# Patient Record
Sex: MALE | Race: ASIAN | NOT HISPANIC OR LATINO | Employment: FULL TIME | URBAN - METROPOLITAN AREA
[De-identification: names, ages, dates, MRNs, and addresses within clinical notes are randomized per-mention and may not be internally consistent; named-entity substitution may affect disease eponyms.]

---

## 2021-01-01 ENCOUNTER — OFFICE VISIT (OUTPATIENT)
Dept: UROLOGY | Facility: CLINIC | Age: 61
End: 2021-01-01
Payer: COMMERCIAL

## 2021-01-01 ENCOUNTER — TELEPHONE (OUTPATIENT)
Dept: UROLOGY | Facility: CLINIC | Age: 61
End: 2021-01-01
Payer: COMMERCIAL

## 2021-01-01 ENCOUNTER — LAB VISIT (OUTPATIENT)
Dept: LAB | Facility: HOSPITAL | Age: 61
End: 2021-01-01
Attending: NURSE PRACTITIONER
Payer: COMMERCIAL

## 2021-01-01 VITALS
HEIGHT: 68 IN | DIASTOLIC BLOOD PRESSURE: 74 MMHG | SYSTOLIC BLOOD PRESSURE: 118 MMHG | HEART RATE: 68 BPM | BODY MASS INDEX: 22.66 KG/M2 | WEIGHT: 149.5 LBS

## 2021-01-01 DIAGNOSIS — N40.1 BENIGN PROSTATIC HYPERPLASIA WITH URINARY FREQUENCY: ICD-10-CM

## 2021-01-01 DIAGNOSIS — Z79.4 TYPE 2 DIABETES MELLITUS WITH CHRONIC KIDNEY DISEASE, WITH LONG-TERM CURRENT USE OF INSULIN, UNSPECIFIED CKD STAGE: ICD-10-CM

## 2021-01-01 DIAGNOSIS — R35.0 BENIGN PROSTATIC HYPERPLASIA WITH URINARY FREQUENCY: ICD-10-CM

## 2021-01-01 DIAGNOSIS — N52.9 ERECTILE DYSFUNCTION, UNSPECIFIED ERECTILE DYSFUNCTION TYPE: ICD-10-CM

## 2021-01-01 DIAGNOSIS — E11.22 TYPE 2 DIABETES MELLITUS WITH CHRONIC KIDNEY DISEASE, WITH LONG-TERM CURRENT USE OF INSULIN, UNSPECIFIED CKD STAGE: ICD-10-CM

## 2021-01-01 DIAGNOSIS — Z94.0 KIDNEY TRANSPLANT RECIPIENT: ICD-10-CM

## 2021-01-01 DIAGNOSIS — N52.9 ERECTILE DYSFUNCTION, UNSPECIFIED ERECTILE DYSFUNCTION TYPE: Primary | ICD-10-CM

## 2021-01-01 LAB
ANION GAP SERPL CALC-SCNC: 9 MMOL/L (ref 8–16)
BUN SERPL-MCNC: 15 MG/DL (ref 6–20)
CALCIUM SERPL-MCNC: 8.8 MG/DL (ref 8.7–10.5)
CHLORIDE SERPL-SCNC: 106 MMOL/L (ref 95–110)
CO2 SERPL-SCNC: 22 MMOL/L (ref 23–29)
COMPLEXED PSA SERPL-MCNC: 0.18 NG/ML (ref 0–4)
CREAT SERPL-MCNC: 1.1 MG/DL (ref 0.5–1.4)
ERYTHROCYTE [DISTWIDTH] IN BLOOD BY AUTOMATED COUNT: 13.2 % (ref 11.5–14.5)
EST. GFR  (AFRICAN AMERICAN): >60 ML/MIN/1.73 M^2
EST. GFR  (NON AFRICAN AMERICAN): >60 ML/MIN/1.73 M^2
ESTIMATED AVG GLUCOSE: 174 MG/DL (ref 68–131)
FSH SERPL-ACNC: 38.29 MIU/ML (ref 0.95–11.95)
GLUCOSE SERPL-MCNC: 217 MG/DL (ref 70–110)
HBA1C MFR BLD: 7.7 % (ref 4–5.6)
HCT VFR BLD AUTO: 34 % (ref 40–54)
HGB BLD-MCNC: 11.9 G/DL (ref 14–18)
LH SERPL-ACNC: 17.7 MIU/ML (ref 0.6–12.1)
MCH RBC QN AUTO: 30.7 PG (ref 27–31)
MCHC RBC AUTO-ENTMCNC: 35 G/DL (ref 32–36)
MCV RBC AUTO: 88 FL (ref 82–98)
PLATELET # BLD AUTO: 93 K/UL (ref 150–450)
PMV BLD AUTO: 11.2 FL (ref 9.2–12.9)
POTASSIUM SERPL-SCNC: 3.7 MMOL/L (ref 3.5–5.1)
PROLACTIN SERPL IA-MCNC: 14.7 NG/ML (ref 3.5–19.4)
RBC # BLD AUTO: 3.88 M/UL (ref 4.6–6.2)
SODIUM SERPL-SCNC: 137 MMOL/L (ref 136–145)
T4 FREE SERPL-MCNC: 1.09 NG/DL (ref 0.71–1.51)
TESTOST SERPL-MCNC: 273 NG/DL (ref 304–1227)
TSH SERPL DL<=0.005 MIU/L-ACNC: 2.3 UIU/ML (ref 0.4–4)
WBC # BLD AUTO: 3.34 K/UL (ref 3.9–12.7)

## 2021-01-01 PROCEDURE — 84443 ASSAY THYROID STIM HORMONE: CPT | Performed by: NURSE PRACTITIONER

## 2021-01-01 PROCEDURE — 1160F RVW MEDS BY RX/DR IN RCRD: CPT | Mod: CPTII,S$GLB,, | Performed by: NURSE PRACTITIONER

## 2021-01-01 PROCEDURE — 3074F SYST BP LT 130 MM HG: CPT | Mod: CPTII,S$GLB,, | Performed by: NURSE PRACTITIONER

## 2021-01-01 PROCEDURE — 3008F PR BODY MASS INDEX (BMI) DOCUMENTED: ICD-10-PCS | Mod: CPTII,S$GLB,, | Performed by: NURSE PRACTITIONER

## 2021-01-01 PROCEDURE — 99999 PR PBB SHADOW E&M-EST. PATIENT-LVL V: CPT | Mod: PBBFAC,,, | Performed by: NURSE PRACTITIONER

## 2021-01-01 PROCEDURE — 84403 ASSAY OF TOTAL TESTOSTERONE: CPT | Performed by: NURSE PRACTITIONER

## 2021-01-01 PROCEDURE — 3074F PR MOST RECENT SYSTOLIC BLOOD PRESSURE < 130 MM HG: ICD-10-PCS | Mod: CPTII,S$GLB,, | Performed by: NURSE PRACTITIONER

## 2021-01-01 PROCEDURE — 4010F ACE/ARB THERAPY RXD/TAKEN: CPT | Mod: CPTII,S$GLB,, | Performed by: NURSE PRACTITIONER

## 2021-01-01 PROCEDURE — 84153 ASSAY OF PSA TOTAL: CPT | Performed by: NURSE PRACTITIONER

## 2021-01-01 PROCEDURE — 3078F PR MOST RECENT DIASTOLIC BLOOD PRESSURE < 80 MM HG: ICD-10-PCS | Mod: CPTII,S$GLB,, | Performed by: NURSE PRACTITIONER

## 2021-01-01 PROCEDURE — 4010F PR ACE/ARB THEARPY RXD/TAKEN: ICD-10-PCS | Mod: CPTII,S$GLB,, | Performed by: NURSE PRACTITIONER

## 2021-01-01 PROCEDURE — 83002 ASSAY OF GONADOTROPIN (LH): CPT | Performed by: NURSE PRACTITIONER

## 2021-01-01 PROCEDURE — 84439 ASSAY OF FREE THYROXINE: CPT | Performed by: NURSE PRACTITIONER

## 2021-01-01 PROCEDURE — 83001 ASSAY OF GONADOTROPIN (FSH): CPT | Performed by: NURSE PRACTITIONER

## 2021-01-01 PROCEDURE — 83036 HEMOGLOBIN GLYCOSYLATED A1C: CPT | Performed by: NURSE PRACTITIONER

## 2021-01-01 PROCEDURE — 3008F BODY MASS INDEX DOCD: CPT | Mod: CPTII,S$GLB,, | Performed by: NURSE PRACTITIONER

## 2021-01-01 PROCEDURE — 1159F MED LIST DOCD IN RCRD: CPT | Mod: CPTII,S$GLB,, | Performed by: NURSE PRACTITIONER

## 2021-01-01 PROCEDURE — 84146 ASSAY OF PROLACTIN: CPT | Performed by: NURSE PRACTITIONER

## 2021-01-01 PROCEDURE — 85027 COMPLETE CBC AUTOMATED: CPT | Performed by: NURSE PRACTITIONER

## 2021-01-01 PROCEDURE — 99204 PR OFFICE/OUTPT VISIT, NEW, LEVL IV, 45-59 MIN: ICD-10-PCS | Mod: S$GLB,,, | Performed by: NURSE PRACTITIONER

## 2021-01-01 PROCEDURE — 99204 OFFICE O/P NEW MOD 45 MIN: CPT | Mod: S$GLB,,, | Performed by: NURSE PRACTITIONER

## 2021-01-01 PROCEDURE — 36415 COLL VENOUS BLD VENIPUNCTURE: CPT | Performed by: NURSE PRACTITIONER

## 2021-01-01 PROCEDURE — 80048 BASIC METABOLIC PNL TOTAL CA: CPT | Performed by: NURSE PRACTITIONER

## 2021-01-01 PROCEDURE — 3078F DIAST BP <80 MM HG: CPT | Mod: CPTII,S$GLB,, | Performed by: NURSE PRACTITIONER

## 2021-01-01 PROCEDURE — 1160F PR REVIEW ALL MEDS BY PRESCRIBER/CLIN PHARMACIST DOCUMENTED: ICD-10-PCS | Mod: CPTII,S$GLB,, | Performed by: NURSE PRACTITIONER

## 2021-01-01 PROCEDURE — 99999 PR PBB SHADOW E&M-EST. PATIENT-LVL V: ICD-10-PCS | Mod: PBBFAC,,, | Performed by: NURSE PRACTITIONER

## 2021-01-01 PROCEDURE — 1159F PR MEDICATION LIST DOCUMENTED IN MEDICAL RECORD: ICD-10-PCS | Mod: CPTII,S$GLB,, | Performed by: NURSE PRACTITIONER

## 2021-01-01 RX ORDER — SIROLIMUS 1 MG/1
2 TABLET, FILM COATED ORAL DAILY
Status: ON HOLD | COMMUNITY
Start: 2021-01-01 | End: 2022-01-01

## 2021-01-01 RX ORDER — SILDENAFIL 100 MG/1
100 TABLET, FILM COATED ORAL DAILY PRN
Qty: 6 TABLET | Refills: 6 | Status: ON HOLD | OUTPATIENT
Start: 2021-01-01 | End: 2022-01-01

## 2021-01-01 RX ORDER — PEN NEEDLE, DIABETIC 32GX 5/32"
NEEDLE, DISPOSABLE MISCELLANEOUS
Status: ON HOLD | COMMUNITY
Start: 2021-01-01 | End: 2022-01-01

## 2021-01-01 RX ORDER — TACROLIMUS 0.5 MG/1
2 CAPSULE ORAL EVERY MORNING
Status: ON HOLD | COMMUNITY
Start: 2021-01-01 | End: 2022-01-01 | Stop reason: HOSPADM

## 2021-01-01 RX ORDER — LEVOTHYROXINE SODIUM 50 UG/1
50 TABLET ORAL DAILY
COMMUNITY
Start: 2021-01-01 | End: 2022-01-01

## 2021-01-01 RX ORDER — INSULIN GLARGINE 300 U/ML
30 INJECTION, SOLUTION SUBCUTANEOUS NIGHTLY
Status: ON HOLD | COMMUNITY
Start: 2021-01-01 | End: 2022-01-01 | Stop reason: SDUPTHER

## 2021-01-01 RX ORDER — METOPROLOL TARTRATE 25 MG/1
25 TABLET, FILM COATED ORAL 2 TIMES DAILY
COMMUNITY
Start: 2021-01-01 | End: 2022-01-01

## 2021-01-01 RX ORDER — ALFUZOSIN HYDROCHLORIDE 10 MG/1
10 TABLET, EXTENDED RELEASE ORAL
Qty: 30 TABLET | Refills: 12 | Status: ON HOLD | OUTPATIENT
Start: 2021-01-01 | End: 2022-01-01 | Stop reason: HOSPADM

## 2021-01-01 RX ORDER — LISINOPRIL 5 MG/1
5 TABLET ORAL DAILY
COMMUNITY
Start: 2021-01-01 | End: 2022-01-01

## 2021-01-01 RX ORDER — LANCETS
EACH MISCELLANEOUS
Status: ON HOLD | COMMUNITY
Start: 2021-01-01 | End: 2022-01-01 | Stop reason: HOSPADM

## 2021-01-01 RX ORDER — PRAVASTATIN SODIUM 40 MG/1
40 TABLET ORAL NIGHTLY
Status: ON HOLD | COMMUNITY
Start: 2021-01-01 | End: 2022-01-01

## 2021-01-01 RX ORDER — METFORMIN HYDROCHLORIDE 500 MG/1
500 TABLET ORAL 2 TIMES DAILY
COMMUNITY
Start: 2021-01-01 | End: 2022-01-01

## 2021-01-01 RX ORDER — ALLOPURINOL 100 MG/1
100 TABLET ORAL DAILY
Status: ON HOLD | COMMUNITY
Start: 2021-01-01 | End: 2022-01-01

## 2021-05-19 ENCOUNTER — OFFICE VISIT (OUTPATIENT)
Dept: URBAN - METROPOLITAN AREA CLINIC 51 | Facility: CLINIC | Age: 61
End: 2021-05-19
Payer: COMMERCIAL

## 2021-05-19 DIAGNOSIS — Z83.511 FAMILY HISTORY OF GLAUCOMA: ICD-10-CM

## 2021-05-19 DIAGNOSIS — H25.811 COMBINED FORMS OF AGE-RELATED CATARACT, RIGHT EYE: ICD-10-CM

## 2021-05-19 DIAGNOSIS — E11.9 TYPE 2 DIABETES MELLITUS W/O COMPLICATION: ICD-10-CM

## 2021-05-19 DIAGNOSIS — Z79.4 LONG TERM (CURRENT) USE OF INSULIN: ICD-10-CM

## 2021-05-19 DIAGNOSIS — H43.812 VITREOUS DEGENERATION, LEFT EYE: Primary | ICD-10-CM

## 2021-05-19 PROCEDURE — 76514 ECHO EXAM OF EYE THICKNESS: CPT | Performed by: OPTOMETRIST

## 2021-05-19 PROCEDURE — 99204 OFFICE O/P NEW MOD 45 MIN: CPT | Performed by: OPTOMETRIST

## 2021-05-19 PROCEDURE — 92250 FUNDUS PHOTOGRAPHY W/I&R: CPT | Performed by: OPTOMETRIST

## 2021-05-19 ASSESSMENT — VISUAL ACUITY
OS: 20/25
OD: 20/30

## 2021-05-19 ASSESSMENT — INTRAOCULAR PRESSURE
OD: 16
OS: 16

## 2021-05-19 ASSESSMENT — KERATOMETRY
OD: 43.08
OS: 43.33

## 2021-05-19 NOTE — IMPRESSION/PLAN
Impression: Family history of glaucoma: Z83.511. Plan: iop low 
rnfl normal 
no cupping 
no glc at this time.  
monitor yearly

## 2021-05-19 NOTE — IMPRESSION/PLAN
Impression: Type 2 diabetes mellitus w/o complication: L78.6. Plan: Diabetes type II: no background retinopathy, no signs of neovascularization noted. Discussed ocular and systemic benefits of blood sugar control.

## 2021-05-19 NOTE — IMPRESSION/PLAN
Impression: Vitreous degeneration, left eye: H43.812.  Plan: pt feels sig to vision and requests referral to retinal specialist

## 2021-05-19 NOTE — IMPRESSION/PLAN
Impression: Combined forms of age-related cataract, right eye: H25.811. Plan: discussed cat right eye , explained cat sx was in left eye and not the right.  pt feels vision is acceptable in the right eye

## 2022-01-01 ENCOUNTER — OFFICE VISIT (OUTPATIENT)
Dept: PULMONOLOGY | Facility: CLINIC | Age: 62
End: 2022-01-01
Payer: COMMERCIAL

## 2022-01-01 ENCOUNTER — PATIENT MESSAGE (OUTPATIENT)
Dept: FAMILY MEDICINE | Facility: CLINIC | Age: 62
End: 2022-01-01
Payer: COMMERCIAL

## 2022-01-01 ENCOUNTER — TELEPHONE (OUTPATIENT)
Dept: PHARMACY | Facility: CLINIC | Age: 62
End: 2022-01-01
Payer: COMMERCIAL

## 2022-01-01 ENCOUNTER — NURSE TRIAGE (OUTPATIENT)
Dept: ADMINISTRATIVE | Facility: CLINIC | Age: 62
End: 2022-01-01
Payer: COMMERCIAL

## 2022-01-01 ENCOUNTER — HOSPITAL ENCOUNTER (OUTPATIENT)
Dept: RADIOLOGY | Facility: CLINIC | Age: 62
Discharge: HOME | End: 2022-03-02
Attending: PHYSICIAN ASSISTANT
Payer: COMMERCIAL

## 2022-01-01 ENCOUNTER — HOSPITAL ENCOUNTER (OUTPATIENT)
Facility: HOSPITAL | Age: 62
Discharge: HOME | End: 2022-03-08
Attending: INTERNAL MEDICINE | Admitting: INTERNAL MEDICINE
Payer: COMMERCIAL

## 2022-01-01 ENCOUNTER — LAB VISIT (OUTPATIENT)
Dept: LAB | Facility: HOSPITAL | Age: 62
End: 2022-01-01
Attending: RADIOLOGY
Payer: COMMERCIAL

## 2022-01-01 ENCOUNTER — TELEPHONE (OUTPATIENT)
Dept: FAMILY MEDICINE | Facility: CLINIC | Age: 62
End: 2022-01-01

## 2022-01-01 ENCOUNTER — DOCUMENTATION ONLY (OUTPATIENT)
Dept: TRANSPLANT | Facility: CLINIC | Age: 62
End: 2022-01-01
Payer: COMMERCIAL

## 2022-01-01 ENCOUNTER — PATIENT MESSAGE (OUTPATIENT)
Dept: PULMONOLOGY | Facility: CLINIC | Age: 62
End: 2022-01-01
Payer: COMMERCIAL

## 2022-01-01 ENCOUNTER — OFFICE VISIT (OUTPATIENT)
Dept: HEPATOLOGY | Facility: CLINIC | Age: 62
End: 2022-01-01
Payer: COMMERCIAL

## 2022-01-01 ENCOUNTER — HOSPITAL ENCOUNTER (OUTPATIENT)
Dept: RADIOLOGY | Facility: HOSPITAL | Age: 62
Discharge: HOME | End: 2022-02-24
Attending: INTERNAL MEDICINE
Payer: COMMERCIAL

## 2022-01-01 ENCOUNTER — HOSPITAL ENCOUNTER (INPATIENT)
Facility: HOSPITAL | Age: 62
LOS: 3 days | Discharge: ANOTHER HOSPITAL | DRG: 682 | End: 2022-04-30
Attending: EMERGENCY MEDICINE | Admitting: INTERNAL MEDICINE
Payer: COMMERCIAL

## 2022-01-01 ENCOUNTER — ANESTHESIA (OUTPATIENT)
Dept: TRANSPLANT | Facility: HOSPITAL | Age: 62
DRG: 698 | End: 2022-01-01
Payer: COMMERCIAL

## 2022-01-01 ENCOUNTER — TELEPHONE (OUTPATIENT)
Dept: TRANSPLANT | Facility: HOSPITAL | Age: 62
End: 2022-01-01
Payer: COMMERCIAL

## 2022-01-01 ENCOUNTER — LAB VISIT (OUTPATIENT)
Dept: LAB | Facility: HOSPITAL | Age: 62
End: 2022-01-01
Attending: INTERNAL MEDICINE
Payer: COMMERCIAL

## 2022-01-01 ENCOUNTER — COMMITTEE REVIEW (OUTPATIENT)
Dept: TRANSPLANT | Facility: CLINIC | Age: 62
End: 2022-01-01
Payer: COMMERCIAL

## 2022-01-01 ENCOUNTER — TELEPHONE (OUTPATIENT)
Dept: HEPATOLOGY | Facility: CLINIC | Age: 62
End: 2022-01-01
Payer: COMMERCIAL

## 2022-01-01 ENCOUNTER — HOSPITAL ENCOUNTER (OUTPATIENT)
Dept: INTERVENTIONAL RADIOLOGY/VASCULAR | Facility: HOSPITAL | Age: 62
Discharge: HOME | End: 2022-04-06
Attending: INTERNAL MEDICINE
Payer: COMMERCIAL

## 2022-01-01 ENCOUNTER — TELEPHONE (OUTPATIENT)
Dept: RADIOLOGY | Facility: HOSPITAL | Age: 62
End: 2022-01-01
Payer: COMMERCIAL

## 2022-01-01 ENCOUNTER — TELEPHONE (OUTPATIENT)
Dept: INTERVENTIONAL RADIOLOGY/VASCULAR | Facility: HOSPITAL | Age: 62
End: 2022-01-01
Payer: COMMERCIAL

## 2022-01-01 ENCOUNTER — ANESTHESIA EVENT (OUTPATIENT)
Dept: SURGERY | Facility: HOSPITAL | Age: 62
End: 2022-01-01
Payer: COMMERCIAL

## 2022-01-01 ENCOUNTER — HOSPITAL ENCOUNTER (OUTPATIENT)
Dept: PREADMISSION TESTING | Facility: HOSPITAL | Age: 62
Discharge: HOME | End: 2022-03-04
Attending: INTERNAL MEDICINE
Payer: COMMERCIAL

## 2022-01-01 ENCOUNTER — LAB VISIT (OUTPATIENT)
Dept: LAB | Facility: HOSPITAL | Age: 62
End: 2022-01-01
Attending: PHYSICIAN ASSISTANT
Payer: COMMERCIAL

## 2022-01-01 ENCOUNTER — TELEPHONE (OUTPATIENT)
Dept: FAMILY MEDICINE | Facility: CLINIC | Age: 62
End: 2022-01-01
Payer: COMMERCIAL

## 2022-01-01 ENCOUNTER — HOSPITAL ENCOUNTER (INPATIENT)
Facility: HOSPITAL | Age: 62
LOS: 18 days | DRG: 698 | End: 2022-05-18
Attending: INTERNAL MEDICINE | Admitting: HOSPITALIST
Payer: COMMERCIAL

## 2022-01-01 ENCOUNTER — HOSPITAL ENCOUNTER (INPATIENT)
Facility: HOSPITAL | Age: 62
LOS: 13 days | Discharge: HOME | DRG: 640 | End: 2022-04-21
Attending: EMERGENCY MEDICINE | Admitting: INTERNAL MEDICINE
Payer: COMMERCIAL

## 2022-01-01 ENCOUNTER — LAB VISIT (OUTPATIENT)
Dept: LAB | Facility: HOSPITAL | Age: 62
End: 2022-01-01
Attending: FAMILY MEDICINE
Payer: COMMERCIAL

## 2022-01-01 ENCOUNTER — OFFICE VISIT (OUTPATIENT)
Dept: FAMILY MEDICINE | Facility: CLINIC | Age: 62
End: 2022-01-01
Payer: COMMERCIAL

## 2022-01-01 ENCOUNTER — EPISODE CHANGES (OUTPATIENT)
Dept: TRANSPLANT | Facility: CLINIC | Age: 62
End: 2022-01-01

## 2022-01-01 ENCOUNTER — TELEPHONE (OUTPATIENT)
Dept: TRANSPLANT | Facility: CLINIC | Age: 62
End: 2022-01-01
Payer: COMMERCIAL

## 2022-01-01 ENCOUNTER — HOSPITAL ENCOUNTER (OUTPATIENT)
Dept: RADIOLOGY | Facility: HOSPITAL | Age: 62
Discharge: HOME | End: 2022-03-24
Attending: INTERNAL MEDICINE
Payer: COMMERCIAL

## 2022-01-01 ENCOUNTER — PATIENT OUTREACH (OUTPATIENT)
Dept: ADMINISTRATIVE | Facility: OTHER | Age: 62
End: 2022-01-01
Payer: COMMERCIAL

## 2022-01-01 ENCOUNTER — TELEPHONE (OUTPATIENT)
Dept: PULMONOLOGY | Facility: CLINIC | Age: 62
End: 2022-01-01
Payer: COMMERCIAL

## 2022-01-01 ENCOUNTER — TELEPHONE (OUTPATIENT)
Dept: UROLOGY | Facility: CLINIC | Age: 62
End: 2022-01-01
Payer: COMMERCIAL

## 2022-01-01 ENCOUNTER — CONFERENCE (OUTPATIENT)
Dept: TRANSPLANT | Facility: CLINIC | Age: 62
End: 2022-01-01
Payer: COMMERCIAL

## 2022-01-01 ENCOUNTER — ANESTHESIA (OUTPATIENT)
Dept: SURGERY | Facility: HOSPITAL | Age: 62
End: 2022-01-01
Payer: COMMERCIAL

## 2022-01-01 ENCOUNTER — HOSPITAL ENCOUNTER (OUTPATIENT)
Dept: RADIOLOGY | Facility: HOSPITAL | Age: 62
Discharge: HOME | End: 2022-02-24
Attending: SURGERY
Payer: COMMERCIAL

## 2022-01-01 ENCOUNTER — PATIENT MESSAGE (OUTPATIENT)
Dept: ADMINISTRATIVE | Facility: HOSPITAL | Age: 62
End: 2022-01-01
Payer: COMMERCIAL

## 2022-01-01 ENCOUNTER — ANESTHESIA EVENT (OUTPATIENT)
Dept: TRANSPLANT | Facility: HOSPITAL | Age: 62
DRG: 698 | End: 2022-01-01
Payer: COMMERCIAL

## 2022-01-01 ENCOUNTER — HOSPITAL ENCOUNTER (INPATIENT)
Facility: HOSPITAL | Age: 62
LOS: 1 days | Discharge: HOME | DRG: 194 | End: 2022-02-03
Attending: EMERGENCY MEDICINE | Admitting: INTERNAL MEDICINE
Payer: COMMERCIAL

## 2022-01-01 VITALS
TEMPERATURE: 99 F | SYSTOLIC BLOOD PRESSURE: 112 MMHG | HEIGHT: 68 IN | HEART RATE: 74 BPM | DIASTOLIC BLOOD PRESSURE: 73 MMHG | BODY MASS INDEX: 22.75 KG/M2 | OXYGEN SATURATION: 94 % | WEIGHT: 150.13 LBS | RESPIRATION RATE: 20 BRPM

## 2022-01-01 VITALS
HEART RATE: 77 BPM | OXYGEN SATURATION: 97 % | WEIGHT: 160.25 LBS | DIASTOLIC BLOOD PRESSURE: 68 MMHG | TEMPERATURE: 99 F | RESPIRATION RATE: 17 BRPM | HEIGHT: 68 IN | SYSTOLIC BLOOD PRESSURE: 118 MMHG | BODY MASS INDEX: 24.29 KG/M2

## 2022-01-01 VITALS
SYSTOLIC BLOOD PRESSURE: 108 MMHG | HEART RATE: 75 BPM | OXYGEN SATURATION: 96 % | DIASTOLIC BLOOD PRESSURE: 70 MMHG | RESPIRATION RATE: 20 BRPM

## 2022-01-01 VITALS
OXYGEN SATURATION: 98 % | SYSTOLIC BLOOD PRESSURE: 124 MMHG | HEART RATE: 62 BPM | DIASTOLIC BLOOD PRESSURE: 77 MMHG | RESPIRATION RATE: 18 BRPM | HEIGHT: 68 IN | WEIGHT: 141.56 LBS | TEMPERATURE: 99 F | BODY MASS INDEX: 21.45 KG/M2

## 2022-01-01 VITALS
RESPIRATION RATE: 15 BRPM | BODY MASS INDEX: 21.81 KG/M2 | WEIGHT: 143.94 LBS | OXYGEN SATURATION: 90 % | HEART RATE: 71 BPM | TEMPERATURE: 99 F | SYSTOLIC BLOOD PRESSURE: 121 MMHG | HEIGHT: 68 IN | DIASTOLIC BLOOD PRESSURE: 75 MMHG

## 2022-01-01 VITALS
TEMPERATURE: 97 F | TEMPERATURE: 98 F | HEART RATE: 79 BPM | SYSTOLIC BLOOD PRESSURE: 99 MMHG | DIASTOLIC BLOOD PRESSURE: 72 MMHG | HEART RATE: 77 BPM | SYSTOLIC BLOOD PRESSURE: 101 MMHG | BODY MASS INDEX: 22.46 KG/M2 | RESPIRATION RATE: 18 BRPM | OXYGEN SATURATION: 96 % | DIASTOLIC BLOOD PRESSURE: 61 MMHG | OXYGEN SATURATION: 96 % | RESPIRATION RATE: 14 BRPM | WEIGHT: 147.69 LBS

## 2022-01-01 VITALS
HEART RATE: 107 BPM | WEIGHT: 137.56 LBS | TEMPERATURE: 97 F | BODY MASS INDEX: 20.85 KG/M2 | OXYGEN SATURATION: 88 % | HEIGHT: 68 IN | RESPIRATION RATE: 19 BRPM | DIASTOLIC BLOOD PRESSURE: 41 MMHG | SYSTOLIC BLOOD PRESSURE: 69 MMHG

## 2022-01-01 VITALS
WEIGHT: 160 LBS | SYSTOLIC BLOOD PRESSURE: 129 MMHG | HEART RATE: 73 BPM | HEIGHT: 68 IN | DIASTOLIC BLOOD PRESSURE: 86 MMHG | BODY MASS INDEX: 24.25 KG/M2 | OXYGEN SATURATION: 97 % | TEMPERATURE: 99 F | RESPIRATION RATE: 18 BRPM

## 2022-01-01 VITALS
SYSTOLIC BLOOD PRESSURE: 128 MMHG | WEIGHT: 162.25 LBS | OXYGEN SATURATION: 97 % | DIASTOLIC BLOOD PRESSURE: 72 MMHG | HEART RATE: 89 BPM | HEIGHT: 68 IN | BODY MASS INDEX: 24.59 KG/M2

## 2022-01-01 VITALS
RESPIRATION RATE: 18 BRPM | SYSTOLIC BLOOD PRESSURE: 103 MMHG | HEART RATE: 78 BPM | OXYGEN SATURATION: 98 % | HEIGHT: 68 IN | DIASTOLIC BLOOD PRESSURE: 70 MMHG | BODY MASS INDEX: 22.7 KG/M2 | WEIGHT: 149.81 LBS

## 2022-01-01 VITALS
SYSTOLIC BLOOD PRESSURE: 115 MMHG | RESPIRATION RATE: 18 BRPM | DIASTOLIC BLOOD PRESSURE: 79 MMHG | BODY MASS INDEX: 22.73 KG/M2 | WEIGHT: 150 LBS | TEMPERATURE: 98 F | HEART RATE: 82 BPM | OXYGEN SATURATION: 99 % | HEIGHT: 68 IN

## 2022-01-01 DIAGNOSIS — K74.60 CIRRHOSIS: Primary | ICD-10-CM

## 2022-01-01 DIAGNOSIS — D64.9 ANEMIA, UNSPECIFIED TYPE: Chronic | ICD-10-CM

## 2022-01-01 DIAGNOSIS — K74.60 CIRRHOSIS OF LIVER WITH ASCITES, UNSPECIFIED HEPATIC CIRRHOSIS TYPE: ICD-10-CM

## 2022-01-01 DIAGNOSIS — I10 HYPERTENSION, UNSPECIFIED TYPE: ICD-10-CM

## 2022-01-01 DIAGNOSIS — Z71.89 ADVANCED CARE PLANNING/COUNSELING DISCUSSION: ICD-10-CM

## 2022-01-01 DIAGNOSIS — R18.8 CIRRHOSIS OF LIVER WITH ASCITES, UNSPECIFIED HEPATIC CIRRHOSIS TYPE: Primary | ICD-10-CM

## 2022-01-01 DIAGNOSIS — R18.8 CIRRHOSIS OF LIVER WITH ASCITES, UNSPECIFIED HEPATIC CIRRHOSIS TYPE: ICD-10-CM

## 2022-01-01 DIAGNOSIS — R07.9 CHEST PAIN: ICD-10-CM

## 2022-01-01 DIAGNOSIS — K74.60 HEPATIC CIRRHOSIS, UNSPECIFIED HEPATIC CIRRHOSIS TYPE, UNSPECIFIED WHETHER ASCITES PRESENT: Primary | ICD-10-CM

## 2022-01-01 DIAGNOSIS — R06.82 TACHYPNEA: ICD-10-CM

## 2022-01-01 DIAGNOSIS — Z94.0 KIDNEY REPLACED BY TRANSPLANT: ICD-10-CM

## 2022-01-01 DIAGNOSIS — E78.5 HYPERLIPIDEMIA, UNSPECIFIED HYPERLIPIDEMIA TYPE: ICD-10-CM

## 2022-01-01 DIAGNOSIS — R06.02 SHORTNESS OF BREATH: ICD-10-CM

## 2022-01-01 DIAGNOSIS — K74.69 NON-ALCOHOLIC MICRONODULAR CIRRHOSIS OF LIVER: Primary | ICD-10-CM

## 2022-01-01 DIAGNOSIS — J84.9 INTERSTITIAL LUNG DISEASE: ICD-10-CM

## 2022-01-01 DIAGNOSIS — Z94.0 KIDNEY TRANSPLANTED: Primary | ICD-10-CM

## 2022-01-01 DIAGNOSIS — Z92.25 PERSONAL HISTORY OF IMMUNOSUPRESSION THERAPY: ICD-10-CM

## 2022-01-01 DIAGNOSIS — K55.9 ISCHEMIC BOWEL DISEASE: ICD-10-CM

## 2022-01-01 DIAGNOSIS — R18.8 OTHER ASCITES: Primary | ICD-10-CM

## 2022-01-01 DIAGNOSIS — J18.9 MULTIFOCAL PNEUMONIA: ICD-10-CM

## 2022-01-01 DIAGNOSIS — K76.89 COMPENSATORY LOBAR HYPERPLASIA OF LIVER: ICD-10-CM

## 2022-01-01 DIAGNOSIS — K20.90 BARRETT'S ESOPHAGUS WITH ESOPHAGITIS: ICD-10-CM

## 2022-01-01 DIAGNOSIS — Z09 HOSPITAL DISCHARGE FOLLOW-UP: ICD-10-CM

## 2022-01-01 DIAGNOSIS — K74.60 HEPATIC CIRRHOSIS, UNSPECIFIED HEPATIC CIRRHOSIS TYPE, UNSPECIFIED WHETHER ASCITES PRESENT: ICD-10-CM

## 2022-01-01 DIAGNOSIS — E10.29 TYPE 1 DIABETES MELLITUS WITH OTHER KIDNEY COMPLICATION: ICD-10-CM

## 2022-01-01 DIAGNOSIS — K74.60 CIRRHOSIS OF LIVER WITH ASCITES, UNSPECIFIED HEPATIC CIRRHOSIS TYPE: Primary | ICD-10-CM

## 2022-01-01 DIAGNOSIS — E07.9 THYROID DISEASE: ICD-10-CM

## 2022-01-01 DIAGNOSIS — Z94.9 TRANSPLANT: ICD-10-CM

## 2022-01-01 DIAGNOSIS — A41.9 SEPSIS, DUE TO UNSPECIFIED ORGANISM, UNSPECIFIED WHETHER ACUTE ORGAN DYSFUNCTION PRESENT: ICD-10-CM

## 2022-01-01 DIAGNOSIS — R07.9 CHEST PAIN IN ADULT: ICD-10-CM

## 2022-01-01 DIAGNOSIS — R53.1 WEAKNESS: ICD-10-CM

## 2022-01-01 DIAGNOSIS — E87.20 METABOLIC ACIDOSIS: ICD-10-CM

## 2022-01-01 DIAGNOSIS — Z94.0 KIDNEY TRANSPLANTED: ICD-10-CM

## 2022-01-01 DIAGNOSIS — K22.70 BARRETT'S ESOPHAGUS WITH ESOPHAGITIS: ICD-10-CM

## 2022-01-01 DIAGNOSIS — Z94.0 KIDNEY TRANSPLANT RECIPIENT: ICD-10-CM

## 2022-01-01 DIAGNOSIS — N18.9 ACUTE KIDNEY INJURY SUPERIMPOSED ON CHRONIC KIDNEY DISEASE: Primary | ICD-10-CM

## 2022-01-01 DIAGNOSIS — R65.20 SEVERE SEPSIS: ICD-10-CM

## 2022-01-01 DIAGNOSIS — Z79.60 LONG-TERM USE OF IMMUNOSUPPRESSANT MEDICATION: ICD-10-CM

## 2022-01-01 DIAGNOSIS — E03.9 ACQUIRED HYPOTHYROIDISM: Chronic | ICD-10-CM

## 2022-01-01 DIAGNOSIS — E87.5 HYPERKALEMIA: ICD-10-CM

## 2022-01-01 DIAGNOSIS — N17.9 AKI (ACUTE KIDNEY INJURY): ICD-10-CM

## 2022-01-01 DIAGNOSIS — E11.9 TYPE 2 DIABETES MELLITUS WITHOUT COMPLICATION, WITHOUT LONG-TERM CURRENT USE OF INSULIN: ICD-10-CM

## 2022-01-01 DIAGNOSIS — K56.609 SMALL BOWEL OBSTRUCTION: ICD-10-CM

## 2022-01-01 DIAGNOSIS — Z01.818 PRE-TRANSPLANT EVALUATION FOR LIVER TRANSPLANT: ICD-10-CM

## 2022-01-01 DIAGNOSIS — B59 PNEUMONIA OF BOTH LUNGS DUE TO PNEUMOCYSTIS JIROVECII, UNSPECIFIED PART OF LUNG: ICD-10-CM

## 2022-01-01 DIAGNOSIS — E87.1 HYPONATREMIA: ICD-10-CM

## 2022-01-01 DIAGNOSIS — K74.69 FLORID CIRRHOSIS: ICD-10-CM

## 2022-01-01 DIAGNOSIS — R18.8 OTHER ASCITES: ICD-10-CM

## 2022-01-01 DIAGNOSIS — B65.9 SCHISTOSOMA: ICD-10-CM

## 2022-01-01 DIAGNOSIS — K65.2 SBP (SPONTANEOUS BACTERIAL PERITONITIS): ICD-10-CM

## 2022-01-01 DIAGNOSIS — E11.9 TYPE 2 DIABETES MELLITUS WITHOUT COMPLICATION: ICD-10-CM

## 2022-01-01 DIAGNOSIS — K74.60 DECOMPENSATED HEPATIC CIRRHOSIS: ICD-10-CM

## 2022-01-01 DIAGNOSIS — K59.00 CONSTIPATION, UNSPECIFIED CONSTIPATION TYPE: ICD-10-CM

## 2022-01-01 DIAGNOSIS — R19.7 DIARRHEA, UNSPECIFIED TYPE: Chronic | ICD-10-CM

## 2022-01-01 DIAGNOSIS — R05.9 COUGH: Primary | ICD-10-CM

## 2022-01-01 DIAGNOSIS — R91.8 LUNG INFILTRATE ON CT: ICD-10-CM

## 2022-01-01 DIAGNOSIS — R05.9 COUGH: ICD-10-CM

## 2022-01-01 DIAGNOSIS — K22.70 BARRETT'S ESOPHAGUS: ICD-10-CM

## 2022-01-01 DIAGNOSIS — A41.9 SEVERE SEPSIS: ICD-10-CM

## 2022-01-01 DIAGNOSIS — R79.89 ABNORMAL FOLLICLE STIMULATING HORMONE (FSH) LEVEL: Primary | ICD-10-CM

## 2022-01-01 DIAGNOSIS — N18.9 CHRONIC KIDNEY DISEASE, UNSPECIFIED CKD STAGE: ICD-10-CM

## 2022-01-01 DIAGNOSIS — K74.69 FLORID CIRRHOSIS: Primary | ICD-10-CM

## 2022-01-01 DIAGNOSIS — K52.9 CHRONIC DIARRHEA: Primary | ICD-10-CM

## 2022-01-01 DIAGNOSIS — I85.00 ESOPHAGEAL VARICES WITHOUT BLEEDING: ICD-10-CM

## 2022-01-01 DIAGNOSIS — R50.9 FEVER, UNSPECIFIED FEVER CAUSE: ICD-10-CM

## 2022-01-01 DIAGNOSIS — E43 SEVERE MALNUTRITION: ICD-10-CM

## 2022-01-01 DIAGNOSIS — K74.60 CIRRHOSIS: ICD-10-CM

## 2022-01-01 DIAGNOSIS — R79.89 INCREASED AMMONIA LEVEL: ICD-10-CM

## 2022-01-01 DIAGNOSIS — Z01.818 PRE-OP TESTING: Primary | ICD-10-CM

## 2022-01-01 DIAGNOSIS — Z09 HOSPITAL DISCHARGE FOLLOW-UP: Primary | ICD-10-CM

## 2022-01-01 DIAGNOSIS — B40.9 BLASTOMYCOSIS: ICD-10-CM

## 2022-01-01 DIAGNOSIS — Z94.0 HISTORY OF RENAL TRANSPLANT: ICD-10-CM

## 2022-01-01 DIAGNOSIS — D64.9 ANEMIA, UNSPECIFIED TYPE: ICD-10-CM

## 2022-01-01 DIAGNOSIS — D69.6 THROMBOCYTOPENIA: ICD-10-CM

## 2022-01-01 DIAGNOSIS — N17.9 ACUTE KIDNEY INJURY SUPERIMPOSED ON CHRONIC KIDNEY DISEASE: Primary | ICD-10-CM

## 2022-01-01 DIAGNOSIS — M10.9 GOUT, UNSPECIFIED CAUSE, UNSPECIFIED CHRONICITY, UNSPECIFIED SITE: ICD-10-CM

## 2022-01-01 DIAGNOSIS — E03.9 HYPOTHYROIDISM, UNSPECIFIED TYPE: Chronic | ICD-10-CM

## 2022-01-01 DIAGNOSIS — K72.90 DECOMPENSATED HEPATIC CIRRHOSIS: ICD-10-CM

## 2022-01-01 DIAGNOSIS — R91.8 MULTIPLE LUNG NODULES: ICD-10-CM

## 2022-01-01 DIAGNOSIS — K74.60 HEPATIC CIRRHOSIS: ICD-10-CM

## 2022-01-01 DIAGNOSIS — N17.9 AKI (ACUTE KIDNEY INJURY): Primary | ICD-10-CM

## 2022-01-01 DIAGNOSIS — D72.819 LEUKOPENIA, UNSPECIFIED TYPE: ICD-10-CM

## 2022-01-01 DIAGNOSIS — Z94.0 TRANSPLANTED KIDNEY: ICD-10-CM

## 2022-01-01 DIAGNOSIS — R05.3 CHRONIC COUGH: ICD-10-CM

## 2022-01-01 DIAGNOSIS — K74.69 NON-ALCOHOLIC MICRONODULAR CIRRHOSIS OF LIVER: ICD-10-CM

## 2022-01-01 DIAGNOSIS — J18.9 PNEUMONIA OF BOTH LUNGS DUE TO INFECTIOUS ORGANISM, UNSPECIFIED PART OF LUNG: Primary | ICD-10-CM

## 2022-01-01 LAB
1,3 BETA GLUCAN SER-MCNC: >500 PG/ML
A1AT PHENOTYP SERPL IFE: ABNORMAL
A1AT SERPL-MCNC: 198 MG/DL (ref 101–187)
ABO + RH BLD: NORMAL
ACID FAST MOD KINY STN SPEC: NORMAL
ACTIN IGG SERPL-ACNC: 9 UNITS (ref 0–19)
ADENOVIRUS: NOT DETECTED
ADV 40+41 DNA STL QL NAA+NON-PROBE: NOT DETECTED
AFP-TM SERPL-MCNC: 86.3 NG/ML (ref 0–8.4)
ALBUMIN FLD-MCNC: 0.8 G/DL
ALBUMIN FLD-MCNC: 0.9 G/DL
ALBUMIN FLD-MCNC: <1 G/DL
ALBUMIN FLD-MCNC: <1 G/DL
ALBUMIN SERPL BCP-MCNC: 2.3 G/DL (ref 3.5–5.2)
ALBUMIN SERPL BCP-MCNC: 2.4 G/DL (ref 3.5–5.2)
ALBUMIN SERPL BCP-MCNC: 2.5 G/DL (ref 3.5–5.2)
ALBUMIN SERPL BCP-MCNC: 2.6 G/DL (ref 3.5–5.2)
ALBUMIN SERPL BCP-MCNC: 2.7 G/DL (ref 3.5–5.2)
ALBUMIN SERPL BCP-MCNC: 2.8 G/DL (ref 3.5–5.2)
ALBUMIN SERPL BCP-MCNC: 2.8 G/DL (ref 3.5–5.2)
ALBUMIN SERPL BCP-MCNC: 2.9 G/DL (ref 3.5–5.2)
ALBUMIN SERPL BCP-MCNC: 3 G/DL (ref 3.5–5.2)
ALBUMIN SERPL BCP-MCNC: 3.1 G/DL (ref 3.5–5.2)
ALBUMIN SERPL BCP-MCNC: 3.3 G/DL (ref 3.5–5.2)
ALBUMIN SERPL BCP-MCNC: 3.4 G/DL (ref 3.5–5.2)
ALBUMIN SERPL ELPH-MCNC: 2.4 G/DL (ref 2.9–4.4)
ALBUMIN/GLOB SERPL: 0.9 {RATIO} (ref 0.7–1.7)
ALDOLASE SERPL-CCNC: 4 U/L (ref 3.3–10.3)
ALLENS TEST: ABNORMAL
ALLENS TEST: ABNORMAL
ALP SERPL-CCNC: 100 U/L (ref 55–135)
ALP SERPL-CCNC: 104 U/L (ref 55–135)
ALP SERPL-CCNC: 105 U/L (ref 55–135)
ALP SERPL-CCNC: 108 U/L (ref 55–135)
ALP SERPL-CCNC: 124 U/L (ref 55–135)
ALP SERPL-CCNC: 125 U/L (ref 55–135)
ALP SERPL-CCNC: 74 U/L (ref 55–135)
ALP SERPL-CCNC: 75 U/L (ref 55–135)
ALP SERPL-CCNC: 77 U/L (ref 55–135)
ALP SERPL-CCNC: 79 U/L (ref 55–135)
ALP SERPL-CCNC: 80 U/L (ref 55–135)
ALP SERPL-CCNC: 80 U/L (ref 55–135)
ALP SERPL-CCNC: 82 U/L (ref 55–135)
ALP SERPL-CCNC: 82 U/L (ref 55–135)
ALP SERPL-CCNC: 83 U/L (ref 55–135)
ALP SERPL-CCNC: 83 U/L (ref 55–135)
ALP SERPL-CCNC: 84 U/L (ref 55–135)
ALP SERPL-CCNC: 84 U/L (ref 55–135)
ALP SERPL-CCNC: 86 U/L (ref 55–135)
ALP SERPL-CCNC: 88 U/L (ref 55–135)
ALP SERPL-CCNC: 89 U/L (ref 55–135)
ALP SERPL-CCNC: 89 U/L (ref 55–135)
ALP SERPL-CCNC: 91 U/L (ref 55–135)
ALP SERPL-CCNC: 92 U/L (ref 55–135)
ALP SERPL-CCNC: 96 U/L (ref 55–135)
ALP SERPL-CCNC: 96 U/L (ref 55–135)
ALP SERPL-CCNC: 99 U/L (ref 55–135)
ALPHA1 GLOB SERPL ELPH-MCNC: 0.3 G/DL (ref 0–0.4)
ALPHA2 GLOB SERPL ELPH-MCNC: 0.4 G/DL (ref 0.4–1)
ALT SERPL W/O P-5'-P-CCNC: 10 U/L (ref 10–44)
ALT SERPL W/O P-5'-P-CCNC: 11 U/L (ref 10–44)
ALT SERPL W/O P-5'-P-CCNC: 12 U/L (ref 10–44)
ALT SERPL W/O P-5'-P-CCNC: 12 U/L (ref 10–44)
ALT SERPL W/O P-5'-P-CCNC: 14 U/L (ref 10–44)
ALT SERPL W/O P-5'-P-CCNC: 15 U/L (ref 10–44)
ALT SERPL W/O P-5'-P-CCNC: 16 U/L (ref 10–44)
ALT SERPL W/O P-5'-P-CCNC: 16 U/L (ref 10–44)
ALT SERPL W/O P-5'-P-CCNC: 17 U/L (ref 10–44)
ALT SERPL W/O P-5'-P-CCNC: 18 U/L (ref 10–44)
ALT SERPL W/O P-5'-P-CCNC: 19 U/L (ref 10–44)
ALT SERPL W/O P-5'-P-CCNC: 21 U/L (ref 10–44)
ALT SERPL W/O P-5'-P-CCNC: 22 U/L (ref 10–44)
ALT SERPL W/O P-5'-P-CCNC: 22 U/L (ref 10–44)
ALT SERPL W/O P-5'-P-CCNC: 23 U/L (ref 10–44)
ALT SERPL W/O P-5'-P-CCNC: 24 U/L (ref 10–44)
ALT SERPL W/O P-5'-P-CCNC: 26 U/L (ref 10–44)
ALT SERPL W/O P-5'-P-CCNC: 26 U/L (ref 10–44)
ALT SERPL W/O P-5'-P-CCNC: 37 U/L (ref 10–44)
ALT SERPL W/O P-5'-P-CCNC: 39 U/L (ref 10–44)
ALT SERPL W/O P-5'-P-CCNC: 49 U/L (ref 10–44)
ALT SERPL W/O P-5'-P-CCNC: 7 U/L (ref 10–44)
ALT SERPL W/O P-5'-P-CCNC: 8 U/L (ref 10–44)
ALT SERPL W/O P-5'-P-CCNC: 9 U/L (ref 10–44)
AMMONIA PLAS-SCNC: 110 UMOL/L (ref 10–50)
AMMONIA PLAS-SCNC: 26 UMOL/L (ref 10–50)
AMMONIA PLAS-SCNC: 69 UMOL/L (ref 10–50)
AMYLASE, BODY FLUID: 23 U/L
ANA TITR SER IF: NEGATIVE {TITER}
ANION GAP SERPL CALC-SCNC: 10 MMOL/L (ref 8–16)
ANION GAP SERPL CALC-SCNC: 11 MMOL/L (ref 8–16)
ANION GAP SERPL CALC-SCNC: 12 MMOL/L (ref 8–16)
ANION GAP SERPL CALC-SCNC: 13 MMOL/L (ref 8–16)
ANION GAP SERPL CALC-SCNC: 15 MMOL/L (ref 8–16)
ANION GAP SERPL CALC-SCNC: 16 MMOL/L (ref 8–16)
ANION GAP SERPL CALC-SCNC: 17 MMOL/L (ref 8–16)
ANION GAP SERPL CALC-SCNC: 18 MMOL/L (ref 8–16)
ANION GAP SERPL CALC-SCNC: 20 MMOL/L (ref 8–16)
ANION GAP SERPL CALC-SCNC: 5 MMOL/L (ref 8–16)
ANION GAP SERPL CALC-SCNC: 5 MMOL/L (ref 8–16)
ANION GAP SERPL CALC-SCNC: 6 MMOL/L (ref 8–16)
ANION GAP SERPL CALC-SCNC: 7 MMOL/L (ref 8–16)
ANION GAP SERPL CALC-SCNC: 7 MMOL/L (ref 8–16)
ANION GAP SERPL CALC-SCNC: 8 MMOL/L (ref 8–16)
ANION GAP SERPL CALC-SCNC: 9 MMOL/L (ref 8–16)
ANISOCYTOSIS BLD QL SMEAR: SLIGHT
ANISOCYTOSIS BLD QL SMEAR: SLIGHT
ANNOTATION COMMENT IMP: ABNORMAL
APPEARANCE FLD: CLEAR
APPEARANCE FLD: NORMAL
APTT BLDCRRT: 47.4 SEC (ref 21–32)
APTT PPP: 39 SEC (ref 23.3–35.1)
ASCENDING AORTA: 3.09 CM
ASCENDING AORTA: 3.14 CM
AST SERPL-CCNC: 103 U/L (ref 10–40)
AST SERPL-CCNC: 104 U/L (ref 10–40)
AST SERPL-CCNC: 110 U/L (ref 10–40)
AST SERPL-CCNC: 110 U/L (ref 10–40)
AST SERPL-CCNC: 111 U/L (ref 10–40)
AST SERPL-CCNC: 112 U/L (ref 10–40)
AST SERPL-CCNC: 120 U/L (ref 10–40)
AST SERPL-CCNC: 122 U/L (ref 10–40)
AST SERPL-CCNC: 124 U/L (ref 10–40)
AST SERPL-CCNC: 127 U/L (ref 10–40)
AST SERPL-CCNC: 129 U/L (ref 10–40)
AST SERPL-CCNC: 204 U/L (ref 10–40)
AST SERPL-CCNC: 213 U/L (ref 10–40)
AST SERPL-CCNC: 55 U/L (ref 10–40)
AST SERPL-CCNC: 58 U/L (ref 10–40)
AST SERPL-CCNC: 59 U/L (ref 10–40)
AST SERPL-CCNC: 60 U/L (ref 10–40)
AST SERPL-CCNC: 61 U/L (ref 10–40)
AST SERPL-CCNC: 61 U/L (ref 10–40)
AST SERPL-CCNC: 62 U/L (ref 10–40)
AST SERPL-CCNC: 63 U/L (ref 10–40)
AST SERPL-CCNC: 64 U/L (ref 10–40)
AST SERPL-CCNC: 66 U/L (ref 10–40)
AST SERPL-CCNC: 68 U/L (ref 10–40)
AST SERPL-CCNC: 71 U/L (ref 10–40)
AST SERPL-CCNC: 71 U/L (ref 10–40)
AST SERPL-CCNC: 72 U/L (ref 10–40)
AST SERPL-CCNC: 75 U/L (ref 10–40)
AST SERPL-CCNC: 77 U/L (ref 10–40)
AST SERPL-CCNC: 80 U/L (ref 10–40)
AST SERPL-CCNC: 83 U/L (ref 10–40)
AST SERPL-CCNC: 84 U/L (ref 10–40)
AST SERPL-CCNC: 86 U/L (ref 10–40)
AST SERPL-CCNC: 86 U/L (ref 10–40)
AST SERPL-CCNC: 90 U/L (ref 10–40)
AST SERPL-CCNC: 91 U/L (ref 10–40)
AST SERPL-CCNC: 95 U/L (ref 10–40)
AST SERPL-CCNC: 96 U/L (ref 10–40)
AST SERPL-CCNC: 96 U/L (ref 10–40)
AST SERPL-CCNC: 97 U/L (ref 10–40)
AST SERPL-CCNC: 99 U/L (ref 10–40)
ASTRO TYP 1-8 RNA STL QL NAA+NON-PROBE: NOT DETECTED
AV INDEX (PROSTH): 0.9
AV MEAN GRADIENT: 5 MMHG
AV PEAK GRADIENT: 8 MMHG
AV VALVE AREA: 3.32 CM2
AV VELOCITY RATIO: 0.89
B DERMAT AG UR QL IA: DETECTED
B-GLOBULIN SERPL ELPH-MCNC: 0.7 G/DL (ref 0.7–1.3)
BACTERIA #/AREA URNS AUTO: NORMAL /HPF
BACTERIA #/AREA URNS HPF: NEGATIVE /HPF
BACTERIA BLD CULT: NORMAL
BACTERIA FLD AEROBE CULT: NO GROWTH
BACTERIA FLD CULT: NORMAL
BACTERIA SPEC AEROBE CULT: ABNORMAL
BACTERIA SPEC AEROBE CULT: NO GROWTH
BACTERIA SPEC AEROBE CULT: NO GROWTH
BACTERIA SPEC AEROBE CULT: NORMAL
BACTERIA SPEC ANAEROBE CULT: NORMAL
BASOPHILS # BLD AUTO: 0 K/UL (ref 0–0.2)
BASOPHILS # BLD AUTO: 0.01 K/UL (ref 0–0.2)
BASOPHILS # BLD AUTO: 0.02 K/UL (ref 0–0.2)
BASOPHILS # BLD AUTO: 0.03 K/UL (ref 0–0.2)
BASOPHILS # BLD AUTO: 0.04 K/UL (ref 0–0.2)
BASOPHILS # BLD AUTO: 0.04 K/UL (ref 0–0.2)
BASOPHILS # BLD AUTO: 0.05 K/UL (ref 0–0.2)
BASOPHILS # BLD AUTO: 0.05 K/UL (ref 0–0.2)
BASOPHILS # BLD AUTO: 0.07 K/UL (ref 0–0.2)
BASOPHILS # BLD AUTO: ABNORMAL K/UL (ref 0–0.2)
BASOPHILS NFR BLD: 0 % (ref 0–1.9)
BASOPHILS NFR BLD: 0.1 % (ref 0–1.9)
BASOPHILS NFR BLD: 0.2 % (ref 0–1.9)
BASOPHILS NFR BLD: 0.2 % (ref 0–1.9)
BASOPHILS NFR BLD: 0.3 % (ref 0–1.9)
BASOPHILS NFR BLD: 0.4 % (ref 0–1.9)
BASOPHILS NFR BLD: 0.5 % (ref 0–1.9)
BASOPHILS NFR BLD: 0.6 % (ref 0–1.9)
BASOPHILS NFR BLD: 0.7 % (ref 0–1.9)
BASOPHILS NFR BLD: 0.9 % (ref 0–1.9)
BASOPHILS NFR BLD: 0.9 % (ref 0–1.9)
BILIRUB SERPL-MCNC: 1.5 MG/DL (ref 0.1–1)
BILIRUB SERPL-MCNC: 1.7 MG/DL (ref 0.1–1)
BILIRUB SERPL-MCNC: 1.8 MG/DL (ref 0.1–1)
BILIRUB SERPL-MCNC: 11.3 MG/DL (ref 0.1–1)
BILIRUB SERPL-MCNC: 14.8 MG/DL (ref 0.1–1)
BILIRUB SERPL-MCNC: 16.1 MG/DL (ref 0.1–1)
BILIRUB SERPL-MCNC: 2 MG/DL (ref 0.1–1)
BILIRUB SERPL-MCNC: 2.1 MG/DL (ref 0.1–1)
BILIRUB SERPL-MCNC: 2.2 MG/DL (ref 0.1–1)
BILIRUB SERPL-MCNC: 2.3 MG/DL (ref 0.1–1)
BILIRUB SERPL-MCNC: 2.3 MG/DL (ref 0.1–1)
BILIRUB SERPL-MCNC: 2.4 MG/DL (ref 0.1–1)
BILIRUB SERPL-MCNC: 2.5 MG/DL (ref 0.1–1)
BILIRUB SERPL-MCNC: 4 MG/DL (ref 0.1–1)
BILIRUB SERPL-MCNC: 4.2 MG/DL (ref 0.1–1)
BILIRUB SERPL-MCNC: 4.5 MG/DL (ref 0.1–1)
BILIRUB SERPL-MCNC: 4.9 MG/DL (ref 0.1–1)
BILIRUB SERPL-MCNC: 4.9 MG/DL (ref 0.1–1)
BILIRUB SERPL-MCNC: 5 MG/DL (ref 0.1–1)
BILIRUB SERPL-MCNC: 5.2 MG/DL (ref 0.1–1)
BILIRUB SERPL-MCNC: 5.3 MG/DL (ref 0.1–1)
BILIRUB SERPL-MCNC: 5.5 MG/DL (ref 0.1–1)
BILIRUB SERPL-MCNC: 5.5 MG/DL (ref 0.1–1)
BILIRUB SERPL-MCNC: 5.6 MG/DL (ref 0.1–1)
BILIRUB SERPL-MCNC: 5.7 MG/DL (ref 0.1–1)
BILIRUB SERPL-MCNC: 5.9 MG/DL (ref 0.1–1)
BILIRUB SERPL-MCNC: 6 MG/DL (ref 0.1–1)
BILIRUB SERPL-MCNC: 6 MG/DL (ref 0.1–1)
BILIRUB SERPL-MCNC: 6.2 MG/DL (ref 0.1–1)
BILIRUB SERPL-MCNC: 6.9 MG/DL (ref 0.1–1)
BILIRUB SERPL-MCNC: 6.9 MG/DL (ref 0.1–1)
BILIRUB SERPL-MCNC: 8.6 MG/DL (ref 0.1–1)
BILIRUB SERPL-MCNC: 9.7 MG/DL (ref 0.1–1)
BILIRUB UR QL STRIP: NEGATIVE
BLASTOMYCES AG RESULT: <1.3 NG/ML
BLD GP AB SCN CELLS X3 SERPL QL: NORMAL
BLD PROD TYP BPU: NORMAL
BLOOD UNIT EXPIRATION DATE: NORMAL
BLOOD UNIT TYPE CODE: 7300
BLOOD UNIT TYPE: NORMAL
BNP SERPL-MCNC: 217 PG/ML (ref 0–99)
BNP SERPL-MCNC: 368 PG/ML (ref 0–99)
BNP SERPL-MCNC: 389 PG/ML (ref 0–99)
BNP SERPL-MCNC: 90 PG/ML (ref 0–99)
BODY FLD TYPE: NORMAL
BODY FLUID SOURCE AMYLASE: NORMAL
BODY FLUID SOURCE, LDH: NORMAL
BORDETELLA PARAPERTUSSIS (IS1001): NOT DETECTED
BORDETELLA PERTUSSIS (PTXP): NOT DETECTED
BSA FOR ECHO PROCEDURE: 1.69 M2
BSA FOR ECHO PROCEDURE: 1.74 M2
BUN SERPL-MCNC: 102 MG/DL (ref 6–20)
BUN SERPL-MCNC: 104 MG/DL (ref 6–20)
BUN SERPL-MCNC: 18 MG/DL (ref 6–20)
BUN SERPL-MCNC: 20 MG/DL (ref 6–20)
BUN SERPL-MCNC: 34 MG/DL (ref 6–20)
BUN SERPL-MCNC: 34 MG/DL (ref 6–20)
BUN SERPL-MCNC: 36 MG/DL (ref 6–20)
BUN SERPL-MCNC: 36 MG/DL (ref 6–20)
BUN SERPL-MCNC: 37 MG/DL (ref 6–20)
BUN SERPL-MCNC: 37 MG/DL (ref 6–20)
BUN SERPL-MCNC: 38 MG/DL (ref 6–20)
BUN SERPL-MCNC: 40 MG/DL (ref 6–20)
BUN SERPL-MCNC: 40 MG/DL (ref 6–20)
BUN SERPL-MCNC: 42 MG/DL (ref 6–20)
BUN SERPL-MCNC: 43 MG/DL (ref 6–20)
BUN SERPL-MCNC: 43 MG/DL (ref 6–20)
BUN SERPL-MCNC: 46 MG/DL (ref 6–20)
BUN SERPL-MCNC: 47 MG/DL (ref 6–20)
BUN SERPL-MCNC: 58 MG/DL (ref 6–20)
BUN SERPL-MCNC: 62 MG/DL (ref 6–20)
BUN SERPL-MCNC: 64 MG/DL (ref 6–20)
BUN SERPL-MCNC: 66 MG/DL (ref 6–20)
BUN SERPL-MCNC: 66 MG/DL (ref 6–20)
BUN SERPL-MCNC: 67 MG/DL (ref 6–20)
BUN SERPL-MCNC: 67 MG/DL (ref 6–20)
BUN SERPL-MCNC: 68 MG/DL (ref 6–20)
BUN SERPL-MCNC: 70 MG/DL (ref 6–20)
BUN SERPL-MCNC: 71 MG/DL (ref 6–20)
BUN SERPL-MCNC: 73 MG/DL (ref 6–20)
BUN SERPL-MCNC: 73 MG/DL (ref 6–20)
BUN SERPL-MCNC: 75 MG/DL (ref 6–20)
BUN SERPL-MCNC: 84 MG/DL (ref 6–20)
BUN SERPL-MCNC: 88 MG/DL (ref 6–20)
BUN SERPL-MCNC: 92 MG/DL (ref 6–20)
BUN SERPL-MCNC: 94 MG/DL (ref 6–20)
BUN SERPL-MCNC: 94 MG/DL (ref 6–20)
BUN SERPL-MCNC: 95 MG/DL (ref 6–20)
BUN SERPL-MCNC: 95 MG/DL (ref 6–20)
BURR CELLS BLD QL SMEAR: ABNORMAL
BURR CELLS BLD QL SMEAR: ABNORMAL
C CAYETANENSIS DNA STL QL NAA+NON-PROBE: NOT DETECTED
C COLI+JEJ+UPSA DNA STL QL NAA+NON-PROBE: NOT DETECTED
C DIF TOX TCDA+TCDB STL QL NAA+NON-PROBE: NOT DETECTED
C DIFF GDH STL QL: NEGATIVE
C DIFF TOX A+B STL QL IA: NEGATIVE
C IMMITIS AB SER QL IA: NEGATIVE
C-ANCA TITR SER IF: NORMAL TITER
CALCIUM SERPL-MCNC: 6.9 MG/DL (ref 8.7–10.5)
CALCIUM SERPL-MCNC: 7.1 MG/DL (ref 8.7–10.5)
CALCIUM SERPL-MCNC: 7.2 MG/DL (ref 8.7–10.5)
CALCIUM SERPL-MCNC: 7.2 MG/DL (ref 8.7–10.5)
CALCIUM SERPL-MCNC: 7.5 MG/DL (ref 8.7–10.5)
CALCIUM SERPL-MCNC: 7.6 MG/DL (ref 8.7–10.5)
CALCIUM SERPL-MCNC: 7.7 MG/DL (ref 8.7–10.5)
CALCIUM SERPL-MCNC: 7.8 MG/DL (ref 8.7–10.5)
CALCIUM SERPL-MCNC: 7.9 MG/DL (ref 8.7–10.5)
CALCIUM SERPL-MCNC: 7.9 MG/DL (ref 8.7–10.5)
CALCIUM SERPL-MCNC: 8 MG/DL (ref 8.7–10.5)
CALCIUM SERPL-MCNC: 8 MG/DL (ref 8.7–10.5)
CALCIUM SERPL-MCNC: 8.1 MG/DL (ref 8.7–10.5)
CALCIUM SERPL-MCNC: 8.2 MG/DL (ref 8.7–10.5)
CALCIUM SERPL-MCNC: 8.3 MG/DL (ref 8.7–10.5)
CALCIUM SERPL-MCNC: 8.4 MG/DL (ref 8.7–10.5)
CALCIUM SERPL-MCNC: 8.6 MG/DL (ref 8.7–10.5)
CALCIUM SERPL-MCNC: 8.7 MG/DL (ref 8.7–10.5)
CALCIUM SERPL-MCNC: 8.8 MG/DL (ref 8.7–10.5)
CALCIUM SERPL-MCNC: 9.3 MG/DL (ref 8.7–10.5)
CALPROTECTIN STL-MCNT: 42 UG/G (ref 0–120)
CCP IGA+IGG SERPL IA-ACNC: 9 UNITS (ref 0–19)
CERULOPLASMIN SERPL-MCNC: 33.3 MG/DL (ref 16–31)
CHLAMYDIA PNEUMONIAE: NOT DETECTED
CHLORIDE SERPL-SCNC: 102 MMOL/L (ref 95–110)
CHLORIDE SERPL-SCNC: 103 MMOL/L (ref 95–110)
CHLORIDE SERPL-SCNC: 103 MMOL/L (ref 95–110)
CHLORIDE SERPL-SCNC: 105 MMOL/L (ref 95–110)
CHLORIDE SERPL-SCNC: 106 MMOL/L (ref 95–110)
CHLORIDE SERPL-SCNC: 107 MMOL/L (ref 95–110)
CHLORIDE SERPL-SCNC: 108 MMOL/L (ref 95–110)
CHLORIDE SERPL-SCNC: 91 MMOL/L (ref 95–110)
CHLORIDE SERPL-SCNC: 92 MMOL/L (ref 95–110)
CHLORIDE SERPL-SCNC: 93 MMOL/L (ref 95–110)
CHLORIDE SERPL-SCNC: 94 MMOL/L (ref 95–110)
CHLORIDE SERPL-SCNC: 94 MMOL/L (ref 95–110)
CHLORIDE SERPL-SCNC: 95 MMOL/L (ref 95–110)
CHLORIDE SERPL-SCNC: 96 MMOL/L (ref 95–110)
CHLORIDE SERPL-SCNC: 97 MMOL/L (ref 95–110)
CHLORIDE SERPL-SCNC: 98 MMOL/L (ref 95–110)
CHLORIDE UR-SCNC: 20 MMOL/L (ref 25–200)
CHOLEST SERPL-MCNC: 156 MG/DL (ref 120–199)
CHOLEST/HDLC SERPL: 4.1 {RATIO} (ref 2–5)
CK SERPL-CCNC: 114 U/L (ref 20–200)
CK SERPL-CCNC: 81 U/L (ref 20–200)
CLARITY UR REFRACT.AUTO: CLEAR
CLARITY UR REFRACT.AUTO: CLEAR
CLARITY UR: ABNORMAL
CLARITY UR: CLEAR
CMV DNA SPEC QL NAA+PROBE: DETECTED
CMV IGG SERPL QL IA: REACTIVE
CO2 SERPL-SCNC: 14 MMOL/L (ref 23–29)
CO2 SERPL-SCNC: 15 MMOL/L (ref 23–29)
CO2 SERPL-SCNC: 16 MMOL/L (ref 23–29)
CO2 SERPL-SCNC: 17 MMOL/L (ref 23–29)
CO2 SERPL-SCNC: 18 MMOL/L (ref 23–29)
CO2 SERPL-SCNC: 19 MMOL/L (ref 23–29)
CO2 SERPL-SCNC: 20 MMOL/L (ref 23–29)
CO2 SERPL-SCNC: 21 MMOL/L (ref 23–29)
CO2 SERPL-SCNC: 22 MMOL/L (ref 23–29)
CO2 SERPL-SCNC: 23 MMOL/L (ref 23–29)
CO2 SERPL-SCNC: 24 MMOL/L (ref 23–29)
CO2 SERPL-SCNC: 24 MMOL/L (ref 23–29)
CO2 SERPL-SCNC: 25 MMOL/L (ref 23–29)
CO2 SERPL-SCNC: 25 MMOL/L (ref 23–29)
CO2 SERPL-SCNC: 27 MMOL/L (ref 23–29)
CODING SYSTEM: NORMAL
COLOR FLD: NORMAL
COLOR FLD: NORMAL
COLOR FLD: YELLOW
COLOR UR AUTO: YELLOW
COLOR UR AUTO: YELLOW
COLOR UR: ABNORMAL
COLOR UR: YELLOW
COMMENT: NORMAL
COMPLEXED PSA SERPL-MCNC: 0.18 NG/ML (ref 0–4)
CORONAVIRUS 229E, COMMON COLD VIRUS: NOT DETECTED
CORONAVIRUS HKU1, COMMON COLD VIRUS: NOT DETECTED
CORONAVIRUS NL63, COMMON COLD VIRUS: NOT DETECTED
CORONAVIRUS OC43, COMMON COLD VIRUS: NOT DETECTED
CORTIS SERPL-MCNC: 17.5 UG/DL
CREAT CL/1.73 SQ M 12H UR+SERPL-ARVRAT: 25 ML/MIN (ref 70–110)
CREAT SERPL-MCNC: 1 MG/DL (ref 0.5–1.4)
CREAT SERPL-MCNC: 1.6 MG/DL (ref 0.5–1.4)
CREAT SERPL-MCNC: 2 MG/DL (ref 0.5–1.4)
CREAT SERPL-MCNC: 2.2 MG/DL (ref 0.5–1.4)
CREAT SERPL-MCNC: 2.4 MG/DL (ref 0.5–1.4)
CREAT SERPL-MCNC: 2.4 MG/DL (ref 0.5–1.4)
CREAT SERPL-MCNC: 2.6 MG/DL (ref 0.5–1.4)
CREAT SERPL-MCNC: 2.7 MG/DL (ref 0.5–1.4)
CREAT SERPL-MCNC: 2.8 MG/DL (ref 0.5–1.4)
CREAT SERPL-MCNC: 2.9 MG/DL (ref 0.5–1.4)
CREAT SERPL-MCNC: 2.9 MG/DL (ref 0.5–1.4)
CREAT SERPL-MCNC: 3 MG/DL (ref 0.5–1.4)
CREAT SERPL-MCNC: 3.2 MG/DL (ref 0.5–1.4)
CREAT SERPL-MCNC: 3.9 MG/DL (ref 0.5–1.4)
CREAT SERPL-MCNC: 4.2 MG/DL (ref 0.5–1.4)
CREAT SERPL-MCNC: 4.7 MG/DL (ref 0.5–1.4)
CREAT SERPL-MCNC: 4.7 MG/DL (ref 0.5–1.4)
CREAT SERPL-MCNC: 4.9 MG/DL (ref 0.5–1.4)
CREAT SERPL-MCNC: 5 MG/DL (ref 0.5–1.4)
CREAT SERPL-MCNC: 5 MG/DL (ref 0.5–1.4)
CREAT SERPL-MCNC: 5.1 MG/DL (ref 0.5–1.4)
CREAT SERPL-MCNC: 5.2 MG/DL (ref 0.5–1.4)
CREAT SERPL-MCNC: 5.3 MG/DL (ref 0.5–1.4)
CREAT SERPL-MCNC: 5.4 MG/DL (ref 0.5–1.4)
CREAT SERPL-MCNC: 5.4 MG/DL (ref 0.5–1.4)
CREAT SERPL-MCNC: 5.6 MG/DL (ref 0.5–1.4)
CREAT SERPL-MCNC: 5.7 MG/DL (ref 0.5–1.4)
CREAT SERPL-MCNC: 5.8 MG/DL (ref 0.5–1.4)
CREAT SERPL-MCNC: 5.8 MG/DL (ref 0.5–1.4)
CREAT SERPL-MCNC: 6 MG/DL (ref 0.5–1.4)
CREAT UR-MCNC: 71 MG/DL (ref 23–375)
CREAT UR-MCNC: 94 MG/DL (ref 23–375)
CREATININE, URINE (MG/SPEC): 1065 MG/SPEC
CRYPTOC AG SER QL LA: NEGATIVE
CRYPTOSP AG STL QL IA: NEGATIVE
CRYPTOSP AG STL QL IA: NEGATIVE
CRYPTOSP DNA STL QL NAA+NON-PROBE: NOT DETECTED
CV ECHO LV RWT: 0.32 CM
CV ECHO LV RWT: 0.32 CM
CV STRESS BASE HR: 51 BPM
CYTOMEGALOVIRUS LOG (IU/ML): <1.7 LOGIU/ML
CYTOMEGALOVIRUS PCR, QUANT: <50 IU/ML
DELSYS: ABNORMAL
DELSYS: ABNORMAL
DIASTOLIC BLOOD PRESSURE: 82 MMHG
DIFFERENTIAL METHOD: ABNORMAL
DISPENSE STATUS: NORMAL
DOP CALC AO PEAK VEL: 1.45 M/S
DOP CALC AO VTI: 32.3 CM
DOP CALC LVOT AREA: 3.1 CM2
DOP CALC LVOT AREA: 3.7 CM2
DOP CALC LVOT DIAMETER: 1.99 CM
DOP CALC LVOT DIAMETER: 2.17 CM
DOP CALC LVOT PEAK VEL: 0.9 M/S
DOP CALC LVOT PEAK VEL: 1.29 M/S
DOP CALC LVOT STROKE VOLUME: 107.12 CM3
DOP CALC LVOT STROKE VOLUME: 79.55 CM3
DOP CALCLVOT PEAK VEL VTI: 25.59 CM
DOP CALCLVOT PEAK VEL VTI: 28.98 CM
E COLI O157 DNA STL QL NAA+NON-PROBE: NORMAL
E HISTOLYT DNA STL QL NAA+NON-PROBE: NOT DETECTED
E WAVE DECELERATION TIME: 156.73 MSEC
E WAVE DECELERATION TIME: 215.52 MSEC
E/A RATIO: 0.77
E/A RATIO: 1.18
E/E' RATIO: 15.27 M/S
E/E' RATIO: 9.17 M/S
EC STX1+STX2 GENES STL QL NAA+NON-PROBE: NOT DETECTED
ECHO LV POSTERIOR WALL: 0.74 CM (ref 0.6–1.1)
ECHO LV POSTERIOR WALL: 0.84 CM (ref 0.6–1.1)
EJECTION FRACTION: 55 %
EJECTION FRACTION: 65 %
ELASTASE PANC STL-MCNT: 405 UG ELAST./G
ELASTASE PANC STL-MCNT: NORMAL UG ELAST./G
ENA JO1 AB SER-ACNC: <0.2 AI (ref 0–0.9)
ENTEROAGGREGATIVE E COLI: NOT DETECTED
ENTEROPATHOGENIC E COLI: NOT DETECTED
ENTEROVIRUS/RHINOVIRUS: NOT DETECTED
EOSINOPHIL # BLD AUTO: 0 K/UL (ref 0–0.5)
EOSINOPHIL # BLD AUTO: 0.1 K/UL (ref 0–0.5)
EOSINOPHIL # BLD AUTO: 0.2 K/UL (ref 0–0.5)
EOSINOPHIL # BLD AUTO: 0.3 K/UL (ref 0–0.5)
EOSINOPHIL # BLD AUTO: 0.4 K/UL (ref 0–0.5)
EOSINOPHIL # BLD AUTO: 0.4 K/UL (ref 0–0.5)
EOSINOPHIL # BLD AUTO: 0.5 K/UL (ref 0–0.5)
EOSINOPHIL # BLD AUTO: ABNORMAL K/UL (ref 0–0.5)
EOSINOPHIL NFR BLD: 0 % (ref 0–8)
EOSINOPHIL NFR BLD: 0.3 % (ref 0–8)
EOSINOPHIL NFR BLD: 1.8 % (ref 0–8)
EOSINOPHIL NFR BLD: 1.9 % (ref 0–8)
EOSINOPHIL NFR BLD: 2 % (ref 0–8)
EOSINOPHIL NFR BLD: 2.5 % (ref 0–8)
EOSINOPHIL NFR BLD: 2.6 % (ref 0–8)
EOSINOPHIL NFR BLD: 2.8 % (ref 0–8)
EOSINOPHIL NFR BLD: 3.1 % (ref 0–8)
EOSINOPHIL NFR BLD: 3.4 % (ref 0–8)
EOSINOPHIL NFR BLD: 3.8 % (ref 0–8)
EOSINOPHIL NFR BLD: 4.3 % (ref 0–8)
EOSINOPHIL NFR BLD: 4.7 % (ref 0–8)
EOSINOPHIL NFR BLD: 5.1 % (ref 0–8)
EOSINOPHIL NFR BLD: 5.3 % (ref 0–8)
EOSINOPHIL NFR BLD: 5.6 % (ref 0–8)
EOSINOPHIL NFR BLD: 5.6 % (ref 0–8)
EOSINOPHIL NFR BLD: 5.9 % (ref 0–8)
EOSINOPHIL NFR BLD: 6.1 % (ref 0–8)
EOSINOPHIL NFR BLD: 6.2 % (ref 0–8)
EOSINOPHIL NFR BLD: 6.6 % (ref 0–8)
EOSINOPHIL NFR BLD: 6.7 % (ref 0–8)
EOSINOPHIL NFR BLD: 6.8 % (ref 0–8)
EOSINOPHIL NFR BLD: 7.5 % (ref 0–8)
EOSINOPHIL NFR BLD: 7.7 % (ref 0–8)
EOSINOPHIL NFR BLD: 7.8 % (ref 0–8)
EOSINOPHIL NFR BLD: 8.2 % (ref 0–8)
EOSINOPHIL NFR BLD: 8.6 % (ref 0–8)
ERYTHROCYTE [DISTWIDTH] IN BLOOD BY AUTOMATED COUNT: 13.2 % (ref 11.5–14.5)
ERYTHROCYTE [DISTWIDTH] IN BLOOD BY AUTOMATED COUNT: 13.2 % (ref 11.5–14.5)
ERYTHROCYTE [DISTWIDTH] IN BLOOD BY AUTOMATED COUNT: 13.4 % (ref 11.5–14.5)
ERYTHROCYTE [DISTWIDTH] IN BLOOD BY AUTOMATED COUNT: 13.4 % (ref 11.5–14.5)
ERYTHROCYTE [DISTWIDTH] IN BLOOD BY AUTOMATED COUNT: 13.5 % (ref 11.5–14.5)
ERYTHROCYTE [DISTWIDTH] IN BLOOD BY AUTOMATED COUNT: 13.7 % (ref 11.5–14.5)
ERYTHROCYTE [DISTWIDTH] IN BLOOD BY AUTOMATED COUNT: 13.8 % (ref 11.5–14.5)
ERYTHROCYTE [DISTWIDTH] IN BLOOD BY AUTOMATED COUNT: 13.9 % (ref 11.5–14.5)
ERYTHROCYTE [DISTWIDTH] IN BLOOD BY AUTOMATED COUNT: 14 % (ref 11.5–14.5)
ERYTHROCYTE [DISTWIDTH] IN BLOOD BY AUTOMATED COUNT: 14 % (ref 11.5–14.5)
ERYTHROCYTE [DISTWIDTH] IN BLOOD BY AUTOMATED COUNT: 14.1 % (ref 11.5–14.5)
ERYTHROCYTE [DISTWIDTH] IN BLOOD BY AUTOMATED COUNT: 14.2 % (ref 11.5–14.5)
ERYTHROCYTE [DISTWIDTH] IN BLOOD BY AUTOMATED COUNT: 14.2 % (ref 11.5–14.5)
ERYTHROCYTE [DISTWIDTH] IN BLOOD BY AUTOMATED COUNT: 14.4 % (ref 11.5–14.5)
ERYTHROCYTE [DISTWIDTH] IN BLOOD BY AUTOMATED COUNT: 14.4 % (ref 11.5–14.5)
ERYTHROCYTE [DISTWIDTH] IN BLOOD BY AUTOMATED COUNT: 14.6 % (ref 11.5–14.5)
ERYTHROCYTE [DISTWIDTH] IN BLOOD BY AUTOMATED COUNT: 14.7 % (ref 11.5–14.5)
ERYTHROCYTE [DISTWIDTH] IN BLOOD BY AUTOMATED COUNT: 14.8 % (ref 11.5–14.5)
ERYTHROCYTE [DISTWIDTH] IN BLOOD BY AUTOMATED COUNT: 15.4 % (ref 11.5–14.5)
ERYTHROCYTE [DISTWIDTH] IN BLOOD BY AUTOMATED COUNT: 15.8 % (ref 11.5–14.5)
ERYTHROCYTE [DISTWIDTH] IN BLOOD BY AUTOMATED COUNT: 16.7 % (ref 11.5–14.5)
ERYTHROCYTE [DISTWIDTH] IN BLOOD BY AUTOMATED COUNT: 17.4 % (ref 11.5–14.5)
ERYTHROCYTE [DISTWIDTH] IN BLOOD BY AUTOMATED COUNT: 17.8 % (ref 11.5–14.5)
ERYTHROCYTE [DISTWIDTH] IN BLOOD BY AUTOMATED COUNT: 18.9 % (ref 11.5–14.5)
ERYTHROCYTE [DISTWIDTH] IN BLOOD BY AUTOMATED COUNT: 19.1 % (ref 11.5–14.5)
ERYTHROCYTE [DISTWIDTH] IN BLOOD BY AUTOMATED COUNT: 20.6 % (ref 11.5–14.5)
ERYTHROCYTE [DISTWIDTH] IN BLOOD BY AUTOMATED COUNT: 22.1 % (ref 11.5–14.5)
ERYTHROCYTE [SEDIMENTATION RATE] IN BLOOD BY WESTERGREN METHOD: 35 MM/H
EST. GFR  (AFRICAN AMERICAN): 10.8 ML/MIN/1.73 M^2
EST. GFR  (AFRICAN AMERICAN): 11.2 ML/MIN/1.73 M^2
EST. GFR  (AFRICAN AMERICAN): 11.2 ML/MIN/1.73 M^2
EST. GFR  (AFRICAN AMERICAN): 11.5 ML/MIN/1.73 M^2
EST. GFR  (AFRICAN AMERICAN): 11.7 ML/MIN/1.73 M^2
EST. GFR  (AFRICAN AMERICAN): 12.3 ML/MIN/1.73 M^2
EST. GFR  (AFRICAN AMERICAN): 12.3 ML/MIN/1.73 M^2
EST. GFR  (AFRICAN AMERICAN): 12.5 ML/MIN/1.73 M^2
EST. GFR  (AFRICAN AMERICAN): 12.8 ML/MIN/1.73 M^2
EST. GFR  (AFRICAN AMERICAN): 13.1 ML/MIN/1.73 M^2
EST. GFR  (AFRICAN AMERICAN): 13.5 ML/MIN/1.73 M^2
EST. GFR  (AFRICAN AMERICAN): 13.5 ML/MIN/1.73 M^2
EST. GFR  (AFRICAN AMERICAN): 13.8 ML/MIN/1.73 M^2
EST. GFR  (AFRICAN AMERICAN): 14.5 ML/MIN/1.73 M^2
EST. GFR  (AFRICAN AMERICAN): 14.5 ML/MIN/1.73 M^2
EST. GFR  (AFRICAN AMERICAN): 16.6 ML/MIN/1.73 M^2
EST. GFR  (AFRICAN AMERICAN): 18.2 ML/MIN/1.73 M^2
EST. GFR  (AFRICAN AMERICAN): 23.1 ML/MIN/1.73 M^2
EST. GFR  (AFRICAN AMERICAN): 24.9 ML/MIN/1.73 M^2
EST. GFR  (AFRICAN AMERICAN): 26 ML/MIN/1.73 M^2
EST. GFR  (AFRICAN AMERICAN): 26 ML/MIN/1.73 M^2
EST. GFR  (AFRICAN AMERICAN): 27.1 ML/MIN/1.73 M^2
EST. GFR  (AFRICAN AMERICAN): 28.3 ML/MIN/1.73 M^2
EST. GFR  (AFRICAN AMERICAN): 29.7 ML/MIN/1.73 M^2
EST. GFR  (AFRICAN AMERICAN): 32.7 ML/MIN/1.73 M^2
EST. GFR  (AFRICAN AMERICAN): 32.7 ML/MIN/1.73 M^2
EST. GFR  (AFRICAN AMERICAN): 36.3 ML/MIN/1.73 M^2
EST. GFR  (AFRICAN AMERICAN): 40.7 ML/MIN/1.73 M^2
EST. GFR  (AFRICAN AMERICAN): 53.3 ML/MIN/1.73 M^2
EST. GFR  (AFRICAN AMERICAN): >60 ML/MIN/1.73 M^2
EST. GFR  (NON AFRICAN AMERICAN): 10.1 ML/MIN/1.73 M^2
EST. GFR  (NON AFRICAN AMERICAN): 10.6 ML/MIN/1.73 M^2
EST. GFR  (NON AFRICAN AMERICAN): 10.6 ML/MIN/1.73 M^2
EST. GFR  (NON AFRICAN AMERICAN): 10.8 ML/MIN/1.73 M^2
EST. GFR  (NON AFRICAN AMERICAN): 11.1 ML/MIN/1.73 M^2
EST. GFR  (NON AFRICAN AMERICAN): 11.4 ML/MIN/1.73 M^2
EST. GFR  (NON AFRICAN AMERICAN): 11.6 ML/MIN/1.73 M^2
EST. GFR  (NON AFRICAN AMERICAN): 11.6 ML/MIN/1.73 M^2
EST. GFR  (NON AFRICAN AMERICAN): 11.9 ML/MIN/1.73 M^2
EST. GFR  (NON AFRICAN AMERICAN): 12.5 ML/MIN/1.73 M^2
EST. GFR  (NON AFRICAN AMERICAN): 12.5 ML/MIN/1.73 M^2
EST. GFR  (NON AFRICAN AMERICAN): 14.4 ML/MIN/1.73 M^2
EST. GFR  (NON AFRICAN AMERICAN): 15.7 ML/MIN/1.73 M^2
EST. GFR  (NON AFRICAN AMERICAN): 20 ML/MIN/1.73 M^2
EST. GFR  (NON AFRICAN AMERICAN): 21.6 ML/MIN/1.73 M^2
EST. GFR  (NON AFRICAN AMERICAN): 22.5 ML/MIN/1.73 M^2
EST. GFR  (NON AFRICAN AMERICAN): 22.5 ML/MIN/1.73 M^2
EST. GFR  (NON AFRICAN AMERICAN): 23.5 ML/MIN/1.73 M^2
EST. GFR  (NON AFRICAN AMERICAN): 24.5 ML/MIN/1.73 M^2
EST. GFR  (NON AFRICAN AMERICAN): 25.7 ML/MIN/1.73 M^2
EST. GFR  (NON AFRICAN AMERICAN): 28.3 ML/MIN/1.73 M^2
EST. GFR  (NON AFRICAN AMERICAN): 28.3 ML/MIN/1.73 M^2
EST. GFR  (NON AFRICAN AMERICAN): 31.4 ML/MIN/1.73 M^2
EST. GFR  (NON AFRICAN AMERICAN): 35.2 ML/MIN/1.73 M^2
EST. GFR  (NON AFRICAN AMERICAN): 46.1 ML/MIN/1.73 M^2
EST. GFR  (NON AFRICAN AMERICAN): 9.3 ML/MIN/1.73 M^2
EST. GFR  (NON AFRICAN AMERICAN): 9.7 ML/MIN/1.73 M^2
EST. GFR  (NON AFRICAN AMERICAN): 9.7 ML/MIN/1.73 M^2
EST. GFR  (NON AFRICAN AMERICAN): 9.9 ML/MIN/1.73 M^2
EST. GFR  (NON AFRICAN AMERICAN): >60 ML/MIN/1.73 M^2
ESTIMATED AVG GLUCOSE: 126 MG/DL (ref 68–131)
ESTIMATED AVG GLUCOSE: 143 MG/DL (ref 68–131)
ETEC LTA+ST1A+ST1B TOX ST NAA+NON-PROBE: NOT DETECTED
FERRITIN SERPL-MCNC: 860 NG/ML (ref 20–300)
FINAL PATHOLOGIC DIAGNOSIS: NORMAL
FINAL PATHOLOGIC DIAGNOSIS: NORMAL
FIO2: 100
FLOW: 15
FLOW: 4
FLUBV RNA NPH QL NAA+NON-PROBE: NOT DETECTED
FRACTIONAL SHORTENING: 36 % (ref 28–44)
FRACTIONAL SHORTENING: 40 % (ref 28–44)
FUNGITELL COMMENTS: POSITIVE
G LAMBLIA DNA STL QL NAA+NON-PROBE: NOT DETECTED
G6PD RBC-CCNT: 10.9 U/G HB (ref 8–11.9)
GALACTOMANNAN AG SERPL IA-ACNC: <0.5 INDEX
GALACTOMANNAN AG SPEC-ACNC: <0.5 INDEX
GAMMA GLOB SERPL ELPH-MCNC: 1.4 G/DL (ref 0.4–1.8)
GAMMA INTERFERON BACKGROUND BLD IA-ACNC: 0.37 IU/ML
GIARDIA ANTIGEN - EIA: NEGATIVE
GIARDIA LAMBLIA AG SOURCE: 0
GLOBULIN SER CALC-MCNC: 2.7 G/DL (ref 2.2–3.9)
GLUCOSE FLD-MCNC: 84 MG/DL
GLUCOSE SERPL-MCNC: 100 MG/DL (ref 70–110)
GLUCOSE SERPL-MCNC: 101 MG/DL (ref 70–110)
GLUCOSE SERPL-MCNC: 102 MG/DL (ref 70–110)
GLUCOSE SERPL-MCNC: 103 MG/DL (ref 70–110)
GLUCOSE SERPL-MCNC: 104 MG/DL (ref 70–110)
GLUCOSE SERPL-MCNC: 104 MG/DL (ref 70–110)
GLUCOSE SERPL-MCNC: 108 MG/DL (ref 70–110)
GLUCOSE SERPL-MCNC: 110 MG/DL (ref 70–110)
GLUCOSE SERPL-MCNC: 111 MG/DL (ref 70–110)
GLUCOSE SERPL-MCNC: 112 MG/DL (ref 70–110)
GLUCOSE SERPL-MCNC: 115 MG/DL (ref 70–110)
GLUCOSE SERPL-MCNC: 118 MG/DL (ref 70–110)
GLUCOSE SERPL-MCNC: 118 MG/DL (ref 70–110)
GLUCOSE SERPL-MCNC: 122 MG/DL (ref 70–110)
GLUCOSE SERPL-MCNC: 125 MG/DL (ref 70–110)
GLUCOSE SERPL-MCNC: 126 MG/DL (ref 70–110)
GLUCOSE SERPL-MCNC: 126 MG/DL (ref 70–110)
GLUCOSE SERPL-MCNC: 127 MG/DL (ref 70–110)
GLUCOSE SERPL-MCNC: 127 MG/DL (ref 70–110)
GLUCOSE SERPL-MCNC: 128 MG/DL (ref 70–110)
GLUCOSE SERPL-MCNC: 128 MG/DL (ref 70–110)
GLUCOSE SERPL-MCNC: 129 MG/DL (ref 70–110)
GLUCOSE SERPL-MCNC: 130 MG/DL (ref 70–110)
GLUCOSE SERPL-MCNC: 130 MG/DL (ref 70–110)
GLUCOSE SERPL-MCNC: 133 MG/DL (ref 70–110)
GLUCOSE SERPL-MCNC: 133 MG/DL (ref 70–110)
GLUCOSE SERPL-MCNC: 135 MG/DL (ref 70–110)
GLUCOSE SERPL-MCNC: 135 MG/DL (ref 70–110)
GLUCOSE SERPL-MCNC: 136 MG/DL (ref 70–110)
GLUCOSE SERPL-MCNC: 137 MG/DL (ref 70–110)
GLUCOSE SERPL-MCNC: 138 MG/DL (ref 70–110)
GLUCOSE SERPL-MCNC: 138 MG/DL (ref 70–110)
GLUCOSE SERPL-MCNC: 140 MG/DL (ref 70–110)
GLUCOSE SERPL-MCNC: 140 MG/DL (ref 70–110)
GLUCOSE SERPL-MCNC: 143 MG/DL (ref 70–110)
GLUCOSE SERPL-MCNC: 143 MG/DL (ref 70–110)
GLUCOSE SERPL-MCNC: 145 MG/DL (ref 70–110)
GLUCOSE SERPL-MCNC: 145 MG/DL (ref 70–110)
GLUCOSE SERPL-MCNC: 146 MG/DL (ref 70–110)
GLUCOSE SERPL-MCNC: 148 MG/DL (ref 70–110)
GLUCOSE SERPL-MCNC: 149 MG/DL (ref 70–110)
GLUCOSE SERPL-MCNC: 149 MG/DL (ref 70–110)
GLUCOSE SERPL-MCNC: 150 MG/DL (ref 70–110)
GLUCOSE SERPL-MCNC: 151 MG/DL (ref 70–110)
GLUCOSE SERPL-MCNC: 151 MG/DL (ref 70–110)
GLUCOSE SERPL-MCNC: 152 MG/DL (ref 70–110)
GLUCOSE SERPL-MCNC: 152 MG/DL (ref 70–110)
GLUCOSE SERPL-MCNC: 159 MG/DL (ref 70–110)
GLUCOSE SERPL-MCNC: 159 MG/DL (ref 70–110)
GLUCOSE SERPL-MCNC: 162 MG/DL (ref 70–110)
GLUCOSE SERPL-MCNC: 163 MG/DL (ref 70–110)
GLUCOSE SERPL-MCNC: 165 MG/DL (ref 70–110)
GLUCOSE SERPL-MCNC: 165 MG/DL (ref 70–110)
GLUCOSE SERPL-MCNC: 167 MG/DL (ref 70–110)
GLUCOSE SERPL-MCNC: 170 MG/DL (ref 70–110)
GLUCOSE SERPL-MCNC: 171 MG/DL (ref 70–110)
GLUCOSE SERPL-MCNC: 172 MG/DL (ref 70–110)
GLUCOSE SERPL-MCNC: 173 MG/DL (ref 70–110)
GLUCOSE SERPL-MCNC: 175 MG/DL (ref 70–110)
GLUCOSE SERPL-MCNC: 179 MG/DL (ref 70–110)
GLUCOSE SERPL-MCNC: 180 MG/DL (ref 70–110)
GLUCOSE SERPL-MCNC: 182 MG/DL (ref 70–110)
GLUCOSE SERPL-MCNC: 187 MG/DL (ref 70–110)
GLUCOSE SERPL-MCNC: 191 MG/DL (ref 70–110)
GLUCOSE SERPL-MCNC: 192 MG/DL (ref 70–110)
GLUCOSE SERPL-MCNC: 194 MG/DL (ref 70–110)
GLUCOSE SERPL-MCNC: 195 MG/DL (ref 70–110)
GLUCOSE SERPL-MCNC: 196 MG/DL (ref 70–110)
GLUCOSE SERPL-MCNC: 196 MG/DL (ref 70–110)
GLUCOSE SERPL-MCNC: 202 MG/DL (ref 70–110)
GLUCOSE SERPL-MCNC: 202 MG/DL (ref 70–110)
GLUCOSE SERPL-MCNC: 204 MG/DL (ref 70–110)
GLUCOSE SERPL-MCNC: 204 MG/DL (ref 70–110)
GLUCOSE SERPL-MCNC: 209 MG/DL (ref 70–110)
GLUCOSE SERPL-MCNC: 209 MG/DL (ref 70–110)
GLUCOSE SERPL-MCNC: 210 MG/DL (ref 70–110)
GLUCOSE SERPL-MCNC: 213 MG/DL (ref 70–110)
GLUCOSE SERPL-MCNC: 216 MG/DL (ref 70–110)
GLUCOSE SERPL-MCNC: 221 MG/DL (ref 70–110)
GLUCOSE SERPL-MCNC: 224 MG/DL (ref 70–110)
GLUCOSE SERPL-MCNC: 230 MG/DL (ref 70–110)
GLUCOSE SERPL-MCNC: 251 MG/DL (ref 70–110)
GLUCOSE SERPL-MCNC: 264 MG/DL (ref 70–110)
GLUCOSE SERPL-MCNC: 272 MG/DL (ref 70–110)
GLUCOSE SERPL-MCNC: 275 MG/DL (ref 70–110)
GLUCOSE SERPL-MCNC: 299 MG/DL (ref 70–110)
GLUCOSE SERPL-MCNC: 306 MG/DL (ref 70–110)
GLUCOSE SERPL-MCNC: 326 MG/DL (ref 70–110)
GLUCOSE SERPL-MCNC: 336 MG/DL (ref 70–110)
GLUCOSE SERPL-MCNC: 459 MG/DL (ref 70–110)
GLUCOSE SERPL-MCNC: 67 MG/DL (ref 70–110)
GLUCOSE SERPL-MCNC: 69 MG/DL (ref 70–110)
GLUCOSE SERPL-MCNC: 71 MG/DL (ref 70–110)
GLUCOSE SERPL-MCNC: 73 MG/DL (ref 70–110)
GLUCOSE SERPL-MCNC: 77 MG/DL (ref 70–110)
GLUCOSE SERPL-MCNC: 80 MG/DL (ref 70–110)
GLUCOSE SERPL-MCNC: 80 MG/DL (ref 70–110)
GLUCOSE SERPL-MCNC: 81 MG/DL (ref 70–110)
GLUCOSE SERPL-MCNC: 85 MG/DL (ref 70–110)
GLUCOSE SERPL-MCNC: 95 MG/DL (ref 70–110)
GLUCOSE SERPL-MCNC: 95 MG/DL (ref 70–110)
GLUCOSE SERPL-MCNC: 97 MG/DL (ref 70–110)
GLUCOSE UR QL STRIP: ABNORMAL
GPP - SALMONELLA: NOT DETECTED
GPP - VIBRIO CHOLERA: NOT DETECTED
GPP - YERSINIA ENTEROCOLITICA: NOT DETECTED
GRAM STN SPEC: NORMAL
GROSS: NORMAL
H CAPSUL AG UR-MCNC: NOT DETECTED NG/ML
HAV IGM SERPL QL IA: NEGATIVE
HBA1C MFR BLD: 6 % (ref 4–5.6)
HBA1C MFR BLD: 6.6 % (ref 4–5.6)
HBV CORE AB SERPL QL IA: POSITIVE
HBV CORE IGM SERPL QL IA: NEGATIVE
HBV SURFACE AB SER-ACNC: POSITIVE M[IU]/ML
HBV SURFACE AG SERPL QL IA: NEGATIVE
HBV SURFACE AG SERPL QL IA: NEGATIVE
HCO3 UR-SCNC: 17.2 MMOL/L (ref 24–28)
HCO3 UR-SCNC: 29.4 MMOL/L (ref 24–28)
HCT VFR BLD AUTO: 19.9 % (ref 40–54)
HCT VFR BLD AUTO: 20.1 % (ref 40–54)
HCT VFR BLD AUTO: 21.3 % (ref 40–54)
HCT VFR BLD AUTO: 22.7 % (ref 40–54)
HCT VFR BLD AUTO: 22.8 % (ref 40–54)
HCT VFR BLD AUTO: 23 % (ref 40–54)
HCT VFR BLD AUTO: 23.1 % (ref 40–54)
HCT VFR BLD AUTO: 23.5 % (ref 40–54)
HCT VFR BLD AUTO: 23.6 % (ref 40–54)
HCT VFR BLD AUTO: 23.8 % (ref 40–54)
HCT VFR BLD AUTO: 24 % (ref 40–54)
HCT VFR BLD AUTO: 24.2 % (ref 40–54)
HCT VFR BLD AUTO: 25 % (ref 40–54)
HCT VFR BLD AUTO: 26.6 % (ref 40–54)
HCT VFR BLD AUTO: 26.8 % (ref 40–54)
HCT VFR BLD AUTO: 27.4 % (ref 40–54)
HCT VFR BLD AUTO: 27.4 % (ref 40–54)
HCT VFR BLD AUTO: 27.6 % (ref 40–54)
HCT VFR BLD AUTO: 27.8 % (ref 40–54)
HCT VFR BLD AUTO: 28 % (ref 40–54)
HCT VFR BLD AUTO: 28 % (ref 40–54)
HCT VFR BLD AUTO: 28.2 % (ref 40–54)
HCT VFR BLD AUTO: 28.2 % (ref 40–54)
HCT VFR BLD AUTO: 28.5 % (ref 40–54)
HCT VFR BLD AUTO: 28.5 % (ref 40–54)
HCT VFR BLD AUTO: 28.6 % (ref 40–54)
HCT VFR BLD AUTO: 28.8 % (ref 40–54)
HCT VFR BLD AUTO: 29.4 % (ref 40–54)
HCT VFR BLD AUTO: 29.5 % (ref 40–54)
HCT VFR BLD AUTO: 29.6 % (ref 40–54)
HCT VFR BLD AUTO: 29.7 % (ref 40–54)
HCT VFR BLD AUTO: 29.9 % (ref 40–54)
HCT VFR BLD AUTO: 30.2 % (ref 40–54)
HCT VFR BLD AUTO: 30.3 % (ref 40–54)
HCT VFR BLD AUTO: 30.5 % (ref 40–54)
HCT VFR BLD AUTO: 30.7 % (ref 40–54)
HCT VFR BLD AUTO: 31.5 % (ref 40–54)
HCT VFR BLD AUTO: 33.5 % (ref 40–54)
HCT VFR BLD AUTO: 33.8 % (ref 40–54)
HCT VFR BLD AUTO: 33.8 % (ref 40–54)
HCT VFR BLD AUTO: 34 % (ref 40–54)
HCT VFR BLD AUTO: 34.2 % (ref 40–54)
HCT VFR BLD AUTO: 38.2 % (ref 40–54)
HCT VFR BLD AUTO: 39.2 % (ref 40–54)
HCV AB S/CO SERPL IA: <0.1 S/CO RATIO (ref 0–0.9)
HCV AB SERPL QL IA: NEGATIVE
HCV AB SERPL QL IA: NEGATIVE
HDLC SERPL-MCNC: 38 MG/DL (ref 40–75)
HDLC SERPL: 24.4 % (ref 20–50)
HGB BLD-MCNC: 10 G/DL (ref 14–18)
HGB BLD-MCNC: 10 G/DL (ref 14–18)
HGB BLD-MCNC: 10.1 G/DL (ref 14–18)
HGB BLD-MCNC: 10.2 G/DL (ref 14–18)
HGB BLD-MCNC: 10.3 G/DL (ref 14–18)
HGB BLD-MCNC: 10.3 G/DL (ref 14–18)
HGB BLD-MCNC: 10.5 G/DL (ref 14–18)
HGB BLD-MCNC: 10.7 G/DL (ref 14–18)
HGB BLD-MCNC: 10.7 G/DL (ref 14–18)
HGB BLD-MCNC: 10.9 G/DL (ref 14–18)
HGB BLD-MCNC: 10.9 G/DL (ref 14–18)
HGB BLD-MCNC: 11.1 G/DL (ref 14–18)
HGB BLD-MCNC: 11.2 G/DL (ref 14–18)
HGB BLD-MCNC: 11.5 G/DL (ref 14–18)
HGB BLD-MCNC: 11.6 G/DL (ref 14–18)
HGB BLD-MCNC: 11.6 G/DL (ref 14–18)
HGB BLD-MCNC: 11.9 G/DL (ref 14–18)
HGB BLD-MCNC: 12.7 G/DL (ref 14–18)
HGB BLD-MCNC: 13.8 G/DL (ref 14–18)
HGB BLD-MCNC: 6.6 G/DL (ref 14–18)
HGB BLD-MCNC: 6.7 G/DL (ref 14–18)
HGB BLD-MCNC: 7.6 G/DL (ref 14–18)
HGB BLD-MCNC: 7.6 G/DL (ref 14–18)
HGB BLD-MCNC: 7.7 G/DL (ref 14–18)
HGB BLD-MCNC: 7.9 G/DL (ref 14–18)
HGB BLD-MCNC: 8 G/DL (ref 14–18)
HGB BLD-MCNC: 8.1 G/DL (ref 14–18)
HGB BLD-MCNC: 8.3 G/DL (ref 14–18)
HGB BLD-MCNC: 8.3 G/DL (ref 14–18)
HGB BLD-MCNC: 8.4 G/DL (ref 14–18)
HGB BLD-MCNC: 8.4 G/DL (ref 14–18)
HGB BLD-MCNC: 8.7 G/DL (ref 14–18)
HGB BLD-MCNC: 9.4 G/DL (ref 14–18)
HGB BLD-MCNC: 9.4 G/DL (ref 14–18)
HGB BLD-MCNC: 9.5 G/DL (ref 14–18)
HGB BLD-MCNC: 9.6 G/DL (ref 14–18)
HGB BLD-MCNC: 9.6 G/DL (ref 14–18)
HGB BLD-MCNC: 9.7 G/DL (ref 14–18)
HGB BLD-MCNC: 9.7 G/DL (ref 14–18)
HGB BLD-MCNC: 9.9 G/DL (ref 14–18)
HGB UR QL STRIP: ABNORMAL
HGB UR QL STRIP: NEGATIVE
HISTOPLASMA ANTIGEN URINE: NOT DETECTED
HIV 1+2 AB+HIV1 P24 AG SERPL QL IA: NEGATIVE
HIV 1+2 AB+HIV1 P24 AG SERPL QL IA: NEGATIVE
HPIV1 RNA NPH QL NAA+NON-PROBE: NOT DETECTED
HPIV2 RNA NPH QL NAA+NON-PROBE: NOT DETECTED
HPIV3 RNA NPH QL NAA+NON-PROBE: NOT DETECTED
HPIV4 RNA NPH QL NAA+NON-PROBE: NOT DETECTED
HUMAN BOCAVIRUS: NOT DETECTED
HUMAN CORONAVIRUS, COMMON COLD VIRUS: NOT DETECTED
HUMAN METAPNEUMOVIRUS: NOT DETECTED
HYALINE CASTS #/AREA URNS LPF: 11 /LPF
HYALINE CASTS #/AREA URNS LPF: 11 /LPF
HYALINE CASTS #/AREA URNS LPF: 17 /LPF
HYALINE CASTS UR QL AUTO: 0 /LPF
HYPOCHROMIA BLD QL SMEAR: ABNORMAL
IMM GRANULOCYTES # BLD AUTO: 0.01 K/UL (ref 0–0.04)
IMM GRANULOCYTES # BLD AUTO: 0.02 K/UL (ref 0–0.04)
IMM GRANULOCYTES # BLD AUTO: 0.03 K/UL (ref 0–0.04)
IMM GRANULOCYTES # BLD AUTO: 0.04 K/UL (ref 0–0.04)
IMM GRANULOCYTES # BLD AUTO: 0.05 K/UL (ref 0–0.04)
IMM GRANULOCYTES # BLD AUTO: 0.06 K/UL (ref 0–0.04)
IMM GRANULOCYTES # BLD AUTO: 0.07 K/UL (ref 0–0.04)
IMM GRANULOCYTES # BLD AUTO: 0.08 K/UL (ref 0–0.04)
IMM GRANULOCYTES # BLD AUTO: 0.09 K/UL (ref 0–0.04)
IMM GRANULOCYTES # BLD AUTO: 0.13 K/UL (ref 0–0.04)
IMM GRANULOCYTES # BLD AUTO: 0.17 K/UL (ref 0–0.04)
IMM GRANULOCYTES # BLD AUTO: 0.18 K/UL (ref 0–0.04)
IMM GRANULOCYTES # BLD AUTO: 0.26 K/UL (ref 0–0.04)
IMM GRANULOCYTES # BLD AUTO: 0.28 K/UL (ref 0–0.04)
IMM GRANULOCYTES # BLD AUTO: 0.3 K/UL (ref 0–0.04)
IMM GRANULOCYTES # BLD AUTO: 0.33 K/UL (ref 0–0.04)
IMM GRANULOCYTES # BLD AUTO: 0.36 K/UL (ref 0–0.04)
IMM GRANULOCYTES # BLD AUTO: 1.23 K/UL (ref 0–0.04)
IMM GRANULOCYTES # BLD AUTO: ABNORMAL K/UL (ref 0–0.04)
IMM GRANULOCYTES NFR BLD AUTO: 0.2 % (ref 0–0.5)
IMM GRANULOCYTES NFR BLD AUTO: 0.2 % (ref 0–0.5)
IMM GRANULOCYTES NFR BLD AUTO: 0.3 % (ref 0–0.5)
IMM GRANULOCYTES NFR BLD AUTO: 0.4 % (ref 0–0.5)
IMM GRANULOCYTES NFR BLD AUTO: 0.4 % (ref 0–0.5)
IMM GRANULOCYTES NFR BLD AUTO: 0.5 % (ref 0–0.5)
IMM GRANULOCYTES NFR BLD AUTO: 0.6 % (ref 0–0.5)
IMM GRANULOCYTES NFR BLD AUTO: 0.6 % (ref 0–0.5)
IMM GRANULOCYTES NFR BLD AUTO: 0.7 % (ref 0–0.5)
IMM GRANULOCYTES NFR BLD AUTO: 0.8 % (ref 0–0.5)
IMM GRANULOCYTES NFR BLD AUTO: 0.9 % (ref 0–0.5)
IMM GRANULOCYTES NFR BLD AUTO: 1 % (ref 0–0.5)
IMM GRANULOCYTES NFR BLD AUTO: 1.1 % (ref 0–0.5)
IMM GRANULOCYTES NFR BLD AUTO: 1.1 % (ref 0–0.5)
IMM GRANULOCYTES NFR BLD AUTO: 1.2 % (ref 0–0.5)
IMM GRANULOCYTES NFR BLD AUTO: 1.7 % (ref 0–0.5)
IMM GRANULOCYTES NFR BLD AUTO: 1.9 % (ref 0–0.5)
IMM GRANULOCYTES NFR BLD AUTO: 1.9 % (ref 0–0.5)
IMM GRANULOCYTES NFR BLD AUTO: 2.2 % (ref 0–0.5)
IMM GRANULOCYTES NFR BLD AUTO: 2.2 % (ref 0–0.5)
IMM GRANULOCYTES NFR BLD AUTO: 2.9 % (ref 0–0.5)
IMM GRANULOCYTES NFR BLD AUTO: 3.1 % (ref 0–0.5)
IMM GRANULOCYTES NFR BLD AUTO: 4 % (ref 0–0.5)
IMM GRANULOCYTES NFR BLD AUTO: 4.6 % (ref 0–0.5)
IMM GRANULOCYTES NFR BLD AUTO: 4.7 % (ref 0–0.5)
IMM GRANULOCYTES NFR BLD AUTO: ABNORMAL % (ref 0–0.5)
INFLUENZA A (SUBTYPES H1,H1-2009,H3): NOT DETECTED
INFLUENZA A - H1N1-09: NOT DETECTED
INFLUENZA A, MOLECULAR: NEGATIVE
INFLUENZA B, MOLECULAR: NEGATIVE
INR PPP: 1.2
INR PPP: 1.3
INR PPP: 1.3
INR PPP: 1.5
INR PPP: 1.5
INR PPP: 1.5 (ref 0.8–1.2)
INR PPP: 1.5 (ref 0.8–1.2)
INR PPP: 1.6 (ref 0.8–1.2)
INR PPP: 1.7 (ref 0.8–1.2)
INR PPP: 1.8 (ref 0.8–1.2)
INR PPP: 1.9 (ref 0.8–1.2)
INR PPP: 2 (ref 0.8–1.2)
INTERVENTRICULAR SEPTUM: 0.82 CM (ref 0.6–1.1)
INTERVENTRICULAR SEPTUM: 0.94 CM (ref 0.6–1.1)
IRON SERPL-MCNC: 120 UG/DL (ref 45–160)
IVRT: 114.18 MSEC
KETONES UR QL STRIP: NEGATIVE
KOH PREP SPEC: NORMAL
L PNEUMO AG UR QL IA: NEGATIVE
L PNEUMO1 AG UR QL IA: NEGATIVE
LA MAJOR: 4.94 CM
LA MAJOR: 5.07 CM
LA MINOR: 4.87 CM
LA MINOR: 5.09 CM
LA WIDTH: 4.1 CM
LA WIDTH: 4.35 CM
LABCORP MISC TEST CODE: NORMAL
LABCORP MISC TEST CODE: NORMAL
LABCORP MISC TEST NAME: NORMAL
LABCORP MISC TEST NAME: NORMAL
LABCORP MISCELLANEOUS TEST: NORMAL
LABCORP MISCELLANEOUS TEST: NORMAL
LABORATORY COMMENT REPORT: ABNORMAL
LACTATE SERPL-SCNC: 0.9 MMOL/L (ref 0.5–2.2)
LACTATE SERPL-SCNC: 1.1 MMOL/L (ref 0.5–2.2)
LACTATE SERPL-SCNC: 1.5 MMOL/L (ref 0.5–1.9)
LACTATE SERPL-SCNC: 1.8 MMOL/L (ref 0.5–2.2)
LACTATE SERPL-SCNC: 2.3 MMOL/L (ref 0.5–2.2)
LACTATE SERPL-SCNC: 8.5 MMOL/L (ref 0.5–2.2)
LDH FLD L TO P-CCNC: 55 U/L
LDH FLD L TO P-CCNC: 95 U/L
LDH FLD L TO P-CCNC: 96 U/L
LDH SERPL L TO P-CCNC: 267 U/L (ref 110–260)
LDLC SERPL CALC-MCNC: 87 MG/DL (ref 63–159)
LEFT ATRIUM SIZE: 3.8 CM
LEFT ATRIUM SIZE: 4.02 CM
LEFT ATRIUM VOLUME INDEX MOD: 32.8 ML/M2
LEFT ATRIUM VOLUME INDEX MOD: 33.2 ML/M2
LEFT ATRIUM VOLUME INDEX: 39.4 ML/M2
LEFT ATRIUM VOLUME INDEX: 41.6 ML/M2
LEFT ATRIUM VOLUME MOD: 56.12 CM3
LEFT ATRIUM VOLUME MOD: 58.1 CM3
LEFT ATRIUM VOLUME: 68.91 CM3
LEFT ATRIUM VOLUME: 71.17 CM3
LEFT INTERNAL DIMENSION IN SYSTOLE: 2.95 CM (ref 2.1–4)
LEFT INTERNAL DIMENSION IN SYSTOLE: 3.22 CM (ref 2.1–4)
LEFT VENTRICLE DIASTOLIC VOLUME INDEX: 56.83 ML/M2
LEFT VENTRICLE DIASTOLIC VOLUME INDEX: 80.19 ML/M2
LEFT VENTRICLE DIASTOLIC VOLUME: 137.12 ML
LEFT VENTRICLE DIASTOLIC VOLUME: 99.46 ML
LEFT VENTRICLE MASS INDEX: 102 G/M2
LEFT VENTRICLE MASS INDEX: 66 G/M2
LEFT VENTRICLE SYSTOLIC VOLUME INDEX: 19.2 ML/M2
LEFT VENTRICLE SYSTOLIC VOLUME INDEX: 24.3 ML/M2
LEFT VENTRICLE SYSTOLIC VOLUME: 33.56 ML
LEFT VENTRICLE SYSTOLIC VOLUME: 41.49 ML
LEFT VENTRICULAR INTERNAL DIMENSION IN DIASTOLE: 4.64 CM (ref 3.5–6)
LEFT VENTRICULAR INTERNAL DIMENSION IN DIASTOLE: 5.33 CM (ref 3.5–6)
LEFT VENTRICULAR MASS: 115.77 G
LEFT VENTRICULAR MASS: 173.66 G
LEGIONELLA CULTURE STATUS: NORMAL
LEGIONELLA CULTURE STATUS: NORMAL
LEGIONELLA PNEUMOPHILA: NOT DETECTED
LEGIONELLA SPEC CULT: NORMAL
LEGIONELLA SPEC CULT: NORMAL
LEGIONELLA SPECIMEN SOURCE: NORMAL
LEUKOCYTE ESTERASE UR QL STRIP: NEGATIVE
LV LATERAL E/E' RATIO: 14 M/S
LV LATERAL E/E' RATIO: 6.88 M/S
LV SEPTAL E/E' RATIO: 13.75 M/S
LV SEPTAL E/E' RATIO: 16.8 M/S
LYMPHOCYTES # BLD AUTO: 0.3 K/UL (ref 1–4.8)
LYMPHOCYTES # BLD AUTO: 0.4 K/UL (ref 1–4.8)
LYMPHOCYTES # BLD AUTO: 0.5 K/UL (ref 1–4.8)
LYMPHOCYTES # BLD AUTO: 0.5 K/UL (ref 1–4.8)
LYMPHOCYTES # BLD AUTO: 0.6 K/UL (ref 1–4.8)
LYMPHOCYTES # BLD AUTO: 0.7 K/UL (ref 1–4.8)
LYMPHOCYTES # BLD AUTO: 0.8 K/UL (ref 1–4.8)
LYMPHOCYTES # BLD AUTO: 0.9 K/UL (ref 1–4.8)
LYMPHOCYTES # BLD AUTO: 1 K/UL (ref 1–4.8)
LYMPHOCYTES # BLD AUTO: 1.1 K/UL (ref 1–4.8)
LYMPHOCYTES # BLD AUTO: 1.3 K/UL (ref 1–4.8)
LYMPHOCYTES # BLD AUTO: 1.3 K/UL (ref 1–4.8)
LYMPHOCYTES # BLD AUTO: ABNORMAL K/UL (ref 1–4.8)
LYMPHOCYTES NFR BLD: 10.1 % (ref 18–48)
LYMPHOCYTES NFR BLD: 10.1 % (ref 18–48)
LYMPHOCYTES NFR BLD: 10.4 % (ref 18–48)
LYMPHOCYTES NFR BLD: 10.7 % (ref 18–48)
LYMPHOCYTES NFR BLD: 12.5 % (ref 18–48)
LYMPHOCYTES NFR BLD: 13 % (ref 18–48)
LYMPHOCYTES NFR BLD: 13.2 % (ref 18–48)
LYMPHOCYTES NFR BLD: 13.4 % (ref 18–48)
LYMPHOCYTES NFR BLD: 15.2 % (ref 18–48)
LYMPHOCYTES NFR BLD: 16 % (ref 18–48)
LYMPHOCYTES NFR BLD: 17.7 % (ref 18–48)
LYMPHOCYTES NFR BLD: 18.1 % (ref 18–48)
LYMPHOCYTES NFR BLD: 18.8 % (ref 18–48)
LYMPHOCYTES NFR BLD: 19.3 % (ref 18–48)
LYMPHOCYTES NFR BLD: 19.4 % (ref 18–48)
LYMPHOCYTES NFR BLD: 19.5 % (ref 18–48)
LYMPHOCYTES NFR BLD: 2.3 % (ref 18–48)
LYMPHOCYTES NFR BLD: 2.6 % (ref 18–48)
LYMPHOCYTES NFR BLD: 20 % (ref 18–48)
LYMPHOCYTES NFR BLD: 21.1 % (ref 18–48)
LYMPHOCYTES NFR BLD: 21.2 % (ref 18–48)
LYMPHOCYTES NFR BLD: 21.4 % (ref 18–48)
LYMPHOCYTES NFR BLD: 21.6 % (ref 18–48)
LYMPHOCYTES NFR BLD: 23.1 % (ref 18–48)
LYMPHOCYTES NFR BLD: 25.1 % (ref 18–48)
LYMPHOCYTES NFR BLD: 25.2 % (ref 18–48)
LYMPHOCYTES NFR BLD: 26.6 % (ref 18–48)
LYMPHOCYTES NFR BLD: 28 % (ref 18–48)
LYMPHOCYTES NFR BLD: 29.5 % (ref 18–48)
LYMPHOCYTES NFR BLD: 3 % (ref 18–48)
LYMPHOCYTES NFR BLD: 3.1 % (ref 18–48)
LYMPHOCYTES NFR BLD: 3.2 % (ref 18–48)
LYMPHOCYTES NFR BLD: 3.8 % (ref 18–48)
LYMPHOCYTES NFR BLD: 4.2 % (ref 18–48)
LYMPHOCYTES NFR BLD: 4.7 % (ref 18–48)
LYMPHOCYTES NFR BLD: 6.7 % (ref 18–48)
LYMPHOCYTES NFR BLD: 7.2 % (ref 18–48)
LYMPHOCYTES NFR BLD: 7.3 % (ref 18–48)
LYMPHOCYTES NFR BLD: 9 % (ref 18–48)
LYMPHOCYTES NFR BLD: 9.6 % (ref 18–48)
LYMPHOCYTES NFR FLD MANUAL: 23 %
LYMPHOCYTES NFR FLD MANUAL: 28 %
LYMPHOCYTES NFR FLD MANUAL: 51 %
LYMPHOCYTES NFR FLD MANUAL: 88 %
LYMPHOCYTES NFR FLD MANUAL: 9 %
Lab: NORMAL
Lab: NORMAL
M PROTEIN SERPL ELPH-MCNC: ABNORMAL G/DL
M TB CMPLX DNA SPEC QL NAA+PROBE: NEGATIVE
M TB IFN-G CD4+ BCKGRND COR BLD-ACNC: 0.02 IU/ML
MAGNESIUM SERPL-MCNC: 1.3 MG/DL (ref 1.6–2.6)
MAGNESIUM SERPL-MCNC: 1.5 MG/DL (ref 1.6–2.6)
MAGNESIUM SERPL-MCNC: 1.6 MG/DL (ref 1.6–2.6)
MAGNESIUM SERPL-MCNC: 1.7 MG/DL (ref 1.6–2.6)
MAGNESIUM SERPL-MCNC: 1.9 MG/DL (ref 1.6–2.6)
MAGNESIUM SERPL-MCNC: 2 MG/DL (ref 1.6–2.6)
MAGNESIUM SERPL-MCNC: 2 MG/DL (ref 1.6–2.6)
MAGNESIUM SERPL-MCNC: 2.8 MG/DL (ref 1.6–2.6)
MAGNESIUM SERPL-MCNC: 3 MG/DL (ref 1.6–2.6)
MCH RBC QN AUTO: 28.7 PG (ref 27–31)
MCH RBC QN AUTO: 29 PG (ref 27–31)
MCH RBC QN AUTO: 29 PG (ref 27–31)
MCH RBC QN AUTO: 29.1 PG (ref 27–31)
MCH RBC QN AUTO: 29.1 PG (ref 27–31)
MCH RBC QN AUTO: 29.2 PG (ref 27–31)
MCH RBC QN AUTO: 29.3 PG (ref 27–31)
MCH RBC QN AUTO: 29.4 PG (ref 27–31)
MCH RBC QN AUTO: 29.5 PG (ref 27–31)
MCH RBC QN AUTO: 29.6 PG (ref 27–31)
MCH RBC QN AUTO: 29.7 PG (ref 27–31)
MCH RBC QN AUTO: 29.8 PG (ref 27–31)
MCH RBC QN AUTO: 29.9 PG (ref 27–31)
MCH RBC QN AUTO: 30 PG (ref 27–31)
MCH RBC QN AUTO: 30.2 PG (ref 27–31)
MCH RBC QN AUTO: 30.3 PG (ref 27–31)
MCH RBC QN AUTO: 30.4 PG (ref 27–31)
MCH RBC QN AUTO: 30.5 PG (ref 27–31)
MCH RBC QN AUTO: 30.5 PG (ref 27–31)
MCH RBC QN AUTO: 30.6 PG (ref 27–31)
MCH RBC QN AUTO: 30.7 PG (ref 27–31)
MCH RBC QN AUTO: 31.8 PG (ref 27–31)
MCHC RBC AUTO-ENTMCNC: 32.8 G/DL (ref 32–36)
MCHC RBC AUTO-ENTMCNC: 32.9 G/DL (ref 32–36)
MCHC RBC AUTO-ENTMCNC: 33.2 G/DL (ref 32–36)
MCHC RBC AUTO-ENTMCNC: 33.2 G/DL (ref 32–36)
MCHC RBC AUTO-ENTMCNC: 33.7 G/DL (ref 32–36)
MCHC RBC AUTO-ENTMCNC: 33.8 G/DL (ref 32–36)
MCHC RBC AUTO-ENTMCNC: 33.9 G/DL (ref 32–36)
MCHC RBC AUTO-ENTMCNC: 33.9 G/DL (ref 32–36)
MCHC RBC AUTO-ENTMCNC: 34 G/DL (ref 32–36)
MCHC RBC AUTO-ENTMCNC: 34.1 G/DL (ref 32–36)
MCHC RBC AUTO-ENTMCNC: 34.2 G/DL (ref 32–36)
MCHC RBC AUTO-ENTMCNC: 34.2 G/DL (ref 32–36)
MCHC RBC AUTO-ENTMCNC: 34.3 G/DL (ref 32–36)
MCHC RBC AUTO-ENTMCNC: 34.4 G/DL (ref 32–36)
MCHC RBC AUTO-ENTMCNC: 34.6 G/DL (ref 32–36)
MCHC RBC AUTO-ENTMCNC: 34.7 G/DL (ref 32–36)
MCHC RBC AUTO-ENTMCNC: 34.8 G/DL (ref 32–36)
MCHC RBC AUTO-ENTMCNC: 35 G/DL (ref 32–36)
MCHC RBC AUTO-ENTMCNC: 35.1 G/DL (ref 32–36)
MCHC RBC AUTO-ENTMCNC: 35.1 G/DL (ref 32–36)
MCHC RBC AUTO-ENTMCNC: 35.2 G/DL (ref 32–36)
MCHC RBC AUTO-ENTMCNC: 35.2 G/DL (ref 32–36)
MCHC RBC AUTO-ENTMCNC: 35.3 G/DL (ref 32–36)
MCHC RBC AUTO-ENTMCNC: 35.4 G/DL (ref 32–36)
MCHC RBC AUTO-ENTMCNC: 35.4 G/DL (ref 32–36)
MCHC RBC AUTO-ENTMCNC: 35.6 G/DL (ref 32–36)
MCHC RBC AUTO-ENTMCNC: 35.6 G/DL (ref 32–36)
MCHC RBC AUTO-ENTMCNC: 35.7 G/DL (ref 32–36)
MCHC RBC AUTO-ENTMCNC: 35.8 G/DL (ref 32–36)
MCHC RBC AUTO-ENTMCNC: 36.1 G/DL (ref 32–36)
MCHC RBC AUTO-ENTMCNC: 36.1 G/DL (ref 32–36)
MCHC RBC AUTO-ENTMCNC: 36.2 G/DL (ref 32–36)
MCV RBC AUTO: 82 FL (ref 82–98)
MCV RBC AUTO: 83 FL (ref 82–98)
MCV RBC AUTO: 84 FL (ref 82–98)
MCV RBC AUTO: 85 FL (ref 82–98)
MCV RBC AUTO: 86 FL (ref 82–98)
MCV RBC AUTO: 87 FL (ref 82–98)
MCV RBC AUTO: 88 FL (ref 82–98)
MCV RBC AUTO: 89 FL (ref 82–98)
MCV RBC AUTO: 90 FL (ref 82–98)
MCV RBC AUTO: 92 FL (ref 82–98)
MCV RBC AUTO: 92 FL (ref 82–98)
MESOTHL CELL NFR FLD MANUAL: 2 %
MESOTHL CELL NFR FLD MANUAL: 2 %
MESOTHL CELL NFR FLD MANUAL: 20 %
MICROSCOPIC COMMENT: ABNORMAL
MICROSCOPIC COMMENT: NORMAL
MICROSCOPIC COMMENT: NORMAL
MICROSCOPIC EXAM: NORMAL
MICROSCOPIC EXAM: NORMAL
MISCELLANEOUS TEST NAME: NORMAL
MITOCHONDRIA M2 IGG SER-ACNC: <20 UNITS (ref 0–20)
MITOGEN IGNF BCKGRD COR BLD-ACNC: 9.42 IU/ML
MODE: ABNORMAL
MODE: ABNORMAL
MONOCYTES # BLD AUTO: 0.1 K/UL (ref 0.3–1)
MONOCYTES # BLD AUTO: 0.1 K/UL (ref 0.3–1)
MONOCYTES # BLD AUTO: 0.3 K/UL (ref 0.3–1)
MONOCYTES # BLD AUTO: 0.4 K/UL (ref 0.3–1)
MONOCYTES # BLD AUTO: 0.5 K/UL (ref 0.3–1)
MONOCYTES # BLD AUTO: 0.6 K/UL (ref 0.3–1)
MONOCYTES # BLD AUTO: 0.7 K/UL (ref 0.3–1)
MONOCYTES # BLD AUTO: 0.8 K/UL (ref 0.3–1)
MONOCYTES # BLD AUTO: 0.9 K/UL (ref 0.3–1)
MONOCYTES # BLD AUTO: 1 K/UL (ref 0.3–1)
MONOCYTES # BLD AUTO: 1.1 K/UL (ref 0.3–1)
MONOCYTES # BLD AUTO: 1.2 K/UL (ref 0.3–1)
MONOCYTES # BLD AUTO: 1.3 K/UL (ref 0.3–1)
MONOCYTES # BLD AUTO: 1.3 K/UL (ref 0.3–1)
MONOCYTES # BLD AUTO: 1.4 K/UL (ref 0.3–1)
MONOCYTES # BLD AUTO: 2.3 K/UL (ref 0.3–1)
MONOCYTES # BLD AUTO: ABNORMAL K/UL (ref 0.3–1)
MONOCYTES NFR BLD: 10.2 % (ref 4–15)
MONOCYTES NFR BLD: 10.2 % (ref 4–15)
MONOCYTES NFR BLD: 11.5 % (ref 4–15)
MONOCYTES NFR BLD: 11.7 % (ref 4–15)
MONOCYTES NFR BLD: 11.7 % (ref 4–15)
MONOCYTES NFR BLD: 12.1 % (ref 4–15)
MONOCYTES NFR BLD: 12.1 % (ref 4–15)
MONOCYTES NFR BLD: 12.2 % (ref 4–15)
MONOCYTES NFR BLD: 12.2 % (ref 4–15)
MONOCYTES NFR BLD: 12.3 % (ref 4–15)
MONOCYTES NFR BLD: 12.5 % (ref 4–15)
MONOCYTES NFR BLD: 13 % (ref 4–15)
MONOCYTES NFR BLD: 13.5 % (ref 4–15)
MONOCYTES NFR BLD: 14.6 % (ref 4–15)
MONOCYTES NFR BLD: 15.1 % (ref 4–15)
MONOCYTES NFR BLD: 15.5 % (ref 4–15)
MONOCYTES NFR BLD: 15.5 % (ref 4–15)
MONOCYTES NFR BLD: 15.9 % (ref 4–15)
MONOCYTES NFR BLD: 16.4 % (ref 4–15)
MONOCYTES NFR BLD: 16.9 % (ref 4–15)
MONOCYTES NFR BLD: 16.9 % (ref 4–15)
MONOCYTES NFR BLD: 17.1 % (ref 4–15)
MONOCYTES NFR BLD: 17.7 % (ref 4–15)
MONOCYTES NFR BLD: 17.7 % (ref 4–15)
MONOCYTES NFR BLD: 18 % (ref 4–15)
MONOCYTES NFR BLD: 19.4 % (ref 4–15)
MONOCYTES NFR BLD: 2.9 % (ref 4–15)
MONOCYTES NFR BLD: 5.1 % (ref 4–15)
MONOCYTES NFR BLD: 6 % (ref 4–15)
MONOCYTES NFR BLD: 6.9 % (ref 4–15)
MONOCYTES NFR BLD: 7.2 % (ref 4–15)
MONOCYTES NFR BLD: 7.2 % (ref 4–15)
MONOCYTES NFR BLD: 8.1 % (ref 4–15)
MONOCYTES NFR BLD: 8.1 % (ref 4–15)
MONOCYTES NFR BLD: 8.3 % (ref 4–15)
MONOCYTES NFR BLD: 8.3 % (ref 4–15)
MONOCYTES NFR BLD: 8.6 % (ref 4–15)
MONOCYTES NFR BLD: 8.8 % (ref 4–15)
MONOCYTES NFR BLD: 9.3 % (ref 4–15)
MONOCYTES NFR BLD: 9.3 % (ref 4–15)
MONOS+MACROS NFR FLD MANUAL: 12 %
MONOS+MACROS NFR FLD MANUAL: 43 %
MONOS+MACROS NFR FLD MANUAL: 46 %
MONOS+MACROS NFR FLD MANUAL: 47 %
MONOS+MACROS NFR FLD MANUAL: 7 %
MORAXELLA CATARRHALIS: NOT DETECTED
MRSA SPEC QL CULT: NORMAL
MRSA SPEC QL CULT: NORMAL
MV A" WAVE DURATION": 11.04 MSEC
MV A" WAVE DURATION": 13.32 MSEC
MV PEAK A VEL: 0.71 M/S
MV PEAK A VEL: 0.71 M/S
MV PEAK E VEL: 0.55 M/S
MV PEAK E VEL: 0.84 M/S
MV STENOSIS PRESSURE HALF TIME: 45.45 MS
MV STENOSIS PRESSURE HALF TIME: 62.5 MS
MV VALVE AREA P 1/2 METHOD: 3.52 CM2
MV VALVE AREA P 1/2 METHOD: 4.84 CM2
MYCOPLASMA PNEUMONIAE: NOT DETECTED
MYELOPEROXIDASE AB SER IA-ACNC: <9 U/ML (ref 0–9)
NEUTROPHILS # BLD AUTO: 1.5 K/UL (ref 1.8–7.7)
NEUTROPHILS # BLD AUTO: 1.8 K/UL (ref 1.8–7.7)
NEUTROPHILS # BLD AUTO: 1.9 K/UL (ref 1.8–7.7)
NEUTROPHILS # BLD AUTO: 1.9 K/UL (ref 1.8–7.7)
NEUTROPHILS # BLD AUTO: 12 K/UL (ref 1.8–7.7)
NEUTROPHILS # BLD AUTO: 12.6 K/UL (ref 1.8–7.7)
NEUTROPHILS # BLD AUTO: 12.8 K/UL (ref 1.8–7.7)
NEUTROPHILS # BLD AUTO: 14 K/UL (ref 1.8–7.7)
NEUTROPHILS # BLD AUTO: 2 K/UL (ref 1.8–7.7)
NEUTROPHILS # BLD AUTO: 2.1 K/UL (ref 1.8–7.7)
NEUTROPHILS # BLD AUTO: 2.2 K/UL (ref 1.8–7.7)
NEUTROPHILS # BLD AUTO: 2.3 K/UL (ref 1.8–7.7)
NEUTROPHILS # BLD AUTO: 2.3 K/UL (ref 1.8–7.7)
NEUTROPHILS # BLD AUTO: 2.4 K/UL (ref 1.8–7.7)
NEUTROPHILS # BLD AUTO: 2.5 K/UL (ref 1.8–7.7)
NEUTROPHILS # BLD AUTO: 2.5 K/UL (ref 1.8–7.7)
NEUTROPHILS # BLD AUTO: 2.6 K/UL (ref 1.8–7.7)
NEUTROPHILS # BLD AUTO: 2.7 K/UL (ref 1.8–7.7)
NEUTROPHILS # BLD AUTO: 2.8 K/UL (ref 1.8–7.7)
NEUTROPHILS # BLD AUTO: 2.9 K/UL (ref 1.8–7.7)
NEUTROPHILS # BLD AUTO: 22.5 K/UL (ref 1.8–7.7)
NEUTROPHILS # BLD AUTO: 3 K/UL (ref 1.8–7.7)
NEUTROPHILS # BLD AUTO: 3.2 K/UL (ref 1.8–7.7)
NEUTROPHILS # BLD AUTO: 3.2 K/UL (ref 1.8–7.7)
NEUTROPHILS # BLD AUTO: 3.3 K/UL (ref 1.8–7.7)
NEUTROPHILS # BLD AUTO: 3.4 K/UL (ref 1.8–7.7)
NEUTROPHILS # BLD AUTO: 3.4 K/UL (ref 1.8–7.7)
NEUTROPHILS # BLD AUTO: 4.6 K/UL (ref 1.8–7.7)
NEUTROPHILS # BLD AUTO: 4.7 K/UL (ref 1.8–7.7)
NEUTROPHILS # BLD AUTO: 4.9 K/UL (ref 1.8–7.7)
NEUTROPHILS # BLD AUTO: 4.9 K/UL (ref 1.8–7.7)
NEUTROPHILS # BLD AUTO: 5 K/UL (ref 1.8–7.7)
NEUTROPHILS # BLD AUTO: 5.3 K/UL (ref 1.8–7.7)
NEUTROPHILS # BLD AUTO: 5.4 K/UL (ref 1.8–7.7)
NEUTROPHILS # BLD AUTO: 6.3 K/UL (ref 1.8–7.7)
NEUTROPHILS # BLD AUTO: 7.1 K/UL (ref 1.8–7.7)
NEUTROPHILS # BLD AUTO: 7.1 K/UL (ref 1.8–7.7)
NEUTROPHILS NFR BLD: 43.6 % (ref 38–73)
NEUTROPHILS NFR BLD: 51.9 % (ref 38–73)
NEUTROPHILS NFR BLD: 52.5 % (ref 38–73)
NEUTROPHILS NFR BLD: 53 % (ref 38–73)
NEUTROPHILS NFR BLD: 53.3 % (ref 38–73)
NEUTROPHILS NFR BLD: 53.8 % (ref 38–73)
NEUTROPHILS NFR BLD: 55.5 % (ref 38–73)
NEUTROPHILS NFR BLD: 55.6 % (ref 38–73)
NEUTROPHILS NFR BLD: 55.6 % (ref 38–73)
NEUTROPHILS NFR BLD: 57 % (ref 38–73)
NEUTROPHILS NFR BLD: 57.7 % (ref 38–73)
NEUTROPHILS NFR BLD: 58 % (ref 38–73)
NEUTROPHILS NFR BLD: 58.1 % (ref 38–73)
NEUTROPHILS NFR BLD: 58.3 % (ref 38–73)
NEUTROPHILS NFR BLD: 58.6 % (ref 38–73)
NEUTROPHILS NFR BLD: 59.6 % (ref 38–73)
NEUTROPHILS NFR BLD: 59.8 % (ref 38–73)
NEUTROPHILS NFR BLD: 66.1 % (ref 38–73)
NEUTROPHILS NFR BLD: 66.2 % (ref 38–73)
NEUTROPHILS NFR BLD: 69 % (ref 38–73)
NEUTROPHILS NFR BLD: 71 % (ref 38–73)
NEUTROPHILS NFR BLD: 71.7 % (ref 38–73)
NEUTROPHILS NFR BLD: 74.1 % (ref 38–73)
NEUTROPHILS NFR BLD: 74.5 % (ref 38–73)
NEUTROPHILS NFR BLD: 76.5 % (ref 38–73)
NEUTROPHILS NFR BLD: 79.2 % (ref 38–73)
NEUTROPHILS NFR BLD: 80.4 % (ref 38–73)
NEUTROPHILS NFR BLD: 80.5 % (ref 38–73)
NEUTROPHILS NFR BLD: 80.7 % (ref 38–73)
NEUTROPHILS NFR BLD: 80.8 % (ref 38–73)
NEUTROPHILS NFR BLD: 81.4 % (ref 38–73)
NEUTROPHILS NFR BLD: 81.5 % (ref 38–73)
NEUTROPHILS NFR BLD: 81.6 % (ref 38–73)
NEUTROPHILS NFR BLD: 84 % (ref 38–73)
NEUTROPHILS NFR BLD: 84.2 % (ref 38–73)
NEUTROPHILS NFR BLD: 85.6 % (ref 38–73)
NEUTROPHILS NFR BLD: 85.9 % (ref 38–73)
NEUTROPHILS NFR BLD: 86.2 % (ref 38–73)
NEUTROPHILS NFR BLD: 87 % (ref 38–73)
NEUTROPHILS NFR BLD: 89 % (ref 38–73)
NEUTROPHILS NFR FLD MANUAL: 1 %
NEUTROPHILS NFR FLD MANUAL: 10 %
NEUTROPHILS NFR FLD MANUAL: 29 %
NEUTROPHILS NFR FLD MANUAL: 3 %
NEUTROPHILS NFR FLD MANUAL: 79 %
NEUTS BAND NFR BLD MANUAL: 2 %
NITRITE UR QL STRIP: NEGATIVE
NONHDLC SERPL-MCNC: 118 MG/DL
NOROVIRUS GI+II RNA STL QL NAA+NON-PROBE: NOT DETECTED
NRBC BLD-RTO: 0 /100 WBC
NRBC BLD-RTO: 1 /100 WBC
NUM UNITS TRANS PACKED RBC: NORMAL
O+P SPEC MICRO: NORMAL
O+P SPEC MICRO: NORMAL
O+P STL CONC: NORMAL
O+P STL CONC: NORMAL
OHS CV CPX 1 MINUTE RECOVERY HEART RATE: 103 BPM
OHS CV CPX 85 PERCENT MAX PREDICTED HEART RATE MALE: 136
OHS CV CPX MAX PREDICTED HEART RATE: 160
OHS CV CPX PATIENT IS FEMALE: 0
OHS CV CPX PATIENT IS MALE: 1
OHS CV CPX PEAK DIASTOLIC BLOOD PRESSURE: 69 MMHG
OHS CV CPX PEAK HEAR RATE: 105 BPM
OHS CV CPX PEAK RATE PRESSURE PRODUCT: NORMAL
OHS CV CPX PEAK SYSTOLIC BLOOD PRESSURE: 110 MMHG
OHS CV CPX PERCENT MAX PREDICTED HEART RATE ACHIEVED: 66
OHS CV CPX RATE PRESSURE PRODUCT PRESENTING: 6732
OSMOLALITY SERPL: 287 MOSM/KG (ref 280–300)
OSMOLALITY UR: 203 MOSM/KG (ref 50–1200)
OSMOLALITY UR: 213 MOSM/KG (ref 50–1200)
OSMOLALITY UR: 297 MOSM/KG (ref 50–1200)
OVALOCYTES BLD QL SMEAR: ABNORMAL
P JIROVECII DNA L RESP QL NAA+NON-PROBE: POSITIVE
P-ANCA ATYPICAL TITR SER IF: NORMAL TITER
P-ANCA TITR SER IF: NORMAL TITER
PARAINFLUENZA: NOT DETECTED
PCO2 BLDA: 31.1 MMHG (ref 35–45)
PCO2 BLDA: 34.8 MMHG (ref 35–45)
PH SMN: 7.35 [PH] (ref 7.35–7.45)
PH SMN: 7.54 [PH] (ref 7.35–7.45)
PH UR STRIP: 5 [PH] (ref 5–8)
PH UR STRIP: 6 [PH] (ref 5–8)
PHOSPHATE SERPL-MCNC: 3.5 MG/DL (ref 2.7–4.5)
PHOSPHATE SERPL-MCNC: 3.7 MG/DL (ref 2.7–4.5)
PHOSPHATE SERPL-MCNC: 4 MG/DL (ref 2.7–4.5)
PHOSPHATE SERPL-MCNC: 4 MG/DL (ref 2.7–4.5)
PHOSPHATE SERPL-MCNC: 4.1 MG/DL (ref 2.7–4.5)
PHOSPHATE SERPL-MCNC: 6.6 MG/DL (ref 2.7–4.5)
PHOSPHATE SERPL-MCNC: 6.6 MG/DL (ref 2.7–4.5)
PHOSPHATE SERPL-MCNC: 6.7 MG/DL (ref 2.7–4.5)
PHOSPHATE SERPL-MCNC: 6.8 MG/DL (ref 2.7–4.5)
PHOSPHATE SERPL-MCNC: 7.4 MG/DL (ref 2.7–4.5)
PISA TR MAX VEL: 2.11 M/S
PISA TR MAX VEL: 2.35 M/S
PLATELET # BLD AUTO: 112 K/UL (ref 150–450)
PLATELET # BLD AUTO: 115 K/UL (ref 150–450)
PLATELET # BLD AUTO: 153 K/UL (ref 150–450)
PLATELET # BLD AUTO: 38 K/UL (ref 150–450)
PLATELET # BLD AUTO: 40 K/UL (ref 150–450)
PLATELET # BLD AUTO: 41 K/UL (ref 150–450)
PLATELET # BLD AUTO: 41 K/UL (ref 150–450)
PLATELET # BLD AUTO: 42 K/UL (ref 150–450)
PLATELET # BLD AUTO: 43 K/UL (ref 150–450)
PLATELET # BLD AUTO: 44 K/UL (ref 150–450)
PLATELET # BLD AUTO: 45 K/UL (ref 150–450)
PLATELET # BLD AUTO: 52 K/UL (ref 150–450)
PLATELET # BLD AUTO: 54 K/UL (ref 150–450)
PLATELET # BLD AUTO: 59 K/UL (ref 150–450)
PLATELET # BLD AUTO: 59 K/UL (ref 150–450)
PLATELET # BLD AUTO: 63 K/UL (ref 150–450)
PLATELET # BLD AUTO: 65 K/UL (ref 150–450)
PLATELET # BLD AUTO: 67 K/UL (ref 150–450)
PLATELET # BLD AUTO: 68 K/UL (ref 150–450)
PLATELET # BLD AUTO: 71 K/UL (ref 150–450)
PLATELET # BLD AUTO: 71 K/UL (ref 150–450)
PLATELET # BLD AUTO: 74 K/UL (ref 150–450)
PLATELET # BLD AUTO: 75 K/UL (ref 150–450)
PLATELET # BLD AUTO: 76 K/UL (ref 150–450)
PLATELET # BLD AUTO: 77 K/UL (ref 150–450)
PLATELET # BLD AUTO: 77 K/UL (ref 150–450)
PLATELET # BLD AUTO: 78 K/UL (ref 150–450)
PLATELET # BLD AUTO: 79 K/UL (ref 150–450)
PLATELET # BLD AUTO: 80 K/UL (ref 150–450)
PLATELET # BLD AUTO: 80 K/UL (ref 150–450)
PLATELET # BLD AUTO: 81 K/UL (ref 150–450)
PLATELET # BLD AUTO: 83 K/UL (ref 150–450)
PLATELET # BLD AUTO: 83 K/UL (ref 150–450)
PLATELET # BLD AUTO: 84 K/UL (ref 150–450)
PLATELET # BLD AUTO: 85 K/UL (ref 150–450)
PLATELET # BLD AUTO: 87 K/UL (ref 150–450)
PLATELET # BLD AUTO: 89 K/UL (ref 150–450)
PLATELET # BLD AUTO: 90 K/UL (ref 150–450)
PLATELET # BLD AUTO: 91 K/UL (ref 150–450)
PLATELET # BLD AUTO: 99 K/UL (ref 150–450)
PLATELET BLD QL SMEAR: ABNORMAL
PLESIOMONAS SHIGELLOIDES: NOT DETECTED
PMV BLD AUTO: 10 FL (ref 9.2–12.9)
PMV BLD AUTO: 10 FL (ref 9.2–12.9)
PMV BLD AUTO: 10.1 FL (ref 9.2–12.9)
PMV BLD AUTO: 10.2 FL (ref 9.2–12.9)
PMV BLD AUTO: 10.3 FL (ref 9.2–12.9)
PMV BLD AUTO: 10.4 FL (ref 9.2–12.9)
PMV BLD AUTO: 10.5 FL (ref 9.2–12.9)
PMV BLD AUTO: 10.6 FL (ref 9.2–12.9)
PMV BLD AUTO: 10.7 FL (ref 9.2–12.9)
PMV BLD AUTO: 10.7 FL (ref 9.2–12.9)
PMV BLD AUTO: 10.8 FL (ref 9.2–12.9)
PMV BLD AUTO: 10.8 FL (ref 9.2–12.9)
PMV BLD AUTO: 10.9 FL (ref 9.2–12.9)
PMV BLD AUTO: 11 FL (ref 9.2–12.9)
PMV BLD AUTO: 11.1 FL (ref 9.2–12.9)
PMV BLD AUTO: 11.4 FL (ref 9.2–12.9)
PMV BLD AUTO: 11.4 FL (ref 9.2–12.9)
PMV BLD AUTO: 11.6 FL (ref 9.2–12.9)
PMV BLD AUTO: 11.6 FL (ref 9.2–12.9)
PMV BLD AUTO: 11.8 FL (ref 9.2–12.9)
PMV BLD AUTO: 11.8 FL (ref 9.2–12.9)
PMV BLD AUTO: 12 FL (ref 9.2–12.9)
PMV BLD AUTO: 12.1 FL (ref 9.2–12.9)
PMV BLD AUTO: 12.2 FL (ref 9.2–12.9)
PMV BLD AUTO: 13 FL (ref 9.2–12.9)
PMV BLD AUTO: 9.8 FL (ref 9.2–12.9)
PMV BLD AUTO: 9.9 FL (ref 9.2–12.9)
PMV BLD AUTO: 9.9 FL (ref 9.2–12.9)
PMV BLD AUTO: ABNORMAL FL (ref 9.2–12.9)
PNEUMOCYSTIS REPORT STATUS: ABNORMAL
PO2 BLDA: 56 MMHG (ref 80–100)
PO2 BLDA: 69 MMHG (ref 80–100)
POC BE: -8 MMOL/L
POC BE: 7 MMOL/L
POC SATURATED O2: 88 % (ref 95–100)
POC SATURATED O2: 96 % (ref 95–100)
POC TCO2: 18 MMOL/L (ref 23–27)
POC TCO2: 30 MMOL/L (ref 23–27)
POCT GLUCOSE: 101 MG/DL (ref 70–110)
POCT GLUCOSE: 102 MG/DL (ref 70–110)
POCT GLUCOSE: 104 MG/DL (ref 70–110)
POCT GLUCOSE: 107 MG/DL (ref 70–110)
POCT GLUCOSE: 110 MG/DL (ref 70–110)
POCT GLUCOSE: 111 MG/DL (ref 70–110)
POCT GLUCOSE: 114 MG/DL (ref 70–110)
POCT GLUCOSE: 114 MG/DL (ref 70–110)
POCT GLUCOSE: 118 MG/DL (ref 70–110)
POCT GLUCOSE: 120 MG/DL (ref 70–110)
POCT GLUCOSE: 123 MG/DL (ref 70–110)
POCT GLUCOSE: 125 MG/DL (ref 70–110)
POCT GLUCOSE: 129 MG/DL (ref 70–110)
POCT GLUCOSE: 130 MG/DL (ref 70–110)
POCT GLUCOSE: 131 MG/DL (ref 70–110)
POCT GLUCOSE: 131 MG/DL (ref 70–110)
POCT GLUCOSE: 133 MG/DL (ref 70–110)
POCT GLUCOSE: 139 MG/DL (ref 70–110)
POCT GLUCOSE: 141 MG/DL (ref 70–110)
POCT GLUCOSE: 141 MG/DL (ref 70–110)
POCT GLUCOSE: 142 MG/DL (ref 70–110)
POCT GLUCOSE: 145 MG/DL (ref 70–110)
POCT GLUCOSE: 149 MG/DL (ref 70–110)
POCT GLUCOSE: 151 MG/DL (ref 70–110)
POCT GLUCOSE: 154 MG/DL (ref 70–110)
POCT GLUCOSE: 156 MG/DL (ref 70–110)
POCT GLUCOSE: 167 MG/DL (ref 70–110)
POCT GLUCOSE: 169 MG/DL (ref 70–110)
POCT GLUCOSE: 173 MG/DL (ref 70–110)
POCT GLUCOSE: 176 MG/DL (ref 70–110)
POCT GLUCOSE: 178 MG/DL (ref 70–110)
POCT GLUCOSE: 182 MG/DL (ref 70–110)
POCT GLUCOSE: 184 MG/DL (ref 70–110)
POCT GLUCOSE: 186 MG/DL (ref 70–110)
POCT GLUCOSE: 186 MG/DL (ref 70–110)
POCT GLUCOSE: 189 MG/DL (ref 70–110)
POCT GLUCOSE: 192 MG/DL (ref 70–110)
POCT GLUCOSE: 193 MG/DL (ref 70–110)
POCT GLUCOSE: 195 MG/DL (ref 70–110)
POCT GLUCOSE: 196 MG/DL (ref 70–110)
POCT GLUCOSE: 200 MG/DL (ref 70–110)
POCT GLUCOSE: 201 MG/DL (ref 70–110)
POCT GLUCOSE: 202 MG/DL (ref 70–110)
POCT GLUCOSE: 202 MG/DL (ref 70–110)
POCT GLUCOSE: 204 MG/DL (ref 70–110)
POCT GLUCOSE: 206 MG/DL (ref 70–110)
POCT GLUCOSE: 207 MG/DL (ref 70–110)
POCT GLUCOSE: 208 MG/DL (ref 70–110)
POCT GLUCOSE: 209 MG/DL (ref 70–110)
POCT GLUCOSE: 210 MG/DL (ref 70–110)
POCT GLUCOSE: 212 MG/DL (ref 70–110)
POCT GLUCOSE: 214 MG/DL (ref 70–110)
POCT GLUCOSE: 217 MG/DL (ref 70–110)
POCT GLUCOSE: 225 MG/DL (ref 70–110)
POCT GLUCOSE: 226 MG/DL (ref 70–110)
POCT GLUCOSE: 233 MG/DL (ref 70–110)
POCT GLUCOSE: 234 MG/DL (ref 70–110)
POCT GLUCOSE: 240 MG/DL (ref 70–110)
POCT GLUCOSE: 242 MG/DL (ref 70–110)
POCT GLUCOSE: 246 MG/DL (ref 70–110)
POCT GLUCOSE: 248 MG/DL (ref 70–110)
POCT GLUCOSE: 257 MG/DL (ref 70–110)
POCT GLUCOSE: 261 MG/DL (ref 70–110)
POCT GLUCOSE: 267 MG/DL (ref 70–110)
POCT GLUCOSE: 273 MG/DL (ref 70–110)
POCT GLUCOSE: 291 MG/DL (ref 70–110)
POCT GLUCOSE: 319 MG/DL (ref 70–110)
POCT GLUCOSE: 328 MG/DL (ref 70–110)
POCT GLUCOSE: 355 MG/DL (ref 70–110)
POCT GLUCOSE: 373 MG/DL (ref 70–110)
POCT GLUCOSE: 402 MG/DL (ref 70–110)
POCT GLUCOSE: 404 MG/DL (ref 70–110)
POCT GLUCOSE: 417 MG/DL (ref 70–110)
POCT GLUCOSE: 424 MG/DL (ref 70–110)
POCT GLUCOSE: 439 MG/DL (ref 70–110)
POCT GLUCOSE: 453 MG/DL (ref 70–110)
POCT GLUCOSE: 462 MG/DL (ref 70–110)
POCT GLUCOSE: 465 MG/DL (ref 70–110)
POCT GLUCOSE: 468 MG/DL (ref 70–110)
POCT GLUCOSE: 473 MG/DL (ref 70–110)
POCT GLUCOSE: 478 MG/DL (ref 70–110)
POCT GLUCOSE: 491 MG/DL (ref 70–110)
POCT GLUCOSE: 69 MG/DL (ref 70–110)
POCT GLUCOSE: 70 MG/DL (ref 70–110)
POCT GLUCOSE: 78 MG/DL (ref 70–110)
POCT GLUCOSE: 95 MG/DL (ref 70–110)
POCT GLUCOSE: 95 MG/DL (ref 70–110)
POCT GLUCOSE: 97 MG/DL (ref 70–110)
POCT GLUCOSE: 98 MG/DL (ref 70–110)
POIKILOCYTOSIS BLD QL SMEAR: SLIGHT
POIKILOCYTOSIS BLD QL SMEAR: SLIGHT
POLYCHROMASIA BLD QL SMEAR: ABNORMAL
POLYCHROMASIA BLD QL SMEAR: ABNORMAL
POTASSIUM SERPL-SCNC: 3.2 MMOL/L (ref 3.5–5.1)
POTASSIUM SERPL-SCNC: 3.2 MMOL/L (ref 3.5–5.1)
POTASSIUM SERPL-SCNC: 3.4 MMOL/L (ref 3.5–5.1)
POTASSIUM SERPL-SCNC: 3.7 MMOL/L (ref 3.5–5.1)
POTASSIUM SERPL-SCNC: 3.8 MMOL/L (ref 3.5–5.1)
POTASSIUM SERPL-SCNC: 3.9 MMOL/L (ref 3.5–5.1)
POTASSIUM SERPL-SCNC: 4 MMOL/L (ref 3.5–5.1)
POTASSIUM SERPL-SCNC: 4.1 MMOL/L (ref 3.5–5.1)
POTASSIUM SERPL-SCNC: 4.2 MMOL/L (ref 3.5–5.1)
POTASSIUM SERPL-SCNC: 4.3 MMOL/L (ref 3.5–5.1)
POTASSIUM SERPL-SCNC: 4.4 MMOL/L (ref 3.5–5.1)
POTASSIUM SERPL-SCNC: 4.4 MMOL/L (ref 3.5–5.1)
POTASSIUM SERPL-SCNC: 4.5 MMOL/L (ref 3.5–5.1)
POTASSIUM SERPL-SCNC: 4.6 MMOL/L (ref 3.5–5.1)
POTASSIUM SERPL-SCNC: 4.7 MMOL/L (ref 3.5–5.1)
POTASSIUM SERPL-SCNC: 4.8 MMOL/L (ref 3.5–5.1)
POTASSIUM SERPL-SCNC: 4.9 MMOL/L (ref 3.5–5.1)
POTASSIUM SERPL-SCNC: 5 MMOL/L (ref 3.5–5.1)
POTASSIUM SERPL-SCNC: 5.1 MMOL/L (ref 3.5–5.1)
POTASSIUM SERPL-SCNC: 5.2 MMOL/L (ref 3.5–5.1)
POTASSIUM SERPL-SCNC: 5.2 MMOL/L (ref 3.5–5.1)
POTASSIUM SERPL-SCNC: 5.3 MMOL/L (ref 3.5–5.1)
POTASSIUM SERPL-SCNC: 5.3 MMOL/L (ref 3.5–5.1)
POTASSIUM SERPL-SCNC: 5.4 MMOL/L (ref 3.5–5.1)
POTASSIUM UR-SCNC: 19 MMOL/L (ref 15–95)
PROCALCITONIN SERPL IA-MCNC: 0.12 NG/ML (ref 0–0.5)
PROCALCITONIN SERPL IA-MCNC: 1.95 NG/ML (ref 0–0.5)
PROT FLD-MCNC: 1.3 G/DL
PROT FLD-MCNC: 1.5 G/DL
PROT FLD-MCNC: <3 G/DL
PROT FLD-MCNC: <3 G/DL
PROT SERPL-MCNC: 4.7 G/DL (ref 6–8.4)
PROT SERPL-MCNC: 4.8 G/DL (ref 6–8.4)
PROT SERPL-MCNC: 4.9 G/DL (ref 6–8.4)
PROT SERPL-MCNC: 4.9 G/DL (ref 6–8.4)
PROT SERPL-MCNC: 5 G/DL (ref 6–8.4)
PROT SERPL-MCNC: 5.1 G/DL (ref 6–8.4)
PROT SERPL-MCNC: 5.1 G/DL (ref 6–8.4)
PROT SERPL-MCNC: 5.1 G/DL (ref 6–8.5)
PROT SERPL-MCNC: 5.3 G/DL (ref 6–8.4)
PROT SERPL-MCNC: 5.4 G/DL (ref 6–8.4)
PROT SERPL-MCNC: 5.5 G/DL (ref 6–8.4)
PROT SERPL-MCNC: 5.6 G/DL (ref 6–8.4)
PROT SERPL-MCNC: 5.7 G/DL (ref 6–8.4)
PROT SERPL-MCNC: 5.8 G/DL (ref 6–8.4)
PROT SERPL-MCNC: 5.8 G/DL (ref 6–8.4)
PROT SERPL-MCNC: 5.9 G/DL (ref 6–8.4)
PROT SERPL-MCNC: 6.2 G/DL (ref 6–8.4)
PROT SERPL-MCNC: 6.5 G/DL (ref 6–8.4)
PROT SERPL-MCNC: 6.5 G/DL (ref 6–8.4)
PROT SERPL-MCNC: 6.6 G/DL (ref 6–8.4)
PROT SERPL-MCNC: 6.7 G/DL (ref 6–8.4)
PROT SERPL-MCNC: 7.3 G/DL (ref 6–8.4)
PROT UR QL STRIP: ABNORMAL
PROT UR QL STRIP: NEGATIVE
PROT UR-MCNC: 27 MG/DL (ref 0–15)
PROT/CREAT UR: 0.29 MG/G{CREAT} (ref 0–0.2)
PROTEINASE3 AB SER IA-ACNC: <3.5 U/ML (ref 0–3.5)
PROTHROMBIN TIME: 14.5 SEC (ref 11.4–13.7)
PROTHROMBIN TIME: 15.2 SEC (ref 11.4–13.7)
PROTHROMBIN TIME: 15.3 SEC (ref 11.4–13.7)
PROTHROMBIN TIME: 15.4 SEC (ref 9–12.5)
PROTHROMBIN TIME: 15.5 SEC (ref 9–12.5)
PROTHROMBIN TIME: 15.9 SEC (ref 9–12.5)
PROTHROMBIN TIME: 15.9 SEC (ref 9–12.5)
PROTHROMBIN TIME: 16.1 SEC (ref 9–12.5)
PROTHROMBIN TIME: 16.1 SEC (ref 9–12.5)
PROTHROMBIN TIME: 16.2 SEC (ref 9–12.5)
PROTHROMBIN TIME: 16.9 SEC (ref 11.4–13.7)
PROTHROMBIN TIME: 16.9 SEC (ref 11.4–13.7)
PROTHROMBIN TIME: 17.2 SEC (ref 9–12.5)
PROTHROMBIN TIME: 18 SEC (ref 9–12.5)
PROTHROMBIN TIME: 18.1 SEC (ref 9–12.5)
PROTHROMBIN TIME: 18.4 SEC (ref 9–12.5)
PROTHROMBIN TIME: 18.4 SEC (ref 9–12.5)
PROTHROMBIN TIME: 18.8 SEC (ref 9–12.5)
PROTHROMBIN TIME: 19.2 SEC (ref 9–12.5)
PROTHROMBIN TIME: 19.6 SEC (ref 9–12.5)
PROTHROMBIN TIME: 19.7 SEC (ref 9–12.5)
PROTHROMBIN TIME: 19.8 SEC (ref 9–12.5)
PROTHROMBIN TIME: 20 SEC (ref 9–12.5)
PTH-INTACT SERPL-MCNC: 248.7 PG/ML (ref 9–77)
PULM VEIN S/D RATIO: 1.16
PULM VEIN S/D RATIO: 1.4
PV PEAK D VEL: 0.4 M/S
PV PEAK D VEL: 0.57 M/S
PV PEAK S VEL: 0.56 M/S
PV PEAK S VEL: 0.66 M/S
RA MAJOR: 4.21 CM
RA MAJOR: 4.46 CM
RA PRESSURE: 3 MMHG
RA PRESSURE: 3 MMHG
RA WIDTH: 1.85 CM
RA WIDTH: 3.95 CM
RBC # BLD AUTO: 2.17 M/UL (ref 4.6–6.2)
RBC # BLD AUTO: 2.23 M/UL (ref 4.6–6.2)
RBC # BLD AUTO: 2.53 M/UL (ref 4.6–6.2)
RBC # BLD AUTO: 2.62 M/UL (ref 4.6–6.2)
RBC # BLD AUTO: 2.65 M/UL (ref 4.6–6.2)
RBC # BLD AUTO: 2.69 M/UL (ref 4.6–6.2)
RBC # BLD AUTO: 2.71 M/UL (ref 4.6–6.2)
RBC # BLD AUTO: 2.81 M/UL (ref 4.6–6.2)
RBC # BLD AUTO: 2.81 M/UL (ref 4.6–6.2)
RBC # BLD AUTO: 2.82 M/UL (ref 4.6–6.2)
RBC # BLD AUTO: 2.83 M/UL (ref 4.6–6.2)
RBC # BLD AUTO: 2.84 M/UL (ref 4.6–6.2)
RBC # BLD AUTO: 2.97 M/UL (ref 4.6–6.2)
RBC # BLD AUTO: 3.15 M/UL (ref 4.6–6.2)
RBC # BLD AUTO: 3.15 M/UL (ref 4.6–6.2)
RBC # BLD AUTO: 3.17 M/UL (ref 4.6–6.2)
RBC # BLD AUTO: 3.23 M/UL (ref 4.6–6.2)
RBC # BLD AUTO: 3.24 M/UL (ref 4.6–6.2)
RBC # BLD AUTO: 3.26 M/UL (ref 4.6–6.2)
RBC # BLD AUTO: 3.26 M/UL (ref 4.6–6.2)
RBC # BLD AUTO: 3.28 M/UL (ref 4.6–6.2)
RBC # BLD AUTO: 3.3 M/UL (ref 4.6–6.2)
RBC # BLD AUTO: 3.3 M/UL (ref 4.6–6.2)
RBC # BLD AUTO: 3.32 M/UL (ref 4.6–6.2)
RBC # BLD AUTO: 3.32 M/UL (ref 4.6–6.2)
RBC # BLD AUTO: 3.34 M/UL (ref 4.6–6.2)
RBC # BLD AUTO: 3.34 M/UL (ref 4.6–6.2)
RBC # BLD AUTO: 3.35 M/UL (ref 4.6–6.2)
RBC # BLD AUTO: 3.38 M/UL (ref 4.6–6.2)
RBC # BLD AUTO: 3.41 M/UL (ref 4.6–6.2)
RBC # BLD AUTO: 3.46 M/UL (ref 4.6–6.2)
RBC # BLD AUTO: 3.54 M/UL (ref 4.6–6.2)
RBC # BLD AUTO: 3.57 M/UL (ref 4.6–6.2)
RBC # BLD AUTO: 3.58 M/UL (ref 4.6–6.2)
RBC # BLD AUTO: 3.59 M/UL (ref 4.6–6.2)
RBC # BLD AUTO: 3.6 M/UL (ref 4.6–6.2)
RBC # BLD AUTO: 3.73 M/UL (ref 4.6–6.2)
RBC # BLD AUTO: 3.75 M/UL (ref 4.6–6.2)
RBC # BLD AUTO: 3.79 M/UL (ref 4.6–6.2)
RBC # BLD AUTO: 3.8 M/UL (ref 4.6–6.2)
RBC # BLD AUTO: 3.83 M/UL (ref 4.6–6.2)
RBC # BLD AUTO: 3.92 M/UL (ref 4.6–6.2)
RBC # BLD AUTO: 4.17 M/UL (ref 4.6–6.2)
RBC # BLD AUTO: 4.34 M/UL (ref 4.6–6.2)
RBC #/AREA URNS AUTO: 1 /HPF (ref 0–4)
RBC #/AREA URNS AUTO: 2 /HPF (ref 0–4)
RBC #/AREA URNS HPF: 10 /HPF (ref 0–4)
RBC #/AREA URNS HPF: 3 /HPF (ref 0–4)
RBC #/AREA URNS HPF: 3 /HPF (ref 0–4)
REFERENCE LAB: NORMAL
RESPIRATORY INFECTION PANEL SOURCE: NORMAL
RETICS/RBC NFR AUTO: 6.2 % (ref 0.4–2)
RHEUMATOID FACT SERPL-ACNC: <10 IU/ML
RIGHT VENTRICULAR END-DIASTOLIC DIMENSION: 2.32 CM
RIGHT VENTRICULAR END-DIASTOLIC DIMENSION: 3.58 CM
RPR SER QL: NORMAL
RSV RNA NPH QL NAA+NON-PROBE: NOT DETECTED
RV TISSUE DOPPLER FREE WALL SYSTOLIC VELOCITY 1 (APICAL 4 CHAMBER VIEW): 11.11 CM/S
RV TISSUE DOPPLER FREE WALL SYSTOLIC VELOCITY 1 (APICAL 4 CHAMBER VIEW): 12 CM/S
RV+EV RNA NPH QL NAA+NON-PROBE: NOT DETECTED
RVA RNA STL QL NAA+NON-PROBE: NOT DETECTED
RVP - ADENOVIRUS: NOT DETECTED
RVP - HUMAN METAPNEUMOVIRUS (HMPV): NOT DETECTED
RVP - INFLUENZA A: NOT DETECTED
RVP - INFLUENZA B: NOT DETECTED
RVP - RESPIRATORY SYNCTIAL VIRUS (RSV) A: NOT DETECTED
RVP - RESPIRATORY VIRAL PANEL, SOURCE: NORMAL
SAMPLE: ABNORMAL
SAMPLE: ABNORMAL
SAPO I+II+IV+V RNA STL QL NAA+NON-PROBE: NOT DETECTED
SARS-COV-2 RDRP RESP QL NAA+PROBE: NEGATIVE
SARS-COV-2 RNA RESP QL NAA+PROBE: NOT DETECTED
SATURATED IRON: 46 % (ref 20–50)
SCHISTOCYTES BLD QL SMEAR: ABNORMAL
SCHISTOCYTES BLD QL SMEAR: ABNORMAL
SCHISTOSOMA IGG SER QL: ABNORMAL
SHIGELLA SP+EIEC IPAH ST NAA+NON-PROBE: NOT DETECTED
SINUS: 3.51 CM
SINUS: 3.8 CM
SIROLIMUS BLD-MCNC: 10 NG/ML (ref 4–20)
SIROLIMUS BLD-MCNC: 10.7 NG/ML (ref 4–20)
SIROLIMUS BLD-MCNC: 10.8 NG/ML (ref 4–20)
SIROLIMUS BLD-MCNC: 12.6 NG/ML (ref 4–20)
SIROLIMUS BLD-MCNC: 13.2 NG/ML (ref 4–20)
SIROLIMUS BLD-MCNC: 13.2 NG/ML (ref 4–20)
SIROLIMUS BLD-MCNC: 15 NG/ML (ref 4–20)
SIROLIMUS BLD-MCNC: 18.7 NG/ML (ref 4–20)
SIROLIMUS BLD-MCNC: 22.1 NG/ML (ref 4–20)
SIROLIMUS BLD-MCNC: 27.6 NG/ML (ref 4–20)
SIROLIMUS BLD-MCNC: 33 NG/ML (ref 4–20)
SIROLIMUS BLD-MCNC: 6.4 NG/ML (ref 4–20)
SIROLIMUS BLD-MCNC: 7.6 NG/ML (ref 4–20)
SIROLIMUS BLD-MCNC: 7.8 NG/ML (ref 4–20)
SIROLIMUS BLD-MCNC: 8.4 NG/ML (ref 4–20)
SIROLIMUS BLD-MCNC: 8.6 NG/ML (ref 4–20)
SITE: ABNORMAL
SITE: ABNORMAL
SODIUM SERPL-SCNC: 120 MMOL/L (ref 136–145)
SODIUM SERPL-SCNC: 121 MMOL/L (ref 136–145)
SODIUM SERPL-SCNC: 122 MMOL/L (ref 136–145)
SODIUM SERPL-SCNC: 123 MMOL/L (ref 136–145)
SODIUM SERPL-SCNC: 124 MMOL/L (ref 136–145)
SODIUM SERPL-SCNC: 125 MMOL/L (ref 136–145)
SODIUM SERPL-SCNC: 126 MMOL/L (ref 136–145)
SODIUM SERPL-SCNC: 127 MMOL/L (ref 136–145)
SODIUM SERPL-SCNC: 128 MMOL/L (ref 136–145)
SODIUM SERPL-SCNC: 129 MMOL/L (ref 136–145)
SODIUM SERPL-SCNC: 129 MMOL/L (ref 136–145)
SODIUM SERPL-SCNC: 130 MMOL/L (ref 136–145)
SODIUM SERPL-SCNC: 132 MMOL/L (ref 136–145)
SODIUM SERPL-SCNC: 132 MMOL/L (ref 136–145)
SODIUM SERPL-SCNC: 133 MMOL/L (ref 136–145)
SODIUM SERPL-SCNC: 133 MMOL/L (ref 136–145)
SODIUM SERPL-SCNC: 134 MMOL/L (ref 136–145)
SODIUM SERPL-SCNC: 135 MMOL/L (ref 136–145)
SODIUM SERPL-SCNC: 136 MMOL/L (ref 136–145)
SODIUM SERPL-SCNC: 137 MMOL/L (ref 136–145)
SODIUM SERPL-SCNC: 138 MMOL/L (ref 136–145)
SODIUM SERPL-SCNC: 139 MMOL/L (ref 136–145)
SODIUM SERPL-SCNC: 139 MMOL/L (ref 136–145)
SODIUM SERPL-SCNC: 142 MMOL/L (ref 136–145)
SODIUM SERPL-SCNC: 143 MMOL/L (ref 136–145)
SODIUM UR-SCNC: 11 MMOL/L (ref 20–250)
SODIUM UR-SCNC: 22 MMOL/L (ref 20–250)
SODIUM UR-SCNC: 22 MMOL/L (ref 20–250)
SODIUM UR-SCNC: 29 MMOL/L (ref 20–250)
SP GR UR STRIP: 1.01 (ref 1–1.03)
SP GR UR STRIP: 1.02 (ref 1–1.03)
SP02: 94
SPECIMEN SOURCE: ABNORMAL
SPECIMEN SOURCE: NORMAL
SPECIMEN TYPE: NORMAL
SQUAMOUS #/AREA URNS HPF: 1 /HPF
SQUAMOUS #/AREA URNS HPF: 1 /HPF
SQUAMOUS #/AREA URNS HPF: 4 /HPF
STJ: 3 CM
STJ: 3 CM
STOOL CULTURE: NORMAL
STRONGYLOIDES ANTIBODY IGG: NEGATIVE
SUPPLEMENTAL DIAGNOSIS: NORMAL
SYSTOLIC BLOOD PRESSURE: 132 MMHG
T4 FREE SERPL-MCNC: 1.22 NG/DL (ref 0.71–1.51)
TACROLIMUS BLD-MCNC: 11.4 NG/ML (ref 5–15)
TACROLIMUS BLD-MCNC: 13.3 NG/ML (ref 5–15)
TACROLIMUS BLD-MCNC: 15.2 NG/ML (ref 5–15)
TACROLIMUS BLD-MCNC: 18.8 NG/ML (ref 5–15)
TACROLIMUS BLD-MCNC: 2.1 NG/ML (ref 5–15)
TACROLIMUS BLD-MCNC: 2.5 NG/ML (ref 5–15)
TACROLIMUS BLD-MCNC: 2.7 NG/ML (ref 5–15)
TACROLIMUS BLD-MCNC: 21 NG/ML (ref 5–15)
TACROLIMUS BLD-MCNC: 21.7 NG/ML (ref 5–15)
TACROLIMUS BLD-MCNC: 24 NG/ML (ref 5–15)
TACROLIMUS BLD-MCNC: 28.8 NG/ML (ref 5–15)
TACROLIMUS BLD-MCNC: 3.5 NG/ML (ref 5–15)
TACROLIMUS BLD-MCNC: 3.5 NG/ML (ref 5–15)
TACROLIMUS BLD-MCNC: 4 NG/ML (ref 5–15)
TACROLIMUS BLD-MCNC: 4.8 NG/ML (ref 5–15)
TACROLIMUS BLD-MCNC: 4.9 NG/ML (ref 5–15)
TACROLIMUS BLD-MCNC: 6 NG/ML (ref 5–15)
TACROLIMUS BLD-MCNC: 6.1 NG/ML (ref 5–15)
TACROLIMUS BLD-MCNC: 6.6 NG/ML (ref 5–15)
TACROLIMUS BLD-MCNC: 7.7 NG/ML (ref 5–15)
TACROLIMUS BLD-MCNC: 9.8 NG/ML (ref 5–15)
TARGETS BLD QL SMEAR: ABNORMAL
TB GOLD PLUS: NEGATIVE
TB2 - NIL: -0.05 IU/ML
TDI LATERAL: 0.06 M/S
TDI LATERAL: 0.08 M/S
TDI SEPTAL: 0.04 M/S
TDI SEPTAL: 0.05 M/S
TDI: 0.06 M/S
TDI: 0.06 M/S
TEM - ACINETOBACTER BAUMANNII: NOT DETECTED
TEM - BORDETELLA PERTUSSIS: NOT DETECTED
TEM - CHLAMYDOPHILA PNEUMONIAE: NOT DETECTED
TEM - KLEBSIELLA PNEUMONIAE: NOT DETECTED
TEM - MRSA: NOT DETECTED
TEM - MYCOPLASMA PNEUMONIAE: NOT DETECTED
TEM - NEISSERIA MENINGITIDIS: NOT DETECTED
TEM - PANTON-VALENTINE: NOT DETECTED
TEM - PSEUDOMONAS AERUGINOSA: NOT DETECTED
TEM - STAPHYLOCOCCUS AUREUS: NOT DETECTED
TEM - STREPTOCOCCUS PNEUMONIAE: NOT DETECTED
TEM - STREPTOCOCCUS PYOGENES A: NOT DETECTED
TEM- HAEMOPHILUS INFLUENZAE B: NOT DETECTED
TEM- HAEMOPHILUS INFLUENZAE: NOT DETECTED
TEST RESULT: NORMAL
TOTAL IRON BINDING CAPACITY: 259 UG/DL (ref 250–450)
TR MAX PG: 18 MMHG
TR MAX PG: 22 MMHG
TRANSFERRIN SERPL-MCNC: 185 MG/DL (ref 200–375)
TRICUSPID ANNULAR PLANE SYSTOLIC EXCURSION: 1.75 CM
TRICUSPID ANNULAR PLANE SYSTOLIC EXCURSION: 2.3 CM
TRIGL SERPL-MCNC: 155 MG/DL (ref 30–150)
TROPONIN I SERPL DL<=0.01 NG/ML-MCNC: 0.03 NG/ML (ref 0–0.03)
TROPONIN I SERPL DL<=0.01 NG/ML-MCNC: 0.03 NG/ML (ref 0–0.03)
TROPONIN I SERPL DL<=0.01 NG/ML-MCNC: <0.03 NG/ML
TROPONIN I SERPL DL<=0.01 NG/ML-MCNC: <0.03 NG/ML
TSH SERPL DL<=0.005 MIU/L-ACNC: 1.54 UIU/ML (ref 0.4–4)
TSH SERPL DL<=0.005 MIU/L-ACNC: 2.38 UIU/ML (ref 0.4–4)
TV REST PULMONARY ARTERY PRESSURE: 21 MMHG
TV REST PULMONARY ARTERY PRESSURE: 25 MMHG
URINE COLLECTION DURATION: 24 HR
URINE VOLUME: 1500 ML
URN SPEC COLLECT METH UR: ABNORMAL
UROBILINOGEN UR STRIP-ACNC: NEGATIVE EU/DL
VANCOMYCIN SERPL-MCNC: 14.7 UG/ML
VANCOMYCIN SERPL-MCNC: 16.1 UG/ML
VANCOMYCIN SERPL-MCNC: 16.3 UG/ML
VANCOMYCIN SERPL-MCNC: 19.4 UG/ML
VANCOMYCIN SERPL-MCNC: 9.9 UG/ML
VARICELLA INTERPRETATION: POSITIVE
VARICELLA ZOSTER IGG: 2.71 ISR (ref 0–0.9)
VIBRIO: NOT DETECTED
WBC # BLD AUTO: 13.93 K/UL (ref 3.9–12.7)
WBC # BLD AUTO: 14.97 K/UL (ref 3.9–12.7)
WBC # BLD AUTO: 15.02 K/UL (ref 3.9–12.7)
WBC # BLD AUTO: 16.11 K/UL (ref 3.9–12.7)
WBC # BLD AUTO: 16.33 K/UL (ref 3.9–12.7)
WBC # BLD AUTO: 2.52 K/UL (ref 3.9–12.7)
WBC # BLD AUTO: 2.56 K/UL (ref 3.9–12.7)
WBC # BLD AUTO: 26.66 K/UL (ref 3.9–12.7)
WBC # BLD AUTO: 3.43 K/UL (ref 3.9–12.7)
WBC # BLD AUTO: 3.45 K/UL (ref 3.9–12.7)
WBC # BLD AUTO: 3.45 K/UL (ref 3.9–12.7)
WBC # BLD AUTO: 3.47 K/UL (ref 3.9–12.7)
WBC # BLD AUTO: 3.5 K/UL (ref 3.9–12.7)
WBC # BLD AUTO: 3.64 K/UL (ref 3.9–12.7)
WBC # BLD AUTO: 3.72 K/UL (ref 3.9–12.7)
WBC # BLD AUTO: 3.79 K/UL (ref 3.9–12.7)
WBC # BLD AUTO: 4.08 K/UL (ref 3.9–12.7)
WBC # BLD AUTO: 4.14 K/UL (ref 3.9–12.7)
WBC # BLD AUTO: 4.14 K/UL (ref 3.9–12.7)
WBC # BLD AUTO: 4.16 K/UL (ref 3.9–12.7)
WBC # BLD AUTO: 4.16 K/UL (ref 3.9–12.7)
WBC # BLD AUTO: 4.18 K/UL (ref 3.9–12.7)
WBC # BLD AUTO: 4.21 K/UL (ref 3.9–12.7)
WBC # BLD AUTO: 4.26 K/UL (ref 3.9–12.7)
WBC # BLD AUTO: 4.44 K/UL (ref 3.9–12.7)
WBC # BLD AUTO: 4.51 K/UL (ref 3.9–12.7)
WBC # BLD AUTO: 4.54 K/UL (ref 3.9–12.7)
WBC # BLD AUTO: 4.78 K/UL (ref 3.9–12.7)
WBC # BLD AUTO: 4.96 K/UL (ref 3.9–12.7)
WBC # BLD AUTO: 5.41 K/UL (ref 3.9–12.7)
WBC # BLD AUTO: 5.64 K/UL (ref 3.9–12.7)
WBC # BLD AUTO: 5.69 K/UL (ref 3.9–12.7)
WBC # BLD AUTO: 6.15 K/UL (ref 3.9–12.7)
WBC # BLD AUTO: 6.22 K/UL (ref 3.9–12.7)
WBC # BLD AUTO: 6.36 K/UL (ref 3.9–12.7)
WBC # BLD AUTO: 6.38 K/UL (ref 3.9–12.7)
WBC # BLD AUTO: 6.86 K/UL (ref 3.9–12.7)
WBC # BLD AUTO: 6.86 K/UL (ref 3.9–12.7)
WBC # BLD AUTO: 7.1 K/UL (ref 3.9–12.7)
WBC # BLD AUTO: 7.85 K/UL (ref 3.9–12.7)
WBC # BLD AUTO: 8.15 K/UL (ref 3.9–12.7)
WBC # BLD AUTO: 8.26 K/UL (ref 3.9–12.7)
WBC # BLD AUTO: 8.78 K/UL (ref 3.9–12.7)
WBC # BLD AUTO: 8.89 K/UL (ref 3.9–12.7)
WBC # FLD: 113 /CU MM
WBC # FLD: 125 /CU MM
WBC # FLD: 171 /CU MM
WBC # FLD: 46 /CU MM
WBC # FLD: 611 /CU MM
WBC #/AREA STL HPF: NORMAL /[HPF]
WBC #/AREA STL HPF: NORMAL /[HPF]
WBC #/AREA URNS AUTO: 1 /HPF (ref 0–5)
WBC #/AREA URNS AUTO: 3 /HPF (ref 0–5)
WBC #/AREA URNS HPF: 2 /HPF (ref 0–5)
WBC #/AREA URNS HPF: 2 /HPF (ref 0–5)
WBC #/AREA URNS HPF: 5 /HPF (ref 0–5)
YEAST URNS QL MICRO: ABNORMAL

## 2022-01-01 PROCEDURE — 12000002 HC ACUTE/MED SURGE SEMI-PRIVATE ROOM

## 2022-01-01 PROCEDURE — 99900035 HC TECH TIME PER 15 MIN (STAT)

## 2022-01-01 PROCEDURE — 80195 ASSAY OF SIROLIMUS: CPT | Performed by: PHYSICIAN ASSISTANT

## 2022-01-01 PROCEDURE — C9399 UNCLASSIFIED DRUGS OR BIOLOG: HCPCS | Performed by: INTERNAL MEDICINE

## 2022-01-01 PROCEDURE — 94761 N-INVAS EAR/PLS OXIMETRY MLT: CPT

## 2022-01-01 PROCEDURE — 80053 COMPREHEN METABOLIC PANEL: CPT | Performed by: NURSE PRACTITIONER

## 2022-01-01 PROCEDURE — 99291 PR CRITICAL CARE, E/M 30-74 MINUTES: ICD-10-PCS | Mod: ,,, | Performed by: NURSE PRACTITIONER

## 2022-01-01 PROCEDURE — 25000003 PHARM REV CODE 250: Performed by: HOSPITALIST

## 2022-01-01 PROCEDURE — 88305 TISSUE EXAM BY PATHOLOGIST: CPT | Mod: 26,,, | Performed by: PATHOLOGY

## 2022-01-01 PROCEDURE — 99222 1ST HOSP IP/OBS MODERATE 55: CPT | Mod: ,,, | Performed by: INTERNAL MEDICINE

## 2022-01-01 PROCEDURE — 99223 PR INITIAL HOSPITAL CARE,LEVL III: ICD-10-PCS | Mod: ,,, | Performed by: INTERNAL MEDICINE

## 2022-01-01 PROCEDURE — 63600175 PHARM REV CODE 636 W HCPCS: Performed by: INTERNAL MEDICINE

## 2022-01-01 PROCEDURE — 1160F PR REVIEW ALL MEDS BY PRESCRIBER/CLIN PHARMACIST DOCUMENTED: ICD-10-PCS | Mod: CPTII,S$GLB,, | Performed by: FAMILY MEDICINE

## 2022-01-01 PROCEDURE — 80197 ASSAY OF TACROLIMUS: CPT | Performed by: INTERNAL MEDICINE

## 2022-01-01 PROCEDURE — 83880 ASSAY OF NATRIURETIC PEPTIDE: CPT | Performed by: EMERGENCY MEDICINE

## 2022-01-01 PROCEDURE — 80197 ASSAY OF TACROLIMUS: CPT | Performed by: HOSPITALIST

## 2022-01-01 PROCEDURE — 82955 ASSAY OF G6PD ENZYME: CPT | Performed by: STUDENT IN AN ORGANIZED HEALTH CARE EDUCATION/TRAINING PROGRAM

## 2022-01-01 PROCEDURE — 37200 TRANSCATHETER BIOPSY: CPT | Mod: ,,, | Performed by: RADIOLOGY

## 2022-01-01 PROCEDURE — 89051 BODY FLUID CELL COUNT: CPT | Performed by: INTERNAL MEDICINE

## 2022-01-01 PROCEDURE — 99232 SBSQ HOSP IP/OBS MODERATE 35: CPT | Mod: ,,, | Performed by: INTERNAL MEDICINE

## 2022-01-01 PROCEDURE — 25500020 PHARM REV CODE 255: Performed by: INTERNAL MEDICINE

## 2022-01-01 PROCEDURE — 36415 COLL VENOUS BLD VENIPUNCTURE: CPT | Performed by: EMERGENCY MEDICINE

## 2022-01-01 PROCEDURE — 84133 ASSAY OF URINE POTASSIUM: CPT | Performed by: STUDENT IN AN ORGANIZED HEALTH CARE EDUCATION/TRAINING PROGRAM

## 2022-01-01 PROCEDURE — 80195 ASSAY OF SIROLIMUS: CPT

## 2022-01-01 PROCEDURE — 82085 ASSAY OF ALDOLASE: CPT | Performed by: INTERNAL MEDICINE

## 2022-01-01 PROCEDURE — 87206 SMEAR FLUORESCENT/ACID STAI: CPT | Mod: 91 | Performed by: STUDENT IN AN ORGANIZED HEALTH CARE EDUCATION/TRAINING PROGRAM

## 2022-01-01 PROCEDURE — 85025 COMPLETE CBC W/AUTO DIFF WBC: CPT | Performed by: HOSPITALIST

## 2022-01-01 PROCEDURE — 99153 MOD SED SAME PHYS/QHP EA: CPT | Performed by: INTERNAL MEDICINE

## 2022-01-01 PROCEDURE — 87040 BLOOD CULTURE FOR BACTERIA: CPT | Mod: 59

## 2022-01-01 PROCEDURE — 27100107 HC POCKET PEAK FLOW METER

## 2022-01-01 PROCEDURE — 83036 HEMOGLOBIN GLYCOSYLATED A1C: CPT | Performed by: NURSE PRACTITIONER

## 2022-01-01 PROCEDURE — 85025 COMPLETE CBC W/AUTO DIFF WBC: CPT | Performed by: PHYSICIAN ASSISTANT

## 2022-01-01 PROCEDURE — 87340 HEPATITIS B SURFACE AG IA: CPT | Performed by: INTERNAL MEDICINE

## 2022-01-01 PROCEDURE — 94640 AIRWAY INHALATION TREATMENT: CPT

## 2022-01-01 PROCEDURE — 87046 STOOL CULTR AEROBIC BACT EA: CPT | Performed by: INTERNAL MEDICINE

## 2022-01-01 PROCEDURE — 89055 LEUKOCYTE ASSESSMENT FECAL: CPT | Performed by: INTERNAL MEDICINE

## 2022-01-01 PROCEDURE — 99232 PR SUBSEQUENT HOSPITAL CARE,LEVL II: ICD-10-PCS | Mod: ,,, | Performed by: INTERNAL MEDICINE

## 2022-01-01 PROCEDURE — 36415 COLL VENOUS BLD VENIPUNCTURE: CPT | Performed by: INTERNAL MEDICINE

## 2022-01-01 PROCEDURE — 36415 COLL VENOUS BLD VENIPUNCTURE: CPT | Performed by: NURSE PRACTITIONER

## 2022-01-01 PROCEDURE — 63600175 PHARM REV CODE 636 W HCPCS: Performed by: HOSPITALIST

## 2022-01-01 PROCEDURE — 90935 HEMODIALYSIS ONE EVALUATION: CPT | Mod: ,,, | Performed by: INTERNAL MEDICINE

## 2022-01-01 PROCEDURE — 99233 SBSQ HOSP IP/OBS HIGH 50: CPT | Mod: ,,, | Performed by: INTERNAL MEDICINE

## 2022-01-01 PROCEDURE — 25000003 PHARM REV CODE 250: Performed by: STUDENT IN AN ORGANIZED HEALTH CARE EDUCATION/TRAINING PROGRAM

## 2022-01-01 PROCEDURE — 84100 ASSAY OF PHOSPHORUS: CPT | Performed by: PHYSICIAN ASSISTANT

## 2022-01-01 PROCEDURE — 87205 SMEAR GRAM STAIN: CPT | Performed by: INTERNAL MEDICINE

## 2022-01-01 PROCEDURE — 88305 TISSUE EXAM BY PATHOLOGIST: CPT | Performed by: PATHOLOGY

## 2022-01-01 PROCEDURE — 3008F PR BODY MASS INDEX (BMI) DOCUMENTED: ICD-10-PCS | Mod: CPTII,S$GLB,, | Performed by: INTERNAL MEDICINE

## 2022-01-01 PROCEDURE — 63700000 PHARM REV CODE 250 ALT 637 W/O HCPCS: Performed by: STUDENT IN AN ORGANIZED HEALTH CARE EDUCATION/TRAINING PROGRAM

## 2022-01-01 PROCEDURE — 99233 SBSQ HOSP IP/OBS HIGH 50: CPT | Mod: ,,, | Performed by: STUDENT IN AN ORGANIZED HEALTH CARE EDUCATION/TRAINING PROGRAM

## 2022-01-01 PROCEDURE — 99232 PR SUBSEQUENT HOSPITAL CARE,LEVL II: ICD-10-PCS | Mod: ,,, | Performed by: NURSE PRACTITIONER

## 2022-01-01 PROCEDURE — P9047 ALBUMIN (HUMAN), 25%, 50ML: HCPCS | Mod: JG | Performed by: INTERNAL MEDICINE

## 2022-01-01 PROCEDURE — 99223 1ST HOSP IP/OBS HIGH 75: CPT | Mod: ,,, | Performed by: HOSPITALIST

## 2022-01-01 PROCEDURE — 84300 ASSAY OF URINE SODIUM: CPT | Performed by: INTERNAL MEDICINE

## 2022-01-01 PROCEDURE — 82803 BLOOD GASES ANY COMBINATION: CPT

## 2022-01-01 PROCEDURE — 31624 DX BRONCHOSCOPE/LAVAGE: CPT | Performed by: INTERNAL MEDICINE

## 2022-01-01 PROCEDURE — 63600175 PHARM REV CODE 636 W HCPCS: Performed by: STUDENT IN AN ORGANIZED HEALTH CARE EDUCATION/TRAINING PROGRAM

## 2022-01-01 PROCEDURE — 85610 PROTHROMBIN TIME: CPT | Performed by: HOSPITALIST

## 2022-01-01 PROCEDURE — 80053 COMPREHEN METABOLIC PANEL: CPT | Performed by: FAMILY MEDICINE

## 2022-01-01 PROCEDURE — 81003 URINALYSIS AUTO W/O SCOPE: CPT | Performed by: INTERNAL MEDICINE

## 2022-01-01 PROCEDURE — 96376 TX/PRO/DX INJ SAME DRUG ADON: CPT

## 2022-01-01 PROCEDURE — 36415 COLL VENOUS BLD VENIPUNCTURE: CPT | Performed by: HOSPITALIST

## 2022-01-01 PROCEDURE — 99900031 HC PATIENT EDUCATION (STAT)

## 2022-01-01 PROCEDURE — 97530 THERAPEUTIC ACTIVITIES: CPT

## 2022-01-01 PROCEDURE — 83993 ASSAY FOR CALPROTECTIN FECAL: CPT | Performed by: INTERNAL MEDICINE

## 2022-01-01 PROCEDURE — 87040 BLOOD CULTURE FOR BACTERIA: CPT | Performed by: EMERGENCY MEDICINE

## 2022-01-01 PROCEDURE — 81001 URINALYSIS AUTO W/SCOPE: CPT | Performed by: INTERNAL MEDICINE

## 2022-01-01 PROCEDURE — 87449 NOS EACH ORGANISM AG IA: CPT | Performed by: INTERNAL MEDICINE

## 2022-01-01 PROCEDURE — 4010F ACE/ARB THERAPY RXD/TAKEN: CPT | Mod: CPTII,S$GLB,, | Performed by: INTERNAL MEDICINE

## 2022-01-01 PROCEDURE — 63600175 PHARM REV CODE 636 W HCPCS: Performed by: NURSE PRACTITIONER

## 2022-01-01 PROCEDURE — 4010F PR ACE/ARB THEARPY RXD/TAKEN: ICD-10-PCS | Mod: CPTII,S$GLB,, | Performed by: INTERNAL MEDICINE

## 2022-01-01 PROCEDURE — 25000003 PHARM REV CODE 250

## 2022-01-01 PROCEDURE — 94010 BREATHING CAPACITY TEST: CPT

## 2022-01-01 PROCEDURE — 25000003 PHARM REV CODE 250: Performed by: NURSE PRACTITIONER

## 2022-01-01 PROCEDURE — 84100 ASSAY OF PHOSPHORUS: CPT

## 2022-01-01 PROCEDURE — 87081 CULTURE SCREEN ONLY: CPT

## 2022-01-01 PROCEDURE — 87075 CULTR BACTERIA EXCEPT BLOOD: CPT | Performed by: INTERNAL MEDICINE

## 2022-01-01 PROCEDURE — 25000003 PHARM REV CODE 250: Performed by: INTERNAL MEDICINE

## 2022-01-01 PROCEDURE — 96365 THER/PROPH/DIAG IV INF INIT: CPT

## 2022-01-01 PROCEDURE — 75970 IR TRANSJUGULAR LIVER BIOPSY: ICD-10-PCS | Mod: 26,,, | Performed by: RADIOLOGY

## 2022-01-01 PROCEDURE — 99232 SBSQ HOSP IP/OBS MODERATE 35: CPT | Mod: ,,, | Performed by: NURSE PRACTITIONER

## 2022-01-01 PROCEDURE — 87070 CULTURE OTHR SPECIMN AEROBIC: CPT | Performed by: INTERNAL MEDICINE

## 2022-01-01 PROCEDURE — 25000242 PHARM REV CODE 250 ALT 637 W/ HCPCS

## 2022-01-01 PROCEDURE — 99233 PR SUBSEQUENT HOSPITAL CARE,LEVL III: ICD-10-PCS | Mod: ,,, | Performed by: HOSPITALIST

## 2022-01-01 PROCEDURE — 87116 MYCOBACTERIA CULTURE: CPT | Performed by: STUDENT IN AN ORGANIZED HEALTH CARE EDUCATION/TRAINING PROGRAM

## 2022-01-01 PROCEDURE — 87070 CULTURE OTHR SPECIMN AEROBIC: CPT | Mod: 59

## 2022-01-01 PROCEDURE — P9047 ALBUMIN (HUMAN), 25%, 50ML: HCPCS | Mod: JG | Performed by: HOSPITALIST

## 2022-01-01 PROCEDURE — G0378 HOSPITAL OBSERVATION PER HR: HCPCS

## 2022-01-01 PROCEDURE — 99233 PR SUBSEQUENT HOSPITAL CARE,LEVL III: ICD-10-PCS | Mod: ,,, | Performed by: STUDENT IN AN ORGANIZED HEALTH CARE EDUCATION/TRAINING PROGRAM

## 2022-01-01 PROCEDURE — 27000207 HC ISOLATION

## 2022-01-01 PROCEDURE — 99223 1ST HOSP IP/OBS HIGH 75: CPT | Mod: ,,, | Performed by: INTERNAL MEDICINE

## 2022-01-01 PROCEDURE — 82390 ASSAY OF CERULOPLASMIN: CPT | Performed by: INTERNAL MEDICINE

## 2022-01-01 PROCEDURE — 80053 COMPREHEN METABOLIC PANEL: CPT | Performed by: HOSPITALIST

## 2022-01-01 PROCEDURE — P9016 RBC LEUKOCYTES REDUCED: HCPCS | Performed by: STUDENT IN AN ORGANIZED HEALTH CARE EDUCATION/TRAINING PROGRAM

## 2022-01-01 PROCEDURE — 94760 N-INVAS EAR/PLS OXIMETRY 1: CPT

## 2022-01-01 PROCEDURE — 85025 COMPLETE CBC W/AUTO DIFF WBC: CPT | Performed by: NURSE PRACTITIONER

## 2022-01-01 PROCEDURE — 3008F PR BODY MASS INDEX (BMI) DOCUMENTED: ICD-10-PCS | Mod: CPTII,S$GLB,, | Performed by: FAMILY MEDICINE

## 2022-01-01 PROCEDURE — 84100 ASSAY OF PHOSPHORUS: CPT | Performed by: HOSPITALIST

## 2022-01-01 PROCEDURE — 85025 COMPLETE CBC W/AUTO DIFF WBC: CPT | Mod: 91 | Performed by: STUDENT IN AN ORGANIZED HEALTH CARE EDUCATION/TRAINING PROGRAM

## 2022-01-01 PROCEDURE — 83036 HEMOGLOBIN GLYCOSYLATED A1C: CPT | Performed by: FAMILY MEDICINE

## 2022-01-01 PROCEDURE — 93010 ELECTROCARDIOGRAM REPORT: CPT | Mod: ,,, | Performed by: INTERNAL MEDICINE

## 2022-01-01 PROCEDURE — 86431 RHEUMATOID FACTOR QUANT: CPT | Performed by: INTERNAL MEDICINE

## 2022-01-01 PROCEDURE — 84153 ASSAY OF PSA TOTAL: CPT | Performed by: INTERNAL MEDICINE

## 2022-01-01 PROCEDURE — 85610 PROTHROMBIN TIME: CPT | Performed by: INTERNAL MEDICINE

## 2022-01-01 PROCEDURE — 87015 SPECIMEN INFECT AGNT CONCNTJ: CPT | Performed by: STUDENT IN AN ORGANIZED HEALTH CARE EDUCATION/TRAINING PROGRAM

## 2022-01-01 PROCEDURE — 84145 PROCALCITONIN (PCT): CPT | Performed by: EMERGENCY MEDICINE

## 2022-01-01 PROCEDURE — 1159F MED LIST DOCD IN RCRD: CPT | Mod: CPTII,S$GLB,, | Performed by: FAMILY MEDICINE

## 2022-01-01 PROCEDURE — 87206 SMEAR FLUORESCENT/ACID STAI: CPT

## 2022-01-01 PROCEDURE — 3078F PR MOST RECENT DIASTOLIC BLOOD PRESSURE < 80 MM HG: ICD-10-PCS | Mod: CPTII,S$GLB,, | Performed by: FAMILY MEDICINE

## 2022-01-01 PROCEDURE — 80074 ACUTE HEPATITIS PANEL: CPT | Performed by: INTERNAL MEDICINE

## 2022-01-01 PROCEDURE — 25000242 PHARM REV CODE 250 ALT 637 W/ HCPCS: Performed by: EMERGENCY MEDICINE

## 2022-01-01 PROCEDURE — 97110 THERAPEUTIC EXERCISES: CPT

## 2022-01-01 PROCEDURE — 97116 GAIT TRAINING THERAPY: CPT

## 2022-01-01 PROCEDURE — 36415 COLL VENOUS BLD VENIPUNCTURE: CPT | Performed by: STUDENT IN AN ORGANIZED HEALTH CARE EDUCATION/TRAINING PROGRAM

## 2022-01-01 PROCEDURE — 99233 PR SUBSEQUENT HOSPITAL CARE,LEVL III: ICD-10-PCS | Mod: ,,, | Performed by: INTERNAL MEDICINE

## 2022-01-01 PROCEDURE — 99999 PR PBB SHADOW E&M-EST. PATIENT-LVL IV: CPT | Mod: PBBFAC,,, | Performed by: FAMILY MEDICINE

## 2022-01-01 PROCEDURE — 80195 ASSAY OF SIROLIMUS: CPT | Performed by: HOSPITALIST

## 2022-01-01 PROCEDURE — 25000242 PHARM REV CODE 250 ALT 637 W/ HCPCS: Performed by: INTERNAL MEDICINE

## 2022-01-01 PROCEDURE — 63600175 PHARM REV CODE 636 W HCPCS: Mod: JG | Performed by: INTERNAL MEDICINE

## 2022-01-01 PROCEDURE — 83930 ASSAY OF BLOOD OSMOLALITY: CPT | Performed by: STUDENT IN AN ORGANIZED HEALTH CARE EDUCATION/TRAINING PROGRAM

## 2022-01-01 PROCEDURE — 25000242 PHARM REV CODE 250 ALT 637 W/ HCPCS: Performed by: NURSE PRACTITIONER

## 2022-01-01 PROCEDURE — 43239 EGD BIOPSY SINGLE/MULTIPLE: CPT | Performed by: INTERNAL MEDICINE

## 2022-01-01 PROCEDURE — 88341 IMHCHEM/IMCYTCHM EA ADD ANTB: CPT | Performed by: PATHOLOGY

## 2022-01-01 PROCEDURE — 99232 PR SUBSEQUENT HOSPITAL CARE,LEVL II: ICD-10-PCS | Mod: ,,, | Performed by: HOSPITALIST

## 2022-01-01 PROCEDURE — 1111F PR DISCHARGE MEDS RECONCILED W/ CURRENT OUTPATIENT MED LIST: ICD-10-PCS | Mod: CPTII,S$GLB,, | Performed by: FAMILY MEDICINE

## 2022-01-01 PROCEDURE — 27000221 HC OXYGEN, UP TO 24 HOURS

## 2022-01-01 PROCEDURE — 84484 ASSAY OF TROPONIN QUANT: CPT | Performed by: STUDENT IN AN ORGANIZED HEALTH CARE EDUCATION/TRAINING PROGRAM

## 2022-01-01 PROCEDURE — 86592 SYPHILIS TEST NON-TREP QUAL: CPT | Performed by: INTERNAL MEDICINE

## 2022-01-01 PROCEDURE — 84484 ASSAY OF TROPONIN QUANT: CPT | Mod: 91 | Performed by: HOSPITALIST

## 2022-01-01 PROCEDURE — 99999 PR PBB SHADOW E&M-EST. PATIENT-LVL V: ICD-10-PCS | Mod: PBBFAC,,, | Performed by: PHYSICIAN ASSISTANT

## 2022-01-01 PROCEDURE — 87205 SMEAR GRAM STAIN: CPT | Performed by: HOSPITALIST

## 2022-01-01 PROCEDURE — 97165 OT EVAL LOW COMPLEX 30 MIN: CPT

## 2022-01-01 PROCEDURE — 86635 COCCIDIOIDES ANTIBODY: CPT | Performed by: STUDENT IN AN ORGANIZED HEALTH CARE EDUCATION/TRAINING PROGRAM

## 2022-01-01 PROCEDURE — 80053 COMPREHEN METABOLIC PANEL: CPT

## 2022-01-01 PROCEDURE — 1160F RVW MEDS BY RX/DR IN RCRD: CPT | Mod: CPTII,S$GLB,, | Performed by: FAMILY MEDICINE

## 2022-01-01 PROCEDURE — 80202 ASSAY OF VANCOMYCIN: CPT | Performed by: HOSPITALIST

## 2022-01-01 PROCEDURE — 37000008 HC ANESTHESIA 1ST 15 MINUTES: Performed by: INTERNAL MEDICINE

## 2022-01-01 PROCEDURE — 96361 HYDRATE IV INFUSION ADD-ON: CPT

## 2022-01-01 PROCEDURE — 99205 PR OFFICE/OUTPT VISIT, NEW, LEVL V, 60-74 MIN: ICD-10-PCS | Mod: S$GLB,,, | Performed by: INTERNAL MEDICINE

## 2022-01-01 PROCEDURE — 25000242 PHARM REV CODE 250 ALT 637 W/ HCPCS: Performed by: STUDENT IN AN ORGANIZED HEALTH CARE EDUCATION/TRAINING PROGRAM

## 2022-01-01 PROCEDURE — 87389 HIV-1 AG W/HIV-1&-2 AB AG IA: CPT | Performed by: INTERNAL MEDICINE

## 2022-01-01 PROCEDURE — 87116 MYCOBACTERIA CULTURE: CPT | Performed by: HOSPITALIST

## 2022-01-01 PROCEDURE — 36415 COLL VENOUS BLD VENIPUNCTURE: CPT | Performed by: PHYSICIAN ASSISTANT

## 2022-01-01 PROCEDURE — 87798 DETECT AGENT NOS DNA AMP: CPT | Mod: 59 | Performed by: STUDENT IN AN ORGANIZED HEALTH CARE EDUCATION/TRAINING PROGRAM

## 2022-01-01 PROCEDURE — 36415 COLL VENOUS BLD VENIPUNCTURE: CPT | Performed by: RADIOLOGY

## 2022-01-01 PROCEDURE — 83735 ASSAY OF MAGNESIUM: CPT | Performed by: NURSE PRACTITIONER

## 2022-01-01 PROCEDURE — 27100171 HC OXYGEN HIGH FLOW UP TO 24 HOURS

## 2022-01-01 PROCEDURE — 87070 CULTURE OTHR SPECIMN AEROBIC: CPT

## 2022-01-01 PROCEDURE — 31624 DX BRONCHOSCOPE/LAVAGE: CPT | Mod: RT,,, | Performed by: INTERNAL MEDICINE

## 2022-01-01 PROCEDURE — 87541 LEGION PNEUMO DNA AMP PROB: CPT | Performed by: STUDENT IN AN ORGANIZED HEALTH CARE EDUCATION/TRAINING PROGRAM

## 2022-01-01 PROCEDURE — 30000890 MISCELLANEOUS SENDOUT TEST, NON-BLOOD: Performed by: INTERNAL MEDICINE

## 2022-01-01 PROCEDURE — 82728 ASSAY OF FERRITIN: CPT | Performed by: INTERNAL MEDICINE

## 2022-01-01 PROCEDURE — 85045 AUTOMATED RETICULOCYTE COUNT: CPT | Performed by: STUDENT IN AN ORGANIZED HEALTH CARE EDUCATION/TRAINING PROGRAM

## 2022-01-01 PROCEDURE — 94799 UNLISTED PULMONARY SVC/PX: CPT

## 2022-01-01 PROCEDURE — 85025 COMPLETE CBC W/AUTO DIFF WBC: CPT

## 2022-01-01 PROCEDURE — 99291 CRITICAL CARE FIRST HOUR: CPT | Mod: ,,, | Performed by: NURSE PRACTITIONER

## 2022-01-01 PROCEDURE — 99153 MOD SED SAME PHYS/QHP EA: CPT | Performed by: RADIOLOGY

## 2022-01-01 PROCEDURE — 80100014 HC HEMODIALYSIS 1:1

## 2022-01-01 PROCEDURE — 86706 HEP B SURFACE ANTIBODY: CPT | Performed by: INTERNAL MEDICINE

## 2022-01-01 PROCEDURE — 99214 PR OFFICE/OUTPT VISIT, EST, LEVL IV, 30-39 MIN: ICD-10-PCS | Mod: S$GLB,,, | Performed by: PHYSICIAN ASSISTANT

## 2022-01-01 PROCEDURE — 99285 EMERGENCY DEPT VISIT HI MDM: CPT | Mod: 25

## 2022-01-01 PROCEDURE — 93010 EKG 12-LEAD: ICD-10-PCS | Mod: ,,, | Performed by: INTERNAL MEDICINE

## 2022-01-01 PROCEDURE — 82042 OTHER SOURCE ALBUMIN QUAN EA: CPT | Performed by: INTERNAL MEDICINE

## 2022-01-01 PROCEDURE — 83935 ASSAY OF URINE OSMOLALITY: CPT | Performed by: STUDENT IN AN ORGANIZED HEALTH CARE EDUCATION/TRAINING PROGRAM

## 2022-01-01 PROCEDURE — 86704 HEP B CORE ANTIBODY TOTAL: CPT | Performed by: INTERNAL MEDICINE

## 2022-01-01 PROCEDURE — 36415 COLL VENOUS BLD VENIPUNCTURE: CPT | Performed by: GENERAL ACUTE CARE HOSPITAL

## 2022-01-01 PROCEDURE — 88341 IMHCHEM/IMCYTCHM EA ADD ANTB: CPT | Mod: 26,,, | Performed by: PATHOLOGY

## 2022-01-01 PROCEDURE — 83735 ASSAY OF MAGNESIUM: CPT | Performed by: INTERNAL MEDICINE

## 2022-01-01 PROCEDURE — 76937 US GUIDE VASCULAR ACCESS: CPT | Mod: TC | Performed by: RADIOLOGY

## 2022-01-01 PROCEDURE — 99222 PR INITIAL HOSPITAL CARE,LEVL II: ICD-10-PCS | Mod: ,,, | Performed by: INTERNAL MEDICINE

## 2022-01-01 PROCEDURE — 80048 BASIC METABOLIC PNL TOTAL CA: CPT | Mod: XB | Performed by: HOSPITALIST

## 2022-01-01 PROCEDURE — 87102 FUNGUS ISOLATION CULTURE: CPT | Performed by: STUDENT IN AN ORGANIZED HEALTH CARE EDUCATION/TRAINING PROGRAM

## 2022-01-01 PROCEDURE — 83735 ASSAY OF MAGNESIUM: CPT | Performed by: HOSPITALIST

## 2022-01-01 PROCEDURE — 83735 ASSAY OF MAGNESIUM: CPT | Performed by: STUDENT IN AN ORGANIZED HEALTH CARE EDUCATION/TRAINING PROGRAM

## 2022-01-01 PROCEDURE — 37200 TRANSCATHETER BIOPSY: CPT | Performed by: RADIOLOGY

## 2022-01-01 PROCEDURE — 86682 HELMINTH ANTIBODY: CPT | Performed by: INTERNAL MEDICINE

## 2022-01-01 PROCEDURE — 82575 CREATININE CLEARANCE TEST: CPT | Performed by: INTERNAL MEDICINE

## 2022-01-01 PROCEDURE — 87102 FUNGUS ISOLATION CULTURE: CPT

## 2022-01-01 PROCEDURE — 3044F HG A1C LEVEL LT 7.0%: CPT | Mod: CPTII,S$GLB,, | Performed by: PHYSICIAN ASSISTANT

## 2022-01-01 PROCEDURE — 84157 ASSAY OF PROTEIN OTHER: CPT

## 2022-01-01 PROCEDURE — 85007 BL SMEAR W/DIFF WBC COUNT: CPT | Performed by: HOSPITALIST

## 2022-01-01 PROCEDURE — 93005 ELECTROCARDIOGRAM TRACING: CPT | Performed by: GENERAL PRACTICE

## 2022-01-01 PROCEDURE — 36415 COLL VENOUS BLD VENIPUNCTURE: CPT | Mod: PO | Performed by: FAMILY MEDICINE

## 2022-01-01 PROCEDURE — 80053 COMPREHEN METABOLIC PANEL: CPT | Performed by: PHYSICIAN ASSISTANT

## 2022-01-01 PROCEDURE — 85027 COMPLETE CBC AUTOMATED: CPT | Performed by: HOSPITALIST

## 2022-01-01 PROCEDURE — 36011 PLACE CATHETER IN VEIN: CPT | Mod: 51,RT,, | Performed by: RADIOLOGY

## 2022-01-01 PROCEDURE — 80048 BASIC METABOLIC PNL TOTAL CA: CPT | Performed by: INTERNAL MEDICINE

## 2022-01-01 PROCEDURE — 43237 ENDOSCOPIC US EXAM ESOPH: CPT | Performed by: INTERNAL MEDICINE

## 2022-01-01 PROCEDURE — 3008F BODY MASS INDEX DOCD: CPT | Mod: CPTII,S$GLB,, | Performed by: INTERNAL MEDICINE

## 2022-01-01 PROCEDURE — 88305 TISSUE EXAM BY PATHOLOGIST: ICD-10-PCS | Mod: 26,,, | Performed by: PATHOLOGY

## 2022-01-01 PROCEDURE — 3078F PR MOST RECENT DIASTOLIC BLOOD PRESSURE < 80 MM HG: ICD-10-PCS | Mod: CPTII,S$GLB,, | Performed by: PHYSICIAN ASSISTANT

## 2022-01-01 PROCEDURE — 81001 URINALYSIS AUTO W/SCOPE: CPT

## 2022-01-01 PROCEDURE — 83615 LACTATE (LD) (LDH) ENZYME: CPT

## 2022-01-01 PROCEDURE — 99204 PR OFFICE/OUTPT VISIT, NEW, LEVL IV, 45-59 MIN: ICD-10-PCS | Mod: S$GLB,,, | Performed by: FAMILY MEDICINE

## 2022-01-01 PROCEDURE — 87389 HIV-1 AG W/HIV-1&-2 AB AG IA: CPT | Performed by: FAMILY MEDICINE

## 2022-01-01 PROCEDURE — 3008F PR BODY MASS INDEX (BMI) DOCUMENTED: ICD-10-PCS | Mod: CPTII,S$GLB,, | Performed by: PHYSICIAN ASSISTANT

## 2022-01-01 PROCEDURE — 84466 ASSAY OF TRANSFERRIN: CPT | Performed by: INTERNAL MEDICINE

## 2022-01-01 PROCEDURE — 87177 OVA AND PARASITES SMEARS: CPT | Performed by: INTERNAL MEDICINE

## 2022-01-01 PROCEDURE — 88365 INSITU HYBRIDIZATION (FISH): CPT | Performed by: PATHOLOGY

## 2022-01-01 PROCEDURE — 86850 RBC ANTIBODY SCREEN: CPT | Performed by: STUDENT IN AN ORGANIZED HEALTH CARE EDUCATION/TRAINING PROGRAM

## 2022-01-01 PROCEDURE — 99999 PR PBB SHADOW E&M-EST. PATIENT-LVL V: ICD-10-PCS | Mod: PBBFAC,,, | Performed by: INTERNAL MEDICINE

## 2022-01-01 PROCEDURE — 84484 ASSAY OF TROPONIN QUANT: CPT | Performed by: EMERGENCY MEDICINE

## 2022-01-01 PROCEDURE — 3051F HG A1C>EQUAL 7.0%<8.0%: CPT | Mod: CPTII,S$GLB,, | Performed by: FAMILY MEDICINE

## 2022-01-01 PROCEDURE — 63600175 PHARM REV CODE 636 W HCPCS: Mod: JG | Performed by: HOSPITALIST

## 2022-01-01 PROCEDURE — C9399 UNCLASSIFIED DRUGS OR BIOLOG: HCPCS | Performed by: NURSE PRACTITIONER

## 2022-01-01 PROCEDURE — 3074F PR MOST RECENT SYSTOLIC BLOOD PRESSURE < 130 MM HG: ICD-10-PCS | Mod: CPTII,S$GLB,, | Performed by: FAMILY MEDICINE

## 2022-01-01 PROCEDURE — 3078F DIAST BP <80 MM HG: CPT | Mod: CPTII,S$GLB,, | Performed by: PHYSICIAN ASSISTANT

## 2022-01-01 PROCEDURE — 25000003 PHARM REV CODE 250: Performed by: NURSE ANESTHETIST, CERTIFIED REGISTERED

## 2022-01-01 PROCEDURE — 96366 THER/PROPH/DIAG IV INF ADDON: CPT

## 2022-01-01 PROCEDURE — 87206 SMEAR FLUORESCENT/ACID STAI: CPT | Performed by: STUDENT IN AN ORGANIZED HEALTH CARE EDUCATION/TRAINING PROGRAM

## 2022-01-01 PROCEDURE — 84157 ASSAY OF PROTEIN OTHER: CPT | Performed by: HOSPITALIST

## 2022-01-01 PROCEDURE — 20600001 HC STEP DOWN PRIVATE ROOM

## 2022-01-01 PROCEDURE — 99214 OFFICE O/P EST MOD 30 MIN: CPT | Mod: S$GLB,,, | Performed by: PHYSICIAN ASSISTANT

## 2022-01-01 PROCEDURE — 87798 DETECT AGENT NOS DNA AMP: CPT | Mod: 59

## 2022-01-01 PROCEDURE — 3044F HG A1C LEVEL LT 7.0%: CPT | Mod: CPTII,S$GLB,, | Performed by: INTERNAL MEDICINE

## 2022-01-01 PROCEDURE — 75970 VASCULAR BIOPSY: CPT | Mod: 26,,, | Performed by: RADIOLOGY

## 2022-01-01 PROCEDURE — 83516 IMMUNOASSAY NONANTIBODY: CPT | Mod: 59 | Performed by: INTERNAL MEDICINE

## 2022-01-01 PROCEDURE — 3074F PR MOST RECENT SYSTOLIC BLOOD PRESSURE < 130 MM HG: ICD-10-PCS | Mod: CPTII,S$GLB,, | Performed by: INTERNAL MEDICINE

## 2022-01-01 PROCEDURE — 82436 ASSAY OF URINE CHLORIDE: CPT

## 2022-01-01 PROCEDURE — 1111F DSCHRG MED/CURRENT MED MERGE: CPT | Mod: CPTII,S$GLB,, | Performed by: FAMILY MEDICINE

## 2022-01-01 PROCEDURE — 37000009 HC ANESTHESIA EA ADD 15 MINS: Performed by: INTERNAL MEDICINE

## 2022-01-01 PROCEDURE — 30000890 HC MISC. SEND OUT TEST

## 2022-01-01 PROCEDURE — 85025 COMPLETE CBC W/AUTO DIFF WBC: CPT | Mod: PO | Performed by: PHYSICIAN ASSISTANT

## 2022-01-01 PROCEDURE — 84157 ASSAY OF PROTEIN OTHER: CPT | Performed by: INTERNAL MEDICINE

## 2022-01-01 PROCEDURE — 86644 CMV ANTIBODY: CPT | Performed by: INTERNAL MEDICINE

## 2022-01-01 PROCEDURE — 87081 CULTURE SCREEN ONLY: CPT | Mod: 91 | Performed by: HOSPITALIST

## 2022-01-01 PROCEDURE — 84132 ASSAY OF SERUM POTASSIUM: CPT | Performed by: GENERAL ACUTE CARE HOSPITAL

## 2022-01-01 PROCEDURE — 87015 SPECIMEN INFECT AGNT CONCNTJ: CPT | Mod: 91 | Performed by: INTERNAL MEDICINE

## 2022-01-01 PROCEDURE — 85730 THROMBOPLASTIN TIME PARTIAL: CPT | Performed by: RADIOLOGY

## 2022-01-01 PROCEDURE — 87305 ASPERGILLUS AG IA: CPT | Performed by: STUDENT IN AN ORGANIZED HEALTH CARE EDUCATION/TRAINING PROGRAM

## 2022-01-01 PROCEDURE — 86235 NUCLEAR ANTIGEN ANTIBODY: CPT | Performed by: INTERNAL MEDICINE

## 2022-01-01 PROCEDURE — 82945 GLUCOSE OTHER FLUID: CPT

## 2022-01-01 PROCEDURE — 1159F PR MEDICATION LIST DOCUMENTED IN MEDICAL RECORD: ICD-10-PCS | Mod: CPTII,S$GLB,, | Performed by: INTERNAL MEDICINE

## 2022-01-01 PROCEDURE — 84100 ASSAY OF PHOSPHORUS: CPT | Performed by: STUDENT IN AN ORGANIZED HEALTH CARE EDUCATION/TRAINING PROGRAM

## 2022-01-01 PROCEDURE — 82140 ASSAY OF AMMONIA: CPT | Performed by: HOSPITALIST

## 2022-01-01 PROCEDURE — 75825 IR TRANSJUGULAR LIVER BIOPSY: ICD-10-PCS | Mod: 26,59,, | Performed by: RADIOLOGY

## 2022-01-01 PROCEDURE — 87385 HISTOPLASMA CAPSUL AG IA: CPT | Performed by: HOSPITALIST

## 2022-01-01 PROCEDURE — 99999 PR PBB SHADOW E&M-EST. PATIENT-LVL V: CPT | Mod: PBBFAC,,, | Performed by: PHYSICIAN ASSISTANT

## 2022-01-01 PROCEDURE — 83605 ASSAY OF LACTIC ACID: CPT | Performed by: HOSPITALIST

## 2022-01-01 PROCEDURE — 87633 RESP VIRUS 12-25 TARGETS: CPT | Performed by: STUDENT IN AN ORGANIZED HEALTH CARE EDUCATION/TRAINING PROGRAM

## 2022-01-01 PROCEDURE — 87045 FECES CULTURE AEROBIC BACT: CPT | Performed by: INTERNAL MEDICINE

## 2022-01-01 PROCEDURE — 87205 SMEAR GRAM STAIN: CPT

## 2022-01-01 PROCEDURE — 36410 VNPNXR 3YR/> PHY/QHP DX/THER: CPT

## 2022-01-01 PROCEDURE — 88307 TISSUE EXAM BY PATHOLOGIST: CPT | Performed by: PATHOLOGY

## 2022-01-01 PROCEDURE — 25000242 PHARM REV CODE 250 ALT 637 W/ HCPCS: Performed by: HOSPITALIST

## 2022-01-01 PROCEDURE — 71046 XR CHEST PA AND LATERAL: ICD-10-PCS | Mod: 26,,, | Performed by: RADIOLOGY

## 2022-01-01 PROCEDURE — 76937 US GUIDE VASCULAR ACCESS: CPT

## 2022-01-01 PROCEDURE — 49083 ABD PARACENTESIS W/IMAGING: CPT | Performed by: RADIOLOGY

## 2022-01-01 PROCEDURE — 86256 FLUORESCENT ANTIBODY TITER: CPT | Performed by: INTERNAL MEDICINE

## 2022-01-01 PROCEDURE — 84165 PROTEIN E-PHORESIS SERUM: CPT | Performed by: INTERNAL MEDICINE

## 2022-01-01 PROCEDURE — 88307 PR  SURG PATH,LEVEL V: ICD-10-PCS | Mod: 26,,, | Performed by: PATHOLOGY

## 2022-01-01 PROCEDURE — 81003 URINALYSIS AUTO W/O SCOPE: CPT | Mod: 91 | Performed by: INTERNAL MEDICINE

## 2022-01-01 PROCEDURE — 63600175 PHARM REV CODE 636 W HCPCS

## 2022-01-01 PROCEDURE — 82550 ASSAY OF CK (CPK): CPT | Performed by: EMERGENCY MEDICINE

## 2022-01-01 PROCEDURE — 94150 VITAL CAPACITY TEST: CPT

## 2022-01-01 PROCEDURE — 82550 ASSAY OF CK (CPK): CPT | Performed by: INTERNAL MEDICINE

## 2022-01-01 PROCEDURE — 36011 PLACE CATHETER IN VEIN: CPT | Mod: RT | Performed by: RADIOLOGY

## 2022-01-01 PROCEDURE — 87507 IADNA-DNA/RNA PROBE TQ 12-25: CPT | Performed by: INTERNAL MEDICINE

## 2022-01-01 PROCEDURE — 96375 TX/PRO/DX INJ NEW DRUG ADDON: CPT

## 2022-01-01 PROCEDURE — 87210 SMEAR WET MOUNT SALINE/INK: CPT | Performed by: STUDENT IN AN ORGANIZED HEALTH CARE EDUCATION/TRAINING PROGRAM

## 2022-01-01 PROCEDURE — 31624 PR BRONCHOSCOPY,DIAG2STIC W LAVAGE: ICD-10-PCS | Mod: RT,,, | Performed by: INTERNAL MEDICINE

## 2022-01-01 PROCEDURE — 83605 ASSAY OF LACTIC ACID: CPT | Performed by: STUDENT IN AN ORGANIZED HEALTH CARE EDUCATION/TRAINING PROGRAM

## 2022-01-01 PROCEDURE — 4010F PR ACE/ARB THEARPY RXD/TAKEN: ICD-10-PCS | Mod: CPTII,S$GLB,, | Performed by: PHYSICIAN ASSISTANT

## 2022-01-01 PROCEDURE — 76937 IR TRANSJUGULAR LIVER BIOPSY: ICD-10-PCS | Mod: 26,,, | Performed by: RADIOLOGY

## 2022-01-01 PROCEDURE — 97116 GAIT TRAINING THERAPY: CPT | Mod: CQ

## 2022-01-01 PROCEDURE — 99223 PR INITIAL HOSPITAL CARE,LEVL III: ICD-10-PCS | Mod: ,,, | Performed by: HOSPITALIST

## 2022-01-01 PROCEDURE — 63600175 PHARM REV CODE 636 W HCPCS: Performed by: RADIOLOGY

## 2022-01-01 PROCEDURE — 86803 HEPATITIS C AB TEST: CPT | Performed by: FAMILY MEDICINE

## 2022-01-01 PROCEDURE — 92610 EVALUATE SWALLOWING FUNCTION: CPT

## 2022-01-01 PROCEDURE — 3074F SYST BP LT 130 MM HG: CPT | Mod: CPTII,S$GLB,, | Performed by: FAMILY MEDICINE

## 2022-01-01 PROCEDURE — 85610 PROTHROMBIN TIME: CPT

## 2022-01-01 PROCEDURE — 82140 ASSAY OF AMMONIA: CPT | Performed by: PHYSICIAN ASSISTANT

## 2022-01-01 PROCEDURE — 80053 COMPREHEN METABOLIC PANEL: CPT | Performed by: EMERGENCY MEDICINE

## 2022-01-01 PROCEDURE — 85027 COMPLETE CBC AUTOMATED: CPT | Performed by: INTERNAL MEDICINE

## 2022-01-01 PROCEDURE — 87799 DETECT AGENT NOS DNA QUANT: CPT

## 2022-01-01 PROCEDURE — 85025 COMPLETE CBC W/AUTO DIFF WBC: CPT | Performed by: EMERGENCY MEDICINE

## 2022-01-01 PROCEDURE — 86403 PARTICLE AGGLUT ANTBDY SCRN: CPT | Performed by: STUDENT IN AN ORGANIZED HEALTH CARE EDUCATION/TRAINING PROGRAM

## 2022-01-01 PROCEDURE — 85610 PROTHROMBIN TIME: CPT | Performed by: PHYSICIAN ASSISTANT

## 2022-01-01 PROCEDURE — 76705 ECHO EXAM OF ABDOMEN: CPT | Mod: TC,PO,59

## 2022-01-01 PROCEDURE — 3074F SYST BP LT 130 MM HG: CPT | Mod: CPTII,S$GLB,, | Performed by: PHYSICIAN ASSISTANT

## 2022-01-01 PROCEDURE — 83880 ASSAY OF NATRIURETIC PEPTIDE: CPT | Performed by: INTERNAL MEDICINE

## 2022-01-01 PROCEDURE — 99205 OFFICE O/P NEW HI 60 MIN: CPT | Mod: S$GLB,,, | Performed by: INTERNAL MEDICINE

## 2022-01-01 PROCEDURE — 88313 SPECIAL STAINS GROUP 2: CPT | Mod: 26,,, | Performed by: PATHOLOGY

## 2022-01-01 PROCEDURE — 99204 OFFICE O/P NEW MOD 45 MIN: CPT | Mod: S$GLB,,, | Performed by: FAMILY MEDICINE

## 2022-01-01 PROCEDURE — 75970 VASCULAR BIOPSY: CPT | Mod: TC | Performed by: RADIOLOGY

## 2022-01-01 PROCEDURE — U0002 COVID-19 LAB TEST NON-CDC: HCPCS | Performed by: PHYSICIAN ASSISTANT

## 2022-01-01 PROCEDURE — 88313 PR  SPECIAL STAINS,GROUP II: ICD-10-PCS | Mod: 26,,, | Performed by: PATHOLOGY

## 2022-01-01 PROCEDURE — S0030 INJECTION, METRONIDAZOLE: HCPCS | Performed by: HOSPITALIST

## 2022-01-01 PROCEDURE — 88342 CHG IMMUNOCYTOCHEMISTRY: ICD-10-PCS | Mod: 26,,, | Performed by: PATHOLOGY

## 2022-01-01 PROCEDURE — 87186 SC STD MICRODIL/AGAR DIL: CPT

## 2022-01-01 PROCEDURE — 84300 ASSAY OF URINE SODIUM: CPT

## 2022-01-01 PROCEDURE — 1159F MED LIST DOCD IN RCRD: CPT | Mod: CPTII,S$GLB,, | Performed by: INTERNAL MEDICINE

## 2022-01-01 PROCEDURE — 27000671 HC TUBING MICROBORE EXT: Performed by: ANESTHESIOLOGY

## 2022-01-01 PROCEDURE — 25000003 PHARM REV CODE 250: Performed by: GENERAL ACUTE CARE HOSPITAL

## 2022-01-01 PROCEDURE — 1111F DSCHRG MED/CURRENT MED MERGE: CPT | Mod: CPTII,S$GLB,, | Performed by: PHYSICIAN ASSISTANT

## 2022-01-01 PROCEDURE — 88312 PR  SPECIAL STAINS,GROUP I: ICD-10-PCS | Mod: 26,,, | Performed by: PATHOLOGY

## 2022-01-01 PROCEDURE — 87015 SPECIMEN INFECT AGNT CONCNTJ: CPT | Performed by: INTERNAL MEDICINE

## 2022-01-01 PROCEDURE — 1111F PR DISCHARGE MEDS RECONCILED W/ CURRENT OUTPATIENT MED LIST: ICD-10-PCS | Mod: CPTII,S$GLB,, | Performed by: PHYSICIAN ASSISTANT

## 2022-01-01 PROCEDURE — 87209 SMEAR COMPLEX STAIN: CPT | Performed by: INTERNAL MEDICINE

## 2022-01-01 PROCEDURE — 80197 ASSAY OF TACROLIMUS: CPT | Performed by: PHYSICIAN ASSISTANT

## 2022-01-01 PROCEDURE — 3008F BODY MASS INDEX DOCD: CPT | Mod: CPTII,S$GLB,, | Performed by: FAMILY MEDICINE

## 2022-01-01 PROCEDURE — 99152 MOD SED SAME PHYS/QHP 5/>YRS: CPT | Performed by: INTERNAL MEDICINE

## 2022-01-01 PROCEDURE — C9399 UNCLASSIFIED DRUGS OR BIOLOG: HCPCS | Performed by: GENERAL ACUTE CARE HOSPITAL

## 2022-01-01 PROCEDURE — 83970 ASSAY OF PARATHORMONE: CPT | Performed by: HOSPITALIST

## 2022-01-01 PROCEDURE — U0002 COVID-19 LAB TEST NON-CDC: HCPCS | Performed by: NURSE PRACTITIONER

## 2022-01-01 PROCEDURE — 27200043 HC FORCEPS, BIOPSY: Performed by: INTERNAL MEDICINE

## 2022-01-01 PROCEDURE — 75825 VEIN X-RAY TRUNK: CPT | Mod: TC,59 | Performed by: RADIOLOGY

## 2022-01-01 PROCEDURE — 3078F DIAST BP <80 MM HG: CPT | Mod: CPTII,S$GLB,, | Performed by: INTERNAL MEDICINE

## 2022-01-01 PROCEDURE — 82103 ALPHA-1-ANTITRYPSIN TOTAL: CPT | Performed by: INTERNAL MEDICINE

## 2022-01-01 PROCEDURE — 90935 PR HEMODIALYSIS, ONE EVALUATION: ICD-10-PCS | Mod: ,,, | Performed by: INTERNAL MEDICINE

## 2022-01-01 PROCEDURE — 85610 PROTHROMBIN TIME: CPT | Performed by: STUDENT IN AN ORGANIZED HEALTH CARE EDUCATION/TRAINING PROGRAM

## 2022-01-01 PROCEDURE — 3074F SYST BP LT 130 MM HG: CPT | Mod: CPTII,S$GLB,, | Performed by: INTERNAL MEDICINE

## 2022-01-01 PROCEDURE — 87305 ASPERGILLUS AG IA: CPT

## 2022-01-01 PROCEDURE — C1751 CATH, INF, PER/CENT/MIDLINE: HCPCS

## 2022-01-01 PROCEDURE — 87075 CULTR BACTERIA EXCEPT BLOOD: CPT | Performed by: HOSPITALIST

## 2022-01-01 PROCEDURE — 75889 IR TRANSJUGULAR LIVER BIOPSY: ICD-10-PCS | Mod: 26,59,, | Performed by: RADIOLOGY

## 2022-01-01 PROCEDURE — 87502 INFLUENZA DNA AMP PROBE: CPT | Performed by: EMERGENCY MEDICINE

## 2022-01-01 PROCEDURE — 63600175 PHARM REV CODE 636 W HCPCS: Performed by: NURSE ANESTHETIST, CERTIFIED REGISTERED

## 2022-01-01 PROCEDURE — 87449 NOS EACH ORGANISM AG IA: CPT | Performed by: STUDENT IN AN ORGANIZED HEALTH CARE EDUCATION/TRAINING PROGRAM

## 2022-01-01 PROCEDURE — 83735 ASSAY OF MAGNESIUM: CPT | Performed by: PHYSICIAN ASSISTANT

## 2022-01-01 PROCEDURE — 3078F PR MOST RECENT DIASTOLIC BLOOD PRESSURE < 80 MM HG: ICD-10-PCS | Mod: CPTII,S$GLB,, | Performed by: INTERNAL MEDICINE

## 2022-01-01 PROCEDURE — 75825 VEIN X-RAY TRUNK: CPT | Mod: 26,59,, | Performed by: RADIOLOGY

## 2022-01-01 PROCEDURE — 99291 CRITICAL CARE FIRST HOUR: CPT | Mod: ,,, | Performed by: HOSPITALIST

## 2022-01-01 PROCEDURE — 88112 CYTOPATH CELL ENHANCE TECH: CPT | Performed by: PATHOLOGY

## 2022-01-01 PROCEDURE — 83735 ASSAY OF MAGNESIUM: CPT | Mod: 91

## 2022-01-01 PROCEDURE — 82105 ALPHA-FETOPROTEIN SERUM: CPT | Performed by: INTERNAL MEDICINE

## 2022-01-01 PROCEDURE — 87077 CULTURE AEROBIC IDENTIFY: CPT

## 2022-01-01 PROCEDURE — 99152 PR MOD CONSCIOUS SEDATION, SAME PHYS, 5+ YRS, FIRST 15 MIN: ICD-10-PCS | Mod: ,,, | Performed by: RADIOLOGY

## 2022-01-01 PROCEDURE — 63600175 PHARM REV CODE 636 W HCPCS: Performed by: EMERGENCY MEDICINE

## 2022-01-01 PROCEDURE — 97530 THERAPEUTIC ACTIVITIES: CPT | Mod: CQ

## 2022-01-01 PROCEDURE — 88312 SPECIAL STAINS GROUP 1: CPT | Mod: 59 | Performed by: PATHOLOGY

## 2022-01-01 PROCEDURE — 87328 CRYPTOSPORIDIUM AG IA: CPT | Performed by: INTERNAL MEDICINE

## 2022-01-01 PROCEDURE — 93005 ELECTROCARDIOGRAM TRACING: CPT

## 2022-01-01 PROCEDURE — 82042 OTHER SOURCE ALBUMIN QUAN EA: CPT

## 2022-01-01 PROCEDURE — 88313 SPECIAL STAINS GROUP 2: CPT | Performed by: PATHOLOGY

## 2022-01-01 PROCEDURE — 25000003 PHARM REV CODE 250: Performed by: RADIOLOGY

## 2022-01-01 PROCEDURE — 88112 PR  CYTOPATH, CELL ENHANCE TECH: ICD-10-PCS | Mod: 26,,, | Performed by: PATHOLOGY

## 2022-01-01 PROCEDURE — 99233 SBSQ HOSP IP/OBS HIGH 50: CPT | Mod: ,,, | Performed by: HOSPITALIST

## 2022-01-01 PROCEDURE — 80053 COMPREHEN METABOLIC PANEL: CPT | Performed by: INTERNAL MEDICINE

## 2022-01-01 PROCEDURE — 87556 M.TUBERCULO DNA AMP PROBE: CPT | Performed by: STUDENT IN AN ORGANIZED HEALTH CARE EDUCATION/TRAINING PROGRAM

## 2022-01-01 PROCEDURE — 88341 PR IHC OR ICC EACH ADD'L SINGLE ANTIBODY  STAINPR: ICD-10-PCS | Mod: 26,,, | Performed by: PATHOLOGY

## 2022-01-01 PROCEDURE — 99152 MOD SED SAME PHYS/QHP 5/>YRS: CPT | Mod: ,,, | Performed by: RADIOLOGY

## 2022-01-01 PROCEDURE — 3074F PR MOST RECENT SYSTOLIC BLOOD PRESSURE < 130 MM HG: ICD-10-PCS | Mod: CPTII,S$GLB,, | Performed by: PHYSICIAN ASSISTANT

## 2022-01-01 PROCEDURE — 99999 PR PBB SHADOW E&M-EST. PATIENT-LVL V: CPT | Mod: PBBFAC,,, | Performed by: INTERNAL MEDICINE

## 2022-01-01 PROCEDURE — 82656 EL-1 FECAL QUAL/SEMIQ: CPT | Performed by: INTERNAL MEDICINE

## 2022-01-01 PROCEDURE — 3008F BODY MASS INDEX DOCD: CPT | Mod: CPTII,S$GLB,, | Performed by: PHYSICIAN ASSISTANT

## 2022-01-01 PROCEDURE — 88112 CYTOPATH CELL ENHANCE TECH: CPT | Mod: 26,,, | Performed by: PATHOLOGY

## 2022-01-01 PROCEDURE — 99232 SBSQ HOSP IP/OBS MODERATE 35: CPT | Mod: ,,, | Performed by: HOSPITALIST

## 2022-01-01 PROCEDURE — 3044F PR MOST RECENT HEMOGLOBIN A1C LEVEL <7.0%: ICD-10-PCS | Mod: CPTII,S$GLB,, | Performed by: PHYSICIAN ASSISTANT

## 2022-01-01 PROCEDURE — 30000890 LABCORP MISCELLANEOUS TEST: Mod: 91 | Performed by: INTERNAL MEDICINE

## 2022-01-01 PROCEDURE — 36600 WITHDRAWAL OF ARTERIAL BLOOD: CPT

## 2022-01-01 PROCEDURE — 86920 COMPATIBILITY TEST SPIN: CPT | Performed by: STUDENT IN AN ORGANIZED HEALTH CARE EDUCATION/TRAINING PROGRAM

## 2022-01-01 PROCEDURE — 89051 BODY FLUID CELL COUNT: CPT

## 2022-01-01 PROCEDURE — 4010F ACE/ARB THERAPY RXD/TAKEN: CPT | Mod: CPTII,S$GLB,, | Performed by: PHYSICIAN ASSISTANT

## 2022-01-01 PROCEDURE — 83605 ASSAY OF LACTIC ACID: CPT | Performed by: EMERGENCY MEDICINE

## 2022-01-01 PROCEDURE — 97535 SELF CARE MNGMENT TRAINING: CPT

## 2022-01-01 PROCEDURE — 86682 HELMINTH ANTIBODY: CPT | Mod: 91 | Performed by: INTERNAL MEDICINE

## 2022-01-01 PROCEDURE — 86787 VARICELLA-ZOSTER ANTIBODY: CPT | Performed by: INTERNAL MEDICINE

## 2022-01-01 PROCEDURE — 82533 TOTAL CORTISOL: CPT | Performed by: HOSPITALIST

## 2022-01-01 PROCEDURE — 86803 HEPATITIS C AB TEST: CPT | Performed by: INTERNAL MEDICINE

## 2022-01-01 PROCEDURE — 27201045 IR TRANSJUGULAR LIVER BIOPSY

## 2022-01-01 PROCEDURE — 49083 IR PARACENTESIS WITH IMAGING: ICD-10-PCS | Mod: ,,, | Performed by: RADIOLOGY

## 2022-01-01 PROCEDURE — 99232 PR SUBSEQUENT HOSPITAL CARE,LEVL II: ICD-10-PCS | Mod: ,,, | Performed by: STUDENT IN AN ORGANIZED HEALTH CARE EDUCATION/TRAINING PROGRAM

## 2022-01-01 PROCEDURE — 87449 NOS EACH ORGANISM AG IA: CPT | Mod: 91 | Performed by: STUDENT IN AN ORGANIZED HEALTH CARE EDUCATION/TRAINING PROGRAM

## 2022-01-01 PROCEDURE — 83516 IMMUNOASSAY NONANTIBODY: CPT | Performed by: INTERNAL MEDICINE

## 2022-01-01 PROCEDURE — 88312 SPECIAL STAINS GROUP 1: CPT | Mod: 26,,, | Performed by: PATHOLOGY

## 2022-01-01 PROCEDURE — 89051 BODY FLUID CELL COUNT: CPT | Performed by: STUDENT IN AN ORGANIZED HEALTH CARE EDUCATION/TRAINING PROGRAM

## 2022-01-01 PROCEDURE — 36011 IR TRANSJUGULAR LIVER BIOPSY: ICD-10-PCS | Mod: 51,RT,, | Performed by: RADIOLOGY

## 2022-01-01 PROCEDURE — 75889 VEIN X-RAY LIVER W/HEMODYNAM: CPT | Mod: 26,59,, | Performed by: RADIOLOGY

## 2022-01-01 PROCEDURE — 84439 ASSAY OF FREE THYROXINE: CPT | Performed by: FAMILY MEDICINE

## 2022-01-01 PROCEDURE — 81001 URINALYSIS AUTO W/SCOPE: CPT | Performed by: STUDENT IN AN ORGANIZED HEALTH CARE EDUCATION/TRAINING PROGRAM

## 2022-01-01 PROCEDURE — 85610 PROTHROMBIN TIME: CPT | Mod: 91 | Performed by: INTERNAL MEDICINE

## 2022-01-01 PROCEDURE — 86200 CCP ANTIBODY: CPT | Performed by: INTERNAL MEDICINE

## 2022-01-01 PROCEDURE — 3044F PR MOST RECENT HEMOGLOBIN A1C LEVEL <7.0%: ICD-10-PCS | Mod: CPTII,S$GLB,, | Performed by: INTERNAL MEDICINE

## 2022-01-01 PROCEDURE — 99232 SBSQ HOSP IP/OBS MODERATE 35: CPT | Mod: ,,, | Performed by: STUDENT IN AN ORGANIZED HEALTH CARE EDUCATION/TRAINING PROGRAM

## 2022-01-01 PROCEDURE — 27100019 HC AMBU BAG ADULT/PED: Performed by: ANESTHESIOLOGY

## 2022-01-01 PROCEDURE — 88342 IMHCHEM/IMCYTCHM 1ST ANTB: CPT | Performed by: PATHOLOGY

## 2022-01-01 PROCEDURE — 86850 RBC ANTIBODY SCREEN: CPT | Performed by: INTERNAL MEDICINE

## 2022-01-01 PROCEDURE — 88307 TISSUE EXAM BY PATHOLOGIST: CPT | Mod: 26,,, | Performed by: PATHOLOGY

## 2022-01-01 PROCEDURE — U0002 COVID-19 LAB TEST NON-CDC: HCPCS | Performed by: INTERNAL MEDICINE

## 2022-01-01 PROCEDURE — 87641 MR-STAPH DNA AMP PROBE: CPT | Performed by: STUDENT IN AN ORGANIZED HEALTH CARE EDUCATION/TRAINING PROGRAM

## 2022-01-01 PROCEDURE — 93976 VASCULAR STUDY: CPT | Mod: TC,PO

## 2022-01-01 PROCEDURE — 86038 ANTINUCLEAR ANTIBODIES: CPT | Performed by: INTERNAL MEDICINE

## 2022-01-01 PROCEDURE — 87329 GIARDIA AG IA: CPT | Performed by: INTERNAL MEDICINE

## 2022-01-01 PROCEDURE — 30000890 LABCORP MISCELLANEOUS TEST: Performed by: INTERNAL MEDICINE

## 2022-01-01 PROCEDURE — 49083 ABD PARACENTESIS W/IMAGING: CPT

## 2022-01-01 PROCEDURE — 1159F PR MEDICATION LIST DOCUMENTED IN MEDICAL RECORD: ICD-10-PCS | Mod: CPTII,S$GLB,, | Performed by: FAMILY MEDICINE

## 2022-01-01 PROCEDURE — 83605 ASSAY OF LACTIC ACID: CPT | Mod: 91 | Performed by: HOSPITALIST

## 2022-01-01 PROCEDURE — U0002 COVID-19 LAB TEST NON-CDC: HCPCS | Performed by: EMERGENCY MEDICINE

## 2022-01-01 PROCEDURE — 87075 CULTR BACTERIA EXCEPT BLOOD: CPT

## 2022-01-01 PROCEDURE — 4010F PR ACE/ARB THEARPY RXD/TAKEN: ICD-10-PCS | Mod: CPTII,S$GLB,, | Performed by: FAMILY MEDICINE

## 2022-01-01 PROCEDURE — 96367 TX/PROPH/DG ADDL SEQ IV INF: CPT

## 2022-01-01 PROCEDURE — 87040 BLOOD CULTURE FOR BACTERIA: CPT | Performed by: HOSPITALIST

## 2022-01-01 PROCEDURE — 93010 ELECTROCARDIOGRAM REPORT: CPT | Mod: ,,, | Performed by: GENERAL PRACTICE

## 2022-01-01 PROCEDURE — 86901 BLOOD TYPING SEROLOGIC RH(D): CPT | Performed by: STUDENT IN AN ORGANIZED HEALTH CARE EDUCATION/TRAINING PROGRAM

## 2022-01-01 PROCEDURE — 83615 LACTATE (LD) (LDH) ENZYME: CPT | Performed by: EMERGENCY MEDICINE

## 2022-01-01 PROCEDURE — 93010 EKG 12-LEAD: ICD-10-PCS | Mod: ,,, | Performed by: GENERAL PRACTICE

## 2022-01-01 PROCEDURE — 93005 ELECTROCARDIOGRAM TRACING: CPT | Performed by: INTERNAL MEDICINE

## 2022-01-01 PROCEDURE — 49082 ABD PARACENTESIS: CPT

## 2022-01-01 PROCEDURE — 97110 THERAPEUTIC EXERCISES: CPT | Mod: CQ

## 2022-01-01 PROCEDURE — 87207 SMEAR SPECIAL STAIN: CPT

## 2022-01-01 PROCEDURE — 90935 HEMODIALYSIS ONE EVALUATION: CPT

## 2022-01-01 PROCEDURE — 80053 COMPREHEN METABOLIC PANEL: CPT | Mod: 91

## 2022-01-01 PROCEDURE — 3078F DIAST BP <80 MM HG: CPT | Mod: CPTII,S$GLB,, | Performed by: FAMILY MEDICINE

## 2022-01-01 PROCEDURE — 89051 BODY FLUID CELL COUNT: CPT | Performed by: HOSPITALIST

## 2022-01-01 PROCEDURE — 97161 PT EVAL LOW COMPLEX 20 MIN: CPT

## 2022-01-01 PROCEDURE — 36415 COLL VENOUS BLD VENIPUNCTURE: CPT | Mod: PO | Performed by: PHYSICIAN ASSISTANT

## 2022-01-01 PROCEDURE — 88365 INSITU HYBRIDIZATION (FISH): CPT | Mod: 26,,, | Performed by: PATHOLOGY

## 2022-01-01 PROCEDURE — 99999 PR PBB SHADOW E&M-EST. PATIENT-LVL IV: ICD-10-PCS | Mod: PBBFAC,,, | Performed by: FAMILY MEDICINE

## 2022-01-01 PROCEDURE — 96372 THER/PROPH/DIAG INJ SC/IM: CPT | Performed by: NURSE PRACTITIONER

## 2022-01-01 PROCEDURE — 84443 ASSAY THYROID STIM HORMONE: CPT | Performed by: FAMILY MEDICINE

## 2022-01-01 PROCEDURE — 83615 LACTATE (LD) (LDH) ENZYME: CPT | Performed by: HOSPITALIST

## 2022-01-01 PROCEDURE — 82042 OTHER SOURCE ALBUMIN QUAN EA: CPT | Performed by: HOSPITALIST

## 2022-01-01 PROCEDURE — 86850 RBC ANTIBODY SCREEN: CPT | Performed by: HOSPITALIST

## 2022-01-01 PROCEDURE — 85610 PROTHROMBIN TIME: CPT | Performed by: RADIOLOGY

## 2022-01-01 PROCEDURE — 4010F ACE/ARB THERAPY RXD/TAKEN: CPT | Mod: CPTII,S$GLB,, | Performed by: FAMILY MEDICINE

## 2022-01-01 PROCEDURE — 88365 PR  TISSUE HYBRIDIZATION: ICD-10-PCS | Mod: 26,,, | Performed by: PATHOLOGY

## 2022-01-01 PROCEDURE — 84443 ASSAY THYROID STIM HORMONE: CPT | Performed by: STUDENT IN AN ORGANIZED HEALTH CARE EDUCATION/TRAINING PROGRAM

## 2022-01-01 PROCEDURE — 87046 STOOL CULTR AEROBIC BACT EA: CPT | Mod: 59 | Performed by: INTERNAL MEDICINE

## 2022-01-01 PROCEDURE — 83935 ASSAY OF URINE OSMOLALITY: CPT | Performed by: INTERNAL MEDICINE

## 2022-01-01 PROCEDURE — 63600175 PHARM REV CODE 636 W HCPCS: Performed by: GENERAL ACUTE CARE HOSPITAL

## 2022-01-01 PROCEDURE — 83615 LACTATE (LD) (LDH) ENZYME: CPT | Performed by: INTERNAL MEDICINE

## 2022-01-01 PROCEDURE — P9047 ALBUMIN (HUMAN), 25%, 50ML: HCPCS | Mod: JG

## 2022-01-01 PROCEDURE — 99291 PR CRITICAL CARE, E/M 30-74 MINUTES: ICD-10-PCS | Mod: ,,, | Performed by: HOSPITALIST

## 2022-01-01 PROCEDURE — 75889 VEIN X-RAY LIVER W/HEMODYNAM: CPT | Mod: TC,59 | Performed by: RADIOLOGY

## 2022-01-01 PROCEDURE — 88342 IMHCHEM/IMCYTCHM 1ST ANTB: CPT | Mod: 26,,, | Performed by: PATHOLOGY

## 2022-01-01 PROCEDURE — C1729 CATH, DRAINAGE: HCPCS

## 2022-01-01 PROCEDURE — 3051F PR MOST RECENT HEMOGLOBIN A1C LEVEL 7.0 - < 8.0%: ICD-10-PCS | Mod: CPTII,S$GLB,, | Performed by: FAMILY MEDICINE

## 2022-01-01 PROCEDURE — 87040 BLOOD CULTURE FOR BACTERIA: CPT | Performed by: INTERNAL MEDICINE

## 2022-01-01 PROCEDURE — 37200 IR TRANSJUGULAR LIVER BIOPSY: ICD-10-PCS | Mod: ,,, | Performed by: RADIOLOGY

## 2022-01-01 PROCEDURE — 71046 X-RAY EXAM CHEST 2 VIEWS: CPT | Mod: 26,,, | Performed by: RADIOLOGY

## 2022-01-01 PROCEDURE — 82150 ASSAY OF AMYLASE: CPT

## 2022-01-01 PROCEDURE — 87081 CULTURE SCREEN ONLY: CPT | Performed by: STUDENT IN AN ORGANIZED HEALTH CARE EDUCATION/TRAINING PROGRAM

## 2022-01-01 PROCEDURE — 85025 COMPLETE CBC W/AUTO DIFF WBC: CPT | Performed by: FAMILY MEDICINE

## 2022-01-01 PROCEDURE — 86480 TB TEST CELL IMMUN MEASURE: CPT | Performed by: INTERNAL MEDICINE

## 2022-01-01 PROCEDURE — 71046 X-RAY EXAM CHEST 2 VIEWS: CPT | Mod: TC,FY,PO

## 2022-01-01 PROCEDURE — 27000675 HC TUBING MICRODRIP: Performed by: ANESTHESIOLOGY

## 2022-01-01 PROCEDURE — 25000003 PHARM REV CODE 250: Performed by: EMERGENCY MEDICINE

## 2022-01-01 PROCEDURE — 45378 DIAGNOSTIC COLONOSCOPY: CPT | Performed by: INTERNAL MEDICINE

## 2022-01-01 PROCEDURE — 85730 THROMBOPLASTIN TIME PARTIAL: CPT

## 2022-01-01 PROCEDURE — 36430 TRANSFUSION BLD/BLD COMPNT: CPT

## 2022-01-01 PROCEDURE — 82570 ASSAY OF URINE CREATININE: CPT

## 2022-01-01 PROCEDURE — 87070 CULTURE OTHR SPECIMN AEROBIC: CPT | Performed by: HOSPITALIST

## 2022-01-01 PROCEDURE — 81003 URINALYSIS AUTO W/O SCOPE: CPT | Performed by: PHYSICIAN ASSISTANT

## 2022-01-01 PROCEDURE — P9047 ALBUMIN (HUMAN), 25%, 50ML: HCPCS | Mod: JG | Performed by: RADIOLOGY

## 2022-01-01 PROCEDURE — 80061 LIPID PANEL: CPT | Performed by: FAMILY MEDICINE

## 2022-01-01 PROCEDURE — 87040 BLOOD CULTURE FOR BACTERIA: CPT | Mod: 59 | Performed by: STUDENT IN AN ORGANIZED HEALTH CARE EDUCATION/TRAINING PROGRAM

## 2022-01-01 RX ORDER — OXYCODONE HYDROCHLORIDE 5 MG/1
5 TABLET ORAL EVERY 8 HOURS PRN
Status: DISCONTINUED | OUTPATIENT
Start: 2022-01-01 | End: 2022-01-01 | Stop reason: HOSPADM

## 2022-01-01 RX ORDER — CLINDAMYCIN PHOSPHATE 900 MG/50ML
900 INJECTION, SOLUTION INTRAVENOUS
Status: DISCONTINUED | OUTPATIENT
Start: 2022-01-01 | End: 2022-01-01

## 2022-01-01 RX ORDER — HYDROCODONE POLISTIREX AND CHLORPHENIRAMINE POLISTIREX 10; 8 MG/5ML; MG/5ML
5 SUSPENSION, EXTENDED RELEASE ORAL EVERY 6 HOURS PRN
Status: DISCONTINUED | OUTPATIENT
Start: 2022-01-01 | End: 2022-01-01

## 2022-01-01 RX ORDER — CODEINE PHOSPHATE AND GUAIFENESIN 10; 100 MG/5ML; MG/5ML
10 SOLUTION ORAL EVERY 4 HOURS PRN
Status: DISCONTINUED | OUTPATIENT
Start: 2022-01-01 | End: 2022-01-01 | Stop reason: HOSPADM

## 2022-01-01 RX ORDER — SODIUM CHLORIDE 9 MG/ML
INJECTION, SOLUTION INTRAVENOUS ONCE
Status: CANCELLED | OUTPATIENT
Start: 2022-01-01 | End: 2022-01-01

## 2022-01-01 RX ORDER — SODIUM CHLORIDE 9 MG/ML
INJECTION, SOLUTION INTRAVENOUS CONTINUOUS
Status: DISCONTINUED | OUTPATIENT
Start: 2022-01-01 | End: 2022-01-01

## 2022-01-01 RX ORDER — MORPHINE SULFATE 2 MG/ML
2 INJECTION, SOLUTION INTRAMUSCULAR; INTRAVENOUS EVERY 4 HOURS PRN
Status: DISCONTINUED | OUTPATIENT
Start: 2022-01-01 | End: 2022-01-01 | Stop reason: HOSPADM

## 2022-01-01 RX ORDER — MIDODRINE HYDROCHLORIDE 5 MG/1
5 TABLET ORAL DAILY PRN
Status: DISCONTINUED | OUTPATIENT
Start: 2022-01-01 | End: 2022-01-01

## 2022-01-01 RX ORDER — SODIUM BICARBONATE 1 MEQ/ML
100 SYRINGE (ML) INTRAVENOUS ONCE
Status: COMPLETED | OUTPATIENT
Start: 2022-01-01 | End: 2022-01-01

## 2022-01-01 RX ORDER — FUROSEMIDE 20 MG/1
20 TABLET ORAL DAILY
Status: DISCONTINUED | OUTPATIENT
Start: 2022-01-01 | End: 2022-01-01

## 2022-01-01 RX ORDER — MIDAZOLAM HYDROCHLORIDE 5 MG/ML
INJECTION INTRAMUSCULAR; INTRAVENOUS CODE/TRAUMA/SEDATION MEDICATION
Status: COMPLETED | OUTPATIENT
Start: 2022-01-01 | End: 2022-01-01

## 2022-01-01 RX ORDER — METOPROLOL TARTRATE 25 MG/1
25 TABLET, FILM COATED ORAL 2 TIMES DAILY
Status: DISCONTINUED | OUTPATIENT
Start: 2022-01-01 | End: 2022-01-01 | Stop reason: HOSPADM

## 2022-01-01 RX ORDER — IPRATROPIUM BROMIDE AND ALBUTEROL SULFATE 2.5; .5 MG/3ML; MG/3ML
3 SOLUTION RESPIRATORY (INHALATION) EVERY 6 HOURS PRN
Status: DISCONTINUED | OUTPATIENT
Start: 2022-01-01 | End: 2022-01-01 | Stop reason: HOSPADM

## 2022-01-01 RX ORDER — LIDOCAINE HYDROCHLORIDE 10 MG/ML
1 INJECTION INFILTRATION; PERINEURAL ONCE
Status: COMPLETED | OUTPATIENT
Start: 2022-01-01 | End: 2022-01-01

## 2022-01-01 RX ORDER — IBUPROFEN 200 MG
24 TABLET ORAL
Status: DISCONTINUED | OUTPATIENT
Start: 2022-01-01 | End: 2022-01-01

## 2022-01-01 RX ORDER — IBUPROFEN 200 MG
16 TABLET ORAL
Status: DISCONTINUED | OUTPATIENT
Start: 2022-01-01 | End: 2022-01-01 | Stop reason: HOSPADM

## 2022-01-01 RX ORDER — LOPERAMIDE HYDROCHLORIDE 2 MG/1
2 CAPSULE ORAL 4 TIMES DAILY PRN
Status: DISCONTINUED | OUTPATIENT
Start: 2022-01-01 | End: 2022-01-01 | Stop reason: HOSPADM

## 2022-01-01 RX ORDER — INSULIN ASPART 100 [IU]/ML
3-6 INJECTION, SOLUTION INTRAVENOUS; SUBCUTANEOUS
Status: DISCONTINUED | OUTPATIENT
Start: 2022-01-01 | End: 2022-01-01

## 2022-01-01 RX ORDER — LOPERAMIDE HYDROCHLORIDE 2 MG/1
2 CAPSULE ORAL 2 TIMES DAILY PRN
Status: DISCONTINUED | OUTPATIENT
Start: 2022-01-01 | End: 2022-01-01

## 2022-01-01 RX ORDER — TACROLIMUS 0.5 MG/1
1.5 CAPSULE ORAL 2 TIMES DAILY
Status: DISCONTINUED | OUTPATIENT
Start: 2022-01-01 | End: 2022-01-01 | Stop reason: HOSPADM

## 2022-01-01 RX ORDER — PRAVASTATIN SODIUM 10 MG/1
10 TABLET ORAL NIGHTLY
Status: DISCONTINUED | OUTPATIENT
Start: 2022-01-01 | End: 2022-01-01 | Stop reason: HOSPADM

## 2022-01-01 RX ORDER — SODIUM CHLORIDE 9 MG/ML
INJECTION, SOLUTION INTRAVENOUS CONTINUOUS
Status: ACTIVE | OUTPATIENT
Start: 2022-01-01 | End: 2022-01-01

## 2022-01-01 RX ORDER — INDOMETHACIN 25 MG/1
CAPSULE ORAL
Status: COMPLETED
Start: 2022-01-01 | End: 2022-01-01

## 2022-01-01 RX ORDER — ALBUMIN HUMAN 250 G/1000ML
12.5 SOLUTION INTRAVENOUS ONCE
Status: COMPLETED | OUTPATIENT
Start: 2022-01-01 | End: 2022-01-01

## 2022-01-01 RX ORDER — TALC
6 POWDER (GRAM) TOPICAL NIGHTLY PRN
Status: DISCONTINUED | OUTPATIENT
Start: 2022-01-01 | End: 2022-01-01 | Stop reason: HOSPADM

## 2022-01-01 RX ORDER — METRONIDAZOLE 500 MG/100ML
500 INJECTION, SOLUTION INTRAVENOUS
Status: DISCONTINUED | OUTPATIENT
Start: 2022-01-01 | End: 2022-01-01

## 2022-01-01 RX ORDER — GLUCAGON 1 MG
1 KIT INJECTION
Status: DISCONTINUED | OUTPATIENT
Start: 2022-01-01 | End: 2022-01-01 | Stop reason: HOSPADM

## 2022-01-01 RX ORDER — PRAZIQUANTEL 600 MG/1
20 TABLET, FILM COATED ORAL EVERY 6 HOURS
Status: COMPLETED | OUTPATIENT
Start: 2022-01-01 | End: 2022-01-01

## 2022-01-01 RX ORDER — ALBUMIN HUMAN 250 G/1000ML
SOLUTION INTRAVENOUS
Status: DISPENSED
Start: 2022-01-01 | End: 2022-01-01

## 2022-01-01 RX ORDER — TACROLIMUS 1 MG/1
2 CAPSULE ORAL 2 TIMES DAILY
Status: DISCONTINUED | OUTPATIENT
Start: 2022-01-01 | End: 2022-01-01 | Stop reason: HOSPADM

## 2022-01-01 RX ORDER — FENTANYL CITRATE 50 UG/ML
INJECTION, SOLUTION INTRAMUSCULAR; INTRAVENOUS CODE/TRAUMA/SEDATION MEDICATION
Status: COMPLETED | OUTPATIENT
Start: 2022-01-01 | End: 2022-01-01

## 2022-01-01 RX ORDER — POLYETHYLENE GLYCOL 3350, SODIUM SULFATE ANHYDROUS, SODIUM BICARBONATE, SODIUM CHLORIDE, POTASSIUM CHLORIDE 236; 22.74; 6.74; 5.86; 2.97 G/4L; G/4L; G/4L; G/4L; G/4L
4000 POWDER, FOR SOLUTION ORAL ONCE
Status: DISCONTINUED | OUTPATIENT
Start: 2022-01-01 | End: 2022-01-01

## 2022-01-01 RX ORDER — ALBUMIN HUMAN 250 G/1000ML
25 SOLUTION INTRAVENOUS 3 TIMES DAILY
Status: COMPLETED | OUTPATIENT
Start: 2022-01-01 | End: 2022-01-01

## 2022-01-01 RX ORDER — MAGNESIUM SULFATE HEPTAHYDRATE 40 MG/ML
2 INJECTION, SOLUTION INTRAVENOUS ONCE
Status: COMPLETED | OUTPATIENT
Start: 2022-01-01 | End: 2022-01-01

## 2022-01-01 RX ORDER — TACROLIMUS 0.5 MG/1
1.5 CAPSULE ORAL 2 TIMES DAILY
Status: DISCONTINUED | OUTPATIENT
Start: 2022-01-01 | End: 2022-01-01

## 2022-01-01 RX ORDER — FUROSEMIDE 20 MG/1
20 TABLET ORAL DAILY
Status: DISCONTINUED | OUTPATIENT
Start: 2022-01-01 | End: 2022-01-01 | Stop reason: HOSPADM

## 2022-01-01 RX ORDER — BENZONATATE 200 MG/1
200 CAPSULE ORAL 3 TIMES DAILY
Qty: 15 CAPSULE | Refills: 0 | Status: SHIPPED | OUTPATIENT
Start: 2022-01-01 | End: 2022-01-01

## 2022-01-01 RX ORDER — POTASSIUM CHLORIDE 20 MEQ/1
40 TABLET, EXTENDED RELEASE ORAL ONCE
Status: COMPLETED | OUTPATIENT
Start: 2022-01-01 | End: 2022-01-01

## 2022-01-01 RX ORDER — PRIMAQUINE PHOSPHATE 15 MG/1
52.6 TABLET ORAL DAILY
Status: DISCONTINUED | OUTPATIENT
Start: 2022-01-01 | End: 2022-01-01

## 2022-01-01 RX ORDER — ALBUTEROL SULFATE 90 UG/1
AEROSOL, METERED RESPIRATORY (INHALATION)
Qty: 18 G | Refills: 2 | Status: ON HOLD | OUTPATIENT
Start: 2022-01-01 | End: 2022-01-01

## 2022-01-01 RX ORDER — NALOXONE HCL 0.4 MG/ML
0.02 VIAL (ML) INJECTION
Status: DISCONTINUED | OUTPATIENT
Start: 2022-01-01 | End: 2022-01-01 | Stop reason: HOSPADM

## 2022-01-01 RX ORDER — ACETAMINOPHEN 325 MG/1
650 TABLET ORAL EVERY 4 HOURS PRN
Status: DISCONTINUED | OUTPATIENT
Start: 2022-01-01 | End: 2022-01-01 | Stop reason: HOSPADM

## 2022-01-01 RX ORDER — LEVOFLOXACIN 500 MG/1
500 TABLET, FILM COATED ORAL DAILY
Qty: 7 TABLET | Refills: 0 | Status: SHIPPED | OUTPATIENT
Start: 2022-01-01 | End: 2022-01-01

## 2022-01-01 RX ORDER — PSEUDOEPHEDRINE/ACETAMINOPHEN 30MG-500MG
100 TABLET ORAL
Status: DISCONTINUED | OUTPATIENT
Start: 2022-01-01 | End: 2022-01-01

## 2022-01-01 RX ORDER — BENZONATATE 100 MG/1
100 CAPSULE ORAL 3 TIMES DAILY PRN
Status: DISCONTINUED | OUTPATIENT
Start: 2022-01-01 | End: 2022-01-01 | Stop reason: HOSPADM

## 2022-01-01 RX ORDER — AMOXICILLIN 500 MG/1
500 CAPSULE ORAL EVERY 24 HOURS
Status: DISCONTINUED | OUTPATIENT
Start: 2022-01-01 | End: 2022-01-01

## 2022-01-01 RX ORDER — SIROLIMUS 1 MG/1
2 TABLET, FILM COATED ORAL DAILY
Status: DISCONTINUED | OUTPATIENT
Start: 2022-01-01 | End: 2022-01-01 | Stop reason: HOSPADM

## 2022-01-01 RX ORDER — ACETAMINOPHEN 325 MG/1
650 TABLET ORAL EVERY 8 HOURS PRN
Status: DISCONTINUED | OUTPATIENT
Start: 2022-01-01 | End: 2022-01-01 | Stop reason: HOSPADM

## 2022-01-01 RX ORDER — GUAIFENESIN 600 MG/1
600 TABLET, EXTENDED RELEASE ORAL 2 TIMES DAILY
Qty: 20 TABLET | Refills: 0 | Status: ON HOLD | OUTPATIENT
Start: 2022-01-01 | End: 2022-01-01

## 2022-01-01 RX ORDER — BENZONATATE 100 MG/1
100 CAPSULE ORAL 3 TIMES DAILY
Status: DISCONTINUED | OUTPATIENT
Start: 2022-01-01 | End: 2022-01-01

## 2022-01-01 RX ORDER — FENTANYL CITRATE 50 UG/ML
100 INJECTION, SOLUTION INTRAMUSCULAR; INTRAVENOUS ONCE
Status: CANCELLED | OUTPATIENT
Start: 2022-01-01 | End: 2022-01-01

## 2022-01-01 RX ORDER — MINOXIDIL 2.5 MG/1
2.5 TABLET ORAL 2 TIMES DAILY
Status: DISCONTINUED | OUTPATIENT
Start: 2022-01-01 | End: 2022-01-01

## 2022-01-01 RX ORDER — INDOMETHACIN 25 MG/1
50 CAPSULE ORAL ONCE
Status: COMPLETED | OUTPATIENT
Start: 2022-01-01 | End: 2022-01-01

## 2022-01-01 RX ORDER — BENZONATATE 100 MG/1
200 CAPSULE ORAL 3 TIMES DAILY
Status: DISCONTINUED | OUTPATIENT
Start: 2022-01-01 | End: 2022-01-01 | Stop reason: HOSPADM

## 2022-01-01 RX ORDER — LISINOPRIL 5 MG/1
5 TABLET ORAL NIGHTLY
Status: DISCONTINUED | OUTPATIENT
Start: 2022-01-01 | End: 2022-01-01 | Stop reason: HOSPADM

## 2022-01-01 RX ORDER — BISACODYL 10 MG
10 SUPPOSITORY, RECTAL RECTAL DAILY PRN
Status: DISCONTINUED | OUTPATIENT
Start: 2022-01-01 | End: 2022-01-01 | Stop reason: HOSPADM

## 2022-01-01 RX ORDER — MIDAZOLAM HYDROCHLORIDE 1 MG/ML
INJECTION INTRAMUSCULAR; INTRAVENOUS CODE/TRAUMA/SEDATION MEDICATION
Status: COMPLETED | OUTPATIENT
Start: 2022-01-01 | End: 2022-01-01

## 2022-01-01 RX ORDER — PROPOFOL 10 MG/ML
VIAL (ML) INTRAVENOUS
Status: DISCONTINUED | OUTPATIENT
Start: 2022-01-01 | End: 2022-01-01

## 2022-01-01 RX ORDER — ALBUTEROL SULFATE 90 UG/1
2 AEROSOL, METERED RESPIRATORY (INHALATION) EVERY 6 HOURS PRN
Qty: 18 G | Refills: 0 | Status: SHIPPED | OUTPATIENT
Start: 2022-01-01 | End: 2022-01-01 | Stop reason: ALTCHOICE

## 2022-01-01 RX ORDER — BENZONATATE 100 MG/1
200 CAPSULE ORAL EVERY 8 HOURS PRN
Status: DISCONTINUED | OUTPATIENT
Start: 2022-01-01 | End: 2022-01-01

## 2022-01-01 RX ORDER — LIDOCAINE HYDROCHLORIDE 10 MG/ML
INJECTION INFILTRATION; PERINEURAL CODE/TRAUMA/SEDATION MEDICATION
Status: COMPLETED | OUTPATIENT
Start: 2022-01-01 | End: 2022-01-01

## 2022-01-01 RX ORDER — LEVOFLOXACIN 5 MG/ML
750 INJECTION, SOLUTION INTRAVENOUS
Status: COMPLETED | OUTPATIENT
Start: 2022-01-01 | End: 2022-01-01

## 2022-01-01 RX ORDER — LIDOCAINE HYDROCHLORIDE 10 MG/ML
INJECTION INFILTRATION; PERINEURAL
Status: DISPENSED
Start: 2022-01-01 | End: 2022-01-01

## 2022-01-01 RX ORDER — TACROLIMUS 0.5 MG/1
1.5 CAPSULE ORAL EVERY EVENING
Status: DISCONTINUED | OUTPATIENT
Start: 2022-01-01 | End: 2022-01-01

## 2022-01-01 RX ORDER — LEVOTHYROXINE SODIUM 50 UG/1
50 TABLET ORAL
Status: DISCONTINUED | OUTPATIENT
Start: 2022-01-01 | End: 2022-01-01

## 2022-01-01 RX ORDER — GUAIFENESIN/DEXTROMETHORPHAN 100-10MG/5
5 SYRUP ORAL EVERY 6 HOURS PRN
Status: DISCONTINUED | OUTPATIENT
Start: 2022-01-01 | End: 2022-01-01 | Stop reason: HOSPADM

## 2022-01-01 RX ORDER — INSULIN ASPART 100 [IU]/ML
1-12 INJECTION, SOLUTION INTRAVENOUS; SUBCUTANEOUS
Status: DISCONTINUED | OUTPATIENT
Start: 2022-01-01 | End: 2022-01-01 | Stop reason: HOSPADM

## 2022-01-01 RX ORDER — METOPROLOL TARTRATE 50 MG/1
50 TABLET ORAL 2 TIMES DAILY
Status: DISCONTINUED | OUTPATIENT
Start: 2022-01-01 | End: 2022-01-01

## 2022-01-01 RX ORDER — POSACONAZOLE 100 MG/1
300 TABLET, DELAYED RELEASE ORAL DAILY
Qty: 90 TABLET | Refills: 3 | Status: SHIPPED | OUTPATIENT
Start: 2022-01-01 | End: 2022-01-01 | Stop reason: HOSPADM

## 2022-01-01 RX ORDER — CALCITRIOL 1 UG/ML
0.25 SOLUTION ORAL DAILY
Status: DISCONTINUED | OUTPATIENT
Start: 2022-01-01 | End: 2022-01-01

## 2022-01-01 RX ORDER — FUROSEMIDE 20 MG/1
20 TABLET ORAL DAILY
Qty: 30 TABLET | Refills: 11 | Status: ON HOLD | OUTPATIENT
Start: 2022-01-01 | End: 2022-01-01

## 2022-01-01 RX ORDER — CODEINE PHOSPHATE AND GUAIFENESIN 10; 100 MG/5ML; MG/5ML
5 SOLUTION ORAL 3 TIMES DAILY PRN
Qty: 118 ML | Refills: 0 | Status: SHIPPED | OUTPATIENT
Start: 2022-01-01 | End: 2022-01-01

## 2022-01-01 RX ORDER — ALBUMIN HUMAN 250 G/1000ML
100 SOLUTION INTRAVENOUS ONCE
Status: COMPLETED | OUTPATIENT
Start: 2022-01-01 | End: 2022-01-01

## 2022-01-01 RX ORDER — LEVOTHYROXINE SODIUM 25 UG/1
50 TABLET ORAL DAILY
Status: DISCONTINUED | OUTPATIENT
Start: 2022-01-01 | End: 2022-01-01 | Stop reason: HOSPADM

## 2022-01-01 RX ORDER — TACROLIMUS 1 MG/1
1.5 CAPSULE ORAL NIGHTLY
Status: ON HOLD | COMMUNITY
End: 2022-01-01 | Stop reason: HOSPADM

## 2022-01-01 RX ORDER — IBUPROFEN 200 MG
24 TABLET ORAL
Status: DISCONTINUED | OUTPATIENT
Start: 2022-01-01 | End: 2022-01-01 | Stop reason: HOSPADM

## 2022-01-01 RX ORDER — IPRATROPIUM BROMIDE AND ALBUTEROL SULFATE 2.5; .5 MG/3ML; MG/3ML
3 SOLUTION RESPIRATORY (INHALATION) 2 TIMES DAILY
Status: DISCONTINUED | OUTPATIENT
Start: 2022-01-01 | End: 2022-01-01

## 2022-01-01 RX ORDER — INSULIN ASPART 100 [IU]/ML
0-5 INJECTION, SOLUTION INTRAVENOUS; SUBCUTANEOUS
Status: DISCONTINUED | OUTPATIENT
Start: 2022-01-01 | End: 2022-01-01

## 2022-01-01 RX ORDER — TRAMADOL HYDROCHLORIDE 50 MG/1
50 TABLET ORAL EVERY 12 HOURS PRN
Status: CANCELLED | OUTPATIENT
Start: 2022-01-01

## 2022-01-01 RX ORDER — DOBUTAMINE HYDROCHLORIDE 400 MG/100ML
60 INJECTION INTRAVENOUS
Status: COMPLETED | OUTPATIENT
Start: 2022-01-01 | End: 2022-01-01

## 2022-01-01 RX ORDER — SODIUM CHLORIDE FOR INHALATION 7 %
4 VIAL, NEBULIZER (ML) INHALATION
Status: COMPLETED | OUTPATIENT
Start: 2022-01-01 | End: 2022-01-01

## 2022-01-01 RX ORDER — INSULIN GLARGINE 300 U/ML
20 INJECTION, SOLUTION SUBCUTANEOUS NIGHTLY
Status: ON HOLD
Start: 2022-01-01 | End: 2022-01-01

## 2022-01-01 RX ORDER — BENZONATATE 100 MG/1
200 CAPSULE ORAL 3 TIMES DAILY
Status: DISCONTINUED | OUTPATIENT
Start: 2022-01-01 | End: 2022-01-01

## 2022-01-01 RX ORDER — HYDROCODONE POLISTIREX AND CHLORPHENIRAMINE POLISTIREX 10; 8 MG/5ML; MG/5ML
5 SUSPENSION, EXTENDED RELEASE ORAL 2 TIMES DAILY
Status: DISCONTINUED | OUTPATIENT
Start: 2022-01-01 | End: 2022-01-01 | Stop reason: HOSPADM

## 2022-01-01 RX ORDER — FUROSEMIDE 10 MG/ML
20 INJECTION INTRAMUSCULAR; INTRAVENOUS ONCE
Status: COMPLETED | OUTPATIENT
Start: 2022-01-01 | End: 2022-01-01

## 2022-01-01 RX ORDER — INSULIN ASPART 100 [IU]/ML
8 INJECTION, SOLUTION INTRAVENOUS; SUBCUTANEOUS ONCE
Status: COMPLETED | OUTPATIENT
Start: 2022-01-01 | End: 2022-01-01

## 2022-01-01 RX ORDER — IBUPROFEN 200 MG
16 TABLET ORAL
Status: DISCONTINUED | OUTPATIENT
Start: 2022-01-01 | End: 2022-01-01

## 2022-01-01 RX ORDER — INSULIN ASPART 100 [IU]/ML
7 INJECTION, SOLUTION INTRAVENOUS; SUBCUTANEOUS ONCE
Status: COMPLETED | OUTPATIENT
Start: 2022-01-01 | End: 2022-01-01

## 2022-01-01 RX ORDER — GUAIFENESIN/DEXTROMETHORPHAN 100-10MG/5
5 SYRUP ORAL EVERY 6 HOURS PRN
Status: DISCONTINUED | OUTPATIENT
Start: 2022-01-01 | End: 2022-01-01

## 2022-01-01 RX ORDER — LIDOCAINE HYDROCHLORIDE 10 MG/ML
1 INJECTION, SOLUTION EPIDURAL; INFILTRATION; INTRACAUDAL; PERINEURAL ONCE
Status: DISCONTINUED | OUTPATIENT
Start: 2022-01-01 | End: 2022-01-01 | Stop reason: HOSPADM

## 2022-01-01 RX ORDER — LIDOCAINE 50 MG/G
2 PATCH TOPICAL
Status: DISCONTINUED | OUTPATIENT
Start: 2022-01-01 | End: 2022-01-01 | Stop reason: HOSPADM

## 2022-01-01 RX ORDER — CEFEPIME HYDROCHLORIDE 1 G/50ML
2 INJECTION, SOLUTION INTRAVENOUS
Status: COMPLETED | OUTPATIENT
Start: 2022-01-01 | End: 2022-01-01

## 2022-01-01 RX ORDER — INSULIN ASPART 100 [IU]/ML
2-4 INJECTION, SOLUTION INTRAVENOUS; SUBCUTANEOUS
Status: DISCONTINUED | OUTPATIENT
Start: 2022-01-01 | End: 2022-01-01

## 2022-01-01 RX ORDER — SODIUM BICARBONATE 650 MG/1
1300 TABLET ORAL 2 TIMES DAILY
Status: DISCONTINUED | OUTPATIENT
Start: 2022-01-01 | End: 2022-01-01

## 2022-01-01 RX ORDER — PRAVASTATIN SODIUM 40 MG/1
40 TABLET ORAL NIGHTLY
Status: DISCONTINUED | OUTPATIENT
Start: 2022-01-01 | End: 2022-01-01 | Stop reason: HOSPADM

## 2022-01-01 RX ORDER — SODIUM CHLORIDE 9 MG/ML
INJECTION, SOLUTION INTRAVENOUS CONTINUOUS
Status: DISCONTINUED | OUTPATIENT
Start: 2022-01-01 | End: 2022-01-01 | Stop reason: HOSPADM

## 2022-01-01 RX ORDER — ALBUMIN HUMAN 250 G/1000ML
25 SOLUTION INTRAVENOUS ONCE
Status: COMPLETED | OUTPATIENT
Start: 2022-01-01 | End: 2022-01-01

## 2022-01-01 RX ORDER — METOPROLOL TARTRATE 50 MG/1
50 TABLET ORAL 2 TIMES DAILY
Status: DISCONTINUED | OUTPATIENT
Start: 2022-01-01 | End: 2022-01-01 | Stop reason: HOSPADM

## 2022-01-01 RX ORDER — LACTULOSE 10 G/15ML
15 SOLUTION ORAL 3 TIMES DAILY
Status: DISCONTINUED | OUTPATIENT
Start: 2022-01-01 | End: 2022-01-01

## 2022-01-01 RX ORDER — ACETAMINOPHEN 500 MG
500 TABLET ORAL EVERY 6 HOURS PRN
Status: DISCONTINUED | OUTPATIENT
Start: 2022-01-01 | End: 2022-01-01 | Stop reason: HOSPADM

## 2022-01-01 RX ORDER — ONDANSETRON 2 MG/ML
4 INJECTION INTRAMUSCULAR; INTRAVENOUS EVERY 8 HOURS PRN
Status: DISCONTINUED | OUTPATIENT
Start: 2022-01-01 | End: 2022-01-01 | Stop reason: HOSPADM

## 2022-01-01 RX ORDER — MAGNESIUM SULFATE HEPTAHYDRATE 40 MG/ML
2 INJECTION, SOLUTION INTRAVENOUS
Status: COMPLETED | OUTPATIENT
Start: 2022-01-01 | End: 2022-01-01

## 2022-01-01 RX ORDER — AMOXICILLIN 500 MG/1
500 CAPSULE ORAL EVERY 12 HOURS
Status: DISCONTINUED | OUTPATIENT
Start: 2022-01-01 | End: 2022-01-01

## 2022-01-01 RX ORDER — MAGNESIUM SULFATE 1 G/100ML
1 INJECTION INTRAVENOUS ONCE
Status: COMPLETED | OUTPATIENT
Start: 2022-01-01 | End: 2022-01-01

## 2022-01-01 RX ORDER — LORAZEPAM 2 MG/ML
0.25 INJECTION INTRAMUSCULAR EVERY 5 MIN PRN
Status: DISCONTINUED | OUTPATIENT
Start: 2022-01-01 | End: 2022-01-01

## 2022-01-01 RX ORDER — SODIUM CHLORIDE 9 MG/ML
INJECTION, SOLUTION INTRAVENOUS
Status: DISCONTINUED | OUTPATIENT
Start: 2022-01-01 | End: 2022-01-01 | Stop reason: HOSPADM

## 2022-01-01 RX ORDER — SODIUM CHLORIDE 0.9 % (FLUSH) 0.9 %
10 SYRINGE (ML) INJECTION
Status: DISCONTINUED | OUTPATIENT
Start: 2022-01-01 | End: 2022-01-01 | Stop reason: HOSPADM

## 2022-01-01 RX ORDER — INSULIN ASPART 100 [IU]/ML
0-5 INJECTION, SOLUTION INTRAVENOUS; SUBCUTANEOUS
Status: DISCONTINUED | OUTPATIENT
Start: 2022-01-01 | End: 2022-01-01 | Stop reason: HOSPADM

## 2022-01-01 RX ORDER — GLUCAGON 1 MG
1 KIT INJECTION
Status: DISCONTINUED | OUTPATIENT
Start: 2022-01-01 | End: 2022-01-01

## 2022-01-01 RX ORDER — FLUTICASONE PROPIONATE 50 MCG
2 SPRAY, SUSPENSION (ML) NASAL DAILY
Qty: 16 G | Refills: 1 | Status: ON HOLD | OUTPATIENT
Start: 2022-01-01 | End: 2022-01-01

## 2022-01-01 RX ORDER — ONDANSETRON 2 MG/ML
4 INJECTION INTRAMUSCULAR; INTRAVENOUS EVERY 6 HOURS PRN
Status: DISCONTINUED | OUTPATIENT
Start: 2022-01-01 | End: 2022-01-01 | Stop reason: HOSPADM

## 2022-01-01 RX ORDER — MINOXIDIL 2.5 MG/1
2.5 TABLET ORAL 2 TIMES DAILY
Status: DISCONTINUED | OUTPATIENT
Start: 2022-01-01 | End: 2022-01-01 | Stop reason: HOSPADM

## 2022-01-01 RX ORDER — PRAVASTATIN SODIUM 40 MG/1
40 TABLET ORAL DAILY
Status: DISCONTINUED | OUTPATIENT
Start: 2022-01-01 | End: 2022-01-01 | Stop reason: HOSPADM

## 2022-01-01 RX ORDER — VANCOMYCIN HCL IN 5 % DEXTROSE 1G/250ML
1000 PLASTIC BAG, INJECTION (ML) INTRAVENOUS ONCE
Status: COMPLETED | OUTPATIENT
Start: 2022-01-01 | End: 2022-01-01

## 2022-01-01 RX ORDER — SODIUM BICARBONATE 650 MG/1
1300 TABLET ORAL 3 TIMES DAILY
Status: DISCONTINUED | OUTPATIENT
Start: 2022-01-01 | End: 2022-01-01

## 2022-01-01 RX ORDER — ONDANSETRON 2 MG/ML
4 INJECTION INTRAMUSCULAR; INTRAVENOUS EVERY 6 HOURS PRN
Status: DISCONTINUED | OUTPATIENT
Start: 2022-01-01 | End: 2022-01-01

## 2022-01-01 RX ORDER — MORPHINE SULFATE 2 MG/ML
2 INJECTION, SOLUTION INTRAMUSCULAR; INTRAVENOUS ONCE
Status: COMPLETED | OUTPATIENT
Start: 2022-01-01 | End: 2022-01-01

## 2022-01-01 RX ORDER — TACROLIMUS 1 MG/1
2 CAPSULE ORAL EVERY MORNING
Status: DISCONTINUED | OUTPATIENT
Start: 2022-01-01 | End: 2022-01-01

## 2022-01-01 RX ORDER — ALBUTEROL SULFATE 90 UG/1
4 AEROSOL, METERED RESPIRATORY (INHALATION)
Status: COMPLETED | OUTPATIENT
Start: 2022-01-01 | End: 2022-01-01

## 2022-01-01 RX ORDER — SYRING-NEEDL,DISP,INSUL,0.3 ML 29 G X1/2"
296 SYRINGE, EMPTY DISPOSABLE MISCELLANEOUS
Status: DISPENSED | OUTPATIENT
Start: 2022-01-01 | End: 2022-01-01

## 2022-01-01 RX ORDER — POLYETHYLENE GLYCOL 3350 17 G/17G
17 POWDER, FOR SOLUTION ORAL 2 TIMES DAILY PRN
Status: DISCONTINUED | OUTPATIENT
Start: 2022-01-01 | End: 2022-01-01 | Stop reason: HOSPADM

## 2022-01-01 RX ORDER — SODIUM CHLORIDE 9 MG/ML
INJECTION, SOLUTION INTRAVENOUS
Status: CANCELLED | OUTPATIENT
Start: 2022-01-01

## 2022-01-01 RX ORDER — INDOMETHACIN 25 MG/1
50 CAPSULE ORAL ONCE
Status: DISCONTINUED | OUTPATIENT
Start: 2022-01-01 | End: 2022-01-01 | Stop reason: HOSPADM

## 2022-01-01 RX ORDER — INSULIN ASPART 100 [IU]/ML
1-10 INJECTION, SOLUTION INTRAVENOUS; SUBCUTANEOUS
Status: DISCONTINUED | OUTPATIENT
Start: 2022-01-01 | End: 2022-01-01

## 2022-01-01 RX ORDER — ACETAMINOPHEN 325 MG/1
650 TABLET ORAL EVERY 6 HOURS PRN
Status: DISCONTINUED | OUTPATIENT
Start: 2022-01-01 | End: 2022-01-01

## 2022-01-01 RX ORDER — SODIUM CHLORIDE 9 MG/ML
1000 INJECTION, SOLUTION INTRAVENOUS
Status: COMPLETED | OUTPATIENT
Start: 2022-01-01 | End: 2022-01-01

## 2022-01-01 RX ORDER — POSACONAZOLE 100 MG/1
300 TABLET, DELAYED RELEASE ORAL 2 TIMES DAILY
Status: COMPLETED | OUTPATIENT
Start: 2022-01-01 | End: 2022-01-01

## 2022-01-01 RX ORDER — METOPROLOL TARTRATE 25 MG/1
12.5 TABLET ORAL 2 TIMES DAILY
Status: DISCONTINUED | OUTPATIENT
Start: 2022-01-01 | End: 2022-01-01

## 2022-01-01 RX ORDER — MIDODRINE HYDROCHLORIDE 5 MG/1
10 TABLET ORAL DAILY PRN
Status: DISCONTINUED | OUTPATIENT
Start: 2022-01-01 | End: 2022-01-01

## 2022-01-01 RX ORDER — ALBUTEROL SULFATE 90 UG/1
1 AEROSOL, METERED RESPIRATORY (INHALATION) EVERY 6 HOURS PRN
Status: DISCONTINUED | OUTPATIENT
Start: 2022-01-01 | End: 2022-01-01

## 2022-01-01 RX ORDER — MORPHINE SULFATE 2 MG/ML
2 INJECTION, SOLUTION INTRAMUSCULAR; INTRAVENOUS
Status: DISCONTINUED | OUTPATIENT
Start: 2022-01-01 | End: 2022-01-01

## 2022-01-01 RX ORDER — LIDOCAINE HYDROCHLORIDE 20 MG/ML
10 SOLUTION OROPHARYNGEAL EVERY 6 HOURS
Status: DISCONTINUED | OUTPATIENT
Start: 2022-01-01 | End: 2022-01-01

## 2022-01-01 RX ORDER — PHENYLEPHRINE HYDROCHLORIDE 10 MG/ML
INJECTION INTRAVENOUS
Status: DISCONTINUED | OUTPATIENT
Start: 2022-01-01 | End: 2022-01-01

## 2022-01-01 RX ORDER — MIDAZOLAM HYDROCHLORIDE 1 MG/ML
2 INJECTION INTRAMUSCULAR; INTRAVENOUS ONCE
Status: CANCELLED | OUTPATIENT
Start: 2022-01-01 | End: 2022-01-01

## 2022-01-01 RX ORDER — INSULIN ASPART 100 [IU]/ML
10 INJECTION, SOLUTION INTRAVENOUS; SUBCUTANEOUS ONCE
Status: COMPLETED | OUTPATIENT
Start: 2022-01-01 | End: 2022-01-01

## 2022-01-01 RX ORDER — GUAIFENESIN/DEXTROMETHORPHAN 100-10MG/5
5 SYRUP ORAL EVERY 6 HOURS PRN
Qty: 473 ML | Refills: 0 | Status: ON HOLD | OUTPATIENT
Start: 2022-01-01 | End: 2022-01-01

## 2022-01-01 RX ORDER — BISACODYL 10 MG
10 SUPPOSITORY, RECTAL RECTAL DAILY PRN
Status: DISCONTINUED | OUTPATIENT
Start: 2022-01-01 | End: 2022-01-01

## 2022-01-01 RX ORDER — POLYETHYLENE GLYCOL 3350 17 G/17G
17 POWDER, FOR SOLUTION ORAL DAILY PRN
Status: DISCONTINUED | OUTPATIENT
Start: 2022-01-01 | End: 2022-01-01 | Stop reason: HOSPADM

## 2022-01-01 RX ORDER — CEFEPIME HYDROCHLORIDE 1 G/50ML
1 INJECTION, SOLUTION INTRAVENOUS
Status: DISCONTINUED | OUTPATIENT
Start: 2022-01-01 | End: 2022-01-01

## 2022-01-01 RX ORDER — SODIUM CHLORIDE FOR INHALATION 7 %
4 VIAL, NEBULIZER (ML) INHALATION EVERY 8 HOURS
Status: DISCONTINUED | OUTPATIENT
Start: 2022-01-01 | End: 2022-01-01

## 2022-01-01 RX ORDER — PREDNISONE 20 MG/1
TABLET ORAL
Qty: 15 TABLET | Refills: 0 | Status: SHIPPED | OUTPATIENT
Start: 2022-01-01 | End: 2022-01-01

## 2022-01-01 RX ORDER — SODIUM BICARBONATE 650 MG/1
650 TABLET ORAL 3 TIMES DAILY
Status: DISCONTINUED | OUTPATIENT
Start: 2022-01-01 | End: 2022-01-01

## 2022-01-01 RX ORDER — ALBUMIN HUMAN 250 G/1000ML
SOLUTION INTRAVENOUS
Status: COMPLETED | OUTPATIENT
Start: 2022-01-01 | End: 2022-01-01

## 2022-01-01 RX ORDER — ALLOPURINOL 100 MG/1
100 TABLET ORAL DAILY
Status: DISCONTINUED | OUTPATIENT
Start: 2022-01-01 | End: 2022-01-01

## 2022-01-01 RX ORDER — CALCITRIOL 0.25 UG/1
0.25 CAPSULE ORAL DAILY
Status: DISCONTINUED | OUTPATIENT
Start: 2022-01-01 | End: 2022-01-01

## 2022-01-01 RX ORDER — POSACONAZOLE 100 MG/1
300 TABLET, DELAYED RELEASE ORAL DAILY
Status: DISCONTINUED | OUTPATIENT
Start: 2022-01-01 | End: 2022-01-01

## 2022-01-01 RX ORDER — HYDROCODONE POLISTIREX AND CHLORPHENIRAMINE POLISTIREX 10; 8 MG/5ML; MG/5ML
5 SUSPENSION, EXTENDED RELEASE ORAL EVERY 12 HOURS PRN
Qty: 115 ML | Refills: 0 | Status: ON HOLD | OUTPATIENT
Start: 2022-01-01 | End: 2022-01-01

## 2022-01-01 RX ORDER — PREDNISONE 20 MG/1
20 TABLET ORAL DAILY
Status: DISCONTINUED | OUTPATIENT
Start: 2022-01-01 | End: 2022-01-01

## 2022-01-01 RX ORDER — LIDOCAINE 50 MG/G
1 PATCH TOPICAL
Status: DISCONTINUED | OUTPATIENT
Start: 2022-01-01 | End: 2022-01-01

## 2022-01-01 RX ORDER — BUDESONIDE 0.5 MG/2ML
0.5 INHALANT ORAL EVERY 12 HOURS
Status: DISCONTINUED | OUTPATIENT
Start: 2022-01-01 | End: 2022-01-01 | Stop reason: HOSPADM

## 2022-01-01 RX ORDER — MORPHINE SULFATE 2 MG/ML
INJECTION, SOLUTION INTRAMUSCULAR; INTRAVENOUS
Status: COMPLETED
Start: 2022-01-01 | End: 2022-01-01

## 2022-01-01 RX ORDER — TACROLIMUS 1 MG/1
2 CAPSULE ORAL 2 TIMES DAILY
Status: DISCONTINUED | OUTPATIENT
Start: 2022-01-01 | End: 2022-01-01

## 2022-01-01 RX ORDER — LEVOTHYROXINE SODIUM 25 UG/1
50 TABLET ORAL
Status: DISCONTINUED | OUTPATIENT
Start: 2022-01-01 | End: 2022-01-01 | Stop reason: HOSPADM

## 2022-01-01 RX ORDER — PSEUDOEPHEDRINE/ACETAMINOPHEN 30MG-500MG
100 TABLET ORAL
Status: DISPENSED | OUTPATIENT
Start: 2022-01-01 | End: 2022-01-01

## 2022-01-01 RX ORDER — PRIMAQUINE PHOSPHATE 15 MG/1
52.6 TABLET ORAL DAILY
Qty: 60 TABLET | Refills: 0 | Status: SHIPPED | OUTPATIENT
Start: 2022-01-01 | End: 2022-01-01 | Stop reason: HOSPADM

## 2022-01-01 RX ORDER — SYRING-NEEDL,DISP,INSUL,0.3 ML 29 G X1/2"
296 SYRINGE, EMPTY DISPOSABLE MISCELLANEOUS
Status: DISCONTINUED | OUTPATIENT
Start: 2022-01-01 | End: 2022-01-01

## 2022-01-01 RX ORDER — OXYCODONE HYDROCHLORIDE 5 MG/1
5 TABLET ORAL ONCE
Status: COMPLETED | OUTPATIENT
Start: 2022-01-01 | End: 2022-01-01

## 2022-01-01 RX ORDER — LIDOCAINE HYDROCHLORIDE 20 MG/ML
INJECTION, SOLUTION INFILTRATION; PERINEURAL CODE/TRAUMA/SEDATION MEDICATION
Status: COMPLETED | OUTPATIENT
Start: 2022-01-01 | End: 2022-01-01

## 2022-01-01 RX ORDER — PROMETHAZINE HYDROCHLORIDE AND DEXTROMETHORPHAN HYDROBROMIDE 6.25; 15 MG/5ML; MG/5ML
5 SYRUP ORAL NIGHTLY PRN
Qty: 180 ML | Refills: 0 | Status: SHIPPED | OUTPATIENT
Start: 2022-01-01 | End: 2022-01-01

## 2022-01-01 RX ORDER — POLYETHYLENE GLYCOL 3350, SODIUM SULFATE ANHYDROUS, SODIUM BICARBONATE, SODIUM CHLORIDE, POTASSIUM CHLORIDE 236; 22.74; 6.74; 5.86; 2.97 G/4L; G/4L; G/4L; G/4L; G/4L
4000 POWDER, FOR SOLUTION ORAL ONCE
Status: COMPLETED | OUTPATIENT
Start: 2022-01-01 | End: 2022-01-01

## 2022-01-01 RX ORDER — SPIRONOLACTONE 50 MG/1
TABLET, FILM COATED ORAL
COMMUNITY
Start: 2022-01-01 | End: 2022-01-01

## 2022-01-01 RX ORDER — MORPHINE SULFATE 1 MG/ML
0-10 INJECTION, SOLUTION INTRAVENOUS CONTINUOUS
Status: DISCONTINUED | OUTPATIENT
Start: 2022-01-01 | End: 2022-01-01

## 2022-01-01 RX ORDER — IPRATROPIUM BROMIDE AND ALBUTEROL SULFATE 2.5; .5 MG/3ML; MG/3ML
3 SOLUTION RESPIRATORY (INHALATION)
Status: DISCONTINUED | OUTPATIENT
Start: 2022-01-01 | End: 2022-01-01 | Stop reason: HOSPADM

## 2022-01-01 RX ORDER — ACETAMINOPHEN 325 MG/1
650 TABLET ORAL ONCE
Status: COMPLETED | OUTPATIENT
Start: 2022-01-01 | End: 2022-01-01

## 2022-01-01 RX ORDER — CODEINE PHOSPHATE AND GUAIFENESIN 10; 100 MG/5ML; MG/5ML
5 SOLUTION ORAL EVERY 6 HOURS PRN
Status: DISCONTINUED | OUTPATIENT
Start: 2022-01-01 | End: 2022-01-01

## 2022-01-01 RX ORDER — GUAIFENESIN 600 MG/1
600 TABLET, EXTENDED RELEASE ORAL 2 TIMES DAILY
Status: DISCONTINUED | OUTPATIENT
Start: 2022-01-01 | End: 2022-01-01 | Stop reason: HOSPADM

## 2022-01-01 RX ORDER — SODIUM CHLORIDE 0.9 % (FLUSH) 0.9 %
10 SYRINGE (ML) INJECTION EVERY 12 HOURS PRN
Status: DISCONTINUED | OUTPATIENT
Start: 2022-01-01 | End: 2022-01-01 | Stop reason: HOSPADM

## 2022-01-01 RX ORDER — INSULIN ASPART 100 [IU]/ML
1-10 INJECTION, SOLUTION INTRAVENOUS; SUBCUTANEOUS EVERY 6 HOURS PRN
Status: DISCONTINUED | OUTPATIENT
Start: 2022-01-01 | End: 2022-01-01 | Stop reason: HOSPADM

## 2022-01-01 RX ORDER — LACTULOSE 10 G/15ML
20 SOLUTION ORAL 2 TIMES DAILY
Qty: 300 ML | Refills: 0 | Status: SHIPPED | OUTPATIENT
Start: 2022-01-01 | End: 2022-01-01

## 2022-01-01 RX ORDER — AZITHROMYCIN 250 MG/1
TABLET, FILM COATED ORAL
Status: ON HOLD | COMMUNITY
Start: 2022-01-01 | End: 2022-01-01 | Stop reason: HOSPADM

## 2022-01-01 RX ORDER — LOPERAMIDE HYDROCHLORIDE 2 MG/1
2 CAPSULE ORAL 4 TIMES DAILY PRN
Qty: 40 CAPSULE | Refills: 0 | Status: ON HOLD | OUTPATIENT
Start: 2022-01-01 | End: 2022-01-01

## 2022-01-01 RX ORDER — TACROLIMUS 0.5 MG/1
1.5 CAPSULE ORAL EVERY 12 HOURS
Qty: 180 CAPSULE | Refills: 11 | Status: ON HOLD
Start: 2022-01-01 | End: 2022-01-01

## 2022-01-01 RX ORDER — LOPERAMIDE HYDROCHLORIDE 2 MG/1
2 CAPSULE ORAL 3 TIMES DAILY
Status: DISCONTINUED | OUTPATIENT
Start: 2022-01-01 | End: 2022-01-01

## 2022-01-01 RX ORDER — LORAZEPAM 2 MG/ML
0.5 INJECTION INTRAMUSCULAR ONCE
Status: DISCONTINUED | OUTPATIENT
Start: 2022-01-01 | End: 2022-01-01

## 2022-01-01 RX ORDER — ALLOPURINOL 100 MG/1
100 TABLET ORAL DAILY
Status: DISCONTINUED | OUTPATIENT
Start: 2022-01-01 | End: 2022-01-01 | Stop reason: HOSPADM

## 2022-01-01 RX ORDER — MUPIROCIN 20 MG/G
OINTMENT TOPICAL 2 TIMES DAILY
Status: COMPLETED | OUTPATIENT
Start: 2022-01-01 | End: 2022-01-01

## 2022-01-01 RX ORDER — PREDNISONE 20 MG/1
40 TABLET ORAL 2 TIMES DAILY
Status: COMPLETED | OUTPATIENT
Start: 2022-01-01 | End: 2022-01-01

## 2022-01-01 RX ORDER — FLUTICASONE PROPIONATE 50 MCG
2 SPRAY, SUSPENSION (ML) NASAL DAILY PRN
Status: DISCONTINUED | OUTPATIENT
Start: 2022-01-01 | End: 2022-01-01 | Stop reason: HOSPADM

## 2022-01-01 RX ORDER — TAMSULOSIN HYDROCHLORIDE 0.4 MG/1
0.4 CAPSULE ORAL DAILY
Status: DISCONTINUED | OUTPATIENT
Start: 2022-01-01 | End: 2022-01-01 | Stop reason: HOSPADM

## 2022-01-01 RX ORDER — PRAZIQUANTEL 600 MG/1
20 TABLET, FILM COATED ORAL EVERY 6 HOURS
Status: DISCONTINUED | OUTPATIENT
Start: 2022-01-01 | End: 2022-01-01

## 2022-01-01 RX ORDER — IPRATROPIUM BROMIDE AND ALBUTEROL SULFATE 2.5; .5 MG/3ML; MG/3ML
3 SOLUTION RESPIRATORY (INHALATION) EVERY 4 HOURS PRN
Status: DISCONTINUED | OUTPATIENT
Start: 2022-01-01 | End: 2022-01-01 | Stop reason: HOSPADM

## 2022-01-01 RX ORDER — IPRATROPIUM BROMIDE AND ALBUTEROL SULFATE 2.5; .5 MG/3ML; MG/3ML
3 SOLUTION RESPIRATORY (INHALATION) EVERY 6 HOURS
Status: DISCONTINUED | OUTPATIENT
Start: 2022-01-01 | End: 2022-01-01

## 2022-01-01 RX ORDER — LEVOTHYROXINE SODIUM 50 UG/1
50 TABLET ORAL DAILY
Status: DISCONTINUED | OUTPATIENT
Start: 2022-01-01 | End: 2022-01-01

## 2022-01-01 RX ORDER — BENZONATATE 200 MG/1
200 CAPSULE ORAL 3 TIMES DAILY PRN
Qty: 90 CAPSULE | Refills: 1 | Status: SHIPPED | OUTPATIENT
Start: 2022-01-01 | End: 2022-01-01 | Stop reason: SDUPTHER

## 2022-01-01 RX ORDER — FLUTICASONE PROPIONATE 50 MCG
2 SPRAY, SUSPENSION (ML) NASAL DAILY
Status: DISCONTINUED | OUTPATIENT
Start: 2022-01-01 | End: 2022-01-01 | Stop reason: HOSPADM

## 2022-01-01 RX ORDER — PRAVASTATIN SODIUM 40 MG/1
40 TABLET ORAL NIGHTLY
Status: DISCONTINUED | OUTPATIENT
Start: 2022-01-01 | End: 2022-01-01

## 2022-01-01 RX ORDER — MINOXIDIL 2.5 MG/1
2.5 TABLET ORAL 2 TIMES DAILY
COMMUNITY
Start: 2022-01-01 | End: 2022-01-01

## 2022-01-01 RX ORDER — ALBUTEROL SULFATE 90 UG/1
1 AEROSOL, METERED RESPIRATORY (INHALATION) EVERY 6 HOURS PRN
Status: DISCONTINUED | OUTPATIENT
Start: 2022-01-01 | End: 2022-01-01 | Stop reason: HOSPADM

## 2022-01-01 RX ORDER — HYDROCODONE BITARTRATE AND ACETAMINOPHEN 500; 5 MG/1; MG/1
TABLET ORAL
Status: DISCONTINUED | OUTPATIENT
Start: 2022-01-01 | End: 2022-01-01 | Stop reason: HOSPADM

## 2022-01-01 RX ORDER — OXYCODONE HYDROCHLORIDE 5 MG/1
10 TABLET ORAL EVERY 4 HOURS PRN
Status: DISCONTINUED | OUTPATIENT
Start: 2022-01-01 | End: 2022-01-01 | Stop reason: HOSPADM

## 2022-01-01 RX ORDER — MORPHINE SULFATE 2 MG/ML
2 INJECTION, SOLUTION INTRAMUSCULAR; INTRAVENOUS ONCE AS NEEDED
Status: COMPLETED | OUTPATIENT
Start: 2022-01-01 | End: 2022-01-01

## 2022-01-01 RX ORDER — NOREPINEPHRINE BITARTRATE/D5W 4MG/250ML
0-3 PLASTIC BAG, INJECTION (ML) INTRAVENOUS CONTINUOUS
Status: DISCONTINUED | OUTPATIENT
Start: 2022-01-01 | End: 2022-01-01

## 2022-01-01 RX ORDER — LACTULOSE 10 G/15ML
20 SOLUTION ORAL 3 TIMES DAILY
Status: DISCONTINUED | OUTPATIENT
Start: 2022-01-01 | End: 2022-01-01

## 2022-01-01 RX ORDER — PREDNISONE 20 MG/1
40 TABLET ORAL DAILY
Status: DISPENSED | OUTPATIENT
Start: 2022-01-01 | End: 2022-01-01

## 2022-01-01 RX ORDER — MORPHINE SULFATE 1 MG/ML
0-10 INJECTION, SOLUTION INTRAVENOUS CONTINUOUS
Status: DISCONTINUED | OUTPATIENT
Start: 2022-01-01 | End: 2022-01-01 | Stop reason: HOSPADM

## 2022-01-01 RX ORDER — FUROSEMIDE 20 MG/1
TABLET ORAL
COMMUNITY
Start: 2022-01-01 | End: 2022-01-01

## 2022-01-01 RX ORDER — ATROPINE SULFATE 0.1 MG/ML
2 INJECTION INTRAVENOUS
Status: COMPLETED | OUTPATIENT
Start: 2022-01-01 | End: 2022-01-01

## 2022-01-01 RX ORDER — BENZONATATE 200 MG/1
200 CAPSULE ORAL 3 TIMES DAILY
Qty: 90 CAPSULE | Refills: 0 | Status: ON HOLD | OUTPATIENT
Start: 2022-01-01 | End: 2022-01-01

## 2022-01-01 RX ORDER — LEVOFLOXACIN 5 MG/ML
500 INJECTION, SOLUTION INTRAVENOUS
Status: DISCONTINUED | OUTPATIENT
Start: 2022-01-01 | End: 2022-01-01 | Stop reason: HOSPADM

## 2022-01-01 RX ORDER — GUAIFENESIN 600 MG/1
600 TABLET, EXTENDED RELEASE ORAL 2 TIMES DAILY
Status: DISCONTINUED | OUTPATIENT
Start: 2022-01-01 | End: 2022-01-01

## 2022-01-01 RX ORDER — METOPROLOL TARTRATE 50 MG/1
50 TABLET ORAL 2 TIMES DAILY
Status: ON HOLD | COMMUNITY
Start: 2022-01-01 | End: 2022-01-01

## 2022-01-01 RX ORDER — MORPHINE SULFATE 2 MG/ML
INJECTION, SOLUTION INTRAMUSCULAR; INTRAVENOUS
Status: DISPENSED
Start: 2022-01-01 | End: 2022-01-01

## 2022-01-01 RX ORDER — ALBUMIN HUMAN 250 G/1000ML
25 SOLUTION INTRAVENOUS EVERY 6 HOURS
Status: COMPLETED | OUTPATIENT
Start: 2022-01-01 | End: 2022-01-01

## 2022-01-01 RX ORDER — LEVOTHYROXINE SODIUM 50 UG/1
50 TABLET ORAL DAILY
Status: ON HOLD | COMMUNITY
Start: 2022-01-01 | End: 2022-01-01

## 2022-01-01 RX ADMIN — LIDOCAINE 1 PATCH: 50 PATCH CUTANEOUS at 09:05

## 2022-01-01 RX ADMIN — PRAZIQUANTEL 1200 MG: 600 TABLET, FILM COATED ORAL at 05:05

## 2022-01-01 RX ADMIN — PRAVASTATIN SODIUM 40 MG: 40 TABLET ORAL at 08:04

## 2022-01-01 RX ADMIN — LEVOTHYROXINE SODIUM 50 MCG: 0.03 TABLET ORAL at 05:04

## 2022-01-01 RX ADMIN — SODIUM BICARBONATE 650 MG TABLET 1300 MG: at 04:05

## 2022-01-01 RX ADMIN — LEVOTHYROXINE SODIUM 50 MCG: 50 TABLET ORAL at 06:05

## 2022-01-01 RX ADMIN — BENZONATATE 200 MG: 100 CAPSULE ORAL at 04:04

## 2022-01-01 RX ADMIN — INSULIN ASPART 6 UNITS: 100 INJECTION, SOLUTION INTRAVENOUS; SUBCUTANEOUS at 05:05

## 2022-01-01 RX ADMIN — ALLOPURINOL 100 MG: 100 TABLET ORAL at 09:05

## 2022-01-01 RX ADMIN — PANCRELIPASE 2 CAPSULE: 60000; 12000; 38000 CAPSULE, DELAYED RELEASE PELLETS ORAL at 11:05

## 2022-01-01 RX ADMIN — METOPROLOL TARTRATE 50 MG: 50 TABLET, FILM COATED ORAL at 09:04

## 2022-01-01 RX ADMIN — MUPIROCIN: 20 OINTMENT TOPICAL at 09:05

## 2022-01-01 RX ADMIN — GUAIFENESIN SYRUP AND DEXTROMETHORPHAN 5 ML: 100; 10 SYRUP ORAL at 04:05

## 2022-01-01 RX ADMIN — LEVOTHYROXINE SODIUM 50 MCG: 50 TABLET ORAL at 09:05

## 2022-01-01 RX ADMIN — PANCRELIPASE 2 CAPSULE: 60000; 12000; 38000 CAPSULE, DELAYED RELEASE PELLETS ORAL at 09:05

## 2022-01-01 RX ADMIN — FENTANYL CITRATE 50 MCG: 50 INJECTION, SOLUTION INTRAMUSCULAR; INTRAVENOUS at 10:04

## 2022-01-01 RX ADMIN — PREDNISONE 40 MG: 20 TABLET ORAL at 08:05

## 2022-01-01 RX ADMIN — PANCRELIPASE 2 CAPSULE: 60000; 12000; 38000 CAPSULE, DELAYED RELEASE PELLETS ORAL at 01:04

## 2022-01-01 RX ADMIN — ACETAMINOPHEN 650 MG: 325 TABLET ORAL at 07:05

## 2022-01-01 RX ADMIN — TACROLIMUS 900 MG: 0.5 CAPSULE ORAL at 04:05

## 2022-01-01 RX ADMIN — METOPROLOL TARTRATE 50 MG: 100 TABLET, FILM COATED ORAL at 08:05

## 2022-01-01 RX ADMIN — INSULIN ASPART 3 UNITS: 100 INJECTION, SOLUTION INTRAVENOUS; SUBCUTANEOUS at 11:05

## 2022-01-01 RX ADMIN — PANCRELIPASE 2 CAPSULE: 60000; 12000; 38000 CAPSULE, DELAYED RELEASE PELLETS ORAL at 08:04

## 2022-01-01 RX ADMIN — BENZONATATE 200 MG: 100 CAPSULE ORAL at 08:04

## 2022-01-01 RX ADMIN — LOPERAMIDE HYDROCHLORIDE 2 MG: 2 CAPSULE ORAL at 05:05

## 2022-01-01 RX ADMIN — TACROLIMUS 1.5 MG: 0.5 CAPSULE ORAL at 06:04

## 2022-01-01 RX ADMIN — SODIUM BICARBONATE 650 MG TABLET 1300 MG: at 02:04

## 2022-01-01 RX ADMIN — PANCRELIPASE 2 CAPSULE: 60000; 12000; 38000 CAPSULE, DELAYED RELEASE PELLETS ORAL at 05:04

## 2022-01-01 RX ADMIN — BENZONATATE 200 MG: 100 CAPSULE, LIQUID FILLED ORAL at 12:05

## 2022-01-01 RX ADMIN — INSULIN ASPART 7 UNITS: 100 INJECTION, SOLUTION INTRAVENOUS; SUBCUTANEOUS at 05:05

## 2022-01-01 RX ADMIN — PRIMAQUINE PHOSPHATE 52.6 MG: 15 TABLET, FILM COATED ORAL at 06:05

## 2022-01-01 RX ADMIN — INSULIN ASPART 2 UNITS: 100 INJECTION, SOLUTION INTRAVENOUS; SUBCUTANEOUS at 05:04

## 2022-01-01 RX ADMIN — TACROLIMUS 2 MG: 1 CAPSULE ORAL at 08:04

## 2022-01-01 RX ADMIN — SODIUM BICARBONATE 650 MG TABLET 1300 MG: at 08:05

## 2022-01-01 RX ADMIN — LEVOTHYROXINE SODIUM 50 MCG: 0.03 TABLET ORAL at 06:04

## 2022-01-01 RX ADMIN — HYDROCODONE POLISTIREX AND CHLORPHENIRAMINE POLISTIREX 5 ML: 10; 8 SUSPENSION, EXTENDED RELEASE ORAL at 08:04

## 2022-01-01 RX ADMIN — LEVOTHYROXINE SODIUM 50 MCG: 50 TABLET ORAL at 05:05

## 2022-01-01 RX ADMIN — BENZONATATE 200 MG: 100 CAPSULE, LIQUID FILLED ORAL at 08:05

## 2022-01-01 RX ADMIN — FUROSEMIDE 20 MG: 20 TABLET ORAL at 10:05

## 2022-01-01 RX ADMIN — BENZONATATE 200 MG: 100 CAPSULE ORAL at 03:04

## 2022-01-01 RX ADMIN — SIROLIMUS 2 MG: 1 TABLET, FILM COATED ORAL at 09:04

## 2022-01-01 RX ADMIN — POLYETHYLENE GLYCOL 3350 17 G: 17 POWDER, FOR SOLUTION ORAL at 11:04

## 2022-01-01 RX ADMIN — TAMSULOSIN HYDROCHLORIDE 0.4 MG: 0.4 CAPSULE ORAL at 08:04

## 2022-01-01 RX ADMIN — TACROLIMUS 900 MG: 0.5 CAPSULE ORAL at 08:05

## 2022-01-01 RX ADMIN — ALLOPURINOL 100 MG: 100 TABLET ORAL at 08:05

## 2022-01-01 RX ADMIN — AMOXICILLIN 500 MG: 500 CAPSULE ORAL at 08:05

## 2022-01-01 RX ADMIN — PANCRELIPASE 2 CAPSULE: 60000; 12000; 38000 CAPSULE, DELAYED RELEASE PELLETS ORAL at 04:04

## 2022-01-01 RX ADMIN — SODIUM BICARBONATE 650 MG TABLET 1300 MG: at 09:04

## 2022-01-01 RX ADMIN — PROPOFOL 50 MG: 10 INJECTION, EMULSION INTRAVENOUS at 10:03

## 2022-01-01 RX ADMIN — CEFEPIME HYDROCHLORIDE 1 G: 1 INJECTION, SOLUTION INTRAVENOUS at 08:05

## 2022-01-01 RX ADMIN — INSULIN ASPART 4 UNITS: 100 INJECTION, SOLUTION INTRAVENOUS; SUBCUTANEOUS at 08:05

## 2022-01-01 RX ADMIN — GUAIFENESIN SYRUP AND DEXTROMETHORPHAN 5 ML: 100; 10 SYRUP ORAL at 10:05

## 2022-01-01 RX ADMIN — PRAVASTATIN SODIUM 40 MG: 40 TABLET ORAL at 09:04

## 2022-01-01 RX ADMIN — METOPROLOL TARTRATE 50 MG: 50 TABLET, FILM COATED ORAL at 08:04

## 2022-01-01 RX ADMIN — TAMSULOSIN HYDROCHLORIDE 0.4 MG: 0.4 CAPSULE ORAL at 11:04

## 2022-01-01 RX ADMIN — GUAIFENESIN 600 MG: 600 TABLET, EXTENDED RELEASE ORAL at 08:04

## 2022-01-01 RX ADMIN — ONDANSETRON 4 MG: 2 INJECTION INTRAMUSCULAR; INTRAVENOUS at 10:05

## 2022-01-01 RX ADMIN — PREDNISONE 20 MG: 20 TABLET ORAL at 08:05

## 2022-01-01 RX ADMIN — FUROSEMIDE 20 MG: 20 TABLET ORAL at 09:05

## 2022-01-01 RX ADMIN — SODIUM BICARBONATE 650 MG TABLET 1300 MG: at 08:04

## 2022-01-01 RX ADMIN — INSULIN ASPART 10 UNITS: 100 INJECTION, SOLUTION INTRAVENOUS; SUBCUTANEOUS at 07:05

## 2022-01-01 RX ADMIN — MIDODRINE HYDROCHLORIDE 5 MG: 5 TABLET ORAL at 12:05

## 2022-01-01 RX ADMIN — LOPERAMIDE HYDROCHLORIDE 2 MG: 2 CAPSULE ORAL at 09:05

## 2022-01-01 RX ADMIN — ACETAMINOPHEN 650 MG: 325 TABLET ORAL at 04:05

## 2022-01-01 RX ADMIN — BUDESONIDE 0.5 MG: 0.5 INHALANT RESPIRATORY (INHALATION) at 08:04

## 2022-01-01 RX ADMIN — METOPROLOL TARTRATE 12.5 MG: 25 TABLET, FILM COATED ORAL at 09:05

## 2022-01-01 RX ADMIN — SODIUM BICARBONATE 650 MG TABLET 1300 MG: at 09:05

## 2022-01-01 RX ADMIN — PANCRELIPASE 2 CAPSULE: 60000; 12000; 38000 CAPSULE, DELAYED RELEASE PELLETS ORAL at 05:05

## 2022-01-01 RX ADMIN — SODIUM BICARBONATE 650 MG TABLET 1300 MG: at 03:05

## 2022-01-01 RX ADMIN — TACROLIMUS 900 MG: 0.5 CAPSULE ORAL at 12:05

## 2022-01-01 RX ADMIN — TACROLIMUS 900 MG: 0.5 CAPSULE ORAL at 05:05

## 2022-01-01 RX ADMIN — HYDROCODONE POLISTIREX AND CHLORPHENIRAMINE POLISTIREX 5 ML: 10; 8 SUSPENSION, EXTENDED RELEASE ORAL at 11:04

## 2022-01-01 RX ADMIN — SODIUM BICARBONATE: 84 INJECTION, SOLUTION INTRAVENOUS at 05:04

## 2022-01-01 RX ADMIN — SODIUM BICARBONATE 650 MG TABLET 1300 MG: at 04:04

## 2022-01-01 RX ADMIN — GUAIFENESIN SYRUP AND DEXTROMETHORPHAN 5 ML: 100; 10 SYRUP ORAL at 11:05

## 2022-01-01 RX ADMIN — DOBUTAMINE HYDROCHLORIDE 60 MCG/KG/MIN: 400 INJECTION INTRAVENOUS at 01:05

## 2022-01-01 RX ADMIN — SODIUM BICARBONATE 100 MEQ: 84 INJECTION INTRAVENOUS at 09:04

## 2022-01-01 RX ADMIN — SIROLIMUS 2 MG: 1 TABLET, FILM COATED ORAL at 11:04

## 2022-01-01 RX ADMIN — MORPHINE SULFATE 2 MG: 2 INJECTION, SOLUTION INTRAMUSCULAR; INTRAVENOUS at 01:04

## 2022-01-01 RX ADMIN — GUAIFENESIN SYRUP AND DEXTROMETHORPHAN 5 ML: 100; 10 SYRUP ORAL at 12:05

## 2022-01-01 RX ADMIN — LOPERAMIDE HYDROCHLORIDE 2 MG: 2 CAPSULE ORAL at 01:04

## 2022-01-01 RX ADMIN — BENZONATATE 200 MG: 100 CAPSULE, LIQUID FILLED ORAL at 03:05

## 2022-01-01 RX ADMIN — TACROLIMUS 900 MG: 0.5 CAPSULE ORAL at 02:05

## 2022-01-01 RX ADMIN — PANCRELIPASE 2 CAPSULE: 60000; 12000; 38000 CAPSULE, DELAYED RELEASE PELLETS ORAL at 09:04

## 2022-01-01 RX ADMIN — PREDNISONE 40 MG: 20 TABLET ORAL at 06:05

## 2022-01-01 RX ADMIN — BENZONATATE 200 MG: 100 CAPSULE ORAL at 09:04

## 2022-01-01 RX ADMIN — MUPIROCIN: 20 OINTMENT TOPICAL at 08:05

## 2022-01-01 RX ADMIN — TACROLIMUS 900 MG: 0.5 CAPSULE ORAL at 11:05

## 2022-01-01 RX ADMIN — MUPIROCIN: 20 OINTMENT TOPICAL at 10:05

## 2022-01-01 RX ADMIN — LEVOTHYROXINE SODIUM 50 MCG: 25 TABLET ORAL at 05:02

## 2022-01-01 RX ADMIN — LIDOCAINE 1 PATCH: 50 PATCH CUTANEOUS at 08:05

## 2022-01-01 RX ADMIN — ACETAMINOPHEN 650 MG: 325 TABLET, FILM COATED ORAL at 04:04

## 2022-01-01 RX ADMIN — Medication 4 ML: at 08:05

## 2022-01-01 RX ADMIN — Medication 1 MG/HR: at 04:05

## 2022-01-01 RX ADMIN — METHYLPREDNISOLONE SODIUM SUCCINATE 80 MG: 40 INJECTION, POWDER, FOR SOLUTION INTRAMUSCULAR; INTRAVENOUS at 11:02

## 2022-01-01 RX ADMIN — VANCOMYCIN HYDROCHLORIDE 750 MG: 750 INJECTION, POWDER, LYOPHILIZED, FOR SOLUTION INTRAVENOUS at 11:05

## 2022-01-01 RX ADMIN — PANCRELIPASE 2 CAPSULE: 60000; 12000; 38000 CAPSULE, DELAYED RELEASE PELLETS ORAL at 08:05

## 2022-01-01 RX ADMIN — INSULIN ASPART 2 UNITS: 100 INJECTION, SOLUTION INTRAVENOUS; SUBCUTANEOUS at 12:05

## 2022-01-01 RX ADMIN — MUPIROCIN: 20 OINTMENT TOPICAL at 10:04

## 2022-01-01 RX ADMIN — SIROLIMUS 2 MG: 1 TABLET, FILM COATED ORAL at 08:04

## 2022-01-01 RX ADMIN — IPRATROPIUM BROMIDE AND ALBUTEROL SULFATE 3 ML: .5; 3 SOLUTION RESPIRATORY (INHALATION) at 08:04

## 2022-01-01 RX ADMIN — TACROLIMUS 1.5 MG: 0.5 CAPSULE ORAL at 11:04

## 2022-01-01 RX ADMIN — BENZONATATE 200 MG: 100 CAPSULE ORAL at 02:04

## 2022-01-01 RX ADMIN — MORPHINE SULFATE 2 MG: 2 INJECTION, SOLUTION INTRAMUSCULAR; INTRAVENOUS at 04:05

## 2022-01-01 RX ADMIN — BENZONATATE 100 MG: 100 CAPSULE ORAL at 02:04

## 2022-01-01 RX ADMIN — CEFTRIAXONE 2 G: 2 INJECTION, SOLUTION INTRAVENOUS at 11:04

## 2022-01-01 RX ADMIN — POSACONAZOLE 300 MG: 100 TABLET, DELAYED RELEASE ORAL at 09:05

## 2022-01-01 RX ADMIN — SODIUM BICARBONATE 650 MG TABLET 1300 MG: at 02:05

## 2022-01-01 RX ADMIN — TACROLIMUS 1.5 MG: 0.5 CAPSULE ORAL at 08:04

## 2022-01-01 RX ADMIN — FLUTICASONE PROPIONATE 100 MCG: 50 SPRAY, METERED NASAL at 09:04

## 2022-01-01 RX ADMIN — SODIUM CHLORIDE 1000 ML: 0.9 INJECTION, SOLUTION INTRAVENOUS at 02:04

## 2022-01-01 RX ADMIN — INSULIN ASPART 3 UNITS: 100 INJECTION, SOLUTION INTRAVENOUS; SUBCUTANEOUS at 08:05

## 2022-01-01 RX ADMIN — TACROLIMUS 900 MG: 0.5 CAPSULE ORAL at 10:05

## 2022-01-01 RX ADMIN — GUAIFENESIN 600 MG: 600 TABLET, EXTENDED RELEASE ORAL at 09:04

## 2022-01-01 RX ADMIN — LACTULOSE 15 G: 20 SOLUTION ORAL at 03:05

## 2022-01-01 RX ADMIN — IPRATROPIUM BROMIDE AND ALBUTEROL SULFATE 3 ML: 2.5; .5 SOLUTION RESPIRATORY (INHALATION) at 08:05

## 2022-01-01 RX ADMIN — TACROLIMUS 1.5 MG: 0.5 CAPSULE ORAL at 05:04

## 2022-01-01 RX ADMIN — IPRATROPIUM BROMIDE AND ALBUTEROL SULFATE 3 ML: .5; 3 SOLUTION RESPIRATORY (INHALATION) at 07:04

## 2022-01-01 RX ADMIN — PHENYLEPHRINE HYDROCHLORIDE 200 MCG: 10 INJECTION INTRAVENOUS at 10:03

## 2022-01-01 RX ADMIN — INSULIN ASPART 4 UNITS: 100 INJECTION, SOLUTION INTRAVENOUS; SUBCUTANEOUS at 11:05

## 2022-01-01 RX ADMIN — GUAIFENESIN SYRUP AND DEXTROMETHORPHAN 5 ML: 100; 10 SYRUP ORAL at 10:04

## 2022-01-01 RX ADMIN — OXYCODONE 5 MG: 5 TABLET ORAL at 08:05

## 2022-01-01 RX ADMIN — SODIUM CHLORIDE: 0.9 INJECTION, SOLUTION INTRAVENOUS at 10:03

## 2022-01-01 RX ADMIN — INSULIN ASPART 1 UNITS: 100 INJECTION, SOLUTION INTRAVENOUS; SUBCUTANEOUS at 02:05

## 2022-01-01 RX ADMIN — METOPROLOL TARTRATE 25 MG: 25 TABLET, FILM COATED ORAL at 09:02

## 2022-01-01 RX ADMIN — LACTULOSE 15 G: 20 SOLUTION ORAL at 09:05

## 2022-01-01 RX ADMIN — PRIMAQUINE PHOSPHATE 52.6 MG: 15 TABLET, FILM COATED ORAL at 09:05

## 2022-01-01 RX ADMIN — BENZONATATE 200 MG: 100 CAPSULE, LIQUID FILLED ORAL at 02:05

## 2022-01-01 RX ADMIN — TACROLIMUS 900 MG: 0.5 CAPSULE ORAL at 01:05

## 2022-01-01 RX ADMIN — FLUTICASONE PROPIONATE 100 MCG: 50 SPRAY, METERED NASAL at 08:04

## 2022-01-01 RX ADMIN — ALBUMIN (HUMAN) 25 G: 12.5 SOLUTION INTRAVENOUS at 05:04

## 2022-01-01 RX ADMIN — BUDESONIDE 0.5 MG: 0.5 INHALANT RESPIRATORY (INHALATION) at 07:04

## 2022-01-01 RX ADMIN — GUAIFENESIN AND CODEINE PHOSPHATE 10 ML: 100; 10 SOLUTION ORAL at 05:04

## 2022-01-01 RX ADMIN — INSULIN ASPART 1 UNITS: 100 INJECTION, SOLUTION INTRAVENOUS; SUBCUTANEOUS at 10:05

## 2022-01-01 RX ADMIN — INSULIN ASPART 5 UNITS: 100 INJECTION, SOLUTION INTRAVENOUS; SUBCUTANEOUS at 02:05

## 2022-01-01 RX ADMIN — AMPICILLIN 2 G: 2 INJECTION, POWDER, FOR SOLUTION INTRAMUSCULAR; INTRAVENOUS at 08:05

## 2022-01-01 RX ADMIN — INSULIN ASPART 3 UNITS: 100 INJECTION, SOLUTION INTRAVENOUS; SUBCUTANEOUS at 05:05

## 2022-01-01 RX ADMIN — BENZONATATE 100 MG: 100 CAPSULE ORAL at 01:04

## 2022-01-01 RX ADMIN — INSULIN DETEMIR 15 UNITS: 100 INJECTION, SOLUTION SUBCUTANEOUS at 09:04

## 2022-01-01 RX ADMIN — AMOXICILLIN 500 MG: 500 CAPSULE ORAL at 04:05

## 2022-01-01 RX ADMIN — AMPICILLIN 2 G: 2 INJECTION, POWDER, FOR SOLUTION INTRAMUSCULAR; INTRAVENOUS at 09:05

## 2022-01-01 RX ADMIN — ACETAMINOPHEN 500 MG: 500 TABLET ORAL at 02:05

## 2022-01-01 RX ADMIN — LIDOCAINE HYDROCHLORIDE 10 ML: 10 INJECTION, SOLUTION INFILTRATION; PERINEURAL at 09:04

## 2022-01-01 RX ADMIN — FUROSEMIDE 20 MG: 20 TABLET ORAL at 08:05

## 2022-01-01 RX ADMIN — INSULIN DETEMIR 12 UNITS: 100 INJECTION, SOLUTION SUBCUTANEOUS at 09:05

## 2022-01-01 RX ADMIN — POSACONAZOLE 300 MG: 100 TABLET, DELAYED RELEASE ORAL at 11:05

## 2022-01-01 RX ADMIN — PANCRELIPASE 2 CAPSULE: 60000; 12000; 38000 CAPSULE, DELAYED RELEASE PELLETS ORAL at 02:04

## 2022-01-01 RX ADMIN — LIDOCAINE HYDROCHLORIDE 6 ML: 20 INJECTION, SOLUTION INFILTRATION; PERINEURAL at 08:05

## 2022-01-01 RX ADMIN — SODIUM BICARBONATE 650 MG TABLET 1300 MG: at 10:04

## 2022-01-01 RX ADMIN — ALBUMIN (HUMAN) 100 G: 12.5 SOLUTION INTRAVENOUS at 02:04

## 2022-01-01 RX ADMIN — PANCRELIPASE 2 CAPSULE: 60000; 12000; 38000 CAPSULE, DELAYED RELEASE PELLETS ORAL at 11:04

## 2022-01-01 RX ADMIN — INSULIN ASPART 1 UNITS: 100 INJECTION, SOLUTION INTRAVENOUS; SUBCUTANEOUS at 09:05

## 2022-01-01 RX ADMIN — MAGNESIUM SULFATE IN WATER 2 G: 40 INJECTION, SOLUTION INTRAVENOUS at 10:04

## 2022-01-01 RX ADMIN — METOPROLOL TARTRATE 25 MG: 25 TABLET, FILM COATED ORAL at 10:02

## 2022-01-01 RX ADMIN — CEFEPIME HYDROCHLORIDE 1 G: 1 INJECTION, SOLUTION INTRAVENOUS at 09:05

## 2022-01-01 RX ADMIN — PANCRELIPASE 2 CAPSULE: 60000; 12000; 38000 CAPSULE, DELAYED RELEASE PELLETS ORAL at 12:05

## 2022-01-01 RX ADMIN — SODIUM ZIRCONIUM CYCLOSILICATE 10 G: 10 POWDER, FOR SUSPENSION ORAL at 07:04

## 2022-01-01 RX ADMIN — ALLOPURINOL 100 MG: 100 TABLET ORAL at 10:05

## 2022-01-01 RX ADMIN — AMOXICILLIN 500 MG: 500 CAPSULE ORAL at 09:05

## 2022-01-01 RX ADMIN — LOPERAMIDE HYDROCHLORIDE 2 MG: 2 CAPSULE ORAL at 08:05

## 2022-01-01 RX ADMIN — LACTULOSE 20 G: 20 SOLUTION ORAL at 09:05

## 2022-01-01 RX ADMIN — MAGNESIUM SULFATE 2 G: 2 INJECTION INTRAVENOUS at 01:05

## 2022-01-01 RX ADMIN — PANCRELIPASE 2 CAPSULE: 60000; 12000; 38000 CAPSULE, DELAYED RELEASE PELLETS ORAL at 07:05

## 2022-01-01 RX ADMIN — ALLOPURINOL 100 MG: 100 TABLET ORAL at 11:04

## 2022-01-01 RX ADMIN — INSULIN ASPART 4 UNITS: 100 INJECTION, SOLUTION INTRAVENOUS; SUBCUTANEOUS at 12:05

## 2022-01-01 RX ADMIN — PREDNISONE 40 MG: 20 TABLET ORAL at 09:05

## 2022-01-01 RX ADMIN — BUDESONIDE 0.5 MG: 0.5 INHALANT RESPIRATORY (INHALATION) at 09:04

## 2022-01-01 RX ADMIN — GUAIFENESIN SYRUP AND DEXTROMETHORPHAN 5 ML: 100; 10 SYRUP ORAL at 08:05

## 2022-01-01 RX ADMIN — MAGNESIUM SULFATE 2 G: 2 INJECTION INTRAVENOUS at 12:04

## 2022-01-01 RX ADMIN — HUMAN INSULIN 2 UNITS: 100 INJECTION, SOLUTION SUBCUTANEOUS at 10:04

## 2022-01-01 RX ADMIN — PANCRELIPASE 2 CAPSULE: 60000; 12000; 38000 CAPSULE, DELAYED RELEASE PELLETS ORAL at 04:05

## 2022-01-01 RX ADMIN — PRAVASTATIN SODIUM 10 MG: 10 TABLET ORAL at 09:04

## 2022-01-01 RX ADMIN — ONDANSETRON 4 MG: 2 INJECTION INTRAMUSCULAR; INTRAVENOUS at 09:04

## 2022-01-01 RX ADMIN — GUAIFENESIN SYRUP AND DEXTROMETHORPHAN 5 ML: 100; 10 SYRUP ORAL at 01:05

## 2022-01-01 RX ADMIN — POTASSIUM BICARBONATE 50 MEQ: 978 TABLET, EFFERVESCENT ORAL at 05:05

## 2022-01-01 RX ADMIN — POTASSIUM CHLORIDE 40 MEQ: 1500 TABLET, EXTENDED RELEASE ORAL at 12:05

## 2022-01-01 RX ADMIN — TAMSULOSIN HYDROCHLORIDE 0.4 MG: 0.4 CAPSULE ORAL at 09:04

## 2022-01-01 RX ADMIN — PANCRELIPASE 2 CAPSULE: 60000; 12000; 38000 CAPSULE, DELAYED RELEASE PELLETS ORAL at 07:04

## 2022-01-01 RX ADMIN — INSULIN DETEMIR 10 UNITS: 100 INJECTION, SOLUTION SUBCUTANEOUS at 08:04

## 2022-01-01 RX ADMIN — ALLOPURINOL 100 MG: 100 TABLET ORAL at 08:04

## 2022-01-01 RX ADMIN — POSACONAZOLE 300 MG: 100 TABLET, DELAYED RELEASE ORAL at 08:05

## 2022-01-01 RX ADMIN — ALLOPURINOL 100 MG: 100 TABLET ORAL at 09:02

## 2022-01-01 RX ADMIN — SODIUM CHLORIDE: 0.9 INJECTION, SOLUTION INTRAVENOUS at 02:05

## 2022-01-01 RX ADMIN — LISINOPRIL 5 MG: 5 TABLET ORAL at 10:02

## 2022-01-01 RX ADMIN — FUROSEMIDE 20 MG: 10 INJECTION, SOLUTION INTRAVENOUS at 10:04

## 2022-01-01 RX ADMIN — LACTULOSE 15 G: 20 SOLUTION ORAL at 08:05

## 2022-01-01 RX ADMIN — MORPHINE SULFATE 2 MG: 2 INJECTION, SOLUTION INTRAMUSCULAR; INTRAVENOUS at 07:05

## 2022-01-01 RX ADMIN — MIDAZOLAM HYDROCHLORIDE 1 MG: 1 INJECTION INTRAMUSCULAR; INTRAVENOUS at 10:04

## 2022-01-01 RX ADMIN — CALCITRIOL CAPSULES 0.25 MCG 0.25 MCG: 0.25 CAPSULE ORAL at 09:05

## 2022-01-01 RX ADMIN — LIDOCAINE HYDROCHLORIDE 5 ML: 10 INJECTION, SOLUTION INFILTRATION; PERINEURAL at 03:05

## 2022-01-01 RX ADMIN — IPRATROPIUM BROMIDE AND ALBUTEROL SULFATE 3 ML: .5; 3 SOLUTION RESPIRATORY (INHALATION) at 09:04

## 2022-01-01 RX ADMIN — INSULIN ASPART 2 UNITS: 100 INJECTION, SOLUTION INTRAVENOUS; SUBCUTANEOUS at 05:05

## 2022-01-01 RX ADMIN — PANCRELIPASE 2 CAPSULE: 60000; 12000; 38000 CAPSULE, DELAYED RELEASE PELLETS ORAL at 06:05

## 2022-01-01 RX ADMIN — AMPICILLIN 2 G: 2 INJECTION, POWDER, FOR SOLUTION INTRAMUSCULAR; INTRAVENOUS at 10:05

## 2022-01-01 RX ADMIN — ALBUMIN (HUMAN) 12.5 G: 12.5 SOLUTION INTRAVENOUS at 12:05

## 2022-01-01 RX ADMIN — IPRATROPIUM BROMIDE AND ALBUTEROL SULFATE 3 ML: .5; 3 SOLUTION RESPIRATORY (INHALATION) at 01:04

## 2022-01-01 RX ADMIN — PANCRELIPASE 2 CAPSULE: 60000; 12000; 38000 CAPSULE, DELAYED RELEASE PELLETS ORAL at 01:05

## 2022-01-01 RX ADMIN — TACROLIMUS 1.5 MG: 0.5 CAPSULE ORAL at 07:04

## 2022-01-01 RX ADMIN — SODIUM BICARBONATE: 84 INJECTION, SOLUTION INTRAVENOUS at 11:04

## 2022-01-01 RX ADMIN — Medication 16 G: at 03:05

## 2022-01-01 RX ADMIN — POSACONAZOLE 300 MG: 100 TABLET, DELAYED RELEASE ORAL at 03:05

## 2022-01-01 RX ADMIN — FENTANYL CITRATE 25 MCG: 50 INJECTION, SOLUTION INTRAMUSCULAR; INTRAVENOUS at 08:05

## 2022-01-01 RX ADMIN — SODIUM CHLORIDE: 0.9 INJECTION, SOLUTION INTRAVENOUS at 05:04

## 2022-01-01 RX ADMIN — ALBUMIN (HUMAN) 25 G: 25 SOLUTION INTRAVENOUS at 10:04

## 2022-01-01 RX ADMIN — PRAZIQUANTEL 1200 MG: 600 TABLET, FILM COATED ORAL at 06:05

## 2022-01-01 RX ADMIN — POLYETHYLENE GLYCOL 3350, SODIUM SULFATE ANHYDROUS, SODIUM BICARBONATE, SODIUM CHLORIDE, POTASSIUM CHLORIDE 4000 ML: 236; 22.74; 6.74; 5.86; 2.97 POWDER, FOR SOLUTION ORAL at 06:05

## 2022-01-01 RX ADMIN — MAGNESIUM SULFATE IN WATER 2 G: 40 INJECTION, SOLUTION INTRAVENOUS at 11:04

## 2022-01-01 RX ADMIN — BENZONATATE 200 MG: 100 CAPSULE, LIQUID FILLED ORAL at 09:05

## 2022-01-01 RX ADMIN — LEVOTHYROXINE SODIUM 50 MCG: 0.03 TABLET ORAL at 04:04

## 2022-01-01 RX ADMIN — TACROLIMUS 900 MG: 0.5 CAPSULE ORAL at 09:05

## 2022-01-01 RX ADMIN — ALBUMIN (HUMAN) 25 G: 12.5 SOLUTION INTRAVENOUS at 12:05

## 2022-01-01 RX ADMIN — SODIUM BICARBONATE: 84 INJECTION, SOLUTION INTRAVENOUS at 03:04

## 2022-01-01 RX ADMIN — INSULIN ASPART 2 UNITS: 100 INJECTION, SOLUTION INTRAVENOUS; SUBCUTANEOUS at 09:04

## 2022-01-01 RX ADMIN — SODIUM BICARBONATE 650 MG TABLET 650 MG: at 09:05

## 2022-01-01 RX ADMIN — INSULIN ASPART 2 UNITS: 100 INJECTION, SOLUTION INTRAVENOUS; SUBCUTANEOUS at 09:05

## 2022-01-01 RX ADMIN — INSULIN ASPART 4 UNITS: 100 INJECTION, SOLUTION INTRAVENOUS; SUBCUTANEOUS at 09:05

## 2022-01-01 RX ADMIN — BENZONATATE 100 MG: 100 CAPSULE ORAL at 08:04

## 2022-01-01 RX ADMIN — ALBUMIN (HUMAN) 25 G: 12.5 SOLUTION INTRAVENOUS at 03:05

## 2022-01-01 RX ADMIN — LACTULOSE 15 G: 20 SOLUTION ORAL at 02:05

## 2022-01-01 RX ADMIN — SODIUM BICARBONATE 650 MG TABLET 1300 MG: at 03:04

## 2022-01-01 RX ADMIN — INSULIN DETEMIR 12 UNITS: 100 INJECTION, SOLUTION SUBCUTANEOUS at 01:05

## 2022-01-01 RX ADMIN — ALBUMIN (HUMAN) 25 G: 12.5 SOLUTION INTRAVENOUS at 09:04

## 2022-01-01 RX ADMIN — GUAIFENESIN SYRUP AND DEXTROMETHORPHAN 5 ML: 100; 10 SYRUP ORAL at 09:05

## 2022-01-01 RX ADMIN — SODIUM BICARBONATE 650 MG TABLET 650 MG: at 10:05

## 2022-01-01 RX ADMIN — FUROSEMIDE 20 MG: 10 INJECTION, SOLUTION INTRAMUSCULAR; INTRAVENOUS at 05:04

## 2022-01-01 RX ADMIN — TACROLIMUS 1.5 MG: 0.5 CAPSULE ORAL at 09:04

## 2022-01-01 RX ADMIN — INSULIN ASPART 3 UNITS: 100 INJECTION, SOLUTION INTRAVENOUS; SUBCUTANEOUS at 09:05

## 2022-01-01 RX ADMIN — FUROSEMIDE 20 MG: 20 TABLET ORAL at 01:04

## 2022-01-01 RX ADMIN — INSULIN ASPART 4 UNITS: 100 INJECTION, SOLUTION INTRAVENOUS; SUBCUTANEOUS at 05:04

## 2022-01-01 RX ADMIN — IPRATROPIUM BROMIDE AND ALBUTEROL SULFATE 3 ML: .5; 3 SOLUTION RESPIRATORY (INHALATION) at 04:04

## 2022-01-01 RX ADMIN — PANCRELIPASE 2 CAPSULE: 60000; 12000; 38000 CAPSULE, DELAYED RELEASE PELLETS ORAL at 02:05

## 2022-01-01 RX ADMIN — BENZONATATE 200 MG: 100 CAPSULE, LIQUID FILLED ORAL at 10:05

## 2022-01-01 RX ADMIN — ALLOPURINOL 100 MG: 100 TABLET ORAL at 09:04

## 2022-01-01 RX ADMIN — SODIUM BICARBONATE 650 MG TABLET 650 MG: at 08:05

## 2022-01-01 RX ADMIN — VANCOMYCIN HYDROCHLORIDE 1000 MG: 1 INJECTION, POWDER, LYOPHILIZED, FOR SOLUTION INTRAVENOUS at 01:04

## 2022-01-01 RX ADMIN — CALCITRIOL 0.25 MCG: 1 SOLUTION ORAL at 10:05

## 2022-01-01 RX ADMIN — GUAIFENESIN AND CODEINE PHOSPHATE 10 ML: 100; 10 SOLUTION ORAL at 08:04

## 2022-01-01 RX ADMIN — PRIMAQUINE PHOSPHATE 52.6 MG: 15 TABLET, FILM COATED ORAL at 08:05

## 2022-01-01 RX ADMIN — FUROSEMIDE 20 MG: 20 TABLET ORAL at 12:05

## 2022-01-01 RX ADMIN — TOPICAL ANESTHETIC 0.5 ML: 200 SPRAY DENTAL; PERIODONTAL at 08:05

## 2022-01-01 RX ADMIN — ACETAMINOPHEN 650 MG: 325 TABLET ORAL at 05:05

## 2022-01-01 RX ADMIN — PANCRELIPASE 2 CAPSULE: 60000; 12000; 38000 CAPSULE, DELAYED RELEASE PELLETS ORAL at 12:04

## 2022-01-01 RX ADMIN — FUROSEMIDE 20 MG: 20 TABLET ORAL at 04:04

## 2022-01-01 RX ADMIN — SODIUM BICARBONATE 650 MG TABLET 650 MG: at 03:05

## 2022-01-01 RX ADMIN — LEVOTHYROXINE SODIUM 50 MCG: 50 TABLET ORAL at 12:05

## 2022-01-01 RX ADMIN — GUAIFENESIN SYRUP AND DEXTROMETHORPHAN 5 ML: 100; 10 SYRUP ORAL at 06:05

## 2022-01-01 RX ADMIN — INSULIN DETEMIR 12 UNITS: 100 INJECTION, SOLUTION SUBCUTANEOUS at 08:05

## 2022-01-01 RX ADMIN — INSULIN ASPART 4 UNITS: 100 INJECTION, SOLUTION INTRAVENOUS; SUBCUTANEOUS at 08:04

## 2022-01-01 RX ADMIN — MORPHINE SULFATE 2 MG: 2 INJECTION, SOLUTION INTRAMUSCULAR; INTRAVENOUS at 09:04

## 2022-01-01 RX ADMIN — METRONIDAZOLE 500 MG: 500 SOLUTION INTRAVENOUS at 03:05

## 2022-01-01 RX ADMIN — INSULIN ASPART 5 UNITS: 100 INJECTION, SOLUTION INTRAVENOUS; SUBCUTANEOUS at 11:05

## 2022-01-01 RX ADMIN — INSULIN ASPART 1 UNITS: 100 INJECTION, SOLUTION INTRAVENOUS; SUBCUTANEOUS at 11:05

## 2022-01-01 RX ADMIN — BENZONATATE 200 MG: 100 CAPSULE, LIQUID FILLED ORAL at 08:04

## 2022-01-01 RX ADMIN — ALLOPURINOL 100 MG: 100 TABLET ORAL at 10:04

## 2022-01-01 RX ADMIN — BENZONATATE 100 MG: 100 CAPSULE ORAL at 09:04

## 2022-01-01 RX ADMIN — LOPERAMIDE HYDROCHLORIDE 2 MG: 2 CAPSULE ORAL at 03:05

## 2022-01-01 RX ADMIN — INSULIN ASPART 2 UNITS: 100 INJECTION, SOLUTION INTRAVENOUS; SUBCUTANEOUS at 02:05

## 2022-01-01 RX ADMIN — METOPROLOL TARTRATE 50 MG: 100 TABLET, FILM COATED ORAL at 09:05

## 2022-01-01 RX ADMIN — IOHEXOL 15 ML: 300 INJECTION, SOLUTION INTRAVENOUS at 10:04

## 2022-01-01 RX ADMIN — TACROLIMUS 900 MG: 0.5 CAPSULE ORAL at 06:05

## 2022-01-01 RX ADMIN — OXYCODONE HYDROCHLORIDE 5 MG: 5 TABLET ORAL at 12:04

## 2022-01-01 RX ADMIN — PROMETHAZINE HYDROCHLORIDE 12.5 MG: 25 INJECTION INTRAMUSCULAR; INTRAVENOUS at 12:05

## 2022-01-01 RX ADMIN — MORPHINE SULFATE: 2 INJECTION, SOLUTION INTRAMUSCULAR; INTRAVENOUS at 02:05

## 2022-01-01 RX ADMIN — ALBUTEROL SULFATE 4 PUFF: 90 AEROSOL, METERED RESPIRATORY (INHALATION) at 10:02

## 2022-01-01 RX ADMIN — TACROLIMUS 900 MG: 0.5 CAPSULE ORAL at 07:05

## 2022-01-01 RX ADMIN — OXYCODONE HYDROCHLORIDE 5 MG: 5 TABLET ORAL at 11:04

## 2022-01-01 RX ADMIN — BENZONATATE 100 MG: 100 CAPSULE ORAL at 05:02

## 2022-01-01 RX ADMIN — SODIUM BICARBONATE 650 MG TABLET 1300 MG: at 07:05

## 2022-01-01 RX ADMIN — INSULIN ASPART 2 UNITS: 100 INJECTION, SOLUTION INTRAVENOUS; SUBCUTANEOUS at 06:05

## 2022-01-01 RX ADMIN — METOPROLOL TARTRATE 50 MG: 50 TABLET, FILM COATED ORAL at 10:04

## 2022-01-01 RX ADMIN — GUAIFENESIN SYRUP AND DEXTROMETHORPHAN 5 ML: 100; 10 SYRUP ORAL at 03:05

## 2022-01-01 RX ADMIN — GUAIFENESIN SYRUP AND DEXTROMETHORPHAN 5 ML: 100; 10 SYRUP ORAL at 05:05

## 2022-01-01 RX ADMIN — SODIUM BICARBONATE 650 MG TABLET 1300 MG: at 10:05

## 2022-01-01 RX ADMIN — LEVOTHYROXINE SODIUM 50 MCG: 50 TABLET ORAL at 04:05

## 2022-01-01 RX ADMIN — INSULIN ASPART 2 UNITS: 100 INJECTION, SOLUTION INTRAVENOUS; SUBCUTANEOUS at 07:05

## 2022-01-01 RX ADMIN — METOPROLOL TARTRATE 50 MG: 100 TABLET, FILM COATED ORAL at 10:05

## 2022-01-01 RX ADMIN — OXYCODONE HYDROCHLORIDE 5 MG: 5 TABLET ORAL at 08:04

## 2022-01-01 RX ADMIN — HYDROCODONE POLISTIREX AND CHLORPHENIRAMINE POLISTIREX 5 ML: 10; 8 SUSPENSION, EXTENDED RELEASE ORAL at 09:04

## 2022-01-01 RX ADMIN — INSULIN ASPART 6 UNITS: 100 INJECTION, SOLUTION INTRAVENOUS; SUBCUTANEOUS at 08:05

## 2022-01-01 RX ADMIN — ATROPINE SULFATE 2 MG: 0.1 INJECTION PARENTERAL at 01:05

## 2022-01-01 RX ADMIN — MINOXIDIL 2.5 MG: 2.5 TABLET ORAL at 09:02

## 2022-01-01 RX ADMIN — TACROLIMUS 2 MG: 1 CAPSULE ORAL at 09:02

## 2022-01-01 RX ADMIN — ALBUMIN (HUMAN) 25 G: 12.5 SOLUTION INTRAVENOUS at 05:05

## 2022-01-01 RX ADMIN — MINOXIDIL 2.5 MG: 2.5 TABLET ORAL at 10:02

## 2022-01-01 RX ADMIN — METOPROLOL TARTRATE 12.5 MG: 25 TABLET, FILM COATED ORAL at 08:05

## 2022-01-01 RX ADMIN — OXYCODONE HYDROCHLORIDE 10 MG: 5 TABLET ORAL at 09:04

## 2022-01-01 RX ADMIN — TACROLIMUS 2 MG: 1 CAPSULE ORAL at 09:04

## 2022-01-01 RX ADMIN — MAGNESIUM SULFATE IN DEXTROSE 1 G: 10 INJECTION, SOLUTION INTRAVENOUS at 12:04

## 2022-01-01 RX ADMIN — LIDOCAINE HYDROCHLORIDE 10 ML: 20 SOLUTION ORAL; TOPICAL at 03:05

## 2022-01-01 RX ADMIN — LACTULOSE 20 G: 20 SOLUTION ORAL at 12:05

## 2022-01-01 RX ADMIN — ALBUMIN (HUMAN) 25 G: 12.5 SOLUTION INTRAVENOUS at 08:05

## 2022-01-01 RX ADMIN — PRIMAQUINE PHOSPHATE 52.6 MG: 15 TABLET, FILM COATED ORAL at 12:05

## 2022-01-01 RX ADMIN — CEFEPIME HYDROCHLORIDE 2 G: 2 INJECTION, SOLUTION INTRAVENOUS at 12:04

## 2022-01-01 RX ADMIN — ACETAMINOPHEN 650 MG: 325 TABLET, FILM COATED ORAL at 08:04

## 2022-01-01 RX ADMIN — SODIUM BICARBONATE 50 MEQ: 84 INJECTION, SOLUTION INTRAVENOUS at 09:05

## 2022-01-01 RX ADMIN — BENZONATATE 100 MG: 100 CAPSULE ORAL at 03:04

## 2022-01-01 RX ADMIN — INSULIN ASPART 4 UNITS: 100 INJECTION, SOLUTION INTRAVENOUS; SUBCUTANEOUS at 06:05

## 2022-01-01 RX ADMIN — SODIUM CHLORIDE 500 ML: 0.9 INJECTION, SOLUTION INTRAVENOUS at 02:05

## 2022-01-01 RX ADMIN — AMOXICILLIN 500 MG: 500 CAPSULE ORAL at 11:05

## 2022-01-01 RX ADMIN — INSULIN DETEMIR 10 UNITS: 100 INJECTION, SOLUTION SUBCUTANEOUS at 10:02

## 2022-01-01 RX ADMIN — VANCOMYCIN HYDROCHLORIDE 250 MG: 500 INJECTION, POWDER, LYOPHILIZED, FOR SOLUTION INTRAVENOUS at 11:05

## 2022-01-01 RX ADMIN — BENZONATATE 100 MG: 100 CAPSULE ORAL at 12:04

## 2022-01-01 RX ADMIN — INSULIN ASPART 8 UNITS: 100 INJECTION, SOLUTION INTRAVENOUS; SUBCUTANEOUS at 09:05

## 2022-01-01 RX ADMIN — NOREPINEPHRINE BITARTRATE 0.45 MCG/KG/MIN: 1 INJECTION, SOLUTION, CONCENTRATE INTRAVENOUS at 07:05

## 2022-01-01 RX ADMIN — LORAZEPAM 0.25 MG: 2 INJECTION INTRAMUSCULAR; INTRAVENOUS at 03:05

## 2022-01-01 RX ADMIN — METOPROLOL TARTRATE 50 MG: 100 TABLET, FILM COATED ORAL at 08:04

## 2022-01-01 RX ADMIN — MINOXIDIL 2.5 MG: 2.5 TABLET ORAL at 09:04

## 2022-01-01 RX ADMIN — SODIUM CHLORIDE: 0.9 INJECTION, SOLUTION INTRAVENOUS at 09:03

## 2022-01-01 RX ADMIN — LEVOFLOXACIN 750 MG: 5 INJECTION, SOLUTION INTRAVENOUS at 12:02

## 2022-01-01 RX ADMIN — LIDOCAINE HYDROCHLORIDE 1 ML: 10 INJECTION, SOLUTION EPIDURAL; INFILTRATION; INTRACAUDAL at 06:05

## 2022-01-01 RX ADMIN — SODIUM ZIRCONIUM CYCLOSILICATE 10 G: 10 POWDER, FOR SUSPENSION ORAL at 08:04

## 2022-01-01 RX ADMIN — PRAVASTATIN SODIUM 40 MG: 40 TABLET ORAL at 09:02

## 2022-01-01 RX ADMIN — TAMSULOSIN HYDROCHLORIDE 0.4 MG: 0.4 CAPSULE ORAL at 10:04

## 2022-01-01 RX ADMIN — INSULIN ASPART 2 UNITS: 100 INJECTION, SOLUTION INTRAVENOUS; SUBCUTANEOUS at 08:05

## 2022-01-01 RX ADMIN — OXYCODONE HYDROCHLORIDE 5 MG: 5 TABLET ORAL at 03:04

## 2022-01-01 RX ADMIN — INDOMETHACIN 50 MEQ: 25 CAPSULE ORAL at 09:05

## 2022-01-01 RX ADMIN — HYDROCODONE POLISTIREX AND CHLORPHENIRAMINE POLISTIREX 5 ML: 10; 8 SUSPENSION, EXTENDED RELEASE ORAL at 04:04

## 2022-01-01 RX ADMIN — SIROLIMUS 2 MG: 1 TABLET, FILM COATED ORAL at 10:04

## 2022-01-01 RX ADMIN — INSULIN ASPART 2 UNITS: 100 INJECTION, SOLUTION INTRAVENOUS; SUBCUTANEOUS at 08:04

## 2022-01-01 RX ADMIN — ALBUMIN (HUMAN) 25 G: 12.5 SOLUTION INTRAVENOUS at 10:04

## 2022-01-01 RX ADMIN — LIDOCAINE HYDROCHLORIDE 8 ML: 10 INJECTION, SOLUTION INFILTRATION; PERINEURAL at 08:05

## 2022-01-01 RX ADMIN — HYDROCODONE POLISTIREX AND CHLORPHENIRAMINE POLISTIREX 5 ML: 10; 8 SUSPENSION, EXTENDED RELEASE ORAL at 06:04

## 2022-01-01 RX ADMIN — LIDOCAINE HYDROCHLORIDE 5 ML: 10 INJECTION, SOLUTION INFILTRATION; PERINEURAL at 10:04

## 2022-01-01 RX ADMIN — PANCRELIPASE 2 CAPSULE: 60000; 12000; 38000 CAPSULE, DELAYED RELEASE PELLETS ORAL at 10:05

## 2022-01-01 RX ADMIN — TACROLIMUS 2 MG: 1 CAPSULE ORAL at 10:02

## 2022-01-01 RX ADMIN — LIDOCAINE 2 PATCH: 50 PATCH CUTANEOUS at 08:05

## 2022-01-01 RX ADMIN — ALLOPURINOL 100 MG: 100 TABLET ORAL at 12:05

## 2022-01-01 RX ADMIN — PRAZIQUANTEL 1200 MG: 600 TABLET, FILM COATED ORAL at 11:05

## 2022-01-01 RX ADMIN — HYDROCODONE POLISTIREX AND CHLORPHENIRAMINE POLISTIREX 5 ML: 10; 8 SUSPENSION, EXTENDED RELEASE ORAL at 05:04

## 2022-01-01 RX ADMIN — SODIUM BICARBONATE: 84 INJECTION, SOLUTION INTRAVENOUS at 04:04

## 2022-01-01 RX ADMIN — IPRATROPIUM BROMIDE AND ALBUTEROL SULFATE 3 ML: .5; 3 SOLUTION RESPIRATORY (INHALATION) at 02:04

## 2022-01-01 RX ADMIN — GUAIFENESIN AND CODEINE PHOSPHATE 10 ML: 100; 10 SOLUTION ORAL at 01:04

## 2022-01-01 RX ADMIN — LOPERAMIDE HYDROCHLORIDE 2 MG: 2 CAPSULE ORAL at 11:05

## 2022-01-01 RX ADMIN — PROMETHAZINE HYDROCHLORIDE 12.5 MG: 25 INJECTION INTRAMUSCULAR; INTRAVENOUS at 05:05

## 2022-01-01 RX ADMIN — IPRATROPIUM BROMIDE AND ALBUTEROL SULFATE 3 ML: 2.5; .5 SOLUTION RESPIRATORY (INHALATION) at 06:05

## 2022-01-01 RX ADMIN — MAGNESIUM SULFATE 2 G: 2 INJECTION INTRAVENOUS at 08:05

## 2022-01-01 RX ADMIN — CALCITRIOL CAPSULES 0.25 MCG 0.25 MCG: 0.25 CAPSULE ORAL at 02:05

## 2022-01-01 RX ADMIN — MIDAZOLAM 1 MG: 5 INJECTION INTRAMUSCULAR; INTRAVENOUS at 08:05

## 2022-01-01 RX ADMIN — Medication 16 G: at 08:04

## 2022-01-01 RX ADMIN — LACTULOSE 20 G: 20 SOLUTION ORAL at 02:05

## 2022-01-01 RX ADMIN — GUAIFENESIN AND CODEINE PHOSPHATE 5 ML: 100; 10 SOLUTION ORAL at 08:04

## 2022-01-01 RX ADMIN — VANCOMYCIN HYDROCHLORIDE 500 MG: 500 INJECTION, POWDER, LYOPHILIZED, FOR SOLUTION INTRAVENOUS at 11:05

## 2022-01-01 RX ADMIN — CEFTRIAXONE 2 G: 2 INJECTION, SOLUTION INTRAVENOUS at 12:04

## 2022-01-01 RX ADMIN — Medication 4 ML: at 12:05

## 2022-01-01 RX ADMIN — CEFTRIAXONE 2 G: 2 INJECTION, SOLUTION INTRAVENOUS at 01:04

## 2022-01-01 RX ADMIN — PRIMAQUINE PHOSPHATE 52.6 MG: 15 TABLET, FILM COATED ORAL at 10:05

## 2022-01-01 RX ADMIN — PHYTONADIONE 10 MG: 10 INJECTION, EMULSION INTRAMUSCULAR; INTRAVENOUS; SUBCUTANEOUS at 11:04

## 2022-01-01 RX ADMIN — GUAIFENESIN AND DEXTROMETHORPHAN 5 ML: 100; 10 SYRUP ORAL at 04:04

## 2022-01-01 RX ADMIN — LACTULOSE 15 G: 20 SOLUTION ORAL at 07:05

## 2022-01-01 RX ADMIN — CEFEPIME HYDROCHLORIDE 1 G: 1 INJECTION, SOLUTION INTRAVENOUS at 11:05

## 2022-01-01 RX ADMIN — POSACONAZOLE 300 MG: 100 TABLET, DELAYED RELEASE ORAL at 12:05

## 2022-01-01 RX ADMIN — BENZONATATE 100 MG: 100 CAPSULE ORAL at 10:04

## 2022-01-01 RX ADMIN — GUAIFENESIN AND CODEINE PHOSPHATE 10 ML: 100; 10 SOLUTION ORAL at 09:04

## 2022-01-01 RX ADMIN — GUAIFENESIN AND CODEINE PHOSPHATE 10 ML: 100; 10 SOLUTION ORAL at 12:04

## 2022-01-01 RX ADMIN — GUAIFENESIN AND CODEINE PHOSPHATE 10 ML: 100; 10 SOLUTION ORAL at 01:02

## 2022-01-01 RX ADMIN — ONDANSETRON 4 MG: 2 INJECTION INTRAMUSCULAR; INTRAVENOUS at 02:04

## 2022-01-01 RX ADMIN — IPRATROPIUM BROMIDE AND ALBUTEROL SULFATE 3 ML: 2.5; .5 SOLUTION RESPIRATORY (INHALATION) at 01:05

## 2022-01-01 RX ADMIN — INSULIN ASPART 3 UNITS: 100 INJECTION, SOLUTION INTRAVENOUS; SUBCUTANEOUS at 12:05

## 2022-01-01 RX ADMIN — INSULIN ASPART 5 UNITS: 100 INJECTION, SOLUTION INTRAVENOUS; SUBCUTANEOUS at 07:05

## 2022-01-01 RX ADMIN — INSULIN ASPART 2 UNITS: 100 INJECTION, SOLUTION INTRAVENOUS; SUBCUTANEOUS at 04:05

## 2022-01-01 RX ADMIN — INSULIN ASPART 6 UNITS: 100 INJECTION, SOLUTION INTRAVENOUS; SUBCUTANEOUS at 08:04

## 2022-01-01 RX ADMIN — CALCIUM GLUCONATE 1 G: 98 INJECTION, SOLUTION INTRAVENOUS at 10:04

## 2022-01-01 RX ADMIN — BENZONATATE 100 MG: 100 CAPSULE ORAL at 11:04

## 2022-01-01 RX ADMIN — SODIUM BICARBONATE 650 MG TABLET 1300 MG: at 11:04

## 2022-01-01 RX ADMIN — CEFTRIAXONE 1 G: 1 INJECTION, SOLUTION INTRAVENOUS at 12:04

## 2022-01-01 RX ADMIN — BENZONATATE 200 MG: 100 CAPSULE ORAL at 05:04

## 2022-01-01 RX ADMIN — IPRATROPIUM BROMIDE AND ALBUTEROL SULFATE 3 ML: 2.5; .5 SOLUTION RESPIRATORY (INHALATION) at 04:05

## 2022-01-01 RX ADMIN — INSULIN HUMAN 2 UNITS/HR: 1 INJECTION, SOLUTION INTRAVENOUS at 12:05

## 2022-01-01 RX ADMIN — INSULIN ASPART 8 UNITS: 100 INJECTION, SOLUTION INTRAVENOUS; SUBCUTANEOUS at 05:04

## 2022-01-01 RX ADMIN — DIPHENHYDRAMINE HYDROCHLORIDE 10 ML: 25 SOLUTION ORAL at 07:05

## 2022-01-01 RX ADMIN — SODIUM CHLORIDE: 0.9 INJECTION, SOLUTION INTRAVENOUS at 01:04

## 2022-01-01 RX ADMIN — CALCITRIOL 0.25 MCG: 1 SOLUTION ORAL at 04:05

## 2022-01-05 NOTE — TELEPHONE ENCOUNTER
----- Message from SANJUANA Fung sent at 1/3/2022  7:56 AM CST -----  Please contact pt and advise--needs to see Endocrinologist for further workup--hormones, glucose all abnormal at this time.  Referral placed into system this am, thanks.

## 2022-01-05 NOTE — TELEPHONE ENCOUNTER
Spoke with patient, results given with advisement for referral, [facility] main number given to call and schedule, also for PCP, patient verbally understood.

## 2022-02-02 NOTE — H&P
[facility] Medicine History & Physical Examination   Patient Name: Edgar Jackson  MRN: [id number]  Patient Class: OP- Observation   Admission Date: 2/2/2022  9:16 AM  Attending Physician: Theodora Colby MD  Primary Care Provider: Primary Doctor No  Face-to-Face encounter date: 02/02/2022  Code Status: Full  Chief Complaint: Shortness of Breath (Cough for 3 weeks-sent over from Urgent Care)      Covid test negative       Patient information was obtained from patient, past medical records and ER records and ED physician sign out.   HISTORY OF PRESENT ILLNESS:   Edgar Jackson is a 61 y.o. Ethiopian male who has PMH of  hypertension ,gout, diabetes, hypothyroidism, ED, a renal transplant in 2004 at [place], recently moved to [place] 2 months ago  The patient presented to [facility] on 2/2/2022 with a primary complaint of Shortness of Breath (Cough for 3 weeks-sent over from Urgent Care)  .  Patient was apparently well 3 weeks ago , started developing low-grade fever with productive cough with clear to yellow sputum, shortness of breath with chest tightness at night mostly, postnasal drip runny nose, sore throat, was initially treated in Urgent Care as bronchitis with antibiotic and decongestions.  Patient also felt nauseous and abdominal bloating with antibiotic therapy.  Patient also noticed watery diarrhea for last 3 days, poor appetite, and bilateral lower extremity edema 1 week.  He also reports hx epistaxis early mornings not today.  No change in urine output but notes dark urine.    In ed patient was afebrile, COVID and influenza screening negative, noted to be leukopenic with pancytopenia, renal function stable with mild hyponatremia, procalcitonin negative  Transaminitis noted, patient denies smoking EtOH use.    CT chest  Showed peripheral interstitial articular markings with punctate calcified nodule densities, with small pleural effusions pericardial effusion and ascites,  morphology features of cirrhosis and portal hypertension-splenomegaly and paraesophageal varices noted   Has been on tacrolimus and sirolimus since 2004, never had any complication after transplant, no new history of chronic lung disease, had history of GI bleed and scope which was unremarkable in the past.  No recent travel, sick contact.  Patient did not establish his care in [place] yet, continue to receive his medications from [place] by mail.    REVIEW OF SYSTEMS:   10 Point Review of System was performed and was found to be negative except for that mentioned already in the HPI above.     PAST MEDICAL HISTORY:     Past Medical History:   Diagnosis Date    CKD (chronic kidney disease)     Diabetes     Gout     Hypertension     Kidney transplant recipient 01/2004    [place]    Thyroid disease        PAST SURGICAL HISTORY:     Past Surgical History:   Procedure Laterality Date    KIDNEY TRANSPLANT  2004       ALLERGIES:   Patient has no known allergies.    FAMILY HISTORY:   No family history on file.    SOCIAL HISTORY:     Social History     Tobacco Use    Smoking status: Never Smoker    Smokeless tobacco: Never Used   Substance Use Topics    Alcohol use: Not Currently        Social History     Substance and Sexual Activity   Sexual Activity Not on file        HOME MEDICATIONS:     Prior to Admission medications    Medication Sig Start Date End Date Taking? Authorizing Provider   allopurinoL (ZYLOPRIM) 100 MG tablet Take 100 mg by mouth once daily. 10/12/21  Yes Historical Provider   levothyroxine (SYNTHROID) 50 MCG tablet Take 50 mcg by mouth once daily. 10/12/21  Yes Historical Provider   lisinopriL (PRINIVIL,ZESTRIL) 5 MG tablet Take 5 mg by mouth once daily. 10/12/21  Yes Historical Provider   metFORMIN (GLUCOPHAGE) 500 MG tablet Take 500 mg by mouth 2 (two) times daily. 8/20/21  Yes Historical Provider   metoprolol tartrate (LOPRESSOR) 25 MG tablet Take 25 mg by mouth 2 (two) times daily. 7/23/21   "Yes Historical Provider   minoxidiL (LONITEN) 2.5 MG tablet Take 2.5 mg by mouth 2 (two) times daily. 1/7/22  Yes Historical Provider   pravastatin (PRAVACHOL) 40 MG tablet Take 40 mg by mouth once daily. 10/12/21  Yes Historical Provider   sirolimus (RAPAMUNE) 1 MG Tab Take 2 mg by mouth once daily. 10/15/21  Yes Historical Provider   tacrolimus (PROGRAF) 0.5 MG Cap Take 2 mg by mouth every morning. And 1.5mg in the evening 8/5/21  Yes Historical Provider   TOUJEO SOLOSTAR U-300 INSULIN 300 unit/mL (1.5 mL) InPn pen Inject 30 Units into the skin nightly. 10/12/21  Yes Historical Provider   alfuzosin (UROXATRAL) 10 mg Tb24 Take 1 tablet (10 mg total) by mouth after dinner. 12/29/21 1/28/22  Chiquita Mcfarlane, SANJUANA   azithromycin (Z-RODOLFO) 250 MG tablet TK 2 TS PO ON DAY 1, THEN TK 1 T PO D FOR 4 DAYS 1/20/22   Historical Provider   BD NOEMI 2ND GEN PEN NEEDLE 32 gauge x 5/32" Ndle USE AS DIRECTED ONCE DAILY WITH BASAGLAR 10/14/21   Historical Provider   MICROLET LANCET Misc USE 1 DAILY 10/14/21   Historical Provider   sildenafiL (VIAGRA) 100 MG tablet Take 1 tablet (100 mg total) by mouth daily as needed for Erectile Dysfunction. 12/29/21 12/29/22  Chiquita Mcfarlane FNP         PHYSICAL EXAM:   BP (!) 142/85   Pulse 70   Temp 98.9 °F (37.2 °C) (Oral)   Resp 19   Wt 70.3 kg (155 lb)   SpO2 95%   BMI 23.57 kg/m²   Vitals Reviewed  General appearance: Well-developed, well-nourished male in no apparent distress.  Skin: No Rash.   Neuro: Motor and sensory exams grossly intact. Good tone. Power in all 4 extremities 5/5.   HENT: Atraumatic head. Moist mucous membranes of oral cavity.  Eyes: Normal extraocular movements.   Neck: Supple. No evidence of lymphadenopathy. No thyroidomegaly.  Lungs: Clear to auscultation bilaterally. No wheezing present.   Heart: Regular rate and rhythm. S1 and S2 present with no murmurs/gallop/rub. No pedal edema. No JVD present.   Abdomen: Soft, non-distended, non-tender. No rebound " tenderness/guarding. No masses or organomegaly. Bowel sounds are normal. Bladder is not palpable.  :no jacinto ,no CVA tenderness  Extremities: No cyanosis, clubbing, 1+blt edema.  Psych/mental status: Alert and oriented. Cooperative. Responds appropriately to questions.     EMERGENCY DEPARTMENT LABS AND IMAGING:     Labs Reviewed   CBC W/ AUTO DIFFERENTIAL - Abnormal; Notable for the following components:       Result Value    WBC 2.52 (*)     RBC 3.79 (*)     Hemoglobin 11.6 (*)     Hematocrit 34.0 (*)     Platelets 90 (*)     Gran # (ANC) 1.5 (*)     Lymph # 0.7 (*)     All other components within normal limits   COMPREHENSIVE METABOLIC PANEL - Abnormal; Notable for the following components:    Sodium 134 (*)     CO2 21 (*)     Glucose 152 (*)     Albumin 3.0 (*)     Total Bilirubin 1.7 (*)      (*)     ALT 49 (*)     Anion Gap 7 (*)     All other components within normal limits   CULTURE, BLOOD   CULTURE, BLOOD   SARS-COV-2 RNA AMPLIFICATION, QUAL   INFLUENZA A AND B ANTIGEN    Narrative:     Specimen Source->Nasopharyngeal Swab   TROPONIN I   B-TYPE NATRIURETIC PEPTIDE   PROCALCITONIN   LACTIC ACID, PLASMA       CT Chest Without Contrast   Final Result      X-Ray Chest PA And Lateral   Final Result          ASSESSMENT & PLAN:   Edgar Jackson is a 61 y.o. male admitted for    # URTI -possible viral atypical pneumonia  # enteritis -2/2 abx or viral prodrome  # newly diagnosed cirrhosis with portal hypertension on CT   # history of renal transplant on tacrolimus  # pancytopenia  # htn, gout,DM, hypothyroidism,HLD      Plan:  Admit to med surg  C/w duonebs, antiemitics  C/w levofloxacin for atypical coverage  Will obtain stool study,CMV,legionella and PCP  Encourage oral hydration  Avoid decongestion and NSAID in transplant pt  Glucose SSI   Will swich to levemir  Restart home meds  Monitor pancytopenia, consider transfusion plt if epistaxis recurs/ G-CSF  Consult GI and ID  Will obtain UA    DVT Prophylaxis: will  "be placed on scd  for DVT prophylaxis and will be advised to be as mobile as possible and sit in a chair as tolerated.     INPATIENT LIST OF MEDICATIONS   No current facility-administered medications for this encounter.    Current Outpatient Medications:     allopurinoL (ZYLOPRIM) 100 MG tablet, Take 100 mg by mouth once daily., Disp: , Rfl:     levothyroxine (SYNTHROID) 50 MCG tablet, Take 50 mcg by mouth once daily., Disp: , Rfl:     lisinopriL (PRINIVIL,ZESTRIL) 5 MG tablet, Take 5 mg by mouth once daily., Disp: , Rfl:     metFORMIN (GLUCOPHAGE) 500 MG tablet, Take 500 mg by mouth 2 (two) times daily., Disp: , Rfl:     metoprolol tartrate (LOPRESSOR) 25 MG tablet, Take 25 mg by mouth 2 (two) times daily., Disp: , Rfl:     minoxidiL (LONITEN) 2.5 MG tablet, Take 2.5 mg by mouth 2 (two) times daily., Disp: , Rfl:     pravastatin (PRAVACHOL) 40 MG tablet, Take 40 mg by mouth once daily., Disp: , Rfl:     sirolimus (RAPAMUNE) 1 MG Tab, Take 2 mg by mouth once daily., Disp: , Rfl:     tacrolimus (PROGRAF) 0.5 MG Cap, Take 2 mg by mouth every morning. And 1.5mg in the evening, Disp: , Rfl:     TOUJEO SOLOSTAR U-300 INSULIN 300 unit/mL (1.5 mL) InPn pen, Inject 30 Units into the skin nightly., Disp: , Rfl:     alfuzosin (UROXATRAL) 10 mg Tb24, Take 1 tablet (10 mg total) by mouth after dinner., Disp: 30 tablet, Rfl: 12    azithromycin (Z-RODOLFO) 250 MG tablet, TK 2 TS PO ON DAY 1, THEN TK 1 T PO D FOR 4 DAYS, Disp: , Rfl:     BD NOEMI 2ND GEN PEN NEEDLE 32 gauge x 5/32" Ndle, USE AS DIRECTED ONCE DAILY WITH BASAGLAR, Disp: , Rfl:     MICROLET LANCET Misc, USE 1 DAILY, Disp: , Rfl:     sildenafiL (VIAGRA) 100 MG tablet, Take 1 tablet (100 mg total) by mouth daily as needed for Erectile Dysfunction., Disp: 6 tablet, Rfl: 6      Scheduled Meds:  Continuous Infusions:  PRN Meds:.      Theodora Colby  [facility] Hospitalist  02/02/2022  "

## 2022-02-02 NOTE — ED PROVIDER NOTES
Encounter Date: 2/2/2022       History     Chief Complaint   Patient presents with    Shortness of Breath     Cough for 3 weeks-sent over from Urgent Care     61-year-old male with a history of hypertension, gout, hypothyroidism, diabetes, CKD who is status post renal transplant in 2004 in [place] (on prograf and rapamune) presents to emergency department with worsening cough and shortness of breath. The patient went to Urgent Care approximately 3 weeks ago and was placed on an antibiotic. He states he took the entire course but cannot remember the name or for how long he took the medication. He states that his symptoms never improved and in fact he now feels worse. He has subjective fevers and chills, a productive cough, myalgias and states he has dyspnea with exertion. He also notes his legs have been swelling for the same amount of time. He has no chest pain. No known sick contacts. He got his covid vaccine and booster. He has not established care here with a transplant team since he moved here several months ago. He does not smoke.        Review of patient's allergies indicates:  No Known Allergies  Past Medical History:   Diagnosis Date    CKD (chronic kidney disease)     Diabetes     Gout     Hypertension     Kidney transplant recipient 01/2004    [place]    Thyroid disease      Past Surgical History:   Procedure Laterality Date    KIDNEY TRANSPLANT  2004     No family history on file.  Social History     Tobacco Use    Smoking status: Never Smoker    Smokeless tobacco: Never Used   Substance Use Topics    Alcohol use: Not Currently    Drug use: Never     Review of Systems   Constitutional: Positive for chills and fever.   HENT: Negative for sore throat.    Respiratory: Positive for cough and shortness of breath.    Cardiovascular: Positive for leg swelling. Negative for chest pain.   Gastrointestinal: Negative for nausea and vomiting.   Genitourinary: Negative for dysuria.   Musculoskeletal:  Positive for myalgias. Negative for back pain.   Skin: Negative for rash.   Neurological: Negative for weakness and headaches.   Hematological: Does not bruise/bleed easily.   Psychiatric/Behavioral: Negative for confusion.   All other systems reviewed and are negative.      Physical Exam     Initial Vitals   BP Pulse Resp Temp SpO2   02/02/22 0842 02/02/22 0841 02/02/22 0841 02/02/22 0842 02/02/22 0841   128/85 73 16 98.9 °F (37.2 °C) 97 %      MAP       --                Physical Exam    Nursing note and vitals reviewed.  Constitutional: He appears well-developed and well-nourished.   Ill appearing   HENT:   Head: Normocephalic and atraumatic.   Eyes: EOM are normal.   Neck:   Normal range of motion.  Cardiovascular: Normal rate, regular rhythm, normal heart sounds and intact distal pulses.   No murmur heard.  Pulmonary/Chest: No respiratory distress.   Mild tachypnea with scattered rales and wheezes   Abdominal: Abdomen is soft. He exhibits no distension. There is no abdominal tenderness.   Musculoskeletal:         General: Edema (2+ BLE) present. Normal range of motion.      Cervical back: Normal range of motion.     Neurological: He is alert and oriented to person, place, and time. GCS score is 15. GCS eye subscore is 4. GCS verbal subscore is 5. GCS motor subscore is 6.   Skin: Skin is warm and dry. Capillary refill takes less than 2 seconds.   Psychiatric: He has a normal mood and affect.         ED Course   Procedures  Labs Reviewed   CBC W/ AUTO DIFFERENTIAL - Abnormal; Notable for the following components:       Result Value    WBC 2.52 (*)     RBC 3.79 (*)     Hemoglobin 11.6 (*)     Hematocrit 34.0 (*)     Platelets 90 (*)     Gran # (ANC) 1.5 (*)     Lymph # 0.7 (*)     All other components within normal limits   COMPREHENSIVE METABOLIC PANEL - Abnormal; Notable for the following components:    Sodium 134 (*)     CO2 21 (*)     Glucose 152 (*)     Albumin 3.0 (*)     Total Bilirubin 1.7 (*)       (*)     ALT 49 (*)     Anion Gap 7 (*)     All other components within normal limits   LACTATE DEHYDROGENASE - Abnormal; Notable for the following components:     (*)     All other components within normal limits   CULTURE, BLOOD   CULTURE, BLOOD   PNEUMOCYSTIS SMEAR BY DFA   CULTURE, STOOL   SARS-COV-2 RNA AMPLIFICATION, QUAL   INFLUENZA A AND B ANTIGEN    Narrative:     Specimen Source->Nasopharyngeal Swab   TROPONIN I   B-TYPE NATRIURETIC PEPTIDE   PROCALCITONIN   LACTIC ACID, PLASMA   LACTATE DEHYDROGENASE   STOOL EXAM-OVA,CYSTS,PARASITES   URINALYSIS, REFLEX TO URINE CULTURE   URINALYSIS   CMV DNA, QUANTITATIVE, PCR   LEGIONELLA ANTIGEN, URINE RANDOM   WBC, STOOL   POCT GLUCOSE, HAND-HELD DEVICE   POCT GLUCOSE, HAND-HELD DEVICE     EKG Readings: (Independently Interpreted)   ECG NSR rate 69 bpm with no JAIDEN/STD, normal intervals     ECG Results          EKG 12-lead (In process)  Result time 02/02/22 09:43:21    In process by Interface, Lab In [facility] (02/02/22 09:43:21)                 Narrative:    Test Reason : R06.02,    Vent. Rate : 069 BPM     Atrial Rate : 069 BPM     P-R Int : 128 ms          QRS Dur : 074 ms      QT Int : 382 ms       P-R-T Axes : 016 001 032 degrees     QTc Int : 409 ms    Normal sinus rhythm  Anteroseptal infarct ,age undetermined  Abnormal ECG  No previous ECGs available    Referred By: [email or web address]   SELF           Confirmed By:                             Imaging Results          CT Chest Without Contrast (Final result)  Result time 02/02/22 12:31:44    Final result by Jarred Lora MD (02/02/22 12:31:44)                 Narrative:    CMS MANDATED QUALITY DATA - CT RADIATION 436    All CT scans at this facility utilize dose modulation, iterative reconstruction, and/or weight based dosing when appropriate to reduce radiation dose to as low as reasonably achievable.    CLINICAL HISTORY:  61 years (1960) Male Cough, persistent; Pneumonia, unresolved    TECHNIQUE:  CT  CHEST WITHOUT IV CONTRAST. 456 images obtained. CT scan of the chest was performed without intravenous contrast.    CONTRAST:  No IV contrast was administered    COMPARISON:  Radiograph of the chest from the same day.    FINDINGS:.  Visualized neck: Within normal limits.  Lungs: Mild scattered peripheral basilar and fissural predominant reticular lung markings, with scattered punctate reticular nodules identified throughout both lungs, a majority of which appear to show calcification suggesting prior granulomatous disease or possibly sarcoid.  Airway: Mild cylindrical bronchiectasis.  Pleura: Trace (right pleural effusion and adjacent atelectasis. There is no pneumothorax.  Cardiovascular: The heart is normal in size. There is a small pericardial effusion. Scattered coronary arterial calcifications (most pronounced in the LAD and RCA distribution) the pulmonary artery is mildly enlarged.  Mediastinum: There is no supraclavicular, axillary, mediastinal, or hilar lymphadenopathy.  Soft tissues: The peripheral soft tissues appear normal.  Musculoskeletal: The visualized osseous structures appear normal.  There are no suspicious osseous lesions.  Esophagus: The esophagus appears grossly normal.  Upper Abdomen: Small to moderate volume of upper abdominal ascites. There is a slightly nodular hepatic parenchymal contour. The spleen is subjectively enlarged.    IMPRESSION:  1. Mild to moderate scattered peripheral paraseptal predominant interstitial reticular lung markings with punctate calcified nodular densities, the differential includes mild chronic granulomatous disease, sarcoid interstitial lung disease, atypical infection/viral pneumonia, and less likely edema.  2. Trace left greater than right pleural effusion and adjacent atelectasis.  3. Small pericardial effusion.  4. Small to moderate volume of upper abdominal ascites.  5. Morphologic features of cirrhosis and findings suggesting portal hypertension. Subjective  splenomegaly and tiny paraesophageal/perigastric varices.  6. Pulmonary arterial enlargement, a nonspecific finding which may suggest pulmonary arterial hypertension or pulmonary vascular congestion.  8. Scattered coronary arterial calcifications (3 vessel disease, most pronounced in the LAD and RCA distribution).                    .    Electronically signed by:  Jarred Lora MD  2/2/2022 12:31 PM CST Workstation: [id number]                             X-Ray Chest PA And Lateral (Final result)  Result time 02/02/22 09:22:19    Final result by Jarred Lora MD (02/02/22 09:22:19)                 Narrative:    CLINICAL HISTORY:  61 years (1960) Male cough    TECHNIQUE:  PA and lateral radiograph of the chest.    COMPARISON:  None available.    FINDINGS:  Minimal scattered peripheral basilar predominant interstitial opacities are seen bilaterally suggestive of mild atypical infection/round pneumonia and less likely atelectasis, scarring or edema. Costophrenic angles are seen without effusion. No pneumothorax is identified. The heart is normal in size. The mediastinum is within normal limits. Osseous structures appear within normal limits. The visualized upper abdomen is unremarkable.    IMPRESSION:  Findings suggestive of mild atypical infection/viral pneumonia. Consider PA and lateral radiographic follow-up after treatment to determine resolution.                  .            Electronically signed by:  Jarred Lora MD  2/2/2022 9:22 AM CST Workstation: [id number]                            X-Rays:   Independently Interpreted Readings:   Chest X-Ray: Bilateral interstitial opacities     Medications   acetaminophen tablet 650 mg (has no administration in time range)   bisacodyL suppository 10 mg (has no administration in time range)   insulin aspart U-100 pen 0-5 Units (has no administration in time range)   glucose chewable tablet 16 g (has no administration in time range)   glucose chewable  tablet 24 g (has no administration in time range)   dextrose 50% injection 12.5 g (has no administration in time range)   dextrose 50% injection 25 g (has no administration in time range)   glucagon (human recombinant) injection 1 mg (has no administration in time range)   acetaminophen tablet 650 mg (has no administration in time range)   melatonin tablet 6 mg (has no administration in time range)   naloxone 0.4 mg/mL injection 0.02 mg (has no administration in time range)   insulin detemir U-100 pen 10 Units (has no administration in time range)   allopurinoL tablet 100 mg (has no administration in time range)   levothyroxine tablet 50 mcg (has no administration in time range)   lisinopriL tablet 5 mg (has no administration in time range)   metoprolol tartrate (LOPRESSOR) tablet 25 mg (has no administration in time range)   minoxidiL tablet 2.5 mg (has no administration in time range)   pravastatin tablet 40 mg (has no administration in time range)   levoFLOXacin 500 mg/100 mL IVPB 500 mg (has no administration in time range)   sirolimus tablet 2 mg (has no administration in time range)   tacrolimus capsule 2 mg (has no administration in time range)   albuterol inhaler 4 puff (4 puffs Inhalation Given 2/2/22 1001)   levoFLOXacin 750 mg/150 mL IVPB 750 mg (0 mg Intravenous Stopped 2/2/22 7154)     Medical Decision Making:   ED Management:  62 yo man presents to the ED with cough, fever and SOB. Ddx includes viral infection, PNA, PTX, malignancy, chf, pericardial effusion, anemia, lyte abnormality. Workup remarkable for leukopenia with elevated LFTS. Lactic and procal normal. CXR and CT with interstitial lung findings concerning for atypical infection. Also with other incidental findings including cirrhosis with evidence of portal hypertension - patient states he has never been told he has liver disease and does not drink etoh. Given his immunocompromised state will cover empirically with antibiotics and admit for  further evaluation and management.     Anastacia Hagen MD  Emergency Medicine  02/02/2022 9:26 PM                        Clinical Impression:   Final diagnoses:  [R05.9] Cough  [R06.02] Shortness of breath  [J18.9] Pneumonia of both lungs due to infectious organism, unspecified part of lung (Primary)  [D72.819] Leukopenia, unspecified type  [K74.60] Hepatic cirrhosis, unspecified hepatic cirrhosis type, unspecified whether ascites present          ED Disposition Condition    Observation               Anastacia Hagen MD  02/02/22 [id number]

## 2022-02-02 NOTE — CARE UPDATE
02/02/22 1001   Patient Assessment/Suction   Level of Consciousness (AVPU) alert   Respiratory Effort Normal;Unlabored   Expansion/Accessory Muscles/Retractions no use of accessory muscles;no retractions;expansion symmetric   All Lung Fields Breath Sounds clear;equal bilaterally   Rhythm/Pattern, Respiratory unlabored;pattern regular;depth regular;chest wiggle adequate;no shortness of breath reported   Cough Frequency no cough   PRE-TX-O2   O2 Device (Oxygen Therapy) room air   SpO2 97 %   Pulse Oximetry Type Intermittent   $ Pulse Oximetry - Multiple Charge Pulse Oximetry - Multiple   Pulse 78   Resp (!) 22   Inhaler   $ Inhaler Charges MDI (Metered Dose Inahler) Treatment;Given With Spacer   Daily Review of Necessity (Inhaler) completed   Respiratory Treatment Status (Inhaler) given   Treatment Route (Inhaler) mouthpiece;spacer/holding chamber   Patient Position (Inhaler) HOB elevated   Post Treatment Assessment (Inhaler) increased aeration   Signs of Intolerance (Inhaler) none   Breath Sounds Post-Respiratory Treatment   Throughout All Fields Post-Treatment All Fields   Throughout All Fields Post-Treatment aeration increased   Post-treatment Heart Rate (beats/min) 72   Post-treatment Resp Rate (breaths/min) 18   Education   $ Education Bronchodilator;15 min   Respiratory Evaluation   $ Care Plan Tech Time 15 min

## 2022-02-03 PROBLEM — J18.9 PNEUMONIA: Status: RESOLVED | Noted: 2022-01-01 | Resolved: 2022-01-01

## 2022-02-03 PROBLEM — J18.9 PNEUMONIA OF BOTH LUNGS DUE TO INFECTIOUS ORGANISM: Status: RESOLVED | Noted: 2022-01-01 | Resolved: 2022-01-01

## 2022-02-03 PROBLEM — J18.9 PNEUMONIA OF BOTH LUNGS DUE TO INFECTIOUS ORGANISM: Status: ACTIVE | Noted: 2022-01-01

## 2022-02-03 PROBLEM — J18.9 PNEUMONIA: Status: ACTIVE | Noted: 2022-01-01

## 2022-02-03 NOTE — NURSING
Patient medication Sirolimus is non-formulary.  Informed patient that the medication would need to be brought from home.  Patient stated he has no one to bring the medication

## 2022-02-03 NOTE — PLAN OF CARE
Plan of care reviewed with patient.  Questions answered.  Patient verbalizes understanding of plan of care.

## 2022-02-03 NOTE — DISCHARGE SUMMARY
[facility] Medicine  Discharge Summary      Patient Name: Edgar Jackson  MRN: [id number]  Patient Class: IP- Inpatient  Admission Date: 2/2/2022  Hospital Length of Stay: 1 days  Discharge Date and Time:  02/03/2022 12:19 PM  Attending Physician: Solomon Gardner MD   Discharging Provider: Solomon Gardner MD  Primary Care Provider: Primary Doctor No      HPI:   No notes on file    * No surgery found *      Hospital Course:   Patient who recently located here from [place], who is on immunosuppressants after renal transplant ,admitted with acute pneumonia  Patient was also found to have signs of end stage liver disease on imaging studies  Pt was treated with iv abx and was later changed to PO abx  Amb Referral to GI MD/PCP made and pt was discharged to home        Goals of Care Treatment Preferences:  Code Status: Full Code      Consults:   Consults (From admission, onward)        Status Ordering Provider     Inpatient consult to Hospitalist  Once        Provider:  MD Masoud Alfaro ELIZABETH D.          No new Assessment & Plan notes have been filed under this hospital service since the last note was generated.  Service: Hospital Medicine    Final Active Diagnoses:      Problems Resolved During this Admission:    Diagnosis Date Noted Date Resolved POA    PRINCIPAL PROBLEM:  Pneumonia [J18.9] 02/03/2022 02/03/2022 Unknown    Pneumonia of both lungs due to infectious organism [J18.9] 02/03/2022 02/03/2022 Unknown       Discharged Condition: good    Disposition: Home or Self Care    Follow Up:    Patient Instructions:      Ambulatory referral/consult to Family Practice   Standing Status: Future   Referral Priority: Routine Referral Type: Consultation   Referral Reason: Specialty Services Required   Requested Specialty: Family Medicine   Number of Visits Requested: 1     Ambulatory referral/consult to Gastroenterology   Standing Status: Future   Referral Priority: Routine Referral Type:  "Consultation   Referral Reason: Specialty Services Required   Referred to Provider: YUNIOR GEE III Requested Specialty: Gastroenterology   Number of Visits Requested: 1       Significant Diagnostic Studies: Labs:   CMP   Recent Labs   Lab 02/02/22  1056   *   K 4.3      CO2 21*   *   BUN 18   CREATININE 1.0   CALCIUM 8.7   PROT 6.7   ALBUMIN 3.0*   BILITOT 1.7*   ALKPHOS 108   *   ALT 49*   ANIONGAP 7*   ESTGFRAFRICA >60.0   EGFRNONAA >60.0    and CBC   Recent Labs   Lab 02/02/22  1056   WBC 2.52*   HGB 11.6*   HCT 34.0*   PLT 90*       Pending Diagnostic Studies:     Procedure Component Value Units Date/Time    Stool Exam-Ova,Cysts,Parasites [[id number]] Collected: 02/03/22 0602    Order Status: Sent Lab Status: In process Updated: 02/03/22 0609    Specimen: Stool          Medications:  Reconciled Home Medications:      Medication List      START taking these medications    benzonatate 200 MG capsule  Commonly known as: TESSALON  Take 1 capsule (200 mg total) by mouth 3 (three) times daily. for 5 days     levoFLOXacin 500 MG tablet  Commonly known as: LEVAQUIN  Take 1 tablet (500 mg total) by mouth once daily. for 7 days     predniSONE 20 MG tablet  Commonly known as: DELTASONE  Take 2 tablets (40 mg total) by mouth once daily for 5 days, THEN 1 tablet (20 mg total) once daily for 5 days.  Start taking on: February 3, 2022        CONTINUE taking these medications    alfuzosin 10 mg Tb24  Commonly known as: UROXATRAL  Take 1 tablet (10 mg total) by mouth after dinner.     allopurinoL 100 MG tablet  Commonly known as: ZYLOPRIM  Take 100 mg by mouth once daily.     BD NOEMI 2ND GEN PEN NEEDLE 32 gauge x 5/32" Ndle  Generic drug: pen needle, diabetic  USE AS DIRECTED ONCE DAILY WITH BASAGLAR     levothyroxine 50 MCG tablet  Commonly known as: SYNTHROID  Take 50 mcg by mouth once daily.     lisinopriL 5 MG tablet  Commonly known as: PRINIVIL,ZESTRIL  Take 5 mg by mouth once daily.   "   metFORMIN 500 MG tablet  Commonly known as: GLUCOPHAGE  Take 500 mg by mouth 2 (two) times daily.     metoprolol tartrate 25 MG tablet  Commonly known as: LOPRESSOR  Take 25 mg by mouth 2 (two) times daily.     minoxidiL 2.5 MG tablet  Commonly known as: LONITEN  Take 2.5 mg by mouth 2 (two) times daily.     pravastatin 40 MG tablet  Commonly known as: PRAVACHOL  Take 40 mg by mouth once daily.     sildenafiL 100 MG tablet  Commonly known as: VIAGRA  Take 1 tablet (100 mg total) by mouth daily as needed for Erectile Dysfunction.     sirolimus 1 MG Tab  Commonly known as: RAPAMUNE  Take 2 mg by mouth once daily.     tacrolimus 0.5 MG Cap  Commonly known as: PROGRAF  Take 2 mg by mouth every morning. And 1.5mg in the evening     TOUJEO SOLOSTAR U-300 INSULIN 300 unit/mL (1.5 mL) Inpn pen  Generic drug: insulin glargine (TOUJEO)  Inject 30 Units into the skin nightly.        STOP taking these medications    azithromycin 250 MG tablet  Commonly known as: Z-RODOLFO     MICROLET LANCET Misc  Generic drug: lancets            Indwelling Lines/Drains at time of discharge:   Lines/Drains/Airways     None               Physical Exam  Cardiovascular:      Rate and Rhythm: Normal rate.   Neurological:      Mental Status: He is alert and oriented to person, place, and time.       Time spent on the discharge of patient: 45  minutes         Solomon Gardner MD  Department of Hospital Medicine  [facility]

## 2022-02-03 NOTE — PLAN OF CARE
Problem: Adult Inpatient Plan of Care  Goal: Plan of Care Review  Outcome: Met  Goal: Patient-Specific Goal (Individualized)  Outcome: Met  Goal: Absence of Hospital-Acquired Illness or Injury  Outcome: Met  Goal: Optimal Comfort and Wellbeing  Outcome: Met  Goal: Readiness for Transition of Care  Outcome: Met     Problem: Gas Exchange Impaired  Goal: Optimal Gas Exchange  Outcome: Met     Problem: Fall Injury Risk  Goal: Absence of Fall and Fall-Related Injury  Outcome: Met     Problem: Skin Injury Risk Increased  Goal: Skin Health and Integrity  Outcome: Met

## 2022-02-03 NOTE — NURSING
IV from left AC removed.  Cannula in tact.  Site without redness, swelling, heat or pain.  Pressure dressing applied.  D/C instructions along with prescriptions from pharmacy given.  Allowed time for questions/answers.  Patient did not want to be wheeled by w/c.  Escorted patient to front for discharge.  Steady gait, denies any pain.

## 2022-02-03 NOTE — PLAN OF CARE
[facility]  Initial Discharge Assessment       Primary Care Provider: Primary Doctor No    Admission Diagnosis: Pneumonia of both lungs due to infectious organism, unspecified part of lung [J18.9]    Admission Date: 2/2/2022  Expected Discharge Date: 2/3/2022         Discharge Barriers Identified: None    Payor: BLUE CROSS BLUE SHIELD / Plan: BCBS ALL OUT OF STATE / Product Type: PPO /     Extended Emergency Contact Information  Primary Emergency Contact: Meenu Jackson  Mobile Phone: [phone]  Relation: Spouse  Preferred language: English   needed? No    Discharge Plan A: Home  Discharge Plan B: Home      [facility] - [place], [place] - [address]  [address]  [place] [address]  Phone: [phone] Fax: [phone]    [id number] - [place] - [address]  [address]  [place] [address]  Phone: [phone] Fax: [phone]      Initial Assessment (most recent)     Adult Discharge Assessment - 02/03/22 1104        Discharge Assessment    Assessment Type Discharge Planning Assessment     Confirmed/corrected address, phone number and insurance Yes     Confirmed Demographics Correct on Facesheet     Source of Information patient     When was your last doctors appointment? 02/02/22     Does patient/caregiver understand observation status Yes     Reason For Admission Pneumonia     Lives With alone     Facility Arrived From: Home     Do you expect to return to your current living situation? Yes     Do you have help at home or someone to help you manage your care at home? No     Prior to hospitilization cognitive status: Alert/Oriented     Current cognitive status: Alert/Oriented     Walking or Climbing Stairs Difficulty none     Dressing/Bathing Difficulty none     Equipment Currently Used at Home none     Readmission within 30 days? No     Patient currently being followed by outpatient case management? No     Do  you currently have service(s) that help you manage your care at home? No     Do you take prescription medications? Yes     Do you have prescription coverage? Yes     Coverage BCBS     Do you have any problems affording any of your prescribed medications? No     Is the patient taking medications as prescribed? yes     Who is going to help you get home at discharge? Will drive     How do you get to doctors appointments? car, drives self     Are you on dialysis? No     Do you take coumadin? No     Discharge Plan A Home     Discharge Plan B Home     DME Needed Upon Discharge  none     Discharge Plan discussed with: Patient     Discharge Barriers Identified None

## 2022-02-03 NOTE — PLAN OF CARE
Chart and discharge orders reviewed. Patient has ambulatory referrals for GI and Family practice. Patient reports having a working glucometer at home. Patient discharged home with no further case management needs.       02/03/22 1156   Final Note   Assessment Type Final Discharge Note   Anticipated Discharge Disposition Home   What phone number can be called within the next 1-3 days to see how you are doing after discharge? [phone]

## 2022-02-08 PROBLEM — M10.9 GOUT: Status: ACTIVE | Noted: 2022-01-01

## 2022-02-08 PROBLEM — Z94.0 KIDNEY TRANSPLANT RECIPIENT: Status: ACTIVE | Noted: 2022-01-01

## 2022-02-08 PROBLEM — E07.9 THYROID DISEASE: Status: ACTIVE | Noted: 2022-01-01

## 2022-02-08 PROBLEM — Z92.25 PERSONAL HISTORY OF IMMUNOSUPRESSION THERAPY: Status: ACTIVE | Noted: 2022-01-01

## 2022-02-08 PROBLEM — E08.22 DIABETES MELLITUS DUE TO UNDERLYING CONDITION WITH CHRONIC KIDNEY DISEASE, WITH LONG-TERM CURRENT USE OF INSULIN: Status: ACTIVE | Noted: 2022-01-01

## 2022-02-08 PROBLEM — Z79.4 DIABETES MELLITUS DUE TO UNDERLYING CONDITION WITH CHRONIC KIDNEY DISEASE, WITH LONG-TERM CURRENT USE OF INSULIN: Status: ACTIVE | Noted: 2022-01-01

## 2022-02-08 PROBLEM — N18.9 CHRONIC KIDNEY DISEASE: Status: ACTIVE | Noted: 2022-01-01

## 2022-02-08 PROBLEM — I10 HYPERTENSION: Status: ACTIVE | Noted: 2022-01-01

## 2022-02-08 PROBLEM — E78.5 HYPERLIPIDEMIA: Status: ACTIVE | Noted: 2022-01-01

## 2022-02-08 NOTE — PROGRESS NOTES
Subjective:       Patient ID: Edgar Jackson is a 61 y.o. male.    Chief Complaint: Follow-up    Here for hospital followup. He was admitted for one day stay 02/02/22 with pneumonia that was likely viral in nature. Was found to have elevated liver enzymes as well and was referred to GI for followup. He recently moved here from [place]. Several chronic medical issues.     Past Medical History:  No date: CKD (chronic kidney disease)  No date: Diabetes  No date: Gout  No date: Hypertension  01/2004: Kidney transplant recipient      Comment:  [place]  No date: Thyroid disease    So far has only been established with urology who referred him to endocrinology and nephrology. Has not made appointments yet.     Today he still reports feeling ill with cough. Cough is worse at night. He is afebrile. Still has a few days of levaquin and prednsione left that was prescribed at discharge.     Review of Systems   Constitutional: Negative for activity change, appetite change, chills and fever.   HENT: Negative for nasal congestion, ear discharge, ear pain and sinus pressure/congestion.    Eyes: Negative for pain and itching.   Respiratory: Positive for cough. Negative for chest tightness and shortness of breath.    Cardiovascular: Negative for chest pain and palpitations.   Gastrointestinal: Negative for abdominal pain, constipation, nausea and vomiting.   Endocrine: Negative for cold intolerance and heat intolerance.   Musculoskeletal: Negative for arthralgias, joint swelling and myalgias.   Integumentary:  Negative for color change and rash.   Neurological: Negative for dizziness, weakness and headaches.   Psychiatric/Behavioral: Negative for agitation, behavioral problems and confusion.         Objective:      Physical Exam  Vitals and nursing note reviewed.   Constitutional:       Appearance: He is well-developed and well-nourished.   HENT:      Head: Normocephalic and atraumatic.   Eyes:      Extraocular Movements: EOM normal.       Pupils: Pupils are equal, round, and reactive to light.   Cardiovascular:      Rate and Rhythm: Normal rate and regular rhythm.      Heart sounds: No murmur heard.      Pulmonary:      Effort: Pulmonary effort is normal. No respiratory distress.      Breath sounds: Normal breath sounds. No wheezing or rales.   Abdominal:      General: There is no distension.      Palpations: Abdomen is soft.      Tenderness: There is no abdominal tenderness. There is no guarding.   Musculoskeletal:         General: Normal range of motion.      Cervical back: Normal range of motion and neck supple.   Skin:     General: Skin is warm and dry.   Neurological:      Mental Status: He is alert and oriented to person, place, and time.      Deep Tendon Reflexes: Reflexes normal.   Psychiatric:         Mood and Affect: Mood and affect normal.         Behavior: Behavior normal.         Thought Content: Thought content normal.         Judgment: Judgment normal.         Assessment:       Problem List Items Addressed This Visit        Cardiac/Vascular    Hypertension    Relevant Orders    CBC Auto Differential    Comprehensive Metabolic Panel    Hemoglobin A1C    Lipid Panel    TSH    T4, Free    Hepatitis C Antibody    HIV 1/2 Ag/Ab (4th Gen)    Hyperlipidemia    Relevant Orders    CBC Auto Differential    Comprehensive Metabolic Panel    Hemoglobin A1C    Lipid Panel    TSH    T4, Free    Hepatitis C Antibody    HIV 1/2 Ag/Ab (4th Gen)       Renal/    Kidney transplant recipient    Relevant Orders    CBC Auto Differential    Comprehensive Metabolic Panel    Hemoglobin A1C    Lipid Panel    TSH    T4, Free    Hepatitis C Antibody    HIV 1/2 Ag/Ab (4th Gen)    Chronic kidney disease    Relevant Orders    CBC Auto Differential    Comprehensive Metabolic Panel    Hemoglobin A1C    Lipid Panel    TSH    T4, Free    Hepatitis C Antibody    HIV 1/2 Ag/Ab (4th Gen)       Immunology/Multi System    Personal history of immunosupression therapy     Relevant Orders    CBC Auto Differential    Comprehensive Metabolic Panel    Hemoglobin A1C    Lipid Panel    TSH    T4, Free    Hepatitis C Antibody    HIV 1/2 Ag/Ab (4th Gen)       Endocrine    Thyroid disease    Relevant Orders    CBC Auto Differential    Comprehensive Metabolic Panel    Hemoglobin A1C    Lipid Panel    TSH    T4, Free    Hepatitis C Antibody    HIV 1/2 Ag/Ab (4th Gen)    Diabetes mellitus due to underlying condition with chronic kidney disease, with long-term current use of insulin       Orthopedic    Gout    Relevant Orders    CBC Auto Differential    Comprehensive Metabolic Panel    Hemoglobin A1C    Lipid Panel    TSH    T4, Free    Hepatitis C Antibody    HIV 1/2 Ag/Ab (4th Gen)      Other Visit Diagnoses     Hospital discharge follow-up    -  Primary    Relevant Orders    CBC Auto Differential    Comprehensive Metabolic Panel    Hemoglobin A1C    Lipid Panel    TSH    T4, Free    Hepatitis C Antibody    HIV 1/2 Ag/Ab (4th Gen)          Plan:       Long was seen today for follow-up.    Diagnoses and all orders for this visit:    Hospital discharge follow-up  -     CBC Auto Differential; Future  -     Comprehensive Metabolic Panel; Future  -     Hemoglobin A1C; Future  -     Lipid Panel; Future  -     TSH; Future  -     T4, Free; Future  -     Hepatitis C Antibody; Future  -     HIV 1/2 Ag/Ab (4th Gen); Future    Chronic kidney disease, unspecified CKD stage  -     CBC Auto Differential; Future  -     Comprehensive Metabolic Panel; Future  -     Hemoglobin A1C; Future  -     Lipid Panel; Future  -     TSH; Future  -     T4, Free; Future  -     Hepatitis C Antibody; Future  -     HIV 1/2 Ag/Ab (4th Gen); Future    Kidney transplant recipient  -     CBC Auto Differential; Future  -     Comprehensive Metabolic Panel; Future  -     Hemoglobin A1C; Future  -     Lipid Panel; Future  -     TSH; Future  -     T4, Free; Future  -     Hepatitis C Antibody; Future  -     HIV 1/2 Ag/Ab (4th Gen);  Future    Hypertension, unspecified type  -     CBC Auto Differential; Future  -     Comprehensive Metabolic Panel; Future  -     Hemoglobin A1C; Future  -     Lipid Panel; Future  -     TSH; Future  -     T4, Free; Future  -     Hepatitis C Antibody; Future  -     HIV 1/2 Ag/Ab (4th Gen); Future    Thyroid disease  -     CBC Auto Differential; Future  -     Comprehensive Metabolic Panel; Future  -     Hemoglobin A1C; Future  -     Lipid Panel; Future  -     TSH; Future  -     T4, Free; Future  -     Hepatitis C Antibody; Future  -     HIV 1/2 Ag/Ab (4th Gen); Future    Gout, unspecified cause, unspecified chronicity, unspecified site  -     CBC Auto Differential; Future  -     Comprehensive Metabolic Panel; Future  -     Hemoglobin A1C; Future  -     Lipid Panel; Future  -     TSH; Future  -     T4, Free; Future  -     Hepatitis C Antibody; Future  -     HIV 1/2 Ag/Ab (4th Gen); Future    Hyperlipidemia, unspecified hyperlipidemia type  -     CBC Auto Differential; Future  -     Comprehensive Metabolic Panel; Future  -     Hemoglobin A1C; Future  -     Lipid Panel; Future  -     TSH; Future  -     T4, Free; Future  -     Hepatitis C Antibody; Future  -     HIV 1/2 Ag/Ab (4th Gen); Future    Personal history of immunosupression therapy  -     CBC Auto Differential; Future  -     Comprehensive Metabolic Panel; Future  -     Hemoglobin A1C; Future  -     Lipid Panel; Future  -     TSH; Future  -     T4, Free; Future  -     Hepatitis C Antibody; Future  -     HIV 1/2 Ag/Ab (4th Gen); Future    Type 1 diabetes mellitus with other kidney complication  -     CBC Auto Differential; Future  -     Comprehensive Metabolic Panel; Future  -     Hemoglobin A1C; Future  -     Lipid Panel; Future  -     TSH; Future  -     T4, Free; Future  -     Hepatitis C Antibody; Future  -     HIV 1/2 Ag/Ab (4th Gen); Future    Other orders  -     guaiFENesin-codeine 100-10 mg/5 ml (TUSSI-ORGANIDIN NR)  mg/5 mL syrup; Take 5 mLs by mouth  3 (three) times daily as needed for Cough.  -     albuterol (PROVENTIL HFA) 90 mcg/actuation inhaler; Inhale 2 puffs into the lungs every 6 (six) hours as needed for Wheezing (coughing). Rescue      Physical exam unremarkable today. Will add tussin ac and albuterol for his remaining symptoms.     Will recheck chemistry panel as well as general fasting labs. Advised to f/u with all specialist he is referred to. We will try to set him up at endo and nephrology today. He is already scheduled with GI next week.

## 2022-02-16 NOTE — NURSING
Ultrasound of abdomen and paracentesis scheduled @ [facility] on 2/24 @ 9am with arrival @ 830..  Pre-procedure instructions given and understanding verbalized.

## 2022-02-24 NOTE — PLAN OF CARE
Pt tolerated paracentesis well performed by dr wayne, catheter removed intact, 1.8 liters of dark yellow clear drainage obtained vs stable discharge instructions given verbalized understanding discharged to home ambulatory with wfe

## 2022-03-01 NOTE — TELEPHONE ENCOUNTER
Pt contacted [facility]. Spoke to wife who states pt was hospitalized previously for PNA and was discharged on 2/3 with cough medication. Wife states pt is out of cough meds and his cough is getting worse and pt is also having SOB that is getting worse. Especially when lying on left side or doing activity. Pt has dry cough. Advised recommendation is ED/UC/or PCP with approval. All UCs and PCP offices are closed due to the holidays. Pt refuses to go to ED. Does not feel like he is that bad that he needs to go to ED. They will wait to speak to PCP tomorrow. Gave 911 precautions and gave number to [facility] for further concerns.    Reason for Disposition   [1] Increasing difficulty breathing AND [2] always has some difficulty breathing    Additional Information   Negative: SEVERE difficulty breathing (e.g., struggling for each breath, speaks in single words)   Negative: [1] Lips or face are bluish now AND [2] persists when not coughing   Negative: Sounds like a life-threatening emergency to the triager   Negative: [1] MODERATE difficulty breathing (e.g., speaks in phrases, SOB even at rest, pulse 100-120) AND [2] still present when not coughing   Negative: Chest pain  (Exception: MILD central chest pain, present only when coughing)    Protocols used: COUGH - CHRONIC-A-AH

## 2022-03-02 NOTE — TELEPHONE ENCOUNTER
----- Message from Roz Chambers PA-C sent at 3/2/2022  4:47 PM CST -----  Prior to read on CHEST X- RAY I spoke to Dr. Madrid in radiology. He did not see significant worsening of pleural effusion but chronic fibrotic lung disease. Spoke to patient to explain results. Advised patient he needs to see pulmonology ASAP. Referral placed to pulmonology. Cough medication sent to pharmacy. Patient advised to go to ED for any worsening cough or shortness of breath. Patient verbalized understanding.

## 2022-03-02 NOTE — PROGRESS NOTES
"Subjective:       Patient ID: Edgar Jackson is a 61 y.o. male.    Chief Complaint: Cough and Fatigue    Mr. Jackson is a 61 year old male who presents to clinic with his wife. The patient was recently hospitalized with pneumonia and also found to have end stage liver disease. The patient under went a paracentesis earlier this week with Dr. Simmons. The patient complains of worsening dry cough today. This is worse with lying down. The patient reports that he also feel short of breath with lying down or exertion. The patient is requesting cough medication today.     Review of patient's allergies indicates:  No Known Allergies      Current Outpatient Medications:     albuterol (PROVENTIL HFA) 90 mcg/actuation inhaler, Inhale 2 puffs into the lungs every 6 (six) hours as needed for Wheezing (coughing). Rescue, Disp: 18 g, Rfl: 0    allopurinoL (ZYLOPRIM) 100 MG tablet, Take 100 mg by mouth once daily., Disp: , Rfl:     BD NOEMI 2ND GEN PEN NEEDLE 32 gauge x 5/32" Ndle, USE AS DIRECTED ONCE DAILY WITH BASAGLAR, Disp: , Rfl:     lisinopriL (PRINIVIL,ZESTRIL) 5 MG tablet, Take 5 mg by mouth once daily., Disp: , Rfl:     metFORMIN (GLUCOPHAGE) 500 MG tablet, Take 500 mg by mouth 2 (two) times daily., Disp: , Rfl:     metoprolol tartrate (LOPRESSOR) 50 MG tablet, Take 50 mg by mouth 2 (two) times daily., Disp: , Rfl:     minoxidiL (LONITEN) 2.5 MG tablet, Take 2.5 mg by mouth 2 (two) times daily., Disp: , Rfl:     pravastatin (PRAVACHOL) 40 MG tablet, Take 40 mg by mouth once daily., Disp: , Rfl:     sildenafiL (VIAGRA) 100 MG tablet, Take 1 tablet (100 mg total) by mouth daily as needed for Erectile Dysfunction., Disp: 6 tablet, Rfl: 6    sirolimus (RAPAMUNE) 1 MG Tab, Take 2 mg by mouth once daily., Disp: , Rfl:     tacrolimus (PROGRAF) 0.5 MG Cap, Take 2 mg by mouth every morning. And 1.5mg in the evening, Disp: , Rfl:     TOUJEO SOLOSTAR U-300 INSULIN 300 unit/mL (1.5 mL) InPn pen, Inject 30 Units into the skin " nightly., Disp: , Rfl:     alfuzosin (UROXATRAL) 10 mg Tb24, Take 1 tablet (10 mg total) by mouth after dinner., Disp: 30 tablet, Rfl: 12    Lab Results   Component Value Date    WBC 3.43 (L) 02/09/2022    HGB 12.7 (L) 02/09/2022    HCT 38.2 (L) 02/09/2022     (L) 02/09/2022    CHOL 156 02/09/2022    TRIG 155 (H) 02/09/2022    HDL 38 (L) 02/09/2022    ALT 37 02/09/2022    AST 86 (H) 02/09/2022     (L) 02/09/2022    K 5.2 (H) 02/09/2022     02/09/2022    CREATININE 2.0 (H) 02/09/2022    BUN 47 (H) 02/09/2022    CO2 19 (L) 02/09/2022    TSH 1.536 02/09/2022    INR 1.3 02/24/2022    HGBA1C 6.6 (H) 02/09/2022       Review of Systems   Constitutional: Positive for activity change, appetite change and fatigue. Negative for fever.   HENT: Negative for postnasal drip, rhinorrhea and sinus pressure.    Eyes: Negative for visual disturbance.   Respiratory: Positive for cough and shortness of breath.    Cardiovascular: Positive for leg swelling. Negative for chest pain.   Gastrointestinal: Positive for abdominal distention. Negative for abdominal pain.   Genitourinary: Negative for difficulty urinating and dysuria.   Musculoskeletal: Negative for arthralgias and myalgias.   Neurological: Negative for headaches.   Hematological: Negative for adenopathy.   Psychiatric/Behavioral: The patient is not nervous/anxious.        Objective:      Physical Exam  Constitutional:       Appearance: Normal appearance.   HENT:      Head: Normocephalic and atraumatic.   Eyes:      Conjunctiva/sclera: Conjunctivae normal.   Cardiovascular:      Rate and Rhythm: Normal rate and regular rhythm.   Pulmonary:      Effort: Pulmonary effort is normal. No respiratory distress.      Breath sounds: No wheezing.      Comments: Decreased breath sounds in lower lung fields  Abdominal:      Palpations: There is no mass.      Tenderness: There is no abdominal tenderness.      Comments: Mild ascites of abdomen   Musculoskeletal:      Right  lower leg: Edema present.      Left lower leg: Edema present.   Lymphadenopathy:      Cervical: No cervical adenopathy.   Skin:     Coloration: Skin is jaundiced.      Findings: No erythema.   Neurological:      Mental Status: He is alert and oriented to person, place, and time.   Psychiatric:         Behavior: Behavior normal.         Assessment:       1. Cough    2. Hypertension, unspecified type    3. Anemia, unspecified type    4. Hepatic cirrhosis, unspecified hepatic cirrhosis type, unspecified whether ascites present    5. Interstitial lung disease        Plan:       Long was seen today for cough and fatigue.    Diagnoses and all orders for this visit:    Cough  -     X-Ray Chest PA And Lateral; Future  -     promethazine-dextromethorphan (PROMETHAZINE-DM) 6.25-15 mg/5 mL Syrp; Take 5 mLs by mouth nightly as needed (cough).    Hypertension, unspecified type  Controlled  Low salt diet  Anemia, unspecified type  -     CBC Auto Differential; Future    Hepatic cirrhosis, unspecified hepatic cirrhosis type, unspecified whether ascites present  -     Comprehensive Metabolic Panel; Future  -     Ammonia; Future  -     Ambulatory referral/consult to Hepatology; Future    Interstitial lung disease  -     Ambulatory referral/consult to Pulmonology; Future

## 2022-03-02 NOTE — TELEPHONE ENCOUNTER
----- Message from Leticia Silva MA sent at 3/2/2022  9:16 AM CST -----  Type:  RX Refill Request    Who Called:  pt  Refill or New Rx:  refill  RX Name and Strength:  guaiFENesin-codeine 100-10 mg/5 ml (TUSSI-ORGANIDIN NR)  mg/5 mL syrup  How is the patient currently taking it? (ex. 1XDay):  as directed  Is this a 30 day or 90 day RX:  30  Preferred Pharmacy with phone number:      [facility]/pharmacy #[id number] - [place] - [address]  [address] [place] [address]  Phone: [phone] Fax: [phone]    Local or Mail Order:  local  Ordering Provider:  demetrius Gramajo Call Back Number:  [phone] (home)     Additional Information:  pt is still coughing pretty bad & would like to know if we can send in more of above medication? Requesting a higher dosage if possible as his cough is still not better & keeping him up at night--please advise--thank you

## 2022-03-02 NOTE — TELEPHONE ENCOUNTER
Patient has been referred to pulmonology for interstitial lung disease/ pulmonary fibrosis. There is also possible left pleural effusion.  Please assist with scheduling ASAP. Thank you.

## 2022-03-04 NOTE — TELEPHONE ENCOUNTER
No new care gaps identified.  Powered by [employer]. Reference number: [id number].   3/04/2022 12:12:22 AM CST

## 2022-03-04 NOTE — DISCHARGE INSTRUCTIONS
To confirm, Your doctor has instructed you that surgery is scheduled for:   Tuesday, March 8, 2022    Endoscopy Department will call day before, March 7, 2022, for arrival time.    Please report to [facility] on [address] the morning of surgery.     Do not eat or drink anything after midnight the night before your surgery - THIS INCLUDES  WATER, GUM, MINTS AND CANDY.  YOU MAY BRUSH YOUR TEETH BUT DO NOT SWALLOW     TAKE ONLY THESE MEDICATIONS WITH A SMALL SIP OF WATER THE MORNING OF YOUR PROCEDURE:  METOPROLOL    ONLY if you are diabetic, check your sugar in the morning before your procedure.       Do not take any diabetic medicines or insulin the morning of surgery .     PLEASE NOTE:  The surgery schedule has many variables which may affect the time of your surgery case.  Family members should be available if your surgery time changes.  Plan to be here the day of your procedure between 4-6 hours.      DO NOT TAKE THESE MEDICATIONS 5-7 DAYS PRIOR to your procedure or per your surgeon's request: ASPIRIN, ALEVE, ADVIL, IBUPROFEN,  LUX SELTZER, BC , FISH OIL , VITAMIN E, HERBALS  (May take Tylenol)                                                        IMPORTANT INSTRUCTIONS    Please leave all jewelry, piercing's and valuables at home.    ONLY for patients requiring bowel prep, written instructions will be given by your doctor's office.    Make arrangements in advance for transportation home by a responsible adult.    You must make arrangements for transportation, TAXI'S, UBER'S OR LYFTS ARE NOT ALLOWED.      If you have any questions about these instructions, call Pre-Op Admit  Nursing at [phone] or the Endoscopy Department at [phone].

## 2022-03-08 NOTE — PROVATION PATIENT INSTRUCTIONS
Discharge Summary/Instructions after an Endoscopic Procedure  Patient Name: Edgar Jackson  Patient MRN: [id number]  Patient YOB: 1960 Tuesday, March 8, 2022  Mio Simmons III, MD  RESTRICTIONS:  During your procedure today, you received medications for sedation.  These   medications may affect your judgment, balance and coordination.  Therefore,   for 24 hours, you have the following restrictions:   - DO NOT drive a car, operate machinery, make legal/financial decisions,   sign important papers or drink alcohol.    ACTIVITY:  Today: no heavy lifting, straining or running due to procedural   sedation/anesthesia.  The following day: return to full activity including work.  DIET:  Eat and drink normally unless instructed otherwise.     TREATMENT FOR COMMON SIDE EFFECTS:  - Mild abdominal pain, nausea, belching, bloating or excessive gas:  rest,   eat lightly and use a heating pad.  - Sore Throat: treat with throat lozenges and/or gargle with warm salt   water.  - Because air was used during the procedure, expelling large amounts of air   from your rectum or belching is normal.  - If a bowel prep was taken, you may not have a bowel movement for 1-3 days.    This is normal.  SYMPTOMS TO WATCH FOR AND REPORT TO YOUR PHYSICIAN:  1. Abdominal pain or bloating, other than gas cramps.  2. Chest pain.  3. Back pain.  4. Signs of infection such as: chills or fever occurring within 24 hours   after the procedure.  5. Rectal bleeding, which would show as bright red, maroon, or black stools.   (A tablespoon of blood from the rectum is not serious, especially if   hemorrhoids are present.)  6. Vomiting.  7. Weakness or dizziness.  GO DIRECTLY TO THE NEAREST EMERGENCY ROOM IF YOU HAVE ANY OF THE FOLLOWING:      Difficulty breathing              Chills and/or fever over 101 F   Persistent vomiting and/or vomiting blood   Severe abdominal pain   Severe chest pain   Black, tarry stools   Bleeding- more than one  tablespoon   Any other symptom or condition that you feel may need urgent attention  Your doctor recommends these additional instructions:  If any biopsies were taken, your doctors clinic will contact you in 1 to 2   weeks with any results.  - Discharge patient to home.   - Patient has a contact number available for emergencies.  The signs and   symptoms of potential delayed complications were discussed with the   patient.  Return to normal activities tomorrow.  Written discharge   instructions were provided to the patient.   - Resume regular diet.   - Continue present medications.   - Await path results.   - F/u serologic w/u and consider referral to tertiary facility +/- IJ guided   bx  For questions, problems or results please call your physician - Mio Simmons III, MD at Work:  [phone].  [facility], EMERGENCY ROOM PHONE NUMBER: [phone]  IF A COMPLICATION OR EMERGENCY SITUATION ARISES AND YOU ARE UNABLE TO REACH   YOUR PHYSICIAN - GO DIRECTLY TO THE EMERGENCY ROOM.  Mio Simmons III, MD  3/8/2022 10:41:32 AM  This report has been verified and signed electronically.  Dear patient,  As a result of recent federal legislation (The Federal Cures Act), you may   receive lab or pathology results from your procedure in your [facility]   account before your physician is able to contact you. Your physician or   their representative will relay the results to you with their   recommendations at their soonest availability.  Thank you,  PROVATION

## 2022-03-08 NOTE — PROVATION PATIENT INSTRUCTIONS
Discharge Summary/Instructions after an Endoscopic Procedure  Patient Name: Edgar Jackson  Patient MRN: [id number]  Patient YOB: 1960 Tuesday, March 8, 2022  Mio Simmons III, MD  RESTRICTIONS:  During your procedure today, you received medications for sedation.  These   medications may affect your judgment, balance and coordination.  Therefore,   for 24 hours, you have the following restrictions:   - DO NOT drive a car, operate machinery, make legal/financial decisions,   sign important papers or drink alcohol.    ACTIVITY:  Today: no heavy lifting, straining or running due to procedural   sedation/anesthesia.  The following day: return to full activity including work.  DIET:  Eat and drink normally unless instructed otherwise.     TREATMENT FOR COMMON SIDE EFFECTS:  - Mild abdominal pain, nausea, belching, bloating or excessive gas:  rest,   eat lightly and use a heating pad.  - Sore Throat: treat with throat lozenges and/or gargle with warm salt   water.  - Because air was used during the procedure, expelling large amounts of air   from your rectum or belching is normal.  - If a bowel prep was taken, you may not have a bowel movement for 1-3 days.    This is normal.  SYMPTOMS TO WATCH FOR AND REPORT TO YOUR PHYSICIAN:  1. Abdominal pain or bloating, other than gas cramps.  2. Chest pain.  3. Back pain.  4. Signs of infection such as: chills or fever occurring within 24 hours   after the procedure.  5. Rectal bleeding, which would show as bright red, maroon, or black stools.   (A tablespoon of blood from the rectum is not serious, especially if   hemorrhoids are present.)  6. Vomiting.  7. Weakness or dizziness.  GO DIRECTLY TO THE NEAREST EMERGENCY ROOM IF YOU HAVE ANY OF THE FOLLOWING:      Difficulty breathing              Chills and/or fever over 101 F   Persistent vomiting and/or vomiting blood   Severe abdominal pain   Severe chest pain   Black, tarry stools   Bleeding- more than one  tablespoon   Any other symptom or condition that you feel may need urgent attention  Your doctor recommends these additional instructions:  If any biopsies were taken, your doctors clinic will contact you in 1 to 2   weeks with any results.  - Patient has a contact number available for emergencies.  The signs and   symptoms of potential delayed complications were discussed with the   patient.  Return to normal activities tomorrow.  Written discharge   instructions were provided to the patient.   - Resume previous diet.   - Discharge patient to home (ambulatory).   - Continue present medications.   - Repeat colonoscopy in 5 years for surveillance.   - Return to GI clinic PRN.  For questions, problems or results please call your physician - Mio Simmons III, MD at Work:  [phone].  [facility] hospital, EMERGENCY ROOM PHONE NUMBER: [phone]  IF A COMPLICATION OR EMERGENCY SITUATION ARISES AND YOU ARE UNABLE TO REACH   YOUR PHYSICIAN - GO DIRECTLY TO THE EMERGENCY ROOM.  Mio Simmons III, MD  3/8/2022 10:42:59 AM  This report has been verified and signed electronically.  Dear patient,  As a result of recent federal legislation (The Federal Cures Act), you may   receive lab or pathology results from your procedure in your [facility]   account before your physician is able to contact you. Your physician or   their representative will relay the results to you with their   recommendations at their soonest availability.  Thank you,  PROVATION

## 2022-03-08 NOTE — ANESTHESIA PREPROCEDURE EVALUATION
03/08/2022  Edgar Jackson is a 61 y.o., male.      Pre-op Assessment    I have reviewed the Patient Summary Reports.     I have reviewed the Nursing Notes. I have reviewed the NPO Status.   I have reviewed the Medications.     Review of Systems  Anesthesia Hx:  Denies Family Hx of Anesthesia complications.   Denies Personal Hx of Anesthesia complications.   Social:  Non-Smoker, No Alcohol Use    Hematology/Oncology:  Hematology Normal   Oncology Normal     EENT/Dental:EENT/Dental Normal   Cardiovascular:   Hypertension hyperlipidemia    Pulmonary:   Productive cough (white sputum) for the past week. Chest x-ray shows bilateral fibrosis and left pleural effusion.   Renal/:   Chronic Renal Disease ( renal transplant working)    Hepatic/GI:   GERD, poorly controlled Liver Disease, (Cirrhosis) Esophageal varices but no history of bleeding.   Musculoskeletal:  Musculoskeletal Normal    Neurological:  Neurology Normal    Endocrine:   Diabetes ( glucose 77 this morning), using insulin    Psych:  Psychiatric Normal           Physical Exam  General: Well nourished, Cooperative, Alert and Oriented    Airway:  Mallampati: II   Mouth Opening: Normal  TM Distance: > 6 cm  Tongue: Normal  Neck ROM: Normal ROM    Dental:  Intact    Chest/Lungs:  Clear to auscultation, Normal Respiratory Rate    Heart:  Rate: Normal  Rhythm: Regular Rhythm  Sounds: Normal        Anesthesia Plan  Type of Anesthesia, risks & benefits discussed:    Anesthesia Type: MAC  Intra-op Monitoring Plan: Standard ASA Monitors  Informed Consent: Informed consent signed with the Patient and all parties understand the risks and agree with anesthesia plan.  All questions answered.   ASA Score: 3  Anesthesia Plan Notes: POM mask    Ready For Surgery From Anesthesia Perspective.     .

## 2022-03-08 NOTE — H&P
"GASTROENTEROLOGY PRE-PROCEDURE H&P NOTE  Patient Name: Edgar Jackson  Patient MRN: [id number]  Patient : 1960    Service date: 3/8/2022    PCP: Tyler Castillo MD    No chief complaint on file.      HPI: Patient is a 61 y.o. male with PMHx as below here for evaluation of elevated LFTS / cirrhosis, varices screening and colon polyp surveillance    Past Medical History:  Past Medical History:   Diagnosis Date    CKD (chronic kidney disease)     Diabetes 2020    Gout     Hypertension     Kidney transplant [place] 2004    [place]    Thyroid disease     Unspecified cirrhosis of liver         Past Surgical History:  Past Surgical History:   Procedure Laterality Date    CATARACT EXTRACTION Right 2017    KIDNEY TRANSPLANT  2004    PORTACATH PLACEMENT      REMOVAL OF VASCULAR ACCESS PORT          Home Medications:  Medications Prior to Admission   Medication Sig Dispense Refill Last Dose    metoprolol tartrate (LOPRESSOR) 50 MG tablet Take 50 mg by mouth 2 (two) times daily.   3/8/2022 at Unknown time    alfuzosin (UROXATRAL) 10 mg Tb24 Take 1 tablet (10 mg total) by mouth after dinner. 30 tablet 12     allopurinoL (ZYLOPRIM) 100 MG tablet Take 100 mg by mouth once daily.       BD NOEMI 2ND GEN PEN NEEDLE 32 gauge x 5/32" Ndle USE AS DIRECTED ONCE DAILY WITH BASAGLAR       lactulose (CHRONULAC) 20 gram/30 mL Soln Take 30 mLs (20 g total) by mouth 2 (two) times daily. 300 mL 0     lisinopriL (PRINIVIL,ZESTRIL) 5 MG tablet Take 5 mg by mouth once daily.       metFORMIN (GLUCOPHAGE) 500 MG tablet Take 500 mg by mouth 2 (two) times daily.       minoxidiL (LONITEN) 2.5 MG tablet Take 2.5 mg by mouth 2 (two) times daily.       pravastatin (PRAVACHOL) 40 MG tablet Take 40 mg by mouth every evening.       promethazine-dextromethorphan (PROMETHAZINE-DM) 6.25-15 mg/5 mL Syrp Take 5 mLs by mouth nightly as needed (cough). 180 mL 0     sildenafiL (VIAGRA) 100 MG tablet Take 1 tablet (100 mg total) by " mouth daily as needed for Erectile Dysfunction. 6 tablet 6     sirolimus (RAPAMUNE) 1 MG Tab Take 2 mg by mouth once daily.       tacrolimus (PROGRAF) 0.5 MG Cap Take 2 mg by mouth every morning. And 1.5mg in the evening       TOUJEO SOLOSTAR U-300 INSULIN 300 unit/mL (1.5 mL) InPn pen Inject 30 Units into the skin nightly.       VENTOLIN HFA 90 mcg/actuation inhaler INHALE 2 PUFFS INTO THE LUNGS EVERY 6 (SIX) HOURS AS NEEDED FOR WHEEZING (COUGHING). RESCUE 18 g 2        Inpatient Medications:        Review of patient's allergies indicates:  No Known Allergies    Social History:   Social History     Occupational History    Not on file   Tobacco Use    Smoking status: Never Smoker    Smokeless tobacco: Never Used   Substance and Sexual Activity    Alcohol use: Not Currently    Drug use: Never    Sexual activity: Not on file       Family History:   History reviewed. No pertinent family history.    Review of Systems:  A 10 point review of systems was performed and was normal, except as mentioned in the HPI, including constitutional, HEENT, heme, lymph, cardiovascular, respiratory, gastrointestinal, genitourinary, neurologic, endocrine, psychiatric and musculoskeletal.      OBJECTIVE:    Physical Exam:  24 Hour Vital Sign Ranges: Temp:  [97.1 °F (36.2 °C)] 97.1 °F (36.2 °C)  Pulse:  [62] 62  Resp:  [16] 16  SpO2:  [97 %] 97 %  BP: (127)/(74) 127/74  Most recent vitals: /74 (BP Location: Left arm, Patient Position: Lying)   Pulse 62   Temp 97.1 °F (36.2 °C) (Temporal)   Resp 16   SpO2 97% Comment: room air   GEN: well-developed, well-nourished, awake and alert, non-toxic appearing adult  HEENT: PERRL, sclera anicteric, oral mucosa pink and moist without lesion  NECK: trachea midline; Good ROM  CV: regular rate and rhythm, no murmurs or gallops  RESP: clear to auscultation bilaterally, no wheezes, rhonci or rales  ABD: soft, non-tender, non-distended, normal bowel sounds  EXT: no swelling or edema, 2+  pulses distally  SKIN: no rashes or jaundice  PSYCH: normal affect    Labs:   No results for input(s): WBC, MCV, PLT in the last 72 hours.    Invalid input(s): HGBAU  No results for input(s): NA, K, CL, CO2, BUN, GLU in the last 72 hours.    Invalid input(s): CREA  No results for input(s): ALB in the last 72 hours.    Invalid input(s): ALKP, SGOT, SGPT, TBIL, DBIL, TPRO  No results for input(s): PT, INR, PTT in the last 72 hours.      IMPRESSION / RECOMMENDATIONS:  EGD / COlon w/ interventions as warranted. WIll add EUS / liver bx due to elevated AST to help w/ liver w/u.   RIsks, benefits, alternatives discussed in detail regarding upcoming procedures and sedation. Some of the more common endoscopic complications include but not limited to immediate or delayed perforation, bleeding, infections, pain, inadvertent injury to surrounding tissue / organs and possible need for surgical evaluation. Patient expressed understanding, all questions answered and will proceed with procedure as planned.     Mio LUIS Physcient III  3/8/2022  9:47 AM

## 2022-03-08 NOTE — TRANSFER OF CARE
Anesthesia Transfer of Care Note    Patient: Long Le    Procedure(s) Performed: Procedure(s) (LRB):  COLONOSCOPY (N/A)  ULTRASOUND, UPPER GI TRACT, ENDOSCOPIC (N/A)    Patient location: GI    Anesthesia Type: MAC    Transport from OR: Transported from OR on room air with adequate spontaneous ventilation    Post pain: adequate analgesia    Post assessment: no apparent anesthetic complications    Post vital signs: stable    Level of consciousness: awake and alert    Nausea/Vomiting: no nausea/vomiting    Complications: none    Transfer of care protocol was followed      Last vitals:   Visit Vitals  /74 (BP Location: Left arm, Patient Position: Lying)   Pulse 62   Temp 36.2 °C (97.1 °F) (Temporal)   Resp 16   SpO2 97%

## 2022-03-08 NOTE — ANESTHESIA POSTPROCEDURE EVALUATION
Anesthesia Post Evaluation    Patient: Long Le    Procedure(s) Performed: Procedure(s) (LRB):  COLONOSCOPY (N/A)  ULTRASOUND, UPPER GI TRACT, ENDOSCOPIC (N/A)    Final Anesthesia Type: MAC      Patient location during evaluation: GI PACU  Patient participation: Yes- Able to Participate  Level of consciousness: awake and alert  Post-procedure vital signs: reviewed and stable  Pain management: adequate  Airway patency: patent    PONV status at discharge: No PONV  Anesthetic complications: no      Cardiovascular status: stable  Respiratory status: unassisted  Hydration status: euvolemic  Follow-up not needed.          Vitals Value Taken Time   /61 03/08/22 1136   Temp 36.1 °C (97 °F) 03/08/22 1058   Pulse 77 03/08/22 1136   Resp 18 03/08/22 1136   SpO2 96 % 03/08/22 1136         Event Time   Out of Recovery 11:40:47         Pain/Shyla Score: Shyla Score: 10 (3/8/2022 10:58 AM)

## 2022-03-16 NOTE — PROGRESS NOTES
Chart was reviewed for overdue [facility]active [facility] Encounters (KIM)  topics  Updates were requested from care everywhere  Health Maintenance has been updated  LIN[place] immunization registry triggered

## 2022-03-17 NOTE — Clinical Note
Recommendations: -  Transjugular liver biopsy with pressure measurements, at [facility].   -  Refer patient to DR Fabian, Nephrology, in [place] for kidney transplant follow up.   -  Low salt in the diet, avoid canned, bottled and processed foods. -  Continue current meds -  CBC, CMP, PT INR every 2 months.  -  Ultrasound of abdomen and AFP every 6 months, next due in August 2022.   -  Avoid alcohol, smoking, sedatives and meds with codeine. -  Avoid high intake of Tylenol (no more than 4 extra-strength pills in one day) -  Call us if any bleeding, fevers, confusion, disorientation occur -  Endoscopy: to be done by Carlton Cotter -  Transplant option not discussed, will evaluate when MELD >15. -  Return in 2 months.

## 2022-03-17 NOTE — PROGRESS NOTES
[facility] Consultation Note    Reason for Consult:  The primary encounter diagnosis was Kidney transplanted. A diagnosis of Hepatic cirrhosis, unspecified hepatic cirrhosis type, unspecified whether ascites present was also pertinent to this visit.    PCP: Tyler Castillo   [id number] / [place] [id number]    HPI:  This is a 61 y.o. male here for evaluation of: elevated , with normal ALT,  T bili 2.2, in a patient with Kidney Transplant 18 yrs ago, donor was brother, performed in [place], [place], on rapamune 2 mg daily, prograf 2mg in AM and 1.5 mg in PM.  Local nephrologist following patient.      In Dec 2021, had intermittent protein in the urine ever since prior to kidney transplant.  Also had prostate problem, given medication for prostate. Had cough went to ER in Feb 2022, and at that time, was told he had elevated liver enzymes, they were , ALT 49 in 2/2/22 and lower again in 2/9/22 AST 86, ALT 37.  Then up again  and ALT 39 on 3/2/22.      Abd distention started a month ago, he had a  paracentesis two wks ago on 2/24/22, 1.8 liter fluid removed.   Ascitic Fluid , segs 10%, albumin <1.0 and protein <3.0.   Plt ct 80s to 115,   SCOOBY, AMA negative, A1AT 198, MM, cerulo 33.3, ferritin 860, iron sat 46%.   Hep A, B, C neg     EUS: No varices, gastritis, duodenum normal.     CT chest:  2/2/22.   Mild to moderate scattered peripheral paraseptal predominant interstitial reticular lung markings with punctate calcified nodular densities, the differential includes mild chronic granulomatous disease, sarcoid interstitial lung disease, atypical infection/viral pneumonia, and less likely edema.  2. Trace left greater than right pleural effusion and adjacent atelectasis.  3. Small pericardial effusion.  4. Small to moderate volume of upper abdominal ascites.  5. Morphologic features of cirrhosis and findings suggesting portal hypertension. Subjective splenomegaly and tiny  paraesophageal/perigastric varices.  6. Pulmonary arterial enlargement, a nonspecific finding which may suggest pulmonary arterial hypertension or pulmonary vascular congestion.  8. Scattered coronary arterial calcifications (3 vessel disease, most pronounced in the LAD and RCA distribution).    Elevated liver enzymes: Yes  Abnormal imaging: Yes  Cirrhosis: Yes  Hepatitis C: No  Hepatitis B: No  Fatty liver: No  Encephalopathy: No  Post-hospital discharge: No  Symptoms: ascites, edema    Primary hepatic manifestations:  Fatigue:Yes  Edema:Yes  Ascites:Yes  Encephalopathy:No  Abdominal pain:No  GI bleeds: No  Pruritus:No  Weight Changes:Yes, lost 20 lb in a month  Changes in Bowel habits: No  Muscle cramps:No    Risk factors for liver disease:  No jaundice  No transfusions  No IVDU  Did not snort cocaine or similar agents  Did not live with anyone with hepatitis B or C  Sexual partner not tested  No hepatotoxic medications  No exposure to industrial toxins  Alcohol: at 20-26 age, drank occasionally at parties, and on weekends. Did this x 5-6 yrs.       ROS:  Constitutional: No fevers, chills, has weight loss and  fatigue  ENT: No allergies, nosebleeds,   CV: No chest pain  Pulm: Has persistent cough, shortness of breath  Ophtho: No vision changes  GI/Liver: see HPI  Derm: No rash, itching  Heme: No swollen glands, bruising  MSK: No joint pains, joint swelling  : urinary flow intermittent, has been told he has prostate problem.  No dysuria, hematuria, decrease in urine output  Endo: No hot or cold intolerance  Neuro: No confusion, disorientation, difficulty with sleep, memory, concentration, syncope, seizure  Psych: No anxiety, depression    Medical History:  has a past medical history of CKD (chronic kidney disease), Diabetes (2020), Gout, Hypertension (2000), Kidney transplant recipient (01/2004), Thyroid disease, and Unspecified cirrhosis of liver.    Surgical History:  has a past surgical history that includes  "Kidney transplant (2004); Portacath placement (2003); Removal of vascular access port (2005); Cataract extraction (Right, 2017); Colonoscopy (N/A, 3/8/2022); and Endoscopic ultrasound of upper gastrointestinal tract (N/A, 3/8/2022).    Family History: family history is not on file..     Social History:  reports that he has never smoked. He has never used smokeless tobacco. He reports previous alcohol use. He reports that he does not use drugs.    Review of patient's allergies indicates:  No Known Allergies    Current Outpatient Rx   Medication Sig Dispense Refill    allopurinoL (ZYLOPRIM) 100 MG tablet Take 100 mg by mouth once daily.      BD NOEMI 2ND GEN PEN NEEDLE 32 gauge x 5/32" Ndle USE AS DIRECTED ONCE DAILY WITH BASAGLAR      lactulose (CHRONULAC) 20 gram/30 mL Soln Take 30 mLs (20 g total) by mouth 2 (two) times daily. 300 mL 0    lisinopriL (PRINIVIL,ZESTRIL) 5 MG tablet Take 5 mg by mouth once daily.      metFORMIN (GLUCOPHAGE) 500 MG tablet Take 500 mg by mouth 2 (two) times daily.      metoprolol tartrate (LOPRESSOR) 50 MG tablet Take 50 mg by mouth 2 (two) times daily.      minoxidiL (LONITEN) 2.5 MG tablet Take 2.5 mg by mouth 2 (two) times daily.      pravastatin (PRAVACHOL) 40 MG tablet Take 40 mg by mouth every evening.      sildenafiL (VIAGRA) 100 MG tablet Take 1 tablet (100 mg total) by mouth daily as needed for Erectile Dysfunction. 6 tablet 6    sirolimus (RAPAMUNE) 1 MG Tab Take 2 mg by mouth once daily.      tacrolimus (PROGRAF) 0.5 MG Cap Take 2 mg by mouth every morning. And 1.5mg in the evening      TOUJEO SOLOSTAR U-300 INSULIN 300 unit/mL (1.5 mL) InPn pen Inject 30 Units into the skin nightly.      VENTOLIN HFA 90 mcg/actuation inhaler INHALE 2 PUFFS INTO THE LUNGS EVERY 6 (SIX) HOURS AS NEEDED FOR WHEEZING (COUGHING). RESCUE 18 g 2    alfuzosin (UROXATRAL) 10 mg Tb24 Take 1 tablet (10 mg total) by mouth after dinner. 30 tablet 12       Objective Findings:    Vital " Signs:  BP 99/72 (BP Location: Left arm, Patient Position: Sitting, BP Method: Small (Automatic))   Pulse 79   Temp 98 °F (36.7 °C) (Temporal)   Resp 14   Wt 67 kg (147 lb 11.3 oz)   SpO2 96% Comment: RA  BMI 22.46 kg/m²   Body mass index is 22.46 kg/m².    Physical Exam:  General Appearance: chronically ill appearing in no acute distress  Head:   Normocephalic, without obvious abnormality  Eyes:    No scleral icterus, EOMI  Abdomen: Soft, non tender, mildly distended. No hepatosplenomegaly, has ascites, no mass  Extremities: 2+ pulses, no clubbing, cyanosis or edema  Skin: No rash  Neurologic: Alert, oriented x 3. No asterixis.       Labs:  Lab Results   Component Value Date    WBC 4.44 03/02/2022    HGB 13.8 (L) 03/02/2022    HCT 39.2 (L) 03/02/2022     (L) 03/02/2022    CHOL 156 02/09/2022    TRIG 155 (H) 02/09/2022    HDL 38 (L) 02/09/2022    INR 1.2 03/04/2022    CREATININE 1.6 (H) 03/02/2022    BUN 20 03/02/2022    BILITOT 2.2 (H) 03/02/2022    ALT 39 03/02/2022     (H) 03/02/2022    ALKPHOS 125 03/02/2022     (L) 03/02/2022    K 4.5 03/02/2022     03/02/2022    CO2 17 (L) 03/02/2022    TSH 1.536 02/09/2022    HGBA1C 6.6 (H) 02/09/2022       Imaging:       Endoscopy:      Assessment:  1. Kidney transplanted    2. Hepatic cirrhosis, unspecified hepatic cirrhosis type, unspecified whether ascites present    decompensated cirrhosis, with edema, ascites.  Remote history of mild alcohol intake, all serologies negative, ferritin elevated, bi sat 46%.   Will get TJ liver biopsy with venous pressure measurement.   Ascites may need paracentesis, currently mild.   Edema LE minimal  No HE, SBP, focal lesion in the liver.    Recommendations:  -  Transjugular liver biopsy with pressure measurements, at [facility].    -  Refer patient to DR Fabian, Nephrology, in [place] for kidney transplant follow up.    -  Low salt in the diet, avoid canned, bottled and processed foods.  -   Continue current meds  -  CBC, CMP, PT INR every 2 months.   -  Ultrasound of abdomen and AFP every 6 months, next due in August 2022.    -  Avoid alcohol, smoking, sedatives and meds with codeine.  -  Avoid high intake of Tylenol (no more than 4 extra-strength pills in one day)  -  Call us if any bleeding, fevers, confusion, disorientation occur  -  Endoscopy: to be done by Carlton Cotter  -  Transplant option not discussed, will evaluate when MELD >15.  -  Return in 2 months.       Follow up in about 2 months (around 5/17/2022).      Order summary:  Orders Placed This Encounter   Procedures    IR Transjugular Liver Biopsy    US Abdomen Limited    AFP Tumor Marker    CBC Auto Differential    Comprehensive Metabolic Panel    Protime-INR    Hemochromatosis DNA Analysis (PCR)    Ambulatory referral/consult to Nephrology       Thank you so much for allowing me to participate in the care of Edgar Granda MD

## 2022-03-17 NOTE — PATIENT INSTRUCTIONS
Recommendations:  -  Transjugular liver biopsy with pressure measurements, at [facility].    -  Refer patient to DR Fabian, Nephrology, in [place] for kidney transplant follow up.    -  Low salt in the diet, avoid canned, bottled and processed foods.  -  Continue current meds  -  CBC, CMP, PT INR every 2 months.   -  Ultrasound of abdomen and AFP every 6 months, next due in August 2022.    -  Avoid alcohol, smoking, sedatives and meds with codeine.  -  Avoid high intake of Tylenol (no more than 4 extra-strength pills in one day)  -  Call us if any bleeding, fevers, confusion, disorientation occur  -  Endoscopy: to be done by Carlton Cotter  -  Transplant option not discussed, will evaluate when MELD >15.  -  Return in 2 months.

## 2022-03-17 NOTE — TELEPHONE ENCOUNTER
All appointments given in person to pt and his wife. Advised to call if they haven't heard anything from Dr. Fabian office by next week. ELSIE GÓMEZ

## 2022-03-17 NOTE — TELEPHONE ENCOUNTER
----- Message from Christina Osullivan MD sent at 3/17/2022  1:00 PM CDT -----  Recommendations:  -  Transjugular liver biopsy with pressure measurements, at [facility].    -  Refer patient to DR Fabian, Nephrology, in [place] for kidney transplant follow up.    -  Low salt in the diet, avoid canned, bottled and processed foods.  -  Continue current meds  -  CBC, CMP, PT INR every 2 months.   -  Ultrasound of abdomen and AFP every 6 months, next due in August 2022.    -  Avoid alcohol, smoking, sedatives and meds with codeine.  -  Avoid high intake of Tylenol (no more than 4 extra-strength pills in one day)  -  Call us if any bleeding, fevers, confusion, disorientation occur  -  Endoscopy: to be done by Carlton Cotter  -  Transplant option not discussed, will evaluate when MELD >15.  -  Return in 2 months.

## 2022-03-18 NOTE — TELEPHONE ENCOUNTER
Patient seen in clinic in [place] with Dr Osullivan. New Orders to follow. The patient will need a TJ Liver Biopsy with venous pressure measurements.  Will send message to IR for scheduling.

## 2022-03-30 PROBLEM — R18.8 CIRRHOSIS OF LIVER WITH ASCITES: Status: ACTIVE | Noted: 2022-01-01

## 2022-03-30 PROBLEM — K74.60 CIRRHOSIS OF LIVER WITH ASCITES: Status: ACTIVE | Noted: 2022-01-01

## 2022-03-30 PROBLEM — J84.9 INTERSTITIAL LUNG DISEASE: Status: ACTIVE | Noted: 2022-01-01

## 2022-03-30 NOTE — PROGRESS NOTES
3/30/2022    Long Renetta  New Patient Consult    Chief Complaint   Patient presents with    [facility]     New patient; never smoker    Interstitial Lung Disease       HPI: Pt is a 62 yo male with kidney transplant, gout, HTN, DM2, cirrhosis presenting for new evaluation. Wife here and assists w/ history.  Pt coughing w/ yellow phlegm, duration 1 month. Denies hemoptysis. Assoc with left chest sharp pain worse w/ coughing- only started 2 days ago. Cough worse at night. Has ventolin inhaler- doesn't help.  Gets chills, no fever. Short of breath w coughing spells. Winded if he tries to walk, about 2 wks. Feels getting weaker and more difficulty walking- got cane. Winded and gasping walking in from parking lot.  Kidney transplant 18 yrs ago- renal failure due to HTN. Just moved here from [place], [place] and trying to establish nephrologist in the area.  Next week he is going to have transjugular biopsy of liver to evaluate cirrhosis- saw Dr. Osullivan recently. Had paracentesis 3 wks ago but now fluid building back up.  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  Wife also recently dx with cirrhosis.     The chief complaint problem is new to me    PFSH:  Past Medical History:   Diagnosis Date    CKD (chronic kidney disease)     Diabetes 2020    Gout     Hypertension 2000    Kidney transplant recipient 01/2004    [place]    Thyroid disease     Unspecified cirrhosis of liver          Past Surgical History:   Procedure Laterality Date    CATARACT EXTRACTION Right 2017    COLONOSCOPY N/A 3/8/2022    Procedure: COLONOSCOPY;  Surgeon: Mio Simmons III, MD;  Location[facility];  Service: Endoscopy;  Laterality: N/A;    ENDOSCOPIC ULTRASOUND OF UPPER GASTROINTESTINAL TRACT N/A 3/8/2022    Procedure: ULTRASOUND, UPPER GI TRACT, ENDOSCOPIC;  Surgeon: Mio WILL  "Troy RICHARDSON MD;  Location: [facility];  Service: Endoscopy;  Laterality: N/A;    KIDNEY TRANSPLANT  2004    PORTACATH PLACEMENT  2003    REMOVAL OF VASCULAR ACCESS PORT  2005     Social History     Tobacco Use    Smoking status: Never Smoker    Smokeless tobacco: Never Used   Substance Use Topics    Alcohol use: Not Currently    Drug use: Never     Family History   Problem Relation Age of Onset    Kidney disease Father     Heart disease Father      Review of patient's allergies indicates:  No Known Allergies    Performance Status:The patient's activity level is functions out of house.      Review of Systems:  a review of eleven systems covering constitutional, Eye, HEENT, Psych, Respiratory, Cardiac, GI, , Musculoskeletal, Endocrine, Dermatologic was negative except for pertinent findings as listed ABOVE and below:  All negative with pertinent positives as above        Exam:Comprehensive exam done. /70 (BP Location: Right arm, Patient Position: Sitting, BP Method: Large (Automatic))   Pulse 78   Resp 18   Ht 5' 8" (1.727 m)   Wt 67.9 kg (149 lb 12.8 oz)   SpO2 98% Comment: on room air at rest  BMI 22.78 kg/m²   Exam included Vitals as listed, and patient's appearance and affect and alertness and mood, oral exam for yeast and hygiene and pharynx lesions and Mallapatti (M) score, neck with inspection for jvd and masses and thyroid abnormalities and lymph nodes (supraclavicular and infraclavicular nodes and axillary also examined and noted if abn), chest exam included symmetry and effort and fremitus and percussion and auscultation, cardiac exam included rhythm and gallops and murmur and rubs and jvd and edema, abdominal exam for mass and hepatosplenomegaly and tenderness and hernias and bowel sounds, Musculoskeletal exam with muscle tone and posture and mobility/gait and  strength, and skin for rashes and cyanosis and pallor and turgor, extremity for clubbing.  Findings were normal except " for pertinent findings listed below:  Chronically ill appearing  Fine rales bilaterally, minimal pleural effusion on left by US  Moderate ascites, abdomen soft nontender  2+ pitting edema bilat ankles    Radiographs (ct chest and cxr) reviewed: view by direct vision   CXR 3/2/22- bilateral increased interstitial markings, infiltrate L costophrenic angle  CT chest 2/2/22- bilateral reticulonodular pattern ILD w/ peripheral, basilar predominance, diffuse distribution, associated minimal traction bronchiolectasis at the bases, left small pleural effusion. No mediastinal adenopathy. There are some punctate areas of calcification at the left basilar pleura and also periphery bilaterally raising question of possible asbestosis, sarcoidosis or microlithiasis    Labs reviewed     Latest Reference Range & Units 03/30/22 09:50   Platelets 150 - 450 K/uL 78 (L)      Latest Reference Range & Units 03/24/22 08:45   AFP 0.0 - 8.4 ng/mL 86.3 (H) [1]      Latest Reference Range & Units 03/04/22 16:32   CERULOPLASMIN 16.0 - 31.0 mg/dL 33.3 (H) [1]      Latest Reference Range & Units 03/30/22 09:50   PT 11.4 - 13.7 sec 15.3 (H)   INR  1.3 [1]     PFT was not done  Will obtain after paracentesis, pt likely to have poor effort due to ascites    Plan:  Clinical impression is ambiguous and will need repeated evaluation wrt looks sick- cirrhosis, ILD, needs dx. Renal transplant- needs diuresis    Long was seen today for establish care and interstitial lung disease.    Diagnoses and all orders for this visit:    Cirrhosis of liver with ascites, unspecified hepatic cirrhosis type  -     IR Paracentesis with Imaging; Future    Interstitial lung disease  -     Ambulatory referral/consult to Pulmonology    Cough  -     benzonatate (TESSALON) 200 MG capsule; Take 1 capsule (200 mg total) by mouth 3 (three) times daily as needed for Cough.    Personal history of immunosupression therapy    Kidney transplant recipient        Follow up in about 2  weeks (around 4/13/2022).    Discussed with patient above for education the following:      Patient Instructions   Will message Dr. Mancia and see if you can get seen sooner for nephrology  May need diuretics to help with fluid building up  IR paracentesis repeat to help with fluid in the abdomen  Liver biopsy upcoming then follow up to decide what is needed next  Fluid on left side of your chest is very small, not large enough to drain at this time  Scarring in the lungs- may be asbestos related, sarcoidosis or other diagnosis. May need biopsy to find out

## 2022-04-06 NOTE — DISCHARGE SUMMARY
Radiology Discharge Summary      Hospital Course: No complications    Admit Date: 4/6/2022  Discharge Date: 04/06/2022     Instructions Given to Patient: Yes  Diet: Resume prior diet  Activity: activity as tolerated and no driving for today    Description of Condition on Discharge: Stable  Vital Signs (Most Recent): Temp: 97.7 °F (36.5 °C) (04/06/22 0905)  Pulse: 87 (04/06/22 0905)  Resp: 18 (04/06/22 0905)  BP: 109/67 (04/06/22 0905)  SpO2: (!) 94 % (04/06/22 0905)    Discharge Disposition: Home    Discharge Diagnosis: Elevated LFTs s/p transjugular liver biopsy    Follow up: As scheduled    Jeovanny Maria M.D.  Interventional Radiology  Department of Radiology  Pager: [phone]

## 2022-04-06 NOTE — PROCEDURES
Radiology Post-Procedure Note    Pre Op Diagnosis: Elevated LFTs    Post Op Diagnosis: Same    Procedure: Transjugular liver biposy    Procedure performed by: Jeovanny Maria MD    Written Informed Consent Obtained: Yes    Specimen Removed: YES 3 x 19 gauge cores    Estimated Blood Loss: Minimal    Findings: Local anesthesia and moderate sedation were used.    A right-sided transjugular approach was used to performed hepatic venography, pressure measurements and liver biopsy.  Hepatic wedge pressure was 22, free hepatic vein pressure 8, right atrial pressure 5 indicating a transhepatic gradient of 14.  3 random specimens of the right hepatic lobe were obtained and sent to pathology for further evaluation.    Hemostasis of the right internal jugular vein was achieved using manual pressure and there was no hematoma.    The patient tolerated the procedure well and there were no complications.  Please see Imaging report for further details.    Jeovanny Maria M.D.  Interventional Radiology  Department of Radiology  Pager: [phone]

## 2022-04-06 NOTE — PLAN OF CARE
Pt arrived to  for paracentesis and transjugular liver biopsy. Paracentesis to be done first, followed by liver biopsy. Pt oriented to unit and staff. Plan of care reviewed with patient. Comfort measures utilized. Pt safely transferred from stretcher to procedural table. Safety strap applied, positioner pillows utilized to minimize pressure points. Blankets applied. Patient placed on continuous monitoring. RN to remain at bedside. Education accepted. Consents reviewed. See flow sheets for monitoring, medication administration, and updates.

## 2022-04-06 NOTE — H&P
Radiology History & Physical      SUBJECTIVE:     Chief Complaint: liver dysfunction    History of Present Illness:  Edgar Jackson is a 61 y.o. male who presents for paracentesis and transjugular liver biopsy.     Past Medical History:   Diagnosis Date    CKD (chronic kidney disease)     Diabetes 2020    Gout     Hypertension 2000    [place]    [place]    Thyroid disease     Unspecified cirrhosis of liver      Past Surgical History:   Procedure Laterality Date    CATARACT EXTRACTION Right 2017    COLONOSCOPY N/A 3/8/2022    Procedure: COLONOSCOPY;  Surgeon: Mio Simmons III, MD;  Location: [facility] END[facility];  Service: Endoscopy;  Laterality: N/A;    ENDOSCOPIC ULTRASOUND OF UPPER GASTROINTESTINAL TRACT N/A 3/8/2022    Procedure: ULTRASOUND, UPPER GI TRACT, ENDOSCOPIC;  Surgeon: Mio Simmons III, MD;  Location: [facility] END[facility];  Service: Endoscopy;  Laterality: N/A;    KIDNEY TRANSPLANT  2004    PORTACATH PLACEMENT  2003    REMOVAL OF VASCULAR ACCESS PORT  2005       Home Meds:   Prior to Admission medications    Medication Sig Start Date End Date Taking? Authorizing Provider   alfuzosin (UROXATRAL) 10 mg Tb24 Take 1 tablet (10 mg total) by mouth after dinner. 12/29/21 1/28/22 Yes SANJUANA Fung   allopurinoL (ZYLOPRIM) 100 MG tablet Take 100 mg by mouth once daily. 10/12/21  Yes Historical Provider   benzonatate (TESSALON) 200 MG capsule Take 1 capsule (200 mg total) by mouth 3 (three) times daily as needed for Cough. 3/31/22 4/10/22 Yes Makeda Boyer MD   lisinopriL (PRINIVIL,ZESTRIL) 5 MG tablet Take 5 mg by mouth once daily. 10/12/21  Yes Historical Provider   metFORMIN (GLUCOPHAGE) 500 MG tablet Take 500 mg by mouth 2 (two) times daily. 8/20/21  Yes Historical Provider   metoprolol tartrate (LOPRESSOR) 50 MG tablet Take 50 mg by mouth 2 (two) times daily. 1/3/22  Yes Historical Provider   minoxidiL (LONITEN) 2.5 MG tablet Take 2.5 mg by mouth 2 (two) times daily.  "1/7/22  Yes Historical Provider   pravastatin (PRAVACHOL) 40 MG tablet Take 40 mg by mouth every evening. 10/12/21  Yes Historical Provider   sirolimus (RAPAMUNE) 1 MG Tab Take 2 mg by mouth once daily. 10/15/21  Yes Historical Provider   tacrolimus (PROGRAF) 0.5 MG Cap Take 2 mg by mouth every morning. And 1.5mg in the evening 8/5/21  Yes Historical Provider   TOUJEO SOLOSTAR U-300 INSULIN 300 unit/mL (1.5 mL) InPn pen Inject 30 Units into the skin nightly. 10/12/21  Yes Historical Provider   VENTOLIN HFA 90 mcg/actuation inhaler INHALE 2 PUFFS INTO THE LUNGS EVERY 6 (SIX) HOURS AS NEEDED FOR WHEEZING (COUGHING). RESCUE 3/7/22  Yes Tyler Castillo MD   BD NOEMI 2ND GEN PEN NEEDLE 32 gauge x 5/32" Ndle USE AS DIRECTED ONCE DAILY WITH BASAGLAR 10/14/21   Historical Provider   lactulose (CHRONULAC) 20 gram/30 mL Soln Take 30 mLs (20 g total) by mouth 2 (two) times daily. 3/4/22   Roz Chambers PA-C   sildenafiL (VIAGRA) 100 MG tablet Take 1 tablet (100 mg total) by mouth daily as needed for Erectile Dysfunction. 12/29/21 12/29/22  Chiquita Mcfarlane, SANJUANA     Anticoagulants/Antiplatelets: no anticoagulation    Allergies: Review of patient's allergies indicates:  No Known Allergies  Sedation History:  no adverse reactions    Review of Systems:   Hematological: no known coagulopathies  Respiratory: no shortness of breath  Cardiovascular: no chest pain  Gastrointestinal: no abdominal pain  Genito-Urinary: no dysuria  Musculoskeletal: negative  Neurological: no TIA or stroke symptoms         OBJECTIVE:     Vital Signs (Most Recent)  Temp: 97.7 °F (36.5 °C) (04/06/22 0905)  Pulse: 87 (04/06/22 0905)  Resp: 18 (04/06/22 0905)  BP: 109/67 (04/06/22 0905)  SpO2: (!) 94 % (04/06/22 0905)    Physical Exam:  ASA: 2  Mallampati: 2    General: no acute distress  Mental Status: alert and oriented to person, place and time  HEENT: normocephalic, atraumatic  Chest: unlabored breathing  Heart: regular heart rate  Abdomen: " nondistended  Extremity: moves all extremities    Laboratory  Lab Results   Component Value Date    INR 1.3 03/30/2022       Lab Results   Component Value Date    WBC 3.47 (L) 03/30/2022    HGB 11.5 (L) 03/30/2022    HCT 33.5 (L) 03/30/2022    MCV 89 03/30/2022    PLT 78 (L) 03/30/2022      Lab Results   Component Value Date     (H) 04/06/2022     04/06/2022    K 5.3 (H) 04/06/2022     (H) 04/06/2022    CO2 16 (L) 04/06/2022    BUN 35 (H) 04/06/2022    CREATININE 3.1 (H) 04/06/2022    CALCIUM 8.4 (L) 04/06/2022    ALT 24 04/06/2022     (H) 04/06/2022    ALBUMIN 2.7 (L) 04/06/2022    BILITOT 2.1 (H) 04/06/2022       ASSESSMENT/PLAN:     Sedation Plan: up to moderate  Patient will undergo paracentesis and transjugular liver biopsy.    Carlitos Ovalles MD PGY2  Department of Radiology  [facility]

## 2022-04-06 NOTE — DISCHARGE INSTRUCTIONS
For scheduling: Call at [phone]    For questions or concerns call: MARZENA MON-FRI 8 AM- 5PM [phone]. Radiology resident on call [phone].    For immediate concerns that are not emergent, you may call our radiology clinic at: [phone]

## 2022-04-06 NOTE — TELEPHONE ENCOUNTER
IR Liver Pathology Conference Note    Patient:  Edgar Jackson  MRN:   [id number]  YOB: 1960  Date of Transplant:  N/A  Native Diagnosis:     Discussion/Plan:    Presenter: Hepatologist - Christina Osullivan MD    Reason for presenting: elevated liver function  62 y/o male with kidney transplant in [place], has had ascites, elevated transaminases, mostly AST, ALT only once. SCOOBY, AMA neg, A1At and cerulopl normal,  TJ biopsy with sinusoidal pressure of 14 mm.  Is there cirrhosis, and any etiology?    Concerns for Pathologists:     Lab Results  WBC (K/uL)   Date Value   03/30/2022 3.47 (L)   03/02/2022 4.44   02/09/2022 3.43 (L)     PLT (K/uL)   Date Value   03/30/2022 78 (L)   03/02/2022 115 (L)   02/09/2022 112 (L)     INR (no units)   Date Value   03/30/2022 1.3   03/04/2022 1.2   02/24/2022 1.3     AST (U/L)   Date Value   04/06/2022 117 (H)     ALT (U/L)   Date Value   04/06/2022 24   03/02/2022 39   02/09/2022 37     BILITOT (mg/dL)   Date Value   04/06/2022 2.1 (H)   03/02/2022 2.2 (H)   02/09/2022 1.7 (H)     ALKPHOS (U/L)   Date Value   04/06/2022 115   03/02/2022 125   02/09/2022 104     CREATININE (mg/dL)   Date Value   04/06/2022 3.1 (H)   03/02/2022 1.6 (H)   02/09/2022 2.0 (H)     AFP (ng/mL)   Date Value   03/24/2022 86.3 (H)

## 2022-04-06 NOTE — PROCEDURES
Radiology Post-Procedure Note    Pre Op Diagnosis: Ascites    Post Op Diagnosis: Same    Procedure: US guided paracentesis.    Procedure performed by: Jeovanny Maria MD    Written Informed Consent Obtained: Yes  Specimen Removed: YES serous fluid  Estimated Blood Loss: Minimal    Findings:   Successful RLQ paracentesis.  Albumin administered per protocol. No complications.    Patient tolerated procedure well.    Jeovanny Maria M.D.  Diagnostic and Interventional Radiologist  Department of Radiology  Pager: [phone]

## 2022-04-06 NOTE — Clinical Note
A pre-sedation assessment was completed by the physician immediately prior to sedation start.   
A pre-sedation assessment was completed by the physician immediately prior to sedation start.   
no

## 2022-04-06 NOTE — CARE UPDATE
Patient fully recovered from procedure. Wife and patient state full understanding of discharge instructions. Patient dressed and left for garage with escort.

## 2022-04-07 PROBLEM — E87.5 HYPERKALEMIA: Status: ACTIVE | Noted: 2022-01-01

## 2022-04-07 PROBLEM — N17.9 AKI (ACUTE KIDNEY INJURY): Status: ACTIVE | Noted: 2022-01-01

## 2022-04-07 PROBLEM — R69 MULTIPLE CHRONIC DISEASES: Status: ACTIVE | Noted: 2022-01-01

## 2022-04-07 NOTE — FIRST PROVIDER EVALUATION
Emergency Department TeleTriage Encounter Note      CHIEF COMPLAINT    Chief Complaint   Patient presents with    abnormal labs     Creat of 3.1, sent by Dr. Hedrick       VITAL SIGNS   Initial Vitals [04/07/22 1131]   BP Pulse Resp Temp SpO2   101/69 86 18 -- 98 %      MAP       --            ALLERGIES    Review of patient's allergies indicates:  No Known Allergies    PROVIDER TRIAGE NOTE      61-year-old male with history of kidney transplant, services presents ER for evaluation of ILDEFONSO.  Yesterday had blood drawn a creatinine 3.1.  Patient reports abdominal discomfort.  Patient reports that he is on 2 new medications that may be causing the ILDEFONSO    PE:  Patient alert and oriented. No acute distress    Initial orders will be placed and care will be transferred to an alternate provider when patient is roomed for a full evaluation. Any additional orders and the final disposition will be determined by that provider.    Disclaimer: This note has been generated using voice-recognition software. There may be typographical errors that have been missed during proof-reading.    ORDERS  Labs Reviewed - No data to display    ED Orders (720h ago, onward)    Start Ordered     Status Ordering Provider    04/07/22 1155 04/07/22 1154  Sirolimus Level  STAT         Ordered JOHNYKUTTY, MEHREEN    04/07/22 1153 04/07/22 1154  CBC auto differential  STAT         Ordered JOHNYKUTTY, MEHREEN    04/07/22 1153 04/07/22 1154  Comprehensive metabolic panel  STAT         Ordered JOHNYKUTTY, MEHREEN    04/07/22 1153 04/07/22 1154  Insert Saline lock IV  Once         Ordered JOHNYKUTTY, MEHREEN    04/07/22 1153 04/07/22 1154  Urinalysis, Reflex to Urine Culture Urine, Clean Catch  STAT         Ordered JOHNYKUTTY, MEHREEN    04/07/22 1153 04/07/22 1154  Tacrolimus level  STAT         Ordered JOHNYKUTTY, MEHREEN            Virtual Visit Note: The provider triage portion of this emergency department evaluation and documentation was performed via [employer],  a HIPAA-compliant telemedicine application, in concert with a tele-presenter in the room. A face to face patient evaluation with one of my colleagues will occur once the patient is placed in an emergency department room.      DISCLAIMER: This note was prepared with [employer] voice recognition transcription software. Garbled syntax, mangled pronouns, and other bizarre constructions may be attributed to that software system.

## 2022-04-07 NOTE — TELEPHONE ENCOUNTER
I have called patient and left a message on his voicemail, stating that his creatinine is elevated 3.1 on yesterday's (4/7/220 lab, increased from 1.6 on 3/2/22.  Since he has a kidney transplant he received at [facility], he needs to call them or his current nephrologist, so they can evaluate him further.    Please call patient and make sure he received above message from me, and that he will follow-up with his providers as above.    Christina Osullivan MD  Hepatology

## 2022-04-07 NOTE — ED PROVIDER NOTES
Encounter Date: 4/7/2022       History     Chief Complaint   Patient presents with    abnormal labs     Creat of 3.1, sent by Dr. Hedrick     61-year-old male with history of chronic kidney disease status post transplant 2004 in [place] on immunosuppressive therapy, diabetes mellitus, hypertension, hyperlipidemia, cirrhotic liver disease, status post liver biopsy with paracentesis done yesterday.  Patient presents to the emergency department for evaluation for acute kidney injury.  Patient on outpatient lab testing found to have creatinine of 3.1.  Patient was sent for further evaluation.  Patient states has had 2 week history of nonbloody diarrhea over last 2 weeks.  Patient denies vomiting, no hematochezia, no melena, no fever.  Patient states has chronic abdominal pain.        Review of patient's allergies indicates:  No Known Allergies  Past Medical History:   Diagnosis Date    CKD (chronic kidney disease)     Diabetes 2020    Gout     Hypertension 2000    Kidney transplant recipient 01/2004    [place]    Thyroid disease     Unspecified cirrhosis of liver      Past Surgical History:   Procedure Laterality Date    CATARACT EXTRACTION Right 2017    COLONOSCOPY N/A 3/8/2022    Procedure: COLONOSCOPY;  Surgeon: Mio Simmons III, MD;  Location: [facility];  Service: Endoscopy;  Laterality: N/A;    ENDOSCOPIC ULTRASOUND OF UPPER GASTROINTESTINAL TRACT N/A 3/8/2022    Procedure: ULTRASOUND, UPPER GI TRACT, ENDOSCOPIC;  Surgeon: Mio Simmons III, MD;  Location: [facility];  Service: Endoscopy;  Laterality: N/A;    KIDNEY TRANSPLANT  2004    PORTACATH PLACEMENT  2003    REMOVAL OF VASCULAR ACCESS PORT  2005     Family History   Problem Relation Age of Onset    Kidney disease Father     Heart disease Father      Social History     Tobacco Use    Smoking status: Never Smoker    Smokeless tobacco: Never Used   Substance Use Topics    Alcohol use: Not Currently    Drug use: Never     Review of  Systems   Constitutional: Positive for fatigue. Negative for fever.   HENT: Negative for sore throat.    Respiratory: Negative for shortness of breath.    Cardiovascular: Negative for chest pain.   Gastrointestinal: Positive for diarrhea. Negative for abdominal pain, nausea and vomiting.   Genitourinary: Negative for dysuria.   Musculoskeletal: Negative for arthralgias and back pain.   Skin: Negative for rash.   Neurological: Positive for weakness.   Hematological: Does not bruise/bleed easily.       Physical Exam     Initial Vitals   BP Pulse Resp Temp SpO2   04/07/22 1131 04/07/22 1131 04/07/22 1131 04/07/22 1218 04/07/22 1131   101/69 86 18 99.1 °F (37.3 °C) 98 %      MAP       --                Physical Exam    Nursing note and vitals reviewed.  Constitutional: He appears well-developed and well-nourished.   HENT:   Head: Normocephalic and atraumatic.   Nose: Nose normal.   Mouth/Throat: Oropharynx is clear and moist.   Eyes: Conjunctivae and EOM are normal. Pupils are equal, round, and reactive to light. No scleral icterus.   Neck: Neck supple.   Normal range of motion.  Cardiovascular: Normal rate, regular rhythm, normal heart sounds and intact distal pulses. Exam reveals no gallop and no friction rub.    No murmur heard.  Pulmonary/Chest: No stridor. No respiratory distress.   Course bilateral breath sounds no adventitious sounds   Abdominal: Abdomen is soft. Bowel sounds are normal. He exhibits no distension and no mass. There is no abdominal tenderness. There is no rebound and no guarding.   Musculoskeletal:         General: No tenderness or edema. Normal range of motion.      Cervical back: Normal range of motion and neck supple.     Lymphadenopathy:     He has no cervical adenopathy.   Neurological: He is alert and oriented to person, place, and time. He has normal strength and normal reflexes. No cranial nerve deficit or sensory deficit. GCS score is 15. GCS eye subscore is 4. GCS verbal subscore is 5.  GCS motor subscore is 6.   Skin: Skin is warm and dry. Capillary refill takes less than 2 seconds. No rash noted.   Psychiatric: He has a normal mood and affect. His behavior is normal. Judgment and thought content normal.         ED Course   Procedures  Labs Reviewed   CBC W/ AUTO DIFFERENTIAL - Abnormal; Notable for the following components:       Result Value    WBC 3.50 (*)     RBC 3.83 (*)     Hemoglobin 11.6 (*)     Hematocrit 33.8 (*)     Platelets 52 (*)     All other components within normal limits   COMPREHENSIVE METABOLIC PANEL - Abnormal; Notable for the following components:    Sodium 132 (*)     Potassium 5.4 (*)     CO2 16 (*)     Glucose 216 (*)     BUN 34 (*)     Creatinine 2.7 (*)     Calcium 8.6 (*)     Albumin 3.0 (*)     Total Bilirubin 2.3 (*)      (*)     eGFR if  28.3 (*)     eGFR if non  24.5 (*)     All other components within normal limits   URINALYSIS, REFLEX TO URINE CULTURE   TACROLIMUS LEVEL   SIROLIMUS LEVEL          Imaging Results          X-Ray Chest PA And Lateral (Final result)  Result time 04/07/22 15:08:26    Final result by Jarred Lora MD (04/07/22 15:08:26)                 Narrative:    CLINICAL HISTORY:  61 years (1960) Male Creat of 3.1, sent by Dr. Hedrick, cough    TECHNIQUE:  PA and lateral radiograph of the chest.    COMPARISON:  Most recent radiograph from February 2, 2022    FINDINGS:  Slight interval worsening, mild scattered peripheral basilar predominant interstitial opacities, slightly worse from the previous exam with lower lung volumes suggesting a combination of atelectasis, scarring and less likely a trace edema or atypical infection/viral pneumonia in the appropriate clinical setting. Costophrenic angles are seen without effusion. No pneumothorax is identified. The heart is normal in size. The mediastinum is within normal limits. Osseous structures appear within normal limits. The visualized upper abdomen is  unremarkable.    IMPRESSION:  Mild scattered peripheral basilar predominant interstitial opacities bilaterally suggesting a combination of atelectasis, mild atypical/viral pneumonia, and scarring.                  .            Electronically signed by:  Jarred Lora MD  4/7/2022 3:08 PM CDT Workstation: [id number]                               Medications   0.9%  NaCl infusion (0 mLs Intravenous Stopped 4/7/22 1010)     Medical Decision Making:   Initial Assessment:   61-year-old male with history of chronic kidney disease status post transplant 2004 in [place] on immunosuppressive therapy, diabetes mellitus, hypertension, hyperlipidemia, cirrhotic liver disease, status post liver biopsy with paracentesis done yesterday.  Patient presents to the emergency department for evaluation for acute kidney injury.  Patient on outpatient lab testing found to have creatinine of 3.1.  Patient was sent for further evaluation.  Patient states has had 2 week history of nonbloody diarrhea over last 2 weeks.  Patient denies vomiting, no hematochezia, no melena, no fever.  Patient states has chronic abdominal pain.        Differential Diagnosis:   Dehydration, acute on chronic kidney injury, tacrolimus toxicity, diuretic induced ILDEFONSO (spironolactone, Lasix)  Clinical Tests:   Lab Tests: Ordered and Reviewed  Radiological Study: Ordered and Reviewed  ED Management:  Patient seen evaluated emergency department.  Currently at this time patient states that he has been taking spironolactone 50 mg daily as well as Lasix 20 mg daily.  Patient currently this time non toxic appearing.  Repeat labs today showed BUN 34 creatinine 2.7, potassium 5.4, bicarb 16. Patient baseline creatinine has ranged from 1.0-2.0 over last 2-3 months.  Patient has been experiencing chronic diarrhea as well over last 2 weeks.  Currently under Gastroenterology care.  Patient emergency department was given IV hydration.  Patient's case discussed with Nephrology.   Current plan is to hold diuretics, tarcolimus level was obtained.  Patient will have repeat BUN and creatinine this week.  Is to follow-up with nephrology as scheduled.  Return to emergency department if problems persist worsen including vomiting, weakness, fever or additional concerns.    As per discussion with Dr. Vance, recommendations are to admit patient for continued IV hydration.  Order stool studies.  Also a dedicated renal ultrasound to evaluate graft kidney.  Begin bicarb drip at 130 ml/hr.  Repeat creatinine lab studies in morning.  Case will be discussed with Hospital Medicine pending admission.                      Clinical Impression:   Final diagnoses:  [N17.9, N18.9] Acute kidney injury superimposed on chronic kidney disease (Primary)  [Z94.0] History of renal transplant          ED Disposition Condition    Observation               Sai Woodard MD  04/07/22 1544       Sai Woodard MD  04/07/22 1546

## 2022-04-07 NOTE — HPI
Mr. Jackson is a 61-year-old male with a past medical history of renal failure status post renal transplant 2004, NIDDM2, ILD, and cirrhosis who presents today with complaints of elevated creatinine on outpatient labs.  It is severe.  It is associated with recent liver biopsy and paracentesis and recent addition of Lasix and spironolactone.  He denies fever, chills, nausea, vomiting, diarrhea, chest pain, shortness of breath, loss of consciousness.  He has a chronic cough that is productive of clear/white/yellow sputum at baseline which he states has worsened over the last 2 months. He had a liver biopsy and paracentesis yesterday at [facility] with Dr. Osullivan and his creatinine was 3.1. He was called and referred to the ED for further work up.

## 2022-04-07 NOTE — CONSULTS
Nephrology Consult Note        Patient Name: Edgar Jackson  MRN: [id number]    Patient Class: Emergency   Admission Date: 4/7/2022  Length of Stay: 0 days  Date of Service: 4/7/2022    Attending Physician: Sai Woodard MD  Primary Care Provider: Tyler Castillo MD    Reason for Consult: ildefonso/hyponatremia/acidosis/hyperkalemia/kidney transplant/cirrhosis with ascites/anemia/diarrhea/gout/htn/hypotension/dm2    SUBJECTIVE:     HPI: 61m with kidney transplant and liver disease who recently moved to the area and is yet to establish care is admitted after paracentesis and liver biopsy on 4/6. Labs show ILDEFONSO, hyponatremia, hyperkalemia. He also complains of diarrhea. He is also reportedly on diuretic, spironolactone, ACEi. Received IVF in ER for hypotension.    Past Medical History:   Diagnosis Date    CKD (chronic kidney disease)     Diabetes 2020    Gout     Hypertension 2000    [place]    [place]    Thyroid disease     Unspecified cirrhosis of liver      Past Surgical History:   Procedure Laterality Date    CATARACT EXTRACTION Right 2017    COLONOSCOPY N/A 3/8/2022    Procedure: COLONOSCOPY;  Surgeon: Mio Simmons III, MD;  Location: [facility];  Service: Endoscopy;  Laterality: N/A;    ENDOSCOPIC ULTRASOUND OF UPPER GASTROINTESTINAL TRACT N/A 3/8/2022    Procedure: ULTRASOUND, UPPER GI TRACT, ENDOSCOPIC;  Surgeon: Mio Simmons III, MD;  Location: [facility];  Service: Endoscopy;  Laterality: N/A;    KIDNEY TRANSPLANT  2004    PORTACATH PLACEMENT  2003    REMOVAL OF VASCULAR ACCESS PORT  2005     Family History   Problem Relation Age of Onset    Kidney disease Father     Heart disease Father      Social History     Tobacco Use    Smoking status: Never Smoker    Smokeless tobacco: Never Used   Substance Use Topics    Alcohol use: Not Currently    Drug use: Never       Review of patient's allergies indicates:  No Known Allergies    Outpatient meds:  Current  "Facility-Administered Medications on File Prior to Encounter   Medication Dose Route Frequency Provider Last Rate Last Admin    [DISCONTINUED] 0.9%  NaCl infusion   Intravenous Continuous Molly Jimenez NP        [DISCONTINUED] LIDOcaine (PF) 10 mg/ml (1%) injection 10 mg  1 mL Other Once Molly Jimenez NP        [DISCONTINUED] ondansetron injection 4 mg  4 mg Intravenous Q6H PRN Jeovanny Maria MD         Current Outpatient Medications on File Prior to Encounter   Medication Sig Dispense Refill    allopurinoL (ZYLOPRIM) 100 MG tablet Take 100 mg by mouth once daily.      levothyroxine (SYNTHROID) 50 MCG tablet Take 50 mcg by mouth once daily.      lisinopriL (PRINIVIL,ZESTRIL) 5 MG tablet Take 5 mg by mouth once daily.      metFORMIN (GLUCOPHAGE) 500 MG tablet Take 500 mg by mouth 2 (two) times daily.      metoprolol tartrate (LOPRESSOR) 50 MG tablet Take 50 mg by mouth 2 (two) times daily.      minoxidiL (LONITEN) 2.5 MG tablet Take 2.5 mg by mouth 2 (two) times daily.      pravastatin (PRAVACHOL) 40 MG tablet Take 40 mg by mouth every evening.      sirolimus (RAPAMUNE) 1 MG Tab Take 2 mg by mouth once daily.      tacrolimus (PROGRAF) 0.5 MG Cap Take 2 mg by mouth every morning. And 1.5mg in the evening      TOUJEO SOLOSTAR U-300 INSULIN 300 unit/mL (1.5 mL) InPn pen Inject 30 Units into the skin nightly.      VENTOLIN HFA 90 mcg/actuation inhaler INHALE 2 PUFFS INTO THE LUNGS EVERY 6 (SIX) HOURS AS NEEDED FOR WHEEZING (COUGHING). RESCUE (Patient taking differently: Inhale 1 puff into the lungs every 6 (six) hours as needed.) 18 g 2    alfuzosin (UROXATRAL) 10 mg Tb24 Take 1 tablet (10 mg total) by mouth after dinner. 30 tablet 12    BD NOEMI 2ND GEN PEN NEEDLE 32 gauge x 5/32" Ndle USE AS DIRECTED ONCE DAILY WITH BASAGLAR      benzonatate (TESSALON) 200 MG capsule Take 1 capsule (200 mg total) by mouth 3 (three) times daily as needed for Cough. 90 capsule 1    furosemide (LASIX) 20 " MG tablet       lactulose (CHRONULAC) 20 gram/30 mL Soln Take 30 mLs (20 g total) by mouth 2 (two) times daily. 300 mL 0    sildenafiL (VIAGRA) 100 MG tablet Take 1 tablet (100 mg total) by mouth daily as needed for Erectile Dysfunction. 6 tablet 6    spironolactone (ALDACTONE) 50 MG tablet          Scheduled meds:      Infusions:      PRN meds:      Review of Systems:  Review of Systems   Constitutional: Positive for malaise/fatigue. Negative for chills, fever and weight loss.   HENT: Negative for hearing loss and nosebleeds.    Eyes: Negative for blurred vision, double vision and photophobia.   Respiratory: Negative for cough, shortness of breath and wheezing.    Cardiovascular: Negative for chest pain, palpitations and leg swelling.   Gastrointestinal: Positive for diarrhea. Negative for abdominal pain, constipation, heartburn, nausea and vomiting.   Genitourinary: Negative for dysuria, frequency and urgency.   Musculoskeletal: Negative for falls, joint pain and myalgias.   Skin: Negative for itching and rash.   Neurological: Positive for weakness. Negative for dizziness, speech change, focal weakness, loss of consciousness and headaches.   Endo/Heme/Allergies: Does not bruise/bleed easily.   Psychiatric/Behavioral: Negative for depression and substance abuse. The patient is not nervous/anxious.      OBJECTIVE:     Vital Signs and IO (Last 24H):  Temp:  [99.1 °F (37.3 °C)]   Pulse:  [80-87]   Resp:  [18]   BP: ()/(62-69)   SpO2:  [97 %-98 %]   No intake/output data recorded.    Wt Readings from Last 5 Encounters:   04/07/22 63.5 kg (140 lb)   04/06/22 68 kg (150 lb)   03/30/22 67.9 kg (149 lb 12.8 oz)   03/17/22 67 kg (147 lb 11.3 oz)   03/04/22 72.6 kg (160 lb)     Physical Exam:  Physical Exam  Constitutional:       General: He is not in acute distress.     Appearance: He is well-developed. He is not diaphoretic.   HENT:      Head: Normocephalic and atraumatic.      Mouth/Throat:      Mouth: Mucous  membranes are moist.   Eyes:      General: No scleral icterus.     Pupils: Pupils are equal, round, and reactive to light.   Cardiovascular:      Rate and Rhythm: Normal rate and regular rhythm.   Pulmonary:      Effort: Pulmonary effort is normal. No respiratory distress.      Breath sounds: No stridor.   Abdominal:      General: There is no distension.      Palpations: Abdomen is soft.   Musculoskeletal:         General: No deformity. Normal range of motion.      Cervical back: Neck supple.   Skin:     General: Skin is warm and dry.      Findings: No erythema or rash.   Neurological:      Mental Status: He is alert and oriented to person, place, and time.      Cranial Nerves: No cranial nerve deficit.   Psychiatric:         Behavior: Behavior normal.       Body mass index is 21.29 kg/m².    Laboratory:  Recent Labs   Lab 04/06/22  0747 04/07/22  1157    132*   K 5.3* 5.4*   * 106   CO2 16* 16*   BUN 35* 34*   CREATININE 3.1* 2.7*   ESTGFRAFRICA 24.0* 28.3*   EGFRNONAA 20.7* 24.5*   * 216*       Recent Labs   Lab 04/06/22  0747 04/07/22  1157   CALCIUM 8.4* 8.6*   ALBUMIN 2.7* 3.0*             No results for input(s): POCTGLUCOSE in the last 168 hours.    Recent Labs   Lab 12/30/21  0655 02/09/22  0814   Hemoglobin A1C 7.7 H 6.6 H       Recent Labs   Lab 04/07/22  1157   WBC 3.50*   HGB 11.6*   HCT 33.8*   PLT 52*   MCV 88   MCHC 34.3   MONO 11.7  0.4       Recent Labs   Lab 04/06/22  0747 04/07/22  1157   BILITOT 2.1* 2.3*   PROT 6.3 6.6   ALBUMIN 2.7* 3.0*   ALKPHOS 115 96   ALT 24 26   * 124*       Recent Labs   Lab 02/03/22  0603   Color, UA Yellow   Appearance, UA Clear   pH, UA 6.0   Specific Gravity, UA 1.010   Protein, UA Negative   Glucose, UA Trace A   Ketones, UA Negative   Urobilinogen, UA Negative   Bilirubin (UA) Negative   Occult Blood UA Negative   Nitrite, UA Negative             Microbiology Results (last 7 days)     ** No results found for the last 168 hours. **         ASSESSMENT/PLAN:     AKId due to hypovolemia  Kidney transplant  Hyponatremia  Hyperkalemia  Acidosis  Hypotension  Diarrhea  DM2  Gout  Anemia  Cirrhosis s/p liver biopsy and paracentesis on 4/6  HTN  No NSAIDs, ACEI/ARB, IV contrast or other nephrotoxins.  Keep MAP > 60, SBP > 100.  Dose meds for GFR < 30 ml/min.  Renal diet - low K, low phos.  Continue IVF, change to bicarb gtt.  Treat DM2.  Diagnose diarrhea better. Consult Dr. Maguire - he knows the patient.  Tolerate asymptomatic HTN up to -160. Hold home meds.  Continue gout meds.  Check UA, obtain renal transplant US, tacrolimus levels send reportedly.  Continue home immunosuppression meds. Use pt own tacrolimus, hospital can not provide 1.5mg dose with current products available.    Thank you for allowing us to participate in the care of your patient!   We will follow the patient and provide recommendations as needed.    Patient care time was spent personally by me on the following activities:   · Obtaining a history.  · Examination of patient.  · Providing medical care at the patients bedside.  · Developing a treatment plan with patient or surrogate and bedside caregivers.  · Ordering and reviewing laboratory studies, radiographic studies, pulse oximetry.  · Ordering and performing treatments and interventions.  · Evaluation of patient's response to treatment.  · Discussions with consultants while on the unit and immediately available to the patient.  · Re-evaluation of the patient's condition.  · Documentation in the medical record.     Dion Leo MD    [facility]  [address] [place] [address] [place] [address]    [phone] - tel  [phone] - fax    4/7/2022

## 2022-04-07 NOTE — TELEPHONE ENCOUNTER
----- Message from Dr AYUSH Osullivan --------  I have called patient and left a message on his voicemail, stating that his creatinine is elevated 3.1 on yesterday's (4/7/220 lab, increased from 1.6 on 3/2/22.  Since he has a kidney transplant he received at [facility] program, he needs to call them or his current nephrologist, so they can evaluate him further.     Please call patient and make sure he received above message from me, and that he will follow-up with his providers as above.     Christina Osullivan MD  Hepatology     Call placed to the patient. Patient stated he has not checked his VM message.  Message relayed from Dr AYUSH Osullivan. The importance of contacting the [facility] Team or Nephrologist today.  Patient stated he will Tele Message the group in [place].   Wife stated I will call his local Nephrologist and Dr Simmons. Both verbalized understanding.  Keep us updated on how he is doing.

## 2022-04-08 PROBLEM — E03.9 HYPOTHYROIDISM: Chronic | Status: ACTIVE | Noted: 2022-01-01

## 2022-04-08 PROBLEM — D64.9 ANEMIA: Chronic | Status: ACTIVE | Noted: 2022-01-01

## 2022-04-08 PROBLEM — D69.6 THROMBOCYTOPENIA: Status: ACTIVE | Noted: 2022-01-01

## 2022-04-08 PROBLEM — R19.7 DIARRHEA: Chronic | Status: ACTIVE | Noted: 2022-01-01

## 2022-04-08 PROBLEM — E87.20 METABOLIC ACIDOSIS: Status: ACTIVE | Noted: 2022-01-01

## 2022-04-08 PROBLEM — E87.1 HYPONATREMIA: Status: ACTIVE | Noted: 2022-01-01

## 2022-04-08 NOTE — PROGRESS NOTES
Nephrology Consult Note        Patient Name: Edgar Jackson  MRN: [id number]    Patient Class: OP- Observation   Admission Date: 4/7/2022  Length of Stay: 0 days  Date of Service: 4/8/2022    Attending Physician: Nori Lezama MD  Primary Care Provider: Tyler Castillo MD    Reason for Consult: ildefonso/hyponatremia/acidosis/hyperkalemia/kidney transplant/cirrhosis with ascites/anemia/diarrhea/gout/htn/hypotension/dm2    SUBJECTIVE:     HPI: 61m with kidney transplant and liver disease who recently moved to the area and is yet to establish care is admitted after paracentesis and liver biopsy on 4/6. Labs show ILDEFONSO, hyponatremia, hyperkalemia. He also complains of diarrhea. He is also reportedly on diuretic, spironolactone, ACEi. Received IVF in ER for hypotension.    4/8 VSS, no new complains. Appreciate GI input. Start oral bicarb for acidosis.    Past Medical History:   Diagnosis Date    CKD (chronic kidney disease)     Diabetes 2020    Gout     Hypertension 2000    [place]    [place]    Thyroid disease     Unspecified cirrhosis of liver      Past Surgical History:   Procedure Laterality Date    CATARACT EXTRACTION Right 2017    COLONOSCOPY N/A 3/8/2022    Procedure: COLONOSCOPY;  Surgeon: Mio Simmons III, MD;  Location: [facility];  Service: Endoscopy;  Laterality: N/A;    ENDOSCOPIC ULTRASOUND OF UPPER GASTROINTESTINAL TRACT N/A 3/8/2022    Procedure: ULTRASOUND, UPPER GI TRACT, ENDOSCOPIC;  Surgeon: Mio Simmons III, MD;  Location: [facility];  Service: Endoscopy;  Laterality: N/A;    KIDNEY TRANSPLANT  2004    PORTACATH PLACEMENT  2003    REMOVAL OF VASCULAR ACCESS PORT  2005     Family History   Problem Relation Age of Onset    Kidney disease Father     Heart disease Father      Social History     Tobacco Use    Smoking status: Never Smoker    Smokeless tobacco: Never Used   Substance Use Topics    Alcohol use: Not Currently    Drug use: Never       Review of  "patient's allergies indicates:  No Known Allergies    Outpatient meds:  No current facility-administered medications on file prior to encounter.     Current Outpatient Medications on File Prior to Encounter   Medication Sig Dispense Refill    allopurinoL (ZYLOPRIM) 100 MG tablet Take 100 mg by mouth once daily.      levothyroxine (SYNTHROID) 50 MCG tablet Take 50 mcg by mouth once daily.      lisinopriL (PRINIVIL,ZESTRIL) 5 MG tablet Take 5 mg by mouth once daily.      metFORMIN (GLUCOPHAGE) 500 MG tablet Take 500 mg by mouth 2 (two) times daily.      metoprolol tartrate (LOPRESSOR) 50 MG tablet Take 50 mg by mouth 2 (two) times daily.      minoxidiL (LONITEN) 2.5 MG tablet Take 2.5 mg by mouth 2 (two) times daily.      pravastatin (PRAVACHOL) 40 MG tablet Take 40 mg by mouth every evening.      sirolimus (RAPAMUNE) 1 MG Tab Take 2 mg by mouth once daily.      tacrolimus (PROGRAF) 0.5 MG Cap Take 2 mg by mouth every morning. And 1.5mg in the evening      tacrolimus (PROGRAF) 1 MG Cap Take 1.5 mg by mouth every evening.      TOUJEO SOLOSTAR U-300 INSULIN 300 unit/mL (1.5 mL) InPn pen Inject 30 Units into the skin nightly.      VENTOLIN HFA 90 mcg/actuation inhaler INHALE 2 PUFFS INTO THE LUNGS EVERY 6 (SIX) HOURS AS NEEDED FOR WHEEZING (COUGHING). RESCUE (Patient taking differently: Inhale 1 puff into the lungs every 6 (six) hours as needed.) 18 g 2    alfuzosin (UROXATRAL) 10 mg Tb24 Take 1 tablet (10 mg total) by mouth after dinner. 30 tablet 12    BD NOEMI 2ND GEN PEN NEEDLE 32 gauge x 5/32" Ndle USE AS DIRECTED ONCE DAILY WITH BASAGLAR      sildenafiL (VIAGRA) 100 MG tablet Take 1 tablet (100 mg total) by mouth daily as needed for Erectile Dysfunction. 6 tablet 6    [DISCONTINUED] furosemide (LASIX) 20 MG tablet       [DISCONTINUED] spironolactone (ALDACTONE) 50 MG tablet          Scheduled meds:      Infusions:      PRN meds:      Review of Systems:  Review of Systems   Constitutional: Positive " for malaise/fatigue. Negative for chills, fever and weight loss.   HENT: Negative for hearing loss and nosebleeds.    Eyes: Negative for blurred vision, double vision and photophobia.   Respiratory: Negative for cough, shortness of breath and wheezing.    Cardiovascular: Negative for chest pain, palpitations and leg swelling.   Gastrointestinal: Positive for diarrhea. Negative for abdominal pain, constipation, heartburn, nausea and vomiting.   Genitourinary: Negative for dysuria, frequency and urgency.   Musculoskeletal: Negative for falls, joint pain and myalgias.   Skin: Negative for itching and rash.   Neurological: Positive for weakness. Negative for dizziness, speech change, focal weakness, loss of consciousness and headaches.   Endo/Heme/Allergies: Does not bruise/bleed easily.   Psychiatric/Behavioral: Negative for depression and substance abuse. The patient is not nervous/anxious.      OBJECTIVE:     Vital Signs and IO (Last 24H):  Temp:  [98.4 °F (36.9 °C)-99.7 °F (37.6 °C)]   Pulse:  [74-87]   Resp:  [18-20]   BP: ()/(58-85)   SpO2:  [94 %-98 %]   I/O last 3 completed shifts:  In: 3350 [P.O.:250; I.V.:3100]  Out: 150 [Urine:150]    Wt Readings from Last 5 Encounters:   04/08/22 63.6 kg (140 lb 3.4 oz)   04/06/22 68 kg (150 lb)   03/30/22 67.9 kg (149 lb 12.8 oz)   03/17/22 67 kg (147 lb 11.3 oz)   03/04/22 72.6 kg (160 lb)     Physical Exam:  Constitutional:       General: He is not in acute distress.     Appearance: He is well-developed. He is not diaphoretic.   HENT:      Head: Normocephalic and atraumatic.      Mouth/Throat:      Mouth: Mucous membranes are moist.   Eyes:      General: No scleral icterus.     Pupils: Pupils are equal, round, and reactive to light.   Cardiovascular:      Rate and Rhythm: Normal rate and regular rhythm.   Pulmonary:      Effort: Pulmonary effort is normal. No respiratory distress.      Breath sounds: No stridor.   Abdominal:      General: There is no distension.       Palpations: Abdomen is soft.   Musculoskeletal:         General: No deformity. Normal range of motion.      Cervical back: Neck supple.   Skin:     General: Skin is warm and dry.      Findings: No erythema or rash.   Neurological:      Mental Status: He is alert and oriented to person, place, and time.      Cranial Nerves: No cranial nerve deficit.   Psychiatric:         Behavior: Behavior normal.     Body mass index is 21.32 kg/m².    Laboratory:  Recent Labs   Lab 04/06/22  0747 04/07/22 1157 04/08/22  0517    132* 130*   K 5.3* 5.4* 5.2*   * 106 108   CO2 16* 16* 14*   BUN 35* 34* 34*   CREATININE 3.1* 2.7* 2.7*   ESTGFRAFRICA 24.0* 28.3* 28.3*   EGFRNONAA 20.7* 24.5* 24.5*   * 216* 143*       Recent Labs   Lab 04/06/22  0747 04/07/22 1157 04/08/22  0517   CALCIUM 8.4* 8.6* 7.7*   ALBUMIN 2.7* 3.0* 2.5*   MG  --   --  1.3*             No results for input(s): POCTGLUCOSE in the last 168 hours.    Recent Labs   Lab 12/30/21  0655 02/09/22  0814   Hemoglobin A1C 7.7 H 6.6 H       Recent Labs   Lab 04/07/22  1157 04/08/22  0517   WBC 3.50* 4.08   HGB 11.6* 10.0*   HCT 33.8* 29.7*   PLT 52* 52*   MCV 88 90   MCHC 34.3 33.7   MONO 11.7  0.4 12.5  0.5       Recent Labs   Lab 04/06/22  0747 04/07/22  1157 04/08/22  0517   BILITOT 2.1* 2.3* 2.0*   PROT 6.3 6.6 5.5*   ALBUMIN 2.7* 3.0* 2.5*   ALKPHOS 115 96 86   ALT 24 26 24   * 124* 110*       Recent Labs   Lab 02/03/22  0603 04/07/22  1515   Color, UA Yellow Yellow   Appearance, UA Clear Clear   pH, UA 6.0 6.0   Specific Gravity, UA 1.010 1.020   Protein, UA Negative Trace A   Glucose, UA Trace A 2+ A   Ketones, UA Negative Negative   Urobilinogen, UA Negative Negative   Bilirubin (UA) Negative Negative   Occult Blood UA Negative Negative   Nitrite, UA Negative Negative             Microbiology Results (last 7 days)     ** No results found for the last 168 hours. **        ASSESSMENT/PLAN:     AKId due to hypovolemia  Kidney  transplant  Hyponatremia  Hyperkalemia  Acidosis  Hypotension  Diarrhea  DM2  Gout  Anemia  Cirrhosis s/p liver biopsy and paracentesis on 4/6  HTN  No NSAIDs, ACEI/ARB, IV contrast or other nephrotoxins.  Keep MAP > 60, SBP > 100.  Dose meds for GFR < 30 ml/min.  Renal diet - low K, low phos.  Continue IVF, keep bicarb gtt, add oral bicarb.  Treat DM2.  Diagnose diarrhea better. GI Consult appreciated, Dr. Maguire knows the patient.  Tolerate asymptomatic HTN up to -160. Hold home meds.  Continue gout meds.  UA is OK, renal transplant US is OK, tacrolimus levels send reportedly.  Continue home immunosuppression meds. Use pt own tacrolimus, hospital can not provide 1.5mg dose with current products available.    Thank you for allowing us to participate in the care of your patient!   We will follow the patient and provide recommendations as needed.    Patient care time was spent personally by me on the following activities:   · Obtaining a history.  · Examination of patient.  · Providing medical care at the patients bedside.  · Developing a treatment plan with patient or surrogate and bedside caregivers.  · Ordering and reviewing laboratory studies, radiographic studies, pulse oximetry.  · Ordering and performing treatments and interventions.  · Evaluation of patient's response to treatment.  · Discussions with consultants while on the unit and immediately available to the patient.  · Re-evaluation of the patient's condition.  · Documentation in the medical record.     Dion Leo MD    [facility]  [address] [place] [address]  [place] Mustafa [id number]    [phone] - tel  [phone] - fax    4/8/2022

## 2022-04-08 NOTE — PROGRESS NOTES
[facility] Medicine  Progress Note    Patient Name: Edgar Jackson  MRN: [id number]  Patient Class: IP- Inpatient   Admission Date: 4/7/2022  Length of Stay: 0 days  Attending Physician: Nori Lezama MD  Primary Care Provider: Tyler Castillo MD        Subjective:     Principal Problem:ILDEFONSO (acute kidney injury)        HPI:  Mr. Jackson is a 61-year-old male with a past medical history of renal failure status post renal transplant 2004, NIDDM2, ILD, and cirrhosis who presents today with complaints of elevated creatinine on outpatient labs.  It is severe.  It is associated with recent liver biopsy and paracentesis and recent addition of Lasix and spironolactone.  He denies fever, chills, nausea, vomiting, diarrhea, chest pain, shortness of breath, loss of consciousness.  He has a chronic cough that is productive of clear/white/yellow sputum at baseline which he states has worsened over the last 2 months. He had a liver biopsy and paracentesis yesterday at [facility] with Dr. Osullivan and his creatinine was 3.1. He was called and referred to the ED for further work up.       Overview/Hospital Course:  Patient with a history of remote renal transplant, secondary to hypertensive renal failure, on chronic immunosuppressant (sirolimus and tacrolimus), diagnosis of cirrhosis who underwent recent transjugular biopsy (pathology with chronic mild to moderate active hepatitis with bridging fibrosis and nodules) as well as paracentesis (2750 cc removed) on 04/06.  He also was seen by Pulmonary for concern of interstitial lung disease and reports ongoing dyspnea on exertion with cough.  He was referred to the ED due to abnormal outpatient labs with acute kidney injury.  He was borderline hypotensive in the ED, creatinine 3.1.  Also noted to have hyponatremia, hyperkalemia with metabolic acidosis.  Ongoing diarrhea quantified as 4 bowel movements per day.  On 04/08, renal function with creatinine 2.7, minimal urine  output, on bicarbonate drip, stool studies in process, Nephrology and Gastroenterology following.      Interval History:  See hospital course.  Patient reports history of renal transplant about 18 years ago due to hypertensive renal failure.  States about 1 month ago he developed loose stools, having about 4 bowel movements per day, nonbloody, states it this has been tested in the past.  Reports urine output has been minimal since admission.  Admits to coughing, productive of small amount of phlegm, this has been affecting his sleep at nighttime.  He had transjugular hepatic biopsy as well as paracentesis on 04/06.  States he was told he had liver cirrhosis.  T-max 99.7°.  Labs with hemoglobin 10, sodium 130, potassium 5.2, BUN/creatinine 34/2.7, magnesium 1.3.  Pathology from a liver biopsy with chronic mild to moderate active hepatitis with bridging fibrosis and nodules.  Discussed with patient and wife present at bedside.    Review of Systems   Constitutional:  Positive for fatigue. Negative for fever.        Always cold   Respiratory:  Positive for cough and shortness of breath.    Cardiovascular:  Negative for leg swelling.   Gastrointestinal:  Positive for diarrhea. Negative for vomiting.   Genitourinary:         Decreased UOP   Neurological:  Positive for weakness.   Psychiatric/Behavioral:  Negative for confusion.    Objective:     Vital Signs (Most Recent):  Temp: 98.8 °F (37.1 °C) (04/08/22 1124)  Pulse: 79 (04/08/22 1124)  Resp: 18 (04/08/22 1124)  BP: 104/74 (04/08/22 1124)  SpO2: (!) 94 % (04/08/22 1124)   Vital Signs (24h Range):  Temp:  [98.4 °F (36.9 °C)-99.7 °F (37.6 °C)] 98.8 °F (37.1 °C)  Pulse:  [74-87] 79  Resp:  [18-20] 18  SpO2:  [94 %-98 %] 94 %  BP: ()/(58-85) 104/74     Weight: 63.6 kg (140 lb 3.4 oz)  Body mass index is 21.32 kg/m².    Intake/Output Summary (Last 24 hours) at 4/8/2022 1254  Last data filed at 4/8/2022 1128  Gross per 24 hour   Intake 3590 ml   Output 552 ml   Net  3038 ml      Physical Exam  Vitals and nursing note reviewed.   Constitutional:       Comments: Lying in bed, chronically ill appearing   HENT:      Head: Normocephalic and atraumatic.      Mouth/Throat:      Mouth: Mucous membranes are moist.   Cardiovascular:      Rate and Rhythm: Normal rate and regular rhythm.   Pulmonary:      Comments: On RA, inspiratory crackles at bases  Abdominal:      General: Bowel sounds are normal. There is no distension.      Palpations: Abdomen is soft.      Tenderness: There is no abdominal tenderness.   Skin:     General: Skin is warm and dry.   Neurological:      Mental Status: He is alert and oriented to person, place, and time.      Comments: Speech intact, moving all four extremities, generalized weakness   Psychiatric:         Mood and Affect: Mood normal.       Significant Labs: Blood Culture: No results for input(s): LABBLOO in the last 48 hours.  BMP:   Recent Labs   Lab 04/08/22  0517   *   *   K 5.2*      CO2 14*   BUN 34*   CREATININE 2.7*   CALCIUM 7.7*   MG 1.3*     CBC:   Recent Labs   Lab 04/07/22  1157 04/08/22  0517   WBC 3.50* 4.08   HGB 11.6* 10.0*   HCT 33.8* 29.7*   PLT 52* 52*     CMP:   Recent Labs   Lab 04/07/22  1157 04/08/22  0517   * 130*   K 5.4* 5.2*    108   CO2 16* 14*   * 143*   BUN 34* 34*   CREATININE 2.7* 2.7*   CALCIUM 8.6* 7.7*   PROT 6.6 5.5*   ALBUMIN 3.0* 2.5*   BILITOT 2.3* 2.0*   ALKPHOS 96 86   * 110*   ALT 26 24   ANIONGAP 10 8   EGFRNONAA 24.5* 24.5*     Cardiac Markers: No results for input(s): CKMB, MYOGLOBIN, BNP, TROPISTAT in the last 48 hours.  Lactic Acid: No results for input(s): LACTATE in the last 48 hours.  Lipid Panel: No results for input(s): CHOL, HDL, LDLCALC, TRIG, CHOLHDL in the last 48 hours.  Magnesium:   Recent Labs   Lab 04/08/22  0517   MG 1.3*     POCT Glucose: No results for input(s): POCTGLUCOSE in the last 48 hours.    Significant Imaging: I have reviewed and  interpreted all pertinent imaging results/findings within the past 24 hours.    X-Ray Chest PA And Lateral    Result Date: 4/7/2022  CLINICAL HISTORY: 61 years (1960) Male Creat of 3.1, sent by Dr. Hedrick, cough TECHNIQUE: PA and lateral radiograph of the chest. COMPARISON: Most recent radiograph from February 2, 2022 FINDINGS: Slight interval worsening, mild scattered peripheral basilar predominant interstitial opacities, slightly worse from the previous exam with lower lung volumes suggesting a combination of atelectasis, scarring and less likely a trace edema or atypical infection/viral pneumonia in the appropriate clinical setting. Costophrenic angles are seen without effusion. No pneumothorax is identified. The heart is normal in size. The mediastinum is within normal limits. Osseous structures appear within normal limits. The visualized upper abdomen is unremarkable. IMPRESSION: Mild scattered peripheral basilar predominant interstitial opacities bilaterally suggesting a combination of atelectasis, mild atypical/viral pneumonia, and scarring. . Electronically signed by:  Jarred Lora MD  4/7/2022 3:08 PM CDT Workstation: [id number]    US Transplant Kidney With Doppler    Result Date: 4/7/2022  Ultrasound renal transplant CLINICAL DATA: Acute renal insufficiency FINDINGS: Sonographic assessment targeted to right lower quadrant renal transplant was performed. The transplanted kidney measures 11.9 cm in longitudinal dimension. Cortical echogenicity is mildly increased. There is no mass or hydronephrosis. Doppler assessment of intrarenal arterial branches demonstrates normal waveforms, with resistive indices of 0.60 or below. The transplant renal artery demonstrates peak systolic velocity of 81 cm/s. Renal vein is patent. There is a small amount of right lower quadrant free fluid. IMPRESSION: 1. Slightly increased renal cortical echogenicity, similar in appearance to March 24, otherwise normal appearance  of right lower quadrant renal transplant. 2. Small amount of right lower quadrant free fluid. Electronically signed by:  Dez Duque MD  4/7/2022 5:05 PM CDT Workstation: [id number]    IR Paracentesis with Imaging    Result Date: 4/6/2022  EXAMINATION: Ultrasound-guided Paracentesis Procedural Personnel Attending physician(s): Jeovanny Maria MD Fellow physician(s): None Resident physician(s): None Advanced practice provider(s): None Pre-procedure diagnosis: Ascites Post-procedure diagnosis: Same Indication: Ascites Complications: No immediate complications. PROCEDURAL SUMMARY: Ultrasound-guided paracentesis PROCEDURE: Pre-procedure: Consent: Informed consent for the procedure was obtained and time-out was performed prior to the procedure. Preparation: The site was prepared and draped using maximal sterile barrier technique including cutaneous antisepsis. Anesthesia/sedation: Level of anesthesia/sedation: No sedation Anesthesia/sedation administered by: Not applicable Total intra-service sedation time (minutes): 0 Limited abdominal ultrasound: Limited abdominal ultrasound was performed. Moderate ascites. A safe window for paracentesis was identified. Paracentesis Local anesthesia was administered. The peritoneal cavity was accessed and fluid return confirmed position. Ascites was drained. Paracentesis access technique: Real-time ultrasound guidance Catheter placed: 5 Indonesian one-step Closure: The catheter was removed. A sterile bandage was applied. Post-drainage ultrasound: Not performed Additional Details: Additional description of procedure: None Equipment details: None Specimens removed: Abdominal fluid Estimated blood loss (mL): Less than 10 Standardized report: [email or web address]_v2.     Ultrasound-guided paracentesis with drainage of 2750 mL of serous fluid. Plan: Follow-up with hepatology. Attestation: Signer name: Jeovanny Maria MD I attest that I was present for the entire procedure. I reviewed the  stored images and agree with the report as written Electronically signed by: Jeovanny Maria MD Date:    04/06/2022 Time:    11:11    IR Transjugular Liver Biopsy    Result Date: 4/6/2022  EXAMINATION: Transjugular liver biopsy with pressure measurements Procedural Personnel Attending physician(s): Jeovanny Maria MD Fellow physician(s): None Resident physician(s): None Advanced practice provider(s): None Pre-procedure diagnosis: Elevated LFTs Post-procedure diagnosis: Same Indication: Elevated liver enzymes Additional clinical history: None Complications: No immediate complications. TECHNIQUE: - Venous access with ultrasound guidance - Hepatic venography - Pressure measurements - Transjugular liver biopsy with fluoroscopic guidance FINDINGS: Pre-procedure Consent: Informed consent for the procedure was obtained and time-out was performed prior to the procedure. Preparation: The site was prepared and draped using maximal sterile barrier technique including cutaneous antisepsis. Anesthesia/sedation Level of anesthesia/sedation: Moderate sedation (conscious sedation) Anesthesia/sedation administered by: Independent trained observer under attending supervision with continuous monitoring of the patient's level of consciousness and physiologic status Total intra-service sedation time (minutes): 25 Access Local anesthesia was administered. The vessel was sonographically evaluated and determined to be patent. Real time ultrasound was used to visualize needle entry into the vessel and a permanent image was stored. A 9 Libyan sheath was placed. Vein accessed: Right internal jugular vein Access technique: 18 gauge access needle Venography Vein catheterized: Right hepatic vein Indication for venography: Diagnostic venography for suspected stenosis or occlusion DSA images do not demonstrate stenosis or thrombosis. Vein catheterized: IVC Indication for venography: Diagnostic venography for suspected stenosis or occlusion DSA  images do not demonstrate stenosis or thrombosis. Pressure measurements Pressure measurements were obtained via end-hole catheter. Mean right atrial pressure (mmHg): 5 Mean free hepatic vein pressure (mmHg): 8 Mean wedged hepatic vein pressure (mmHg): 22 Mean IVC pressure (mmHg): 7 Biopsy Samples were obtained of the liver parenchyma from the hepatic vein using the transjugular liver biopsy set. Core needle biopsy device: Argon Core needle size (gauge): 19 Number of core specimens: 3 Closure The sheath was removed and hemostasis was achieved with manual compression. A sterile bandage was applied. Contrast Contrast agent: Omnipaque 300 Contrast volume (mL): 15 Radiation Dose Fluoroscopy time ( minutes): 3.8 Reference air kerma ( mGy): 23.1 Kerma area product (uGy.m2): 697.59 Additional Details Additional description of procedure: None Equipment details: None Specimens removed: Biopsy samples as detailed above Estimated blood loss (mL): Less than 10 Standardized report: [email or web address]ressures_v2 Attestation Signer name: Jeovanny Maria MD I attest that I was present for the entire procedure. I reviewed the stored images and agree with the report as written.     Transjugular liver biopsy with 3 core biopsy samples obtained. The corrected sinusoidal pressure (wedged hepatic vein pressure minus free hepatic vein pressure) is 14 mmHg. Plan: Specimen(s) sent for evaluation. _______________________________________________________________ Electronically signed by: Jeovanny Maria MD Date:    04/06/2022 Time:    11:14    US Abdomen Limited with Doppler (xpd)    Result Date: 3/24/2022  Reason: K74.69, Z94 Doppler evaluation of the hepatic vessels was performed. .0 K22.70 Nonalcoholic micronodular cirrhosis of liver, kidney transplant recipient, Black's esophagus with esophagitis; ; . COMPARISON: 2/24/2022 FINDINGS: The liver is small in size with nodular contour compatible with cirrhosis. There is no hepatic  mass. There is hepatopedal flow within the portal veins. The hepatic veins and IVC are patent. The peak systolic velocity in the right hepatic artery is 75 cm/s and in the left hepatic artery is 40 cm/s. The peak systolic velocity in the main hepatic artery 85 cm/s. There is normal waveform pattern. The gallbladder is contracted with wall thickening most likely secondary to ascites and cirrhosis. There is no biliary duct dilatation. Common bile duct measures 4 mm. There is a transplanted kidney in the right lower quadrant measuring 11.9 x 6.6 x 6.0 cm. There is no hydronephrosis. IMPRESSION: Small liver with nodular contour compatible with cirrhosis. There is moderate ascites The hepatic veins, portal veins and hepatic artery are patent Normal appearance to the transplanted kidney in the right lower quadrant Electronically signed by:  Elsi Stinson MD  3/24/2022 9:56 AM CDT Workstation: [id number]         Assessment/Plan:      Active Hospital Problems    Diagnosis    *ILDEFONSO (acute kidney injury)    Diarrhea    Hyponatremia    Metabolic acidosis    Hypothyroidism    Thrombocytopenia    Anemia    Hyperkalemia    Cirrhosis of liver with ascites    Interstitial lung disease    Diabetes mellitus due to underlying condition with chronic kidney disease, with long-term current use of insulin    Personal history of immunosupression therapy    Hypertension    Gout     Plan:  Continue care on medical floor  Ordered membrane stabilizing medication for hyperkalemia including additional bicarbonate and calcium gluconate, repeat BMP this afternoon  Continue bicarbonate infusion as per nephrology  Strict I/O and monitoring of urine output  Ordered dose of albumin today to assist with blood pressure and possible renal function  Stool studies ordered given diarrhea, will follow  Added p.r.n. anti tussive  Start Levemir 10 units q.h.s. and increased to moderate dose insulin sliding scale t.i.d. with meals and  Accu-Cheks.  Previous HbA1c 6.6%.  As needed hypoglycemic measures  Liver biopsy 4/6 with chronic mild to moderate active hepatitis with bridging fibrosis and nodules  Continue to hold home lisinopril, metformin, Lasix, spironolactone, minoxidil  Continue home immunosuppressive medication, managed by Nephrology  2 g IV magnesium supplementation and following levels  Renally dosing all medications and avoiding nephrotoxin drugs  A.m. labs ordered  Appreciate nephrology input  Consult Gastroenterology  Further plan as per clinical course  Discussed with patient and wife present at bedside    VTE Risk Mitigation (From admission, onward)         Ordered     IP VTE HIGH RISK PATIENT  Once         04/07/22 1800     Place sequential compression device  Until discontinued         04/07/22 1800                Discharge Planning   LE:      Code Status: Full Code   Is the patient medically ready for discharge?:     Reason for patient still in hospital (select all that apply): Patient trending condition  Discharge Plan A: Home, Home with family                  Nori Lezama MD  Department of Hospital Medicine   [facility]

## 2022-04-08 NOTE — CONSULTS
Nephrology Consult Note        Patient Name: Edgar Jackson  MRN: [id number]    Patient Class: OP- Observation   Admission Date: 4/7/2022  Length of Stay: 0 days  Date of Service: 4/8/2022    Attending Physician: Nori Lezama MD  Primary Care Provider: Tyler Castillo MD    Reason for Consult: ildefonso/hyponatremia/acidosis/hyperkalemia/kidney transplant/cirrhosis with ascites/anemia/diarrhea/gout/htn/hypotension/dm2    SUBJECTIVE:     HPI: 61m with kidney transplant and liver disease who recently moved to the area and is yet to establish care is admitted after paracentesis and liver biopsy on 4/6. Labs show ILDEFONSO, hyponatremia, hyperkalemia. He also complains of diarrhea. He is also reportedly on diuretic, spironolactone, ACEi. Received IVF in ER for hypotension.    4/8 VSS, no new complains.    Past Medical History:   Diagnosis Date    CKD (chronic kidney disease)     Diabetes 2020    Gout     Hypertension 2000    [place]    [place]    Thyroid disease     Unspecified cirrhosis of liver      Past Surgical History:   Procedure Laterality Date    CATARACT EXTRACTION Right 2017    COLONOSCOPY N/A 3/8/2022    Procedure: COLONOSCOPY;  Surgeon: Mio Simmons III, MD;  Location: [facility];  Service: Endoscopy;  Laterality: N/A;    ENDOSCOPIC ULTRASOUND OF UPPER GASTROINTESTINAL TRACT N/A 3/8/2022    Procedure: ULTRASOUND, UPPER GI TRACT, ENDOSCOPIC;  Surgeon: Mio Simmons III, MD;  Location: [facility];  Service: Endoscopy;  Laterality: N/A;    KIDNEY TRANSPLANT  2004    PORTACATH PLACEMENT  2003    REMOVAL OF VASCULAR ACCESS PORT  2005     Family History   Problem Relation Age of Onset    Kidney disease Father     Heart disease Father      Social History     Tobacco Use    Smoking status: Never Smoker    Smokeless tobacco: Never Used   Substance Use Topics    Alcohol use: Not Currently    Drug use: Never       Review of patient's allergies indicates:  No Known  "Allergies    Outpatient meds:  No current facility-administered medications on file prior to encounter.     Current Outpatient Medications on File Prior to Encounter   Medication Sig Dispense Refill    allopurinoL (ZYLOPRIM) 100 MG tablet Take 100 mg by mouth once daily.      levothyroxine (SYNTHROID) 50 MCG tablet Take 50 mcg by mouth once daily.      lisinopriL (PRINIVIL,ZESTRIL) 5 MG tablet Take 5 mg by mouth once daily.      metFORMIN (GLUCOPHAGE) 500 MG tablet Take 500 mg by mouth 2 (two) times daily.      metoprolol tartrate (LOPRESSOR) 50 MG tablet Take 50 mg by mouth 2 (two) times daily.      minoxidiL (LONITEN) 2.5 MG tablet Take 2.5 mg by mouth 2 (two) times daily.      pravastatin (PRAVACHOL) 40 MG tablet Take 40 mg by mouth every evening.      sirolimus (RAPAMUNE) 1 MG Tab Take 2 mg by mouth once daily.      tacrolimus (PROGRAF) 0.5 MG Cap Take 2 mg by mouth every morning. And 1.5mg in the evening      tacrolimus (PROGRAF) 1 MG Cap Take 1.5 mg by mouth every evening.      TOUJEO SOLOSTAR U-300 INSULIN 300 unit/mL (1.5 mL) InPn pen Inject 30 Units into the skin nightly.      VENTOLIN HFA 90 mcg/actuation inhaler INHALE 2 PUFFS INTO THE LUNGS EVERY 6 (SIX) HOURS AS NEEDED FOR WHEEZING (COUGHING). RESCUE (Patient taking differently: Inhale 1 puff into the lungs every 6 (six) hours as needed.) 18 g 2    alfuzosin (UROXATRAL) 10 mg Tb24 Take 1 tablet (10 mg total) by mouth after dinner. 30 tablet 12    BD NOEMI 2ND GEN PEN NEEDLE 32 gauge x 5/32" Ndle USE AS DIRECTED ONCE DAILY WITH BASAGLAR      sildenafiL (VIAGRA) 100 MG tablet Take 1 tablet (100 mg total) by mouth daily as needed for Erectile Dysfunction. 6 tablet 6    [DISCONTINUED] furosemide (LASIX) 20 MG tablet       [DISCONTINUED] spironolactone (ALDACTONE) 50 MG tablet          Scheduled meds:      Infusions:      PRN meds:      Review of Systems:  Review of Systems   Constitutional: Positive for malaise/fatigue. Negative for chills, " fever and weight loss.   HENT: Negative for hearing loss and nosebleeds.    Eyes: Negative for blurred vision, double vision and photophobia.   Respiratory: Negative for cough, shortness of breath and wheezing.    Cardiovascular: Negative for chest pain, palpitations and leg swelling.   Gastrointestinal: Positive for diarrhea. Negative for abdominal pain, constipation, heartburn, nausea and vomiting.   Genitourinary: Negative for dysuria, frequency and urgency.   Musculoskeletal: Negative for falls, joint pain and myalgias.   Skin: Negative for itching and rash.   Neurological: Positive for weakness. Negative for dizziness, speech change, focal weakness, loss of consciousness and headaches.   Endo/Heme/Allergies: Does not bruise/bleed easily.   Psychiatric/Behavioral: Negative for depression and substance abuse. The patient is not nervous/anxious.      OBJECTIVE:     Vital Signs and IO (Last 24H):  Temp:  [98.4 °F (36.9 °C)-99.7 °F (37.6 °C)]   Pulse:  [74-87]   Resp:  [18-20]   BP: ()/(58-85)   SpO2:  [94 %-98 %]   I/O last 3 completed shifts:  In: 3350 [P.O.:250; I.V.:3100]  Out: 150 [Urine:150]    Wt Readings from Last 5 Encounters:   04/08/22 63.6 kg (140 lb 3.4 oz)   04/06/22 68 kg (150 lb)   03/30/22 67.9 kg (149 lb 12.8 oz)   03/17/22 67 kg (147 lb 11.3 oz)   03/04/22 72.6 kg (160 lb)     Physical Exam:  Constitutional:       General: He is not in acute distress.     Appearance: He is well-developed. He is not diaphoretic.   HENT:      Head: Normocephalic and atraumatic.      Mouth/Throat:      Mouth: Mucous membranes are moist.   Eyes:      General: No scleral icterus.     Pupils: Pupils are equal, round, and reactive to light.   Cardiovascular:      Rate and Rhythm: Normal rate and regular rhythm.   Pulmonary:      Effort: Pulmonary effort is normal. No respiratory distress.      Breath sounds: No stridor.   Abdominal:      General: There is no distension.      Palpations: Abdomen is soft.    Musculoskeletal:         General: No deformity. Normal range of motion.      Cervical back: Neck supple.   Skin:     General: Skin is warm and dry.      Findings: No erythema or rash.   Neurological:      Mental Status: He is alert and oriented to person, place, and time.      Cranial Nerves: No cranial nerve deficit.   Psychiatric:         Behavior: Behavior normal.     Body mass index is 21.32 kg/m².    Laboratory:  Recent Labs   Lab 04/06/22  0747 04/07/22  1157 04/08/22  0517    132* 130*   K 5.3* 5.4* 5.2*   * 106 108   CO2 16* 16* 14*   BUN 35* 34* 34*   CREATININE 3.1* 2.7* 2.7*   ESTGFRAFRICA 24.0* 28.3* 28.3*   EGFRNONAA 20.7* 24.5* 24.5*   * 216* 143*       Recent Labs   Lab 04/06/22  0747 04/07/22 1157 04/08/22  0517   CALCIUM 8.4* 8.6* 7.7*   ALBUMIN 2.7* 3.0* 2.5*   MG  --   --  1.3*             No results for input(s): POCTGLUCOSE in the last 168 hours.    Recent Labs   Lab 12/30/21  0655 02/09/22  0814   Hemoglobin A1C 7.7 H 6.6 H       Recent Labs   Lab 04/07/22  1157 04/08/22  0517   WBC 3.50* 4.08   HGB 11.6* 10.0*   HCT 33.8* 29.7*   PLT 52* 52*   MCV 88 90   MCHC 34.3 33.7   MONO 11.7  0.4 12.5  0.5       Recent Labs   Lab 04/06/22  0747 04/07/22  1157 04/08/22  0517   BILITOT 2.1* 2.3* 2.0*   PROT 6.3 6.6 5.5*   ALBUMIN 2.7* 3.0* 2.5*   ALKPHOS 115 96 86   ALT 24 26 24   * 124* 110*       Recent Labs   Lab 02/03/22  0603 04/07/22  1515   Color, UA Yellow Yellow   Appearance, UA Clear Clear   pH, UA 6.0 6.0   Specific Gravity, UA 1.010 1.020   Protein, UA Negative Trace A   Glucose, UA Trace A 2+ A   Ketones, UA Negative Negative   Urobilinogen, UA Negative Negative   Bilirubin (UA) Negative Negative   Occult Blood UA Negative Negative   Nitrite, UA Negative Negative             Microbiology Results (last 7 days)     ** No results found for the last 168 hours. **        ASSESSMENT/PLAN:     AKId due to hypovolemia  Kidney  transplant  Hyponatremia  Hyperkalemia  Acidosis  Hypotension  Diarrhea  DM2  Gout  Anemia  Cirrhosis s/p liver biopsy and paracentesis on 4/6  HTN  No NSAIDs, ACEI/ARB, IV contrast or other nephrotoxins.  Keep MAP > 60, SBP > 100.  Dose meds for GFR < 30 ml/min.  Renal diet - low K, low phos.  Continue IVF, change to bicarb gtt.  Treat DM2.  Diagnose diarrhea better. Consult Dr. Maguire - he knows the patient.  Tolerate asymptomatic HTN up to -160. Hold home meds.  Continue gout meds.  Check UA, obtain renal transplant US, tacrolimus levels send reportedly.  Continue home immunosuppression meds. Use pt own tacrolimus, hospital can not provide 1.5mg dose with current products available.    Thank you for allowing us to participate in the care of your patient!   We will follow the patient and provide recommendations as needed.    Patient care time was spent personally by me on the following activities:   · Obtaining a history.  · Examination of patient.  · Providing medical care at the patients bedside.  · Developing a treatment plan with patient or surrogate and bedside caregivers.  · Ordering and reviewing laboratory studies, radiographic studies, pulse oximetry.  · Ordering and performing treatments and interventions.  · Evaluation of patient's response to treatment.  · Discussions with consultants while on the unit and immediately available to the patient.  · Re-evaluation of the patient's condition.  · Documentation in the medical record.     Dion Leo MD    [facility]  [address] [place] [address]  JOCE Mustafa [id number]    [phone] - tel  [phone] - fax    4/8/2022

## 2022-04-08 NOTE — CONSULTS
GASTROENTEROLOGY INPATIENT CONSULT NOTE  Patient Name: Edgar Jackson  Patient MRN: [id number]  Patient : 1960    Admit Date: 2022  Service date: 2022    Reason for Consult:  Cirrhosis and diarrhea    PCP: Tyler Castillo MD    Chief Complaint   Patient presents with    abnormal labs     Creat of 3.1, sent by Dr. Hedrick       HPI: Patient is a 61 y.o. male with PMHx with history of kidney transplant and chronic liver disease.  Has had worsening ascites.  Had a recent paracentesis.  Presents to the hospital with renal insufficiency and hypotension along with hypo a tree me a consistent with hypovolemia he has been having chronic diarrhea.  This has been going on for months.  Colonoscopy done earlier this year was unremarkable by Dr. Simmons.     Past Medical History:  Past Medical History:   Diagnosis Date    CKD (chronic kidney disease)     Diabetes     Gout     Hypertension     Kidney transplant recipient 2004    [place]    Thyroid disease     Unspecified cirrhosis of liver         Past Surgical History:  Past Surgical History:   Procedure Laterality Date    CATARACT EXTRACTION Right 2017    COLONOSCOPY N/A 3/8/2022    Procedure: COLONOSCOPY;  Surgeon: Mio Simmons III, MD;  Location: [facility];  Service: Endoscopy;  Laterality: N/A;    ENDOSCOPIC ULTRASOUND OF UPPER GASTROINTESTINAL TRACT N/A 3/8/2022    Procedure: ULTRASOUND, UPPER GI TRACT, ENDOSCOPIC;  Surgeon: Mio Simmons III, MD;  Location: [facility];  Service: Endoscopy;  Laterality: N/A;    KIDNEY TRANSPLANT  2004    PORTACATH PLACEMENT  2003    REMOVAL OF VASCULAR ACCESS PORT  2005        Home Medications:  Medications Prior to Admission   Medication Sig Dispense Refill Last Dose    allopurinoL (ZYLOPRIM) 100 MG tablet Take 100 mg by mouth once daily.   2022 at 20:00    levothyroxine (SYNTHROID) 50 MCG tablet Take 50 mcg by mouth once daily.   2022 at 08:00    lisinopriL (PRINIVIL,ZESTRIL) 5 MG  "tablet Take 5 mg by mouth once daily.   4/6/2022 at 08:00    metFORMIN (GLUCOPHAGE) 500 MG tablet Take 500 mg by mouth 2 (two) times daily.   4/6/2022 at 20:00    metoprolol tartrate (LOPRESSOR) 50 MG tablet Take 50 mg by mouth 2 (two) times daily.   4/6/2022 at 20:00    minoxidiL (LONITEN) 2.5 MG tablet Take 2.5 mg by mouth 2 (two) times daily.   4/6/2022 at 20:00    pravastatin (PRAVACHOL) 40 MG tablet Take 40 mg by mouth every evening.   4/6/2022 at 20:00    sirolimus (RAPAMUNE) 1 MG Tab Take 2 mg by mouth once daily.   4/7/2022 at 08:00    tacrolimus (PROGRAF) 0.5 MG Cap Take 2 mg by mouth every morning. And 1.5mg in the evening   4/6/2022 at 20:00    tacrolimus (PROGRAF) 1 MG Cap Take 1.5 mg by mouth every evening.   4/6/2022 at Unknown time    TOUJEO SOLOSTAR U-300 INSULIN 300 unit/mL (1.5 mL) InPn pen Inject 30 Units into the skin nightly.   Past Month at Unknown time    VENTOLIN HFA 90 mcg/actuation inhaler INHALE 2 PUFFS INTO THE LUNGS EVERY 6 (SIX) HOURS AS NEEDED FOR WHEEZING (COUGHING). RESCUE (Patient taking differently: Inhale 1 puff into the lungs every 6 (six) hours as needed.) 18 g 2 Past Month at Unknown time    alfuzosin (UROXATRAL) 10 mg Tb24 Take 1 tablet (10 mg total) by mouth after dinner. 30 tablet 12     BD NOEMI 2ND GEN PEN NEEDLE 32 gauge x 5/32" Ndle USE AS DIRECTED ONCE DAILY WITH BASAGLAR       sildenafiL (VIAGRA) 100 MG tablet Take 1 tablet (100 mg total) by mouth daily as needed for Erectile Dysfunction. 6 tablet 6        Inpatient Medications:   allopurinoL  100 mg Oral Daily    levothyroxine  50 mcg Oral Daily    magnesium sulfate IVPB  2 g Intravenous Once    metoprolol tartrate  50 mg Oral BID    pravastatin  40 mg Oral QHS    sirolimus  2 mg Oral Daily    tacrolimus  1.5 mg Oral Daily PM    tacrolimus  2 mg Oral Daily AM    tamsulosin  0.4 mg Oral Daily     acetaminophen, albuterol-ipratropium, dextrose 50%, dextrose 50%, dextrose 50%, dextrose 50%, glucagon " "(human recombinant), glucose, glucose, insulin regular, melatonin, naloxone, ondansetron, polyethylene glycol, sodium chloride 0.9%    Review of patient's allergies indicates:  No Known Allergies    Social History:   Social History     Occupational History    Not on file   Tobacco Use    Smoking status: Never Smoker    Smokeless tobacco: Never Used   Substance and Sexual Activity    Alcohol use: Not Currently    Drug use: Never    Sexual activity: Not on file       Family History:   Family History   Problem Relation Age of Onset    Kidney disease Father     Heart disease Father        Review of Systems:  A 10 point review of systems was performed and was normal, except as mentioned in the HPI, including constitutional, HEENT, heme, lymph, cardiovascular, respiratory, gastrointestinal, genitourinary, neurologic, endocrine, psychiatric and musculoskeletal.      OBJECTIVE:    Physical Exam:  24 Hour Vital Sign Ranges: Temp:  [98.4 °F (36.9 °C)-99.7 °F (37.6 °C)] 98.8 °F (37.1 °C)  Pulse:  [74-87] 79  Resp:  [18-20] 18  SpO2:  [94 %-98 %] 94 %  BP: ()/(58-85) 104/74  Most recent vitals: /74   Pulse 79   Temp 98.8 °F (37.1 °C) (Oral)   Resp 18   Ht 5' 8" (1.727 m)   Wt 63.6 kg (140 lb 3.4 oz)   SpO2 (!) 94%   BMI 21.32 kg/m²    GEN: well-developed, well-nourished, awake and alert, non-toxic appearing adult  HEENT: PERRL, sclera anicteric, oral mucosa pink and moist without lesion  NECK: trachea midline; Good ROM  CV: regular rate and rhythm, no murmurs or gallops  RESP: clear to auscultation bilaterally, no wheezes, rhonci or rales  ABD: soft, non-tender, non-distended, normal bowel sounds  EXT: no swelling or edema, 2+ pulses distally  SKIN: no rashes or jaundice  PSYCH: normal affect    Labs:   Recent Labs     04/07/22  1157 04/08/22  0517   WBC 3.50* 4.08   MCV 88 90   PLT 52* 52*     Recent Labs     04/06/22  0747 04/07/22  1157 04/08/22  0517    132* 130*   K 5.3* 5.4* 5.2*   * " 106 108   CO2 16* 16* 14*   BUN 35* 34* 34*   * 216* 143*     No results for input(s): ALB in the last 72 hours.    Invalid input(s): ALKP, SGOT, SGPT, TBIL, DBIL, TPRO  No results for input(s): PT, INR, PTT in the last 72 hours.      Radiology Review:  US Transplant Kidney With Doppler   Final Result      X-Ray Chest PA And Lateral   Final Result            IMPRESSION / RECOMMENDATIONS:  Refractory diarrhea of uncertain etiology.  Cultures will be ordered.  2. Chronic renal insufficiency status post transplantation nephrology evaluated the patient.  3. Cirrhosis of the liver with ascites.  Recent biopsy revealed chronic active hepatitis.  He has negative hepatitis B and C and also iron all autoimmune markers.  Will plan on proceeding with stool samples further recommendations to follow.  Dr. Chow will see the patient this weekend.      Thank you for this consult.    Constantin Monique  4/8/2022  11:51 AM

## 2022-04-08 NOTE — SUBJECTIVE & OBJECTIVE
Past Medical History:   Diagnosis Date    CKD (chronic kidney disease)     Diabetes 2020    Gout     Hypertension 2000    Kidney transplant recipient 01/2004    [place]    Thyroid disease     Unspecified cirrhosis of liver        Past Surgical History:   Procedure Laterality Date    CATARACT EXTRACTION Right 2017    COLONOSCOPY N/A 3/8/2022    Procedure: COLONOSCOPY;  Surgeon: Mio Simmons III, MD;  Location: [facility] ENDO;  Service: Endoscopy;  Laterality: N/A;    ENDOSCOPIC ULTRASOUND OF UPPER GASTROINTESTINAL TRACT N/A 3/8/2022    Procedure: ULTRASOUND, UPPER GI TRACT, ENDOSCOPIC;  Surgeon: Mio Simmons III, MD;  Location: [facility] ENDO;  Service: Endoscopy;  Laterality: N/A;    KIDNEY TRANSPLANT  2004    PORTACATH PLACEMENT  2003    REMOVAL OF VASCULAR ACCESS PORT  2005       Review of patient's allergies indicates:  No Known Allergies    Current Facility-Administered Medications on File Prior to Encounter   Medication    [DISCONTINUED] 0.9%  NaCl infusion    [DISCONTINUED] LIDOcaine (PF) 10 mg/ml (1%) injection 10 mg    [DISCONTINUED] ondansetron injection 4 mg     Current Outpatient Medications on File Prior to Encounter   Medication Sig    allopurinoL (ZYLOPRIM) 100 MG tablet Take 100 mg by mouth once daily.    levothyroxine (SYNTHROID) 50 MCG tablet Take 50 mcg by mouth once daily.    lisinopriL (PRINIVIL,ZESTRIL) 5 MG tablet Take 5 mg by mouth once daily.    metFORMIN (GLUCOPHAGE) 500 MG tablet Take 500 mg by mouth 2 (two) times daily.    metoprolol tartrate (LOPRESSOR) 50 MG tablet Take 50 mg by mouth 2 (two) times daily.    minoxidiL (LONITEN) 2.5 MG tablet Take 2.5 mg by mouth 2 (two) times daily.    pravastatin (PRAVACHOL) 40 MG tablet Take 40 mg by mouth every evening.    sirolimus (RAPAMUNE) 1 MG Tab Take 2 mg by mouth once daily.    tacrolimus (PROGRAF) 0.5 MG Cap Take 2 mg by mouth every morning. And 1.5mg in the evening    tacrolimus (PROGRAF) 1 MG Cap Take 1.5 mg by mouth every evening.     "TOUJEO SOLOSTAR U-300 INSULIN 300 unit/mL (1.5 mL) InPn pen Inject 30 Units into the skin nightly.    VENTOLIN HFA 90 mcg/actuation inhaler INHALE 2 PUFFS INTO THE LUNGS EVERY 6 (SIX) HOURS AS NEEDED FOR WHEEZING (COUGHING). RESCUE (Patient taking differently: Inhale 1 puff into the lungs every 6 (six) hours as needed.)    alfuzosin (UROXATRAL) 10 mg Tb24 Take 1 tablet (10 mg total) by mouth after dinner.    BD NOEMI 2ND GEN PEN NEEDLE 32 gauge x 5/32" Ndle USE AS DIRECTED ONCE DAILY WITH BASAGLAR    sildenafiL (VIAGRA) 100 MG tablet Take 1 tablet (100 mg total) by mouth daily as needed for Erectile Dysfunction.    [DISCONTINUED] benzonatate (TESSALON) 200 MG capsule Take 1 capsule (200 mg total) by mouth 3 (three) times daily as needed for Cough.    [DISCONTINUED] furosemide (LASIX) 20 MG tablet     [DISCONTINUED] lactulose (CHRONULAC) 20 gram/30 mL Soln Take 30 mLs (20 g total) by mouth 2 (two) times daily.    [DISCONTINUED] spironolactone (ALDACTONE) 50 MG tablet      Family History       Problem Relation (Age of Onset)    Heart disease Father    Kidney disease Father          Tobacco Use    Smoking status: Never Smoker    Smokeless tobacco: Never Used   Substance and Sexual Activity    Alcohol use: Not Currently    Drug use: Never    Sexual activity: Not on file     Review of Systems   Constitutional:  Positive for fatigue. Negative for chills, diaphoresis and fever.   HENT:  Negative for congestion, ear pain, sore throat and trouble swallowing.    Eyes:  Negative for pain, discharge and visual disturbance.   Respiratory:  Positive for cough. Negative for chest tightness, shortness of breath and wheezing.    Cardiovascular:  Negative for chest pain, palpitations and leg swelling.   Gastrointestinal:  Negative for abdominal distention, abdominal pain, blood in stool, constipation, diarrhea, nausea and vomiting.   Endocrine: Negative for polydipsia, polyphagia and polyuria.   Genitourinary:  Negative for dysuria, " flank pain, frequency and urgency.   Musculoskeletal:  Negative for back pain, joint swelling, neck pain and neck stiffness.   Skin:  Negative for rash and wound.   Allergic/Immunologic: Negative for immunocompromised state.   Neurological:  Positive for weakness. Negative for dizziness, syncope, speech difficulty, light-headedness, numbness and headaches.   Hematological:  Negative for adenopathy.   Psychiatric/Behavioral:  Negative for confusion and suicidal ideas. The patient is not nervous/anxious.    All other systems reviewed and are negative.  Objective:     Vital Signs (Most Recent):  Temp: 99 °F (37.2 °C) (04/07/22 2044)  Pulse: 79 (04/07/22 2044)  Resp: 18 (04/07/22 2044)  BP: 105/76 (04/07/22 2044)  SpO2: 96 % (04/07/22 2044) Vital Signs (24h Range):  Temp:  [99 °F (37.2 °C)-99.7 °F (37.6 °C)] 99 °F (37.2 °C)  Pulse:  [76-87] 79  Resp:  [18] 18  SpO2:  [96 %-98 %] 96 %  BP: ()/(62-85) 105/76     Weight: 63.5 kg (140 lb 0.6 oz)  Body mass index is 21.29 kg/m².    Physical Exam  Vitals and nursing note reviewed.   Constitutional:       Appearance: He is well-developed.   HENT:      Head: Normocephalic and atraumatic.   Eyes:      Conjunctiva/sclera: Conjunctivae normal.      Pupils: Pupils are equal, round, and reactive to light.   Cardiovascular:      Rate and Rhythm: Normal rate and regular rhythm.   Pulmonary:      Effort: Pulmonary effort is normal.      Comments: Coarse in bases  Abdominal:      General: Bowel sounds are normal.      Palpations: Abdomen is soft.   Musculoskeletal:         General: Normal range of motion.      Cervical back: Normal range of motion and neck supple.      Right lower leg: Edema present.      Left lower leg: Edema present.      Comments: Bilat LE edema 1+   Skin:     General: Skin is warm and dry.      Capillary Refill: Capillary refill takes less than 2 seconds.      Comments: Puncture site to rt neck and rt abd clean, dry, no drainage, no erythema    Neurological:       Mental Status: He is alert and oriented to person, place, and time.   Psychiatric:         Behavior: Behavior normal.         Thought Content: Thought content normal.         Judgment: Judgment normal.         CRANIAL NERVES     CN III, IV, VI   Pupils are equal, round, and reactive to light.     Significant Labs: All pertinent labs within the past 24 hours have been reviewed.  CBC:   Recent Labs   Lab 04/07/22  1157   WBC 3.50*   HGB 11.6*   HCT 33.8*   PLT 52*     CMP:   Recent Labs   Lab 04/06/22  0747 04/07/22  1157    132*   K 5.3* 5.4*   * 106   CO2 16* 16*   * 216*   BUN 35* 34*   CREATININE 3.1* 2.7*   CALCIUM 8.4* 8.6*   PROT 6.3 6.6   ALBUMIN 2.7* 3.0*   BILITOT 2.1* 2.3*   ALKPHOS 115 96   * 124*   ALT 24 26   ANIONGAP 6* 10   EGFRNONAA 20.7* 24.5*     Urine Studies:   Recent Labs   Lab 04/07/22  1515   COLORU Yellow   APPEARANCEUA Clear   PHUR 6.0   SPECGRAV 1.020   PROTEINUA Trace*   GLUCUA 2+*   KETONESU Negative   BILIRUBINUA Negative   OCCULTUA Negative   NITRITE Negative   UROBILINOGEN Negative   LEUKOCYTESUR Negative       Significant Imaging: I have reviewed all pertinent imaging results/findings within the past 24 hours.    X-Ray Chest PA And Lateral    Result Date: 4/7/2022  CLINICAL HISTORY: 61 years (1960) Male Creat of 3.1, sent by Dr. Hedrick, cough TECHNIQUE: PA and lateral radiograph of the chest. COMPARISON: Most recent radiograph from February 2, 2022 FINDINGS: Slight interval worsening, mild scattered peripheral basilar predominant interstitial opacities, slightly worse from the previous exam with lower lung volumes suggesting a combination of atelectasis, scarring and less likely a trace edema or atypical infection/viral pneumonia in the appropriate clinical setting. Costophrenic angles are seen without effusion. No pneumothorax is identified. The heart is normal in size. The mediastinum is within normal limits. Osseous structures appear within normal  limits. The visualized upper abdomen is unremarkable. IMPRESSION: Mild scattered peripheral basilar predominant interstitial opacities bilaterally suggesting a combination of atelectasis, mild atypical/viral pneumonia, and scarring. . Electronically signed by:  Jarred Lora MD  4/7/2022 3:08 PM CDT Workstation: [id number]    US Transplant Kidney With Doppler    Result Date: 4/7/2022  Ultrasound renal transplant CLINICAL DATA: Acute renal insufficiency FINDINGS: Sonographic assessment targeted to right lower quadrant renal transplant was performed. The transplanted kidney measures 11.9 cm in longitudinal dimension. Cortical echogenicity is mildly increased. There is no mass or hydronephrosis. Doppler assessment of intrarenal arterial branches demonstrates normal waveforms, with resistive indices of 0.60 or below. The transplant renal artery demonstrates peak systolic velocity of 81 cm/s. Renal vein is patent. There is a small amount of right lower quadrant free fluid. IMPRESSION: 1. Slightly increased renal cortical echogenicity, similar in appearance to March 24, otherwise normal appearance of right lower quadrant renal transplant. 2. Small amount of right lower quadrant free fluid. Electronically signed by:  Dez Duque MD  4/7/2022 5:05 PM CDT Workstation: [id number]    I

## 2022-04-08 NOTE — HOSPITAL COURSE
Patient with a history of remote renal transplant (2/2 hypertensive renal failure), on chronic immunosuppressant (sirolimus and tacrolimus), diagnosis of cirrhosis who underwent recent transjugular biopsy (pathology with chronic mild to moderate active hepatitis with bridging fibrosis and nodules) as well as paracentesis (2750 cc removed) on 04/06.  He was referred to the ED due to abnormal outpatient labs with acute kidney injury.  He was borderline hypotensive in the ED, creatinine 3.1.  Also noted to have hyponatremia, hyperkalemia with metabolic acidosis.  Ongoing diarrhea quantified as 4 bowel movements per day. Stool studies and culture unrevealing. Started on Creon with improvement in diarrhea. On 4/11 developed fever, but workup was unrevealing.  Paracentesis was performed; fluid studies were not consistent with SBP.  Patient completed 5 days of IV ceftriaxone for empiric therapy. No repeat revers afterwards.  Patient's tacrolimus dosing was adjusted to 1.5mg BID with improvement in his renal function. Patient tolerated lasix and albumin dosing with stable renal function. Per discussion with nephrology, patient may discharge home today with PO lasix 20mg, outpatient BMP on Monday (to be forwarded to Nephrology), and follow up in clinic with nephrology in 2 weeks.     Patient was evaluated by PT and was advised to use a walker, which was ordered. He declined ambulatory referral to PT.     He also was seen by Pulmonary for concern of interstitial lung disease and reports ongoing dyspnea on exertion with cough. Patient cough improved with tussionex and tessalon. He qualified for 3L home oxygen, which was ordered. He will follow with Dr. Boyer of pulmonology for further ILD evaluation.    He will continue to follow with GI.    Physical Exam    Constitutional:       General: He is not in acute distress.     Appearance: He is not diaphoretic.      Comments: Sitting in bed, NAD, cooperative   Cardiovascular:       Rate and Rhythm: Normal rate and regular rhythm.   Pulmonary:      Breath sounds: No wheezing.      Comments: On RA, inspiratory crackles at bases.  No coughing on exam today.  Abdominal:      General: Bowel sounds are normal.      Palpations: Abdomen is soft.      Tenderness: There is no abdominal tenderness. Paracentesis bandage in place.  Skin:     General: Skin is warm and dry. BLE edema.  Neurological:      Mental Status: He is alert and oriented to person, place, and time.      Comments: Speech intact, moving all four extremities  Psychiatric:         Mood and Affect: Mood normal.

## 2022-04-08 NOTE — H&P
[facility] Medicine  History & Physical    DOS: 04/07/2022  5:09 PM      Patient Name: Edgar Jackson  MRN: [id number]  Patient Class: OP- Observation  Admission Date: 4/7/2022  Attending Physician: Dr. Serna  Primary Care Provider: Tyler Castillo MD         Patient information was obtained from patient, spouse/SO, past medical records and ER records.     Subjective:     Principal Problem:ILDEFONSO (acute kidney injury)    Chief Complaint:   Chief Complaint   Patient presents with    abnormal labs     Creat of 3.1, sent by Dr. Hedrick        HPI: Mr. Jackson is a 61-year-old male with a past medical history of renal failure status post renal transplant 2004, NIDDM2, ILD, and cirrhosis who presents today with complaints of elevated creatinine on outpatient labs.  It is severe.  It is associated with recent liver biopsy and paracentesis and recent addition of Lasix and spironolactone.  He denies fever, chills, nausea, vomiting, diarrhea, chest pain, shortness of breath, loss of consciousness.  He has a chronic cough that is productive of clear/white/yellow sputum at baseline which he states has worsened over the last 2 months. He had a liver biopsy and paracentesis yesterday at [facility] with Dr. Osullivan and his creatinine was 3.1. He was called and referred to the ED for further work up.       Past Medical History:   Diagnosis Date    CKD (chronic kidney disease)     Diabetes 2020    Gout     Hypertension 2000    Kidney transplant recipient 01/2004    [place]    Thyroid disease     Unspecified cirrhosis of liver        Past Surgical History:   Procedure Laterality Date    CATARACT EXTRACTION Right 2017    COLONOSCOPY N/A 3/8/2022    Procedure: COLONOSCOPY;  Surgeon: Mio Simmons III, MD;  [facility];  Service: Endoscopy;  Laterality: N/A;    ENDOSCOPIC ULTRASOUND OF UPPER GASTROINTESTINAL TRACT N/A 3/8/2022    Procedure: ULTRASOUND, UPPER GI TRACT, ENDOSCOPIC;  Surgeon: Mio WILL  "Troy RICHARDSON MD;  Location: [facility];  Service: Endoscopy;  Laterality: N/A;    KIDNEY TRANSPLANT  2004    PORTACATH PLACEMENT  2003    REMOVAL OF VASCULAR ACCESS PORT  2005       Review of patient's allergies indicates:  No Known Allergies    Current Facility-Administered Medications on File Prior to Encounter   Medication    [DISCONTINUED] 0.9%  NaCl infusion    [DISCONTINUED] LIDOcaine (PF) 10 mg/ml (1%) injection 10 mg    [DISCONTINUED] ondansetron injection 4 mg     Current Outpatient Medications on File Prior to Encounter   Medication Sig    allopurinoL (ZYLOPRIM) 100 MG tablet Take 100 mg by mouth once daily.    levothyroxine (SYNTHROID) 50 MCG tablet Take 50 mcg by mouth once daily.    lisinopriL (PRINIVIL,ZESTRIL) 5 MG tablet Take 5 mg by mouth once daily.    metFORMIN (GLUCOPHAGE) 500 MG tablet Take 500 mg by mouth 2 (two) times daily.    metoprolol tartrate (LOPRESSOR) 50 MG tablet Take 50 mg by mouth 2 (two) times daily.    minoxidiL (LONITEN) 2.5 MG tablet Take 2.5 mg by mouth 2 (two) times daily.    pravastatin (PRAVACHOL) 40 MG tablet Take 40 mg by mouth every evening.    sirolimus (RAPAMUNE) 1 MG Tab Take 2 mg by mouth once daily.    tacrolimus (PROGRAF) 0.5 MG Cap Take 2 mg by mouth every morning. And 1.5mg in the evening    tacrolimus (PROGRAF) 1 MG Cap Take 1.5 mg by mouth every evening.    TOUJEO SOLOSTAR U-300 INSULIN 300 unit/mL (1.5 mL) InPn pen Inject 30 Units into the skin nightly.    VENTOLIN HFA 90 mcg/actuation inhaler INHALE 2 PUFFS INTO THE LUNGS EVERY 6 (SIX) HOURS AS NEEDED FOR WHEEZING (COUGHING). RESCUE (Patient taking differently: Inhale 1 puff into the lungs every 6 (six) hours as needed.)    alfuzosin (UROXATRAL) 10 mg Tb24 Take 1 tablet (10 mg total) by mouth after dinner.    BD NOEMI 2ND GEN PEN NEEDLE 32 gauge x 5/32" Ndle USE AS DIRECTED ONCE DAILY WITH BASAGLAR    sildenafiL (VIAGRA) 100 MG tablet Take 1 tablet (100 mg total) by mouth daily as needed " for Erectile Dysfunction.    [DISCONTINUED] benzonatate (TESSALON) 200 MG capsule Take 1 capsule (200 mg total) by mouth 3 (three) times daily as needed for Cough.    [DISCONTINUED] furosemide (LASIX) 20 MG tablet     [DISCONTINUED] lactulose (CHRONULAC) 20 gram/30 mL Soln Take 30 mLs (20 g total) by mouth 2 (two) times daily.    [DISCONTINUED] spironolactone (ALDACTONE) 50 MG tablet      Family History       Problem Relation (Age of Onset)    Heart disease Father    Kidney disease Father          Tobacco Use    Smoking status: Never Smoker    Smokeless tobacco: Never Used   Substance and Sexual Activity    Alcohol use: Not Currently    Drug use: Never    Sexual activity: Not on file     Review of Systems   Constitutional:  Positive for fatigue. Negative for chills, diaphoresis and fever.   HENT:  Negative for congestion, ear pain, sore throat and trouble swallowing.    Eyes:  Negative for pain, discharge and visual disturbance.   Respiratory:  Positive for cough. Negative for chest tightness, shortness of breath and wheezing.    Cardiovascular:  Negative for chest pain, palpitations and leg swelling.   Gastrointestinal:  Negative for abdominal distention, abdominal pain, blood in stool, constipation, diarrhea, nausea and vomiting.   Endocrine: Negative for polydipsia, polyphagia and polyuria.   Genitourinary:  Negative for dysuria, flank pain, frequency and urgency.   Musculoskeletal:  Negative for back pain, joint swelling, neck pain and neck stiffness.   Skin:  Negative for rash and wound.   Allergic/Immunologic: Negative for immunocompromised state.   Neurological:  Positive for weakness. Negative for dizziness, syncope, speech difficulty, light-headedness, numbness and headaches.   Hematological:  Negative for adenopathy.   Psychiatric/Behavioral:  Negative for confusion and suicidal ideas. The patient is not nervous/anxious.    All other systems reviewed and are negative.  Objective:     Vital Signs  (Most Recent):  Temp: 99 °F (37.2 °C) (04/07/22 2044)  Pulse: 79 (04/07/22 2044)  Resp: 18 (04/07/22 2044)  BP: 105/76 (04/07/22 2044)  SpO2: 96 % (04/07/22 2044) Vital Signs (24h Range):  Temp:  [99 °F (37.2 °C)-99.7 °F (37.6 °C)] 99 °F (37.2 °C)  Pulse:  [76-87] 79  Resp:  [18] 18  SpO2:  [96 %-98 %] 96 %  BP: ()/(62-85) 105/76     Weight: 63.5 kg (140 lb 0.6 oz)  Body mass index is 21.29 kg/m².    Physical Exam  Vitals and nursing note reviewed.   Constitutional:       Appearance: He is well-developed.   HENT:      Head: Normocephalic and atraumatic.   Eyes:      Conjunctiva/sclera: Conjunctivae normal.      Pupils: Pupils are equal, round, and reactive to light.   Cardiovascular:      Rate and Rhythm: Normal rate and regular rhythm.   Pulmonary:      Effort: Pulmonary effort is normal.      Comments: Coarse in bases  Abdominal:      General: Bowel sounds are normal.      Palpations: Abdomen is soft.   Musculoskeletal:         General: Normal range of motion.      Cervical back: Normal range of motion and neck supple.      Right lower leg: Edema present.      Left lower leg: Edema present.      Comments: Bilat LE edema 1+   Skin:     General: Skin is warm and dry.      Capillary Refill: Capillary refill takes less than 2 seconds.      Comments: Puncture site to rt neck and rt abd clean, dry, no drainage, no erythema    Neurological:      Mental Status: He is alert and oriented to person, place, and time.   Psychiatric:         Behavior: Behavior normal.         Thought Content: Thought content normal.         Judgment: Judgment normal.         CRANIAL NERVES     CN III, IV, VI   Pupils are equal, round, and reactive to light.     Significant Labs: All pertinent labs within the past 24 hours have been reviewed.  CBC:   Recent Labs   Lab 04/07/22  1157   WBC 3.50*   HGB 11.6*   HCT 33.8*   PLT 52*     CMP:   Recent Labs   Lab 04/06/22  0747 04/07/22  1157    132*   K 5.3* 5.4*   * 106   CO2 16*  16*   * 216*   BUN 35* 34*   CREATININE 3.1* 2.7*   CALCIUM 8.4* 8.6*   PROT 6.3 6.6   ALBUMIN 2.7* 3.0*   BILITOT 2.1* 2.3*   ALKPHOS 115 96   * 124*   ALT 24 26   ANIONGAP 6* 10   EGFRNONAA 20.7* 24.5*     Urine Studies:   Recent Labs   Lab 04/07/22  1515   COLORU Yellow   APPEARANCEUA Clear   PHUR 6.0   SPECGRAV 1.020   PROTEINUA Trace*   GLUCUA 2+*   KETONESU Negative   BILIRUBINUA Negative   OCCULTUA Negative   NITRITE Negative   UROBILINOGEN Negative   LEUKOCYTESUR Negative       Significant Imaging: I have reviewed all pertinent imaging results/findings within the past 24 hours.    X-Ray Chest PA And Lateral    Result Date: 4/7/2022  CLINICAL HISTORY: 61 years (1960) Male Creat of 3.1, sent by Dr. Hedrick, cough TECHNIQUE: PA and lateral radiograph of the chest. COMPARISON: Most recent radiograph from February 2, 2022 FINDINGS: Slight interval worsening, mild scattered peripheral basilar predominant interstitial opacities, slightly worse from the previous exam with lower lung volumes suggesting a combination of atelectasis, scarring and less likely a trace edema or atypical infection/viral pneumonia in the appropriate clinical setting. Costophrenic angles are seen without effusion. No pneumothorax is identified. The heart is normal in size. The mediastinum is within normal limits. Osseous structures appear within normal limits. The visualized upper abdomen is unremarkable. IMPRESSION: Mild scattered peripheral basilar predominant interstitial opacities bilaterally suggesting a combination of atelectasis, mild atypical/viral pneumonia, and scarring. . Electronically signed by:  Jarred Lora MD  4/7/2022 3:08 PM CDT Workstation: [id number]    US Transplant Kidney With Doppler    Result Date: 4/7/2022  Ultrasound renal transplant CLINICAL DATA: Acute renal insufficiency FINDINGS: Sonographic assessment targeted to right lower quadrant renal transplant was performed. The transplanted kidney  measures 11.9 cm in longitudinal dimension. Cortical echogenicity is mildly increased. There is no mass or hydronephrosis. Doppler assessment of intrarenal arterial branches demonstrates normal waveforms, with resistive indices of 0.60 or below. The transplant renal artery demonstrates peak systolic velocity of 81 cm/s. Renal vein is patent. There is a small amount of right lower quadrant free fluid. IMPRESSION: 1. Slightly increased renal cortical echogenicity, similar in appearance to March 24, otherwise normal appearance of right lower quadrant renal transplant. 2. Small amount of right lower quadrant free fluid. Electronically signed by:  Dez Duque MD  4/7/2022 5:05 PM CDT Workstation: [id number]    I    Assessment/Plan:     * ILDEFONSO (acute kidney injury)  Admit to med/tele   Consult nephrology - ER MD spoke with Dr. Leo who recommends bicarb gtt which has been ordered  Hold ACEI/ARB  Hold diuretics for now  Hold metformin  Daily wt, accurate I&O  Diabetic/renal diet   Trend chemistries        Hyperkalemia  Started on bicarb gtt  Given 1 time dose of lokelma   Repeat level in AM       Multiple chronic diseases  Continue appropriate home meds      Cirrhosis of liver with ascites  S/p transjugular liver biopsy and paracentesis yesterday      Interstitial lung disease  PRN nebs  Outpatient f/u with Dr. Boyer scheduled for next week      Diabetes mellitus due to underlying condition with chronic kidney disease, with long-term current use of insulin  Pt stopped taking basal insulin  Start accuchecks ACHS with low dose ISS        VTE Risk Mitigation (From admission, onward)         Ordered     IP VTE HIGH RISK PATIENT  Once         04/07/22 1800     Place sequential compression device  Until discontinued         04/07/22 1800                   Samantha Nick NP  Department of Hospital Medicine   [facility]

## 2022-04-08 NOTE — ASSESSMENT & PLAN NOTE
Admit to med/tele   Consult nephrology - ER MD spoke with Dr. Leo who recommends bicarb gtt which has been ordered  Hold ACEI/ARB  Hold diuretics for now  Hold metformin  Daily wt, accurate I&O  Diabetic/renal diet   Trend chemistries

## 2022-04-08 NOTE — SUBJECTIVE & OBJECTIVE
Interval History:  See hospital course.  Patient reports history of renal transplant about 18 years ago due to hypertensive renal failure.  States about 1 month ago he developed loose stools, having about 4 bowel movements per day, nonbloody, states it this has been tested in the past.  Reports urine output has been minimal since admission.  Admits to coughing, productive of small amount of phlegm, this has been affecting his sleep at nighttime.  He had transjugular hepatic biopsy as well as paracentesis on 04/06.  States he was told he had liver cirrhosis.  T-max 99.7°.  Labs with hemoglobin 10, sodium 130, potassium 5.2, BUN/creatinine 34/2.7, magnesium 1.3.  Pathology from a liver biopsy with chronic mild to moderate active hepatitis with bridging fibrosis and nodules.  Discussed with patient and wife present at bedside.    Review of Systems   Constitutional:  Positive for fatigue. Negative for fever.        Always cold   Respiratory:  Positive for cough and shortness of breath.    Cardiovascular:  Negative for leg swelling.   Gastrointestinal:  Positive for diarrhea. Negative for vomiting.   Genitourinary:         Decreased UOP   Neurological:  Positive for weakness.   Psychiatric/Behavioral:  Negative for confusion.    Objective:     Vital Signs (Most Recent):  Temp: 98.8 °F (37.1 °C) (04/08/22 1124)  Pulse: 79 (04/08/22 1124)  Resp: 18 (04/08/22 1124)  BP: 104/74 (04/08/22 1124)  SpO2: (!) 94 % (04/08/22 1124)   Vital Signs (24h Range):  Temp:  [98.4 °F (36.9 °C)-99.7 °F (37.6 °C)] 98.8 °F (37.1 °C)  Pulse:  [74-87] 79  Resp:  [18-20] 18  SpO2:  [94 %-98 %] 94 %  BP: ()/(58-85) 104/74     Weight: 63.6 kg (140 lb 3.4 oz)  Body mass index is 21.32 kg/m².    Intake/Output Summary (Last 24 hours) at 4/8/2022 1254  Last data filed at 4/8/2022 1128  Gross per 24 hour   Intake 3590 ml   Output 552 ml   Net 3038 ml      Physical Exam  Vitals and nursing note reviewed.   Constitutional:       Comments: Lying in  bed, chronically ill appearing   HENT:      Head: Normocephalic and atraumatic.      Mouth/Throat:      Mouth: Mucous membranes are moist.   Cardiovascular:      Rate and Rhythm: Normal rate and regular rhythm.   Pulmonary:      Comments: On RA, inspiratory crackles at bases  Abdominal:      General: Bowel sounds are normal. There is no distension.      Palpations: Abdomen is soft.      Tenderness: There is no abdominal tenderness.   Skin:     General: Skin is warm and dry.   Neurological:      Mental Status: He is alert and oriented to person, place, and time.      Comments: Speech intact, moving all four extremities, generalized weakness   Psychiatric:         Mood and Affect: Mood normal.       Significant Labs: Blood Culture: No results for input(s): LABBLOO in the last 48 hours.  BMP:   Recent Labs   Lab 04/08/22  0517   *   *   K 5.2*      CO2 14*   BUN 34*   CREATININE 2.7*   CALCIUM 7.7*   MG 1.3*     CBC:   Recent Labs   Lab 04/07/22  1157 04/08/22  0517   WBC 3.50* 4.08   HGB 11.6* 10.0*   HCT 33.8* 29.7*   PLT 52* 52*     CMP:   Recent Labs   Lab 04/07/22  1157 04/08/22  0517   * 130*   K 5.4* 5.2*    108   CO2 16* 14*   * 143*   BUN 34* 34*   CREATININE 2.7* 2.7*   CALCIUM 8.6* 7.7*   PROT 6.6 5.5*   ALBUMIN 3.0* 2.5*   BILITOT 2.3* 2.0*   ALKPHOS 96 86   * 110*   ALT 26 24   ANIONGAP 10 8   EGFRNONAA 24.5* 24.5*     Cardiac Markers: No results for input(s): CKMB, MYOGLOBIN, BNP, TROPISTAT in the last 48 hours.  Lactic Acid: No results for input(s): LACTATE in the last 48 hours.  Lipid Panel: No results for input(s): CHOL, HDL, LDLCALC, TRIG, CHOLHDL in the last 48 hours.  Magnesium:   Recent Labs   Lab 04/08/22  0517   MG 1.3*     POCT Glucose: No results for input(s): POCTGLUCOSE in the last 48 hours.    Significant Imaging: I have reviewed and interpreted all pertinent imaging results/findings within the past 24 hours.    X-Ray Chest PA And  Lateral    Result Date: 4/7/2022  CLINICAL HISTORY: 61 years (1960) Male Creat of 3.1, sent by Dr. Hedrick, cough TECHNIQUE: PA and lateral radiograph of the chest. COMPARISON: Most recent radiograph from February 2, 2022 FINDINGS: Slight interval worsening, mild scattered peripheral basilar predominant interstitial opacities, slightly worse from the previous exam with lower lung volumes suggesting a combination of atelectasis, scarring and less likely a trace edema or atypical infection/viral pneumonia in the appropriate clinical setting. Costophrenic angles are seen without effusion. No pneumothorax is identified. The heart is normal in size. The mediastinum is within normal limits. Osseous structures appear within normal limits. The visualized upper abdomen is unremarkable. IMPRESSION: Mild scattered peripheral basilar predominant interstitial opacities bilaterally suggesting a combination of atelectasis, mild atypical/viral pneumonia, and scarring. . Electronically signed by:  Jarred Lora MD  4/7/2022 3:08 PM CDT Workstation: [id number]    US Transplant Kidney With Doppler    Result Date: 4/7/2022  Ultrasound renal transplant CLINICAL DATA: Acute renal insufficiency FINDINGS: Sonographic assessment targeted to right lower quadrant renal transplant was performed. The transplanted kidney measures 11.9 cm in longitudinal dimension. Cortical echogenicity is mildly increased. There is no mass or hydronephrosis. Doppler assessment of intrarenal arterial branches demonstrates normal waveforms, with resistive indices of 0.60 or below. The transplant renal artery demonstrates peak systolic velocity of 81 cm/s. Renal vein is patent. There is a small amount of right lower quadrant free fluid. IMPRESSION: 1. Slightly increased renal cortical echogenicity, similar in appearance to March 24, otherwise normal appearance of right lower quadrant renal transplant. 2. Small amount of right lower quadrant free fluid.  Electronically signed by:  Dez Duque MD  4/7/2022 5:05 PM CDT Workstation: [id number]    IR Paracentesis with Imaging    Result Date: 4/6/2022  EXAMINATION: Ultrasound-guided Paracentesis Procedural Personnel Attending physician(s): Jeovanny Maria MD Fellow physician(s): None Resident physician(s): None Advanced practice provider(s): None Pre-procedure diagnosis: Ascites Post-procedure diagnosis: Same Indication: Ascites Complications: No immediate complications. PROCEDURAL SUMMARY: Ultrasound-guided paracentesis PROCEDURE: Pre-procedure: Consent: Informed consent for the procedure was obtained and time-out was performed prior to the procedure. Preparation: The site was prepared and draped using maximal sterile barrier technique including cutaneous antisepsis. Anesthesia/sedation: Level of anesthesia/sedation: No sedation Anesthesia/sedation administered by: Not applicable Total intra-service sedation time (minutes): 0 Limited abdominal ultrasound: Limited abdominal ultrasound was performed. Moderate ascites. A safe window for paracentesis was identified. Paracentesis Local anesthesia was administered. The peritoneal cavity was accessed and fluid return confirmed position. Ascites was drained. Paracentesis access technique: Real-time ultrasound guidance Catheter placed: 5 Serbian one-step Closure: The catheter was removed. A sterile bandage was applied. Post-drainage ultrasound: Not performed Additional Details: Additional description of procedure: None Equipment details: None Specimens removed: Abdominal fluid Estimated blood loss (mL): Less than 10 Standardized report: [email or web address].     Ultrasound-guided paracentesis with drainage of 2750 mL of serous fluid. Plan: Follow-up with hepatology. Attestation: Signer name: Jeovanny Maria MD I attest that I was present for the entire procedure. I reviewed the stored images and agree with the report as written Electronically signed by: Jeovanny Maria MD  Date:    04/06/2022 Time:    11:11    IR Transjugular Liver Biopsy    Result Date: 4/6/2022  EXAMINATION: Transjugular liver biopsy with pressure measurements Procedural Personnel Attending physician(s): Jeovanny Maria MD Fellow physician(s): None Resident physician(s): None Advanced practice provider(s): None Pre-procedure diagnosis: Elevated LFTs Post-procedure diagnosis: Same Indication: Elevated liver enzymes Additional clinical history: None Complications: No immediate complications. TECHNIQUE: - Venous access with ultrasound guidance - Hepatic venography - Pressure measurements - Transjugular liver biopsy with fluoroscopic guidance FINDINGS: Pre-procedure Consent: Informed consent for the procedure was obtained and time-out was performed prior to the procedure. Preparation: The site was prepared and draped using maximal sterile barrier technique including cutaneous antisepsis. Anesthesia/sedation Level of anesthesia/sedation: Moderate sedation (conscious sedation) Anesthesia/sedation administered by: Independent trained observer under attending supervision with continuous monitoring of the patient's level of consciousness and physiologic status Total intra-service sedation time (minutes): 25 Access Local anesthesia was administered. The vessel was sonographically evaluated and determined to be patent. Real time ultrasound was used to visualize needle entry into the vessel and a permanent image was stored. A 9 Pashto sheath was placed. Vein accessed: Right internal jugular vein Access technique: 18 gauge access needle Venography Vein catheterized: Right hepatic vein Indication for venography: Diagnostic venography for suspected stenosis or occlusion DSA images do not demonstrate stenosis or thrombosis. Vein catheterized: IVC Indication for venography: Diagnostic venography for suspected stenosis or occlusion DSA images do not demonstrate stenosis or thrombosis. Pressure measurements Pressure measurements were  obtained via end-hole catheter. Mean right atrial pressure (mmHg): 5 Mean free hepatic vein pressure (mmHg): 8 Mean wedged hepatic vein pressure (mmHg): 22 Mean IVC pressure (mmHg): 7 Biopsy Samples were obtained of the liver parenchyma from the hepatic vein using the transjugular liver biopsy set. Core needle biopsy device: Argon Core needle size (gauge): 19 Number of core specimens: 3 Closure The sheath was removed and hemostasis was achieved with manual compression. A sterile bandage was applied. Contrast Contrast agent: Omnipaque 300 Contrast volume (mL): 15 Radiation Dose Fluoroscopy time ( minutes): 3.8 Reference air kerma ( mGy): 23.1 Kerma area product (uGy.m2): 697.59 Additional Details Additional description of procedure: None Equipment details: None Specimens removed: Biopsy samples as detailed above Estimated blood loss (mL): Less than 10 Standardized report: [email or web address]ressures_[email or web address] Attestation Signer name: Jeovanny Maria MD I attest that I was present for the entire procedure. I reviewed the stored images and agree with the report as written.     Transjugular liver biopsy with 3 core biopsy samples obtained. The corrected sinusoidal pressure (wedged hepatic vein pressure minus free hepatic vein pressure) is 14 mmHg. Plan: Specimen(s) sent for evaluation. _______________________________________________________________ Electronically signed by: Jeovanny Maria MD Date:    04/06/2022 Time:    11:14    US Abdomen Limited with Doppler (xpd)    Result Date: 3/24/2022  Reason: K74.69, Z94 Doppler evaluation of the hepatic vessels was performed. .0 K22.70 Nonalcoholic micronodular cirrhosis of liver, kidney transplant recipient, Black's esophagus with esophagitis; ; . COMPARISON: 2/24/2022 FINDINGS: The liver is small in size with nodular contour compatible with cirrhosis. There is no hepatic mass. There is hepatopedal flow within the portal veins. The hepatic veins and IVC are patent. The  peak systolic velocity in the right hepatic artery is 75 cm/s and in the left hepatic artery is 40 cm/s. The peak systolic velocity in the main hepatic artery 85 cm/s. There is normal waveform pattern. The gallbladder is contracted with wall thickening most likely secondary to ascites and cirrhosis. There is no biliary duct dilatation. Common bile duct measures 4 mm. There is a transplanted kidney in the right lower quadrant measuring 11.9 x 6.6 x 6.0 cm. There is no hydronephrosis. IMPRESSION: Small liver with nodular contour compatible with cirrhosis. There is moderate ascites The hepatic veins, portal veins and hepatic artery are patent Normal appearance to the transplanted kidney in the right lower quadrant Electronically signed by:  Elsi Stinson MD  3/24/2022 9:56 AM CDT Workstation: [id number]

## 2022-04-08 NOTE — PLAN OF CARE
[facility]  Initial Discharge Assessment       Primary Care Provider: Tyler Castillo MD    Admission Diagnosis: Acute kidney injury superimposed on chronic kidney disease [N17.9, N18.9]    Admission Date: 4/7/2022  Expected Discharge Date:     Discharge Barriers Identified: None    Payor: [facility] SHIELD / Plan: BCBS ALL OUT OF STATE / Product Type: PPO /     Extended Emergency Contact Information  Primary Emergency Contact: Meenu Jackson  Mobile Phone: [phone]  Relation: Spouse  Preferred language: English   needed? No    Discharge Plan A: Home, Home with family  Discharge Plan B: Home Health      [id number] [place] - [address]  [address]  [place] [address]  Phone: [phone] Fax: [phone]    [facility] - [place], [place] - [address] AT [address]  [address]  [place] [address]  Phone: [phone] Fax: [phone]    SW met with patient at bedside to complete discharge planning assessment.  Patient alert and oriented xs 4.  Patient verified all demographic information on facesheet is correct.  Patient verified PCP is Dr. Castillo.  Patient verified primary health insurance is BCBS.  Patient with NO home health but has listed DME.  Patient with NO POA or Living Will.  Patient not on dialysis or medication coumadin.  Patient with no 30 day admission.  Patient with no financial issues at this time.  Patient family will provide transportation upon discharge from facility.  Patient require assistance with ADLs, live with spouse, spouse drives.      Initial Assessment (most recent)     Adult Discharge Assessment - 04/08/22 1202        Discharge Assessment    Assessment Type Discharge Planning Assessment     Confirmed/corrected address, phone number and insurance Yes     Confirmed Demographics Correct on Facesheet     Source of Information family;patient     Does patient/caregiver understand observation  status Yes     Communicated LE with patient/caregiver Yes     Lives With spouse     Facility Arrived From: home     Do you expect to return to your current living situation? Yes     Do you have help at home or someone to help you manage your care at home? Yes     Who are your caregiver(s) and their phone number(s)? spouse     Prior to hospitilization cognitive status: Alert/Oriented     Current cognitive status: Alert/Oriented     Walking or Climbing Stairs Difficulty ambulation difficulty, requires equipment;stair climbing difficulty, requires equipment     Dressing/Bathing Difficulty bathing difficulty, assistance 1 person;dressing difficulty, assistance 1 person     Equipment Currently Used at Home cane, straight     Readmission within 30 days? No     Patient currently being followed by outpatient case management? No     Do you currently have service(s) that help you manage your care at home? No     Do you take prescription medications? No     Do you have prescription coverage? Yes     Do you have any problems affording any of your prescribed medications? No     Is the patient taking medications as prescribed? yes     Who is going to help you get home at discharge? spouse     How do you get to doctors appointments? family or friend will provide;car, drives self     Are you on dialysis? No     Do you take coumadin? No     Discharge Plan A Home;Home with family     Discharge Plan B Home Health     DME Needed Upon Discharge  none     Discharge Plan discussed with: Patient;Spouse/sig other     Discharge Barriers Identified None

## 2022-04-09 PROBLEM — E87.5 HYPERKALEMIA: Status: RESOLVED | Noted: 2022-01-01 | Resolved: 2022-01-01

## 2022-04-09 NOTE — PLAN OF CARE
04/09/22 0817   Patient Assessment/Suction   Level of Consciousness (AVPU) alert   Respiratory Effort Unlabored   All Lung Fields Breath Sounds clear;diminished   PRE-TX-O2   O2 Device (Oxygen Therapy) room air   SpO2 96 %   Pulse Oximetry Type Intermittent   $ Pulse Oximetry - Multiple Charge Pulse Oximetry - Multiple   Pulse 74   Resp 20   Aerosol Therapy   $ Aerosol Therapy Charges PRN treatment not required   Respiratory Evaluation   $ Care Plan Tech Time 15 min

## 2022-04-09 NOTE — PROGRESS NOTES
Nephrology Consult Note        Patient Name: Edgar Jackson  MRN: [id number]    Patient Class: IP- Inpatient   Admission Date: 4/7/2022  Length of Stay: 1 days  Date of Service: 4/9/2022    Attending Physician: Nori Lezama MD  Primary Care Provider: [place] Castillo MD    Reason for Consult: ildefonso/hyponatremia/acidosis/hyperkalemia/kidney transplant/cirrhosis with ascites/anemia/diarrhea/gout/htn/hypotension/dm2    SUBJECTIVE:     HPI: 61m with kidney transplant and liver disease who recently moved to the area and is yet to establish care is admitted after paracentesis and liver biopsy on 4/6. Labs show ILDEFONSO, hyponatremia, hyperkalemia. He also complains of diarrhea. He is also reportedly on diuretic, spironolactone, ACEi. Received IVF in ER for hypotension.    4/8 VSS, no new complains. Appreciate GI input. Start oral bicarb for acidosis.  4/9 VSS, no new complains. GI work-up for diarrhea in progress.    Past Medical History:   Diagnosis Date    CKD (chronic kidney disease)     Diabetes 2020    Gout     Hypertension 2000    [place] 01/2004    [place]    Thyroid disease     Unspecified cirrhosis of liver      Past Surgical History:   Procedure Laterality Date    CATARACT EXTRACTION Right 2017    COLONOSCOPY N/A 3/8/2022    Procedure: COLONOSCOPY;  Surgeon: Mio Simmons III, MD;  Location: [facility];  Service: Endoscopy;  Laterality: N/A;    ENDOSCOPIC ULTRASOUND OF UPPER GASTROINTESTINAL TRACT N/A 3/8/2022    Procedure: ULTRASOUND, UPPER GI TRACT, ENDOSCOPIC;  Surgeon: Mio Simmons III, MD;  Location: [facility];  Service: Endoscopy;  Laterality: N/A;    KIDNEY TRANSPLANT  2004    PORTACATH PLACEMENT  2003    REMOVAL OF VASCULAR ACCESS PORT  2005     Family History   Problem Relation Age of Onset    Kidney disease Father     Heart disease Father      Social History     Tobacco Use    Smoking status: Never Smoker    Smokeless tobacco: Never Used   Substance Use Topics     POST THYROID BIOPSY DISCHARGE INSTRUCTIONS      Resume pre-procedure medications and diet.  You may use over the counter pain relievers or an ice pack as needed if discomfort occurs.    Bandaids may be removed after 6 hrs.    Follow up with the physician who ordered the biopsy for results in 2-3 working days or per previous instructions from their office.    Bruising at the site is normal and will resolve on its own.  There are no restrictions in your activities.POST THYROID BIOPSY DISCHARGE INSTRUCTIONS      Resume pre-procedure medications and diet.  You may use over the counter pain relievers or an ice pack as needed if discomfort occurs.    Bandaids may be removed after 6 hrs.    Follow up with the physician who ordered the biopsy for results in 2-3 working days or per previous instructions from their office.    Bruising at the site is normal and will resolve on its own.  There are no restrictions in your activities.   "Alcohol use: Not Currently    Drug use: Never       Review of patient's allergies indicates:  No Known Allergies    Outpatient meds:  No current facility-administered medications on file prior to encounter.     Current Outpatient Medications on File Prior to Encounter   Medication Sig Dispense Refill    allopurinoL (ZYLOPRIM) 100 MG tablet Take 100 mg by mouth once daily.      levothyroxine (SYNTHROID) 50 MCG tablet Take 50 mcg by mouth once daily.      lisinopriL (PRINIVIL,ZESTRIL) 5 MG tablet Take 5 mg by mouth once daily.      metFORMIN (GLUCOPHAGE) 500 MG tablet Take 500 mg by mouth 2 (two) times daily.      metoprolol tartrate (LOPRESSOR) 50 MG tablet Take 50 mg by mouth 2 (two) times daily.      minoxidiL (LONITEN) 2.5 MG tablet Take 2.5 mg by mouth 2 (two) times daily.      pravastatin (PRAVACHOL) 40 MG tablet Take 40 mg by mouth every evening.      sirolimus (RAPAMUNE) 1 MG Tab Take 2 mg by mouth once daily.      tacrolimus (PROGRAF) 0.5 MG Cap Take 2 mg by mouth every morning. And 1.5mg in the evening      tacrolimus (PROGRAF) 1 MG Cap Take 1.5 mg by mouth every evening.      TOUJEO SOLOSTAR U-300 INSULIN 300 unit/mL (1.5 mL) InPn pen Inject 30 Units into the skin nightly.      VENTOLIN HFA 90 mcg/actuation inhaler INHALE 2 PUFFS INTO THE LUNGS EVERY 6 (SIX) HOURS AS NEEDED FOR WHEEZING (COUGHING). RESCUE (Patient taking differently: Inhale 1 puff into the lungs every 6 (six) hours as needed.) 18 g 2    alfuzosin (UROXATRAL) 10 mg Tb24 Take 1 tablet (10 mg total) by mouth after dinner. 30 tablet 12    BD NOEMI 2ND GEN PEN NEEDLE 32 gauge x 5/32" Ndle USE AS DIRECTED ONCE DAILY WITH BASAGLAR      sildenafiL (VIAGRA) 100 MG tablet Take 1 tablet (100 mg total) by mouth daily as needed for Erectile Dysfunction. 6 tablet 6    [DISCONTINUED] furosemide (LASIX) 20 MG tablet       [DISCONTINUED] spironolactone (ALDACTONE) 50 MG tablet          Scheduled meds:      Infusions:      PRN " meds:      Review of Systems:  Review of Systems   Constitutional: Positive for malaise/fatigue. Negative for chills, fever and weight loss.   HENT: Negative for hearing loss and nosebleeds.    Eyes: Negative for blurred vision, double vision and photophobia.   Respiratory: Negative for cough, shortness of breath and wheezing.    Cardiovascular: Negative for chest pain, palpitations and leg swelling.   Gastrointestinal: Positive for diarrhea. Negative for abdominal pain, constipation, heartburn, nausea and vomiting.   Genitourinary: Negative for dysuria, frequency and urgency.   Musculoskeletal: Negative for falls, joint pain and myalgias.   Skin: Negative for itching and rash.   Neurological: Positive for weakness. Negative for dizziness, speech change, focal weakness, loss of consciousness and headaches.   Endo/Heme/Allergies: Does not bruise/bleed easily.   Psychiatric/Behavioral: Negative for depression and substance abuse. The patient is not nervous/anxious.      OBJECTIVE:     Vital Signs and IO (Last 24H):  Temp:  [97.5 °F (36.4 °C)-99.5 °F (37.5 °C)]   Pulse:  [66-79]   Resp:  [18-20]   BP: (104-135)/(71-87)   SpO2:  [87 %-95 %]   I/O last 3 completed shifts:  In: 1790 [P.O.:490; I.V.:1300]  Out: 1252 [Urine:750; Stool:502]    Wt Readings from Last 5 Encounters:   04/08/22 63.6 kg (140 lb 3.4 oz)   04/06/22 68 kg (150 lb)   03/30/22 67.9 kg (149 lb 12.8 oz)   03/17/22 67 kg (147 lb 11.3 oz)   03/04/22 72.6 kg (160 lb)     Physical Exam:  Constitutional:       General: He is not in acute distress.     Appearance: He is well-developed. He is not diaphoretic.   HENT:      Head: Normocephalic and atraumatic.      Mouth/Throat:      Mouth: Mucous membranes are moist.   Eyes:      General: No scleral icterus.     Pupils: Pupils are equal, round, and reactive to light.   Cardiovascular:      Rate and Rhythm: Normal rate and regular rhythm.   Pulmonary:      Effort: Pulmonary effort is normal. No respiratory  distress.      Breath sounds: No stridor.   Abdominal:      General: There is no distension.      Palpations: Abdomen is soft.   Musculoskeletal:         General: No deformity. Normal range of motion.      Cervical back: Neck supple.   Skin:     General: Skin is warm and dry.      Findings: No erythema or rash.   Neurological:      Mental Status: He is alert and oriented to person, place, and time.      Cranial Nerves: No cranial nerve deficit.   Psychiatric:         Behavior: Behavior normal.     Body mass index is 21.32 kg/m².    Laboratory:  Recent Labs   Lab 04/08/22  0517 04/08/22  1342 04/09/22  0550   * 133* 133*   K 5.2* 4.9 5.1    106 107   CO2 14* 15* 17*   BUN 34* 37* 37*   CREATININE 2.7* 2.8* 2.7*   ESTGFRAFRICA 28.3* 27.1* 28.3*   EGFRNONAA 24.5* 23.5* 24.5*   * 196* 202*       Recent Labs   Lab 04/07/22 1157 04/08/22 0517 04/08/22  1342 04/09/22  0550   CALCIUM 8.6* 7.7* 8.3* 7.7*   ALBUMIN 3.0* 2.5*  --  2.5*   MG  --  1.3*  --  1.7             No results for input(s): POCTGLUCOSE in the last 168 hours.    Recent Labs   Lab 12/30/21  0655 02/09/22  0814   Hemoglobin A1C 7.7 H 6.6 H       Recent Labs   Lab 04/07/22 1157 04/08/22  0517 04/09/22  0550   WBC 3.50* 4.08 4.26   HGB 11.6* 10.0* 9.9*   HCT 33.8* 29.7* 28.8*   PLT 52* 52* 45*   MCV 88 90 89   MCHC 34.3 33.7 34.4   MONO 11.7  0.4 12.5  0.5 12.2  0.5       Recent Labs   Lab 04/07/22 1157 04/08/22  0517 04/09/22  0550   BILITOT 2.3* 2.0* 2.0*   PROT 6.6 5.5* 5.3*   ALBUMIN 3.0* 2.5* 2.5*   ALKPHOS 96 86 75   ALT 26 24 24   * 110* 112*       Recent Labs   Lab 02/03/22  0603 04/07/22  1515   Color, UA Yellow Yellow   Appearance, UA Clear Clear   pH, UA 6.0 6.0   Specific Gravity, UA 1.010 1.020   Protein, UA Negative Trace A   Glucose, UA Trace A 2+ A   Ketones, UA Negative Negative   Urobilinogen, UA Negative Negative   Bilirubin (UA) Negative Negative   Occult Blood UA Negative Negative   Nitrite, UA Negative  Negative             Microbiology Results (last 7 days)     Procedure Component Value Units Date/Time    Stool culture [[id number]] Collected: 04/08/22 1835    Order Status: Completed Specimen: Stool Updated: 04/09/22 0834     Stool Culture Nothing significant to date    Clostridium difficile EIA [[id number]] Collected: 04/08/22 1835    Order Status: Completed Specimen: Stool Updated: 04/08/22 2054     C. diff Antigen Negative     C difficile Toxins A+B, EIA Negative     Comment: Testing not recommended for children <24 months old.       Giardia Specific Antigen [[id number]] Collected: 04/08/22 1835    Order Status: Completed Specimen: Stool Updated: 04/08/22 1843     Giardia lamblia Ag Source 0        ASSESSMENT/PLAN:     ILDEFONSO due to ATN due to hypovolemia  Life related donor kidney transplant for 18 years  Hyponatremia  Hyperkalemia  Acidosis  Hypotension  Diarrhea  DM2  Gout  Anemia  Cirrhosis s/p liver biopsy showing chronic active hepatitis and paracentesis on 4/6  HTN  No NSAIDs, ACEI/ARB, IV contrast or other nephrotoxins.  Keep MAP > 60, SBP > 100.  Dose meds for GFR < 30 ml/min.  Renal diet - low K, low phos.  Continue oral bicarb.  Control DM2.  Continue gout meds.    Tolerate asymptomatic HTN up to -160. Hold home BP meds.    Diagnose diarrhea better. GI Consult appreciated.    UA is OK, renal transplant US is OK, tacrolimus levels send reportedly.  Continue home immunosuppression meds. Use pt own tacrolimus, hospital can not provide 1.5mg dose with current products available.    Thank you for allowing us to participate in the care of your patient!   We will follow the patient and provide recommendations as needed.    Patient care time was spent personally by me on the following activities:     · Obtaining a history.  · Examination of patient.  · Providing medical care at the patients bedside.  · Developing a treatment plan with patient or surrogate and bedside caregivers.  · Ordering and reviewing  laboratory studies, radiographic studies, pulse oximetry.  · Ordering and performing treatments and interventions.  · Evaluation of patient's response to treatment.  · Discussions with consultants while on the unit and immediately available to the patient.  · Re-evaluation of the patient's condition.  · Documentation in the medical record.     Dion Leo MD    [facility]  [address] [place] [id number]    [phone] - tel  [phone] - fax    4/9/2022

## 2022-04-09 NOTE — PROGRESS NOTES
[facility]  Gastroenterology  Progress Note    Patient Name: Edgar Jackson  MRN: [id number]  Admission Date: 4/7/2022  Hospital Length of Stay: 1 days  Code Status: Full Code   Attending Provider: Nori Lezama MD  Consulting Provider: Inge Chow MD  Primary Care Physician: [place] Castillo MD  Principal Problem: ILDEFONSO (acute kidney injury)    Subjective:     Interval History:  61-year-old  male with a past medical history of kidney transplant and chronic liver disease.  Cirrhosis with evidence of decompensation by ascites.  Serologic workup for etiology unclear with negative viral serologies and negative autoimmune serologies.  Suspect cirrhosis secondary to LOPEZ.  Liver biopsy April 6 with moderate active hepatitis with bridging fibrosis and nodules.  Current meld score of 25 as he is recovering from recent acute kidney injury secondary to ATN    Patient's renal function compromised by recent diuretics as well as ongoing diarrhea.  Stool studies have been negative thus far for pathogens.    4/9/22 - Patient reports that diarrhea is persistent and usually postprandial but there have been some nocturnal events averaging 3 to 6 bowel movements daily. His golf is also been lingering and persistent for several months of unclear ideology.  Review of Systems  Objective:     Vital Signs (Most Recent):  Temp: 98.8 °F (37.1 °C) (04/09/22 0746)  Pulse: 74 (04/09/22 0817)  Resp: 20 (04/09/22 0817)  BP: 120/87 (04/09/22 0746)  SpO2: 96 % (04/09/22 0817) Vital Signs (24h Range):  Temp:  [97.5 °F (36.4 °C)-99.5 °F (37.5 °C)] 98.8 °F (37.1 °C)  Pulse:  [66-74] 74  Resp:  [18-20] 20  SpO2:  [87 %-96 %] 96 %  BP: (107-135)/(71-87) 120/87     Weight: 63.6 kg (140 lb 3.4 oz) (04/08/22 0400)  Body mass index is 21.32 kg/m².      Intake/Output Summary (Last 24 hours) at 4/9/2022 1201  Last data filed at 4/9/2022 0749  Gross per 24 hour   Intake 200 ml   Output 851 ml   Net -651 ml       Lines/Drains/Airways        Peripheral Intravenous Line  Duration                  Peripheral IV - Single Lumen 04/07/22 1230 20 G Left Antecubital 1 day                    Physical Exam  Physical Exam:  General- Patient alert and oriented x3 in NAD  HEENT- PERRLA, EOMI, OP clear, MMM  Neck- No JVD, Lymphadenopathy, Thyromegaly  CV- Regular rate and rhythm, No Murmur/mae/rubs  Resp- Lungs CTA Bilaterally, No increased WOB  GI- Non tender/non-distended, BS normoactive x4 quads, no HSM  Extrem- No cyanosis, clubbing, edema. Pulses 2+ and symmetric  Neuro- Strength 5/5 flexors/extensors, DTRs 2+ and symmetric, Intact sensation to light touch grossly   Significant Labs:  CBC:   Recent Labs   Lab 04/08/22  0517 04/09/22  0550   WBC 4.08 4.26   HGB 10.0* 9.9*   HCT 29.7* 28.8*   PLT 52* 45*     CMP:   Recent Labs   Lab 04/09/22  0550   *   CALCIUM 7.7*   ALBUMIN 2.5*   PROT 5.3*   *   K 5.1   CO2 17*      BUN 37*   CREATININE 2.7*   ALKPHOS 75   ALT 24   *   BILITOT 2.0*     Coagulation:   Recent Labs   Lab 04/09/22  0550   INR 1.5         Significant Imaging:  U/S: I have reviewed all results within the past 24 hours and my personal findings are:  IMPRESSION:   IMPRESSION:   1. Slightly increased renal cortical echogenicity, similar in appearance to March 24, otherwise normal appearance of right lower quadrant renal transplant.   2. Small amount of right lower quadrant free fluid.   Assessment/Plan:     Active Diagnoses:    Diagnosis Date Noted POA    PRINCIPAL PROBLEM:  ILDEFONSO (acute kidney injury) [N17.9] 04/07/2022 Yes    Diarrhea [R19.7] 04/08/2022 Yes     Chronic    Hyponatremia [E87.1] 04/08/2022 Yes    Metabolic acidosis [E87.2] 04/08/2022 Yes    Hypothyroidism [E03.9] 04/08/2022 Yes     Chronic    Thrombocytopenia [D69.6] 04/08/2022 Yes    Anemia [D64.9] 04/08/2022 Yes     Chronic    Cirrhosis of liver with ascites [K74.60, R18.8] 03/30/2022 Yes    Interstitial lung disease [J84.9] 03/30/2022 Yes     Diabetes mellitus due to underlying condition with chronic kidney disease, with long-term current use of insulin [E08.22, Z79.4] 02/08/2022 Not Applicable    Personal history of immunosupression therapy [Z92.25] 02/08/2022 Not Applicable    Hypertension [I10] 02/08/2022 Yes    Gout [M10.9] 02/08/2022 Yes    Kidney transplant recipient [Z94.0] 02/08/2022 Not Applicable      Problems Resolved During this Admission:    Diagnosis Date Noted Date Resolved POA    Hyperkalemia [E87.5] 04/07/2022 04/09/2022 Yes     61-year-old gentleman with a history of insulin-dependent diabetes prior kidney transplant on current immunosuppressive therapy and more recent decompensation of cirrhosis of the liver with ascites admitted for renal injury.    Diarrhea negative for C diff and bacterial pathogen - creon trial qac and qhs.    Acute kidney injury due to ATN and hypovolemia    Black's esophagus indefinite for dysplasia    Chronic anemia - multifactorial.    Persistent cough - consider pulmonary evaluation in immunosuppressed patient        Thank you for your consult. I will follow-up with patient. Please contact us if you have any additional questions.    Inge Chow MD  Gastroenterology  [facility]

## 2022-04-09 NOTE — PROGRESS NOTES
Critical access hospital Medicine  Progress Note    Patient Name: Edgar Jackson  MRN: [id number]  Patient Class: IP- Inpatient   Admission Date: 4/7/2022  Length of Stay: 1 days  Attending Physician: Nori Lezama MD  Primary Care Provider: Tyler Castillo MD        Subjective:     Principal Problem:ILDEFONSO (acute kidney injury)        HPI:  Mr. Jackson is a 61-year-old male with a past medical history of renal failure status post renal transplant 2004, NIDDM2, ILD, and cirrhosis who presents today with complaints of elevated creatinine on outpatient labs.  It is severe.  It is associated with recent liver biopsy and paracentesis and recent addition of Lasix and spironolactone.  He denies fever, chills, nausea, vomiting, diarrhea, chest pain, shortness of breath, loss of consciousness.  He has a chronic cough that is productive of clear/white/yellow sputum at baseline which he states has worsened over the last 2 months. He had a liver biopsy and paracentesis yesterday at [facility] with Dr. Osullivan and his creatinine was 3.1. He was called and referred to the ED for further work up.       Overview/Hospital Course:  Patient with a history of remote renal transplant, secondary to hypertensive renal failure, on chronic immunosuppressant (sirolimus and tacrolimus), diagnosis of cirrhosis who underwent recent transjugular biopsy (pathology with chronic mild to moderate active hepatitis with bridging fibrosis and nodules) as well as paracentesis (2750 cc removed) on 04/06.  He also was seen by Pulmonary for concern of interstitial lung disease and reports ongoing dyspnea on exertion with cough.  He was referred to the ED due to abnormal outpatient labs with acute kidney injury.  He was borderline hypotensive in the ED, creatinine 3.1.  Also noted to have hyponatremia, hyperkalemia with metabolic acidosis.  Ongoing diarrhea quantified as 4 bowel movements per day.  On 04/08, renal function with creatinine 2.7, minimal urine  output, on bicarbonate drip, stool studies in process, Nephrology and Gastroenterology following.  On 04/09 still having diarrhea, about 8 bowel movements overnight, states no relation to time of day, usually after every meal, blood pressure is improved, BUN/creatinine 37/2.7.      Interval History:  Patient continues with diarrhea, states he had 8 bowel movements overnight, 2 bowel movement as of today.  Tolerating oral diet but feels with every oral intake has bowel movement.  Still reports low urine output but has been urinating.  Feet fluid in his abdomen is building back up.  Continues with nonproductive cough which continues to affect his sleep.  T-max 99.5°.  Blood pressure trend has improved.  Labs with hemoglobin 9.9, platelet 45, sodium 133, potassium 5.1, BUN/creatinine 37/2.7, T bilirubin 2.  Documented urine output 650 cc.  C diff negative.  Discussed with patient and wife present at bedside.      Review of Systems   Constitutional:  Negative for fever.   Respiratory:  Positive for cough and shortness of breath.    Gastrointestinal:  Positive for diarrhea.   Neurological:  Positive for weakness.   Psychiatric/Behavioral:  Negative for confusion.    Objective:     Vital Signs (Most Recent):  Temp: 98.8 °F (37.1 °C) (04/09/22 0746)  Pulse: 70 (04/09/22 0746)  Resp: 20 (04/09/22 0746)  BP: 120/87 (04/09/22 0746)  SpO2: (!) 92 % (04/09/22 0746)   Vital Signs (24h Range):  Temp:  [97.5 °F (36.4 °C)-99.5 °F (37.5 °C)] 98.8 °F (37.1 °C)  Pulse:  [66-79] 70  Resp:  [18-20] 20  SpO2:  [87 %-95 %] 92 %  BP: (104-135)/(71-87) 120/87     Weight: 63.6 kg (140 lb 3.4 oz)  Body mass index is 21.32 kg/m².    Intake/Output Summary (Last 24 hours) at 4/9/2022 1102  Last data filed at 4/9/2022 0749  Gross per 24 hour   Intake 440 ml   Output 1253 ml   Net -813 ml      Physical Exam  Vitals and nursing note reviewed.   Constitutional:       Comments: chronically ill appearing, sitting side of bed   HENT:      Head:  Normocephalic and atraumatic.      Mouth/Throat:      Mouth: Mucous membranes are moist.   Cardiovascular:      Rate and Rhythm: Normal rate and regular rhythm.   Pulmonary:      Comments: On RA, inspiratory crackles at bases  Abdominal:      General: Bowel sounds are normal.      Palpations: Abdomen is soft.      Tenderness: There is no abdominal tenderness.   Skin:     General: Skin is warm and dry.   Neurological:      Mental Status: He is alert and oriented to person, place, and time.      Comments: Speech intact, moving all four extremities, generalized weakness   Psychiatric:         Mood and Affect: Mood normal.       Significant Labs: CBC:   Recent Labs   Lab 04/07/22  1157 04/08/22  0517 04/09/22  0550   WBC 3.50* 4.08 4.26   HGB 11.6* 10.0* 9.9*   HCT 33.8* 29.7* 28.8*   PLT 52* 52* 45*     CMP:   Recent Labs   Lab 04/07/22  1157 04/08/22  0517 04/08/22  1342 04/09/22  0550   * 130* 133* 133*   K 5.4* 5.2* 4.9 5.1    108 106 107   CO2 16* 14* 15* 17*   * 143* 196* 202*   BUN 34* 34* 37* 37*   CREATININE 2.7* 2.7* 2.8* 2.7*   CALCIUM 8.6* 7.7* 8.3* 7.7*   PROT 6.6 5.5*  --  5.3*   ALBUMIN 3.0* 2.5*  --  2.5*   BILITOT 2.3* 2.0*  --  2.0*   ALKPHOS 96 86  --  75   * 110*  --  112*   ALT 26 24  --  24   ANIONGAP 10 8 12 9   EGFRNONAA 24.5* 24.5* 23.5* 24.5*     Cardiac Markers: No results for input(s): CKMB, MYOGLOBIN, BNP, TROPISTAT in the last 48 hours.  Coagulation:   Recent Labs   Lab 04/09/22  0550   INR 1.5     Lipid Panel: No results for input(s): CHOL, HDL, LDLCALC, TRIG, CHOLHDL in the last 48 hours.  Magnesium:   Recent Labs   Lab 04/08/22  0517 04/09/22  0550   MG 1.3* 1.7     Prealbumin: No results for input(s): PREALBUMIN in the last 48 hours.  Respiratory Culture: No results for input(s): GSRESP, RESPIRATORYC in the last 48 hours.  Troponin: No results for input(s): TROPONINI in the last 48 hours.  TSH:   Recent Labs   Lab 02/09/22  0814   TSH 1.536     Urine Studies:    Recent Labs   Lab 04/07/22  1515   COLORU Yellow   APPEARANCEUA Clear   PHUR 6.0   SPECGRAV 1.020   PROTEINUA Trace*   GLUCUA 2+*   KETONESU Negative   BILIRUBINUA Negative   OCCULTUA Negative   NITRITE Negative   UROBILINOGEN Negative   LEUKOCYTESUR Negative       Significant Imaging: I have reviewed and interpreted all pertinent imaging results/findings within the past 24 hours.      Assessment/Plan:     Active Hospital Problems    Diagnosis    *ILDEFONSO (acute kidney injury)    Diarrhea    Hyponatremia    Metabolic acidosis    Hypothyroidism    Thrombocytopenia    Anemia    Cirrhosis of liver with ascites    Interstitial lung disease    Diabetes mellitus due to underlying condition with chronic kidney disease, with long-term current use of insulin    Personal history of immunosupression therapy    Hypertension    Gout    Kidney transplant recipient     Plan:  Continue care on medical floor  Bicarbonate infusion discontinued, sodium bicarbonate 1300 b.i.d. orally  Follow up pending stool studies, C diff is negative, acute on chronic condition  Strict I and O and monitoring of urine output  Adjusted p.r.n. antitussives today and will follow  Increase Levemir 20 units q.h.s. and moderate dose insulin sliding scale t.i.d. with meals and Accu-Cheks.  HbA1c 6.6%.  As needed hypoglycemic measures  Liver biopsy 4/6 with chronic mild to moderate active hepatitis with bridging fibrosis and nodules  Continue to hold home lisinopril, metformin, Lasix, spironolactone, minoxidil  Continue home immunosuppressive medication, managed by Nephrology  Renally dosing all medications and avoiding nephrotoxin drugs  A.m. labs ordered  Appreciate all consultant's input  Further plan as per clinical course  Discussed with patient and wife present at bedside      VTE Risk Mitigation (From admission, onward)         Ordered     IP VTE HIGH RISK PATIENT  Once         04/07/22 1800     Place sequential compression device  Until  discontinued         04/07/22 1800                Discharge Planning   LE:      Code Status: Full Code   Is the patient medically ready for discharge?:     Reason for patient still in hospital (select all that apply): Patient trending condition  Discharge Plan A: Home, Home with family                  Nori Lezama MD  Department of Hospital Medicine   [facility]

## 2022-04-09 NOTE — PLAN OF CARE
Problem: Adult Inpatient Plan of Care  Goal: Plan of Care Review  Outcome: Ongoing, Progressing  Goal: Patient-Specific Goal (Individualized)  Outcome: Ongoing, Progressing  Goal: Absence of Hospital-Acquired Illness or Injury  Outcome: Ongoing, Progressing  Goal: Optimal Comfort and Wellbeing  Outcome: Ongoing, Progressing  Goal: Readiness for Transition of Care  Outcome: Ongoing, Progressing     Problem: Fluid and Electrolyte Imbalance (Acute Kidney Injury/Impairment)  Goal: Fluid and Electrolyte Balance  Outcome: Ongoing, Progressing     Problem: Oral Intake Inadequate (Acute Kidney Injury/Impairment)  Goal: Optimal Nutrition Intake  Outcome: Ongoing, Progressing     Problem: Renal Function Impairment (Acute Kidney Injury/Impairment)  Goal: Effective Renal Function  Outcome: Ongoing, Progressing     Problem: Infection  Goal: Absence of Infection Signs and Symptoms  Outcome: Ongoing, Progressing

## 2022-04-10 NOTE — CONSULTS
Pulmonary/Critical Care Consult      Patient name: Edgar Jackson  MRN: [id number]  Date: 04/10/2022    Admit Date: 4/7/2022  Consult Requested By: Nori Lezama MD    Reason for Consult: ILD, cough    HPI:    4/10/2022 - Pt known to Dr Boyer - has ILD which is being evaluated, has cirrhosis (being evaluated) and is admitted for increased creatinine and diarrhea.  He has chronic cough and some increased dyspnea.  He denies any definite asbestos exposure, CTD, no toxic exposures.  He is a kidney transplant patient (about 18 years ago).  CT chest reviewed.  Had recent liver biopsy + active hepatitis and has been seen by Dr Simmons (GI following while in the hospital).    Review of Systems    Review of Systems   Constitutional: Positive for malaise/fatigue. Negative for chills and fever.   Respiratory: Positive for cough (mostly nonproductive) and shortness of breath.    Cardiovascular: Positive for leg swelling.   Gastrointestinal:        + cirrhosis   Psychiatric/Behavioral: Negative for depression and suicidal ideas.       Past Medical History    Past Medical History:   Diagnosis Date    CKD (chronic kidney disease)     Diabetes 2020    Gout     Hypertension 2000    [place] transplant [place] 01/2004    [place]    Thyroid disease     Unspecified cirrhosis of liver        Past Surgical History    Past Surgical History:   Procedure Laterality Date    CATARACT EXTRACTION Right 2017    COLONOSCOPY N/A 3/8/2022    Procedure: COLONOSCOPY;  Surgeon: Mio Simmons III, MD;  Location: [facility];  Service: Endoscopy;  Laterality: N/A;    ENDOSCOPIC ULTRASOUND OF UPPER GASTROINTESTINAL TRACT N/A 3/8/2022    Procedure: ULTRASOUND, UPPER GI TRACT, ENDOSCOPIC;  Surgeon: Mio Simmons III, MD;  Location: [facility];  Service: Endoscopy;  Laterality: N/A;    KIDNEY TRANSPLANT  2004    PORTACATH PLACEMENT  2003    REMOVAL OF VASCULAR ACCESS PORT  2005       Medications (scheduled):      allopurinoL  100 mg  Oral Daily    benzonatate  100 mg Oral TID    insulin detemir U-100  20 Units Subcutaneous QHS    levothyroxine  50 mcg Oral Daily    lipase-protease-amylase 12,000-38,000-60,000 units  2 capsule Oral QID (WM & HS)    magnesium sulfate IVPB  1 g Intravenous Once    metoprolol tartrate  50 mg Oral BID    pravastatin  40 mg Oral QHS    sirolimus  2 mg Oral Daily    sodium bicarbonate  1,300 mg Oral BID    tacrolimus  1.5 mg Oral Daily PM    tacrolimus  2 mg Oral Daily AM    tamsulosin  0.4 mg Oral Daily       Medications (infusions):      sodium bicarbonate drip         Medications (prn):     acetaminophen, albuterol-ipratropium, dextrose 50%, dextrose 50%, glucagon (human recombinant), glucose, glucose, hydrocodone-chlorpheniramine, insulin aspart U-100, melatonin, naloxone, ondansetron, polyethylene glycol, sodium chloride 0.9%    Family History:   Family History   Problem Relation Age of Onset    Kidney disease Father     Heart disease Father        Social History: Tobacco:   Social History     Tobacco Use   Smoking Status Never Smoker   Smokeless Tobacco Never Used                                EtOH:   Social History     Substance and Sexual Activity   Alcohol Use Not Currently                                Drugs:   Social History     Substance and Sexual Activity   Drug Use Never                                Occupation: [employer]                             Asbestos exposure: no                             Pets: na                             Environmental allergies: na    Physical Exam    Vital signs:  Temp:  [98.4 °F (36.9 °C)-99 °F (37.2 °C)]   Pulse:  [65-78]   Resp:  [10-20]   BP: (107-122)/(72-82)   SpO2:  [92 %-100 %]     Intake/Output:     Intake/Output Summary (Last 24 hours) at 4/10/2022 1112  Last data filed at 4/10/2022 1110  Gross per 24 hour   Intake 480 ml   Output 800 ml   Net -320 ml        BMI: Estimated body mass index is 21.32 kg/m² as calculated from the following:    Height  "as of this encounter: 5' 8" (1.727 m).    Weight as of this encounter: 63.6 kg (140 lb 3.4 oz).    Physical Exam  Vitals and nursing note reviewed.   Constitutional:       General: He is not in acute distress.     Appearance: Normal appearance. He is not ill-appearing, toxic-appearing or diaphoretic.   HENT:      Head: Normocephalic and atraumatic.      Right Ear: External ear normal.      Left Ear: External ear normal.      Nose: Nose normal.      Mouth/Throat:      Mouth: Mucous membranes are moist.      Pharynx: Oropharynx is clear. No oropharyngeal exudate.   Eyes:      General: No scleral icterus.        Right eye: No discharge.         Left eye: No discharge.      Extraocular Movements: Extraocular movements intact.      Conjunctiva/sclera: Conjunctivae normal.      Pupils: Pupils are equal, round, and reactive to light.   Neck:      Vascular: No carotid bruit.   Cardiovascular:      Rate and Rhythm: Normal rate and regular rhythm.      Pulses: Normal pulses.      Heart sounds: Normal heart sounds. No murmur heard.    No friction rub. No gallop.   Pulmonary:      Effort: Pulmonary effort is normal. No respiratory distress.      Breath sounds: No stridor. Rales (few posterior) present. No wheezing or rhonchi.   Chest:      Chest wall: No tenderness.   Abdominal:      General: Bowel sounds are normal. There is no distension.      Tenderness: There is no abdominal tenderness. There is no guarding.   Musculoskeletal:         General: No swelling. Normal range of motion.      Cervical back: Normal range of motion and neck supple. No rigidity or tenderness.      Right lower leg: Edema present.      Left lower leg: Edema present.   Lymphadenopathy:      Cervical: No cervical adenopathy.   Skin:     General: Skin is warm and dry.      Capillary Refill: Capillary refill takes less than 2 seconds.      Coloration: Skin is not jaundiced.      Findings: No bruising.   Neurological:      General: No focal deficit present. "      Mental Status: He is alert and oriented to person, place, and time. Mental status is at baseline.      Cranial Nerves: No cranial nerve deficit.      Sensory: No sensory deficit.      Motor: No weakness.   Psychiatric:         Mood and Affect: Mood normal.         Behavior: Behavior normal.         Thought Content: Thought content normal.         Judgment: Judgment normal.         Laboratory    Recent Labs   Lab 04/10/22  0629   WBC 4.14   RBC 3.17*   HGB 9.6*   HCT 28.0*   PLT 38*   MCV 88   MCH 30.3   MCHC 34.3       Recent Labs   Lab 04/10/22  0629   CALCIUM 7.5*   PROT 5.3*   *   K 5.0   CO2 19*      BUN 40*   CREATININE 2.6*   ALKPHOS 88   ALT 24   *   BILITOT 1.8*       No results for input(s): PT, INR, APTT in the last 24 hours.    No results for input(s): CPK, CPKMB, TROPONINI, MB in the last 24 hours.    Additional labs: reviewed    Microbiology:       Microbiology Results (last 7 days)     Procedure Component Value Units Date/Time    Stool culture [[id number]] Collected: 04/08/22 1835    Order Status: Completed Specimen: Stool Updated: 04/10/22 0811     Stool Culture Nothing significant to date    Clostridium difficile EIA [[id number]] Collected: 04/08/22 1835    Order Status: Completed Specimen: Stool Updated: 04/08/22 2054     C. diff Antigen Negative     C difficile Toxins A+B, EIA Negative     Comment: Testing not recommended for children <24 months old.       Giardia Specific Antigen [[id number]] Collected: 04/08/22 1835    Order Status: Completed Specimen: Stool Updated: 04/08/22 1843     Giardia lamblia Ag Source 0          Radiology    US Transplant Kidney With Doppler  Ultrasound renal transplant    CLINICAL DATA: Acute renal insufficiency    FINDINGS: Sonographic assessment targeted to right lower quadrant renal transplant was performed.    The transplanted kidney measures 11.9 cm in longitudinal dimension. Cortical echogenicity is mildly increased. There is no mass or  hydronephrosis. Doppler assessment of intrarenal arterial branches demonstrates normal waveforms, with resistive indices of 0.60 or below. The transplant renal artery demonstrates peak systolic velocity of 81 cm/s. Renal vein is patent.    There is a small amount of right lower quadrant free fluid.    IMPRESSION:  1. Slightly increased renal cortical echogenicity, similar in appearance to March 24, otherwise normal appearance of right lower quadrant renal transplant.  2. Small amount of right lower quadrant free fluid.    Electronically signed by:  Dez Duque MD  4/7/2022 5:05 PM CDT Workstation: [id number]  X-Ray Chest PA And Lateral  CLINICAL HISTORY:  61 years (1960) Male Creat of 3.1, sent by Dr. Hedrick, cough    TECHNIQUE:  PA and lateral radiograph of the chest.    COMPARISON:  Most recent radiograph from February 2, 2022    FINDINGS:  Slight interval worsening, mild scattered peripheral basilar predominant interstitial opacities, slightly worse from the previous exam with lower lung volumes suggesting a combination of atelectasis, scarring and less likely a trace edema or atypical infection/viral pneumonia in the appropriate clinical setting. Costophrenic angles are seen without effusion. No pneumothorax is identified. The heart is normal in size. The mediastinum is within normal limits. Osseous structures appear within normal limits. The visualized upper abdomen is unremarkable.    IMPRESSION:  Mild scattered peripheral basilar predominant interstitial opacities bilaterally suggesting a combination of atelectasis, mild atypical/viral pneumonia, and scarring.    .    Electronically signed by:  Jarred Lora MD  4/7/2022 3:08 PM CDT Workstation: [id number]        Additional Studies    reviewed    Ventilator Information              No results for input(s): PH, PCO2, PO2, HCO3, POCSATURATED, BE in the last 72 hours.      Impression    Active Hospital Problems    Diagnosis  POA    *ILDEFONSO (acute  kidney injury) [N17.9]  Yes    Diarrhea [R19.7]  Yes     Chronic    Hyponatremia [E87.1]  Yes    Metabolic acidosis [E87.2]  Yes    Hypothyroidism [E03.9]  Yes     Chronic    Thrombocytopenia [D69.6]  Yes    Anemia [D64.9]  Yes     Chronic    Cirrhosis of liver with ascites [K74.60, R18.8]  Yes    Interstitial lung disease [J84.9]  Yes    Diabetes mellitus due to underlying condition with chronic kidney disease, with long-term current use of insulin [E08.22, Z79.4]  Not Applicable    Personal history of immunosupression therapy [Z92.25]  Not Applicable    Hypertension [I10]  Yes    Gout [M10.9]  Yes    Kidney transplant recipient [Z94.0]  Not Applicable      Resolved Hospital Problems    Diagnosis Date Resolved POA    Hyperkalemia [E87.5] 04/09/2022 Yes       Plan    · Treat cough with prn cough suppression  · Will order CTD workup for ILD  · Further renal evaluation per renal  · Further GI workup per GI    Thank you for this consult.  I will follow with you while the patient is hospitalized.        Robert Gu MD  [facility] Pulmonary/Critical Care  04/10/2022

## 2022-04-10 NOTE — PROGRESS NOTES
Nephrology Consult Note        Patient Name: Edgar Jackson  MRN: [id number]    Patient Class: IP- Inpatient   Admission Date: 4/7/2022  Length of Stay: 2 days  Date of Service: 4/10/2022    Attending Physician: Nori Lezama MD  Primary Care Provider: Tyler Castillo MD    Reason for Consult: ildefonso/hyponatremia/acidosis/hyperkalemia/kidney transplant/cirrhosis with ascites/anemia/diarrhea/gout/htn/hypotension/dm2    SUBJECTIVE:     HPI: 61m with kidney transplant and liver disease who recently moved to the area and is yet to establish care is admitted after paracentesis and liver biopsy on 4/6. Labs show ILDEFONSO, hyponatremia, hyperkalemia. He also complains of diarrhea. He is also reportedly on diuretic, spironolactone, ACEi. Received IVF in ER for hypotension.    4/8 VSS, no new complains. Appreciate GI input. Start oral bicarb for acidosis.  4/9 VSS, no new complains. GI work-up for diarrhea in progress.  4/10 VSS. Thrombocytopenia of unclear etiology. Continues to have diarrhea. sCr not worse. Acidosis persists despite oral bicarb, will add bicarb drip to give more bicarb and add more volume. K remains marginal, bicarb drip should help. sNa is low, suspect subtle volume depldtion, more IVF should help.    Past Medical History:   Diagnosis Date    CKD (chronic kidney disease)     Diabetes 2020    Gout     Hypertension 2000    [place] 01[place]    [place]    Thyroid disease     Unspecified cirrhosis of liver      Past Surgical History:   Procedure Laterality Date    CATARACT EXTRACTION Right 2017    COLONOSCOPY N/A 3/8/2022    Procedure: COLONOSCOPY;  Surgeon: Mio Simmons III, MD;  Location: [facility];  Service: Endoscopy;  Laterality: N/A;    ENDOSCOPIC ULTRASOUND OF UPPER GASTROINTESTINAL TRACT N/A 3/8/2022    Procedure: ULTRASOUND, UPPER GI TRACT, ENDOSCOPIC;  Surgeon: Mio Simmons III, MD;  Location: [facility];  Service: Endoscopy;  Laterality: N/A;    KIDNEY TRANSPLANT  2004  "   PORTACATH PLACEMENT  2003    REMOVAL OF VASCULAR ACCESS PORT  2005     Family History   Problem Relation Age of Onset    Kidney disease Father     Heart disease Father      Social History     Tobacco Use    Smoking status: Never Smoker    Smokeless tobacco: Never Used   Substance Use Topics    Alcohol use: Not Currently    Drug use: Never       Review of patient's allergies indicates:  No Known Allergies    Outpatient meds:  No current facility-administered medications on file prior to encounter.     Current Outpatient Medications on File Prior to Encounter   Medication Sig Dispense Refill    allopurinoL (ZYLOPRIM) 100 MG tablet Take 100 mg by mouth once daily.      levothyroxine (SYNTHROID) 50 MCG tablet Take 50 mcg by mouth once daily.      lisinopriL (PRINIVIL,ZESTRIL) 5 MG tablet Take 5 mg by mouth once daily.      metFORMIN (GLUCOPHAGE) 500 MG tablet Take 500 mg by mouth 2 (two) times daily.      metoprolol tartrate (LOPRESSOR) 50 MG tablet Take 50 mg by mouth 2 (two) times daily.      minoxidiL (LONITEN) 2.5 MG tablet Take 2.5 mg by mouth 2 (two) times daily.      pravastatin (PRAVACHOL) 40 MG tablet Take 40 mg by mouth every evening.      sirolimus (RAPAMUNE) 1 MG Tab Take 2 mg by mouth once daily.      tacrolimus (PROGRAF) 0.5 MG Cap Take 2 mg by mouth every morning. And 1.5mg in the evening      tacrolimus (PROGRAF) 1 MG Cap Take 1.5 mg by mouth every evening.      TOUJEO SOLOSTAR U-300 INSULIN 300 unit/mL (1.5 mL) InPn pen Inject 30 Units into the skin nightly.      VENTOLIN HFA 90 mcg/actuation inhaler INHALE 2 PUFFS INTO THE LUNGS EVERY 6 (SIX) HOURS AS NEEDED FOR WHEEZING (COUGHING). RESCUE (Patient taking differently: Inhale 1 puff into the lungs every 6 (six) hours as needed.) 18 g 2    alfuzosin (UROXATRAL) 10 mg Tb24 Take 1 tablet (10 mg total) by mouth after dinner. 30 tablet 12    BD NOEMI 2ND GEN PEN NEEDLE 32 gauge x 5/32" Ndle USE AS DIRECTED ONCE DAILY WITH BASAGLAR   "    sildenafiL (VIAGRA) 100 MG tablet Take 1 tablet (100 mg total) by mouth daily as needed for Erectile Dysfunction. 6 tablet 6    [DISCONTINUED] furosemide (LASIX) 20 MG tablet       [DISCONTINUED] spironolactone (ALDACTONE) 50 MG tablet          Scheduled meds:      Infusions:      PRN meds:      Review of Systems:  Review of Systems   Constitutional: Positive for malaise/fatigue. Negative for chills, fever and weight loss.   HENT: Negative for hearing loss and nosebleeds.    Eyes: Negative for blurred vision, double vision and photophobia.   Respiratory: Negative for cough, shortness of breath and wheezing.    Cardiovascular: Negative for chest pain, palpitations and leg swelling.   Gastrointestinal: Positive for diarrhea. Negative for abdominal pain, constipation, heartburn, nausea and vomiting.   Genitourinary: Negative for dysuria, frequency and urgency.   Musculoskeletal: Negative for falls, joint pain and myalgias.   Skin: Negative for itching and rash.   Neurological: Positive for weakness. Negative for dizziness, speech change, focal weakness, loss of consciousness and headaches.   Endo/Heme/Allergies: Does not bruise/bleed easily.   Psychiatric/Behavioral: Negative for depression and substance abuse. The patient is not nervous/anxious.      OBJECTIVE:     Vital Signs and IO (Last 24H):  Temp:  [98.4 °F (36.9 °C)-99 °F (37.2 °C)]   Pulse:  [65-78]   Resp:  [10-20]   BP: (107-122)/(72-82)   SpO2:  [92 %-100 %]   I/O last 3 completed shifts:  In: 200 [I.V.:200]  Out: 1401 [Urine:900; Stool:501]    Wt Readings from Last 5 Encounters:   04/08/22 63.6 kg (140 lb 3.4 oz)   04/06/22 68 kg (150 lb)   03/30/22 67.9 kg (149 lb 12.8 oz)   03/17/22 67 kg (147 lb 11.3 oz)   03/04/22 72.6 kg (160 lb)     Physical Exam:  Constitutional:       General: He is not in acute distress.     Appearance: He is well-developed. He is not diaphoretic.   HENT:      Head: Normocephalic and atraumatic.      Mouth/Throat:      Mouth:  Mucous membranes are moist.   Eyes:      General: No scleral icterus.     Pupils: Pupils are equal, round, and reactive to light.   Cardiovascular:      Rate and Rhythm: Normal rate and regular rhythm.   Pulmonary:      Effort: Pulmonary effort is normal. No respiratory distress.      Breath sounds: No stridor.   Abdominal:      General: There is no distension.      Palpations: Abdomen is soft.   Musculoskeletal:         General: No deformity. Normal range of motion.      Cervical back: Neck supple.   Skin:     General: Skin is warm and dry.      Findings: No erythema or rash.   Neurological:      Mental Status: He is alert and oriented to person, place, and time.      Cranial Nerves: No cranial nerve deficit.   Psychiatric:         Behavior: Behavior normal.     Body mass index is 21.32 kg/m².    Laboratory:  Recent Labs   Lab 04/08/22  1342 04/09/22  0550 04/10/22  0629   * 133* 129*   K 4.9 5.1 5.0    107 105   CO2 15* 17* 19*   BUN 37* 37* 40*   CREATININE 2.8* 2.7* 2.6*   ESTGFRAFRICA 27.1* 28.3* 29.7*   EGFRNONAA 23.5* 24.5* 25.7*   * 202* 136*       Recent Labs   Lab 04/08/22  0517 04/08/22  1342 04/09/22  0550 04/10/22  0629   CALCIUM 7.7* 8.3* 7.7* 7.5*   ALBUMIN 2.5*  --  2.5* 2.5*   MG 1.3*  --  1.7 1.7             No results for input(s): POCTGLUCOSE in the last 168 hours.    Recent Labs   Lab 12/30/21  0655 02/09/22  0814   Hemoglobin A1C 7.7 H 6.6 H       Recent Labs   Lab 04/08/22  0517 04/09/22  0550 04/10/22  0629   WBC 4.08 4.26 4.14   HGB 10.0* 9.9* 9.6*   HCT 29.7* 28.8* 28.0*   PLT 52* 45* 38*   MCV 90 89 88   MCHC 33.7 34.4 34.3   MONO 12.5  0.5 12.2  0.5 13.0  0.5       Recent Labs   Lab 04/08/22  0517 04/09/22  0550 04/10/22  0629   BILITOT 2.0* 2.0* 1.8*   PROT 5.5* 5.3* 5.3*   ALBUMIN 2.5* 2.5* 2.5*   ALKPHOS 86 75 88   ALT 24 24 24   * 112* 120*       Recent Labs   Lab 02/03/22  0603 04/07/22  1515   Color, UA Yellow Yellow   Appearance, UA Clear Clear   pH, UA  6.0 6.0   Specific Gravity, UA 1.010 1.020   Protein, UA Negative Trace A   Glucose, UA Trace A 2+ A   Ketones, UA Negative Negative   Urobilinogen, UA Negative Negative   Bilirubin (UA) Negative Negative   Occult Blood UA Negative Negative   Nitrite, UA Negative Negative             Microbiology Results (last 7 days)     Procedure Component Value Units Date/Time    Stool culture [[id number]] Collected: 04/08/22 1835    Order Status: Completed Specimen: Stool Updated: 04/10/22 0811     Stool Culture Nothing significant to date    Clostridium difficile EIA [[id number]] Collected: 04/08/22 1835    Order Status: Completed Specimen: Stool Updated: 04/08/22 2054     C. diff Antigen Negative     C difficile Toxins A+B, EIA Negative     Comment: Testing not recommended for children <24 months old.       Giardia Specific Antigen [[id number]] Collected: 04/08/22 1835    Order Status: Completed Specimen: Stool Updated: 04/08/22 1843     Giardia lamblia Ag Source 0        ASSESSMENT/PLAN:     ILDEFONSO due to ATN due to hypovolemia  Live related donor kidney transplant for 18 years  Hyponatremia  Hyperkalemia  Acidosis  Hypotension  Diarrhea  DM2  Gout  Anemia  Cirrhosis s/p liver biopsy showing chronic active hepatitis and paracentesis on 4/6  HTN  No NSAIDs, ACEI/ARB, IV contrast or other nephrotoxins.  Keep MAP > 60, SBP > 100.  Dose meds for GFR < 30 ml/min.  Renal diet. K remains marginal, bicarb drip should help, no Lokelma due to diarrhea.  Continue oral bicarb. Acidosis persists despite oral bicarb, will add bicarb drip to give more bicarb and add more volume.   sNa is low, suspect subtle volume depldtion, more IVF should help.  Control DM2.  Continue gout meds.    Tolerate asymptomatic HTN up to -160. Hold home BP meds.    Diagnose and treat diarrhea better. GI Consult appreciated.    UA is OK, renal transplant US is OK, tacrolimus levels send reportedly.  Continue home immunosuppression meds. Use pt own tacrolimus,  hospital can not provide 1.5mg dose with current products available.    Thank you for allowing us to participate in the care of your patient!   We will follow the patient and provide recommendations as needed.    Patient care time was spent personally by me on the following activities:     · Obtaining a history.  · Examination of patient.  · Providing medical care at the patients bedside.  · Developing a treatment plan with patient or surrogate and bedside caregivers.  · Ordering and reviewing laboratory studies, radiographic studies, pulse oximetry.  · Ordering and performing treatments and interventions.  · Evaluation of patient's response to treatment.  · Discussions with consultants while on the unit and immediately available to the patient.  · Re-evaluation of the patient's condition.  · Documentation in the medical record.     Dion Leo MD    [facility]  [address] [place] [address] [id number] [phone] - tel  [phone] - fax    4/10/2022

## 2022-04-10 NOTE — PROGRESS NOTES
[facility]  Progress Note    Patient Name: Edgar Jackson  MRN: [id number]  Patient Class: IP- Inpatient   Admission Date: 4/7/2022  Length of Stay: 2 days  Attending Physician: Nori Lezama MD  Primary Care Provider: Tyler Castillo MD        Subjective:     Principal Problem:ILDEFONSO (acute kidney injury)        HPI:  Mr. Jackson is a 61-year-old male with a past medical history of renal failure status post renal transplant 2004, NIDDM2, ILD, and cirrhosis who presents today with complaints of elevated creatinine on outpatient labs.  It is severe.  It is associated with recent liver biopsy and paracentesis and recent addition of Lasix and spironolactone.  He denies fever, chills, nausea, vomiting, diarrhea, chest pain, shortness of breath, loss of consciousness.  He has a chronic cough that is productive of clear/white/yellow sputum at baseline which he states has worsened over the last 2 months. He had a liver biopsy and paracentesis yesterday at [facility] with Dr. Osullivan and his creatinine was 3.1. He was called and referred to the ED for further work up.       Overview/Hospital Course:  Patient with a history of remote renal transplant, secondary to hypertensive renal failure, on chronic immunosuppressant (sirolimus and tacrolimus), diagnosis of cirrhosis who underwent recent transjugular biopsy (pathology with chronic mild to moderate active hepatitis with bridging fibrosis and nodules) as well as paracentesis (2750 cc removed) on 04/06.  He also was seen by Pulmonary for concern of interstitial lung disease and reports ongoing dyspnea on exertion with cough.  He was referred to the ED due to abnormal outpatient labs with acute kidney injury.  He was borderline hypotensive in the ED, creatinine 3.1.  Also noted to have hyponatremia, hyperkalemia with metabolic acidosis.  Ongoing diarrhea quantified as 4 bowel movements per day.  On 04/08, renal function with creatinine 2.7, minimal urine  output, on bicarbonate drip, stool studies in process, Nephrology and Gastroenterology following.  On 04/09 still having diarrhea, about 8 bowel movements overnight, states no relation to time of day, usually after every meal, blood pressure is improved, BUN/creatinine 37/2.7.  On 04/10 states overall he feels better, Creon was started yesterday and diarrhea is improving, states he believes he is producing more urine, BUN/creatinine 40/2.6, Nephrology resuming bicarbonate drip, pulmonary consulted.      Interval History:  Patient states overall he feels improved today.  Diarrhea frequency is less, had about 3-4 bowel movements in the interim, becoming more formed, remains nonbloody.  States liquid cough medication has been helpful.  Believes he is also producing more urine.  T-max 99°, blood pressure stable.  Labs with hemoglobin 9.6, platelet 38, sodium 129, potassium 5, BUN/creatinine 40/2.6.  Documented urine output 650 cc.  Discussed with patient and wife present at bedside.      Review of Systems   Constitutional:  Negative for fever.   Respiratory:  Positive for cough and shortness of breath.    Gastrointestinal:  Positive for abdominal distention and diarrhea (improving).   Allergic/Immunologic: Positive for immunocompromised state.   Psychiatric/Behavioral:  Negative for confusion.    Objective:     Vital Signs (Most Recent):  Temp: 99 °F (37.2 °C) (04/10/22 0645)  Pulse: 68 (04/10/22 0743)  Resp: 18 (04/10/22 1103)  BP: 107/77 (04/10/22 0645)  SpO2: (!) 92 % (04/10/22 0743)   Vital Signs (24h Range):  Temp:  [98.4 °F (36.9 °C)-99 °F (37.2 °C)] 99 °F (37.2 °C)  Pulse:  [65-78] 68  Resp:  [10-20] 18  SpO2:  [92 %-100 %] 92 %  BP: (107-122)/(72-82) 107/77     Weight: 63.6 kg (140 lb 3.4 oz)  Body mass index is 21.32 kg/m².    Intake/Output Summary (Last 24 hours) at 4/10/2022 1114  Last data filed at 4/10/2022 1110  Gross per 24 hour   Intake 480 ml   Output 800 ml   Net -320 ml      Physical Exam  Vitals and  nursing note reviewed.   Constitutional:       Comments: Sitting in bed, NAD, cooperative   HENT:      Head: Normocephalic and atraumatic.      Mouth/Throat:      Mouth: Mucous membranes are moist.   Cardiovascular:      Rate and Rhythm: Normal rate and regular rhythm.   Pulmonary:      Comments: On RA, inspiratory crackles at bases  Abdominal:      General: Bowel sounds are normal.      Palpations: Abdomen is soft.      Tenderness: There is no abdominal tenderness.      Comments: Abdomen not tense, non tender   Skin:     General: Skin is warm and dry.   Neurological:      Mental Status: He is alert and oriented to person, place, and time.      Comments: Speech intact, moving all four extremities, generalized weakness   Psychiatric:         Mood and Affect: Mood normal.       Significant Labs: BMP:   Recent Labs   Lab 04/10/22  0629   *   *   K 5.0      CO2 19*   BUN 40*   CREATININE 2.6*   CALCIUM 7.5*   MG 1.7     CBC:   Recent Labs   Lab 04/09/22  0550 04/10/22  0629   WBC 4.26 4.14   HGB 9.9* 9.6*   HCT 28.8* 28.0*   PLT 45* 38*     CMP:   Recent Labs   Lab 04/08/22  1342 04/09/22  0550 04/10/22  0629   * 133* 129*   K 4.9 5.1 5.0    107 105   CO2 15* 17* 19*   * 202* 136*   BUN 37* 37* 40*   CREATININE 2.8* 2.7* 2.6*   CALCIUM 8.3* 7.7* 7.5*   PROT  --  5.3* 5.3*   ALBUMIN  --  2.5* 2.5*   BILITOT  --  2.0* 1.8*   ALKPHOS  --  75 88   AST  --  112* 120*   ALT  --  24 24   ANIONGAP 12 9 5*   EGFRNONAA 23.5* 24.5* 25.7*     Magnesium:   Recent Labs   Lab 04/09/22  0550 04/10/22  0629   MG 1.7 1.7     POCT Glucose: No results for input(s): POCTGLUCOSE in the last 48 hours.    Significant Imaging: I have reviewed and interpreted all pertinent imaging results/findings within the past 24 hours.      Assessment/Plan:      Active Hospital Problems    Diagnosis    *ILDEFONSO (acute kidney injury)    Diarrhea    Hyponatremia    Metabolic acidosis    Hypothyroidism    Thrombocytopenia     Anemia    Cirrhosis of liver with ascites    Interstitial lung disease    Diabetes mellitus due to underlying condition with chronic kidney disease, with long-term current use of insulin    Personal history of immunosupression therapy    Hypertension    Gout    Kidney transplant recipient     Plan:  Continue care on medical floor  On 4/10 bicarbonate drip resumed at 100 cc, sodium bicarbonate 1300 b.i.d., as per Nephrology  On 04/19 started on Creon on has noted improvement in diarrhea, continue  Strict I and O and monitoring of urine output  Continue p.r.n. antitussives today and will follow  Chronic thrombocytopenia, likely due to cirrhosis, no evidence of bleeding, monitoring closely  1 g IV magnesium supplementation  Continue Levemir 20 units q.h.s. and moderate dose insulin sliding scale t.i.d. with meals and Accu-Cheks.  HbA1c 6.6%.  As needed hypoglycemic measures  Liver biopsy 4/6 with chronic mild to moderate active hepatitis with bridging fibrosis and nodules  Continue to hold home lisinopril, metformin, Lasix, spironolactone, minoxidil  Continue home immunosuppressive medication, managed by Nephrology  Renally dosing all medications and avoiding nephrotoxin drugs  A.m. labs ordered  Appreciate all consultant's input  Further plan as per clinical course  Discussed with patient and wife present at bedside    VTE Risk Mitigation (From admission, onward)         Ordered     IP VTE HIGH RISK PATIENT  Once         04/07/22 1800     Place sequential compression device  Until discontinued         04/07/22 1800                Discharge Planning   LE:      Code Status: Full Code   Is the patient medically ready for discharge?:     Reason for patient still in hospital (select all that apply): Patient trending condition  Discharge Plan A: Home, Home with family                  Nori Lezama MD  Department of Hospital Medicine   [facility]

## 2022-04-10 NOTE — PROGRESS NOTES
[facility]  Gastroenterology  Progress Note    Patient Name: Edgar Jackson  MRN: [id number]  Admission Date: 4/7/2022  Hospital Length of Stay: 2 days  Code Status: Full Code   Attending Provider: Nori Lezama MD  Consulting Provider: Inge Chow MD  Primary Care Physician: Tyler Castillo MD  Principal Problem: ILDEFONSO (acute kidney injury)    Subjective:     Interval History:  61-year-old  male with a past medical history of kidney transplant and chronic liver disease.  Cirrhosis with evidence of decompensation by ascites.  Serologic workup for etiology unclear with negative viral serologies and negative autoimmune serologies.  Suspect cirrhosis secondary to LOPEZ.  Liver biopsy April 6 with moderate active hepatitis with bridging fibrosis and nodules.  Current meld score of 25 as he is recovering from recent acute kidney injury secondary to ATN    Patient's renal function compromised by recent diuretics as well as ongoing diarrhea.  Stool studies have been negative thus far for pathogens.    4/9/22 - Patient reports that diarrhea is persistent and usually postprandial but there have been some nocturnal events averaging 3 to 6 bowel movements daily. His golf is also been lingering and persistent for several months of unclear etiology    4/10/22 - diarrhea resolving with creon. Nonproductive cough evaluated with dr. noble  Review of Systems  Objective:     Vital Signs (Most Recent):  Temp: 99.1 °F (37.3 °C) (04/10/22 1158)  Pulse: 70 (04/10/22 1158)  Resp: 18 (04/10/22 1158)  BP: 127/78 (04/10/22 1158)  SpO2: (!) 94 % (04/10/22 1158) Vital Signs (24h Range):  Temp:  [98.4 °F (36.9 °C)-99.1 °F (37.3 °C)] 99.1 °F (37.3 °C)  Pulse:  [65-72] 70  Resp:  [10-20] 18  SpO2:  [92 %-100 %] 94 %  BP: (107-127)/(72-82) 127/78     Weight: 63.6 kg (140 lb 3.4 oz) (04/08/22 0400)  Body mass index is 21.32 kg/m².      Intake/Output Summary (Last 24 hours) at 4/10/2022 1408  Last data filed at 4/10/2022  1331  Gross per 24 hour   Intake 720 ml   Output 800 ml   Net -80 ml       Lines/Drains/Airways     Peripheral Intravenous Line  Duration                Peripheral IV - Single Lumen 04/07/22 1230 20 G Left Antecubital 3 days                Physical Exam  Physical Exam:  General- Patient alert and oriented x3 in NAD  HEENT- PERRLA, EOMI, OP clear, MMM  Neck- No JVD, Lymphadenopathy, Thyromegaly  CV- Regular rate and rhythm, No Murmur/mae/rubs  Resp- Lungs CTA Bilaterally, No increased WOB  GI- Non tender/non-distended, BS normoactive x4 quads, no HSM  Extrem- No cyanosis, clubbing, edema. Pulses 2+ and symmetric  Neuro- Strength 5/5 flexors/extensors, DTRs 2+ and symmetric, Intact sensation to light touch grossly   Significant Labs:  CBC:   Recent Labs   Lab 04/09/22  0550 04/10/22  0629   WBC 4.26 4.14   HGB 9.9* 9.6*   HCT 28.8* 28.0*   PLT 45* 38*     CMP:   Recent Labs   Lab 04/10/22  0629   *   CALCIUM 7.5*   ALBUMIN 2.5*   PROT 5.3*   *   K 5.0   CO2 19*      BUN 40*   CREATININE 2.6*   ALKPHOS 88   ALT 24   *   BILITOT 1.8*     Coagulation:   Recent Labs   Lab 04/09/22  0550   INR 1.5         Significant Imaging:  U/S: I have reviewed all results within the past 24 hours and my personal findings are:  IMPRESSION:   IMPRESSION:   1. Slightly increased renal cortical echogenicity, similar in appearance to March 24, otherwise normal appearance of right lower quadrant renal transplant.   2. Small amount of right lower quadrant free fluid.   Assessment/Plan:     Active Diagnoses:    Diagnosis Date Noted POA    PRINCIPAL PROBLEM:  ILDEFONSO (acute kidney injury) [N17.9] 04/07/2022 Yes    Diarrhea [R19.7] 04/08/2022 Yes     Chronic    Hyponatremia [E87.1] 04/08/2022 Yes    Metabolic acidosis [E87.2] 04/08/2022 Yes    Hypothyroidism [E03.9] 04/08/2022 Yes     Chronic    Thrombocytopenia [D69.6] 04/08/2022 Yes    Anemia [D64.9] 04/08/2022 Yes     Chronic    Cirrhosis of liver with ascites  [K74.60, R18.8] 03/30/2022 Yes    Interstitial lung disease [J84.9] 03/30/2022 Yes    Diabetes mellitus due to underlying condition with chronic kidney disease, with long-term current use of insulin [E08.22, Z79.4] 02/08/2022 Not Applicable    Personal history of immunosupression therapy [Z92.25] 02/08/2022 Not Applicable    Hypertension [I10] 02/08/2022 Yes    Gout [M10.9] 02/08/2022 Yes    Kidney transplant recipient [Z94.0] 02/08/2022 Not Applicable      Problems Resolved During this Admission:    Diagnosis Date Noted Date Resolved POA    Hyperkalemia [E87.5] 04/07/2022 04/09/2022 Yes     61-year-old gentleman with a history of insulin-dependent diabetes prior kidney transplant on current immunosuppressive therapy and more recent decompensation of cirrhosis of the liver with ascites admitted for renal injury.    Diarrhea negative for C diff and bacterial pathogen - creon trial qac and qhs with improvement. Ok to continue and titrate to result.    Acute kidney injury due to ATN and hypovolemia    Black's esophagus indefinite for dysplasia - on ppi    Chronic anemia - multifactorial.    Persistent cough - appreciate dr. noble input      Thank you for your consult.  I will sign off with f/u coordinated with dr. Simmons.  Please reconsult with any new issues    .Inge Chow MD  Gastroenterology  [facility]

## 2022-04-10 NOTE — CARE UPDATE
04/10/22 0743   Patient Assessment/Suction   Level of Consciousness (AVPU) alert   Respiratory Effort Normal;Unlabored   Expansion/Accessory Muscles/Retractions no use of accessory muscles;no retractions;expansion symmetric   All Lung Fields Breath Sounds clear   Rhythm/Pattern, Respiratory unlabored;pattern regular;depth regular;chest wiggle adequate;no shortness of breath reported   Cough Frequency no cough   PRE-TX-O2   O2 Device (Oxygen Therapy) room air   SpO2 (!) 92 %   Pulse Oximetry Type Intermittent   $ Pulse Oximetry - Multiple Charge Pulse Oximetry - Multiple   Pulse 68   Resp 10   Aerosol Therapy   $ Aerosol Therapy Charges PRN treatment not required   Education   $ Education Bronchodilator;15 min   Respiratory Evaluation   $ Care Plan Tech Time 15 min

## 2022-04-10 NOTE — PLAN OF CARE
04/09/22 2015   Patient Assessment/Suction   Level of Consciousness (AVPU) alert   Respiratory Effort Normal;Unlabored   Expansion/Accessory Muscles/Retractions no use of accessory muscles   All Lung Fields Breath Sounds Anterior:;clear;diminished   Rhythm/Pattern, Respiratory unlabored   Cough Frequency infrequent   PRE-TX-O2   O2 Device (Oxygen Therapy) room air   SpO2 96 %   Pulse Oximetry Type Intermittent   $ Pulse Oximetry - Multiple Charge Pulse Oximetry - Multiple   Pulse 70   Resp 18   Aerosol Therapy   $ Aerosol Therapy Charges Aerosol Treatment   Daily Review of Necessity (SVN) completed   Respiratory Treatment Status (SVN) given   Treatment Route (SVN) mask;oxygen   Patient Position (SVN) HOB elevated   Post Treatment Assessment (SVN) breath sounds unchanged   Signs of Intolerance (SVN) none   Breath Sounds Post-Respiratory Treatment   Post-treatment Heart Rate (beats/min) 88   Post-treatment Resp Rate (breaths/min) 18   Education   $ Education Bronchodilator;15 min   Respiratory Evaluation   $ Care Plan Tech Time 15 min

## 2022-04-10 NOTE — SUBJECTIVE & OBJECTIVE
Interval History:  Patient states overall he feels improved today.  Diarrhea frequency is less, had about 3-4 bowel movements in the interim, becoming more formed, remains nonbloody.  States liquid cough medication has been helpful.  Believes he is also producing more urine.  T-max 99°, blood pressure stable.  Labs with hemoglobin 9.6, platelet 38, sodium 129, potassium 5, BUN/creatinine 40/2.6.  Documented urine output 650 cc.  Discussed with patient and wife present at bedside.      Review of Systems   Constitutional:  Negative for fever.   Respiratory:  Positive for cough and shortness of breath.    Gastrointestinal:  Positive for abdominal distention and diarrhea (improving).   Allergic/Immunologic: Positive for immunocompromised state.   Psychiatric/Behavioral:  Negative for confusion.    Objective:     Vital Signs (Most Recent):  Temp: 99 °F (37.2 °C) (04/10/22 0645)  Pulse: 68 (04/10/22 0743)  Resp: 18 (04/10/22 1103)  BP: 107/77 (04/10/22 0645)  SpO2: (!) 92 % (04/10/22 0743)   Vital Signs (24h Range):  Temp:  [98.4 °F (36.9 °C)-99 °F (37.2 °C)] 99 °F (37.2 °C)  Pulse:  [65-78] 68  Resp:  [10-20] 18  SpO2:  [92 %-100 %] 92 %  BP: (107-122)/(72-82) 107/77     Weight: 63.6 kg (140 lb 3.4 oz)  Body mass index is 21.32 kg/m².    Intake/Output Summary (Last 24 hours) at 4/10/2022 1114  Last data filed at 4/10/2022 1110  Gross per 24 hour   Intake 480 ml   Output 800 ml   Net -320 ml      Physical Exam  Vitals and nursing note reviewed.   Constitutional:       Comments: Sitting in bed, NAD, cooperative   HENT:      Head: Normocephalic and atraumatic.      Mouth/Throat:      Mouth: Mucous membranes are moist.   Cardiovascular:      Rate and Rhythm: Normal rate and regular rhythm.   Pulmonary:      Comments: On RA, inspiratory crackles at bases  Abdominal:      General: Bowel sounds are normal.      Palpations: Abdomen is soft.      Tenderness: There is no abdominal tenderness.      Comments: Abdomen not tense, non  tender   Skin:     General: Skin is warm and dry.   Neurological:      Mental Status: He is alert and oriented to person, place, and time.      Comments: Speech intact, moving all four extremities, generalized weakness   Psychiatric:         Mood and Affect: Mood normal.       Significant Labs: BMP:   Recent Labs   Lab 04/10/22  0629   *   *   K 5.0      CO2 19*   BUN 40*   CREATININE 2.6*   CALCIUM 7.5*   MG 1.7     CBC:   Recent Labs   Lab 04/09/22  0550 04/10/22  0629   WBC 4.26 4.14   HGB 9.9* 9.6*   HCT 28.8* 28.0*   PLT 45* 38*     CMP:   Recent Labs   Lab 04/08/22  1342 04/09/22  0550 04/10/22  0629   * 133* 129*   K 4.9 5.1 5.0    107 105   CO2 15* 17* 19*   * 202* 136*   BUN 37* 37* 40*   CREATININE 2.8* 2.7* 2.6*   CALCIUM 8.3* 7.7* 7.5*   PROT  --  5.3* 5.3*   ALBUMIN  --  2.5* 2.5*   BILITOT  --  2.0* 1.8*   ALKPHOS  --  75 88   AST  --  112* 120*   ALT  --  24 24   ANIONGAP 12 9 5*   EGFRNONAA 23.5* 24.5* 25.7*     Magnesium:   Recent Labs   Lab 04/09/22  0550 04/10/22  0629   MG 1.7 1.7     POCT Glucose: No results for input(s): POCTGLUCOSE in the last 48 hours.    Significant Imaging: I have reviewed and interpreted all pertinent imaging results/findings within the past 24 hours.

## 2022-04-11 PROBLEM — R50.9 FEVER: Status: ACTIVE | Noted: 2022-01-01

## 2022-04-11 NOTE — SUBJECTIVE & OBJECTIVE
Interval History:  Patient states diarrhea continues to improve, now having formed stools, 2 bowel movements overnight and 1 of as this morning.  Denies any worsening shortness of breath, cough remains nonproductive, mild distention of abdomen but no significant pain, denies any new skin rashes/lesions, denies any dysuria.  No upper respiratory tract symptoms.  States he is making urine.  Febrile to 100.6.  Labs with WBC 4, hemoglobin 10.1, platelet 41, sodium low at 125, BUN/creatinine 38/2.4, magnesium 1.5.  Documented urine output 1675. Discussed with patient and wife present at bedside.      Review of Systems   Constitutional:  Positive for fever.   HENT:  Negative for congestion and sore throat.    Respiratory:  Positive for cough.    Cardiovascular:  Negative for chest pain.   Gastrointestinal:  Negative for nausea and vomiting.   Genitourinary:  Negative for difficulty urinating.   Skin:  Negative for rash and wound.   Allergic/Immunologic: Positive for immunocompromised state.   Neurological:  Negative for headaches.   Psychiatric/Behavioral:  Negative for confusion.    Objective:     Vital Signs (Most Recent):  Temp: 100.1 °F (37.8 °C) (04/11/22 0809)  Pulse: 67 (04/11/22 0658)  Resp: 18 (04/11/22 0658)  BP: 126/87 (04/11/22 0658)  SpO2: (!) 92 % (04/11/22 0658)   Vital Signs (24h Range):  Temp:  [37.8 °F (3.2 °C)-100.6 °F (38.1 °C)] 100.1 °F (37.8 °C)  Pulse:  [67-73] 67  Resp:  [18] 18  SpO2:  [92 %-95 %] 92 %  BP: (117-135)/(75-88) 126/87     Weight: 63.6 kg (140 lb 3.4 oz)  Body mass index is 21.32 kg/m².    Intake/Output Summary (Last 24 hours) at 4/11/2022 1101  Last data filed at 4/11/2022 0832  Gross per 24 hour   Intake 2926 ml   Output 1925 ml   Net 1001 ml      Physical Exam  Vitals and nursing note reviewed.   Constitutional:       General: He is not in acute distress.     Appearance: He is not diaphoretic.      Comments: Sitting in bed, NAD, cooperative   HENT:      Head: Normocephalic and  atraumatic.      Mouth/Throat:      Mouth: Mucous membranes are moist.      Comments: No oral thrush  Eyes:      General:         Right eye: No discharge.         Left eye: No discharge.   Cardiovascular:      Rate and Rhythm: Normal rate and regular rhythm.   Pulmonary:      Comments: On RA, inspiratory crackles at bases  Abdominal:      General: Bowel sounds are normal.      Palpations: Abdomen is soft.      Tenderness: There is no abdominal tenderness.      Comments: Abdomen not tense, non tender   Skin:     General: Skin is warm and dry.   Neurological:      Mental Status: He is alert and oriented to person, place, and time.      Comments: Speech intact, moving all four extremities, generalized weakness   Psychiatric:         Mood and Affect: Mood normal.       Significant Labs: Blood Culture: No results for input(s): LABBLOO in the last 48 hours.  BMP:   Recent Labs   Lab 04/11/22  0852   *   *   K 4.2   CL 96   CO2 21*   BUN 38*   CREATININE 2.4*   CALCIUM 7.1*   MG 1.5*     CBC:   Recent Labs   Lab 04/10/22  0629 04/11/22  0852   WBC 4.14 4.16   HGB 9.6* 10.1*   HCT 28.0* 29.5*   PLT 38* 41*     CMP:   Recent Labs   Lab 04/10/22  0629 04/11/22  0852   * 125*   K 5.0 4.2    96   CO2 19* 21*   * 195*   BUN 40* 38*   CREATININE 2.6* 2.4*   CALCIUM 7.5* 7.1*   PROT 5.3* 5.4*   ALBUMIN 2.5* 2.5*   BILITOT 1.8* 2.1*   ALKPHOS 88 86   * 122*   ALT 24 24   ANIONGAP 5* 8   EGFRNONAA 25.7* 28.3*     Cardiac Markers: No results for input(s): CKMB, MYOGLOBIN, BNP, TROPISTAT in the last 48 hours.  Lactic Acid: No results for input(s): LACTATE in the last 48 hours.  Magnesium:   Recent Labs   Lab 04/10/22  0629 04/11/22  0852   MG 1.7 1.5*     Respiratory Culture: No results for input(s): GSRESP, RESPIRATORYC in the last 48 hours.  Troponin: No results for input(s): TROPONINI in the last 48 hours.  TSH:   Recent Labs   Lab 02/09/22  0814   TSH 1.536     Urine Culture: No results for  input(s): LABURIN in the last 48 hours.  Urine Studies: No results for input(s): COLORU, APPEARANCEUA, PHUR, SPECGRAV, PROTEINUA, GLUCUA, KETONESU, BILIRUBINUA, OCCULTUA, NITRITE, UROBILINOGEN, LEUKOCYTESUR, RBCUA, WBCUA, BACTERIA, SQUAMEPITHEL, HYALINECASTS in the last 48 hours.    Invalid input(s): SUKHI    Significant Imaging: I have reviewed and interpreted all pertinent imaging results/findings within the past 24 hours.

## 2022-04-11 NOTE — PROGRESS NOTES
Nephrology Consult Note        Patient Name: Edgar Jackson  MRN: [id number]    Patient Class: IP- Inpatient   Admission Date: 4/7/2022  Length of Stay: 3 days  Date of Service: 4/11/2022    Attending Physician: Nori Lezama MD  Primary Care Provider: Tyler Castillo MD    Reason for Consult: ildefonso/hyponatremia/acidosis/hyperkalemia/kidney transplant/cirrhosis with ascites/anemia/diarrhea/gout/htn/hypotension/dm2    SUBJECTIVE:     HPI: 61m with kidney transplant and liver disease who recently moved to the area and is yet to establish care is admitted after paracentesis and liver biopsy on 4/6. Labs show ILDEFONSO, hyponatremia, hyperkalemia. He also complains of diarrhea. He is also reportedly on diuretic, spironolactone, ACEi. Received IVF in ER for hypotension.    4/8 VSS, no new complains. Appreciate GI input. Start oral bicarb for acidosis.  4/9 VSS, no new complains. GI work-up for diarrhea in progress.  4/10 VSS. Thrombocytopenia of unclear etiology. Continues to have diarrhea. sCr not worse. Acidosis persists despite oral bicarb, will add bicarb drip to give more bicarb and add more volume. K remains marginal, bicarb drip should help. sNa is low, suspect subtle volume depldtion, more IVF should help.  4/11 VSS. Low grade fever, non-productive cough, CXR unimpressive, Appreciate Pulm input. Hyponatremia worsening - will send urine lytes, stop bicarb gtt and increase oral bicarb to TID. Diarrhea reportedly better with creon.    Past Medical History:   Diagnosis Date    CKD (chronic kidney disease)     Diabetes 2020    Gout     Hypertension 2000    [place] transplant [place] 01/2004    [place]    Thyroid disease     Unspecified cirrhosis of liver      Past Surgical History:   Procedure Laterality Date    CATARACT EXTRACTION Right 2017    COLONOSCOPY N/A 3/8/2022    Procedure: COLONOSCOPY;  Surgeon: Mio Simmons III, MD;  Location: [facility];  Service: Endoscopy;  Laterality: N/A;    ENDOSCOPIC ULTRASOUND  OF UPPER GASTROINTESTINAL TRACT N/A 3/8/2022    Procedure: ULTRASOUND, UPPER GI TRACT, ENDOSCOPIC;  Surgeon: Mio Simmons III, MD;  Location: [facility];  Service: Endoscopy;  Laterality: N/A;    KIDNEY TRANSPLANT  2004    PORTACATH PLACEMENT  2003    REMOVAL OF VASCULAR ACCESS PORT  2005     Family History   Problem Relation Age of Onset    Kidney disease Father     Heart disease Father      Social History     Tobacco Use    Smoking status: Never Smoker    Smokeless tobacco: Never Used   Substance Use Topics    Alcohol use: Not Currently    Drug use: Never       Review of patient's allergies indicates:  No Known Allergies    Outpatient meds:  No current facility-administered medications on file prior to encounter.     Current Outpatient Medications on File Prior to Encounter   Medication Sig Dispense Refill    allopurinoL (ZYLOPRIM) 100 MG tablet Take 100 mg by mouth once daily.      levothyroxine (SYNTHROID) 50 MCG tablet Take 50 mcg by mouth once daily.      lisinopriL (PRINIVIL,ZESTRIL) 5 MG tablet Take 5 mg by mouth once daily.      metFORMIN (GLUCOPHAGE) 500 MG tablet Take 500 mg by mouth 2 (two) times daily.      metoprolol tartrate (LOPRESSOR) 50 MG tablet Take 50 mg by mouth 2 (two) times daily.      minoxidiL (LONITEN) 2.5 MG tablet Take 2.5 mg by mouth 2 (two) times daily.      pravastatin (PRAVACHOL) 40 MG tablet Take 40 mg by mouth every evening.      sirolimus (RAPAMUNE) 1 MG Tab Take 2 mg by mouth once daily.      tacrolimus (PROGRAF) 0.5 MG Cap Take 2 mg by mouth every morning. And 1.5mg in the evening      tacrolimus (PROGRAF) 1 MG Cap Take 1.5 mg by mouth every evening.      TOUJEO SOLOSTAR U-300 INSULIN 300 unit/mL (1.5 mL) InPn pen Inject 30 Units into the skin nightly.      VENTOLIN HFA 90 mcg/actuation inhaler INHALE 2 PUFFS INTO THE LUNGS EVERY 6 (SIX) HOURS AS NEEDED FOR WHEEZING (COUGHING). RESCUE (Patient taking differently: Inhale 1 puff into the lungs every 6  "(six) hours as needed.) 18 g 2    alfuzosin (UROXATRAL) 10 mg Tb24 Take 1 tablet (10 mg total) by mouth after dinner. 30 tablet 12    BD NOEMI 2ND GEN PEN NEEDLE 32 gauge x 5/32" Ndle USE AS DIRECTED ONCE DAILY WITH BASAGLAR      sildenafiL (VIAGRA) 100 MG tablet Take 1 tablet (100 mg total) by mouth daily as needed for Erectile Dysfunction. 6 tablet 6    [DISCONTINUED] furosemide (LASIX) 20 MG tablet       [DISCONTINUED] spironolactone (ALDACTONE) 50 MG tablet          Scheduled meds:      Infusions:      PRN meds:      Review of Systems:  Review of Systems   Constitutional: Positive for malaise/fatigue. Negative for chills, fever and weight loss.   HENT: Negative for hearing loss and nosebleeds.    Eyes: Negative for blurred vision, double vision and photophobia.   Respiratory: Negative for cough, shortness of breath and wheezing.    Cardiovascular: Negative for chest pain, palpitations and leg swelling.   Gastrointestinal: Positive for diarrhea. Negative for abdominal pain, constipation, heartburn, nausea and vomiting.   Genitourinary: Negative for dysuria, frequency and urgency.   Musculoskeletal: Negative for falls, joint pain and myalgias.   Skin: Negative for itching and rash.   Neurological: Positive for weakness. Negative for dizziness, speech change, focal weakness, loss of consciousness and headaches.   Endo/Heme/Allergies: Does not bruise/bleed easily.   Psychiatric/Behavioral: Negative for depression and substance abuse. The patient is not nervous/anxious.      OBJECTIVE:     Vital Signs and IO (Last 24H):  Temp:  [37.8 °F (3.2 °C)-100.6 °F (38.1 °C)]   Pulse:  [67-73]   Resp:  [18]   BP: (117-135)/(75-88)   SpO2:  [92 %-95 %]   I/O last 3 completed shifts:  In: 2446 [P.O.:1410; I.V.:1036]  Out: 2225 [Urine:2225]    Wt Readings from Last 5 Encounters:   04/08/22 63.6 kg (140 lb 3.4 oz)   04/06/22 68 kg (150 lb)   03/30/22 67.9 kg (149 lb 12.8 oz)   03/17/22 67 kg (147 lb 11.3 oz)   03/04/22 72.6 kg " (160 lb)     Physical Exam:  Constitutional:       General: He is not in acute distress.     Appearance: He is well-developed. He is not diaphoretic.   HENT:      Head: Normocephalic and atraumatic.      Mouth/Throat:      Mouth: Mucous membranes are moist.   Eyes:      General: No scleral icterus.     Pupils: Pupils are equal, round, and reactive to light.   Cardiovascular:      Rate and Rhythm: Normal rate and regular rhythm.   Pulmonary:      Effort: Pulmonary effort is normal. No respiratory distress.      Breath sounds: No stridor.   Abdominal:      General: There is no distension.      Palpations: Abdomen is soft.   Musculoskeletal:         General: No deformity. Normal range of motion.      Cervical back: Neck supple.   Skin:     General: Skin is warm and dry.      Findings: No erythema or rash.   Neurological:      Mental Status: He is alert and oriented to person, place, and time.      Cranial Nerves: No cranial nerve deficit.   Psychiatric:         Behavior: Behavior normal.     Body mass index is 21.32 kg/m².    Laboratory:  Recent Labs   Lab 04/09/22  0550 04/10/22  0629 04/11/22  0852   * 129* 125*   K 5.1 5.0 4.2    105 96   CO2 17* 19* 21*   BUN 37* 40* 38*   CREATININE 2.7* 2.6* 2.4*   ESTGFRAFRICA 28.3* 29.7* 32.7*   EGFRNONAA 24.5* 25.7* 28.3*   * 136* 195*       Recent Labs   Lab 04/09/22  0550 04/10/22  0629 04/11/22  0852   CALCIUM 7.7* 7.5* 7.1*   ALBUMIN 2.5* 2.5* 2.5*   MG 1.7 1.7 1.5*             No results for input(s): POCTGLUCOSE in the last 168 hours.    Recent Labs   Lab 12/30/21  0655 02/09/22  0814   Hemoglobin A1C 7.7 H 6.6 H       Recent Labs   Lab 04/09/22  0550 04/10/22  0629 04/11/22  0852   WBC 4.26 4.14 4.16   HGB 9.9* 9.6* 10.1*   HCT 28.8* 28.0* 29.5*   PLT 45* 38* 41*   MCV 89 88 87   MCHC 34.4 34.3 34.2   MONO 12.2  0.5 13.0  0.5 15.9*  0.7       Recent Labs   Lab 04/09/22  0550 04/10/22  0629 04/11/22  0852   BILITOT 2.0* 1.8* 2.1*   PROT 5.3* 5.3*  5.4*   ALBUMIN 2.5* 2.5* 2.5*   ALKPHOS 75 88 86   ALT 24 24 24   * 120* 122*       Recent Labs   Lab 02/03/22  0603 04/07/22  1515   Color, UA Yellow Yellow   Appearance, UA Clear Clear   pH, UA 6.0 6.0   Specific Gravity, UA 1.010 1.020   Protein, UA Negative Trace A   Glucose, UA Trace A 2+ A   Ketones, UA Negative Negative   Urobilinogen, UA Negative Negative   Bilirubin (UA) Negative Negative   Occult Blood UA Negative Negative   Nitrite, UA Negative Negative             Microbiology Results (last 7 days)     Procedure Component Value Units Date/Time    Blood culture [[id number]] Collected: 04/11/22 0852    Order Status: Sent Specimen: Blood Updated: 04/11/22 0900    Narrative:      Collection has been rescheduled by [facility] at 04/11/2022 08:50 Reason:   Patient unavailable, getting stress test.  Collection has been rescheduled by [facility] at 04/11/2022 08:50 Reason:   Patient unavailable, getting stress test.    Stool culture [[id number]] Collected: 04/08/22 1835    Order Status: Completed Specimen: Stool Updated: 04/11/22 0837     Stool Culture No Salmonella,Shigella,Vibrio,Campylobacter.      No E coli 0157:H7 isolated.    Clostridium difficile EIA [[id number]] Collected: 04/08/22 1835    Order Status: Completed Specimen: Stool Updated: 04/08/22 2054     C. diff Antigen Negative     C difficile Toxins A+B, EIA Negative     Comment: Testing not recommended for children <24 months old.       Giardia Specific Antigen [[id number]] Collected: 04/08/22 1835    Order Status: Completed Specimen: Stool Updated: 04/08/22 1843     Giardia lamblia Ag Source 0        ASSESSMENT/PLAN:     ILDEFONSO due to ATN due to hypovolemia  Live related donor kidney transplant for 18 years  Hyponatremia  Hyperkalemia  Acidosis  Hypotension  Diarrhea  DM2  Gout  Anemia  Cirrhosis s/p liver biopsy showing chronic active hepatitis and paracentesis on 4/6  HTN  No NSAIDs, ACEI/ARB, IV contrast or other nephrotoxins.    Keep MAP > 60, SBP >  100.    Dose meds for GFR < 30 ml/min.    Renal diet. K remains marginal, no Lokelma due to diarrhea.    Previous UA is clear, renal transplant US is OK. Continue home immunosuppression meds. Use pt own tacrolimus, hospital can not provide 1.5mg dose with current products available.Control DM2.    Continue oral bicarb, increase to TID, stop drip due to worsening hyponatremia.    sNa is worse - sent urine lytes, treat any infection. Does not appear volume depleted - started IVF yesterday and today sNa is worse. He also runs low grade-fever and has cough today, so potential PNA can worsen hyponatremia as well...    Tolerate asymptomatic HTN up to -160. Hold home BP meds.    Diagnose and treat diarrhea. GI Consult appreciated.    Continue gout meds.    Thank you for allowing us to participate in the care of your patient!   We will follow the patient and provide recommendations as needed.    Patient care time was spent personally by me on the following activities:     · Obtaining a history.  · Examination of patient.  · Providing medical care at the patients bedside.  · Developing a treatment plan with patient or surrogate and bedside caregivers.  · Ordering and reviewing laboratory studies, radiographic studies, pulse oximetry.  · Ordering and performing treatments and interventions.  · Evaluation of patient's response to treatment.  · Discussions with consultants while on the unit and immediately available to the patient.  · Re-evaluation of the patient's condition.  · Documentation in the medical record.     Dion Leo MD    [facility]  [address] [place] [address]  [place] [address]    [phone] - tel  [phone] - fax    4/11/2022

## 2022-04-11 NOTE — PLAN OF CARE
Problem: Adult Inpatient Plan of Care  Goal: Plan of Care Review  Outcome: Ongoing, Progressing  Goal: Patient-Specific Goal (Individualized)  Outcome: Ongoing, Progressing  Goal: Absence of Hospital-Acquired Illness or Injury  Outcome: Ongoing, Progressing  Goal: Optimal Comfort and Wellbeing  Outcome: Ongoing, Progressing  Goal: Readiness for Transition of Care  Outcome: Ongoing, Progressing     Problem: Fluid and Electrolyte Imbalance (Acute Kidney Injury/Impairment)  Goal: Fluid and Electrolyte Balance  Outcome: Ongoing, Progressing     Problem: Oral Intake Inadequate (Acute Kidney Injury/Impairment)  Goal: Optimal Nutrition Intake  Outcome: Ongoing, Progressing     Problem: Renal Function Impairment (Acute Kidney Injury/Impairment)  Goal: Effective Renal Function  Outcome: Ongoing, Progressing     Problem: Infection  Goal: Absence of Infection Signs and Symptoms  Outcome: Ongoing, Progressing     Problem: Fall Injury Risk  Goal: Absence of Fall and Fall-Related Injury  Outcome: Ongoing, Progressing

## 2022-04-11 NOTE — CONSULTS
Consult Note  Infectious Disease    Reason for Consult:  Fever in immunocompromised    HPI: Edgar Jackson is a 61 y.o. male With a history of interstitial lung disease and prior kidney transplantation 2004 on immunosuppressive does and recent diagnosis of active hepatitis (biopsy 4/6) with cirrhosis, decompensation with ascites (recent therapeutic paracentesis 4/6) suspected to be due to LOPEZ, was admitted with acute kidney injury secondary to hypovolemia from diuretics, paracentesis?  and several weeks of non- bloody diarrhea.  At the time of his evaluation in the ER his creatinine was 2.7, CO2 16, potassium 5.4, , he had a 99.1 degree temperature, normal O2 saturation, CBC with white blood cells 3.5 platelets 52.  He required initiation of a bicarbonate drip and treatment for hyperkalemia.  He was seen by GI who ordered stool studies (C difficile toxin negative, Giardia negative, stool culture no enteric joe) and prescribed pancreatic enzyme supplements which were beneficial.  He has developed a low-grade temperature, has a chronic dry cough associated with his interstitial lung disease and a cultures of blood and urine as well as chest x-ray and a diagnostic paracentesis were ordered.  He was placed empirically on ceftriaxone and this consult was requested.  Creatinine has improved to 2.4, sodium is 125.  Chest is shows worsening infiltrates right greater than left which may reflect the hydration has received.  Review of care everywhere indicates that his creatinine was 1.0 in September prior to moving here.     Review of patient's allergies indicates:  No Known Allergies  Past Medical History:   Diagnosis Date    CKD (chronic kidney disease)     Diabetes 2020    Gout     Hypertension 2000    [place]    [place]    Thyroid disease     Unspecified cirrhosis of liver    Fatty liver(from care everywhere)  Hypothyroidism  Migraines  Pulmonary nodules  Chronic  rhinitis  Tinnitus  Bronchoscopy 6/5/2020  Past Surgical History:   Procedure Laterality Date    CATARACT EXTRACTION Right 2017    COLONOSCOPY N/A 3/8/2022    Procedure: COLONOSCOPY;  Surgeon: Mio Simmons III, MD;  Location: [facility] ENDO;  Service: Endoscopy;  Laterality: N/A;    ENDOSCOPIC ULTRASOUND OF UPPER GASTROINTESTINAL TRACT N/A 3/8/2022    Procedure: ULTRASOUND, UPPER GI TRACT, ENDOSCOPIC;  Surgeon: Mio Simmons III, MD;  Location: [facility] ENDO;  Service: Endoscopy;  Laterality: N/A;    KIDNEY TRANSPLANT  2004    PORTACATH PLACEMENT  2003    REMOVAL OF VASCULAR ACCESS PORT  2005     Social History     Socioeconomic History    Marital status:    Tobacco Use    Smoking status: Never Smoker    Smokeless tobacco: Never Used   Substance and Sexual Activity    Alcohol use: Not Currently    Drug use: Never     Family History   Problem Relation Age of Onset    Kidney disease Father     Heart disease Father          Review of Systems:   No chills, fever, sweats, weight loss  No change in vision, loss of vision or diplopia  No sinus congestion, purulent nasal discharge, post nasal drip or facial pain  No pain in mouth or throat. No problems with teeth, gums.  No chest pain, palpitations, syncope  No cough, sputum production, shortness of breath, dyspnea on exertion, pleurisy, hemoptysis  No nausea, vomiting, diarrhea, constipation, blood in stool, or focal abd pain,  No dysphagia, odynophagia  No dysuria, hematuria, strangury, retention, incontinence, nocturia, prostatism,   No vaginal/penile discharge, genital ulcers, risk for STD  No swelling of joints, redness of joints, injuries, or new focal pain  No unusual headaches, dizziness, vertigo, numbness, paresthesias, neuropathy, falls  No anxiety, depression, substance abuse, sleep disturbance  No diabetes, thyroid, hypogonadal conditions  No bleeding, lymphadenopathy, anemia, malignancy, unusual bruising  No new rashes, lesions, or  wounds  No TB exposure  No recent/prior steroids  Outdoor activities:  Travel:   Implants:   Antibiotic History:     EXAM & DIAGNOSTICS REVIEWED:   Vitals:     Temp:  [37.8 °F (3.2 °C)-100.6 °F (38.1 °C)]   Temp: 99 °F (37.2 °C) (04/11/22 1143)  Pulse: 61 (04/11/22 1143)  Resp: 18 (04/11/22 1143)  BP: 112/78 (04/11/22 1143)  SpO2: (!) 91 % (04/11/22 1143)    Intake/Output Summary (Last 24 hours) at 4/11/2022 1614  Last data filed at 4/11/2022 1434  Gross per 24 hour   Intake 2686 ml   Output 2075 ml   Net 611 ml       General:  In NAD. Alert and attentive, cooperative, comfortable  Eyes:  Anicteric, PERRL, EOMI  ENT:  No ulcers, exudates, thrush, nares patent, dentition is  Neck:  supple, no masses or adenopathy appreciated  Lungs: Clear, no consolidation, rales, wheezes, rub  Heart:  RRR, no gallop/murmur/rub noted  Abd:  Soft, NT, ND, normal BS, no masses or organomegaly appreciated.  :  Voids/Oliveira, urine clear, no flank tenderness  Musc:  Joints without effusion, swelling, erythema, synovitis, muscle wasting.   Skin:  No rashes. No palmar or plantar lesions. No subungual petechiae  Neuro:             Alert, attentive, speech fluent, face symmetric, moves all extremities, no focal weakness. Ambulatory  Psych: Calm, cooperative  Lymphatic:     No cervical, supraclavicular, axillary, or inguinal nodes  Extrem: No edema, erythema, phlebitis, cellulitis, warm and well perfused  VAD:       Isolation:    Wound:       Lines/Tubes/Drains:    General Labs reviewed:  Recent Labs   Lab 04/09/22  0550 04/10/22  0629 04/11/22  0852   WBC 4.26 4.14 4.16   HGB 9.9* 9.6* 10.1*   HCT 28.8* 28.0* 29.5*   PLT 45* 38* 41*       Recent Labs   Lab 04/09/22  0550 04/10/22  0629 04/11/22  0852   * 129* 125*   K 5.1 5.0 4.2    105 96   CO2 17* 19* 21*   BUN 37* 40* 38*   CREATININE 2.7* 2.6* 2.4*   CALCIUM 7.7* 7.5* 7.1*   PROT 5.3* 5.3* 5.4*   BILITOT 2.0* 1.8* 2.1*   ALKPHOS 75 88 86   ALT 24 24 24   * 120* 122*      No results for input(s): CRP in the last 168 hours.   Latest Reference Range & Units 02/24/22 10:23   Fluid Color  Yellow   Fluid Appearance  Clear   WBC, Body Fluid /cu mm 171 [1]   Body Fluid Type  Ascites   Segs, Fluid % 10   Lymphs, Fluid % 23   Monocytes/Macrophages, Fluid % 47   Mesothelial Cells, Fluid % 20   Body Fluid, Albumin Not Established g/dL <1.0 [2]   Body Fluid Source, Albumin  Ascites   Body Fluid Source, Total Protein  Ascites   Body Fluid, Protein Not established g/dL <3.0 [3]     Micro:  Microbiology Results (last 7 days)     Procedure Component Value Units Date/Time    Blood culture [[id number]] Collected: 04/11/22 0852    Order Status: Completed Specimen: Blood Updated: 04/11/22 1517     Blood Culture, Routine No Growth to date    Narrative:      2 sets please  Collection has been rescheduled by [facility] at 04/11/2022 08:50 Reason:   Patient unavailable, getting stress test.  Collection has been rescheduled by [facility] at 04/11/2022 08:50 Reason:   Patient unavailable, getting stress test.    Stool culture [[id number]] Collected: 04/08/22 1835    Order Status: Completed Specimen: Stool Updated: 04/11/22 0837     Stool Culture No Salmonella,Shigella,Vibrio,Campylobacter.      No E coli 0157:H7 isolated.    Clostridium difficile EIA [[id number]] Collected: 04/08/22 1835    Order Status: Completed Specimen: Stool Updated: 04/08/22 2054     C. diff Antigen Negative     C difficile Toxins A+B, EIA Negative     Comment: Testing not recommended for children <24 months old.       Giardia Specific Antigen [[id number]] Collected: 04/08/22 1835    Order Status: Completed Specimen: Stool Updated: 04/08/22 1843     Giardia lamblia Ag Source 0          Imaging Reviewed:   CXRs   CT chest 02/02/2022    Cardiology: normal EF by echo 5/2020, care everywhere    IMPRESSION & PLAN   1.  Low-grade fever, worsening infiltrates, fluid versus infection    2.  Acute kidney injury, improved with hydration and bicarbonate  supplementation, 2.6 liters positive since admission   S/p renal transplant 2004 on immunosuppression    3.  Cirrhosis with ascites, varices, felt to be due to LOPEZ.     Elevated alpha fetoprotein 10 times normal, no masses on imaging 3/24   Elevated ammonia 3/8  69    4.  Interstitial lung disease baseline autoimmune workup in progress    5.  Chronic diarrhea, Multiple stool test negative and several in progress      Recommendations:  Agree with rocephin and paracentesis   Need outside records  Would check BNP      Medical Decision Making during this encounter was  [_] Low Complexity  [_] Moderate Complexity  [ xxx] High Complexity

## 2022-04-11 NOTE — PROGRESS NOTES
Pulmonary/Critical Care  Progress Note      Patient name: Edgar Jackson  MRN: [id number]  Date: 04/11/2022    Admit Date: 4/7/2022  Consult Requested By: Nori Lezama MD    Reason for Consult: ILD, cough    HPI:    4/10/2022 - Pt known to Dr Boyer - has ILD which is being evaluated, has cirrhosis (being evaluated) and is admitted for increased creatinine and diarrhea.  He has chronic cough and some increased dyspnea.  He denies any definite asbestos exposure, CTD, no toxic exposures.  He is a kidney transplant patient (about 18 years ago).  CT chest reviewed.  Had recent liver biopsy + active hepatitis and has been seen by Dr Simmons (GI following while in the hospital).    4/11/2022 - Stable overnight, feels that cough may be less.  No new complaints.  Had a low grade fever.  CXR ordered for today.    Review of Systems    Review of Systems   Constitutional: Positive for malaise/fatigue. Negative for chills and fever.   Respiratory: Positive for cough (mostly nonproductive) and shortness of breath.    Cardiovascular: Positive for leg swelling.   Gastrointestinal:        + cirrhosis   Psychiatric/Behavioral: Negative for depression and suicidal ideas.       Past Medical History    Past Medical History:   Diagnosis Date    CKD (chronic kidney disease)     Diabetes 2020    Gout     Hypertension 2000    [place] 01/[place]    [place]    Thyroid disease     Unspecified cirrhosis of liver        Past Surgical History    Past Surgical History:   Procedure Laterality Date    CATARACT EXTRACTION Right 2017    COLONOSCOPY N/A 3/8/2022    Procedure: COLONOSCOPY;  Surgeon: Mio Simmons III, MD;  Location: [facility];  Service: Endoscopy;  Laterality: N/A;    ENDOSCOPIC ULTRASOUND OF UPPER GASTROINTESTINAL TRACT N/A 3/8/2022    Procedure: ULTRASOUND, UPPER GI TRACT, ENDOSCOPIC;  Surgeon: Mio Simmons III, MD;  Location: [facility];  Service: Endoscopy;  Laterality: N/A;    KIDNEY  TRANSPLANT  2004    PORTACATH PLACEMENT  2003    REMOVAL OF VASCULAR ACCESS PORT  2005       Medications (scheduled):      allopurinoL  100 mg Oral Daily    benzonatate  200 mg Oral TID    insulin detemir U-100  20 Units Subcutaneous QHS    levothyroxine  50 mcg Oral Daily    lipase-protease-amylase 12,000-38,000-60,000 units  2 capsule Oral QID (WM & HS)    metoprolol tartrate  50 mg Oral BID    pravastatin  40 mg Oral QHS    sirolimus  2 mg Oral Daily    sodium bicarbonate  1,300 mg Oral BID    tacrolimus  1.5 mg Oral Daily PM    tacrolimus  2 mg Oral Daily AM    tamsulosin  0.4 mg Oral Daily       Medications (infusions):      sodium bicarbonate drip 100 mL/hr at 04/11/22 0414       Medications (prn):     acetaminophen, albuterol-ipratropium, dextrose 50%, dextrose 50%, glucagon (human recombinant), glucose, glucose, hydrocodone-chlorpheniramine, insulin aspart U-100, melatonin, naloxone, ondansetron, polyethylene glycol, sodium chloride 0.9%    Family History:   Family History   Problem Relation Age of Onset    Kidney disease Father     Heart disease Father        Social History: Tobacco:   Social History     Tobacco Use   Smoking Status Never Smoker   Smokeless Tobacco Never Used                                EtOH:   Social History     Substance and Sexual Activity   Alcohol Use Not Currently                                Drugs:   Social History     Substance and Sexual Activity   Drug Use Never                                Occupation:                              Asbestos exposure: no                             Pets: na                             Environmental allergies: na    Physical Exam    Vital signs:  Temp:  [37.8 °F (3.2 °C)-100.6 °F (38.1 °C)]   Pulse:  [67-73]   Resp:  [18]   BP: (117-135)/(75-88)   SpO2:  [92 %-95 %]     Intake/Output:     Intake/Output Summary (Last 24 hours) at 4/11/2022 0816  Last data filed at 4/11/2022 0800  Gross per 24 hour   Intake 2686 ml  "  Output 1675 ml   Net 1011 ml        BMI: Estimated body mass index is 21.32 kg/m² as calculated from the following:    Height as of this encounter: 5' 8" (1.727 m).    Weight as of this encounter: 63.6 kg (140 lb 3.4 oz).    Physical Exam  Vitals and nursing note reviewed.   Constitutional:       General: He is not in acute distress.     Appearance: Normal appearance. He is not ill-appearing, toxic-appearing or diaphoretic.   HENT:      Head: Normocephalic and atraumatic.      Right Ear: External ear normal.      Left Ear: External ear normal.      Nose: Nose normal.      Mouth/Throat:      Mouth: Mucous membranes are moist.      Pharynx: Oropharynx is clear. No oropharyngeal exudate.   Eyes:      General: No scleral icterus.        Right eye: No discharge.         Left eye: No discharge.      Extraocular Movements: Extraocular movements intact.      Conjunctiva/sclera: Conjunctivae normal.      Pupils: Pupils are equal, round, and reactive to light.   Neck:      Vascular: No carotid bruit.   Cardiovascular:      Rate and Rhythm: Normal rate and regular rhythm.      Pulses: Normal pulses.      Heart sounds: Normal heart sounds. No murmur heard.    No friction rub. No gallop.   Pulmonary:      Effort: Pulmonary effort is normal. No respiratory distress.      Breath sounds: No stridor. Rales (few posterior) present. No wheezing or rhonchi.   Chest:      Chest wall: No tenderness.   Abdominal:      General: Bowel sounds are normal. There is no distension.      Tenderness: There is no abdominal tenderness. There is no guarding.   Musculoskeletal:         General: No swelling. Normal range of motion.      Cervical back: Normal range of motion and neck supple. No rigidity or tenderness.      Right lower leg: Edema present.      Left lower leg: Edema present.   Lymphadenopathy:      Cervical: No cervical adenopathy.   Skin:     General: Skin is warm and dry.      Capillary Refill: Capillary refill takes less than 2 " seconds.      Coloration: Skin is not jaundiced.      Findings: No bruising.   Neurological:      General: No focal deficit present.      Mental Status: He is alert and oriented to person, place, and time. Mental status is at baseline.      Cranial Nerves: No cranial nerve deficit.      Sensory: No sensory deficit.      Motor: No weakness.   Psychiatric:         Mood and Affect: Mood normal.         Behavior: Behavior normal.         Thought Content: Thought content normal.         Judgment: Judgment normal.         Laboratory    No results for input(s): WBC, RBC, HGB, HCT, PLT, MCV, MCH, MCHC in the last 24 hours.    No results for input(s): GLUCOSE, CALCIUM, PROT, NA, K, CO2, CL, BUN, CREATININE, ALKPHOS, ALT, AST, BILITOT in the last 24 hours.    Invalid input(s):  ALBUMIN    No results for input(s): PT, INR, APTT in the last 24 hours.    Recent Labs   Lab 04/10/22  1522          Additional labs: reviewed    Microbiology:       Microbiology Results (last 7 days)     Procedure Component Value Units Date/Time    Blood culture [[id number]]     Order Status: Sent Specimen: Blood     Stool culture [[id number]] Collected: 04/08/22 1835    Order Status: Completed Specimen: Stool Updated: 04/10/22 0811     Stool Culture Nothing significant to date    Clostridium difficile EIA [[id number]] Collected: 04/08/22 1835    Order Status: Completed Specimen: Stool Updated: 04/08/22 2054     C. diff Antigen Negative     C difficile Toxins A+B, EIA Negative     Comment: Testing not recommended for children <24 months old.       Giardia Specific Antigen [[id number]] Collected: 04/08/22 1835    Order Status: Completed Specimen: Stool Updated: 04/08/22 1843     Giardia lamblia Ag Source 0          Radiology    US Transplant Kidney With Doppler  Ultrasound renal transplant    CLINICAL DATA: Acute renal insufficiency    FINDINGS: Sonographic assessment targeted to right lower quadrant renal transplant was performed.    The  transplanted kidney measures 11.9 cm in longitudinal dimension. Cortical echogenicity is mildly increased. There is no mass or hydronephrosis. Doppler assessment of intrarenal arterial branches demonstrates normal waveforms, with resistive indices of 0.60 or below. The transplant renal artery demonstrates peak systolic velocity of 81 cm/s. Renal vein is patent.    There is a small amount of right lower quadrant free fluid.    IMPRESSION:  1. Slightly increased renal cortical echogenicity, similar in appearance to March 24, otherwise normal appearance of right lower quadrant renal transplant.  2. Small amount of right lower quadrant free fluid.    Electronically signed by:  Dez Duque MD  4/7/2022 5:05 PM CDT Workstation: [id number]  X-Ray Chest PA And Lateral  CLINICAL HISTORY:  61 years (1960) Male Creat of 3.1, sent by Dr. Hedrick, cough    TECHNIQUE:  PA and lateral radiograph of the chest.    COMPARISON:  Most recent radiograph from February 2, 2022    FINDINGS:  Slight interval worsening, mild scattered peripheral basilar predominant interstitial opacities, slightly worse from the previous exam with lower lung volumes suggesting a combination of atelectasis, scarring and less likely a trace edema or atypical infection/viral pneumonia in the appropriate clinical setting. Costophrenic angles are seen without effusion. No pneumothorax is identified. The heart is normal in size. The mediastinum is within normal limits. Osseous structures appear within normal limits. The visualized upper abdomen is unremarkable.    IMPRESSION:  Mild scattered peripheral basilar predominant interstitial opacities bilaterally suggesting a combination of atelectasis, mild atypical/viral pneumonia, and scarring.    .    Electronically signed by:  Jarred Lora MD  4/7/2022 3:08 PM CDT Workstation: [id number]        Additional Studies    reviewed    Ventilator Information              No results for input(s): PH, PCO2,  PO2, HCO3, POCSATURATED, BE in the last 72 hours.      Impression    Active Hospital Problems    Diagnosis  POA    *ILDEFONSO (acute kidney injury) [N17.9]  Yes    Diarrhea [R19.7]  Yes     Chronic    Hyponatremia [E87.1]  Yes    Metabolic acidosis [E87.2]  Yes    Hypothyroidism [E03.9]  Yes     Chronic    Thrombocytopenia [D69.6]  Yes    Anemia [D64.9]  Yes     Chronic    Cirrhosis of liver with ascites [K74.60, R18.8]  Yes    Interstitial lung disease [J84.9]  Yes    Diabetes mellitus due to underlying condition with chronic kidney disease, with long-term current use of insulin [E08.22, Z79.4]  Not Applicable    Personal history of immunosupression therapy [Z92.25]  Not Applicable    Hypertension [I10]  Yes    Gout [M10.9]  Yes    Kidney transplant recipient [Z94.0]  Not Applicable      Resolved Hospital Problems    Diagnosis Date Resolved POA    Hyperkalemia [E87.5] 04/09/2022 Yes       Plan    · Treat cough with prn cough suppression  · Will order CTD workup for ILD - results pending  · Further renal evaluation per renal  · Further GI workup per GI  · From a respiratory standpoint he could be DC and followup with Dr Boyer for further evaluation of possible ILD    Thank you for this consult.  I will follow with you while the patient is hospitalized.        Robert Gu MD  [facility] Pulmonary/Critical Care  04/11/2022

## 2022-04-11 NOTE — ASSESSMENT & PLAN NOTE
Followed by outpatient Pulmonary Dr. Boyer, chest x-ray with continued changes  Pulmonary here have ordered serology workup, follow-up  Continue p.r.n. antitussives as ordered

## 2022-04-11 NOTE — ASSESSMENT & PLAN NOTE
Slowly improving and is producing urine  Bicarbonate drip discontinued, continue p.o. bicarbonate  Repeat UA with reflex urine culture given fever  Ultrasound transplant kidney no acute  Continue to hold lisinopril, Lasix, spironolactone, metformin  Renally dosing all medications avoiding nephrotoxic drugs  Appreciate nephrology input

## 2022-04-11 NOTE — RESPIRATORY THERAPY
04/11/22 0022   PRE-TX-O2   O2 Device (Oxygen Therapy) room air   SpO2 95 %   Pulse Oximetry Type Intermittent   $ Pulse Oximetry - Multiple Charge Pulse Oximetry - Multiple   Aerosol Therapy   $ Aerosol Therapy Charges PRN treatment not required   Education   $ Education Bronchodilator;15 min   Respiratory Evaluation   $ Care Plan Tech Time 15 min

## 2022-04-11 NOTE — ASSESSMENT & PLAN NOTE
Fever of 100.6, denies any new localizing source  Pancultures including UA with reflex urine culture, blood culture, chest x-ray being repeated, diagnostic paracentesis  Given only potential source at this time and patient is immunocompromised will treat empirically for SBP with Rocephin  Continue to monitor temperature curve and follow up culture results  Infectious disease consulted

## 2022-04-11 NOTE — ASSESSMENT & PLAN NOTE
Worse today and hyponatremia labs ordered including urine osmolality, urine sodium, serum osmolality  Nephrology following

## 2022-04-11 NOTE — ASSESSMENT & PLAN NOTE
Continue basal bolus insulin with Accu-Cheks  As needed hypoglycemic measures  Previous HbA1c 6.6%

## 2022-04-11 NOTE — CARE UPDATE
04/11/22 1049   Patient Assessment/Suction   Level of Consciousness (AVPU) alert   Respiratory Effort Normal;Unlabored   Expansion/Accessory Muscles/Retractions no use of accessory muscles   All Lung Fields Breath Sounds clear   Rhythm/Pattern, Respiratory no shortness of breath reported   Cough Frequency no cough   PRE-TX-O2   O2 Device (Oxygen Therapy) room air   SpO2 (!) 93 %   Pulse Oximetry Type Intermittent   $ Pulse Oximetry - Multiple Charge Pulse Oximetry - Multiple   Pulse 64   Resp 18   Aerosol Therapy   $ Aerosol Therapy Charges PRN treatment not required   Education   $ Education Bronchodilator;15 min   Respiratory Evaluation   $ Care Plan Tech Time 15 min   $ Eval/Re-eval Charges Evaluation

## 2022-04-12 NOTE — PROGRESS NOTES
Nephrology Consult Note        Patient Name: Edgar Jackson  MRN: [id number]    Patient Class: IP- Inpatient   Admission Date: 4/7/2022  Length of Stay: 4 days  Date of Service: 4/12/2022    Attending Physician: Manoj Allen MD  Primary Care Provider: Tyler Castillo MD    Reason for Consult: ildefonso/hyponatremia/acidosis/hyperkalemia/kidney transplant/cirrhosis with ascites/anemia/diarrhea/gout/htn/hypotension/dm2    SUBJECTIVE:     HPI: 61m with kidney transplant and liver disease who recently moved to the area and is yet to establish care is admitted after paracentesis and liver biopsy on 4/6. Labs show ILDEFONSO, hyponatremia, hyperkalemia. He also complains of diarrhea. He is also reportedly on diuretic, spironolactone, ACEi. Received IVF in ER for hypotension.    4/8 VSS, no new complains. Appreciate GI input. Start oral bicarb for acidosis.  4/9 VSS, no new complains. GI work-up for diarrhea in progress.  4/10 VSS. Thrombocytopenia of unclear etiology. Continues to have diarrhea. sCr not worse. Acidosis persists despite oral bicarb, will add bicarb drip to give more bicarb and add more volume. K remains marginal, bicarb drip should help. sNa is low, suspect subtle volume depldtion, more IVF should help.  4/11 VSS. Low grade fever, non-productive cough, CXR unimpressive, Appreciate Pulm input. Hyponatremia worsening - will send urine lytes, stop bicarb gtt and increase oral bicarb to TID. Diarrhea reportedly better with creon.  4/12  Not in his room.  Output not recorded.       Past Medical History:   Diagnosis Date    CKD (chronic kidney disease)     Diabetes 2020    Gout     Hypertension 2000    [place] 01/[place]    [place]    Thyroid disease     Unspecified cirrhosis of liver      Past Surgical History:   Procedure Laterality Date    CATARACT EXTRACTION Right 2017    COLONOSCOPY N/A 3/8/2022    Procedure: COLONOSCOPY;  Surgeon: Mio Simmons III, MD;  Location: [facility];  Service:  Endoscopy;  Laterality: N/A;    ENDOSCOPIC ULTRASOUND OF UPPER GASTROINTESTINAL TRACT N/A 3/8/2022    Procedure: ULTRASOUND, UPPER GI TRACT, ENDOSCOPIC;  Surgeon: Mio Simmons III, MD;  Location: [facility];  Service: Endoscopy;  Laterality: N/A;    KIDNEY TRANSPLANT  2004    PORTACATH PLACEMENT  2003    REMOVAL OF VASCULAR ACCESS PORT  2005     Family History   Problem Relation Age of Onset    Kidney disease Father     Heart disease Father      Social History     Tobacco Use    Smoking status: Never Smoker    Smokeless tobacco: Never Used   Substance Use Topics    Alcohol use: Not Currently    Drug use: Never       Review of patient's allergies indicates:  No Known Allergies    Outpatient meds:  No current facility-administered medications on file prior to encounter.     Current Outpatient Medications on File Prior to Encounter   Medication Sig Dispense Refill    allopurinoL (ZYLOPRIM) 100 MG tablet Take 100 mg by mouth once daily.      levothyroxine (SYNTHROID) 50 MCG tablet Take 50 mcg by mouth once daily.      lisinopriL (PRINIVIL,ZESTRIL) 5 MG tablet Take 5 mg by mouth once daily.      metFORMIN (GLUCOPHAGE) 500 MG tablet Take 500 mg by mouth 2 (two) times daily.      metoprolol tartrate (LOPRESSOR) 50 MG tablet Take 50 mg by mouth 2 (two) times daily.      minoxidiL (LONITEN) 2.5 MG tablet Take 2.5 mg by mouth 2 (two) times daily.      pravastatin (PRAVACHOL) 40 MG tablet Take 40 mg by mouth every evening.      sirolimus (RAPAMUNE) 1 MG Tab Take 2 mg by mouth once daily.      tacrolimus (PROGRAF) 0.5 MG Cap Take 2 mg by mouth every morning. And 1.5mg in the evening      tacrolimus (PROGRAF) 1 MG Cap Take 1.5 mg by mouth every evening.      TOUJEO SOLOSTAR U-300 INSULIN 300 unit/mL (1.5 mL) InPn pen Inject 30 Units into the skin nightly.      VENTOLIN HFA 90 mcg/actuation inhaler INHALE 2 PUFFS INTO THE LUNGS EVERY 6 (SIX) HOURS AS NEEDED FOR WHEEZING (COUGHING). RESCUE (Patient  "taking differently: Inhale 1 puff into the lungs every 6 (six) hours as needed.) 18 g 2    alfuzosin (UROXATRAL) 10 mg Tb24 Take 1 tablet (10 mg total) by mouth after dinner. 30 tablet 12    BD NOEMI 2ND GEN PEN NEEDLE 32 gauge x 5/32" Ndle USE AS DIRECTED ONCE DAILY WITH BASAGLAR      sildenafiL (VIAGRA) 100 MG tablet Take 1 tablet (100 mg total) by mouth daily as needed for Erectile Dysfunction. 6 tablet 6    [DISCONTINUED] furosemide (LASIX) 20 MG tablet       [DISCONTINUED] spironolactone (ALDACTONE) 50 MG tablet          Scheduled meds:      Infusions:      PRN meds:      Review of Systems:  Review of Systems   Constitutional: Positive for malaise/fatigue. Negative for chills, fever and weight loss.   HENT: Negative for hearing loss and nosebleeds.    Eyes: Negative for blurred vision, double vision and photophobia.   Respiratory: Negative for cough, shortness of breath and wheezing.    Cardiovascular: Negative for chest pain, palpitations and leg swelling.   Gastrointestinal: Positive for diarrhea. Negative for abdominal pain, constipation, heartburn, nausea and vomiting.   Genitourinary: Negative for dysuria, frequency and urgency.   Musculoskeletal: Negative for falls, joint pain and myalgias.   Skin: Negative for itching and rash.   Neurological: Positive for weakness. Negative for dizziness, speech change, focal weakness, loss of consciousness and headaches.   Endo/Heme/Allergies: Does not bruise/bleed easily.   Psychiatric/Behavioral: Negative for depression and substance abuse. The patient is not nervous/anxious.      OBJECTIVE:     Vital Signs and IO (Last 24H):  Temp:  [98 °F (36.7 °C)-99.5 °F (37.5 °C)]   Pulse:  [60-65]   Resp:  [18]   BP: (112-145)/(71-93)   SpO2:  [94 %-99 %]   I/O last 3 completed shifts:  In: 2956 [P.O.:1920; I.V.:1036]  Out: 1700 [Urine:1700]    Wt Readings from Last 5 Encounters:   04/08/22 63.6 kg (140 lb 3.4 oz)   04/06/22 68 kg (150 lb)   03/30/22 67.9 kg (149 lb 12.8 " oz)   03/17/22 67 kg (147 lb 11.3 oz)   03/04/22 72.6 kg (160 lb)     Physical Exam:  Constitutional:       General: He is not in acute distress.     Appearance: He is well-developed. He is not diaphoretic.   HENT:      Head: Normocephalic and atraumatic.      Mouth/Throat:      Mouth: Mucous membranes are moist.   Eyes:      General: No scleral icterus.     Pupils: Pupils are equal, round, and reactive to light.   Cardiovascular:      Rate and Rhythm: Normal rate and regular rhythm.   Pulmonary:      Effort: Pulmonary effort is normal. No respiratory distress.      Breath sounds: No stridor.   Abdominal:      General: There is no distension.      Palpations: Abdomen is soft.   Musculoskeletal:         General: No deformity. Normal range of motion.      Cervical back: Neck supple.   Skin:     General: Skin is warm and dry.      Findings: No erythema or rash.   Neurological:      Mental Status: He is alert and oriented to person, place, and time.      Cranial Nerves: No cranial nerve deficit.   Psychiatric:         Behavior: Behavior normal.     Body mass index is 21.32 kg/m².    Laboratory:  Recent Labs   Lab 04/10/22  0629 04/11/22  0852 04/12/22  0525   * 125* 128*   K 5.0 4.2 4.3    96 98   CO2 19* 21* 22*   BUN 40* 38* 36*   CREATININE 2.6* 2.4* 2.2*   ESTGFRAFRICA 29.7* 32.7* 36.3*   EGFRNONAA 25.7* 28.3* 31.4*   * 195* 149*       Recent Labs   Lab 04/10/22  0629 04/11/22  0852 04/12/22  0525   CALCIUM 7.5* 7.1* 6.9*   ALBUMIN 2.5* 2.5* 2.3*   MG 1.7 1.5* 2.0             No results for input(s): POCTGLUCOSE in the last 168 hours.    Recent Labs   Lab 12/30/21  0655 02/09/22  0814   Hemoglobin A1C 7.7 H 6.6 H       Recent Labs   Lab 04/10/22  0629 04/11/22  0852 04/12/22  0525   WBC 4.14 4.16 3.79*   HGB 9.6* 10.1* 10.2*   HCT 28.0* 29.5* 28.2*   PLT 38* 41* 38*   MCV 88 87 85   MCHC 34.3 34.2 36.2*   MONO 13.0  0.5 15.9*  0.7 16.9*  0.6       Recent Labs   Lab 04/10/22  0629 04/11/22  0852  04/12/22  0525   BILITOT 1.8* 2.1* 2.0*   PROT 5.3* 5.4* 5.3*   ALBUMIN 2.5* 2.5* 2.3*   ALKPHOS 88 86 84   ALT 24 24 22   * 122* 110*       Recent Labs   Lab 02/03/22  0603 04/07/22  1515 04/11/22  1438   Color, UA Yellow Yellow Yellow   Appearance, UA Clear Clear Clear   pH, UA 6.0 6.0 6.0   Specific [place], UA 1.010 1.020 1.010   Protein, UA Negative Trace A Negative   Glucose, UA Trace A 2+ A 1+ A   Ketones, UA Negative Negative Negative   Urobilinogen, UA Negative Negative Negative   Bilirubin (UA) Negative Negative Negative   Occult Blood UA Negative Negative Negative   Nitrite, UA Negative Negative Negative             Microbiology Results (last 7 days)     Procedure Component Value Units Date/Time    Culture, Body Fluid (Aerobic) w/ GS [[id number]] Collected: 04/12/22 1221    Order Status: No result Specimen: Ascites Updated: 04/12/22 1338    (rule out SBP) Culture, Anaerobic [[id number]] Collected: 04/12/22 1221    Order Status: Sent Specimen: Ascites Updated: 04/12/22 1226    (rule out SBP) Aerobic culture [[id number]] Collected: 04/12/22 1221    Order Status: Canceled Specimen: Ascites     (rule out SBP) Gram stain [[id number]] Collected: 04/12/22 1221    Order Status: Canceled Specimen: Ascites     Blood culture [[id number]] Collected: 04/11/22 0852    Order Status: Completed Specimen: Blood Updated: 04/12/22 1032     Blood Culture, Routine No Growth to date      No Growth to date    Narrative:      2 sets please  Collection has been rescheduled by [facility] at 04/11/2022 08:50 Reason:   Patient unavailable, getting stress test.  Collection has been rescheduled by [facility] at 04/11/2022 08:50 Reason:   Patient unavailable, getting stress test.    Gram stain [[id number]]     Order Status: No result Specimen: Ascites     Aerobic culture [[id number]]     Order Status: No result Specimen: Ascites     Culture, Anaerobic [[id number]]     Order Status: No result Specimen: Ascites     Stool culture [[id number]] Collected:  04/08/22 1835    Order Status: Completed Specimen: Stool Updated: 04/11/22 0837     Stool Culture No Salmonella,Shigella,Vibrio,Campylobacter.      No E coli 0157:H7 isolated.    Clostridium difficile EIA [[id number]] Collected: 04/08/22 1835    Order Status: Completed Specimen: Stool Updated: 04/08/22 2054     C. diff Antigen Negative     C difficile Toxins A+B, EIA Negative     Comment: Testing not recommended for children <24 months old.       Giardia Specific Antigen [[id number]] Collected: 04/08/22 1835    Order Status: Completed Specimen: Stool Updated: 04/08/22 1843     Giardia lamblia Ag Source 0        ASSESSMENT/PLAN:     ILDEFONSO due to ATN due to hypovolemia  Live related donor kidney transplant for 18 years  Hyponatremia  Hyperkalemia  Acidosis  Hypotension  Diarrhea  DM2  Gout  Anemia  Cirrhosis s/p liver biopsy showing chronic active hepatitis and paracentesis on 4/6  HTN  No NSAIDs, ACEI/ARB, IV contrast or other nephrotoxins.    Keep MAP > 60, SBP > 100.    Dose meds for GFR < 30 ml/min.    Renal diet. K remains marginal, no Lokelma due to diarrhea.    Previous UA is clear, renal transplant US is OK. Continue home immunosuppression meds. Use pt own tacrolimus, hospital can not provide 1.5mg dose with current products available.Control DM2.    Check immunosuppressant trough levels.  Previous levels not accurate    Continue oral bicarb, increase to TID, stop drip due to worsening hyponatremia.    sNa is worse - sent urine lytes, treat any infection. Does not appear volume depleted - started IVF yesterday and today sNa is worse. He also runs low grade-fever and has cough today, so potential PNA can worsen hyponatremia as well...    Tolerate asymptomatic HTN up to -160. Hold home BP meds.    Diagnose and treat diarrhea. GI Consult appreciated.    Continue gout meds.    Thank you for allowing us to participate in the care of your patient!   We will follow the patient and provide recommendations as  needed.    Patient care time was spent personally by me on the following activities:     · Obtaining a history.  · Examination of patient.  · Providing medical care at the patients bedside.  · Developing a treatment plan with patient or surrogate and bedside caregivers.  · Ordering and reviewing laboratory studies, radiographic studies, pulse oximetry.  · Ordering and performing treatments and interventions.  · Evaluation of patient's response to treatment.  · Discussions with consultants while on the unit and immediately available to the patient.  · Re-evaluation of the patient's condition.  · Documentation in the medical record.     Kvng Villagomez MD    [facility]  [address] [place] [id number]    [phone] - tel  [phone] - fax    4/12/2022

## 2022-04-12 NOTE — PROGRESS NOTES
[facility]  Progress Note    Patient Name: Edgar Jackson  MRN: [id number]  Patient Class: IP- Inpatient   Admission Date: 4/7/2022  Length of Stay: 4 days  Attending Physician: Manoj Allen MD  Primary Care Provider: Tyler Castillo MD        Subjective:     Principal Problem:Fever        HPI:  Mr. Jackson is a 61-year-old male with a past medical history of renal failure status post renal transplant 2004, NIDDM2, ILD, and cirrhosis who presents today with complaints of elevated creatinine on outpatient labs.  It is severe.  It is associated with recent liver biopsy and paracentesis and recent addition of Lasix and spironolactone.  He denies fever, chills, nausea, vomiting, diarrhea, chest pain, shortness of breath, loss of consciousness.  He has a chronic cough that is productive of clear/white/yellow sputum at baseline which he states has worsened over the last 2 months. He had a liver biopsy and paracentesis yesterday at [facility] with Dr. Osullivan and his creatinine was 3.1. He was called and referred to the ED for further work up.       Overview/Hospital Course:  Patient with a history of remote renal transplant, secondary to hypertensive renal failure, on chronic immunosuppressant (sirolimus and tacrolimus), diagnosis of cirrhosis who underwent recent transjugular biopsy (pathology with chronic mild to moderate active hepatitis with bridging fibrosis and nodules) as well as paracentesis (2750 cc removed) on 04/06.  He also was seen by Pulmonary for concern of interstitial lung disease and reports ongoing dyspnea on exertion with cough.  He was referred to the ED due to abnormal outpatient labs with acute kidney injury.  He was borderline hypotensive in the ED, creatinine 3.1.  Also noted to have hyponatremia, hyperkalemia with metabolic acidosis.  Ongoing diarrhea quantified as 4 bowel movements per day.  On 04/08, renal function with creatinine 2.7, minimal urine output, on bicarbonate  drip, stool studies in process, Nephrology and Gastroenterology following.  On 04/09 still having diarrhea, about 8 bowel movements overnight, states no relation to time of day, usually after every meal, blood pressure is improved, BUN/creatinine 37/2.7.  On 04/10 states overall he feels better, Creon was started yesterday and diarrhea is improving, states he believes he is producing more urine, BUN/creatinine 40/2.6, Nephrology resuming bicarbonate drip, pulmonary consulted. On 4/11 developed fever and work up started.      Interval History:  Paracentesis performed today.  Patient still has some nausea.  Denies abdominal pain.  He has a cough, but this is chronic.  He denies fever today.    Review of Systems   Constitutional:  Negative for fever.   HENT:  Negative for congestion and sore throat.    Respiratory:  Positive for cough.    Cardiovascular:  Negative for chest pain.   Gastrointestinal:  Positive for nausea. Negative for vomiting.   Genitourinary:  Negative for difficulty urinating.   Skin:  Negative for rash and wound.   Neurological:  Negative for headaches.   Psychiatric/Behavioral:  Negative for confusion.    Objective:     Vital Signs (Most Recent):  Temp: 97.6 °F (36.4 °C) (04/12/22 1500)  Pulse: 67 (04/12/22 1500)  Resp: 18 (04/12/22 1500)  BP: 119/72 (04/12/22 1500)  SpO2: 97 % (04/12/22 1500)   Vital Signs (24h Range):  Temp:  [97.6 °F (36.4 °C)-99.5 °F (37.5 °C)] 97.6 °F (36.4 °C)  Pulse:  [60-67] 67  Resp:  [18] 18  SpO2:  [94 %-99 %] 97 %  BP: (112-145)/(71-93) 119/72     Weight: 63.6 kg (140 lb 3.4 oz)  Body mass index is 21.32 kg/m².    Intake/Output Summary (Last 24 hours) at 4/12/2022 1641  Last data filed at 4/12/2022 1406  Gross per 24 hour   Intake 390 ml   Output 2200 ml   Net -1810 ml        Physical Exam  Vitals and nursing note reviewed.   Constitutional:       General: He is not in acute distress.     Appearance: He is not diaphoretic.      Comments: Sitting in bed, NAD, cooperative    HENT:      Head: Normocephalic and atraumatic.      Mouth/Throat:      Mouth: Mucous membranes are moist.      Comments: No oral thrush  Eyes:      General:         Right eye: No discharge.         Left eye: No discharge.   Cardiovascular:      Rate and Rhythm: Normal rate and regular rhythm.   Pulmonary:      Comments: On RA, inspiratory crackles at bases  Abdominal:      General: Bowel sounds are normal.      Palpations: Abdomen is soft.      Tenderness: There is no abdominal tenderness.      Comments: Abdomen distended, but not tense.  Bandage in place right lower quadrant.   Skin:     General: Skin is warm and dry.   Neurological:      Mental Status: He is alert and oriented to person, place, and time.      Comments: Speech intact, moving all four extremities, generalized weakness   Psychiatric:         Mood and Affect: Mood normal.       Significant Labs: Blood Culture:   Recent Labs   Lab 04/11/22 0852   LABBLOO No Growth to date  No Growth to date     BMP:   Recent Labs   Lab 04/12/22  0525   *   *   K 4.3   CL 98   CO2 22*   BUN 36*   CREATININE 2.2*   CALCIUM 6.9*   MG 2.0       CBC:   Recent Labs   Lab 04/11/22 0852 04/12/22  0525   WBC 4.16 3.79*   HGB 10.1* 10.2*   HCT 29.5* 28.2*   PLT 41* 38*       CMP:   Recent Labs   Lab 04/11/22 0852 04/12/22  0525   * 128*   K 4.2 4.3   CL 96 98   CO2 21* 22*   * 149*   BUN 38* 36*   CREATININE 2.4* 2.2*   CALCIUM 7.1* 6.9*   PROT 5.4* 5.3*   ALBUMIN 2.5* 2.3*   BILITOT 2.1* 2.0*   ALKPHOS 86 84   * 110*   ALT 24 22   ANIONGAP 8 8   EGFRNONAA 28.3* 31.4*       Cardiac Markers:   Recent Labs   Lab 04/11/22  0852   *     Lactic Acid: No results for input(s): LACTATE in the last 48 hours.  Magnesium:   Recent Labs   Lab 04/11/22 0852 04/12/22  0525   MG 1.5* 2.0       Respiratory Culture: No results for input(s): GSRESP, RESPIRATORYC in the last 48 hours.  Troponin: No results for input(s): TROPONINI in the last 48  hours.  TSH:   Recent Labs   Lab 02/09/22  0814   TSH 1.536       Urine Culture: No results for input(s): LABURIN in the last 48 hours.  Urine Studies:   Recent Labs   Lab 04/11/22  1438   COLORU Yellow   APPEARANCEUA Clear   PHUR 6.0   SPECGRAV 1.010   PROTEINUA Negative   GLUCUA 1+*   KETONESU Negative   BILIRUBINUA Negative   OCCULTUA Negative   NITRITE Negative   UROBILINOGEN Negative   LEUKOCYTESUR Negative       Significant Imaging: I have reviewed and interpreted all pertinent imaging results/findings within the past 24 hours.      Assessment/Plan:      * Fever in immunocomprised patient  T-max 100.6, denies any new localizing source  Pancultures including UA with reflex urine culture, blood culture, chest x-ray being repeated, diagnostic paracentesis  Given only potential source at this time and patient is immunocompromised will treat empirically for SBP with Rocephin  Continue to monitor temperature curve and follow up culture results  Infectious disease consulted  Anticipate 1 more day rocephin    Anemia  Chronic and multifactorial      Thrombocytopenia  No evidence of bleeding, due to cirrhosis, monitoring      Hypothyroidism  Continue Synthroid      Metabolic acidosis  Sodium bicarbonate increased to 1300 t.i.d. as per Nephrology      Hyponatremia  Worse today and hyponatremia labs ordered including urine osmolality, urine sodium, serum osmolality  Nephrology following      Diarrhea  Better with Creon, continue  Appreciate GI input      ILDEFONSO (acute kidney injury)  Slowly improving and is producing urine  Bicarbonate drip discontinued, continue p.o. bicarbonate  Repeat UA with reflex urine culture given fever  Ultrasound transplant kidney no acute  Continue to hold lisinopril, Lasix, spironolactone, metformin  Renally dosing all medications avoiding nephrotoxic drugs  Appreciate nephrology input      Cirrhosis of liver with ascites  S/p transjugular liver biopsy and [facility]  Repeat paracentesis  today without evidence of SBP    Interstitial lung disease  Followed by outpatient Pulmonary Dr. Boyer, chest x-ray with continued changes  Pulmonary here have ordered serology workup, follow-up  Continue p.r.n. antitussives as ordered      Diabetes mellitus due to underlying condition with chronic kidney disease, with long-term current use of insulin  Continue basal bolus insulin with Accu-Cheks  As needed hypoglycemic measures  Previous HbA1c 6.6%      Gout  Continue allopurinol      Personal history of immunosupression therapy  Continuing chronic immunosuppressant      Hypertension  Controlled      History of renal transplant          VTE Risk Mitigation (From admission, onward)         Ordered     IP VTE HIGH RISK PATIENT  Once         04/07/22 1800     Place sequential compression device  Until discontinued         04/07/22 1800                Discharge Planning   LE: 4/14/2022     Code Status: Full Code   Is the patient medically ready for discharge?:     Reason for patient still in hospital (select all that apply): Treatment and Consult recommendations  Discharge Plan A: Home with family, Home                  Manoj Allen MD  Department of Hospital Medicine   [facility]

## 2022-04-12 NOTE — PROGRESS NOTES
Pulmonary/Critical Care  Progress Note      Patient name: Edgar Jackson  MRN: [id number]  Date: 04/12/2022    Admit Date: 4/7/2022  Consult Requested By: Manoj Allen MD    Reason for Consult: ILD, cough    HPI:    4/10/2022 - Pt known to Dr Boyer - has ILD which is being evaluated, has cirrhosis (being evaluated) and is admitted for increased creatinine and diarrhea.  He has chronic cough and some increased dyspnea.  He denies any definite asbestos exposure, CTD, no toxic exposures.  He is a kidney transplant patient (about 18 years ago).  CT chest reviewed.  Had recent liver biopsy + active hepatitis and has been seen by Dr Simmons (GI following while in the hospital).    4/11/2022 - Stable overnight, feels that cough may be less.  No new complaints.  Had a low grade fever.  CXR ordered for today.    4/12/2022 - Respiratory status stable, cough is controlled, no new respiratory symptoms.  Has had Tmax 100.6 but now afebrile.  Reviewed CXR - difficult to compare due to different techniques (portable vs PA) and exposures.  Labs reviewed.  Creatinine has continued to improve.  CTD studies pending for evaluation of ILD except RF negative.  Has paracentesis ordered.    Review of Systems    Review of Systems   Constitutional: Positive for malaise/fatigue. Negative for chills and fever.   Respiratory: Positive for cough (mostly nonproductive) and shortness of breath.    Cardiovascular: Positive for leg swelling.   Gastrointestinal:        + cirrhosis   Psychiatric/Behavioral: Negative for depression and suicidal ideas.       Past Medical History    Past Medical History:   Diagnosis Date    CKD (chronic kidney disease)     Diabetes 2020    Gout     Hypertension 2000    [place] 01[place]    [place]    Thyroid disease     Unspecified cirrhosis of liver        Past Surgical History    Past Surgical History:   Procedure Laterality Date    CATARACT EXTRACTION Right 2017    COLONOSCOPY N/A 3/8/2022     Procedure: COLONOSCOPY;  Surgeon: Mio Simmons III, MD;  Location: [facility] ENDO;  Service: Endoscopy;  Laterality: N/A;    ENDOSCOPIC ULTRASOUND OF UPPER GASTROINTESTINAL TRACT N/A 3/8/2022    Procedure: ULTRASOUND, UPPER GI TRACT, ENDOSCOPIC;  Surgeon: Mio Simmons III, MD;  Location: [facility] ENDO;  Service: Endoscopy;  Laterality: N/A;    KIDNEY TRANSPLANT  2004    PORTACATH PLACEMENT  2003    REMOVAL OF VASCULAR ACCESS PORT  2005       Medications (scheduled):      allopurinoL  100 mg Oral Daily    benzonatate  200 mg Oral TID    cefTRIAXone (ROCEPHIN) IVPB  1 g Intravenous Q24H    insulin detemir U-100  20 Units Subcutaneous QHS    levothyroxine  50 mcg Oral Daily    lipase-protease-amylase 12,000-38,000-60,000 units  2 capsule Oral QID (WM & HS)    metoprolol tartrate  50 mg Oral BID    pravastatin  40 mg Oral QHS    sirolimus  2 mg Oral Daily    sodium bicarbonate  1,300 mg Oral TID    tacrolimus  1.5 mg Oral Daily PM    tacrolimus  2 mg Oral Daily AM    tamsulosin  0.4 mg Oral Daily       Medications (infusions):         Medications (prn):     acetaminophen, albuterol-ipratropium, dextrose 50%, dextrose 50%, glucagon (human recombinant), glucose, glucose, hydrocodone-chlorpheniramine, insulin aspart U-100, melatonin, naloxone, ondansetron, polyethylene glycol, sodium chloride 0.9%    Family History:   Family History   Problem Relation Age of Onset    Kidney disease Father     Heart disease Father        Social History: Tobacco:   Social History     Tobacco Use   Smoking Status Never Smoker   Smokeless Tobacco Never Used                                EtOH:   Social History     Substance and Sexual Activity   Alcohol Use Not Currently                                Drugs:   Social History     Substance and Sexual Activity   Drug Use Never                                Occupation:                              Asbestos exposure: no                             Pets: na        "                      Environmental allergies: na    Physical Exam    Vital signs:  Temp:  [99 °F (37.2 °C)-99.5 °F (37.5 °C)]   Pulse:  [60-64]   Resp:  [18]   BP: (112-145)/(78-93)   SpO2:  [91 %-98 %]     Intake/Output:     Intake/Output Summary (Last 24 hours) at 4/12/2022 0834  Last data filed at 4/11/2022 1800  Gross per 24 hour   Intake 750 ml   Output 600 ml   Net 150 ml        BMI: Estimated body mass index is 21.32 kg/m² as calculated from the following:    Height as of this encounter: 5' 8" (1.727 m).    Weight as of this encounter: 63.6 kg (140 lb 3.4 oz).    Physical Exam  Vitals and nursing note reviewed.   Constitutional:       General: He is not in acute distress.     Appearance: Normal appearance. He is not ill-appearing, toxic-appearing or diaphoretic.   HENT:      Head: Normocephalic and atraumatic.      Right Ear: External ear normal.      Left Ear: External ear normal.      Nose: Nose normal.      Mouth/Throat:      Mouth: Mucous membranes are moist.      Pharynx: Oropharynx is clear. No oropharyngeal exudate.   Eyes:      General: No scleral icterus.        Right eye: No discharge.         Left eye: No discharge.      Extraocular Movements: Extraocular movements intact.      Conjunctiva/sclera: Conjunctivae normal.      Pupils: Pupils are equal, round, and reactive to light.   Neck:      Vascular: No carotid bruit.   Cardiovascular:      Rate and Rhythm: Normal rate and regular rhythm.      Pulses: Normal pulses.      Heart sounds: Normal heart sounds. No murmur heard.    No friction rub. No gallop.   Pulmonary:      Effort: Pulmonary effort is normal. No respiratory distress.      Breath sounds: No stridor. Rales (few posterior) present. No wheezing or rhonchi.   Chest:      Chest wall: No tenderness.   Abdominal:      General: Bowel sounds are normal. There is no distension.      Tenderness: There is no abdominal tenderness. There is no guarding.   Musculoskeletal:         General: No " swelling. Normal range of motion.      Cervical back: Normal range of motion and neck supple. No rigidity or tenderness.      Right lower leg: Edema present.      Left lower leg: Edema present.   Lymphadenopathy:      Cervical: No cervical adenopathy.   Skin:     General: Skin is warm and dry.      Capillary Refill: Capillary refill takes less than 2 seconds.      Coloration: Skin is not jaundiced.      Findings: No bruising.   Neurological:      General: No focal deficit present.      Mental Status: He is alert and oriented to person, place, and time. Mental status is at baseline.      Cranial Nerves: No cranial nerve deficit.      Sensory: No sensory deficit.      Motor: No weakness.   Psychiatric:         Mood and Affect: Mood normal.         Behavior: Behavior normal.         Thought Content: Thought content normal.         Judgment: Judgment normal.         Laboratory    Recent Labs   Lab 04/12/22  0525   WBC 3.79*   RBC 3.32*   HGB 10.2*   HCT 28.2*   PLT 38*   MCV 85   MCH 30.7   MCHC 36.2*       Recent Labs   Lab 04/12/22  0525   CALCIUM 6.9*   PROT 5.3*   *   K 4.3   CO2 22*   CL 98   BUN 36*   CREATININE 2.2*   ALKPHOS 84   ALT 22   *   BILITOT 2.0*       No results for input(s): PT, INR, APTT in the last 24 hours.    No results for input(s): CPK, CPKMB, TROPONINI, MB in the last 24 hours.    Additional labs: reviewed    Microbiology:       Microbiology Results (last 7 days)     Procedure Component Value Units Date/Time    Gram stain [[id number]]     Order Status: No result Specimen: Ascites     (rule out SBP) Aerobic culture [[id number]]     Order Status: No result Specimen: Ascites     (rule out SBP) Culture, Anaerobic [[id number]]     Order Status: No result Specimen: Ascites     (rule out SBP) Gram stain [[id number]]     Order Status: No result Specimen: Ascites     Aerobic culture [[id number]]     Order Status: No result Specimen: Ascites     Culture, Anaerobic [[id number]]     Order Status: No  result Specimen: Ascites     Blood culture [[id number]] Collected: 04/11/22 0852    Order Status: Completed Specimen: Blood Updated: 04/11/22 1517     Blood Culture, Routine No Growth to date    Narrative:      2 sets please  Collection has been rescheduled by [facility] at 04/11/2022 08:50 Reason:   Patient unavailable, getting stress test.  Collection has been rescheduled by [facility] at 04/11/2022 08:50 Reason:   Patient unavailable, getting stress test.    Stool culture [[id number]] Collected: 04/08/22 1835    Order Status: Completed Specimen: Stool Updated: 04/11/22 0837     Stool Culture No Salmonella,Shigella,Vibrio,Campylobacter.      No E coli 0157:H7 isolated.    Clostridium difficile EIA [[id number]] Collected: 04/08/22 1835    Order Status: Completed Specimen: Stool Updated: 04/08/22 2054     C. diff Antigen Negative     C difficile Toxins A+B, EIA Negative     Comment: Testing not recommended for children <24 months old.       Giardia Specific Antigen [[id number]] Collected: 04/08/22 1835    Order Status: Completed Specimen: Stool Updated: 04/08/22 1843     Giardia lamblia Ag Source 0          Radiology    X-Ray Chest 1 View  XR CHEST 1 VIEW    CLINICAL HISTORY:  61 years Male fever    COMPARISON: April 7, 2022    FINDINGS: Cardiac silhouette size is stable compared to prior. Diffuse increased reticular nodular markings within both lungs, asymmetrically greater on the right, stable compared to prior. No large pleural effusion. No pneumothorax. No acute osseous abnormality.    IMPRESSION:    No significant interval change compared to April 7. Diffuse bilateral reticular nodular opacities, right greater than left.    Electronically signed by:  Lucas Carroll MD  4/11/2022 8:51 AM CDT Workstation: [id number]        Additional Studies    reviewed    Ventilator Information              No results for input(s): PH, PCO2, PO2, HCO3, POCSATURATED, BE in the last 72 hours.      Impression    Active Hospital Problems    Diagnosis   POA    *Fever in immunocomprised patient [R50.9]  No    Diarrhea [R19.7]  Yes     Chronic    Hyponatremia [E87.1]  Yes    Metabolic acidosis [E87.2]  Yes    Hypothyroidism [E03.9]  Yes     Chronic    Thrombocytopenia [D69.6]  Yes    Anemia [D64.9]  Yes     Chronic    ILDEFONSO (acute kidney injury) [N17.9]  Yes    Cirrhosis of liver with ascites [K74.60, R18.8]  Yes    Interstitial lung disease [J84.9]  Yes    Diabetes mellitus due to underlying condition with chronic kidney disease, with long-term current use of insulin [E08.22, Z79.4]  Not Applicable    Personal history of immunosupression therapy [Z92.25]  Not Applicable    Hypertension [I10]  Yes    Gout [M10.9]  Yes    History of renal transplant [Z94.0]  Not Applicable      Resolved Hospital Problems    Diagnosis Date Resolved POA    Hyperkalemia [E87.5] 04/09/2022 Yes       Plan    · Treat cough with prn cough suppression  · Will order CTD workup for ILD - results pending  · Further renal evaluation per renal  · Further GI workup per GI  · From a respiratory standpoint he could be DC and followup with Dr Boyer for further evaluation of possible ILD    Respiratory status stable, I will sign off.  Please reconsult if I can be of further assistance.  Thank you for this consult.        Robert Gu MD  [facility] Pulmonary/Critical Care  04/12/2022

## 2022-04-12 NOTE — PLAN OF CARE
04/12/22 1449   Discharge Reassessment   Assessment Type Discharge Planning Reassessment   Discharge Plan A Home with family;Home   Discharge Plan B Home;Home with family   DME Needed Upon Discharge  none   Discharge Barriers Identified None   Pt discussed in MD huddle this date. Per attending MD, plan is to discharge home in 2 days. Case management to continue to follow.

## 2022-04-12 NOTE — ASSESSMENT & PLAN NOTE
[place] [facility] [place]peat paracentesis today without evidence of SBP

## 2022-04-12 NOTE — SUBJECTIVE & OBJECTIVE
Interval History:  Paracentesis performed today.  Patient still has some nausea.  Denies abdominal pain.  He has a cough, but this is chronic.  He denies fever today.    Review of Systems   Constitutional:  Negative for fever.   HENT:  Negative for congestion and sore throat.    Respiratory:  Positive for cough.    Cardiovascular:  Negative for chest pain.   Gastrointestinal:  Positive for nausea. Negative for vomiting.   Genitourinary:  Negative for difficulty urinating.   Skin:  Negative for rash and wound.   Neurological:  Negative for headaches.   Psychiatric/Behavioral:  Negative for confusion.    Objective:     Vital Signs (Most Recent):  Temp: 97.6 °F (36.4 °C) (04/12/22 1500)  Pulse: 67 (04/12/22 1500)  Resp: 18 (04/12/22 1500)  BP: 119/72 (04/12/22 1500)  SpO2: 97 % (04/12/22 1500)   Vital Signs (24h Range):  Temp:  [97.6 °F (36.4 °C)-99.5 °F (37.5 °C)] 97.6 °F (36.4 °C)  Pulse:  [60-67] 67  Resp:  [18] 18  SpO2:  [94 %-99 %] 97 %  BP: (112-145)/(71-93) 119/72     Weight: 63.6 kg (140 lb 3.4 oz)  Body mass index is 21.32 kg/m².    Intake/Output Summary (Last 24 hours) at 4/12/2022 1641  Last data filed at 4/12/2022 1406  Gross per 24 hour   Intake 390 ml   Output 2200 ml   Net -1810 ml        Physical Exam  Vitals and nursing note reviewed.   Constitutional:       General: He is not in acute distress.     Appearance: He is not diaphoretic.      Comments: Sitting in bed, NAD, cooperative   HENT:      Head: Normocephalic and atraumatic.      Mouth/Throat:      Mouth: Mucous membranes are moist.      Comments: No oral thrush  Eyes:      General:         Right eye: No discharge.         Left eye: No discharge.   Cardiovascular:      Rate and Rhythm: Normal rate and regular rhythm.   Pulmonary:      Comments: On RA, inspiratory crackles at bases  Abdominal:      General: Bowel sounds are normal.      Palpations: Abdomen is soft.      Tenderness: There is no abdominal tenderness.      Comments: Abdomen distended,  but not tense.  Bandage in place right lower quadrant.   Skin:     General: Skin is warm and dry.   Neurological:      Mental Status: He is alert and oriented to person, place, and time.      Comments: Speech intact, moving all four extremities, generalized weakness   Psychiatric:         Mood and Affect: Mood normal.       Significant Labs: Blood Culture:   Recent Labs   Lab 04/11/22  0852   LABBLOO No Growth to date  No Growth to date     BMP:   Recent Labs   Lab 04/12/22  0525   *   *   K 4.3   CL 98   CO2 22*   BUN 36*   CREATININE 2.2*   CALCIUM 6.9*   MG 2.0       CBC:   Recent Labs   Lab 04/11/22  0852 04/12/22  0525   WBC 4.16 3.79*   HGB 10.1* 10.2*   HCT 29.5* 28.2*   PLT 41* 38*       CMP:   Recent Labs   Lab 04/11/22  0852 04/12/22  0525   * 128*   K 4.2 4.3   CL 96 98   CO2 21* 22*   * 149*   BUN 38* 36*   CREATININE 2.4* 2.2*   CALCIUM 7.1* 6.9*   PROT 5.4* 5.3*   ALBUMIN 2.5* 2.3*   BILITOT 2.1* 2.0*   ALKPHOS 86 84   * 110*   ALT 24 22   ANIONGAP 8 8   EGFRNONAA 28.3* 31.4*       Cardiac Markers:   Recent Labs   Lab 04/11/22  0852   *     Lactic Acid: No results for input(s): LACTATE in the last 48 hours.  Magnesium:   Recent Labs   Lab 04/11/22  0852 04/12/22  0525   MG 1.5* 2.0       Respiratory Culture: No results for input(s): GSRESP, RESPIRATORYC in the last 48 hours.  Troponin: No results for input(s): TROPONINI in the last 48 hours.  TSH:   Recent Labs   Lab 02/09/22  0814   TSH 1.536       Urine Culture: No results for input(s): LABURIN in the last 48 hours.  Urine Studies:   Recent Labs   Lab 04/11/22  1438   COLORU Yellow   APPEARANCEUA Clear   PHUR 6.0   SPECGRAV 1.010   PROTEINUA Negative   GLUCUA 1+*   KETONESU Negative   BILIRUBINUA Negative   OCCULTUA Negative   NITRITE Negative   UROBILINOGEN Negative   LEUKOCYTESUR Negative       Significant Imaging: I have reviewed and interpreted all pertinent imaging results/findings within the past 24  hours.

## 2022-04-12 NOTE — PROGRESS NOTES
Consult Note  Infectious Disease    Reason for Consult:  Fever in immunocompromised    HPI: Edgar Jackson is a 61 y.o. male With a history of interstitial lung disease and prior kidney transplantation 2004 on immunosuppressive does and recent diagnosis of active hepatitis (biopsy 4/6) with cirrhosis, decompensation with ascites (recent therapeutic paracentesis 4/6) suspected to be due to LOPEZ, was admitted with acute kidney injury secondary to hypovolemia from diuretics, paracentesis?  and several weeks of non- bloody diarrhea.  At the time of his evaluation in the ER his creatinine was 2.7, CO2 16, potassium 5.4, , he had a 99.1 degree temperature, normal O2 saturation, CBC with white blood cells 3.5 platelets 52.  He required initiation of a bicarbonate drip and treatment for hyperkalemia.  He was seen by GI who ordered stool studies (C difficile toxin negative, Giardia negative, stool culture no enteric joe) and prescribed pancreatic enzyme supplements which were beneficial.  He has developed a low-grade temperature, has a chronic dry cough associated with his interstitial lung disease and a cultures of blood and urine as well as chest x-ray and a diagnostic paracentesis were ordered.  He was placed empirically on ceftriaxone and this consult was requested.  Creatinine has improved to 2.4, sodium is 125.  Chest is shows worsening infiltrates right greater than left which may reflect the hydration has received.  Review of care everywhere indicates that his creatinine was 1.0 in September prior to moving here.     4/12: no further temperature elevations. S/p paracentesis for diagnostic purposes and not consistent with SBP. UA was negative, CXR did have more infiltrates on right than admit. . He is comfortable on room air. Continues with cough. Has not been out of bed much. .    EXAM & DIAGNOSTICS REVIEWED:   Vitals:     Temp:  [99 °F (37.2 °C)-99.5 °F (37.5 °C)]   Temp: 99.2 °F (37.3 °C) (04/12/22  0700)  Pulse: 62 (04/12/22 0700)  Resp: 18 (04/12/22 0700)  BP: 112/78 (04/12/22 0700)  SpO2: 98 % (04/12/22 0700)    Intake/Output Summary (Last 24 hours) at 4/12/2022 0901  Last data filed at 4/12/2022 0800  Gross per 24 hour   Intake 990 ml   Output 2000 ml   Net -1010 ml       General:  In NAD. Alert and attentive, cooperative, comfortable  Eyes:  Anicteric,  EOMI  ENT:  No ulcers, exudates, thrush, nares patent,    Neck:  supple, no masses or adenopathy appreciated  Lungs: Clear, no consolidation, rales, wheezes, rub  Heart:  RRR, no gallop/murmur/rub noted  Abd:  Soft, NT, ND, normal BS, no masses or organomegaly appreciated.  :  Voids , no flank tenderness  Musc:  Joints without effusion, swelling, erythema, synovitis, muscle wasting.   Skin:  No rashes.   Neuro:             Alert, attentive, speech fluent, face symmetric, moves all extremities, no focal weakness. Ambulatory  Psych: Calm, cooperative  Lymphatic:     No cervical, supraclavicular, axillary, or inguinal nodes  Extrem: no edema, erythema, phlebitis, cellulitis, warm and well perfused  VAD:       Isolation:    Wound:       Lines/Tubes/Drains:    General Labs reviewed:  Recent Labs   Lab 04/10/22  0629 04/11/22  0852 04/12/22  0525   WBC 4.14 4.16 3.79*   HGB 9.6* 10.1* 10.2*   HCT 28.0* 29.5* 28.2*   PLT 38* 41* 38*       Recent Labs   Lab 04/10/22  0629 04/11/22  0852 04/12/22  0525   * 125* 128*   K 5.0 4.2 4.3    96 98   CO2 19* 21* 22*   BUN 40* 38* 36*   CREATININE 2.6* 2.4* 2.2*   CALCIUM 7.5* 7.1* 6.9*   PROT 5.3* 5.4* 5.3*   BILITOT 1.8* 2.1* 2.0*   ALKPHOS 88 86 84   ALT 24 24 22   * 122* 110*      Latest Reference Range & Units 02/24/22 10:23 04/12/22 12:22 04/12/22 12:23   Fluid Color  Yellow Yellow    Fluid Appearance  Clear Hazy    WBC, Body Fluid /cu mm 171 [1] 113 [2]    Body Fluid Type  Ascites Ascites    Segs, Fluid % 10 29    Lymphs, Fluid % 23 28    Monocytes/Macrophages, Fluid % 47 43    Mesothelial Cells,  Fluid % 20     LD, Fluid Not established U/L   55 [3]   Body Fluid, Albumin Not Established g/dL <1.0 [4]  <1.0 [5]   Body Fluid Source, Albumin  Ascites  Abdominal   Body Fluid Source, LDH    Ascites   Body Fluid Source, Total Protein  Ascites  Ascites   Body Fluid, Protein Not established g/dL <3.0 [6]  <3.0 [7]        Micro:  Microbiology Results (last 7 days)     Procedure Component Value Units Date/Time    Gram stain [[id number]]     Order Status: No result Specimen: Ascites     (rule out SBP) Aerobic culture [[id number]]     Order Status: No result Specimen: Ascites     (rule out SBP) Culture, Anaerobic [[id number]]     Order Status: No result Specimen: Ascites     (rule out SBP) Gram stain [[id number]]     Order Status: No result Specimen: Ascites     Aerobic culture [[id number]]     Order Status: No result Specimen: Ascites     Culture, Anaerobic [[id number]]     Order Status: No result Specimen: Ascites     Blood culture [[id number]] Collected: 04/11/22 0852    Order Status: Completed Specimen: Blood Updated: 04/11/22 1517     Blood Culture, Routine No Growth to date    Narrative:      2 sets please  Collection has been rescheduled by [facility] at 04/11/2022 08:50 Reason:   Patient unavailable, getting stress test.  Collection has been rescheduled by [facility] at 04/11/2022 08:50 Reason:   Patient unavailable, getting stress test.    Stool culture [[id number]] Collected: 04/08/22 1835    Order Status: Completed Specimen: Stool Updated: 04/11/22 0837     Stool Culture No Salmonella,Shigella,Vibrio,Campylobacter.      No E coli 0157:H7 isolated.    Clostridium difficile EIA [[id number]] Collected: 04/08/22 1835    Order Status: Completed Specimen: Stool Updated: 04/08/22 2054     C. diff Antigen Negative     C difficile Toxins A+B, EIA Negative     Comment: Testing not recommended for children <24 months old.       Giardia Specific Antigen [[id number]] Collected: 04/08/22 1835    Order Status: Completed Specimen: Stool Updated:  04/08/22 1843     Giardia lamblia Ag Source 0          Imaging Reviewed:   CXRs   CT chest 02/02/2022    Cardiology: normal EF by echo 5/2020, care everywhere    IMPRESSION & PLAN   1.  Low-grade fever, worsening infiltrates, fluid versus infection, favor fluid    2.  Acute kidney injury, improved with hydration and bicarbonate supplementation, 2.6 liters positive since admission   S/p renal transplant 2004 on immunosuppression    3.  Cirrhosis with ascites, varices, felt to be due to LOPEZ.     Elevated alpha fetoprotein 10 times normal, no masses on imaging 3/24   Elevated ammonia 3/8  69    4.  Interstitial lung disease baseline autoimmune workup in progress, largely negative, bronchoscopy 6/2020 for same?    5.  Chronic diarrhea, Multiple stool test negative and several in progress      Recommendations:    rocephin one more day  Consider some resumption o f diuresis         Medical Decision Making during this encounter was  [_] Low Complexity  [_] Moderate Complexity  [ xxx] High Complexity

## 2022-04-12 NOTE — PLAN OF CARE
[place], [place]                                      CARDIOLOGY REPORT      NAME:            TORI GARCIA                         AGE:  85   ACCT#:           [id number]                              :  1932   MR#:             [id number]                             ROOM:  [id number]   ADMIT DATE:      2017                        DIS DATE:     PT TYPE:         I                                       DP:  1726   ATTENDING:       DANYA TUBBS MD                                    DICTATING PHYSICIAN:  MOR HENDRICKSON MD      DATE OF PROCEDURE:  2017               ELECTROCARDIOGRAM      INDICATION:  Preoperative evaluation, CVA, history of coronary artery disease   and pacemaker.      Resting heart rate was 60 beats per minute, resting blood pressure was 180/105.   The resting EKG shows underlying AFib with ventricular paced rhythm.  The   patient did receive Lexiscan according to standard protocol.  The EKG was not   interpretable due to presence of baseline abnormality.  The patient's blood   pressure dropped to 163/83, and was complaining of dyspnea and resolved with   aminophylline.      IMPRESSION:   1. Abnormal baseline EKG with atrial fibrillation and ventricular paced       rhythm.   2. No angina symptoms during the test except for dyspnea, resolved with       aminophylline.   3. Elevated resting blood pressure and normal blood pressure response to       aminophylline.   4. SPECT images will be reported separately by Radiology.            ______________________________   MD CELSA Jackson/Melissa   DP:  1726   DD:  2017 17:42:05   DT:  2017 20:46:30   Job #:  [id number]           Problem: Adult Inpatient Plan of Care  Goal: Plan of Care Review  Outcome: Ongoing, Progressing  Goal: Patient-Specific Goal (Individualized)  Outcome: Ongoing, Progressing  Goal: Absence of Hospital-Acquired Illness or Injury  Outcome: Ongoing, Progressing

## 2022-04-12 NOTE — NURSING
Critical calcium called in from   this about 700am md dr rock notified no new orders pt stable in no noted distress denies any pain will continue to observe

## 2022-04-12 NOTE — CARE UPDATE
04/12/22 1144   Patient Assessment/Suction   Level of Consciousness (AVPU) alert   Respiratory Effort Normal;Unlabored   Expansion/Accessory Muscles/Retractions no use of accessory muscles   All Lung Fields Breath Sounds equal bilaterally;clear   Rhythm/Pattern, Respiratory no shortness of breath reported   Cough Frequency no cough   PRE-TX-O2   O2 Device (Oxygen Therapy) room air   SpO2 (!) 94 %   Pulse Oximetry Type Intermittent   $ Pulse Oximetry - Multiple Charge Pulse Oximetry - Multiple   Pulse 65   Resp 18   Aerosol Therapy   $ Aerosol Therapy Charges PRN treatment not required   Education   $ Education Bronchodilator;15 min   Respiratory Evaluation   $ Care Plan Tech Time 15 min   $ Eval/Re-eval Charges Re-evaluation

## 2022-04-12 NOTE — ASSESSMENT & PLAN NOTE
T-max 100.6, denies any new localizing source  Pancultures including UA with reflex urine culture, blood culture, chest x-ray being repeated, diagnostic paracentesis  Given only potential source at this time and patient is immunocompromised will treat empirically for SBP with Rocephin  Continue to monitor temperature curve and follow up culture results  Infectious disease consulted  Anticipate 1 more day rocephin

## 2022-04-13 NOTE — TELEPHONE ENCOUNTER
----- Message from Kelby Ty MA sent at 4/13/2022  9:01 AM CDT -----    ----- Message -----  From: Christina Osullivan MD  Sent: 4/13/2022   3:25 AM CDT  To: Abran [facility] Staff    Stage 3 out of 4 scarring in the liver (one notch below cirrhosis).  Please inform patient.

## 2022-04-13 NOTE — PROGRESS NOTES
[facility] Medicine  Progress Note    Patient Name: Edgar Jackson  MRN: [id number]  Patient Class: IP- Inpatient   Admission Date: 4/7/2022  Length of Stay: 5 days  Attending Physician: Manoj Allen MD  Primary Care Provider: Tyler Castillo MD        Subjective:     Principal Problem:Fever        HPI:  Mr. Jackson is a 61-year-old male with a past medical history of renal failure status post renal transplant 2004, NIDDM2, ILD, and cirrhosis who presents today with complaints of elevated creatinine on outpatient labs.  It is severe.  It is associated with recent liver biopsy and paracentesis and recent addition of Lasix and spironolactone.  He denies fever, chills, nausea, vomiting, diarrhea, chest pain, shortness of breath, loss of consciousness.  He has a chronic cough that is productive of clear/white/yellow sputum at baseline which he states has worsened over the last 2 months. He had a liver biopsy and paracentesis yesterday at [facility] with Dr. Osullivan and his creatinine was 3.1. He was called and referred to the ED for further work up.       Overview/Hospital Course:  Patient with a history of remote renal transplant, secondary to hypertensive renal failure, on chronic immunosuppressant (sirolimus and tacrolimus), diagnosis of cirrhosis who underwent recent transjugular biopsy (pathology with chronic mild to moderate active hepatitis with bridging fibrosis and nodules) as well as paracentesis (2750 cc removed) on 04/06.  He also was seen by Pulmonary for concern of interstitial lung disease and reports ongoing dyspnea on exertion with cough.  He was referred to the ED due to abnormal outpatient labs with acute kidney injury.  He was borderline hypotensive in the ED, creatinine 3.1.  Also noted to have hyponatremia, hyperkalemia with metabolic acidosis.  Ongoing diarrhea quantified as 4 bowel movements per day.  On 04/08, renal function with creatinine 2.7, minimal urine output, on bicarbonate  drip, stool studies in process, Nephrology and Gastroenterology following.  On 04/09 still having diarrhea, about 8 bowel movements overnight, states no relation to time of day, usually after every meal, blood pressure is improved, BUN/creatinine 37/2.7.  On 04/10 states overall he feels better, Creon was started yesterday and diarrhea is improving, states he believes he is producing more urine, BUN/creatinine 40/2.6, Nephrology resuming bicarbonate drip, pulmonary consulted. On 4/11 developed fever and work up started, which was largely unrevealing.  Paracentesis was performed; fluid studies were not consistent with SBP.  Patient completed 3 days of IV ceftriaxone.      Interval History:  Patient still has cough today, but this is chronic.  He had a single loose bowel movement, but no recurrent loose bowel movements suggestive of diarrhea.  He inquires about discharging home soon.    Review of Systems   Constitutional:  Negative for fever.   HENT:  Negative for congestion and sore throat.    Respiratory:  Positive for cough.    Cardiovascular:  Negative for chest pain.   Gastrointestinal:  Positive for nausea. Negative for vomiting.   Genitourinary:  Negative for difficulty urinating.   Skin:  Negative for rash and wound.   Neurological:  Negative for headaches.   Psychiatric/Behavioral:  Negative for confusion.    Objective:     Vital Signs (Most Recent):  Temp: 97 °F (36.1 °C) (04/13/22 1500)  Pulse: 78 (04/13/22 1613)  Resp: 18 (04/13/22 1613)  BP: 130/76 (04/13/22 1500)  SpO2: 97 % (04/13/22 1613)   Vital Signs (24h Range):  Temp:  [97 °F (36.1 °C)-99.7 °F (37.6 °C)] 97 °F (36.1 °C)  Pulse:  [60-78] 78  Resp:  [18] 18  SpO2:  [92 %-98 %] 97 %  BP: (117-130)/(74-87) 130/76     Weight: 63.6 kg (140 lb 3.4 oz)  Body mass index is 21.32 kg/m².    Intake/Output Summary (Last 24 hours) at 4/13/2022 1627  Last data filed at 4/13/2022 1500  Gross per 24 hour   Intake 120 ml   Output 1110 ml   Net -990 ml         Physical Exam  Vitals and nursing note reviewed.   Constitutional:       General: He is not in acute distress.     Appearance: He is not diaphoretic.      Comments: Sitting in bed, NAD, cooperative   HENT:      Head: Normocephalic and atraumatic.      Mouth/Throat:      Mouth: Mucous membranes are moist.      Comments: No oral thrush  Eyes:      General:         Right eye: No discharge.         Left eye: No discharge.   Cardiovascular:      Rate and Rhythm: Normal rate and regular rhythm.   Pulmonary:      Comments: On RA, inspiratory crackles at bases  Abdominal:      General: Bowel sounds are normal.      Palpations: Abdomen is soft.      Tenderness: There is no abdominal tenderness.      Comments: Abdomen distended, but not tense.  Bandage in place right lower quadrant.   Skin:     General: Skin is warm and dry.   Neurological:      Mental Status: He is alert and oriented to person, place, and time.      Comments: Speech intact, moving all four extremities, generalized weakness   Psychiatric:         Mood and Affect: Mood normal.       Significant Labs: Blood Culture: No results for input(s): LABBLOO in the last 48 hours.    BMP:   Recent Labs   Lab 04/13/22  0759   *   *   K 4.2   CL 96   CO2 19*   BUN 36*   CREATININE 2.4*   CALCIUM 7.2*   MG 1.9       CBC:   Recent Labs   Lab 04/12/22  0525 04/13/22  0759   WBC 3.79* 3.45*   HGB 10.2* 10.1*   HCT 28.2* 28.0*   PLT 38* 38*       CMP:   Recent Labs   Lab 04/12/22  0525 04/13/22  0759   * 125*   K 4.3 4.2   CL 98 96   CO2 22* 19*   * 163*   BUN 36* 36*   CREATININE 2.2* 2.4*   CALCIUM 6.9* 7.2*   PROT 5.3* 5.1*   ALBUMIN 2.3* 2.3*   BILITOT 2.0* 1.8*   ALKPHOS 84 89   * 104*   ALT 22 22   ANIONGAP 8 10   EGFRNONAA 31.4* 28.3*       Cardiac Markers: No results for input(s): CKMB, MYOGLOBIN, BNP, TROPISTAT in the last 48 hours.    Lactic Acid: No results for input(s): LACTATE in the last 48 hours.  Magnesium:   Recent Labs   Lab  04/12/22  0525 04/13/22  0759   MG 2.0 1.9       Respiratory Culture: No results for input(s): GSRESP, RESPIRATORYC in the last 48 hours.  Troponin: No results for input(s): TROPONINI in the last 48 hours.  TSH:   Recent Labs   Lab 02/09/22  0814   TSH 1.536       Urine Culture: No results for input(s): LABURIN in the last 48 hours.  Urine Studies: No results for input(s): COLORU, APPEARANCEUA, PHUR, SPECGRAV, PROTEINUA, GLUCUA, KETONESU, BILIRUBINUA, OCCULTUA, NITRITE, UROBILINOGEN, LEUKOCYTESUR, RBCUA, WBCUA, BACTERIA, SQUAMEPITHEL, HYALINECASTS in the last 48 hours.    Invalid input(s): WRIGHTSUR      Significant Imaging: I have reviewed and interpreted all pertinent imaging results/findings within the past 24 hours.      Assessment/Plan:      * Fever in immunocomprised patient  T-max 100.6, denies any new localizing source  Pancultures including UA with reflex urine culture, blood culture, chest x-ray being repeated, diagnostic paracentesis  Given only potential source at this time and patient is immunocompromised will treat empirically for SBP with Rocephin  Continue to monitor temperature curve and follow up culture results  Infectious disease consulted  Anticipate 1 more day rocephin    Anemia  Chronic and multifactorial      Thrombocytopenia  No evidence of bleeding, due to cirrhosis, monitoring      Hypothyroidism  Continue Synthroid      Metabolic acidosis  Sodium bicarbonate increased to 1300 t.i.d. as per Nephrology      Hyponatremia  Worse today and hyponatremia labs ordered including urine osmolality, urine sodium, serum osmolality  Nephrology following      Diarrhea  Improving.  Continue Creon  Appreciate GI input      ILDEFONSO (acute kidney injury)  Slowly improving and is producing urine  Bicarbonate drip discontinued, continue p.o. bicarbonate  Repeat UA with reflex urine culture given fever  Ultrasound transplant kidney no acute  Renally dosing all medications avoiding nephrotoxic drugs  Appreciate  nephrology input    Continue to hold lisinopril, Aldactone, metformin.  Re-attempt low-dose p.o. Lasix    Cirrhosis of liver with ascites  S/p transjugular liver biopsy and paracentesis at [facility]  Repeat paracentesis today without evidence of SBP    Start p.o. Lasix 20 mg daily    Interstitial lung disease  Followed by outpatient Pulmonary Dr. Boyer, chest x-ray with continued changes  Pulmonary here have ordered serology workup, follow-up  Continue p.r.n. antitussives as ordered      Diabetes mellitus due to underlying condition with chronic kidney disease, with long-term current use of insulin  Continue basal bolus insulin with Accu-Cheks  As needed hypoglycemic measures  Previous HbA1c 6.6%      Gout  Continue allopurinol      Personal history of immunosupression therapy  Continuing chronic immunosuppressant      Hypertension  Controlled      History of renal transplant          VTE Risk Mitigation (From admission, onward)         Ordered     IP VTE HIGH RISK PATIENT  Once         04/07/22 1800     Place sequential compression device  Until discontinued         04/07/22 1800                Discharge Planning   LE: 4/14/2022     Code Status: Full Code   Is the patient medically ready for discharge?:     Reason for patient still in hospital (select all that apply): Laboratory test, Treatment and Consult recommendations  Discharge Plan A: Home with family, Home                  Manoj Allen MD  Department of Hospital Medicine   [facility]

## 2022-04-13 NOTE — PROGRESS NOTES
Nephrology Consult Note        Patient Name: Edgar Jackson  MRN: [id number]    Patient Class: IP- Inpatient   Admission Date: 4/7/2022  Length of Stay: 5 days  Date of Service: 4/13/2022    Attending Physician: Manoj Allen MD  Primary Care Provider: Tyler Castillo MD    Reason for Consult: ildefonso/hyponatremia/acidosis/hyperkalemia/kidney transplant/cirrhosis with ascites/anemia/diarrhea/gout/htn/hypotension/dm2    SUBJECTIVE:     HPI: 61m with kidney transplant and liver disease who recently moved to the area and is yet to establish care is admitted after paracentesis and liver biopsy on 4/6. Labs show ILDEFONSO, hyponatremia, hyperkalemia. He also complains of diarrhea. He is also reportedly on diuretic, spironolactone, ACEi. Received IVF in ER for hypotension.    4/8 VSS, no new complains. Appreciate GI input. Start oral bicarb for acidosis.  4/9 VSS, no new complains. GI work-up for diarrhea in progress.  4/10 VSS. Thrombocytopenia of unclear etiology. Continues to have diarrhea. sCr not worse. Acidosis persists despite oral bicarb, will add bicarb drip to give more bicarb and add more volume. K remains marginal, bicarb drip should help. sNa is low, suspect subtle volume depldtion, more IVF should help.  4/11 VSS. Low grade fever, non-productive cough, CXR unimpressive, Appreciate Pulm input. Hyponatremia worsening - will send urine lytes, stop bicarb gtt and increase oral bicarb to TID. Diarrhea reportedly better with creon.  4/12  Not in his room.  Output not recorded.   4/13  Has diarrhea.  No vomiting or sob.  Weak.        Past Medical History:   Diagnosis Date    CKD (chronic kidney disease)     Diabetes 2020    Gout     Hypertension 2000    [place] 01/2004    [place]    Thyroid disease     Unspecified cirrhosis of liver      Past Surgical History:   Procedure Laterality Date    CATARACT EXTRACTION Right 2017    COLONOSCOPY N/A 3/8/2022    Procedure: COLONOSCOPY;  Surgeon: Mio WILL  Troy RICHARDSON MD;  Location: [facility] END[facility];  Service: Endoscopy;  Laterality: N/A;    ENDOSCOPIC ULTRASOUND OF UPPER GASTROINTESTINAL TRACT N/A 3/8/2022    Procedure: ULTRASOUND, UPPER GI TRACT, ENDOSCOPIC;  Surgeon: Mio Simmons III, MD;  Location: [facility] ENDO;  Service: Endoscopy;  Laterality: N/A;    KIDNEY TRANSPLANT  2004    PORTACATH PLACEMENT  2003    REMOVAL OF VASCULAR ACCESS PORT  2005     Family History   Problem Relation Age of Onset    Kidney disease Father     Heart disease Father      Social History     Tobacco Use    Smoking status: Never Smoker    Smokeless tobacco: Never Used   Substance Use Topics    Alcohol use: Not Currently    Drug use: Never       Review of patient's allergies indicates:  No Known Allergies    Outpatient meds:  No current facility-administered medications on file prior to encounter.     Current Outpatient Medications on File Prior to Encounter   Medication Sig Dispense Refill    allopurinoL (ZYLOPRIM) 100 MG tablet Take 100 mg by mouth once daily.      levothyroxine (SYNTHROID) 50 MCG tablet Take 50 mcg by mouth once daily.      lisinopriL (PRINIVIL,ZESTRIL) 5 MG tablet Take 5 mg by mouth once daily.      metFORMIN (GLUCOPHAGE) 500 MG tablet Take 500 mg by mouth 2 (two) times daily.      metoprolol tartrate (LOPRESSOR) 50 MG tablet Take 50 mg by mouth 2 (two) times daily.      minoxidiL (LONITEN) 2.5 MG tablet Take 2.5 mg by mouth 2 (two) times daily.      pravastatin (PRAVACHOL) 40 MG tablet Take 40 mg by mouth every evening.      sirolimus (RAPAMUNE) 1 MG Tab Take 2 mg by mouth once daily.      tacrolimus (PROGRAF) 0.5 MG Cap Take 2 mg by mouth every morning. And 1.5mg in the evening      tacrolimus (PROGRAF) 1 MG Cap Take 1.5 mg by mouth every evening.      TOUJEO SOLOSTAR U-300 INSULIN 300 unit/mL (1.5 mL) InPn pen Inject 30 Units into the skin nightly.      VENTOLIN HFA 90 mcg/actuation inhaler INHALE 2 PUFFS INTO THE LUNGS EVERY 6 (SIX) HOURS AS  "NEEDED FOR WHEEZING (COUGHING). RESCUE (Patient taking differently: Inhale 1 puff into the lungs every 6 (six) hours as needed.) 18 g 2    alfuzosin (UROXATRAL) 10 mg Tb24 Take 1 tablet (10 mg total) by mouth after dinner. 30 tablet 12    BD NOEMI 2ND GEN PEN NEEDLE 32 gauge x 5/32" Ndle USE AS DIRECTED ONCE DAILY WITH BASAGLAR      sildenafiL (VIAGRA) 100 MG tablet Take 1 tablet (100 mg total) by mouth daily as needed for Erectile Dysfunction. 6 tablet 6    [DISCONTINUED] furosemide (LASIX) 20 MG tablet       [DISCONTINUED] spironolactone (ALDACTONE) 50 MG tablet          Scheduled meds:      Infusions:      PRN meds:      Review of Systems:  Review of Systems   Constitutional: Positive for malaise/fatigue. Negative for chills, fever and weight loss.   HENT: Negative for hearing loss and nosebleeds.    Eyes: Negative for blurred vision, double vision and photophobia.   Respiratory: Negative for cough, shortness of breath and wheezing.    Cardiovascular: Negative for chest pain, palpitations and leg swelling.   Gastrointestinal: Positive for diarrhea. Negative for abdominal pain, constipation, heartburn, nausea and vomiting.   Genitourinary: Negative for dysuria, frequency and urgency.   Musculoskeletal: Negative for falls, joint pain and myalgias.   Skin: Negative for itching and rash.   Neurological: Positive for weakness. Negative for dizziness, speech change, focal weakness, loss of consciousness and headaches.   Endo/Heme/Allergies: Does not bruise/bleed easily.   Psychiatric/Behavioral: Negative for depression and substance abuse. The patient is not nervous/anxious.      OBJECTIVE:     Vital Signs and IO (Last 24H):  Temp:  [97.6 °F (36.4 °C)-99.7 °F (37.6 °C)]   Pulse:  [60-68]   Resp:  [18]   BP: (117-126)/(72-87)   SpO2:  [92 %-97 %]   I/O last 3 completed shifts:  In: 1040 [P.O.:1040]  Out: 2800 [Urine:2800]    Wt Readings from Last 5 Encounters:   04/08/22 63.6 kg (140 lb 3.4 oz)   04/06/22 68 kg (150 " lb)   03/30/22 67.9 kg (149 lb 12.8 oz)   03/17/22 67 kg (147 lb 11.3 oz)   03/04/22 72.6 kg (160 lb)     Physical Exam:  Constitutional:       General: He is not in acute distress.     Appearance: He is well-developed. He is not diaphoretic.   HENT:      Head: Normocephalic and atraumatic.      Mouth/Throat:      Mouth: Mucous membranes are moist.   Eyes:      General: No scleral icterus.     Pupils: Pupils are equal, round, and reactive to light.   Cardiovascular:      Rate and Rhythm: Normal rate and regular rhythm.   Pulmonary:      Effort: Pulmonary effort is normal. No respiratory distress.      Breath sounds: No stridor.   Abdominal:      General: There is no distension.      Palpations: Abdomen is soft.   Musculoskeletal:         General: No deformity. Normal range of motion.      Cervical back: Neck supple.   Skin:     General: Skin is warm and dry.      Findings: No erythema or rash.   Neurological:      Mental Status: He is alert and oriented to person, place, and time.      Cranial Nerves: No cranial nerve deficit.   Psychiatric:         Behavior: Behavior normal.     Body mass index is 21.32 kg/m².    Laboratory:  Recent Labs   Lab 04/11/22  0852 04/12/22  0525 04/13/22  0759   * 128* 125*   K 4.2 4.3 4.2   CL 96 98 96   CO2 21* 22* 19*   BUN 38* 36* 36*   CREATININE 2.4* 2.2* 2.4*   ESTGFRAFRICA 32.7* 36.3* 32.7*   EGFRNONAA 28.3* 31.4* 28.3*   * 149* 163*       Recent Labs   Lab 04/11/22  0852 04/12/22  0525 04/13/22  0759   CALCIUM 7.1* 6.9* 7.2*   ALBUMIN 2.5* 2.3* 2.3*   MG 1.5* 2.0 1.9             No results for input(s): POCTGLUCOSE in the last 168 hours.    Recent Labs   Lab 12/30/21  0655 02/09/22  0814   Hemoglobin A1C 7.7 H 6.6 H       Recent Labs   Lab 04/11/22  0852 04/12/22  0525 04/13/22  0759   WBC 4.16 3.79* 3.45*   HGB 10.1* 10.2* 10.1*   HCT 29.5* 28.2* 28.0*   PLT 41* 38* 38*   MCV 87 85 84   MCHC 34.2 36.2* 36.1*   MONO 15.9*  0.7 16.9*  0.6 17.7*  0.6       Recent  Labs   Lab 04/11/22  0852 04/12/22  0525 04/13/22  0759   BILITOT 2.1* 2.0* 1.8*   PROT 5.4* 5.3* 5.1*   ALBUMIN 2.5* 2.3* 2.3*   ALKPHOS 86 84 89   ALT 24 22 22   * 110* 104*       Recent Labs   Lab 02/03/22  0603 04/07/22  1515 04/11/22  1438   Color, UA Yellow Yellow Yellow   Appearance, UA Clear Clear Clear   pH, UA 6.0 6.0 6.0   Specific [place], UA 1.010 1.020 1.010   Protein, UA Negative Trace A Negative   Glucose, UA Trace A 2+ A 1+ A   Ketones, UA Negative Negative Negative   Urobilinogen, UA Negative Negative Negative   Bilirubin (UA) Negative Negative Negative   Occult Blood UA Negative Negative Negative   Nitrite, UA Negative Negative Negative             Microbiology Results (last 7 days)     Procedure Component Value Units Date/Time    Blood culture [[id number]] Collected: 04/11/22 0852    Order Status: Completed Specimen: Blood Updated: 04/13/22 1032     Blood Culture, Routine No Growth to date      No Growth to date      No Growth to date    Narrative:      2 sets please  Collection has been rescheduled by [facility] at 04/11/2022 08:50 Reason:   Patient unavailable, getting stress test.  Collection has been rescheduled by [facility] at 04/11/2022 08:50 Reason:   Patient unavailable, getting stress test.    Culture, Body Fluid (Aerobic) w/ GS [[id number]] Collected: 04/12/22 1221    Order Status: Completed Specimen: Ascites Updated: 04/13/22 0856     AEROBIC CULTURE - FLUID No growth     Gram Stain Result Few WBC's      No organisms seen    Narrative:      To rule out SBP order labs: Aerobic culture [[id number]],  Culture, Anaerobic [IPE909], Gram stain [SQA626], Albumin  [CHM847], Protein [GFG345], LDH [MTH819], WBC \T\ Dff  [VZH3430].    Giardia Specific Antigen [[id number]] Collected: 04/08/22 1835    Order Status: Completed Specimen: Stool Updated: 04/13/22 0719     Giardia Antigen - EIA Negative     Comment: Performed at:  [facility] [address]  [id number]  Lab  Director: Zay Lorenzana MD, Phone:  [phone]          Giardia lamblia Ag Source 0    (rule out SBP) Culture, Anaerobic [[id number]] Collected: 04/12/22 1221    Order Status: Sent Specimen: Ascites Updated: 04/12/22 1226    (rule out SBP) Aerobic culture [[id number]] Collected: 04/12/22 1221    Order Status: Canceled Specimen: Ascites     (rule out SBP) Gram stain [[id number]] Collected: 04/12/22 1221    Order Status: Canceled Specimen: Ascites     Gram stain [[id number]]     Order Status: No result Specimen: Ascites     Aerobic culture [[id number]]     Order Status: No result Specimen: Ascites     Culture, Anaerobic [[id number]]     Order Status: No result Specimen: Ascites     Stool culture [[id number]] Collected: 04/08/22 1835    Order Status: Completed Specimen: Stool Updated: 04/11/22 0837     Stool Culture No Salmonella,Shigella,Vibrio,Campylobacter.      No E coli 0157:H7 isolated.    Clostridium difficile EIA [[id number]] Collected: 04/08/22 1835    Order Status: Completed Specimen: Stool Updated: 04/08/22 2054     C. diff Antigen Negative     C difficile Toxins A+B, EIA Negative     Comment: Testing not recommended for children <24 months old.           ASSESSMENT/PLAN:     ILDEFONSO due to ATN due to hypovolemia  Live related donor kidney transplant for 18 years  Hyponatremia  Hyperkalemia  Acidosis  Hypotension  Diarrhea  DM2  Gout  Anemia  Cirrhosis s/p liver biopsy showing chronic active hepatitis and paracentesis on 4/6  HTN  No NSAIDs, ACEI/ARB, IV contrast or other nephrotoxins.    Keep MAP > 60, SBP > 100.    Dose meds for GFR < 30 ml/min.    Renal diet. K remains marginal, no Lokelma due to diarrhea.    Previous UA is clear, renal transplant US is OK. Continue home immunosuppression meds. Use pt own tacrolimus, hospital can not provide 1.5mg dose with current products available.Control DM2.    Check immunosuppressant trough levels.  Previous levels not accurate    Continue oral bicarb, increase to TID, stop  drip due to worsening hyponatremia.    sNa is worse - sent urine lytes, treat any infection. Does not appear volume depleted - started IVF yesterday and today sNa is worse. He also runs low grade-fever and has cough today, so potential PNA can worsen hyponatremia as well...    Tolerate asymptomatic HTN up to -160. Hold home BP meds.    Diagnose and treat diarrhea. GI Consult appreciated.    Continue gout meds.    Thank you for allowing us to participate in the care of your patient!   We will follow the patient and provide recommendations as needed.    Patient care time was spent personally by me on the following activities:     · Obtaining a history.  · Examination of patient.  · Providing medical care at the patients bedside.  · Developing a treatment plan with patient or surrogate and bedside caregivers.  · Ordering and reviewing laboratory studies, radiographic studies, pulse oximetry.  · Ordering and performing treatments and interventions.  · Evaluation of patient's response to treatment.  · Discussions with consultants while on the unit and immediately available to the patient.  · Re-evaluation of the patient's condition.  · Documentation in the medical record.     Kvng Villagomez MD    [facility]  [address] [place] [address]  [place] [address]    [phone] - tel  [phone] - fax    4/13/2022

## 2022-04-13 NOTE — TELEPHONE ENCOUNTER
Message From: Christina Osullivan MD  Stage 3 out of 4 scarring in the liver (one notch below cirrhosis).  Please inform patient.     Patient called at [phone]. No Answer.  Left VM message about Liver Biopsy results.  Please call us back at the clinic with any questions or concerns at [phone] the Office of Dr [facility]ullivan [facility] at [facility].

## 2022-04-13 NOTE — SUBJECTIVE & OBJECTIVE
Interval History:  Patient still has cough today, but this is chronic.  He had a single loose bowel movement, but no recurrent loose bowel movements suggestive of diarrhea.  He inquires about discharging home soon.    Review of Systems   Constitutional:  Negative for fever.   HENT:  Negative for congestion and sore throat.    Respiratory:  Positive for cough.    Cardiovascular:  Negative for chest pain.   Gastrointestinal:  Positive for nausea. Negative for vomiting.   Genitourinary:  Negative for difficulty urinating.   Skin:  Negative for rash and wound.   Neurological:  Negative for headaches.   Psychiatric/Behavioral:  Negative for confusion.    Objective:     Vital Signs (Most Recent):  Temp: 97 °F (36.1 °C) (04/13/22 1500)  Pulse: 78 (04/13/22 1613)  Resp: 18 (04/13/22 1613)  BP: 130/76 (04/13/22 1500)  SpO2: 97 % (04/13/22 1613)   Vital Signs (24h Range):  Temp:  [97 °F (36.1 °C)-99.7 °F (37.6 °C)] 97 °F (36.1 °C)  Pulse:  [60-78] 78  Resp:  [18] 18  SpO2:  [92 %-98 %] 97 %  BP: (117-130)/(74-87) 130/76     Weight: 63.6 kg (140 lb 3.4 oz)  Body mass index is 21.32 kg/m².    Intake/Output Summary (Last 24 hours) at 4/13/2022 1627  Last data filed at 4/13/2022 1500  Gross per 24 hour   Intake 120 ml   Output 1110 ml   Net -990 ml        Physical Exam  Vitals and nursing note reviewed.   Constitutional:       General: He is not in acute distress.     Appearance: He is not diaphoretic.      Comments: Sitting in bed, NAD, cooperative   HENT:      Head: Normocephalic and atraumatic.      Mouth/Throat:      Mouth: Mucous membranes are moist.      Comments: No oral thrush  Eyes:      General:         Right eye: No discharge.         Left eye: No discharge.   Cardiovascular:      Rate and Rhythm: Normal rate and regular rhythm.   Pulmonary:      Comments: On RA, inspiratory crackles at bases  Abdominal:      General: Bowel sounds are normal.      Palpations: Abdomen is soft.      Tenderness: There is no abdominal  tenderness.      Comments: Abdomen distended, but not tense.  Bandage in place right lower quadrant.   Skin:     General: Skin is warm and dry.   Neurological:      Mental Status: He is alert and oriented to person, place, and time.      Comments: Speech intact, moving all four extremities, generalized weakness   Psychiatric:         Mood and Affect: Mood normal.       Significant Labs: Blood Culture: No results for input(s): LABBLOO in the last 48 hours.    BMP:   Recent Labs   Lab 04/13/22  0759   *   *   K 4.2   CL 96   CO2 19*   BUN 36*   CREATININE 2.4*   CALCIUM 7.2*   MG 1.9       CBC:   Recent Labs   Lab 04/12/22  0525 04/13/22  0759   WBC 3.79* 3.45*   HGB 10.2* 10.1*   HCT 28.2* 28.0*   PLT 38* 38*       CMP:   Recent Labs   Lab 04/12/22 0525 04/13/22  0759   * 125*   K 4.3 4.2   CL 98 96   CO2 22* 19*   * 163*   BUN 36* 36*   CREATININE 2.2* 2.4*   CALCIUM 6.9* 7.2*   PROT 5.3* 5.1*   ALBUMIN 2.3* 2.3*   BILITOT 2.0* 1.8*   ALKPHOS 84 89   * 104*   ALT 22 22   ANIONGAP 8 10   EGFRNONAA 31.4* 28.3*       Cardiac Markers: No results for input(s): CKMB, MYOGLOBIN, BNP, TROPISTAT in the last 48 hours.    Lactic Acid: No results for input(s): LACTATE in the last 48 hours.  Magnesium:   Recent Labs   Lab 04/12/22 0525 04/13/22 0759   MG 2.0 1.9       Respiratory Culture: No results for input(s): GSRESP, RESPIRATORYC in the last 48 hours.  Troponin: No results for input(s): TROPONINI in the last 48 hours.  TSH:   Recent Labs   Lab 02/09/22  0814   TSH 1.536       Urine Culture: No results for input(s): LABURIN in the last 48 hours.  Urine Studies: No results for input(s): COLORU, APPEARANCEUA, PHUR, SPECGRAV, PROTEINUA, GLUCUA, KETONESU, BILIRUBINUA, OCCULTUA, NITRITE, UROBILINOGEN, LEUKOCYTESUR, RBCUA, WBCUA, BACTERIA, SQUAMEPITHEL, HYALINECASTS in the last 48 hours.    Invalid input(s): SUKHI      Significant Imaging: I have reviewed and interpreted all pertinent imaging  results/findings within the past 24 hours.

## 2022-04-13 NOTE — ASSESSMENT & PLAN NOTE
S/p transjugular liver biopsy and paracentesis at [facility]  Repeat paracentesis today without evidence of SBP    Start p.o. Lasix 20 mg daily

## 2022-04-13 NOTE — ASSESSMENT & PLAN NOTE
Slowly improving and is producing urine  Bicarbonate drip discontinued, continue p.o. bicarbonate  Repeat UA with reflex urine culture given fever  Ultrasound transplant kidney no acute  Renally dosing all medications avoiding nephrotoxic drugs  Appreciate nephrology input    Continue to hold lisinopril, Aldactone, metformin.  Re-attempt low-dose p.o. Lasix

## 2022-04-13 NOTE — PROGRESS NOTES
"Formerly [facility], [facility]  Adult Nutrition   Progress Note (Initial Assessment)     SUMMARY     Recommendations  Recommendation/Intervention: 1. Continue diet as tolerated. 2.  to obtain pt meal choices daily.  Goals: 1. Patient intake will be > 75% estimated energy needs. 2. Labs to trend to target range.  Nutrition Goal Status: new  Communication of RD Recs: reviewed with RN    Dietitian Rounds Brief  RD screen LOS. Patient eating well per nursing. Patient sleeping at visit. RD to follow for interview.    Malnutrition Assessment    Diet order:   Current Diet Order: Diabetic 1800 kcal diet       Evaluation of Received Nutrient/Fluid Intake    % Intake of Estimated Energy Needs: 50 - 75 %  % Meal Intake: 50 - 75 %    Energy Calories Required: meeting needs  Protein Required: meeting needs  Fluid Required: meeting needs  Tolerance: tolerating    Anthropometrics  Temp: 97 °F (36.1 °C)  Height Method: Stated  Height: 5' 8" (172.7 cm)  Height (inches): 68 in  Weight Method: Bed Scale  Weight: 63.6 kg (140 lb 3.4 oz)  Weight (lb): 140.21 lb  Ideal Body Weight (IBW), Male: 154 lb  % Ideal Body Weight, Male (lb): 90.94 %  BMI (Calculated): 21.3  BMI Grade: 18.5-24.9 - normal       Estimated/Assessed Needs  Weight Used For Calorie Calculations: 63.6 kg (140 lb 3.4 oz)  Energy Calorie Requirements (kcal): 2281-6256 kcal/day (25-30 kcal/kg)     Protein Requirements: 50-64 gm/day (0.8-1.0 gm / kg)  Weight Used For Protein Calculations: 63.6 kg (140 lb 3.4 oz)  Fluid Requirements (mL): 1ml/kcal or per MD     RDA Method (mL): 1590       Reason for Assessment  Reason For Assessment: length of stay  Diagnosis: renal disease  Relevant Medical History: kidney transplant, CKD  Interdisciplinary Rounds: did not attend    Nutrition/Diet History       Nutrition Risk Screen  Nutrition Risk Screen: no indicators present     MST Score: 0  Have you recently lost weight without trying?: No  Weight loss score: 0  Have you been eating " poorly because of a decreased appetite?: No  Appetite score: 0       Weight History:  Wt Readings from Last 5 Encounters:   04/08/22 63.6 kg (140 lb 3.4 oz)   04/06/22 68 kg (150 lb)   03/30/22 67.9 kg (149 lb 12.8 oz)   03/17/22 67 kg (147 lb 11.3 oz)   03/04/22 72.6 kg (160 lb)        Lab/Procedures/Meds: Pertinent Labs/Meds Reviewed    Medications:Pertinent Medications Reviewed  Scheduled Meds:   allopurinoL  100 mg Oral Daily    benzonatate  200 mg Oral TID    cefTRIAXone (ROCEPHIN) IVPB  2 g Intravenous Q24H    [START ON 4/14/2022] furosemide  20 mg Oral Daily    insulin detemir U-100  20 Units Subcutaneous QHS    levothyroxine  50 mcg Oral Daily    lipase-protease-amylase 12,000-38,000-60,000 units  2 capsule Oral QID (WM & HS)    metoprolol tartrate  50 mg Oral BID    pravastatin  40 mg Oral QHS    sirolimus  2 mg Oral Daily    sodium bicarbonate  1,300 mg Oral TID    tacrolimus  1.5 mg Oral Daily PM    tacrolimus  2 mg Oral Daily AM    tamsulosin  0.4 mg Oral Daily     Continuous Infusions:  PRN Meds:.acetaminophen, albuterol-ipratropium, dextrose 50%, dextrose 50%, glucagon (human recombinant), glucose, glucose, hydrocodone-chlorpheniramine, insulin aspart U-100, melatonin, naloxone, ondansetron, polyethylene glycol, sodium chloride 0.9%    Labs: Pertinent Labs Reviewed  Clinical Chemistry:  Recent Labs   Lab 04/13/22  0759   *   K 4.2   CL 96   CO2 19*   *   BUN 36*   CREATININE 2.4*   CALCIUM 7.2*   PROT 5.1*   ALBUMIN 2.3*   BILITOT 1.8*   ALKPHOS 89   *   ALT 22   ANIONGAP 10   ESTGFRAFRICA 32.7*   EGFRNONAA 28.3*   MG 1.9     CBC:   Recent Labs   Lab 04/13/22  0759   WBC 3.45*   RBC 3.34*   HGB 10.1*   HCT 28.0*   PLT 38*   MCV 84   MCH 30.2   MCHC 36.1*     Cardiac Profile:  Recent Labs   Lab 04/10/22  1522 04/11/22  0852   BNP  --  389*     --      Monitor and Evaluation  Food and Nutrient Intake: energy intake, food and beverage intake  Food and Nutrient  Adminstration: diet order  Physical Activity and Function: nutrition-related ADLs and IADLs  Anthropometric Measurements: weight change, weight, body mass index  Biochemical Data, Medical Tests and Procedures: electrolyte and renal panel, gastrointestinal profile, glucose/endocrine profile, inflammatory profile, lipid profile  Nutrition-Focused Physical Findings: overall appearance     Nutrition Risk  Level of Risk/Frequency of Follow-up: moderate - high     Nutrition Follow-Up  RD Follow-up?: Yes      Carola Guillen RD, CORTNEYN 04/13/2022 3:53 PM

## 2022-04-13 NOTE — PLAN OF CARE
Problem: Adult Inpatient Plan of Care  Goal: Plan of Care Review  Outcome: Ongoing, Progressing  Goal: Patient-Specific Goal (Individualized)  Outcome: Ongoing, Progressing  Goal: Absence of Hospital-Acquired Illness or Injury  Outcome: Ongoing, Progressing  Goal: Optimal Comfort and Wellbeing  Outcome: Ongoing, Progressing  Goal: Readiness for Transition of Care  Outcome: Ongoing, Progressing     Problem: Fluid and Electrolyte Imbalance (Acute Kidney Injury/Impairment)  Goal: Fluid and Electrolyte Balance  Outcome: Ongoing, Progressing     Problem: Adult Inpatient Plan of Care  Goal: Plan of Care Review  Outcome: Ongoing, Progressing  Goal: Patient-Specific Goal (Individualized)  Outcome: Ongoing, Progressing  Goal: Absence of Hospital-Acquired Illness or Injury  Outcome: Ongoing, Progressing  Goal: Optimal Comfort and Wellbeing  Outcome: Ongoing, Progressing  Goal: Readiness for Transition of Care  Outcome: Ongoing, Progressing     Problem: Fluid and Electrolyte Imbalance (Acute Kidney Injury/Impairment)  Goal: Fluid and Electrolyte Balance  Outcome: Ongoing, Progressing

## 2022-04-13 NOTE — CARE UPDATE
04/13/22 0805   Patient Assessment/Suction   Level of Consciousness (AVPU) alert   Respiratory Effort Normal;Unlabored   Expansion/Accessory Muscles/Retractions no use of accessory muscles;no retractions;expansion symmetric   All Lung Fields Breath Sounds clear   Rhythm/Pattern, Respiratory unlabored;pattern regular;depth regular;chest wiggle adequate;no shortness of breath reported   Cough Frequency no cough   PRE-TX-O2   O2 Device (Oxygen Therapy) room air   SpO2 97 %   Pulse Oximetry Type Intermittent   $ Pulse Oximetry - Multiple Charge Pulse Oximetry - Multiple   Pulse 66   Resp 18   Aerosol Therapy   $ Aerosol Therapy Charges PRN treatment not required   Education   $ Education Bronchodilator;15 min   Respiratory Evaluation   $ Care Plan Tech Time 15 min

## 2022-04-13 NOTE — RESPIRATORY THERAPY
04/12/22 2147   PRE-TX-O2   O2 Device (Oxygen Therapy) room air   SpO2 (!) 94 %   Pulse Oximetry Type Intermittent   $ Pulse Oximetry - Multiple Charge Pulse Oximetry - Multiple   Aerosol Therapy   $ Aerosol Therapy Charges PRN treatment not required   Education   $ Education Bronchodilator;15 min   Respiratory Evaluation   $ Care Plan Tech Time 15 min

## 2022-04-13 NOTE — PROGRESS NOTES
Consult Note  Infectious Disease    Reason for Consult:  Fever in immunocompromised    HPI: Edgar Jackson is a 61 y.o. male With a history of interstitial lung disease and prior kidney transplantation 2004 on immunosuppressive does and recent diagnosis of active hepatitis (biopsy 4/6) with cirrhosis, decompensation with ascites (recent therapeutic paracentesis 4/6) suspected to be due to LOPEZ, was admitted with acute kidney injury secondary to hypovolemia from diuretics, paracentesis?  and several weeks of non- bloody diarrhea.  At the time of his evaluation in the ER his creatinine was 2.7, CO2 16, potassium 5.4, , he had a 99.1 degree temperature, normal O2 saturation, CBC with white blood cells 3.5 platelets 52.  He required initiation of a bicarbonate drip and treatment for hyperkalemia.  He was seen by GI who ordered stool studies (C difficile toxin negative, Giardia negative, stool culture no enteric joe) and prescribed pancreatic enzyme supplements which were beneficial.  He has developed a low-grade temperature, has a chronic dry cough associated with his interstitial lung disease and a cultures of blood and urine as well as chest x-ray and a diagnostic paracentesis were ordered.  He was placed empirically on ceftriaxone and this consult was requested.  Creatinine has improved to 2.4, sodium is 125.  Chest is shows worsening infiltrates right greater than left which may reflect the hydration has received.  Review of care everywhere indicates that his creatinine was 1.0 in September prior to moving here.     4/12: no further temperature elevations. S/p paracentesis for diagnostic purposes and not consistent with SBP. UA was negative, CXR did have more infiltrates on right than admit. . He is comfortable on room air. Continues with cough. Has not been out of bed much. .  4/13: interim reviewed. Tm 99.7, sats normal on RA, peritoneal fluid no growth, platelets lower at 38k. Creatinine has been stable,  LFTs stable. Very depressed. Staying in bed all day. Only getting up to go to the restroom. Cough is a bit worse, sounds wetter. Cannot describe it    EXAM & DIAGNOSTICS REVIEWED:   Vitals:     Temp:  [97.6 °F (36.4 °C)-99.7 °F (37.6 °C)]   Temp: 97.8 °F (36.6 °C) (04/13/22 0700)  Pulse: 66 (04/13/22 0805)  Resp: 18 (04/13/22 0805)  BP: 122/75 (04/13/22 0700)  SpO2: 97 % (04/13/22 0805)    Intake/Output Summary (Last 24 hours) at 4/13/2022 1411  Last data filed at 4/13/2022 1200  Gross per 24 hour   Intake 460 ml   Output 850 ml   Net -390 ml       General:  In NAD. Alert and attentive, cooperative, comfortable  Eyes:  Anicteric,  EOMI  ENT:  No ulcers, exudates, thrush, nares patent,    Neck:  supple, no masses or adenopathy appreciated  Lungs: Loose cough, left greater than right fine crackles  Heart:  RRR, no gallop/murmur/rub noted  Abd:  Soft, NT, ND, normal BS, no masses or organomegaly appreciated.  :  Voids , no flank tenderness  Musc:  Joints without effusion, swelling, erythema, synovitis, muscle wasting.   Skin:  No rashes.   Neuro:             Alert, attentive, speech fluent, face symmetric, moves all extremities, no focal weakness. Ambulatory  Psych: Calm, cooperative  Lymphatic:      Extrem: no edema, erythema, phlebitis, cellulitis, warm and well perfused  VAD:       Isolation:    Wound:       Lines/Tubes/Drains:    General Labs reviewed:  Recent Labs   Lab 04/11/22  0852 04/12/22  0525 04/13/22  0759   WBC 4.16 3.79* 3.45*   HGB 10.1* 10.2* 10.1*   HCT 29.5* 28.2* 28.0*   PLT 41* 38* 38*       Recent Labs   Lab 04/11/22  0852 04/12/22  0525 04/13/22  0759   * 128* 125*   K 4.2 4.3 4.2   CL 96 98 96   CO2 21* 22* 19*   BUN 38* 36* 36*   CREATININE 2.4* 2.2* 2.4*   CALCIUM 7.1* 6.9* 7.2*   PROT 5.4* 5.3* 5.1*   BILITOT 2.1* 2.0* 1.8*   ALKPHOS 86 84 89   ALT 24 22 22   * 110* 104*      Latest Reference Range & Units 02/24/22 10:23 04/12/22 12:22 04/12/22 12:23   Fluid Color  Yellow  Yellow    Fluid Appearance  Clear Hazy    WBC, Body Fluid /cu mm 171 [1] 113 [2]    Body Fluid Type  Ascites Ascites    Segs, Fluid % 10 29    Lymphs, Fluid % 23 28    Monocytes/Macrophages, Fluid % 47 43    Mesothelial Cells, Fluid % 20     LD, Fluid Not established U/L   55 [3]   Body Fluid, Albumin Not Established g/dL <1.0 [4]  <1.0 [5]   Body Fluid Source, Albumin  Ascites  Abdominal   Body Fluid Source, LDH    Ascites   Body Fluid Source, Total Protein  Ascites  Ascites   Body Fluid, Protein Not established g/dL <3.0 [6]  <3.0 [7]        Micro:  Microbiology Results (last 7 days)     Procedure Component Value Units Date/Time    Blood culture [[id number]] Collected: 04/11/22 0852    Order Status: Completed Specimen: Blood Updated: 04/13/22 1032     Blood Culture, Routine No Growth to date      No Growth to date      No Growth to date    Narrative:      2 sets please  Collection has been rescheduled by [facility] at 04/11/2022 08:50 Reason:   Patient unavailable, getting stress test.  Collection has been rescheduled by [facility] at 04/11/2022 08:50 Reason:   Patient unavailable, getting stress test.    Culture, Body Fluid (Aerobic) w/ GS [[id number]] Collected: 04/12/22 1221    Order Status: Completed Specimen: Ascites Updated: 04/13/22 0856     AEROBIC CULTURE - FLUID No growth     Gram Stain Result Few WBC's      No organisms seen    Narrative:      To rule out SBP order labs: Aerobic culture [GNO098],  Culture, Anaerobic [[id number]], Gram stain [MRO227], Albumin  [XBX652], Protein [IYV263], LDH [SLK083], WBC \T\ Dff  [YAK1556].    Giardia Specific Antigen [[id number]] Collected: 04/08/22 1835    Order Status: Completed Specimen: Stool Updated: 04/13/22 0719     Giardia Antigen - EIA Negative     Comment: Performed at:  [facility]  [id number]  : Zay Lorenzana MD, Phone:  [phone]          Giardia lamblia Ag Source 0    (rule out SBP) Culture, Anaerobic  [[id number]] Collected: 04/12/22 1221    Order Status: Sent Specimen: Ascites Updated: 04/12/22 1226    (rule out SBP) Aerobic culture [[id number]] Collected: 04/12/22 1221    Order Status: Canceled Specimen: Ascites     (rule out SBP) Gram stain [[id number]] Collected: 04/12/22 1221    Order Status: Canceled Specimen: Ascites     Gram stain [[id number]]     Order Status: No result Specimen: Ascites     Aerobic culture [[id number]]     Order Status: No result Specimen: Ascites     Culture, Anaerobic [[id number]]     Order Status: No result Specimen: Ascites     Stool culture [[id number]] Collected: 04/08/22 1835    Order Status: Completed Specimen: Stool Updated: 04/11/22 0837     Stool Culture No Salmonella,Shigella,Vibrio,Campylobacter.      No E coli 0157:H7 isolated.    Clostridium difficile EIA [[id number]] Collected: 04/08/22 1835    Order Status: Completed Specimen: Stool Updated: 04/08/22 2054     C. diff Antigen Negative     C difficile Toxins A+B, EIA Negative     Comment: Testing not recommended for children <24 months old.             Imaging Reviewed:   CXRs   CT chest 02/02/2022    Cardiology: normal EF by echo 5/2020, care everywhere    IMPRESSION & PLAN   1.  Low-grade fever, worsening infiltrates, fluid versus infection, favor fluid    2.  Acute kidney injury, improved with hydration and bicarbonate supplementation, 2.6 liters positive since admission   S/p renal transplant 2004 on immunosuppression    3.  Cirrhosis with ascites, varices, felt to be due to LOPEZ.     Elevated alpha fetoprotein 10 times normal, no masses on imaging 3/24   Elevated ammonia 3/8  69    4.  Interstitial lung disease baseline autoimmune workup in progress, largely negative, bronchoscopy 6/2020 for same?    5.  Chronic diarrhea, Multiple stool test negative and several in progress      Recommendations:   continue rocephin  Sputum culture  Scheduled aerosols  Repeat CXR  Consider some resumption o f diuresis     D/w   Tiffany      Medical Decision Making during this encounter was  [_] Low Complexity  [_] Moderate Complexity  [ xxx] High Complexity

## 2022-04-14 NOTE — ASSESSMENT & PLAN NOTE
Worsening.  Nephrology is following  Patient's worsening renal function hyponatremia after low-dose oral Lasix.     Yes

## 2022-04-14 NOTE — ASSESSMENT & PLAN NOTE
Improving.  C diff negative Giardia negative.  No pathogenic bacteria identified on stool culture.  He continues have multiple loose and watery bowel movements each day despite Creon  Continue Creon  Start loperamide p.r.n.

## 2022-04-14 NOTE — ASSESSMENT & PLAN NOTE
Followed by outpatient Pulmonary Dr. Boyer, chest x-ray with continued changes  Pulmonary here have ordered serology workup, follow-up  Continue Scheduled Benzonate  Hycodan p.r.n.    Start Flonase and Mucinex given the possibility that his congestion and sinus drainage may be contributing to his cough

## 2022-04-14 NOTE — ASSESSMENT & PLAN NOTE
[place] [facility] [place]peat paracentesis today without evidence of SBP

## 2022-04-14 NOTE — ASSESSMENT & PLAN NOTE
Ultrasound transplant kidney no acute  Nephrology has been consulted    Continue to hold lisinopril, Aldactone, metformin.  Stop p.o. Lasix  Continue p.o. bicarbonate    NS 75 cc for 1 L

## 2022-04-14 NOTE — PROGRESS NOTES
[facility] Medicine  Progress Note    Patient Name: Edgar Jackson  MRN: [id number]  Patient Class: IP- Inpatient   Admission Date: 4/7/2022  Length of Stay: 6 days  Attending Physician: Manoj Allen MD  Primary Care Provider: Tyler Castillo MD        Subjective:     Principal Problem:Fever        HPI:  Mr. Jackson is a 61-year-old male with a past medical history of renal failure status post renal transplant 2004, NIDDM2, ILD, and cirrhosis who presents today with complaints of elevated creatinine on outpatient labs.  It is severe.  It is associated with recent liver biopsy and paracentesis and recent addition of Lasix and spironolactone.  He denies fever, chills, nausea, vomiting, diarrhea, chest pain, shortness of breath, loss of consciousness.  He has a chronic cough that is productive of clear/white/yellow sputum at baseline which he states has worsened over the last 2 months. He had a liver biopsy and paracentesis yesterday at [facility] with Dr. Osullivan and his creatinine was 3.1. He was called and referred to the ED for further work up.       Overview/Hospital Course:  Patient with a history of remote renal transplant, secondary to hypertensive renal failure, on chronic immunosuppressant (sirolimus and tacrolimus), diagnosis of cirrhosis who underwent recent transjugular biopsy (pathology with chronic mild to moderate active hepatitis with bridging fibrosis and nodules) as well as paracentesis (2750 cc removed) on 04/06.  He also was seen by Pulmonary for concern of interstitial lung disease and reports ongoing dyspnea on exertion with cough.  He was referred to the ED due to abnormal outpatient labs with acute kidney injury.  He was borderline hypotensive in the ED, creatinine 3.1.  Also noted to have hyponatremia, hyperkalemia with metabolic acidosis.  Ongoing diarrhea quantified as 4 bowel movements per day.  On 04/08, renal function with creatinine 2.7, minimal urine output, on bicarbonate  drip, stool studies in process, Nephrology and Gastroenterology following.  On 04/09 still having diarrhea, about 8 bowel movements overnight, states no relation to time of day, usually after every meal, blood pressure is improved, BUN/creatinine 37/2.7.  On 04/10 states overall he feels better, Creon was started yesterday and diarrhea is improving, states he believes he is producing more urine, BUN/creatinine 40/2.6, Nephrology resuming bicarbonate drip, pulmonary consulted. On 4/11 developed fever and work up started, which was largely unrevealing.  Paracentesis was performed; fluid studies were not consistent with SBP.  Patient completed 3 days of IV ceftriaxone.  Patient persistent cough throughout the admission.      Interval History:  Patient still has cough today.  He has not been observed coughing in the room.  He reports some associated hemoptysis, but also acknowledges the has a small amount of congestion epistaxis.  He denies fever.  His renal function is slightly worse today.    Review of Systems   Constitutional:  Negative for fever.   HENT:  Negative for congestion and sore throat.    Respiratory:  Positive for cough.    Cardiovascular:  Negative for chest pain.   Gastrointestinal:  Positive for nausea. Negative for vomiting.   Genitourinary:  Negative for difficulty urinating.   Skin:  Negative for rash and wound.   Neurological:  Negative for headaches.   Psychiatric/Behavioral:  Negative for confusion.    Objective:     Vital Signs (Most Recent):  Temp: 98.3 °F (36.8 °C) (04/14/22 1157)  Pulse: 71 (04/14/22 1157)  Resp: 18 (04/14/22 1157)  BP: 112/79 (04/14/22 1157)  SpO2: 98 % (04/14/22 1157)   Vital Signs (24h Range):  Temp:  [97.6 °F (36.4 °C)-98.4 °F (36.9 °C)] 98.3 °F (36.8 °C)  Pulse:  [58-71] 71  Resp:  [18] 18  SpO2:  [95 %-98 %] 98 %  BP: ()/(61-81) 112/79     Weight: 63.6 kg (140 lb 3.4 oz)  Body mass index is 21.32 kg/m².    Intake/Output Summary (Last 24 hours) at 4/14/2022  1615  Last data filed at 4/14/2022 1200  Gross per 24 hour   Intake 1320 ml   Output 1150 ml   Net 170 ml        Physical Exam  Vitals and nursing note reviewed.   Constitutional:       General: He is not in acute distress.     Appearance: He is not diaphoretic.      Comments: Sitting in bed, NAD, cooperative   HENT:      Head: Normocephalic and atraumatic.      Mouth/Throat:      Mouth: Mucous membranes are moist.      Comments: No oral thrush  Eyes:      General:         Right eye: No discharge.         Left eye: No discharge.   Cardiovascular:      Rate and Rhythm: Normal rate and regular rhythm.   Pulmonary:      Comments: On RA, inspiratory crackles at bases  Abdominal:      General: Bowel sounds are normal.      Palpations: Abdomen is soft.      Tenderness: There is no abdominal tenderness.      Comments: Abdomen distended, but not tense.  Bandage in place right lower quadrant.   Skin:     General: Skin is warm and dry.   Neurological:      Mental Status: He is alert and oriented to person, place, and time.      Comments: Speech intact, moving all four extremities, generalized weakness   Psychiatric:         Mood and Affect: Mood normal.       Significant Labs: Blood Culture: No results for input(s): LABBLOO in the last 48 hours.    BMP:   Recent Labs   Lab 04/14/22  0552   *   *   K 4.4   CL 95   CO2 20*   BUN 42*   CREATININE 3.0*   CALCIUM 7.2*   MG 1.9       CBC:   Recent Labs   Lab 04/13/22  0759 04/14/22  0552   WBC 3.45* 4.21   HGB 10.1* 10.3*   HCT 28.0* 29.6*   PLT 38* 41*       CMP:   Recent Labs   Lab 04/13/22  0759 04/14/22  0552   * 121*   K 4.2 4.4   CL 96 95   CO2 19* 20*   * 133*   BUN 36* 42*   CREATININE 2.4* 3.0*   CALCIUM 7.2* 7.2*   PROT 5.1* 5.4*   ALBUMIN 2.3* 2.4*   BILITOT 1.8* 1.8*   ALKPHOS 89 92   * 111*   ALT 22 24   ANIONGAP 10 6*   EGFRNONAA 28.3* 21.6*       Cardiac Markers: No results for input(s): CKMB, MYOGLOBIN, BNP, TROPISTAT in the last 48  hours.    Lactic Acid: No results for input(s): LACTATE in the last 48 hours.  Magnesium:   Recent Labs   Lab 04/13/22  0759 04/14/22  0552   MG 1.9 1.9       Respiratory Culture: No results for input(s): GSRESP, RESPIRATORYC in the last 48 hours.  Troponin: No results for input(s): TROPONINI in the last 48 hours.  TSH:   Recent Labs   Lab 02/09/22  0814   TSH 1.536       Urine Culture: No results for input(s): LABURIN in the last 48 hours.  Urine Studies: No results for input(s): COLORU, APPEARANCEUA, PHUR, SPECGRAV, PROTEINUA, GLUCUA, KETONESU, BILIRUBINUA, OCCULTUA, NITRITE, UROBILINOGEN, LEUKOCYTESUR, RBCUA, WBCUA, BACTERIA, SQUAMEPITHEL, HYALINECASTS in the last 48 hours.    Invalid input(s): WRIGHTSUR      Significant Imaging: I have reviewed and interpreted all pertinent imaging results/findings within the past 24 hours.      Assessment/Plan:      * Fever in immunocomprised patient  T-max 100.6, denies any new localizing source  Pancultures including UA with reflex urine culture, blood culture, chest x-ray being repeated, diagnostic paracentesis  Given only potential source at this time and patient is immunocompromised will treat empirically for SBP with Rocephin  Continue to monitor temperature curve and follow up culture results  Infectious disease consulted    Continue rocephin  No recurrent fevers    Anemia  Chronic and multifactorial      Thrombocytopenia  No evidence of bleeding, due to cirrhosis, monitoring      Hypothyroidism  Continue Synthroid      Metabolic acidosis  Sodium bicarbonate increased to 1300 t.i.d. as per Nephrology      Hyponatremia  Worsening.  Nephrology is following  Patient's worsening renal function hyponatremia after low-dose oral Lasix.      Diarrhea  Improving.  C diff negative Giardia negative.  No pathogenic bacteria identified on stool culture.  He continues have multiple loose and watery bowel movements each day despite Creon  Continue Creon  Start loperamide  p.r.n.      ILDEFONSO (acute kidney injury)  Ultrasound transplant kidney no acute  Nephrology has been consulted    Continue to hold lisinopril, Aldactone, metformin.  Stop p.o. Lasix  Continue p.o. bicarbonate    NS 75 cc for 1 L    Cirrhosis of liver with ascites  [place] [facility] [place]  Repeat paracentesis today without evidence of SBP  MELD sodium 30, consider transplant evaluation in the setting worsening kidney failure and worsening cirrhosis.    Continue to follow the liver and renal function over the next few days.  If he fails to improve, will contact solid organ transplant team at [facility]    Interstitial lung disease  Followed by outpatient Pulmonary Dr. Boyer, chest x-ray with continued changes  Pulmonary here have ordered serology workup, follow-up  Continue Scheduled Benzonate  Hycodan p.r.n.    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  Continue insulin sliding scale    Gout  Continue allopurinol      Personal history of immunosupression therapy  Continuing chronic immunosuppressant      Hypertension  Controlled      History of renal transplant  Hold next 2 doses of tacrolimus, then resume at 1.5 mg b.i.d. as discussed with Nephrology  Continue sirolimus        VTE Risk Mitigation (From admission, onward)         Ordered     IP VTE HIGH RISK PATIENT  Once         04/07/22 1800     Place sequential compression device  Until discontinued         04/07/22 1800                Discharge Planning   LE:      Code Status: Full Code   Is the patient medically ready for discharge?:     Reason for patient still in hospital (select all that apply):  Treatment and Consult recommendations  Discharge Plan A: Home with family, Home                  Manoj Allen MD  Department of Hospital Medicine   [facility]

## 2022-04-14 NOTE — TELEPHONE ENCOUNTER
Received call from Meenu, wife of the patient. She stated they received a call about the Liver Biopsy results from Dr Osullivan.  I had a few questions.    Message From: Christina Osullivan MD  Stage 3 out of 4 scarring in the liver (one notch below cirrhosis).  Please inform patient.     Allowed to verbalize concerns. Questions answered. Meenu stated he is still in the hospital. He stated he is ready to come home. They will discharge him when his condition improves.  Voiced understanding.

## 2022-04-14 NOTE — ASSESSMENT & PLAN NOTE
Continue basal bolus insulin with Accu-Cheks  As needed hypoglycemic measures  Previous HbA1c 6.6%  Continue detemir 22 units q.h.s.  Continue insulin sliding scale

## 2022-04-14 NOTE — ASSESSMENT & PLAN NOTE
T-max 100.6, denies any new localizing source  Pancultures including UA with reflex urine culture, blood culture, chest x-ray being repeated, diagnostic paracentesis  Given only potential source at this time and patient is immunocompromised will treat empirically for SBP with Rocephin  Continue to monitor temperature curve and follow up culture results  Infectious disease consulted    Discontinue Rocephin  No recurrent fevers

## 2022-04-14 NOTE — PROGRESS NOTES
Consult Note  Infectious Disease    Reason for Consult:  Fever in immunocompromised    HPI: Edgar Jackson is a 61 y.o. male With a history of interstitial lung disease and prior kidney transplantation 2004 on immunosuppressive does and recent diagnosis of active hepatitis (biopsy 4/6) with cirrhosis, decompensation with ascites (recent therapeutic paracentesis 4/6) suspected to be due to LOPEZ, was admitted with acute kidney injury secondary to hypovolemia from diuretics, paracentesis?  and several weeks of non- bloody diarrhea.  At the time of his evaluation in the ER his creatinine was 2.7, CO2 16, potassium 5.4, , he had a 99.1 degree temperature, normal O2 saturation, CBC with white blood cells 3.5 platelets 52.  He required initiation of a bicarbonate drip and treatment for hyperkalemia.  He was seen by GI who ordered stool studies (C difficile toxin negative, Giardia negative, stool culture no enteric joe) and prescribed pancreatic enzyme supplements which were beneficial.  He has developed a low-grade temperature, has a chronic dry cough associated with his interstitial lung disease and a cultures of blood and urine as well as chest x-ray and a diagnostic paracentesis were ordered.  He was placed empirically on ceftriaxone and this consult was requested.  Creatinine has improved to 2.4, sodium is 125.  Chest is shows worsening infiltrates right greater than left which may reflect the hydration has received.  Review of care everywhere indicates that his creatinine was 1.0 in September prior to moving here.     4/12: no further temperature elevations. S/p paracentesis for diagnostic purposes and not consistent with SBP. UA was negative, CXR did have more infiltrates on right than admit. . He is comfortable on room air. Continues with cough. Has not been out of bed much. .  4/13: interim reviewed. Tm 99.7, sats normal on RA, peritoneal fluid no growth, platelets lower at 38k. Creatinine has been stable,  LFTs stable. Very depressed. Staying in bed all day. Only getting up to go to the restroom. Cough is a bit worse, sounds wetter. Cannot describe it  4/14: interim reviewed. Afebrile. Peritoneal fluid culture neg. CXR a bit better but same fibrotic background. Sputum culture not yet collected. Cr up again. Immunosuppressives adjusted by dr. Villagomez. Sputum is clear in quality. He has not been out of bed today except to go to the bathroom. Wife at bedside, discussed ID issues with her.     EXAM & DIAGNOSTICS REVIEWED:   Vitals:     Temp:  [97 °F (36.1 °C)-98.4 °F (36.9 °C)]   Temp: 98.3 °F (36.8 °C) (04/14/22 1157)  Pulse: 71 (04/14/22 1157)  Resp: 18 (04/14/22 1157)  BP: 112/79 (04/14/22 1157)  SpO2: 98 % (04/14/22 1157)    Intake/Output Summary (Last 24 hours) at 4/14/2022 1439  Last data filed at 4/14/2022 1200  Gross per 24 hour   Intake 1320 ml   Output 1350 ml   Net -30 ml       General:  In NAD. Alert and attentive, cooperative, comfortable, withdrawn  Eyes:  Anicteric,  EOMI  ENT:  No ulcers, exudates, thrush, nares patent,    Neck:  supple,    Lungs: Loose cough,much clearer  Heart:  RRR, no gallop/murmur/rub noted  Abd:  Soft, NT, ND, normal BS, no masses or organomegaly appreciated. Protuberant with  More ascites today  :  Voids , no flank tenderness  Musc:  Joints without effusion, swelling, erythema, synovitis, muscle wasting.   Skin:  No rashes.   Neuro:             Alert, attentive, speech fluent, face symmetric, moves all extremities, no focal weakness. Ambulatory  Psych: Calm, cooperative, withdrawn, depressed  Lymphatic:        Extrem: no edema, erythema, phlebitis, cellulitis, warm and well perfused  VAD:       Isolation:    Wound:       Lines/Tubes/Drains:    General Labs reviewed:  Recent Labs   Lab 04/12/22  0525 04/13/22  0759 04/14/22  0552   WBC 3.79* 3.45* 4.21   HGB 10.2* 10.1* 10.3*   HCT 28.2* 28.0* 29.6*   PLT 38* 38* 41*       Recent Labs   Lab 04/12/22  0525 04/13/22  0759  04/14/22  0552   * 125* 121*   K 4.3 4.2 4.4   CL 98 96 95   CO2 22* 19* 20*   BUN 36* 36* 42*   CREATININE 2.2* 2.4* 3.0*   CALCIUM 6.9* 7.2* 7.2*   PROT 5.3* 5.1* 5.4*   BILITOT 2.0* 1.8* 1.8*   ALKPHOS 84 89 92   ALT 22 22 24   * 104* 111*      Latest Reference Range & Units 02/24/22 10:23 04/12/22 12:22 04/12/22 12:23   Fluid Color  Yellow Yellow    Fluid Appearance  Clear Hazy    WBC, Body Fluid /cu mm 171 [1] 113 [2]    Body Fluid Type  Ascites Ascites    Segs, Fluid % 10 29    Lymphs, Fluid % 23 28    Monocytes/Macrophages, Fluid % 47 43    Mesothelial Cells, Fluid % 20     LD, Fluid Not established U/L   55 [3]   Body Fluid, Albumin Not Established g/dL <1.0 [4]  <1.0 [5]   Body Fluid Source, Albumin  Ascites  Abdominal   Body Fluid Source, LDH    Ascites   Body Fluid Source, Total Protein  Ascites  Ascites   Body Fluid, Protein Not established g/dL <3.0 [6]  <3.0 [7]        Micro:  Microbiology Results (last 7 days)     Procedure Component Value Units Date/Time    Culture, Body Fluid (Aerobic) w/ GS [[id number]] Collected: 04/12/22 1221    Order Status: Completed Specimen: Ascites Updated: 04/14/22 1332     AEROBIC CULTURE - FLUID No growth     Gram Stain Result Few WBC's      No organisms seen    Narrative:      To rule out SBP order labs: Aerobic culture [[id number]],  Culture, Anaerobic [RHQ026], Gram stain [QLL600], Albumin  [ZGV017], Protein [MLA627], LDH [QCP243], WBC \T\ Dff  [GZG9754].    Blood culture [[id number]] Collected: 04/11/22 0852    Order Status: Completed Specimen: Blood Updated: 04/14/22 1032     Blood Culture, Routine No Growth to date      No Growth to date      No Growth to date      No Growth to date    Narrative:      2 sets please  Collection has been rescheduled by [facility] at 04/11/2022 08:50 Reason:   Patient unavailable, getting stress test.  Collection has been rescheduled by [facility] at 04/11/2022 08:50 Reason:   Patient unavailable, getting stress test.    Culture, Respiratory  with Gram Stain [[id number]]     Order Status: Sent Specimen: Sputum, Expectorated     Giardia Specific Antigen [[id number]] Collected: 04/08/22 1835    Order Status: Completed Specimen: Stool Updated: 04/13/22 0719     Giardia Antigen - EIA Negative     Comment: Performed at:  [address]  [id number]  : Zay Lorenzana MD, Phone:  [phone]          Giardia lamblia Ag Source 0    (rule out SBP) Culture, Anaerobic [[id number]] Collected: 04/12/22 1221    Order Status: Sent Specimen: Ascites Updated: 04/12/22 1226    (rule out SBP) Aerobic culture [[id number]] Collected: 04/12/22 1221    Order Status: Canceled Specimen: Ascites     (rule out SBP) Gram stain [[id number]] Collected: 04/12/22 1221    Order Status: Canceled Specimen: Ascites     Gram stain [[id number]]     Order Status: No result Specimen: Ascites     Aerobic culture [[id number]]     Order Status: No result Specimen: Ascites     Culture, Anaerobic [[id number]]     Order Status: No result Specimen: Ascites     Stool culture [[id number]] Collected: 04/08/22 1835    Order Status: Completed Specimen: Stool Updated: 04/11/22 0837     Stool Culture No Salmonella,Shigella,Vibrio,Campylobacter.      No E coli 0157:H7 isolated.    Clostridium difficile EIA [[id number]] Collected: 04/08/22 1835    Order Status: Completed Specimen: Stool Updated: 04/08/22 2054     C. diff Antigen Negative     C difficile Toxins A+B, EIA Negative     Comment: Testing not recommended for children <24 months old.             Imaging Reviewed:   CXRs   CT chest 02/02/2022    Cardiology: normal EF by echo 5/2020, care everywhere    IMPRESSION & PLAN   1.  Low-grade fever, worsening infiltrates, fluid versus infection, favor fluid    2.  Acute kidney injury, improved with hydration and bicarbonate supplementation, but not normalizing   S/p renal transplant 2004 on immunosuppression    3.  Cirrhosis with ascites, varices, felt to be due  to LOPEZ.     Elevated alpha fetoprotein 10 times normal, no masses on imaging 3/24   Elevated ammonia 3/8  69    4.  Interstitial lung disease baseline autoimmune workup in progress, largely negative, bronchoscopy 6/2020 for same?  Chest x-ray improved 4/13    5.  Chronic diarrhea, Multiple stool test negative and several in progress      Recommendations:   continue rocephin, not more than 5 days(tomorrow is #5)     Scheduled aerosols   out of bed     D/w wife and patient      Medical Decision Making during this encounter was  [_] Low Complexity  [_] Moderate Complexity  [ xxx] High Complexity

## 2022-04-14 NOTE — PLAN OF CARE
Change to moderate follow-up      Problem: Oral Intake Inadequate (Acute Kidney Injury/Impairment)  Goal: Optimal Nutrition Intake  Intervention: Promote and Optimize Nutrition  Flowsheets (Taken 4/14/2022 1436)  Oral Nutrition Promotion: calorie-dense liquids provided

## 2022-04-14 NOTE — TELEPHONE ENCOUNTER
Liver pathology conference    Liver Bx 4/6: Chronic mild-moderately active hepatitis with bridging fibrosis and nodule   formation (Grade 2, Stage 3).

## 2022-04-14 NOTE — CARE UPDATE
04/14/22 0809   Patient Assessment/Suction   Level of Consciousness (AVPU) alert   Respiratory Effort Normal;Unlabored   Expansion/Accessory Muscles/Retractions no use of accessory muscles;no retractions;expansion symmetric   All Lung Fields Breath Sounds wheezes, expiratory   Rhythm/Pattern, Respiratory unlabored;pattern regular;depth regular;chest wiggle adequate;no shortness of breath reported   Cough Frequency infrequent   Cough Type good   PRE-TX-O2   O2 Device (Oxygen Therapy) room air   SpO2 97 %   Pulse Oximetry Type Intermittent   $ Pulse Oximetry - Multiple Charge Pulse Oximetry - Multiple   Pulse 70   Resp 18   Aerosol Therapy   $ Aerosol Therapy Charges Aerosol Treatment   Daily Review of Necessity (SVN) completed   Respiratory Treatment Status (SVN) given   Treatment Route (SVN) mask;oxygen   Patient Position (SVN) HOB elevated   Post Treatment Assessment (SVN) increased aeration   Signs of Intolerance (SVN) none   Breath Sounds Post-Respiratory Treatment   Throughout All Fields Post-Treatment All Fields   Throughout All Fields Post-Treatment aeration increased   Post-treatment Heart Rate (beats/min) 74   Post-treatment Resp Rate (breaths/min) 18   Education   $ Education Bronchodilator;15 min   Respiratory Evaluation   $ Care Plan Tech Time 15 min

## 2022-04-14 NOTE — ASSESSMENT & PLAN NOTE
Continue sirolimus  Hold next 2 doses of tacrolimus, then resume 1.5 mg b.i.d. (resume 4/16 as discussed with Nephrology)

## 2022-04-14 NOTE — CARE UPDATE
04/13/22 2117   PRE-TX-O2   O2 Device (Oxygen Therapy) room air   SpO2 97 %   Pulse Oximetry Type Intermittent   $ Pulse Oximetry - Multiple Charge Pulse Oximetry - Multiple   Aerosol Therapy   $ Aerosol Therapy Charges PRN treatment not required   Respiratory Treatment Status (SVN) PRN treatment not required   Education   $ Education Bronchodilator;15 min   Respiratory Evaluation   $ Care Plan Tech Time 15 min   $ Eval/Re-eval Charges Re-evaluation

## 2022-04-14 NOTE — SUBJECTIVE & OBJECTIVE
Interval History:  Patient still has cough today.  He has not been observed coughing in the room.  He reports some associated hemoptysis, but also acknowledges the has a small amount of congestion epistaxis.  He denies fever.  His renal function is slightly worse today.    Review of Systems   Constitutional:  Negative for fever.   HENT:  Negative for congestion and sore throat.    Respiratory:  Positive for cough.    Cardiovascular:  Negative for chest pain.   Gastrointestinal:  Positive for nausea. Negative for vomiting.   Genitourinary:  Negative for difficulty urinating.   Skin:  Negative for rash and wound.   Neurological:  Negative for headaches.   Psychiatric/Behavioral:  Negative for confusion.    Objective:     Vital Signs (Most Recent):  Temp: 98.3 °F (36.8 °C) (04/14/22 1157)  Pulse: 71 (04/14/22 1157)  Resp: 18 (04/14/22 1157)  BP: 112/79 (04/14/22 1157)  SpO2: 98 % (04/14/22 1157)   Vital Signs (24h Range):  Temp:  [97.6 °F (36.4 °C)-98.4 °F (36.9 °C)] 98.3 °F (36.8 °C)  Pulse:  [58-71] 71  Resp:  [18] 18  SpO2:  [95 %-98 %] 98 %  BP: ()/(61-81) 112/79     Weight: 63.6 kg (140 lb 3.4 oz)  Body mass index is 21.32 kg/m².    Intake/Output Summary (Last 24 hours) at 4/14/2022 1615  Last data filed at 4/14/2022 1200  Gross per 24 hour   Intake 1320 ml   Output 1150 ml   Net 170 ml        Physical Exam  Vitals and nursing note reviewed.   Constitutional:       General: He is not in acute distress.     Appearance: He is not diaphoretic.      Comments: Sitting in bed, NAD, cooperative   HENT:      Head: Normocephalic and atraumatic.      Mouth/Throat:      Mouth: Mucous membranes are moist.      Comments: No oral thrush  Eyes:      General:         Right eye: No discharge.         Left eye: No discharge.   Cardiovascular:      Rate and Rhythm: Normal rate and regular rhythm.   Pulmonary:      Comments: On RA, inspiratory crackles at bases  Abdominal:      General: Bowel sounds are normal.      Palpations:  Abdomen is soft.      Tenderness: There is no abdominal tenderness.      Comments: Abdomen distended, but not tense.  Bandage in place right lower quadrant.   Skin:     General: Skin is warm and dry.   Neurological:      Mental Status: He is alert and oriented to person, place, and time.      Comments: Speech intact, moving all four extremities, generalized weakness   Psychiatric:         Mood and Affect: Mood normal.       Significant Labs: Blood Culture: No results for input(s): LABBLOO in the last 48 hours.    BMP:   Recent Labs   Lab 04/14/22  0552   *   *   K 4.4   CL 95   CO2 20*   BUN 42*   CREATININE 3.0*   CALCIUM 7.2*   MG 1.9       CBC:   Recent Labs   Lab 04/13/22 0759 04/14/22  0552   WBC 3.45* 4.21   HGB 10.1* 10.3*   HCT 28.0* 29.6*   PLT 38* 41*       CMP:   Recent Labs   Lab 04/13/22 0759 04/14/22  0552   * 121*   K 4.2 4.4   CL 96 95   CO2 19* 20*   * 133*   BUN 36* 42*   CREATININE 2.4* 3.0*   CALCIUM 7.2* 7.2*   PROT 5.1* 5.4*   ALBUMIN 2.3* 2.4*   BILITOT 1.8* 1.8*   ALKPHOS 89 92   * 111*   ALT 22 24   ANIONGAP 10 6*   EGFRNONAA 28.3* 21.6*       Cardiac Markers: No results for input(s): CKMB, MYOGLOBIN, BNP, TROPISTAT in the last 48 hours.    Lactic Acid: No results for input(s): LACTATE in the last 48 hours.  Magnesium:   Recent Labs   Lab 04/13/22 0759 04/14/22  0552   MG 1.9 1.9       Respiratory Culture: No results for input(s): GSRESP, RESPIRATORYC in the last 48 hours.  Troponin: No results for input(s): TROPONINI in the last 48 hours.  TSH:   Recent Labs   Lab 02/09/22  0814   TSH 1.536       Urine Culture: No results for input(s): LABURIN in the last 48 hours.  Urine Studies: No results for input(s): COLORU, APPEARANCEUA, PHUR, SPECGRAV, PROTEINUA, GLUCUA, KETONESU, BILIRUBINUA, OCCULTUA, NITRITE, UROBILINOGEN, LEUKOCYTESUR, RBCUA, WBCUA, BACTERIA, SQUAMEPITHEL, HYALINECASTS in the last 48 hours.    Invalid input(s): SUKHI      Significant  Imaging: I have reviewed and interpreted all pertinent imaging results/findings within the past 24 hours.

## 2022-04-14 NOTE — PROGRESS NOTES
Nephrology Consult Note        Patient Name: Edgar Jackson  MRN: [id number]    Patient Class: IP- Inpatient   Admission Date: 4/7/2022  Length of Stay: 6 days  Date of Service: 4/14/2022    Attending Physician: Manoj Allen MD  Primary Care Provider: Tyler Castillo MD    Reason for Consult: ildefonso/hyponatremia/acidosis/hyperkalemia/kidney transplant/cirrhosis with ascites/anemia/diarrhea/gout/htn/hypotension/dm2    SUBJECTIVE:     HPI: 61m with kidney transplant and liver disease who recently moved to the area and is yet to establish care is admitted after paracentesis and liver biopsy on 4/6. Labs show ILDEFONSO, hyponatremia, hyperkalemia. He also complains of diarrhea. He is also reportedly on diuretic, spironolactone, ACEi. Received IVF in ER for hypotension.    4/8 VSS, no new complains. Appreciate GI input. Start oral bicarb for acidosis.  4/9 VSS, no new complains. GI work-up for diarrhea in progress.  4/10 VSS. Thrombocytopenia of unclear etiology. Continues to have diarrhea. sCr not worse. Acidosis persists despite oral bicarb, will add bicarb drip to give more bicarb and add more volume. K remains marginal, bicarb drip should help. sNa is low, suspect subtle volume depldtion, more IVF should help.  4/11 VSS. Low grade fever, non-productive cough, CXR unimpressive, Appreciate Pulm input. Hyponatremia worsening - will send urine lytes, stop bicarb gtt and increase oral bicarb to TID. Diarrhea reportedly better with creon.  4/12  Not in his room.  Output not recorded.   4/13  Has diarrhea.  No vomiting or sob.  Weak.      4/14  AFVSS.  1060 cc UOP.  C/o cough and diarrhea    Past Medical History:   Diagnosis Date    CKD (chronic kidney disease)     Diabetes 2020    Gout     Hypertension 2000    [place]ney transplant [place] 01/2004    [place]    Thyroid disease     Unspecified cirrhosis of liver      Past Surgical History:   Procedure Laterality Date    CATARACT EXTRACTION Right 2017    COLONOSCOPY N/A 3/8/2022     Procedure: COLONOSCOPY;  Surgeon: Mio Simmons III, MD;  Location: [facility] END[facility];  Service: Endoscopy;  Laterality: N/A;    ENDOSCOPIC ULTRASOUND OF UPPER GASTROINTESTINAL TRACT N/A 3/8/2022    Procedure: ULTRASOUND, UPPER GI TRACT, ENDOSCOPIC;  Surgeon: Mio Simmons III, MD;  Location: [facility] ENDO;  Service: Endoscopy;  Laterality: N/A;    KIDNEY TRANSPLANT  2004    PORTACATH PLACEMENT  2003    REMOVAL OF VASCULAR ACCESS PORT  2005     Family History   Problem Relation Age of Onset    Kidney disease Father     Heart disease Father      Social History     Tobacco Use    Smoking status: Never Smoker    Smokeless tobacco: Never Used   Substance Use Topics    Alcohol use: Not Currently    Drug use: Never       Review of patient's allergies indicates:  No Known Allergies    Outpatient meds:  No current facility-administered medications on file prior to encounter.     Current Outpatient Medications on File Prior to Encounter   Medication Sig Dispense Refill    allopurinoL (ZYLOPRIM) 100 MG tablet Take 100 mg by mouth once daily.      levothyroxine (SYNTHROID) 50 MCG tablet Take 50 mcg by mouth once daily.      lisinopriL (PRINIVIL,ZESTRIL) 5 MG tablet Take 5 mg by mouth once daily.      metFORMIN (GLUCOPHAGE) 500 MG tablet Take 500 mg by mouth 2 (two) times daily.      metoprolol tartrate (LOPRESSOR) 50 MG tablet Take 50 mg by mouth 2 (two) times daily.      minoxidiL (LONITEN) 2.5 MG tablet Take 2.5 mg by mouth 2 (two) times daily.      pravastatin (PRAVACHOL) 40 MG tablet Take 40 mg by mouth every evening.      sirolimus (RAPAMUNE) 1 MG Tab Take 2 mg by mouth once daily.      tacrolimus (PROGRAF) 0.5 MG Cap Take 2 mg by mouth every morning. And 1.5mg in the evening      tacrolimus (PROGRAF) 1 MG Cap Take 1.5 mg by mouth every evening.      TOUJEO SOLOSTAR U-300 INSULIN 300 unit/mL (1.5 mL) InPn pen Inject 30 Units into the skin nightly.      VENTOLIN HFA 90 mcg/actuation inhaler INHALE 2  "PUFFS INTO THE LUNGS EVERY 6 (SIX) HOURS AS NEEDED FOR WHEEZING (COUGHING). RESCUE (Patient taking differently: Inhale 1 puff into the lungs every 6 (six) hours as needed.) 18 g 2    alfuzosin (UROXATRAL) 10 mg Tb24 Take 1 tablet (10 mg total) by mouth after dinner. 30 tablet 12    BD NOEMI 2ND GEN PEN NEEDLE 32 gauge x 5/32" Ndle USE AS DIRECTED ONCE DAILY WITH BASAGLAR      sildenafiL (VIAGRA) 100 MG tablet Take 1 tablet (100 mg total) by mouth daily as needed for Erectile Dysfunction. 6 tablet 6    [DISCONTINUED] furosemide (LASIX) 20 MG tablet       [DISCONTINUED] spironolactone (ALDACTONE) 50 MG tablet          Scheduled meds:      Infusions:      PRN meds:      Review of Systems:  Review of Systems   Constitutional: Positive for malaise/fatigue. Negative for chills, fever and weight loss.   HENT: Negative for hearing loss and nosebleeds.    Eyes: Negative for blurred vision, double vision and photophobia.   Respiratory: Negative for cough, shortness of breath and wheezing.    Cardiovascular: Negative for chest pain, palpitations and leg swelling.   Gastrointestinal: Positive for diarrhea. Negative for abdominal pain, constipation, heartburn, nausea and vomiting.   Genitourinary: Negative for dysuria, frequency and urgency.   Musculoskeletal: Negative for falls, joint pain and myalgias.   Skin: Negative for itching and rash.   Neurological: Positive for weakness. Negative for dizziness, speech change, focal weakness, loss of consciousness and headaches.   Endo/Heme/Allergies: Does not bruise/bleed easily.   Psychiatric/Behavioral: Negative for depression and substance abuse. The patient is not nervous/anxious.      OBJECTIVE:     Vital Signs and IO (Last 24H):  Temp:  [97 °F (36.1 °C)-98.4 °F (36.9 °C)]   Pulse:  [58-78]   Resp:  [18]   BP: ()/(61-81)   SpO2:  [95 %-98 %]   I/O last 3 completed shifts:  In: 1120 [P.O.:1120]  Out: 1660 [Urine:1660]    Wt Readings from Last 5 Encounters:   04/08/22 63.6 " kg (140 lb 3.4 oz)   04/06/22 68 kg (150 lb)   03/30/22 67.9 kg (149 lb 12.8 oz)   03/17/22 67 kg (147 lb 11.3 oz)   03/04/22 72.6 kg (160 lb)     Physical Exam:  Constitutional:       General: He is not in acute distress.     Appearance: He is well-developed. He is not diaphoretic.   HENT:      Head: Normocephalic and atraumatic.      Mouth/Throat:      Mouth: Mucous membranes are moist.   Eyes:      General: No scleral icterus.     Pupils: Pupils are equal, round, and reactive to light.   Cardiovascular:      Rate and Rhythm: Normal rate and regular rhythm.   Pulmonary:      Effort: Pulmonary effort is normal. No respiratory distress.      Breath sounds: No stridor.   Abdominal:      General: There is no distension.      Palpations: Abdomen is soft.   Musculoskeletal:         General: No deformity. Normal range of motion.      Cervical back: Neck supple.   Skin:     General: Skin is warm and dry.      Findings: No erythema or rash.   Neurological:      Mental Status: He is alert and oriented to person, place, and time.      Cranial Nerves: No cranial nerve deficit.   Psychiatric:         Behavior: Behavior normal.     Body mass index is 21.32 kg/m².    Laboratory:  Recent Labs   Lab 04/12/22  0525 04/13/22  0759 04/14/22  0552   * 125* 121*   K 4.3 4.2 4.4   CL 98 96 95   CO2 22* 19* 20*   BUN 36* 36* 42*   CREATININE 2.2* 2.4* 3.0*   ESTGFRAFRICA 36.3* 32.7* 24.9*   EGFRNONAA 31.4* 28.3* 21.6*   * 163* 133*       Recent Labs   Lab 04/12/22  0525 04/13/22  0759 04/14/22  0552   CALCIUM 6.9* 7.2* 7.2*   ALBUMIN 2.3* 2.3* 2.4*   MG 2.0 1.9 1.9             No results for input(s): POCTGLUCOSE in the last 168 hours.    Recent Labs   Lab 12/30/21  0655 02/09/22  0814   Hemoglobin A1C 7.7 H 6.6 H       Recent Labs   Lab 04/12/22  0525 04/13/22  0759 04/14/22  0552   WBC 3.79* 3.45* 4.21   HGB 10.2* 10.1* 10.3*   HCT 28.2* 28.0* 29.6*   PLT 38* 38* 41*   MCV 85 84 87   MCHC 36.2* 36.1* 34.8   MONO 16.9*   0.6 17.7*  0.6 16.4*  0.7       Recent Labs   Lab 04/12/22  0525 04/13/22  0759 04/14/22  0552   BILITOT 2.0* 1.8* 1.8*   PROT 5.3* 5.1* 5.4*   ALBUMIN 2.3* 2.3* 2.4*   ALKPHOS 84 89 92   ALT 22 22 24   * 104* 111*       Recent Labs   Lab 02/03/22  0603 04/07/22  1515 04/11/22  1438   Color, UA Yellow Yellow Yellow   Appearance, UA Clear Clear Clear   pH, UA 6.0 6.0 6.0   Specific Hendricks, UA 1.010 1.020 1.010   Protein, UA Negative Trace A Negative   Glucose, UA Trace A 2+ A 1+ A   Ketones, UA Negative Negative Negative   Urobilinogen, UA Negative Negative Negative   Bilirubin (UA) Negative Negative Negative   Occult Blood UA Negative Negative Negative   Nitrite, UA Negative Negative Negative             Microbiology Results (last 7 days)     Procedure Component Value Units Date/Time    Culture, Respiratory with Gram Stain [[id number]]     Order Status: Sent Specimen: Sputum, Expectorated     Blood culture [[id number]] Collected: 04/11/22 0852    Order Status: Completed Specimen: Blood Updated: 04/13/22 1032     Blood Culture, Routine No Growth to date      No Growth to date      No Growth to date    Narrative:      2 sets please  Collection has been rescheduled by [facility] at 04/11/2022 08:50 Reason:   Patient unavailable, getting stress test.  Collection has been rescheduled by [facility] at 04/11/2022 08:50 Reason:   Patient unavailable, getting stress test.    Culture, Body Fluid (Aerobic) w/ GS [[id number]] Collected: 04/12/22 1221    Order Status: Completed Specimen: Ascites Updated: 04/13/22 0856     AEROBIC CULTURE - FLUID No growth     Gram Stain Result Few WBC's      No organisms seen    Narrative:      To rule out SBP order labs: Aerobic culture [POR465],  Culture, Anaerobic [OGE722], Gram stain [LIR621], Albumin  [EHS940], Protein [BUU129], LDH [ZML080], WBC \T\ Dff  [DQI0047].    Giardia Specific Antigen [[id number]] Collected: 04/08/22 1835    Order Status: Completed Specimen: Stool Updated: 04/13/22 0719      Giardia Antigen - EIA Negative     Comment: Performed at:  [facility]  [address] [place], [place]  [id number]  : Zay Lorenzana MD, Phone:  [phone]          Giardia lamblia Ag Source 0    (rule out SBP) Culture, Anaerobic [[id number]] Collected: 04/12/22 1221    Order Status: Sent Specimen: Ascites Updated: 04/12/22 1226    (rule out SBP) Aerobic culture [[id number]] Collected: 04/12/22 1221    Order Status: Canceled Specimen: Ascites     (rule out SBP) Gram stain [[id number]] Collected: 04/12/22 1221    Order Status: Canceled Specimen: Ascites     Gram stain [[id number]]     Order Status: No result Specimen: Ascites     Aerobic culture [[id number]]     Order Status: No result Specimen: Ascites     Culture, Anaerobic [[id number]]     Order Status: No result Specimen: Ascites     Stool culture [[id number]] Collected: 04/08/22 1835    Order Status: Completed Specimen: Stool Updated: 04/11/22 0837     Stool Culture No Salmonella,Shigella,Vibrio,Campylobacter.      No E coli 0157:H7 isolated.    Clostridium difficile EIA [[id number]] Collected: 04/08/22 1835    Order Status: Completed Specimen: Stool Updated: 04/08/22 2054     C. diff Antigen Negative     C difficile Toxins A+B, EIA Negative     Comment: Testing not recommended for children <24 months old.           ASSESSMENT/PLAN:     ILDEFONSO due to ATN due to hypovolemia  Live related donor kidney transplant for 18 years  Hyponatremia  Hyperkalemia  Acidosis  Hypotension  Diarrhea  DM2  Gout  Anemia  Cirrhosis s/p liver biopsy showing chronic active hepatitis and paracentesis on 4/6  HTN  No NSAIDs, ACEI/ARB, IV contrast or other nephrotoxins.    Keep MAP > 60, SBP > 100.    Dose meds for GFR < 30 ml/min.    Renal diet. K remains marginal, no Lokelma due to diarrhea.    Previous UA is clear, renal transplant US is OK. Continue home immunosuppression meds. Use pt own tacrolimus, hospital can not provide 1.5mg dose with current products  available.Control DM2.    Tacrolimus level toxic. Change dose of Prograf     Hold lasix.  Ns 75 cc/hour for a liter.     Continue oral bicarb, increase to TID, stop drip due to worsening hyponatremia.    Recheck ua with micro and urine Na.    Tolerate asymptomatic HTN up to -160. Hold home BP meds.    Diagnose and treat diarrhea. GI Consult appreciated.    Continue gout meds.    Thank you for allowing us to participate in the care of your patient!   We will follow the patient and provide recommendations as needed.    Patient care time was spent personally by me on the following activities:     · Obtaining a history.  · Examination of patient.  · Providing medical care at the patients bedside.  · Developing a treatment plan with patient or surrogate and bedside caregivers.  · Ordering and reviewing laboratory studies, radiographic studies, pulse oximetry.  · Ordering and performing treatments and interventions.  · Evaluation of patient's response to treatment.  · Discussions with consultants while on the unit and immediately available to the patient.  · Re-evaluation of the patient's condition.  · Documentation in the medical record.     Kvng Villagomez MD    [facility]  [address] [place] [address]  JOCE Mustafa [id number]    [phone] - tel  [phone] - fax    4/14/2022

## 2022-04-14 NOTE — ASSESSMENT & PLAN NOTE
T-max 100.6, denies any new localizing source  Pancultures including UA with reflex urine culture, blood culture, chest x-ray being repeated, diagnostic paracentesis  Given only potential source at this time and patient is immunocompromised will treat empirically for SBP with Rocephin  Continue to monitor temperature curve and follow up culture results  Infectious disease consulted  Patient completed short course of IV Rocephin.    No recurrent fevers

## 2022-04-15 PROBLEM — R53.1 WEAKNESS: Status: ACTIVE | Noted: 2022-01-01

## 2022-04-15 NOTE — PROGRESS NOTES
Nephrology Consult Note        Patient Name: Edgar Jackson  MRN: [id number]    Patient Class: IP- Inpatient   Admission Date: 4/7/2022  Length of Stay: 7 days  Date of Service: 4/15/2022    Attending Physician: Manoj Allen MD  Primary Care Provider: Tyler Castillo MD    Reason for Consult: ildefonso/hyponatremia/acidosis/hyperkalemia/kidney transplant/cirrhosis with ascites/anemia/diarrhea/gout/htn/hypotension/dm2    SUBJECTIVE:     HPI: 61m with kidney transplant and liver disease who recently moved to the area and is yet to establish care is admitted after paracentesis and liver biopsy on 4/6. Labs show ILDEFONSO, hyponatremia, hyperkalemia. He also complains of diarrhea. He is also reportedly on diuretic, spironolactone, ACEi. Received IVF in ER for hypotension.    4/8 VSS, no new complains. Appreciate GI input. Start oral bicarb for acidosis.  4/9 VSS, no new complains. GI work-up for diarrhea in progress.  4/10 VSS. Thrombocytopenia of unclear etiology. Continues to have diarrhea. sCr not worse. Acidosis persists despite oral bicarb, will add bicarb drip to give more bicarb and add more volume. K remains marginal, bicarb drip should help. sNa is low, suspect subtle volume depldtion, more IVF should help.  4/11 VSS. Low grade fever, non-productive cough, CXR unimpressive, Appreciate Pulm input. Hyponatremia worsening - will send urine lytes, stop bicarb gtt and increase oral bicarb to TID. Diarrhea reportedly better with creon.  4/12  Not in his room.  Output not recorded.   4/13  Has diarrhea.  No vomiting or sob.  Weak.      4/14  AFVSS.  1060 cc UOP.  C/o cough and diarrhea  4/15  Diarrhea better.  Coughing at night.  No vomiting.  1900cc uop    Past Medical History:   Diagnosis Date    CKD (chronic kidney disease)     Diabetes 2020    Gout     Hypertension 2000    [place] transplant [place] 01/2004    [place]    Thyroid disease     Unspecified cirrhosis of liver      Past Surgical History:   Procedure Laterality  Date    CATARACT EXTRACTION Right 2017    COLONOSCOPY N/A 3/8/2022    Procedure: COLONOSCOPY;  Surgeon: Mio Simmons III, MD;  Location: [facility] ENDO;  Service: Endoscopy;  Laterality: N/A;    ENDOSCOPIC ULTRASOUND OF UPPER GASTROINTESTINAL TRACT N/A 3/8/2022    Procedure: ULTRASOUND, UPPER GI TRACT, ENDOSCOPIC;  Surgeon: Mio Simmons III, MD;  Location: [facility] ENDO;  Service: Endoscopy;  Laterality: N/A;    KIDNEY TRANSPLANT  2004    PORTACATH PLACEMENT  2003    REMOVAL OF VASCULAR ACCESS PORT  2005     Family History   Problem Relation Age of Onset    Kidney disease Father     Heart disease Father      Social History     Tobacco Use    Smoking status: Never Smoker    Smokeless tobacco: Never Used   Substance Use Topics    Alcohol use: Not Currently    Drug use: Never       Review of patient's allergies indicates:  No Known Allergies    Outpatient meds:  No current facility-administered medications on file prior to encounter.     Current Outpatient Medications on File Prior to Encounter   Medication Sig Dispense Refill    allopurinoL (ZYLOPRIM) 100 MG tablet Take 100 mg by mouth once daily.      levothyroxine (SYNTHROID) 50 MCG tablet Take 50 mcg by mouth once daily.      lisinopriL (PRINIVIL,ZESTRIL) 5 MG tablet Take 5 mg by mouth once daily.      metFORMIN (GLUCOPHAGE) 500 MG tablet Take 500 mg by mouth 2 (two) times daily.      metoprolol tartrate (LOPRESSOR) 50 MG tablet Take 50 mg by mouth 2 (two) times daily.      minoxidiL (LONITEN) 2.5 MG tablet Take 2.5 mg by mouth 2 (two) times daily.      pravastatin (PRAVACHOL) 40 MG tablet Take 40 mg by mouth every evening.      sirolimus (RAPAMUNE) 1 MG Tab Take 2 mg by mouth once daily.      tacrolimus (PROGRAF) 0.5 MG Cap Take 2 mg by mouth every morning. And 1.5mg in the evening      tacrolimus (PROGRAF) 1 MG Cap Take 1.5 mg by mouth every evening.      TOUJEO SOLOSTAR U-300 INSULIN 300 unit/mL (1.5 mL) InPn pen Inject 30 Units into  "the skin nightly.      VENTOLIN HFA 90 mcg/actuation inhaler INHALE 2 PUFFS INTO THE LUNGS EVERY 6 (SIX) HOURS AS NEEDED FOR WHEEZING (COUGHING). RESCUE (Patient taking differently: Inhale 1 puff into the lungs every 6 (six) hours as needed.) 18 g 2    alfuzosin (UROXATRAL) 10 mg Tb24 Take 1 tablet (10 mg total) by mouth after dinner. 30 tablet 12    BD NOEMI 2ND GEN PEN NEEDLE 32 gauge x 5/32" Ndle USE AS DIRECTED ONCE DAILY WITH BASAGLAR      sildenafiL (VIAGRA) 100 MG tablet Take 1 tablet (100 mg total) by mouth daily as needed for Erectile Dysfunction. 6 tablet 6    [DISCONTINUED] furosemide (LASIX) 20 MG tablet       [DISCONTINUED] spironolactone (ALDACTONE) 50 MG tablet          Scheduled meds:      Infusions:      PRN meds:      Review of Systems:  Review of Systems   Constitutional: Positive for malaise/fatigue. Negative for chills, fever and weight loss.   HENT: Negative for hearing loss and nosebleeds.    Eyes: Negative for blurred vision, double vision and photophobia.   Respiratory: Negative for cough, shortness of breath and wheezing.    Cardiovascular: Negative for chest pain, palpitations and leg swelling.   Gastrointestinal: Positive for diarrhea. Negative for abdominal pain, constipation, heartburn, nausea and vomiting.   Genitourinary: Negative for dysuria, frequency and urgency.   Musculoskeletal: Negative for falls, joint pain and myalgias.   Skin: Negative for itching and rash.   Neurological: Positive for weakness. Negative for dizziness, speech change, focal weakness, loss of consciousness and headaches.   Endo/Heme/Allergies: Does not bruise/bleed easily.   Psychiatric/Behavioral: Negative for depression and substance abuse. The patient is not nervous/anxious.      OBJECTIVE:     Vital Signs and IO (Last 24H):  Temp:  [97 °F (36.1 °C)-98.3 °F (36.8 °C)]   Pulse:  [61-98]   Resp:  [16-18]   BP: (126-136)/(76-89)   SpO2:  [94 %-98 %]   I/O last 3 completed shifts:  In: 1765 " [P.O.:1765]  Out: 2300 [Urine:2300]    Wt Readings from Last 5 Encounters:   04/08/22 63.6 kg (140 lb 3.4 oz)   04/06/22 68 kg (150 lb)   03/30/22 67.9 kg (149 lb 12.8 oz)   03/17/22 67 kg (147 lb 11.3 oz)   03/04/22 72.6 kg (160 lb)     Physical Exam:  Constitutional:       General: He is not in acute distress.     Appearance: He is well-developed. He is not diaphoretic.   HENT:      Head: Normocephalic and atraumatic.      Mouth/Throat:      Mouth: Mucous membranes are moist.   Eyes:      General: No scleral icterus.     Pupils: Pupils are equal, round, and reactive to light.   Cardiovascular:      Rate and Rhythm: Normal rate and regular rhythm.   Pulmonary:      Effort: Pulmonary effort is normal. No respiratory distress.      Breath sounds: No stridor.   Abdominal:      General: There is no distension.      Palpations: Abdomen is soft.   Musculoskeletal:         General: No deformity. Normal range of motion.      Cervical back: Neck supple.   Skin:     General: Skin is warm and dry.      Findings: No erythema or rash.   Neurological:      Mental Status: He is alert and oriented to person, place, and time.      Cranial Nerves: No cranial nerve deficit.   Psychiatric:         Behavior: Behavior normal.     Body mass index is 21.32 kg/m².    Laboratory:  Recent Labs   Lab 04/13/22 0759 04/14/22  0552 04/15/22  0454   * 121* 126*   K 4.2 4.4 4.6   CL 96 95 98   CO2 19* 20* 21*   BUN 36* 42* 40*   CREATININE 2.4* 3.0* 3.0*   ESTGFRAFRICA 32.7* 24.9* 24.9*   EGFRNONAA 28.3* 21.6* 21.6*   * 133* 85       Recent Labs   Lab 04/13/22 0759 04/14/22  0552 04/15/22  0454   CALCIUM 7.2* 7.2* 7.6*   ALBUMIN 2.3* 2.4* 2.4*   MG 1.9 1.9 2.0             No results for input(s): POCTGLUCOSE in the last 168 hours.    Recent Labs   Lab 12/30/21  0655 02/09/22  0814   Hemoglobin A1C 7.7 H 6.6 H       Recent Labs   Lab 04/13/22  0759 04/14/22  0552 04/15/22  0454   WBC 3.45* 4.21 4.51   HGB 10.1* 10.3* 10.7*   HCT  28.0* 29.6* 29.9*   PLT 38* 41* 52*   MCV 84 87 85   MCHC 36.1* 34.8 35.8   MONO 17.7*  0.6 16.4*  0.7 15.1*  0.7       Recent Labs   Lab 04/13/22  0759 04/14/22  0552 04/15/22  0454   BILITOT 1.8* 1.8* 1.5*   PROT 5.1* 5.4* 5.5*   ALBUMIN 2.3* 2.4* 2.4*   ALKPHOS 89 92 100   ALT 22 24 26   * 111* 129*       Recent Labs   Lab 02/03/22  0603 04/07/22  1515 04/11/22  1438   Color, UA Yellow Yellow Yellow   Appearance, UA Clear Clear Clear   pH, UA 6.0 6.0 6.0   Specific [place], UA 1.010 1.020 1.010   Protein, UA Negative Trace A Negative   Glucose, UA Trace A 2+ A 1+ A   Ketones, UA Negative Negative Negative   Urobilinogen, UA Negative Negative Negative   Bilirubin (UA) Negative Negative Negative   Occult Blood UA Negative Negative Negative   Nitrite, UA Negative Negative Negative             Microbiology Results (last 7 days)     Procedure Component Value Units Date/Time    Blood culture [[id number]] Collected: 04/11/22 0852    Order Status: Completed Specimen: Blood Updated: 04/15/22 1032     Blood Culture, Routine No Growth to date      No Growth to date      No Growth to date      No Growth to date      No Growth to date    Narrative:      2 sets please  Collection has been rescheduled by [facility] at 04/11/2022 08:50 Reason:   Patient unavailable, getting stress test.  Collection has been rescheduled by [facility] at 04/11/2022 08:50 Reason:   Patient unavailable, getting stress test.    Culture, Body Fluid (Aerobic) w/ GS [[id number]] Collected: 04/12/22 1221    Order Status: Completed Specimen: Ascites Updated: 04/15/22 0923     AEROBIC CULTURE - FLUID No growth     Gram Stain Result Few WBC's      No organisms seen    Narrative:      To rule out SBP order labs: Aerobic culture [WCU331],  Culture, Anaerobic [DOR432], Gram stain [MOB118], Albumin  [PWJ764], Protein [WOU706], LDH [HGA314], WBC \T\ Dff  [SJF2431].    (rule out SBP) Culture, Anaerobic [[id number]] Collected: 04/12/22 1221    Order Status: Completed  Specimen: Ascites Updated: 04/14/22 1452     Anaerobic Culture No anaerobes isolated    Narrative:      To rule out SBP order labs: Aerobic culture [WZH368],  Culture, Anaerobic [CXR435], Gram stain [HRU977], Albumin  [WKN785], Protein [YSR456], LDH [UWI470], WBC \T\ Dff  [JUG4313].    Culture, Respiratory with Gram Stain [[id number]]     Order Status: Sent Specimen: Sputum, Expectorated     Giardia Specific Antigen [[id number]] Collected: 04/08/22 1835    Order Status: Completed Specimen: Stool Updated: 04/13/22 0719     Giardia Antigen - EIA Negative     Comment: Performed at:  [address]  [id number]  : Zay Lorenzana MD, Phone:  [phone]          Giardia lamblia Ag Source 0    (rule out SBP) Aerobic culture [[id number]] Collected: 04/12/22 1221    Order Status: Canceled Specimen: Ascites     (rule out SBP) Gram stain [[id number]] Collected: 04/12/22 1221    Order Status: Canceled Specimen: Ascites     Gram stain [[id number]]     Order Status: No result Specimen: Ascites     Aerobic culture [[id number]]     Order Status: No result Specimen: Ascites     Culture, Anaerobic [[id number]]     Order Status: No result Specimen: Ascites     Stool culture [[id number]] Collected: 04/08/22 1835    Order Status: Completed Specimen: Stool Updated: 04/11/22 0837     Stool Culture No Salmonella,Shigella,Vibrio,Campylobacter.      No E coli 0157:H7 isolated.    Clostridium difficile EIA [[id number]] Collected: 04/08/22 1835    Order Status: Completed Specimen: Stool Updated: 04/08/22 2054     C. diff Antigen Negative     C difficile Toxins A+B, EIA Negative     Comment: Testing not recommended for children <24 months old.           ASSESSMENT/PLAN:     ILDEFONSO due to ATN due to hypovolemia and possibly Tacrolimus toxicity  Live related donor kidney transplant for 18 years  Hyponatremia  Hyperkalemia  Acidosis  Hypotension  Diarrhea  DM2  Gout  Anemia  Cirrhosis s/p liver biopsy  showing chronic active hepatitis and paracentesis on 4/6  HTN  No NSAIDs, ACEI/ARB, IV contrast or other nephrotoxins.    Keep MAP > 60, SBP > 100.    Dose meds for GFR < 30 ml/min.    Renal diet. K remains marginal, no Lokelma due to diarrhea.    Previous UA is clear, renal transplant US is OK. Continue home immunosuppression meds. Use pt own tacrolimus, hospital can not provide 1.5mg dose with current products available.Control DM2.    Tacrolimus level toxic. Holding Prograf yesterday and today.  Resuming tomorrow at lower dose     Hold lasix.       Continue oral bicarb, increase to TID, stop drip due to worsening hyponatremia.      Tolerate asymptomatic HTN up to -160. Hold home BP meds.    Diagnose and treat diarrhea. GI Consult appreciated.    Continue gout meds.    Thank you for allowing us to participate in the care of your patient!   We will follow the patient and provide recommendations as needed.    Patient care time was spent personally by me on the following activities:     · Obtaining a history.  · Examination of patient.  · Providing medical care at the patients bedside.  · Developing a treatment plan with patient or surrogate and bedside caregivers.  · Ordering and reviewing laboratory studies, radiographic studies, pulse oximetry.  · Ordering and performing treatments and interventions.  · Evaluation of patient's response to treatment.  · Discussions with consultants while on the unit and immediately available to the patient.  · Re-evaluation of the patient's condition.  · Documentation in the medical record.     Kvng Villagomez MD    [facility]  [address] [place] [address] [place] [address]    [phone] - tel  [phone] - fax    4/15/2022

## 2022-04-15 NOTE — ASSESSMENT & PLAN NOTE
Patient uses a cane at home.  Family is concerned about stability.  PT ordered  OT ordered  Ambulate

## 2022-04-15 NOTE — ASSESSMENT & PLAN NOTE
Improving.    C diff negative Giardia negative.  No pathogenic bacteria identified on stool culture.  Continue Creon  Continue loperamide p.r.n.

## 2022-04-15 NOTE — SUBJECTIVE & OBJECTIVE
Interval History:  Diarrhea and cough are improving today.  His congestion has significantly improved.  Patient's family's concerns about stability is uses cane at home.  PT and OT consult ordered.  Renal function stable today    Review of Systems   Constitutional:  Negative for fever.   HENT:  Negative for congestion and sore throat.    Respiratory:  Positive for cough.    Cardiovascular:  Negative for chest pain.   Gastrointestinal:  Negative for vomiting.   Genitourinary:  Negative for difficulty urinating.   Skin:  Negative for rash and wound.   Neurological:  Negative for headaches.   Psychiatric/Behavioral:  Negative for confusion.    Objective:     Vital Signs (Most Recent):  Temp: 98 °F (36.7 °C) (04/15/22 1100)  Pulse: 67 (04/15/22 1426)  Resp: 18 (04/15/22 1426)  BP: 130/79 (04/15/22 1100)  SpO2: (!) 94 % (04/15/22 1426)   Vital Signs (24h Range):  Temp:  [97 °F (36.1 °C)-98.3 °F (36.8 °C)] 98 °F (36.7 °C)  Pulse:  [61-98] 67  Resp:  [16-18] 18  SpO2:  [94 %-98 %] 94 %  BP: (126-137)/(76-88) 130/79     Weight: 63.6 kg (140 lb 3.4 oz)  Body mass index is 21.32 kg/m².    Intake/Output Summary (Last 24 hours) at 4/15/2022 1446  Last data filed at 4/15/2022 1242  Gross per 24 hour   Intake 720 ml   Output 1450 ml   Net -730 ml        Physical Exam  Vitals and nursing note reviewed.   Constitutional:       General: He is not in acute distress.     Appearance: He is not diaphoretic.      Comments: Sitting in bed, NAD, cooperative   HENT:      Head: Normocephalic and atraumatic.      Mouth/Throat:      Mouth: Mucous membranes are moist.      Comments: No oral thrush  Eyes:      General:         Right eye: No discharge.         Left eye: No discharge.   Cardiovascular:      Rate and Rhythm: Normal rate and regular rhythm.   Pulmonary:      Comments: On RA, inspiratory crackles at bases.  No coughing on exam today.  Abdominal:      General: Bowel sounds are normal.      Palpations: Abdomen is soft.      Tenderness:  There is no abdominal tenderness.      Comments: Abdomen distended, but not tense.  Bandage in place right lower quadrant.   Skin:     General: Skin is warm and dry.   Neurological:      Mental Status: He is alert and oriented to person, place, and time.      Comments: Speech intact, moving all four extremities, generalized weakness   Psychiatric:         Mood and Affect: Mood normal.       Significant Labs: Blood Culture: No results for input(s): LABBLOO in the last 48 hours.    BMP:   Recent Labs   Lab 04/15/22  0454   GLU 85   *   K 4.6   CL 98   CO2 21*   BUN 40*   CREATININE 3.0*   CALCIUM 7.6*   MG 2.0       CBC:   Recent Labs   Lab 04/14/22  0552 04/15/22  0454   WBC 4.21 4.51   HGB 10.3* 10.7*   HCT 29.6* 29.9*   PLT 41* 52*       CMP:   Recent Labs   Lab 04/14/22  0552 04/15/22  0454   * 126*   K 4.4 4.6   CL 95 98   CO2 20* 21*   * 85   BUN 42* 40*   CREATININE 3.0* 3.0*   CALCIUM 7.2* 7.6*   PROT 5.4* 5.5*   ALBUMIN 2.4* 2.4*   BILITOT 1.8* 1.5*   ALKPHOS 92 100   * 129*   ALT 24 26   ANIONGAP 6* 7*   EGFRNONAA 21.6* 21.6*       Cardiac Markers: No results for input(s): CKMB, MYOGLOBIN, BNP, TROPISTAT in the last 48 hours.    Lactic Acid: No results for input(s): LACTATE in the last 48 hours.  Magnesium:   Recent Labs   Lab 04/14/22  0552 04/15/22  0454   MG 1.9 2.0       Respiratory Culture: No results for input(s): GSRESP, RESPIRATORYC in the last 48 hours.  Troponin: No results for input(s): TROPONINI in the last 48 hours.  TSH:   Recent Labs   Lab 02/09/22  0814   TSH 1.536       Urine Culture: No results for input(s): LABURIN in the last 48 hours.  Urine Studies: No results for input(s): COLORU, APPEARANCEUA, PHUR, SPECGRAV, PROTEINUA, GLUCUA, KETONESU, BILIRUBINUA, OCCULTUA, NITRITE, UROBILINOGEN, LEUKOCYTESUR, RBCUA, WBCUA, BACTERIA, SQUAMEPITHEL, HYALINECASTS in the last 48 hours.    Invalid input(s): SUKHI      Significant Imaging: I have reviewed and interpreted all  pertinent imaging results/findings within the past 24 hours.

## 2022-04-15 NOTE — CARE UPDATE
04/14/22 1933   Patient Assessment/Suction   Level of Consciousness (AVPU) alert   Respiratory Effort Normal;Unlabored   Expansion/Accessory Muscles/Retractions no use of accessory muscles;no retractions;expansion symmetric   All Lung Fields Breath Sounds Anterior:;wheezes, expiratory;clear   Rhythm/Pattern, Respiratory unlabored;pattern regular;depth regular   Cough Frequency frequent   Cough Type dry;good;nonproductive   PRE-TX-O2   O2 Device (Oxygen Therapy) room air   SpO2 95 %   Pulse Oximetry Type Intermittent   $ Pulse Oximetry - Multiple Charge Pulse Oximetry - Multiple   Pulse 67   Resp 16   Aerosol Therapy   $ Aerosol Therapy Charges Aerosol Treatment   Daily Review of Necessity (SVN) completed   Respiratory Treatment Status (SVN) given   Treatment Route (SVN) oxygen;mask   Patient Position (SVN) HOB elevated   Post Treatment Assessment (SVN) breath sounds unchanged   Signs of Intolerance (SVN) none   Breath Sounds Post-Respiratory Treatment   Throughout All Fields Post-Treatment All Fields   Throughout All Fields Post-Treatment no change   Post-treatment Heart Rate (beats/min) 68   Post-treatment Resp Rate (breaths/min) 17   Education   $ Education Bronchodilator;15 min   Respiratory Evaluation   $ Care Plan Tech Time 15 min   $ Eval/Re-eval Charges Re-evaluation

## 2022-04-15 NOTE — PROGRESS NOTES
Consult Note  Infectious Disease    Reason for Consult:  Fever in immunocompromised    HPI: Edgar Jackson is a 61 y.o. male With a history of interstitial lung disease and prior kidney transplantation 2004 on immunosuppressive does and recent diagnosis of active hepatitis (biopsy 4/6) with cirrhosis, decompensation with ascites (recent therapeutic paracentesis 4/6) suspected to be due to LOPEZ, was admitted with acute kidney injury secondary to hypovolemia from diuretics, paracentesis?  and several weeks of non- bloody diarrhea.  At the time of his evaluation in the ER his creatinine was 2.7, CO2 16, potassium 5.4, , he had a 99.1 degree temperature, normal O2 saturation, CBC with white blood cells 3.5 platelets 52.  He required initiation of a bicarbonate drip and treatment for hyperkalemia.  He was seen by GI who ordered stool studies (C difficile toxin negative, Giardia negative, stool culture no enteric joe) and prescribed pancreatic enzyme supplements which were beneficial.  He has developed a low-grade temperature, has a chronic dry cough associated with his interstitial lung disease and a cultures of blood and urine as well as chest x-ray and a diagnostic paracentesis were ordered.  He was placed empirically on ceftriaxone and this consult was requested.  Creatinine has improved to 2.4, sodium is 125.  Chest is shows worsening infiltrates right greater than left which may reflect the hydration has received.  Review of care everywhere indicates that his creatinine was 1.0 in September prior to moving here.     4/12: no further temperature elevations. S/p paracentesis for diagnostic purposes and not consistent with SBP. UA was negative, CXR did have more infiltrates on right than admit. . He is comfortable on room air. Continues with cough. Has not been out of bed much. .  4/13: interim reviewed. Tm 99.7, sats normal on RA, peritoneal fluid no growth, platelets lower at 38k. Creatinine has been stable,  LFTs stable. Very depressed. Staying in bed all day. Only getting up to go to the restroom. Cough is a bit worse, sounds wetter. Cannot describe it  4/14: interim reviewed. Afebrile. Peritoneal fluid culture neg. CXR a bit better but same fibrotic background. Sputum culture not yet collected. Cr up again. Immunosuppressives adjusted by dr. Villagomez. Sputum is clear in quality. He has not been out of bed today except to go to the bathroom. Wife at bedside, discussed ID issues with her.   4/15: afebrile today is day 5 of rocephin. Imodium was added for diarrhea. Cr is stable. He feels better, relates some rhinorrhea and post nasal drip which could be contributing to cough. He is interested in discharge. He has been sitting up more of the time.     EXAM & DIAGNOSTICS REVIEWED:   Vitals:     Temp:  [97 °F (36.1 °C)-98.3 °F (36.8 °C)]   Temp: 98 °F (36.7 °C) (04/15/22 1100)  Pulse: 69 (04/15/22 1100)  Resp: 18 (04/15/22 1100)  BP: 130/79 (04/15/22 1100)  SpO2: 95 % (04/15/22 1100)    Intake/Output Summary (Last 24 hours) at 4/15/2022 1248  Last data filed at 4/15/2022 1242  Gross per 24 hour   Intake 720 ml   Output 1450 ml   Net -730 ml       General:  In NAD. Alert and attentive, cooperative, comfortable, more engaging  Eyes:  Anicteric,  EOMI  ENT:  No ulcers, exudates, thrush, nares patent,    Neck:  supple,    Lungs:  almost clear  Heart:  RRR, no gallop/murmur/rub noted  Abd:  Soft, NT, ND, normal BS, no masses or organomegaly appreciated. Protuberant with  Ascites stable  :  Voids , no flank tenderness  Musc:  Joints without effusion, swelling, erythema, synovitis, muscle wasting.   Skin:  No rashes.   Neuro:             Alert, attentive, speech fluent, face symmetric, moves all extremities, no focal weakness. Ambulatory  Psych:  Calm, cooperative, in better spirits  Lymphatic:        Extrem: no edema, erythema, phlebitis, cellulitis, warm and well perfused  VAD:       Isolation:     Wound:       Lines/Tubes/Drains:    General Labs reviewed:  Recent Labs   Lab 04/13/22  0759 04/14/22  0552 04/15/22  0454   WBC 3.45* 4.21 4.51   HGB 10.1* 10.3* 10.7*   HCT 28.0* 29.6* 29.9*   PLT 38* 41* 52*       Recent Labs   Lab 04/13/22  0759 04/14/22  0552 04/15/22  0454   * 121* 126*   K 4.2 4.4 4.6   CL 96 95 98   CO2 19* 20* 21*   BUN 36* 42* 40*   CREATININE 2.4* 3.0* 3.0*   CALCIUM 7.2* 7.2* 7.6*   PROT 5.1* 5.4* 5.5*   BILITOT 1.8* 1.8* 1.5*   ALKPHOS 89 92 100   ALT 22 24 26   * 111* 129*      Latest Reference Range & Units 02/24/22 10:23 04/12/22 12:22 04/12/22 12:23   Fluid Color  Yellow Yellow    Fluid Appearance  Clear Hazy    WBC, Body Fluid /cu mm 171 [1] 113 [2]    Body Fluid Type  Ascites Ascites    Segs, Fluid % 10 29    Lymphs, Fluid % 23 28    Monocytes/Macrophages, Fluid % 47 43    Mesothelial Cells, Fluid % 20     LD, Fluid Not established U/L   55 [3]   Body Fluid, Albumin Not Established g/dL <1.0 [4]  <1.0 [5]   Body Fluid Source, Albumin  Ascites  Abdominal   Body Fluid Source, LDH    Ascites   Body Fluid Source, Total Protein  Ascites  Ascites   Body Fluid, Protein Not established g/dL <3.0 [6]  <3.0 [7]        Micro:  Microbiology Results (last 7 days)     Procedure Component Value Units Date/Time    Blood culture [[id number]] Collected: 04/11/22 0852    Order Status: Completed Specimen: Blood Updated: 04/15/22 1032     Blood Culture, Routine No Growth to date      No Growth to date      No Growth to date      No Growth to date      No Growth to date    Narrative:      2 sets please  Collection has been rescheduled by [facility] at 04/11/2022 08:50 Reason:   Patient unavailable, getting stress test.  Collection has been rescheduled by [facility] at 04/11/2022 08:50 Reason:   Patient unavailable, getting stress test.    Culture, Body Fluid (Aerobic) w/ GS [[id number]] Collected: 04/12/22 1221    Order Status: Completed Specimen: Ascites Updated: 04/15/22 0923     AEROBIC CULTURE -  FLUID No growth     Gram Stain Result Few WBC's      No organisms seen    Narrative:      To rule out SBP order labs: Aerobic culture [GNR090],  Culture, Anaerobic [XLK544], Gram stain [RUF428], Albumin  [TKC536], Protein [WZA329], LDH [XCJ225], WBC \T\ Dff  [EDI4265].    (rule out SBP) Culture, Anaerobic [[id number]] Collected: 04/12/22 1221    Order Status: Completed Specimen: Ascites Updated: 04/14/22 1452     Anaerobic Culture No anaerobes isolated    Narrative:      To rule out SBP order labs: Aerobic culture [WGH580],  Culture, Anaerobic [RCN156], Gram stain [EMC687], Albumin  [FKT700], Protein [POC360], LDH [PPN007], WBC \T\ Dff  [QCB2677].    Culture, Respiratory with Gram Stain [[id number]]     Order Status: Sent Specimen: Sputum, Expectorated     Giardia Specific Antigen [[id number]] Collected: 04/08/22 1835    Order Status: Completed Specimen: Stool Updated: 04/13/22 0719     Giardia Antigen - EIA Negative     Comment: Performed at:  [facility] [id number]  : Zay Lorenzana MD, Phone:  [phone]          Giardia lamblia Ag Source 0    (rule out SBP) Aerobic culture [[id number]] Collected: 04/12/22 1221    Order Status: Canceled Specimen: Ascites     (rule out SBP) Gram stain [[id number]] Collected: 04/12/22 1221    Order Status: Canceled Specimen: Ascites     Gram stain [[id number]]     Order Status: No result Specimen: Ascites     Aerobic culture [[id number]]     Order Status: No result Specimen: Ascites     Culture, Anaerobic [[id number]]     Order Status: No result Specimen: Ascites     Stool culture [[id number]] Collected: 04/08/22 1835    Order Status: Completed Specimen: Stool Updated: 04/11/22 0837     Stool Culture No Salmonella,Shigella,Vibrio,Campylobacter.      No E coli 0157:H7 isolated.    Clostridium difficile EIA [[id number]] Collected: 04/08/22 1835    Order Status: Completed Specimen: Stool Updated: 04/08/22 2054     C. diff Antigen  Negative     C difficile Toxins A+B, EIA Negative     Comment: Testing not recommended for children <24 months old.             Imaging Reviewed:   CXRs   CT chest 02/02/2022    Cardiology: normal EF by echo 5/2020, care everywhere    IMPRESSION & PLAN   1.  Low-grade fever, worsening infiltrates, fluid versus infection, favor fluid, with improvement and no specific evidence of infection, s/p 5 days rocephin    2.  Acute kidney injury, improved with hydration and bicarbonate supplementation, but not normalizing   S/p renal transplant 2004 on immunosuppression    3.  Cirrhosis with ascites, varices, felt to be due to LOPEZ.     Elevated alpha fetoprotein 10 times normal, no masses on imaging 3/24   Elevated ammonia 3/8  69    4.  Interstitial lung disease baseline autoimmune workup in progress, largely negative, bronchoscopy 6/2020 for same?  Chest x-ray improved 4/13    5.  Chronic diarrhea, Multiple stool test negative and several in progress      Recommendations:   continue rocephin, not more than 5 days, stopping today   per GI can up titrate the creon prn    Scheduled aerosols while here  Adding flonase   out of bed     D/w wife and patient  Will follow peripherally    Medical Decision Making during this encounter was  [_] Low Complexity  [_] Moderate Complexity  [ xxx] High Complexity

## 2022-04-15 NOTE — PLAN OF CARE
04/15/22 0809   Patient Assessment/Suction   Level of Consciousness (AVPU) alert   Respiratory Effort Unlabored   All Lung Fields Breath Sounds clear   Cough Type nonproductive   PRE-TX-O2   O2 Device (Oxygen Therapy) room air   SpO2 95 %   Pulse Oximetry Type Intermittent   $ Pulse Oximetry - Multiple Charge Pulse Oximetry - Multiple   Pulse 68   Resp 18   Positioning   Body Position position changed independently   Head of Bed (HOB) Positioning HOB at 15 degrees   Positioning/Transfer Devices pillows   Aerosol Therapy   $ Aerosol Therapy Charges Aerosol Treatment   Respiratory Treatment Status (SVN) given   Treatment Route (SVN) mask   Patient Position (SVN) semi-Abad's   Post Treatment Assessment (SVN) breath sounds unchanged   Signs of Intolerance (SVN) none   Breath Sounds Post-Respiratory Treatment   Post-treatment Heart Rate (beats/min) 70   Post-treatment Resp Rate (breaths/min) 18   Respiratory Evaluation   $ Care Plan Tech Time 15 min

## 2022-04-15 NOTE — PLAN OF CARE
04/15/22 1355   Discharge Assessment   Assessment Type Discharge Planning Reassessment   Source of Information health record   Discharge Plan A Home with family   Discharge Plan B Other  (Pending clinical progression)   Patient admitted on 4/8 inpatient. History  Renal transplant; Nephrology consulted.   Currently on antibiotic. Continues to have loose stools. Stool studies negative. T: 98; on room air.   S/p Paracentesis on 4/13; DCP is to home pending clinical progression. CM request that bedside evaluate if patient needs PT/OT eval.   CM available resource if needed.

## 2022-04-15 NOTE — ASSESSMENT & PLAN NOTE
Ultrasound transplant kidney no acute  Nephrology has been consulted    Continue to hold lisinopril, Aldactone, metformin, lasix  Continue p.o. bicarbonate

## 2022-04-15 NOTE — PROGRESS NOTES
[facility] Medicine  Progress Note    Patient Name: Edgar Jackson  MRN: [id number]  Patient Class: IP- Inpatient   Admission Date: 4/7/2022  Length of Stay: 7 days  Attending Physician: Manoj Allen MD  Primary Care Provider: Tyler Castillo MD        Subjective:     Principal Problem:Fever        HPI:  Mr. Jackson is a 61-year-old male with a past medical history of renal failure status post renal transplant 2004, NIDDM2, ILD, and cirrhosis who presents today with complaints of elevated creatinine on outpatient labs.  It is severe.  It is associated with recent liver biopsy and paracentesis and recent addition of Lasix and spironolactone.  He denies fever, chills, nausea, vomiting, diarrhea, chest pain, shortness of breath, loss of consciousness.  He has a chronic cough that is productive of clear/white/yellow sputum at baseline which he states has worsened over the last 2 months. He had a liver biopsy and paracentesis yesterday at [facility] with Dr. Osullivan and his creatinine was 3.1. He was called and referred to the ED for further work up.       Overview/Hospital Course:  Patient with a history of remote renal transplant, secondary to hypertensive renal failure, on chronic immunosuppressant (sirolimus and tacrolimus), diagnosis of cirrhosis who underwent recent transjugular biopsy (pathology with chronic mild to moderate active hepatitis with bridging fibrosis and nodules) as well as paracentesis (2750 cc removed) on 04/06.  He also was seen by Pulmonary for concern of interstitial lung disease and reports ongoing dyspnea on exertion with cough.  He was referred to the ED due to abnormal outpatient labs with acute kidney injury.  He was borderline hypotensive in the ED, creatinine 3.1.  Also noted to have hyponatremia, hyperkalemia with metabolic acidosis.  Ongoing diarrhea quantified as 4 bowel movements per day.  On 04/08, renal function with creatinine 2.7, minimal urine output, on bicarbonate  drip, stool studies in process, Nephrology and Gastroenterology following.  On 04/09 still having diarrhea, about 8 bowel movements overnight, states no relation to time of day, usually after every meal, blood pressure is improved, BUN/creatinine 37/2.7.  On 04/10 states overall he feels better, Creon was started yesterday and diarrhea is improving, states he believes he is producing more urine, BUN/creatinine 40/2.6, Nephrology resuming bicarbonate drip, pulmonary consulted. On 4/11 developed fever and work up started, which was largely unrevealing.  Paracentesis was performed; fluid studies were not consistent with SBP.  Patient completed 3 days of IV ceftriaxone.  Patient persistent cough throughout the admission.      Interval History:  Diarrhea and cough are improving today.  His congestion has significantly improved.  Patient's family's concerns about stability is uses cane at home.  PT and OT consult ordered.  Renal function stable today    Review of Systems   Constitutional:  Negative for fever.   HENT:  Negative for congestion and sore throat.    Respiratory:  Positive for cough.    Cardiovascular:  Negative for chest pain.   Gastrointestinal:  Negative for vomiting.   Genitourinary:  Negative for difficulty urinating.   Skin:  Negative for rash and wound.   Neurological:  Negative for headaches.   Psychiatric/Behavioral:  Negative for confusion.    Objective:     Vital Signs (Most Recent):  Temp: 98 °F (36.7 °C) (04/15/22 1100)  Pulse: 67 (04/15/22 1426)  Resp: 18 (04/15/22 1426)  BP: 130/79 (04/15/22 1100)  SpO2: (!) 94 % (04/15/22 1426)   Vital Signs (24h Range):  Temp:  [97 °F (36.1 °C)-98.3 °F (36.8 °C)] 98 °F (36.7 °C)  Pulse:  [61-98] 67  Resp:  [16-18] 18  SpO2:  [94 %-98 %] 94 %  BP: (126-137)/(76-88) 130/79     Weight: 63.6 kg (140 lb 3.4 oz)  Body mass index is 21.32 kg/m².    Intake/Output Summary (Last 24 hours) at 4/15/2022 1446  Last data filed at 4/15/2022 1242  Gross per 24 hour   Intake  720 ml   Output 1450 ml   Net -730 ml        Physical Exam  Vitals and nursing note reviewed.   Constitutional:       General: He is not in acute distress.     Appearance: He is not diaphoretic.      Comments: Sitting in bed, NAD, cooperative   HENT:      Head: Normocephalic and atraumatic.      Mouth/Throat:      Mouth: Mucous membranes are moist.      Comments: No oral thrush  Eyes:      General:         Right eye: No discharge.         Left eye: No discharge.   Cardiovascular:      Rate and Rhythm: Normal rate and regular rhythm.   Pulmonary:      Comments: On RA, inspiratory crackles at bases.  No coughing on exam today.  Abdominal:      General: Bowel sounds are normal.      Palpations: Abdomen is soft.      Tenderness: There is no abdominal tenderness.      Comments: Abdomen distended, but not tense.  Bandage in place right lower quadrant.   Skin:     General: Skin is warm and dry.   Neurological:      Mental Status: He is alert and oriented to person, place, and time.      Comments: Speech intact, moving all four extremities, generalized weakness   Psychiatric:         Mood and Affect: Mood normal.       Significant Labs: Blood Culture: No results for input(s): LABBLOO in the last 48 hours.    BMP:   Recent Labs   Lab 04/15/22  0454   GLU 85   *   K 4.6   CL 98   CO2 21*   BUN 40*   CREATININE 3.0*   CALCIUM 7.6*   MG 2.0       CBC:   Recent Labs   Lab 04/14/22  0552 04/15/22  0454   WBC 4.21 4.51   HGB 10.3* 10.7*   HCT 29.6* 29.9*   PLT 41* 52*       CMP:   Recent Labs   Lab 04/14/22  0552 04/15/22  0454   * 126*   K 4.4 4.6   CL 95 98   CO2 20* 21*   * 85   BUN 42* 40*   CREATININE 3.0* 3.0*   CALCIUM 7.2* 7.6*   PROT 5.4* 5.5*   ALBUMIN 2.4* 2.4*   BILITOT 1.8* 1.5*   ALKPHOS 92 100   * 129*   ALT 24 26   ANIONGAP 6* 7*   EGFRNONAA 21.6* 21.6*       Cardiac Markers: No results for input(s): CKMB, MYOGLOBIN, BNP, TROPISTAT in the last 48 hours.    Lactic Acid: No results for  input(s): LACTATE in the last 48 hours.  Magnesium:   Recent Labs   Lab 04/14/22  0552 04/15/22  0454   MG 1.9 2.0       Respiratory Culture: No results for input(s): GSRESP, RESPIRATORYC in the last 48 hours.  Troponin: No results for input(s): TROPONINI in the last 48 hours.  TSH:   Recent Labs   Lab 02/09/22  0814   TSH 1.536       Urine Culture: No results for input(s): LABURIN in the last 48 hours.  Urine Studies: No results for input(s): COLORU, APPEARANCEUA, PHUR, SPECGRAV, PROTEINUA, GLUCUA, KETONESU, BILIRUBINUA, OCCULTUA, NITRITE, UROBILINOGEN, LEUKOCYTESUR, RBCUA, WBCUA, BACTERIA, SQUAMEPITHEL, HYALINECASTS in the last 48 hours.    Invalid input(s): WRIGHTSUR      Significant Imaging: I have reviewed and interpreted all pertinent imaging results/findings within the past 24 hours.      Assessment/Plan:      * Fever in immunocomprised patient  T-max 100.6, denies any new localizing source  Pancultures including UA with reflex urine culture, blood culture, chest x-ray being repeated, diagnostic paracentesis  Given only potential source at this time and patient is immunocompromised will treat empirically for SBP with Rocephin  Continue to monitor temperature curve and follow up culture results  Infectious disease consulted    Discontinue Rocephin  No recurrent fevers    Weakness  Patient uses a cane at home.  Family is concerned about stability.  PT ordered  OT ordered  Ambulate      Anemia  Chronic and multifactorial      Thrombocytopenia  No evidence of bleeding, due to cirrhosis, monitoring      Hypothyroidism  Continue Synthroid      Metabolic acidosis  Sodium bicarbonate increased to 1300 t.i.d. as per Nephrology      Hyponatremia  Stable.  Nephrology following  Patient's renal function and hyponatremia acutely worsened after single dose of Lasix      Diarrhea  Improving.    C diff negative Giardia negative.  No pathogenic bacteria identified on stool culture.  Continue Creon  Continue loperamide  p.r.n.      ILDEFONSO (acute kidney injury)  Ultrasound transplant kidney no acute  Nephrology has been consulted    Continue to hold lisinopril, Aldactone, metformin, lasix  Continue p.o. bicarbonate    Cirrhosis of liver with ascites  S/p transjugular liver biopsy and [place] [facility]  Repeat paracentesis today without evidence of SBP      Interstitial lung disease  Followed by outpatient Pulmonary Dr. Boyer, chest x-ray with continued changes  Pulmonary here have ordered serology workup, follow-up  Continue Scheduled Benzonate  Hycodan p.r.n.    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  Continue insulin sliding scale    Gout  Continue allopurinol      Personal history of immunosupression therapy  Continuing chronic immunosuppressant      Hypertension  Controlled      History of renal transplant  Continue sirolimus  Hold next 2 doses of tacrolimus, then resume 1.5 mg b.i.d. (resume 4/16 as discussed with Nephrology)        VTE Risk Mitigation (From admission, onward)         Ordered     IP VTE HIGH RISK PATIENT  Once         04/07/22 1800     Place sequential compression device  Until discontinued         04/07/22 1800                Discharge Planning   LE: 4/18/2022     Code Status: Full Code   Is the patient medically ready for discharge?:     Reason for patient still in hospital (select all that apply): Treatment and Consult recommendations  Discharge Plan A: Home with family      Patient has completed antibiotics.  Awaiting improvement in his renal function prior to discharge home.  Final date of discharge to be determined after discussion with Nephrology.            Manoj Allen,  MD  Department of Hospital Medicine   [facility]

## 2022-04-15 NOTE — ASSESSMENT & PLAN NOTE
Stable.  Nephrology following  Patient's renal function and hyponatremia acutely worsened after single dose of Lasix

## 2022-04-16 NOTE — PLAN OF CARE
Problem: Adult Inpatient Plan of Care  Goal: Plan of Care Review  Outcome: Ongoing, Progressing  Goal: Patient-Specific Goal (Individualized)  Outcome: Ongoing, Progressing  Goal: Absence of Hospital-Acquired Illness or Injury  Outcome: Ongoing, Progressing  Goal: Optimal Comfort and Wellbeing  Outcome: Ongoing, Progressing  Goal: Readiness for Transition of Care  Outcome: Ongoing, Progressing     Problem: Renal Function Impairment (Acute Kidney Injury/Impairment)  Goal: Effective Renal Function  Outcome: Ongoing, Progressing

## 2022-04-16 NOTE — ASSESSMENT & PLAN NOTE
Ultrasound transplant kidney no acute  Nephrology has been consulted    Continue to hold lisinopril, Aldactone, metformin, lasix  Continue p.o. bicarbonate    Continuing to titrate tacrolimus

## 2022-04-16 NOTE — PROGRESS NOTES
[facility] Medicine  Progress Note    Patient Name: Edgar Jackson  MRN: [id number]  Patient Class: IP- Inpatient   Admission Date: 4/7/2022  Length of Stay: 8 days  Attending Physician: Manoj Allen MD  Primary Care Provider: Tyler Castillo MD        Subjective:     Principal Problem:Fever        HPI:  Mr. Jackson is a 61-year-old male with a past medical history of renal failure status post renal transplant 2004, NIDDM2, ILD, and cirrhosis who presents today with complaints of elevated creatinine on outpatient labs.  It is severe.  It is associated with recent liver biopsy and paracentesis and recent addition of Lasix and spironolactone.  He denies fever, chills, nausea, vomiting, diarrhea, chest pain, shortness of breath, loss of consciousness.  He has a chronic cough that is productive of clear/white/yellow sputum at baseline which he states has worsened over the last 2 months. He had a liver biopsy and paracentesis yesterday at [facility] with Dr. Osullivan and his creatinine was 3.1. He was called and referred to the ED for further work up.       Overview/Hospital Course:  Patient with a history of remote renal transplant, secondary to hypertensive renal failure, on chronic immunosuppressant (sirolimus and tacrolimus), diagnosis of cirrhosis who underwent recent transjugular biopsy (pathology with chronic mild to moderate active hepatitis with bridging fibrosis and nodules) as well as paracentesis (2750 cc removed) on 04/06.  He also was seen by Pulmonary for concern of interstitial lung disease and reports ongoing dyspnea on exertion with cough.  He was referred to the ED due to abnormal outpatient labs with acute kidney injury.  He was borderline hypotensive in the ED, creatinine 3.1.  Also noted to have hyponatremia, hyperkalemia with metabolic acidosis.  Ongoing diarrhea quantified as 4 bowel movements per day.  On 04/08, renal function with creatinine 2.7, minimal urine output, on bicarbonate  drip, stool studies in process, Nephrology and Gastroenterology following.  On 04/09 still having diarrhea, about 8 bowel movements overnight, states no relation to time of day, usually after every meal, blood pressure is improved, BUN/creatinine 37/2.7.  On 04/10 states overall he feels better, Creon was started yesterday and diarrhea is improving, states he believes he is producing more urine, BUN/creatinine 40/2.6, Nephrology resuming bicarbonate drip, pulmonary consulted. On 4/11 developed fever and work up started, which was largely unrevealing.  Paracentesis was performed; fluid studies were not consistent with SBP.  Patient completed 3 days of IV ceftriaxone.  Patient persistent cough throughout the admission.      Interval History:  Diarrhea stable.  Patient still has persistent cough , particular deep breathing.  The patient required 3 L nasal cannula with activity.  Patient's renal function is stable today.  Her still titrating immunosuppressant medications.    Review of Systems   Constitutional:  Negative for fever.   HENT:  Negative for congestion and sore throat.    Respiratory:  Positive for cough.    Cardiovascular:  Negative for chest pain.   Gastrointestinal:  Negative for vomiting.   Genitourinary:  Negative for difficulty urinating.   Skin:  Negative for rash and wound.   Neurological:  Negative for headaches.   Psychiatric/Behavioral:  Negative for confusion.    Objective:     Vital Signs (Most Recent):  Temp: 99.2 °F (37.3 °C) (04/16/22 1104)  Pulse: 75 (04/16/22 1104)  Resp: 18 (04/16/22 1104)  BP: 105/79 (04/16/22 1104)  SpO2: 97 % (04/16/22 1104)   Vital Signs (24h Range):  Temp:  [98 °F (36.7 °C)-99.2 °F (37.3 °C)] 99.2 °F (37.3 °C)  Pulse:  [63-76] 75  Resp:  [18-22] 18  SpO2:  [92 %-99 %] 97 %  BP: (105-129)/(72-89) 105/79     Weight: 63.6 kg (140 lb 3.4 oz)  Body mass index is 21.32 kg/m².    Intake/Output Summary (Last 24 hours) at 4/16/2022 1551  Last data filed at 4/16/2022 1330  Gross  per 24 hour   Intake 1140 ml   Output 775 ml   Net 365 ml        Physical Exam  Vitals and nursing note reviewed.   Constitutional:       General: He is not in acute distress.     Appearance: He is not diaphoretic.      Comments: Sitting in bed, NAD, cooperative   HENT:      Head: Normocephalic and atraumatic.      Mouth/Throat:      Mouth: Mucous membranes are moist.      Comments: No oral thrush  Eyes:      General:         Right eye: No discharge.         Left eye: No discharge.   Cardiovascular:      Rate and Rhythm: Normal rate and regular rhythm.   Pulmonary:      Breath sounds: Wheezing present.      Comments: On RA, inspiratory crackles at bases.  No coughing on exam today.  Abdominal:      General: Bowel sounds are normal.      Palpations: Abdomen is soft.      Tenderness: There is no abdominal tenderness.      Comments: Abdomen distended, but not tense.  Bandage in place right lower quadrant.   Skin:     General: Skin is warm and dry.   Neurological:      Mental Status: He is alert and oriented to person, place, and time.      Comments: Speech intact, moving all four extremities, generalized weakness   Psychiatric:         Mood and Affect: Mood normal.       Significant Labs: Blood Culture: No results for input(s): LABBLOO in the last 48 hours.    BMP:   Recent Labs   Lab 04/16/22  0715   *   *   K 4.6   CL 93*   CO2 19*   BUN 47*   CREATININE 3.2*   CALCIUM 7.5*   MG 1.7       CBC:   Recent Labs   Lab 04/15/22  0454 04/16/22  0715   WBC 4.51 4.54   HGB 10.7* 11.2*   HCT 29.9* 31.5*   PLT 52* 59*       CMP:   Recent Labs   Lab 04/15/22  0454 04/16/22  0715   * 121*   K 4.6 4.6   CL 98 93*   CO2 21* 19*   GLU 85 126*   BUN 40* 47*   CREATININE 3.0* 3.2*   CALCIUM 7.6* 7.5*   PROT 5.5* 5.9*   ALBUMIN 2.4* 2.5*   BILITOT 1.5* 1.8*   ALKPHOS 100 89   * 103*   ALT 26 23   ANIONGAP 7* 9   EGFRNONAA 21.6* 20.0*       Cardiac Markers: No results for input(s): CKMB, MYOGLOBIN, BNP,  TROPISTAT in the last 48 hours.    Lactic Acid: No results for input(s): LACTATE in the last 48 hours.  Magnesium:   Recent Labs   Lab 04/15/22  0454 04/16/22  0715   MG 2.0 1.7       Respiratory Culture: No results for input(s): GSRESP, RESPIRATORYC in the last 48 hours.  Troponin: No results for input(s): TROPONINI in the last 48 hours.  TSH:   Recent Labs   Lab 02/09/22  0814   TSH 1.536       Urine Culture: No results for input(s): LABURIN in the last 48 hours.  Urine Studies:   Recent Labs   Lab 04/16/22  1330   RBCUA 3  3   WBCUA 2  2   BACTERIA Negative  Negative   SQUAMEPITHEL 1  1   HYALINECASTS 11*  11*         Significant Imaging: I have reviewed and interpreted all pertinent imaging results/findings within the past 24 hours.  Chest x-ray unchanged    Assessment/Plan:      * Fever in immunocomprised patient  T-max 100.6, denies any new localizing source  Pancultures including UA with reflex urine culture, blood culture, chest x-ray being repeated, diagnostic paracentesis  Given only potential source at this time and patient is immunocompromised will treat empirically for SBP with Rocephin  Continue to monitor temperature curve and follow up culture results  Infectious disease consulted    Discontinue Rocephin  No recurrent fevers    Weakness  Patient uses a cane at home.  Family is concerned about stability.  PT ordered  OT ordered  Ambulate      Anemia  Chronic and multifactorial      Thrombocytopenia  No evidence of bleeding, due to cirrhosis, monitoring      Hypothyroidism  Continue Synthroid      Metabolic acidosis  Sodium bicarbonate increased to 1300 t.i.d. as per Nephrology      Hyponatremia  Stable.  Nephrology following  Patient's renal function and hyponatremia acutely worsened after single dose of Lasix      Diarrhea  Improving.    C diff negative Giardia negative.  No pathogenic bacteria identified on stool culture.  Continue Creon  Continue loperamide p.r.n.      ILDEFONSO (acute kidney  injury)  Ultrasound transplant kidney no acute  Nephrology has been consulted    Continue to hold lisinopril, Aldactone, metformin, lasix  Continue p.o. bicarbonate    Continuing to titrate tacrolimus    Cirrhosis of liver with ascites  S/p transjugular liver biopsy and paracentesis [place] [facility]  Repeat paracentesis today without evidence of SBP      Interstitial lung disease  Followed by outpatient Pulmonary Dr. Boyer, chest x-ray with continued changes  Pulmonary here have ordered serology workup, follow-up  Continue Scheduled Benzonate  Hycodan p.r.n.    Continue Flonase and Mucinex   DuoNebs t.i.d.  Continue budesonide b.i.d.    3 L home oxygen via nasal cannula with activity ordered      Diabetes mellitus due to underlying condition with chronic kidney disease, with long-term current use of insulin  Continue basal bolus insulin with Accu-Cheks  As needed hypoglycemic measures  Previous HbA1c 6.6%  Continue detemir 22 units q.h.s.  Continue insulin sliding scale    Gout  Continue allopurinol      Personal history of immunosupression therapy  Continuing chronic immunosuppressant      Hypertension  Permissive hypertension per Nephrology at this time      History of renal transplant  Continue sirolimus  Tacrolimus dosing per Nephrology        VTE Risk Mitigation (From admission, onward)         Ordered     IP VTE HIGH RISK PATIENT  Once         04/07/22 1800     Place sequential compression device  Until discontinued         04/07/22 1800                Discharge Planning   LE:      Code Status: Full Code   Is the patient medically ready for discharge?:     Reason for patient still in hospital (select all that apply): Consult recommendations  Discharge Plan A: Home with family   Discharge Delays: None known at this time              Manoj Allen MD  Department of Hospital Medicine   [facility]

## 2022-04-16 NOTE — PT/OT/SLP EVAL
Occupational Therapy   Evaluation and Discharge Note    Name: Edgar Jackson  MRN: [id number]  Admitting Diagnosis:  Fever   Recent Surgery: * No surgery found *      Recommendations:     Discharge Recommendations: home  Discharge Equipment Recommendations:  walker, rolling  Barriers to discharge:  None    Assessment:     Edgar Jackson is a 61 y.o. male with a medical diagnosis of Fever. At this time, patient is functioning at their prior level of function and does not require further acute OT services.     Plan:     During this hospitalization, patient does not require further acute OT services.  Please re-consult if situation changes.    · Plan of Care Reviewed with: patient, spouse    Subjective     Chief Complaint: General fatigue   Patient/Family Comments/goals: Return home and resume work    Occupational Profile:  Living Environment: Pt lives with spouse in a 2 story home with 10 steps to enter.   Previous level of function: Independent, working as an  with mostly computer work  Roles and Routines: , employed as   Equipment Used at home:  cane, straight  Assistance upon Discharge: spouse    Pain/Comfort:  · Pain Rating 1: 0/10    Patients cultural, spiritual, Taoism conflicts given the current situation: no    Objective:     Communicated with: nurse prior to session.  Patient found up in chair with telemetry upon OT entry to room.    General Precautions: Standard, fall   Orthopedic Precautions:N/A   Braces: N/A  Respiratory Status: Room air     Occupational Performance:      Functional Mobility/Transfers:  · Patient completed Sit <> Stand Transfer with supervision  with  rolling walker   · Functional Mobility: ambulated 50 feet with walker and SBA, no LOB    Activities of Daily Living:  · Feeding:  independence    · Grooming: independence    · Lower Body Dressing: independence      Cognitive/Visual Perceptual:  Cognitive/Psychosocial Skills:     -       Oriented to: Person, Place, Time and  Situation   -       Follows Commands/attention:Follows multistep  commands  -       Communication: clear/fluent  -       Memory: No Deficits noted  -       Safety awareness/insight to disability: intact   -       Mood/Affect/Coping skills/emotional control: Cooperative and Pleasant    Physical Exam:  Balance:    -       Good standing and sitting balance  Upper Extremity Range of Motion:     -       Right Upper Extremity: WNL  -       Left Upper Extremity: WNL  Upper Extremity Strength:    -       Right Upper Extremity: WFL  -       Left Upper Extremity: WFL   Strength:    -       Right Upper Extremity: WFL  -       Left Upper Extremity: WFL    AMPAC 6 Click ADL:  AMPAC Total Score: 24    Treatment & Education:  Role of OT, safety awareness, importance of functional mobility, and call bell use.    Education:    Patient left up in chair with all lines intact, call button in reach and wife present    GOALS:   Multidisciplinary Problems     Occupational Therapy Goals     Not on file                History:     Past Medical History:   Diagnosis Date    CKD (chronic kidney disease)     Diabetes 2020    Gout     Hypertension 2000    [place] 01/2004    [place]    Thyroid disease     Unspecified cirrhosis of liver        Past Surgical History:   Procedure Laterality Date    CATARACT EXTRACTION Right 2017    COLONOSCOPY N/A 3/8/2022    Procedure: COLONOSCOPY;  Surgeon: Mio Simmons III, MD;  Location: [facility] [place] [facility];  Service: Endoscopy;  Laterality: N/A;    ENDOSCOPIC ULTRASOUND OF UPPER GASTROINTESTINAL TRACT N/A 3/8/2022    Procedure: ULTRASOUND, UPPER GI TRACT, ENDOSCOPIC;  Surgeon: Mio Simmons III, MD;  Location: [facility] [place] [facility];  Service: Endoscopy;  Laterality: N/A;    KIDNEY TRANSPLANT  2004    PORTACATH PLACEMENT  2003    REMOVAL OF VASCULAR ACCESS PORT  2005       Time Tracking:     OT Date of Treatment: 04/16/22  OT Start Time: 0952  OT Stop Time: 1005  OT Total Time (min):  13 min    Billable Minutes:Evaluation 5  Therapeutic Activity 8    4/16/2022

## 2022-04-16 NOTE — PT/OT/SLP EVAL
"Physical Therapy Evaluation    Patient Name:  Edgar Jackson   MRN:  [id number]    Recommendations:     Discharge Recommendations:  home health PT   Discharge Equipment Recommendations: walker, rolling   Barriers to discharge: None    Assessment:     Edgar Jackson is a 61 y.o. male admitted with a medical diagnosis of Fever.  He presents with the following impairments/functional limitations:  impaired endurance, impaired functional mobilty, gait instability, impaired balance, impaired cardiopulmonary response to activity.    Pt with HOB elevated, wife at bedside, agreeable to PT. Pt performed bed mobility with independence. Pt stated he does not have O2 at home, 95% on RA at rest. PT attempted to ambulate pt with SPC but patient was very unsteady and had right sided LOB with ability to self correct. Pt ambulated in horan with RW and CGA with greater stability, but was returned to room due to low oxygen saturation on RA 76%. PT donned 2 L oxygen and sats quickly improved to 90s. Pt with minimal shortness of breath or symptoms during gait, RN notified. Pt left up in chair with wife present. Pt educated on calling for help with mobility to avoid falls.    Rehab Prognosis: Fair; patient would benefit from acute skilled PT services to address these deficits and reach maximum level of function.    Recent Surgery: * No surgery found *      Plan:     During this hospitalization, patient to be seen 5 x/week to address the identified rehab impairments via gait training, therapeutic activities, therapeutic exercises, canalith reposition procedure and progress toward the following goals:    · Plan of Care Expires:  05/16/22    Subjective     Chief Complaint: coughing fits  Patient/Family Comments/goals: to get stronger  Pain/Comfort:  · Pain Rating 1:  ("only when I cough")  · Location 1: chest    Living Environment:  Pt lives with his wife in a raised house ([address]) and bedroom on 2nd floor (12 steps). (+) , works as   Prior to " admission, patients level of function was modified independent with SPC.  Equipment used at home: cane, straight.  DME owned (not currently used): none.  Upon discharge, patient will have assistance from wife.    Objective:     Communicated with RN prior to session.  Patient found HOB elevated with telemetry, oxygen  upon PT entry to room.    General Precautions: Standard, fall   Orthopedic Precautions:N/A   Braces: N/A  Respiratory Status: Nasal cannula, flow 2 L/min    Exams:  · Cognitive Exam:  Patient is oriented to Person, Place, Time and Situation  · Gross Motor Coordination:  WFL  · RLE ROM: WFL  · RLE Strength: WFL  · LLE ROM: WFL  · LLE Strength: WFL    Functional Mobility:  · Bed Mobility:     · Supine to Sit: independence  · Transfers:     · Sit to Stand:  contact guard assistance with no AD  · Gait: 5 ft SPC very unsteady with R sided LOB and ability to self correct, 45 ft RW and CGA with greater stability, distance limited by oxygen saturation levels    Therapeutic Activities and Exercises:   Education, poc, dc planning, fall prevention. PT educated pt/ family on importance of out of bed to chair and functional mobility to negate negative effects of prolonged bed rest.     AM-PAC 6 CLICK MOBILITY  Total Score:19     Patient left up in chair with all lines intact, call button in reach, RN notified and wife present.    GOALS:   Multidisciplinary Problems     Physical Therapy Goals        Problem: Physical Therapy    Goal Priority Disciplines Outcome Goal Variances Interventions   Physical Therapy Goal     PT, PT/OT      Description: Goals to be met by: discharge     Patient will increase functional independence with mobility by performin. Gait  x 150 feet with Supervision using LRAD  2. Ascend/descend 10 stair with right Handrails Contact Guard Assistance using LRAD                     History:     Past Medical History:   Diagnosis Date    CKD (chronic kidney disease)     Diabetes 2020    Gout      Hypertension 2000    [place]ney transplant recipient 01/2004    [place]    Thyroid disease     Unspecified cirrhosis of liver        Past Surgical History:   Procedure Laterality Date    CATARACT EXTRACTION Right 2017    COLONOSCOPY N/A 3/8/2022    Procedure: COLONOSCOPY;  Surgeon: Mio Simmons III, MD;  [facility];  Service: Endoscopy;  Laterality: N/A;    ENDOSCOPIC ULTRASOUND OF UPPER GASTROINTESTINAL TRACT N/A 3/8/2022    Procedure: ULTRASOUND, UPPER GI TRACT, ENDOSCOPIC;  Surgeon: Mio Simmons III, MD;  Location: [facility];  Service: Endoscopy;  Laterality: N/A;    KIDNEY TRANSPLANT  2004    PORTACATH PLACEMENT  2003    REMOVAL OF VASCULAR ACCESS PORT  2005       Time Tracking:     PT Received On: 04/16/22  PT Start Time: 0917     PT Stop Time: 0934  PT Total Time (min): 17 min     Billable Minutes: Evaluation 8 and Gait Training 9      04/16/2022

## 2022-04-16 NOTE — CARE UPDATE
04/16/22 0935   Patient Assessment/Suction   Level of Consciousness (AVPU) alert   Respiratory Effort Unlabored   Expansion/Accessory Muscles/Retractions no use of accessory muscles   All Lung Fields Breath Sounds clear   Rhythm/Pattern, Respiratory no shortness of breath reported   Cough Frequency no cough   PRE-TX-O2   O2 Device (Oxygen Therapy) nasal cannula   Flow (L/min) 2   SpO2 99 %   Pulse Oximetry Type Intermittent   $ Pulse Oximetry - Multiple Charge Pulse Oximetry - Multiple   Pulse 64   Resp 18   Aerosol Therapy   $ Aerosol Therapy Charges Aerosol Treatment   Daily Review of Necessity (SVN) completed   Respiratory Treatment Status (SVN) given   Treatment Route (SVN) mask;oxygen   Patient Position (SVN) sitting in chair   Post Treatment Assessment (SVN) breath sounds unchanged;vital signs unchanged   Signs of Intolerance (SVN) none   Education   $ Education Bronchodilator;15 min   Respiratory Evaluation   $ Care Plan Tech Time 15 min   $ Eval/Re-eval Charges Re-evaluation

## 2022-04-16 NOTE — PLAN OF CARE
Problem: Adult Inpatient Plan of Care  Goal: Plan of Care Review  Outcome: Ongoing, Progressing  Goal: Patient-Specific Goal (Individualized)  Outcome: Ongoing, Progressing  Goal: Absence of Hospital-Acquired Illness or Injury  Outcome: Ongoing, Progressing  Goal: Optimal Comfort and Wellbeing  Outcome: Ongoing, Progressing  Goal: Readiness for Transition of Care  Outcome: Ongoing, Progressing     Problem: Fluid and Electrolyte Imbalance (Acute Kidney Injury/Impairment)  Goal: Fluid and Electrolyte Balance  Outcome: Ongoing, Progressing

## 2022-04-16 NOTE — PLAN OF CARE
Met with pt at bedside with spouse present. Pt's dc plan is still home with family but is now asking about home health. PT/OT have orders to evaluate pt, HH is pending PT/OT eval.     RT is recommending home O2 at discharge.        04/16/22 1144   Discharge Reassessment   Assessment Type Discharge Planning Reassessment   Did the patient's condition or plan change since previous assessment? Yes   Discharge Plan discussed with: Spouse/sig other;Patient   Communicated LE with patient/caregiver Date not available/Unable to determine   Discharge Plan A Home with family   Discharge Plan B Home with family;Home Health   DME Needed Upon Discharge  oxygen   Discharge Barriers Identified None   Post-Acute Status   Discharge Delays None known at this time

## 2022-04-16 NOTE — PROGRESS NOTES
Nephrology Consult Note        Patient Name: Edgar Jackson  MRN: [id number]    Patient Class: IP- Inpatient   Admission Date: 4/7/2022  Length of Stay: 8 days  Date of Service: 4/16/2022    Attending Physician: Manoj Allen MD  Primary Care Provider: Tyler Castillo MD    Reason for Consult: ildefonso/hyponatremia/acidosis/hyperkalemia/kidney transplant/cirrhosis with ascites/anemia/diarrhea/gout/htn/hypotension/dm2    SUBJECTIVE:     HPI: 61m with kidney transplant and liver disease who recently moved to the area and is yet to establish care is admitted after paracentesis and liver biopsy on 4/6. Labs show ILDEFONSO, hyponatremia, hyperkalemia. He also complains of diarrhea. He is also reportedly on diuretic, spironolactone, ACEi. Received IVF in ER for hypotension.    4/8 VSS, no new complains. Appreciate GI input. Start oral bicarb for acidosis.  4/9 VSS, no new complains. GI work-up for diarrhea in progress.  4/10 VSS. Thrombocytopenia of unclear etiology. Continues to have diarrhea. sCr not worse. Acidosis persists despite oral bicarb, will add bicarb drip to give more bicarb and add more volume. K remains marginal, bicarb drip should help. sNa is low, suspect subtle volume depldtion, more IVF should help.  4/11 VSS. Low grade fever, non-productive cough, CXR unimpressive, Appreciate Pulm input. Hyponatremia worsening - will send urine lytes, stop bicarb gtt and increase oral bicarb to TID. Diarrhea reportedly better with creon.  4/12  Not in his room.  Output not recorded.   4/13  Has diarrhea.  No vomiting or sob.  Weak.      4/14  AFVSS.  1060 cc UOP.  C/o cough and diarrhea  4/15  Diarrhea better.  Coughing at night.  No vomiting.  1900cc uop  4/16  Diarrhea back.  Coughing a lot.  No eating.    Past Medical History:   Diagnosis Date    CKD (chronic kidney disease)     Diabetes 2020    Gout     Hypertension 2000    [place] transplant [place] 01/2004    [place]    Thyroid disease     Unspecified cirrhosis of liver       Past Surgical History:   Procedure Laterality Date    CATARACT EXTRACTION Right 2017    COLONOSCOPY N/A 3/8/2022    Procedure: COLONOSCOPY;  Surgeon: iMo Simmons III, MD;  Location: [facility] ENDO;  Service: Endoscopy;  Laterality: N/A;    ENDOSCOPIC ULTRASOUND OF UPPER GASTROINTESTINAL TRACT N/A 3/8/2022    Procedure: ULTRASOUND, UPPER GI TRACT, ENDOSCOPIC;  Surgeon: Mio Simmons III, MD;  Location: [facility] ENDO;  Service: Endoscopy;  Laterality: N/A;    KIDNEY TRANSPLANT  2004    PORTACATH PLACEMENT  2003    REMOVAL OF VASCULAR ACCESS PORT  2005     Family History   Problem Relation Age of Onset    Kidney disease Father     Heart disease Father      Social History     Tobacco Use    Smoking status: Never Smoker    Smokeless tobacco: Never Used   Substance Use Topics    Alcohol use: Not Currently    Drug use: Never       Review of patient's allergies indicates:  No Known Allergies    Outpatient meds:  No current facility-administered medications on file prior to encounter.     Current Outpatient Medications on File Prior to Encounter   Medication Sig Dispense Refill    allopurinoL (ZYLOPRIM) 100 MG tablet Take 100 mg by mouth once daily.      levothyroxine (SYNTHROID) 50 MCG tablet Take 50 mcg by mouth once daily.      lisinopriL (PRINIVIL,ZESTRIL) 5 MG tablet Take 5 mg by mouth once daily.      metFORMIN (GLUCOPHAGE) 500 MG tablet Take 500 mg by mouth 2 (two) times daily.      metoprolol tartrate (LOPRESSOR) 50 MG tablet Take 50 mg by mouth 2 (two) times daily.      minoxidiL (LONITEN) 2.5 MG tablet Take 2.5 mg by mouth 2 (two) times daily.      pravastatin (PRAVACHOL) 40 MG tablet Take 40 mg by mouth every evening.      sirolimus (RAPAMUNE) 1 MG Tab Take 2 mg by mouth once daily.      tacrolimus (PROGRAF) 0.5 MG Cap Take 2 mg by mouth every morning. And 1.5mg in the evening      tacrolimus (PROGRAF) 1 MG Cap Take 1.5 mg by mouth every evening.      TOUJEO SOLOSTAR U-300 INSULIN  "300 unit/mL (1.5 mL) InPn pen Inject 30 Units into the skin nightly.      VENTOLIN HFA 90 mcg/actuation inhaler INHALE 2 PUFFS INTO THE LUNGS EVERY 6 (SIX) HOURS AS NEEDED FOR WHEEZING (COUGHING). RESCUE (Patient taking differently: Inhale 1 puff into the lungs every 6 (six) hours as needed.) 18 g 2    alfuzosin (UROXATRAL) 10 mg Tb24 Take 1 tablet (10 mg total) by mouth after dinner. 30 tablet 12    BD NOEMI 2ND GEN PEN NEEDLE 32 gauge x 5/32" Ndle USE AS DIRECTED ONCE DAILY WITH BASAGLAR      sildenafiL (VIAGRA) 100 MG tablet Take 1 tablet (100 mg total) by mouth daily as needed for Erectile Dysfunction. 6 tablet 6    [DISCONTINUED] furosemide (LASIX) 20 MG tablet       [DISCONTINUED] spironolactone (ALDACTONE) 50 MG tablet          Scheduled meds:      Infusions:      PRN meds:      Review of Systems:  Review of Systems   Constitutional: Positive for malaise/fatigue. Negative for chills, fever and weight loss.   HENT: Negative for hearing loss and nosebleeds.    Eyes: Negative for blurred vision, double vision and photophobia.   Respiratory: Negative for cough, shortness of breath and wheezing.    Cardiovascular: Negative for chest pain, palpitations and leg swelling.   Gastrointestinal: Positive for diarrhea. Negative for abdominal pain, constipation, heartburn, nausea and vomiting.   Genitourinary: Negative for dysuria, frequency and urgency.   Musculoskeletal: Negative for falls, joint pain and myalgias.   Skin: Negative for itching and rash.   Neurological: Positive for weakness. Negative for dizziness, speech change, focal weakness, loss of consciousness and headaches.   Endo/Heme/Allergies: Does not bruise/bleed easily.   Psychiatric/Behavioral: Negative for depression and substance abuse. The patient is not nervous/anxious.      OBJECTIVE:     Vital Signs and IO (Last 24H):  Temp:  [98 °F (36.7 °C)-99.2 °F (37.3 °C)]   Pulse:  [63-76]   Resp:  [18-22]   BP: (105-129)/(72-89)   SpO2:  [92 %-99 %]   I/O " last 3 completed shifts:  In: 2580 [P.O.:2580]  Out: 2150 [Urine:2100; Stool:50]    Wt Readings from Last 5 Encounters:   04/08/22 63.6 kg (140 lb 3.4 oz)   04/06/22 68 kg (150 lb)   03/30/22 67.9 kg (149 lb 12.8 oz)   03/17/22 67 kg (147 lb 11.3 oz)   03/04/22 72.6 kg (160 lb)     Physical Exam:  Constitutional:       General: He is not in acute distress.     Appearance: He is well-developed. He is not diaphoretic.   HENT:      Head: Normocephalic and atraumatic.      Mouth/Throat:      Mouth: Mucous membranes are moist.   Eyes:      General: No scleral icterus.     Pupils: Pupils are equal, round, and reactive to light.   Cardiovascular:      Rate and Rhythm: Normal rate and regular rhythm.   Pulmonary:      Effort: Pulmonary effort is normal. No respiratory distress.      Breath sounds: No stridor.   Abdominal:      General: There is no distension.      Palpations: Abdomen is soft.   Musculoskeletal:         General: No deformity. Normal range of motion.      Cervical back: Neck supple.   Skin:     General: Skin is warm and dry.      Findings: No erythema or rash.   Neurological:      Mental Status: He is alert and oriented to person, place, and time.      Cranial Nerves: No cranial nerve deficit.   Psychiatric:         Behavior: Behavior normal.     Body mass index is 21.32 kg/m².    Laboratory:  Recent Labs   Lab 04/14/22  0552 04/15/22  0454 04/16/22  0715   * 126* 121*   K 4.4 4.6 4.6   CL 95 98 93*   CO2 20* 21* 19*   BUN 42* 40* 47*   CREATININE 3.0* 3.0* 3.2*   ESTGFRAFRICA 24.9* 24.9* 23.1*   EGFRNONAA 21.6* 21.6* 20.0*   * 85 126*       Recent Labs   Lab 04/14/22  0552 04/15/22  0454 04/16/22  0715   CALCIUM 7.2* 7.6* 7.5*   ALBUMIN 2.4* 2.4* 2.5*   MG 1.9 2.0 1.7             No results for input(s): POCTGLUCOSE in the last 168 hours.    Recent Labs   Lab 12/30/21  0655 02/09/22  0814   Hemoglobin A1C 7.7 H 6.6 H       Recent Labs   Lab 04/14/22  0552 04/15/22  0454 04/16/22  0715   WBC 4.21  4.51 4.54   HGB 10.3* 10.7* 11.2*   HCT 29.6* 29.9* 31.5*   PLT 41* 52* 59*   MCV 87 85 85   MCHC 34.8 35.8 35.6   MONO 16.4*  0.7 15.1*  0.7 19.4*  0.9       Recent Labs   Lab 04/14/22  0552 04/15/22  0454 04/16/22  0715   BILITOT 1.8* 1.5* 1.8*   PROT 5.4* 5.5* 5.9*   ALBUMIN 2.4* 2.4* 2.5*   ALKPHOS 92 100 89   ALT 24 26 23   * 129* 103*       Recent Labs   Lab 02/03/22  0603 04/07/22  1515 04/11/22  1438   Color, UA Yellow Yellow Yellow   Appearance, UA Clear Clear Clear   pH, UA 6.0 6.0 6.0   Specific [place], UA 1.010 1.020 1.010   Protein, UA Negative Trace A Negative   Glucose, UA Trace A 2+ A 1+ A   Ketones, UA Negative Negative Negative   Urobilinogen, UA Negative Negative Negative   Bilirubin (UA) Negative Negative Negative   Occult Blood UA Negative Negative Negative   Nitrite, UA Negative Negative Negative             Microbiology Results (last 7 days)     Procedure Component Value Units Date/Time    Blood culture [[id number]] Collected: 04/11/22 0852    Order Status: Completed Specimen: Blood Updated: 04/16/22 1032     Blood Culture, Routine No growth after 5 days.    Narrative:      2 sets please  Collection has been rescheduled by [facility] at 04/11/2022 08:50 Reason:   Patient unavailable, getting stress test.  Collection has been rescheduled by [facility] at 04/11/2022 08:50 Reason:   Patient unavailable, getting stress test.    Culture, Body Fluid (Aerobic) w/ GS [[id number]] Collected: 04/12/22 1221    Order Status: Completed Specimen: Ascites Updated: 04/15/22 0923     AEROBIC CULTURE - FLUID No growth     Gram Stain Result Few WBC's      No organisms seen    Narrative:      To rule out SBP order labs: Aerobic culture [[id number]],  Culture, Anaerobic [UAD606], Gram stain [RJY253], Albumin  [QZO539], Protein [YZB910], LDH [DNW913], WBC \T\ Dff  [CIN9861].    (rule out SBP) Culture, Anaerobic [[id number]] Collected: 04/12/22 1221    Order Status: Completed Specimen: Ascites Updated: 04/14/22 3442      Anaerobic Culture No anaerobes isolated    Narrative:      To rule out SBP order labs: Aerobic culture [YUP576],  Culture, Anaerobic [FBA616], Gram stain [YZS990], Albumin  [MET424], Protein [BLK568], LDH [FNW522], WBC \T\ Dff  [XGQ1497].    Culture, Respiratory with Gram Stain [[id number]]     Order Status: Sent Specimen: Sputum, Expectorated     [place] Specific Antigen [[id number]] Collected: 04/08/22 1835    Order Status: Completed Specimen: Stool Updated: 04/13/22 0719     Gi[place]ia Antigen - EIA Negative     Comment: Performed at:  [facility]  [id number]  : Zay Lorenzana MD, Phone:  [phone]          Giardia lamblia Ag Source 0    (rule out SBP) Aerobic culture [[id number]] Collected: 04/12/22 1221    Order Status: Canceled Specimen: Ascites     (rule out SBP) Gram stain [[id number]] Collected: 04/12/22 1221    Order Status: Canceled Specimen: Ascites     Gram stain [[id number]]     Order Status: No result Specimen: Ascites     Aerobic culture [[id number]]     Order Status: No result Specimen: Ascites     Culture, Anaerobic [[id number]]     Order Status: No result Specimen: Ascites     Stool culture [[id number]] Collected: 04/08/22 1835    Order Status: Completed Specimen: Stool Updated: 04/11/22 0837     Stool Culture No Salmonella,Shigella,Vibrio,Campylobacter.      No E coli 0157:H7 isolated.        ASSESSMENT/PLAN:     ILDEFONSO due to ATN due to hypovolemia and possibly Tacrolimus toxicity  Live related donor kidney transplant for 18 years  Hyponatremia  Hyperkalemia  Acidosis  Hypotension  Diarrhea  DM2  Gout  Anemia  Cirrhosis s/p liver biopsy showing chronic active hepatitis and paracentesis on 4/6  HTN      No NSAIDs , ACEI/ARB, IV contrast or other nephrotoxins.    Check 24 hour urine creatinine clearance    Keep MAP > 60, SBP > 100.    Dose meds for GFR < 30 ml/min.    Renal diet. K remains marginal, no Lokelma due to diarrhea.    Previous UA is  clear, renal transplant US is OK. Continue home immunosuppression meds. Use pt own tacrolimus, hospital can not provide 1.5mg dose with current products available.Control DM2.    Tacrolimus level toxic. Holding Prograf yesterday and today.  Resuming tomorrow at lower dose     Hold lasix.       Continue oral bicarb, increase to TID, stop drip due to worsening hyponatremia.      Tolerate asymptomatic HTN up to -160. Hold home BP meds.    Diagnose and treat diarrhea. GI Consult appreciated.    Continue gout meds.    Thank you for allowing us to participate in the care of your patient!   We will follow the patient and provide recommendations as needed.    Patient care time was spent personally by me on the following activities:     · Obtaining a history.  · Examination of patient.  · Providing medical care at the patients bedside.  · Developing a treatment plan with patient or surrogate and bedside caregivers.  · Ordering and reviewing laboratory studies, radiographic studies, pulse oximetry.  · Ordering and performing treatments and interventions.  · Evaluation of patient's response to treatment.  · Discussions with consultants while on the unit and immediately available to the patient.  · Re-evaluation of the patient's condition.  · Documentation in the medical record.     Kvng Villagomez MD    [facility]  [address] [place] [address]  [place] [address]    [phone] - tel  [phone] - fax    4/16/2022

## 2022-04-16 NOTE — SUBJECTIVE & OBJECTIVE
Interval History:  Diarrhea stable.  Patient still has persistent cough , particular deep breathing.  The patient required 3 L nasal cannula with activity.  Patient's renal function is stable today.  Her still titrating immunosuppressant medications.    Review of Systems   Constitutional:  Negative for fever.   HENT:  Negative for congestion and sore throat.    Respiratory:  Positive for cough.    Cardiovascular:  Negative for chest pain.   Gastrointestinal:  Negative for vomiting.   Genitourinary:  Negative for difficulty urinating.   Skin:  Negative for rash and wound.   Neurological:  Negative for headaches.   Psychiatric/Behavioral:  Negative for confusion.    Objective:     Vital Signs (Most Recent):  Temp: 99.2 °F (37.3 °C) (04/16/22 1104)  Pulse: 75 (04/16/22 1104)  Resp: 18 (04/16/22 1104)  BP: 105/79 (04/16/22 1104)  SpO2: 97 % (04/16/22 1104)   Vital Signs (24h Range):  Temp:  [98 °F (36.7 °C)-99.2 °F (37.3 °C)] 99.2 °F (37.3 °C)  Pulse:  [63-76] 75  Resp:  [18-22] 18  SpO2:  [92 %-99 %] 97 %  BP: (105-129)/(72-89) 105/79     Weight: 63.6 kg (140 lb 3.4 oz)  Body mass index is 21.32 kg/m².    Intake/Output Summary (Last 24 hours) at 4/16/2022 1551  Last data filed at 4/16/2022 1330  Gross per 24 hour   Intake 1140 ml   Output 775 ml   Net 365 ml        Physical Exam  Vitals and nursing note reviewed.   Constitutional:       General: He is not in acute distress.     Appearance: He is not diaphoretic.      Comments: Sitting in bed, NAD, cooperative   HENT:      Head: Normocephalic and atraumatic.      Mouth/Throat:      Mouth: Mucous membranes are moist.      Comments: No oral thrush  Eyes:      General:         Right eye: No discharge.         Left eye: No discharge.   Cardiovascular:      Rate and Rhythm: Normal rate and regular rhythm.   Pulmonary:      Breath sounds: Wheezing present.      Comments: On RA, inspiratory crackles at bases.  No coughing on exam today.  Abdominal:      General: Bowel sounds  are normal.      Palpations: Abdomen is soft.      Tenderness: There is no abdominal tenderness.      Comments: Abdomen distended, but not tense.  Bandage in place right lower quadrant.   Skin:     General: Skin is warm and dry.   Neurological:      Mental Status: He is alert and oriented to person, place, and time.      Comments: Speech intact, moving all four extremities, generalized weakness   Psychiatric:         Mood and Affect: Mood normal.       Significant Labs: Blood Culture: No results for input(s): LABBLOO in the last 48 hours.    BMP:   Recent Labs   Lab 04/16/22  0715   *   *   K 4.6   CL 93*   CO2 19*   BUN 47*   CREATININE 3.2*   CALCIUM 7.5*   MG 1.7       CBC:   Recent Labs   Lab 04/15/22  0454 04/16/22  0715   WBC 4.51 4.54   HGB 10.7* 11.2*   HCT 29.9* 31.5*   PLT 52* 59*       CMP:   Recent Labs   Lab 04/15/22  0454 04/16/22  0715   * 121*   K 4.6 4.6   CL 98 93*   CO2 21* 19*   GLU 85 126*   BUN 40* 47*   CREATININE 3.0* 3.2*   CALCIUM 7.6* 7.5*   PROT 5.5* 5.9*   ALBUMIN 2.4* 2.5*   BILITOT 1.5* 1.8*   ALKPHOS 100 89   * 103*   ALT 26 23   ANIONGAP 7* 9   EGFRNONAA 21.6* 20.0*       Cardiac Markers: No results for input(s): CKMB, MYOGLOBIN, BNP, TROPISTAT in the last 48 hours.    Lactic Acid: No results for input(s): LACTATE in the last 48 hours.  Magnesium:   Recent Labs   Lab 04/15/22  0454 04/16/22  0715   MG 2.0 1.7       Respiratory Culture: No results for input(s): GSRESP, RESPIRATORYC in the last 48 hours.  Troponin: No results for input(s): TROPONINI in the last 48 hours.  TSH:   Recent Labs   Lab 02/09/22  0814   TSH 1.536       Urine Culture: No results for input(s): LABURIN in the last 48 hours.  Urine Studies:   Recent Labs   Lab 04/16/22  1330   RBCUA 3  3   WBCUA 2  2   BACTERIA Negative  Negative   SQUAMEPITHEL 1  1   HYALINECASTS 11*  11*         Significant Imaging: I have reviewed and interpreted all pertinent imaging results/findings within the  past 24 hours.

## 2022-04-16 NOTE — CARE UPDATE
04/16/22 1012   Home Oxygen Qualification   $ Home O2 Qualification Tech time 15 minutes   Room Air SpO2 At Rest 94 %   Room Air SpO2 During Ambulation (!) 86 %   SpO2 During Ambulation on O2 93 %   Heart Rate on O2 80 bpm   Ambulation O2 LPM 3 LPM   SpO2 Post Ambulation 96 %   Post Ambulation Heart Rate 82 bpm   Post Ambulation O2 LPM 2 LPM   Home O2 Eval Comments Patient almost instantly decreased to to 86% and Sat got to 93 and higher on 3L O2. Patient does need home O2 at this time.

## 2022-04-16 NOTE — ASSESSMENT & PLAN NOTE
Followed by outpatient Pulmonary Dr. Boyer, chest x-ray with continued changes  Pulmonary here have ordered serology workup, follow-up  Continue Scheduled Benzonate  Hycodan p.r.n.    Continue Flonase and Mucinex   DuoNebs t.i.d.  Continue budesonide b.i.d.    3 L home oxygen via nasal cannula with activity ordered

## 2022-04-17 NOTE — ASSESSMENT & PLAN NOTE
Patient uses a cane at home.  Family is concerned about stability.  PT ordered  OT ordered  Ambulate    Home walker ordered

## 2022-04-17 NOTE — ASSESSMENT & PLAN NOTE
Followed by outpatient Pulmonary Dr. Boyer, chest x-ray with continued changes  Pulmonary here have ordered serology workup, follow-up  Continue Scheduled Benzonate  Hycodan b.i.d.    Continue Flonase and Mucinex   DuoNebs t.i.d.  Continue budesonide b.i.d.    3 L home oxygen via nasal cannula with activity ordered

## 2022-04-17 NOTE — RESPIRATORY THERAPY
04/16/22 2018   Patient Assessment/Suction   Level of Consciousness (AVPU) alert   Respiratory Effort Normal;Unlabored   Expansion/Accessory Muscles/Retractions no use of accessory muscles   All Lung Fields Breath Sounds clear   Rhythm/Pattern, Respiratory unlabored;pattern regular;depth regular   PRE-TX-O2   O2 Device (Oxygen Therapy) nasal cannula   $ Is the patient on Low Flow Oxygen? Yes   Flow (L/min) 3   SpO2 95 %   Pulse Oximetry Type Intermittent   $ Pulse Oximetry - Multiple Charge Pulse Oximetry - Multiple   Pulse 68   Resp 18   Aerosol Therapy   $ Aerosol Therapy Charges Aerosol Treatment   Daily Review of Necessity (SVN) completed   Respiratory Treatment Status (SVN) given   Treatment Route (SVN) mask;oxygen   Patient Position (SVN) HOB elevated   Post Treatment Assessment (SVN) breath sounds unchanged   Signs of Intolerance (SVN) none   Breath Sounds Post-Respiratory Treatment   Throughout All Fields Post-Treatment All Fields   Throughout All Fields Post-Treatment no change   Post-treatment Heart Rate (beats/min) 72   Post-treatment Resp Rate (breaths/min) 18   Education   $ Education Bronchodilator;15 min   Respiratory Evaluation   $ Care Plan Tech Time 15 min      Subjective   Fernie Porter is a 49 y.o. male.     Chief Complaint   Patient presents with   • Atrial Fibrillation   • Aortic Stenosis   • Hypertension   • Migraine         Atrial Fibrillation   Presents for follow-up visit. The symptoms have been stable. Past medical history includes atrial fibrillation. There are no medication compliance problems.   Hypertension   This is a chronic problem. The current episode started more than 1 year ago. The problem is unchanged. The problem is controlled. Associated symptoms include headaches. Pertinent negatives include no orthopnea or peripheral edema. There are no associated agents to hypertension. Risk factors for coronary artery disease include male gender. Current antihypertension treatment includes alpha 1 blockers, diuretics and beta blockers. The current treatment provides significant improvement. There are no compliance problems.  There is no history of angina, kidney disease, CAD/MI, CVA or heart failure.   Migraine    This is a chronic problem. The current episode started more than 1 year ago. The problem occurs constantly. The problem has been waxing and waning. The pain is located in the left unilateral region. The pain does not radiate. The pain quality is similar to prior headaches. The quality of the pain is described as throbbing. The pain is moderate. Pertinent negatives include no abdominal pain, coughing, fever, nausea or vomiting. Nothing aggravates the symptoms. He has tried NSAIDs for the symptoms. The treatment provided significant relief.   Headache    This is a chronic problem. The current episode started more than 1 year ago. The problem occurs constantly. Pertinent negatives include no abdominal pain, coughing, fever, nausea or vomiting.        The following portions of the patient's history were reviewed and updated as appropriate: allergies, current medications, past social history and problem list.    Outpatient Medications Marked as Taking  for the 5/18/20 encounter (Office Visit) with Juan Antonio Ford MD   Medication Sig Dispense Refill   • amLODIPine (NORVASC) 5 MG tablet TAKE 1 TABLET BY MOUTH EVERY DAY 90 tablet 3   • amoxicillin (AMOXIL) 500 MG capsule Take 4 tablets by mouth 1 hr prior to dental procedures 20 capsule 0   • cloNIDine (CATAPRES) 0.2 MG tablet TAKE ONE TABLET 2 TIMES A  tablet 1   • fluticasone (FLONASE) 50 MCG/ACT nasal spray 2 SPRAYS INTO THE NOSTRIL(S) AS DIRECTED BY PROVIDER DAILY. 48 mL 2   • hydroCHLOROthiazide (MICROZIDE) 12.5 MG capsule TAKE ONE CAPSULE BY MOUTH EVERY DAY 90 capsule 1   • hydrocortisone 2.5 % cream Apply  topically to the appropriate area as directed 3 (Three) Times a Day As Needed.     • ibuprofen (ADVIL,MOTRIN) 200 MG tablet Take 200 mg by mouth Every 6 (Six) Hours As Needed for Mild Pain .     • imipramine (TOFRANIL) 50 MG tablet TAKE 2 TABLETS BY MOUTH AT BEDTIME 180 tablet 1   • propranolol LA (INDERAL LA) 120 MG 24 hr capsule Take 1 capsule by mouth Daily. 90 capsule 1   • warfarin (COUMADIN) 5 MG tablet TAKE 1.5 TABLETS BY MOUTH SUN AND THURS, AND TAKE 1 TABLET BY MOUTH ALL OTHER DAYS OR AS DIRECTED. 105 tablet 0       Review of Systems   Constitutional: Negative for chills, fatigue and fever.   Respiratory: Negative for cough and wheezing.    Cardiovascular: Negative for orthopnea and leg swelling.   Gastrointestinal: Negative for abdominal pain, constipation, diarrhea, nausea and vomiting.   Neurological: Positive for headaches.       Objective   There were no vitals filed for this visit.   There were no vitals filed for this visit. [unfilled]  There is no height or weight on file to calculate BMI.      Physical Exam   Constitutional: He appears well-developed and well-nourished. No distress.   Skin: He is not diaphoretic.   Psychiatric: He has a normal mood and affect. His behavior is normal. Judgment and thought content normal.         Problem List Items Addressed This Visit         Cardiovascular and Mediastinum    Hypertension - Primary    Migraine    Paroxysmal atrial fibrillation (CMS/HCC)       Other    Aortic valve replaced        Assessment/Plan   Video visit today due to COVID pandemic.  In for recheck of hypertension, AF, aortic stenosis, and migraines.  Migraines persist.  They're doing pretty well to present time.  Gets 3 or 4 per month.  Has failed several drugs in the past including Topamax.  Blood pressure control is excellent.  Atrial fib is controlled.  Gets annual lab work today May 2020 including CBC, CMP, urinalysis.  Continue to monitor every 3 months.  Certainly no severe migraine since he started Inderal LA.  He would like to do a little better job with his migraines.  We will push his dose up to 120 mg daily on the Inderal LA.  If not we'll consider trying Depakote.  He thinks he failed Zonegran in the past.  Schwannoma diagnosed in his lumbar spine since last visit.  Being followed by neurosurgery.  Spent 13 minutes with patient today on the visit.          Dragon disclaimer:   Much of this encounter note is an electronic transcription/translation of spoken language to printed text. The electronic translation of spoken language may permit erroneous, or at times, nonsensical words or phrases to be inadvertently transcribed; Although I have reviewed the note for such errors, some may still exist.

## 2022-04-17 NOTE — ASSESSMENT & PLAN NOTE
Ultrasound transplant kidney no acute  Nephrology has been consulted    Continue to hold lisinopril, Aldactone, metformin, lasix  Continue p.o. bicarbonate    24 hour urine per nephrology    Continuing to titrate tacrolimus

## 2022-04-17 NOTE — PROGRESS NOTES
Nephrology Consult Note        Patient Name: Edgar Jackson  MRN: [id number]    Patient Class: IP- Inpatient   Admission Date: 4/7/2022  Length of Stay: 9 days  Date of Service: 4/17/2022    Attending Physician: Manoj Allen MD  Primary Care Provider: Tyler Castillo MD    Reason for Consult: ildefonso/hyponatremia/acidosis/hyperkalemia/kidney transplant/cirrhosis with ascites/anemia/diarrhea/gout/htn/hypotension/dm2    SUBJECTIVE:     HPI: 61m with kidney transplant and liver disease who recently moved to the area and is yet to establish care is admitted after paracentesis and liver biopsy on 4/6. Labs show ILDEFONSO, hyponatremia, hyperkalemia. He also complains of diarrhea. He is also reportedly on diuretic, spironolactone, ACEi. Received IVF in ER for hypotension.    4/8 VSS, no new complains. Appreciate GI input. Start oral bicarb for acidosis.  4/9 VSS, no new complains. GI work-up for diarrhea in progress.  4/10 VSS. Thrombocytopenia of unclear etiology. Continues to have diarrhea. sCr not worse. Acidosis persists despite oral bicarb, will add bicarb drip to give more bicarb and add more volume. K remains marginal, bicarb drip should help. sNa is low, suspect subtle volume depldtion, more IVF should help.  4/11 VSS. Low grade fever, non-productive cough, CXR unimpressive, Appreciate Pulm input. Hyponatremia worsening - will send urine lytes, stop bicarb gtt and increase oral bicarb to TID. Diarrhea reportedly better with creon.  4/12  Not in his room.  Output not recorded.   4/13  Has diarrhea.  No vomiting or sob.  Weak.      4/14  AFVSS.  1060 cc UOP.  C/o cough and diarrhea  4/15  Diarrhea better.  Coughing at night.  No vomiting.  1900cc uop  4/16  Diarrhea back.  Coughing a lot.  No eating.  4/17  About the same.  Still coughing.  Diarrhea. 700cc output     Past Medical History:   Diagnosis Date    CKD (chronic kidney disease)     Diabetes 2020    Gout     Hypertension 2000    [place] transplant [place] 01/2004     [place]    Thyroid disease     Unspecified cirrhosis of liver      Past Surgical History:   Procedure Laterality Date    CATARACT EXTRACTION Right 2017    COLONOSCOPY N/A 3/8/2022    Procedure: COLONOSCOPY;  Surgeon: Mio Simmons III, MD;  Location: [facility] ENDO;  Service: Endoscopy;  Laterality: N/A;    ENDOSCOPIC ULTRASOUND OF UPPER GASTROINTESTINAL TRACT N/A 3/8/2022    Procedure: ULTRASOUND, UPPER GI TRACT, ENDOSCOPIC;  Surgeon: Mio Simmons III, MD;  Location: [facility] ENDO;  Service: Endoscopy;  Laterality: N/A;    KIDNEY TRANSPLANT  2004    PORTACATH PLACEMENT  2003    REMOVAL OF VASCULAR ACCESS PORT  2005     Family History   Problem Relation Age of Onset    Kidney disease Father     Heart disease Father      Social History     Tobacco Use    Smoking status: Never Smoker    Smokeless tobacco: Never Used   Substance Use Topics    Alcohol use: Not Currently    Drug use: Never       Review of patient's allergies indicates:  No Known Allergies    Outpatient meds:  No current facility-administered medications on file prior to encounter.     Current Outpatient Medications on File Prior to Encounter   Medication Sig Dispense Refill    allopurinoL (ZYLOPRIM) 100 MG tablet Take 100 mg by mouth once daily.      levothyroxine (SYNTHROID) 50 MCG tablet Take 50 mcg by mouth once daily.      lisinopriL (PRINIVIL,ZESTRIL) 5 MG tablet Take 5 mg by mouth once daily.      metFORMIN (GLUCOPHAGE) 500 MG tablet Take 500 mg by mouth 2 (two) times daily.      metoprolol tartrate (LOPRESSOR) 50 MG tablet Take 50 mg by mouth 2 (two) times daily.      minoxidiL (LONITEN) 2.5 MG tablet Take 2.5 mg by mouth 2 (two) times daily.      pravastatin (PRAVACHOL) 40 MG tablet Take 40 mg by mouth every evening.      sirolimus (RAPAMUNE) 1 MG Tab Take 2 mg by mouth once daily.      tacrolimus (PROGRAF) 0.5 MG Cap Take 2 mg by mouth every morning. And 1.5mg in the evening      tacrolimus (PROGRAF) 1 MG Cap Take  "1.5 mg by mouth every evening.      TOUJEO SOLOSTAR U-300 INSULIN 300 unit/mL (1.5 mL) InPn pen Inject 30 Units into the skin nightly.      VENTOLIN HFA 90 mcg/actuation inhaler INHALE 2 PUFFS INTO THE LUNGS EVERY 6 (SIX) HOURS AS NEEDED FOR WHEEZING (COUGHING). RESCUE (Patient taking differently: Inhale 1 puff into the lungs every 6 (six) hours as needed.) 18 g 2    alfuzosin (UROXATRAL) 10 mg Tb24 Take 1 tablet (10 mg total) by mouth after dinner. 30 tablet 12    BD NOEMI 2ND GEN PEN NEEDLE 32 gauge x 5/32" Ndle USE AS DIRECTED ONCE DAILY WITH BASAGLAR      sildenafiL (VIAGRA) 100 MG tablet Take 1 tablet (100 mg total) by mouth daily as needed for Erectile Dysfunction. 6 tablet 6    [DISCONTINUED] furosemide (LASIX) 20 MG tablet       [DISCONTINUED] spironolactone (ALDACTONE) 50 MG tablet          Scheduled meds:      Infusions:      PRN meds:      Review of Systems:  Review of Systems   Constitutional: Positive for malaise/fatigue. Negative for chills, fever and weight loss.   HENT: Negative for hearing loss and nosebleeds.    Eyes: Negative for blurred vision, double vision and photophobia.   Respiratory: Negative for cough, shortness of breath and wheezing.    Cardiovascular: Negative for chest pain, palpitations and leg swelling.   Gastrointestinal: Positive for diarrhea. Negative for abdominal pain, constipation, heartburn, nausea and vomiting.   Genitourinary: Negative for dysuria, frequency and urgency.   Musculoskeletal: Negative for falls, joint pain and myalgias.   Skin: Negative for itching and rash.   Neurological: Positive for weakness. Negative for dizziness, speech change, focal weakness, loss of consciousness and headaches.   Endo/Heme/Allergies: Does not bruise/bleed easily.   Psychiatric/Behavioral: Negative for depression and substance abuse. The patient is not nervous/anxious.      OBJECTIVE:     Vital Signs and IO (Last 24H):  Temp:  [98.1 °F (36.7 °C)-99.4 °F (37.4 °C)]   Pulse:  " [61-73]   Resp:  [16-20]   BP: (113-136)/(77-90)   SpO2:  [93 %-98 %]   I/O last 3 completed shifts:  In: 2400 [P.O.:2400]  Out: 1300 [Urine:1250; Stool:50]    Wt Readings from Last 5 Encounters:   04/17/22 64.2 kg (141 lb 8.6 oz)   04/06/22 68 kg (150 lb)   03/30/22 67.9 kg (149 lb 12.8 oz)   03/17/22 67 kg (147 lb 11.3 oz)   03/04/22 72.6 kg (160 lb)     Physical Exam:  Constitutional:       General: He is not in acute distress.     Appearance: He is well-developed. He is not diaphoretic.   HENT:      Head: Normocephalic and atraumatic.      Mouth/Throat:      Mouth: Mucous membranes are moist.   Eyes:      General: No scleral icterus.     Pupils: Pupils are equal, round, and reactive to light.   Cardiovascular:      Rate and Rhythm: Normal rate and regular rhythm.   Pulmonary:      Effort: Pulmonary effort is normal. No respiratory distress.      Breath sounds: No stridor.   Abdominal:      General: There is no distension.      Palpations: Abdomen is soft.   Musculoskeletal:         General: No deformity. Normal range of motion.      Cervical back: Neck supple.   Skin:     General: Skin is warm and dry.      Findings: No erythema or rash.   Neurological:      Mental Status: He is alert and oriented to person, place, and time.      Cranial Nerves: No cranial nerve deficit.   Psychiatric:         Behavior: Behavior normal.     Body mass index is 21.52 kg/m².    Laboratory:  Recent Labs   Lab 04/15/22  0454 04/16/22  0715 04/17/22  0605   * 121* 120*   K 4.6 4.6 4.4   CL 98 93* 93*   CO2 21* 19* 19*   BUN 40* 47* 47*   CREATININE 3.0* 3.2* 3.0*   ESTGFRAFRICA 24.9* 23.1* 24.9*   EGFRNONAA 21.6* 20.0* 21.6*   GLU 85 126* 135*       Recent Labs   Lab 04/15/22  0454 04/16/22  0715 04/17/22  0605   CALCIUM 7.6* 7.5* 7.6*   ALBUMIN 2.4* 2.5* 2.5*   MG 2.0 1.7 1.6             No results for input(s): POCTGLUCOSE in the last 168 hours.    Recent Labs   Lab 12/30/21  0655 02/09/22  0814   Hemoglobin A1C 7.7 H 6.6 H        Recent Labs   Lab 04/15/22  0454 04/16/22  0715 04/17/22  0606   WBC 4.51 4.54 5.64   HGB 10.7* 11.2* 10.9*   HCT 29.9* 31.5* 30.5*   PLT 52* 59* 76*   MCV 85 85 85   MCHC 35.8 35.6 35.7   MONO 15.1*  0.7 19.4*  0.9 17.7*  1.0       Recent Labs   Lab 04/15/22  0454 04/16/22  0715 04/17/22  0605   BILITOT 1.5* 1.8* 1.7*   PROT 5.5* 5.9* 5.5*   ALBUMIN 2.4* 2.5* 2.5*   ALKPHOS 100 89 88   ALT 26 23 19   * 103* 99*       Recent Labs   Lab 02/03/22  0603 04/07/22  1515 04/11/22  1438 04/16/22  1330   Color, UA Yellow Yellow Yellow  --    Appearance, UA Clear Clear Clear  --    pH, UA 6.0 6.0 6.0  --    Specific Gravity, UA 1.010 1.020 1.010  --    Protein, UA Negative Trace A Negative  --    Glucose, UA Trace A 2+ A 1+ A  --    Ketones, UA Negative Negative Negative  --    Urobilinogen, UA Negative Negative Negative  --    Bilirubin (UA) Negative Negative Negative  --    Occult Blood UA Negative Negative Negative  --    Nitrite, UA Negative Negative Negative  --    RBC, UA  --   --   --  3  3   WBC, UA  --   --   --  2  2   Bacteria  --   --   --  Negative  Negative   Hyaline Casts, UA  --   --   --  11 A  11 A             Microbiology Results (last 7 days)     Procedure Component Value Units Date/Time    Blood culture [[id number]] Collected: 04/11/22 0852    Order Status: Completed Specimen: Blood Updated: 04/16/22 1032     Blood Culture, Routine No growth after 5 days.    Narrative:      2 sets please  Collection has been rescheduled by [facility] at 04/11/2022 08:50 Reason:   Patient unavailable, getting stress test.  Collection has been rescheduled by [facility] at 04/11/2022 08:50 Reason:   Patient unavailable, getting stress test.    Culture, Body Fluid (Aerobic) w/ GS [[id number]] Collected: 04/12/22 1221    Order Status: Completed Specimen: Ascites Updated: 04/15/22 0923     AEROBIC CULTURE - FLUID No growth     Gram Stain Result Few WBC's      No organisms seen    Narrative:      To rule out SBP order labs:  Aerobic culture [[id number]],  Culture, Anaerobic [[id number]], Gram stain [NDC916], Albumin  [YMJ728], Protein [QMU331], LDH [FBE745], WBC \T\ Dff  [[id number]].    (rule out SBP) Culture, Anaerobic [[id number]] Collected: 04/12/22 1221    Order Status: Completed Specimen: Ascites Updated: 04/14/22 1452     Anaerobic Culture No anaerobes isolated    Narrative:      To rule out SBP order labs: Aerobic culture [[id number]],  Culture, Anaerobic [[id number]], Gram stain [PEE296], Albumin  [WZD701], Protein [BKI978], LDH [[id number]], WBC \T\ Dff  [[id number]].    Culture, Respiratory with Gram Stain [[id number]]     Order Status: Sent Specimen: Sputum, Expectorated     Giardia Specific Antigen [[id number]] Collected: 04/08/22 1835    Order Status: Completed Specimen: Stool Updated: 04/13/22 0719     Giardia Antigen - EIA Negative     Comment: Performed at:  [address]  [id number]  : Zay Lorenzana MD, Phone:  [phone]          Giardia lamblia Ag Source 0    (rule out SBP) Aerobic culture [[id number]] Collected: 04/12/22 1221    Order Status: Canceled Specimen: Ascites     (rule out SBP) Gram stain [[id number]] Collected: 04/12/22 1221    Order Status: Canceled Specimen: Ascites     Gram stain [[id number]]     Order Status: No result Specimen: Ascites     Aerobic culture [[id number]]     Order Status: No result Specimen: Ascites     Culture, Anaerobic [[id number]]     Order Status: No result Specimen: Ascites     Stool culture [[id number]] Collected: 04/08/22 1835    Order Status: Completed Specimen: Stool Updated: 04/11/22 0837     Stool Culture No Salmonella,Shigella,Vibrio,Campylobacter.      No E coli 0157:H7 isolated.        ASSESSMENT/PLAN:     ILDEFONSO due to ATN due to hypovolemia and possibly Tacrolimus toxicity  Live related donor kidney transplant for 18 years  Hyponatremia  Hyperkalemia  Acidosis  Hypotension  Diarrhea  DM2  Gout  Anemia  Cirrhosis s/p liver biopsy showing chronic  active hepatitis and paracentesis on 4/6  HTN      No NSAIDs , ACEI/ARB, IV contrast or other nephrotoxins.    Check 24 hour urine creatinine clearance    Keep MAP > 60, SBP > 100.    Dose meds for GFR < 30 ml/min.    Renal diet. K remains marginal, no Lokelma due to diarrhea.    Previous UA is clear, renal transplant US is OK. Continue home immunosuppression meds. Use pt own tacrolimus, hospital can not provide 1.5mg dose with current products available.Control DM2.    Tacrolimus level toxic. Held for a couple of days and resumed at a lower dose yesterday.  Check level on wednesday    Hold lasix.       Continue oral bicarb, increase to TID, stop drip due to worsening hyponatremia.      Tolerate asymptomatic HTN up to -160. Hold home BP meds.    Diagnose and treat diarrhea. GI Consult appreciated.    Continue gout meds.    Thank you for allowing us to participate in the care of your patient!   We will follow the patient and provide recommendations as needed.    Patient care time was spent personally by me on the following activities:     · Obtaining a history.  · Examination of patient.  · Providing medical care at the patients bedside.  · Developing a treatment plan with patient or surrogate and bedside caregivers.  · Ordering and reviewing laboratory studies, radiographic studies, pulse oximetry.  · Ordering and performing treatments and interventions.  · Evaluation of patient's response to treatment.  · Discussions with consultants while on the unit and immediately available to the patient.  · Re-evaluation of the patient's condition.  · Documentation in the medical record.     Kvng Villagomez MD    [facility]  [address] [place] [address]  [place] [address]    [phone] - tel  [phone] - fax    4/17/2022

## 2022-04-17 NOTE — SUBJECTIVE & OBJECTIVE
Interval History:  Patient having 2-3 loose bowel movements per day after meals.  He still has cough which is worsened today.  He has not been receiving cough suppressants as frequently as previously.  Patient completed his 24 hour urine collection today.    Review of Systems   Constitutional:  Negative for fever.   HENT:  Negative for congestion and sore throat.    Respiratory:  Positive for cough.    Cardiovascular:  Negative for chest pain.   Gastrointestinal:  Negative for vomiting.   Genitourinary:  Negative for difficulty urinating.   Skin:  Negative for rash and wound.   Neurological:  Negative for headaches.   Psychiatric/Behavioral:  Negative for confusion.    Objective:     Vital Signs (Most Recent):  Temp: 98.1 °F (36.7 °C) (04/17/22 1155)  Pulse: 65 (04/17/22 1155)  Resp: 16 (04/17/22 1215)  BP: (!) 131/90 (04/17/22 1155)  SpO2: (!) 93 % (04/17/22 1155)   Vital Signs (24h Range):  Temp:  [98.1 °F (36.7 °C)-99.4 °F (37.4 °C)] 98.1 °F (36.7 °C)  Pulse:  [61-73] 65  Resp:  [16-20] 16  SpO2:  [93 %-98 %] 93 %  BP: (113-136)/(77-90) 131/90     Weight: 64.2 kg (141 lb 8.6 oz)  Body mass index is 21.52 kg/m².    Intake/Output Summary (Last 24 hours) at 4/17/2022 1425  Last data filed at 4/17/2022 1216  Gross per 24 hour   Intake 1500 ml   Output 975 ml   Net 525 ml        Physical Exam  Vitals and nursing note reviewed.   Constitutional:       General: He is not in acute distress.     Appearance: He is not diaphoretic.      Comments: Sitting in bed, NAD, cooperative   HENT:      Head: Normocephalic and atraumatic.      Mouth/Throat:      Mouth: Mucous membranes are moist.      Comments: No oral thrush  Eyes:      General:         Right eye: No discharge.         Left eye: No discharge.   Cardiovascular:      Rate and Rhythm: Normal rate and regular rhythm.   Pulmonary:      Breath sounds: Wheezing present.      Comments: On RA, inspiratory crackles at bases.  No coughing on exam today.  Abdominal:      General:  Bowel sounds are normal.      Palpations: Abdomen is soft.      Tenderness: There is no abdominal tenderness.      Comments: Abdomen distended, but not tense.  Bandage in place right lower quadrant.   Skin:     General: Skin is warm and dry.   Neurological:      Mental Status: He is alert and oriented to person, place, and time.      Comments: Speech intact, moving all four extremities, generalized weakness   Psychiatric:         Mood and Affect: Mood normal.       Significant Labs: Blood Culture: No results for input(s): LABBLOO in the last 48 hours.    BMP:   Recent Labs   Lab 04/17/22  0605   *   *   K 4.4   CL 93*   CO2 19*   BUN 47*   CREATININE 3.0*   CALCIUM 7.6*   MG 1.6       CBC:   Recent Labs   Lab 04/16/22  0715 04/17/22  0606   WBC 4.54 5.64   HGB 11.2* 10.9*   HCT 31.5* 30.5*   PLT 59* 76*       CMP:   Recent Labs   Lab 04/16/22  0715 04/17/22  0605   * 120*   K 4.6 4.4   CL 93* 93*   CO2 19* 19*   * 135*   BUN 47* 47*   CREATININE 3.2* 3.0*   CALCIUM 7.5* 7.6*   PROT 5.9* 5.5*   ALBUMIN 2.5* 2.5*   BILITOT 1.8* 1.7*   ALKPHOS 89 88   * 99*   ALT 23 19   ANIONGAP 9 8   EGFRNONAA 20.0* 21.6*       Cardiac Markers:   Recent Labs   Lab 04/17/22  0606   *       Lactic Acid: No results for input(s): LACTATE in the last 48 hours.  Magnesium:   Recent Labs   Lab 04/16/22  0715 04/17/22  0605   MG 1.7 1.6       Respiratory Culture: No results for input(s): GSRESP, RESPIRATORYC in the last 48 hours.  Troponin: No results for input(s): TROPONINI in the last 48 hours.  TSH:   Recent Labs   Lab 02/09/22  0814   TSH 1.536       Urine Culture: No results for input(s): LABURIN in the last 48 hours.  Urine Studies:   Recent Labs   Lab 04/16/22  1330   RBCUA 3  3   WBCUA 2  2   BACTERIA Negative  Negative   SQUAMEPITHEL 1  1   HYALINECASTS 11*  11*         Significant Imaging: I have reviewed and interpreted all pertinent imaging results/findings within the past 24 hours.

## 2022-04-17 NOTE — CARE UPDATE
04/17/22 0823   Patient Assessment/Suction   Level of Consciousness (AVPU) alert   Respiratory Effort Unlabored   Expansion/Accessory Muscles/Retractions no use of accessory muscles   All Lung Fields Breath Sounds clear   Rhythm/Pattern, Respiratory no shortness of breath reported   PRE-TX-O2   O2 Device (Oxygen Therapy) room air  (2L NC on SB)   SpO2 (!) 94 %   Pulse Oximetry Type Intermittent   $ Pulse Oximetry - Multiple Charge Pulse Oximetry - Multiple   Pulse 65   Resp 20   Aerosol Therapy   $ Aerosol Therapy Charges Aerosol Treatment  (Duo+Pulmicort)   Daily Review of Necessity (SVN) completed   Respiratory Treatment Status (SVN) given   Treatment Route (SVN) mask;oxygen   Patient Position (SVN) HOB elevated   Post Treatment Assessment (SVN) breath sounds unchanged;vital signs unchanged   Signs of Intolerance (SVN) none   Education   $ Education Bronchodilator;15 min   Respiratory Evaluation   $ Care Plan Tech Time 15 min   $ Eval/Re-eval Charges Re-evaluation

## 2022-04-17 NOTE — PROGRESS NOTES
[facility]  Progress Note    Patient Name: Edgar Jackson  MRN: [id number]  Patient Class: IP- Inpatient   Admission Date: 4/7/2022  Length of Stay: 9 days  Attending Physician: Manoj Allen MD  Primary Care Provider: Tyler Castillo MD        Subjective:     Principal Problem:Fever        HPI:  Mr. Jackson is a 61-year-old male with a past medical history of renal failure status post renal transplant 2004, NIDDM2, ILD, and cirrhosis who presents today with complaints of elevated creatinine on outpatient labs.  It is severe.  It is associated with recent liver biopsy and paracentesis and recent addition of Lasix and spironolactone.  He denies fever, chills, nausea, vomiting, diarrhea, chest pain, shortness of breath, loss of consciousness.  He has a chronic cough that is productive of clear/white/yellow sputum at baseline which he states has worsened over the last 2 months. He had a liver biopsy and paracentesis yesterday at [facility] with Dr. Osullivan and his creatinine was 3.1. He was called and referred to the ED for further work up.       Overview/Hospital Course:  Patient with a history of remote renal transplant, secondary to hypertensive renal failure, on chronic immunosuppressant (sirolimus and tacrolimus), diagnosis of cirrhosis who underwent recent transjugular biopsy (pathology with chronic mild to moderate active hepatitis with bridging fibrosis and nodules) as well as paracentesis (2750 cc removed) on 04/06.  He also was seen by Pulmonary for concern of interstitial lung disease and reports ongoing dyspnea on exertion with cough.  He was referred to the ED due to abnormal outpatient labs with acute kidney injury.  He was borderline hypotensive in the ED, creatinine 3.1.  Also noted to have hyponatremia, hyperkalemia with metabolic acidosis.  Ongoing diarrhea quantified as 4 bowel movements per day.  On 04/08, renal function with creatinine 2.7, minimal urine output, on bicarbonate  drip, stool studies in process, Nephrology and Gastroenterology following.  On 04/09 still having diarrhea, about 8 bowel movements overnight, states no relation to time of day, usually after every meal, blood pressure is improved, BUN/creatinine 37/2.7.  On 04/10 states overall he feels better, Creon was started yesterday and diarrhea is improving, states he believes he is producing more urine, BUN/creatinine 40/2.6, Nephrology resuming bicarbonate drip, pulmonary consulted. On 4/11 developed fever and work up started, which was largely unrevealing.  Paracentesis was performed; fluid studies were not consistent with SBP.  Patient completed 3 days of IV ceftriaxone.  Patient persistent cough throughout the admission.      Interval History:  Patient having 2-3 loose bowel movements per day after meals.  He still has cough which is worsened today.  He has not been receiving cough suppressants as frequently as previously.  Patient completed his 24 hour urine collection today.    Review of Systems   Constitutional:  Negative for fever.   HENT:  Negative for congestion and sore throat.    Respiratory:  Positive for cough.    Cardiovascular:  Negative for chest pain.   Gastrointestinal:  Negative for vomiting.   Genitourinary:  Negative for difficulty urinating.   Skin:  Negative for rash and wound.   Neurological:  Negative for headaches.   Psychiatric/Behavioral:  Negative for confusion.    Objective:     Vital Signs (Most Recent):  Temp: 98.1 °F (36.7 °C) (04/17/22 1155)  Pulse: 65 (04/17/22 1155)  Resp: 16 (04/17/22 1215)  BP: (!) 131/90 (04/17/22 1155)  SpO2: (!) 93 % (04/17/22 1155)   Vital Signs (24h Range):  Temp:  [98.1 °F (36.7 °C)-99.4 °F (37.4 °C)] 98.1 °F (36.7 °C)  Pulse:  [61-73] 65  Resp:  [16-20] 16  SpO2:  [93 %-98 %] 93 %  BP: (113-136)/(77-90) 131/90     Weight: 64.2 kg (141 lb 8.6 oz)  Body mass index is 21.52 kg/m².    Intake/Output Summary (Last 24 hours) at 4/17/2022 3095  Last data filed at  4/17/2022 1216  Gross per 24 hour   Intake 1500 ml   Output 975 ml   Net 525 ml        Physical Exam  Vitals and nursing note reviewed.   Constitutional:       General: He is not in acute distress.     Appearance: He is not diaphoretic.      Comments: Sitting in bed, NAD, cooperative   HENT:      Head: Normocephalic and atraumatic.      Mouth/Throat:      Mouth: Mucous membranes are moist.      Comments: No oral thrush  Eyes:      General:         Right eye: No discharge.         Left eye: No discharge.   Cardiovascular:      Rate and Rhythm: Normal rate and regular rhythm.   Pulmonary:      Breath sounds: Wheezing present.      Comments: On RA, inspiratory crackles at bases.  No coughing on exam today.  Abdominal:      General: Bowel sounds are normal.      Palpations: Abdomen is soft.      Tenderness: There is no abdominal tenderness.      Comments: Abdomen distended, but not tense.  Bandage in place right lower quadrant.   Skin:     General: Skin is warm and dry.   Neurological:      Mental Status: He is alert and oriented to person, place, and time.      Comments: Speech intact, moving all four extremities, generalized weakness   Psychiatric:         Mood and Affect: Mood normal.       Significant Labs: Blood Culture: No results for input(s): LABBLOO in the last 48 hours.    BMP:   Recent Labs   Lab 04/17/22  0605   *   *   K 4.4   CL 93*   CO2 19*   BUN 47*   CREATININE 3.0*   CALCIUM 7.6*   MG 1.6       CBC:   Recent Labs   Lab 04/16/22  0715 04/17/22  0606   WBC 4.54 5.64   HGB 11.2* 10.9*   HCT 31.5* 30.5*   PLT 59* 76*       CMP:   Recent Labs   Lab 04/16/22  0715 04/17/22  0605   * 120*   K 4.6 4.4   CL 93* 93*   CO2 19* 19*   * 135*   BUN 47* 47*   CREATININE 3.2* 3.0*   CALCIUM 7.5* 7.6*   PROT 5.9* 5.5*   ALBUMIN 2.5* 2.5*   BILITOT 1.8* 1.7*   ALKPHOS 89 88   * 99*   ALT 23 19   ANIONGAP 9 8   EGFRNONAA 20.0* 21.6*       Cardiac Markers:   Recent Labs   Lab  04/17/22  0606   *       Lactic Acid: No results for input(s): LACTATE in the last 48 hours.  Magnesium:   Recent Labs   Lab 04/16/22  0715 04/17/22  0605   MG 1.7 1.6       Respiratory Culture: No results for input(s): GSRESP, RESPIRATORYC in the last 48 hours.  Troponin: No results for input(s): TROPONINI in the last 48 hours.  TSH:   Recent Labs   Lab 02/09/22  0814   TSH 1.536       Urine Culture: No results for input(s): LABURIN in the last 48 hours.  Urine Studies:   Recent Labs   Lab 04/16/22  1330   RBCUA 3  3   WBCUA 2  2   BACTERIA Negative  Negative   SQUAMEPITHEL 1  1   HYALINECASTS 11*  11*         Significant Imaging: I have reviewed and interpreted all pertinent imaging results/findings within the past 24 hours.      Assessment/Plan:      * Fever in immunocomprised patient  T-max 100.6, denies any new localizing source  Pancultures including UA with reflex urine culture, blood culture, chest x-ray being repeated, diagnostic paracentesis  Given only potential source at this time and patient is immunocompromised will treat empirically for SBP with Rocephin  Continue to monitor temperature curve and follow up culture results  Infectious disease consulted    Discontinue Rocephin  No recurrent fevers    Weakness  Patient uses a cane at home.  Family is concerned about stability.  PT ordered  OT ordered  Ambulate    Home walker ordered      Anemia  Chronic and multifactorial      Thrombocytopenia  No evidence of bleeding, due to cirrhosis, monitoring      Hypothyroidism  Continue Synthroid      Metabolic acidosis  Sodium bicarbonate increased to 1300 t.i.d. as per Nephrology      Hyponatremia  Stable.  Nephrology following  Patient's renal function and hyponatremia acutely worsened after single dose of Lasix      Diarrhea  Improving.    C diff negative Giardia negative.  No pathogenic bacteria identified on stool culture.  Continue Creon  Continue loperamide p.r.n.      ILDEFONSO (acute kidney  injury)  Ultrasound transplant kidney no acute  Nephrology has been consulted    Continue to hold lisinopril, Aldactone, metformin, lasix  Continue p.o. bicarbonate    24 hour urine per nephrology    Continuing to titrate tacrolimus    Cirrhosis of liver with ascites  [place] [facility] [place]peat paracentesis today without evidence of SBP      Interstitial lung disease  Followed by outpatient Pulmonary Dr. Boyer, chest x-ray with continued changes  Pulmonary here have ordered serology workup, follow-up  Continue Scheduled Benzonate  Hycodan b.i.d.    Continue Flonase and Mucinex   DuoNebs t.i.d.  Continue budesonide b.i.d.    3 L home oxygen via nasal cannula with activity ordered      Diabetes mellitus due to underlying condition with chronic kidney disease, with long-term current use of insulin  Continue basal bolus insulin with Accu-Cheks  As needed hypoglycemic measures  Previous HbA1c 6.6%  Continue detemir 22 units q.h.s.  Continue insulin sliding scale    Gout  Continue allopurinol      Personal history of immunosupression therapy  Continuing chronic immunosuppressant      Hypertension  Permissive hypertension per Nephrology at this time      History of renal transplant  Continue sirolimus  Tacrolimus dosing per Nephrology        VTE Risk Mitigation (From admission, onward)         Ordered     IP VTE HIGH RISK PATIENT  Once         04/07/22 1800     Place sequential compression device  Until discontinued         04/07/22 1800                Discharge Planning   LE:      Code Status: Full Code   Is the patient medically ready for discharge?:     Reason for patient still in hospital (select all that apply): Consult recommendations  Discharge Plan A: Home with family   Discharge Delays: None known at this time              Manoj Allen MD  Department of Hospital Medicine   [facility] hospital

## 2022-04-18 NOTE — PT/OT/SLP PROGRESS
Physical Therapy Treatment    Patient Name:  Edgar Jackson   MRN:  [id number]    Recommendations:     Discharge Recommendations:  home health PT   Discharge Equipment Recommendations: walker, rolling   Barriers to discharge: None    Assessment:     Edgar Jackson is a 61 y.o. male admitted with a medical diagnosis of Fever.  He presents with the following impairments/functional limitations:  impaired endurance, gait instability, impaired balance, pain, impaired cardiopulmonary response to activity.    Pt supine with HOB elevated, agreeable to ambulation. Pt ambulated in horan 156 ft with RW and CGA with 2 L O2 in tow. Oxygen saturation remained above 91% for duration of activity, ambulation limited due to abdominal pain today. Pt left in chair with all needs in reach.    Rehab Prognosis: Good; patient would benefit from acute skilled PT services to address these deficits and reach maximum level of function.    Recent Surgery: * No surgery found *      Plan:     During this hospitalization, patient to be seen 5 x/week to address the identified rehab impairments via gait training, therapeutic activities, therapeutic exercises and progress toward the following goals:    · Plan of Care Expires:  05/16/22    Subjective     Chief Complaint: abdominal pain  Patient/Family Comments/goals: to get to chair  Pain/Comfort:  · Pain Rating 1: 10/10  · Location - Side 1: Left  · Location 1: abdomen  · Pain Addressed 1: Reposition, Cessation of Activity  · Pain Rating Post-Intervention 1: 10/10      Objective:     Communicated with RN prior to session.  Patient found HOB elevated with telemetry upon PT entry to room.     General Precautions: Standard, fall   Orthopedic Precautions:N/A   Braces: N/A  Respiratory Status: Room air Pt left on 2L O2 following activity, 93%     Functional Mobility:  · Bed Mobility:     · Supine to Sit: independence  · Transfers:     · Sit to Stand:  contact guard assistance with rolling walker  · Gait: 156 ft RW and  CGA, 2 L O2 in tow, steady sal with verbal cueing on RW proximity    Therapeutic Activities and Exercises:   Education, poc, dc planning, fall prevention. PT educated pt on importance of out of bed to chair and functional mobility to negate negative effects of prolonged bed rest.     Patient left up in chair with all lines intact and call button in reach.    GOALS:   Multidisciplinary Problems     Physical Therapy Goals        Problem: Physical Therapy    Goal Priority Disciplines Outcome Goal Variances Interventions   Physical Therapy Goal     PT, PT/OT      Description: Goals to be met by: discharge     Patient will increase functional independence with mobility by performin. Gait  x 150 feet with Supervision using LRAD  2. Ascend/descend 10 stair with right Handrails Contact Guard Assistance using LRAD                     Time Tracking:     PT Received On: 22  PT Start Time: 0845     PT Stop Time: 0858  PT Total Time (min): 13 min     Billable Minutes: Gait Training 13    Treatment Type: Treatment  PT/PTA: PT     PTA Visit Number: 0     2022

## 2022-04-18 NOTE — ASSESSMENT & PLAN NOTE
S[place] [facility] [place]  Repeat paracentesis today without evidence of SBP    NPO midnight  Therapeutic paracentesis in the morning

## 2022-04-18 NOTE — PROGRESS NOTES
[facility] Medicine  Progress Note    Patient Name: Edgar Jackson  MRN: [id number]  Patient Class: IP- Inpatient   Admission Date: 4/7/2022  Length of Stay: 10 days  Attending Physician: Manoj Allen MD  Primary Care Provider: Tyler Castillo MD        Subjective:     Principal Problem:Fever        HPI:  Mr. Jackson is a 61-year-old male with a past medical history of renal failure status post renal transplant 2004, NIDDM2, ILD, and cirrhosis who presents today with complaints of elevated creatinine on outpatient labs.  It is severe.  It is associated with recent liver biopsy and paracentesis and recent addition of Lasix and spironolactone.  He denies fever, chills, nausea, vomiting, diarrhea, chest pain, shortness of breath, loss of consciousness.  He has a chronic cough that is productive of clear/white/yellow sputum at baseline which he states has worsened over the last 2 months. He had a liver biopsy and paracentesis yesterday at [facility] with Dr. Osullivan and his creatinine was 3.1. He was called and referred to the ED for further work up.       Overview/Hospital Course:  Patient with a history of remote renal transplant, secondary to hypertensive renal failure, on chronic immunosuppressant (sirolimus and tacrolimus), diagnosis of cirrhosis who underwent recent transjugular biopsy (pathology with chronic mild to moderate active hepatitis with bridging fibrosis and nodules) as well as paracentesis (2750 cc removed) on 04/06.  He also was seen by Pulmonary for concern of interstitial lung disease and reports ongoing dyspnea on exertion with cough.  He was referred to the ED due to abnormal outpatient labs with acute kidney injury.  He was borderline hypotensive in the ED, creatinine 3.1.  Also noted to have hyponatremia, hyperkalemia with metabolic acidosis.  Ongoing diarrhea quantified as 4 bowel movements per day.  On 04/08, renal function with creatinine 2.7, minimal urine output, on  bicarbonate drip, stool studies in process, Nephrology and Gastroenterology following.  On 04/09 still having diarrhea, about 8 bowel movements overnight, states no relation to time of day, usually after every meal, blood pressure is improved, BUN/creatinine 37/2.7.  On 04/10 states overall he feels better, Creon was started yesterday and diarrhea is improving, states he believes he is producing more urine, BUN/creatinine 40/2.6, Nephrology resuming bicarbonate drip, pulmonary consulted. On 4/11 developed fever and work up started, which was largely unrevealing.  Paracentesis was performed; fluid studies were not consistent with SBP.  Patient completed 3 days of IV ceftriaxone.  Patient persistent cough throughout the admission.      Interval History:  Diarrhea is improved.  His cough is better, but still present at night.  His worsening abdominal distension and pain with the cough.    Review of Systems   Constitutional:  Negative for fever.   HENT:  Negative for congestion and sore throat.    Respiratory:  Positive for cough.    Cardiovascular:  Negative for chest pain.   Gastrointestinal:  Negative for vomiting.   Genitourinary:  Negative for difficulty urinating.   Skin:  Negative for rash and wound.   Neurological:  Negative for headaches.   Psychiatric/Behavioral:  Negative for confusion.    Objective:     Vital Signs (Most Recent):  Temp: 98.4 °F (36.9 °C) (04/18/22 1348)  Pulse: 71 (04/18/22 1348)  Resp: 18 (04/18/22 1348)  BP: 130/87 (04/18/22 1348)  SpO2: 97 % (04/18/22 1348)   Vital Signs (24h Range):  Temp:  [98.4 °F (36.9 °C)-99.2 °F (37.3 °C)] 98.4 °F (36.9 °C)  Pulse:  [69-85] 71  Resp:  [16-19] 18  SpO2:  [93 %-97 %] 97 %  BP: (121-136)/(80-91) 130/87     Weight: 64.2 kg (141 lb 8.6 oz)  Body mass index is 21.52 kg/m².    Intake/Output Summary (Last 24 hours) at 4/18/2022 1537  Last data filed at 4/18/2022 1156  Gross per 24 hour   Intake 60 ml   Output 700 ml   Net -640 ml        Physical  Exam  Vitals and nursing note reviewed.   Constitutional:       General: He is not in acute distress.     Appearance: He is not diaphoretic.      Comments: Sitting in bed, NAD, cooperative   HENT:      Head: Normocephalic and atraumatic.      Mouth/Throat:      Mouth: Mucous membranes are moist.      Comments: No oral thrush  Eyes:      General:         Right eye: No discharge.         Left eye: No discharge.   Cardiovascular:      Rate and Rhythm: Normal rate and regular rhythm.   Pulmonary:      Breath sounds: Wheezing present.      Comments: On RA, inspiratory crackles at bases.  No coughing on exam today.  Abdominal:      General: Bowel sounds are normal.      Palpations: Abdomen is soft.      Tenderness: There is no abdominal tenderness.      Comments: Abdomen distended, but not tense.  Bandage in place right lower quadrant.   Skin:     General: Skin is warm and dry.   Neurological:      Mental Status: He is alert and oriented to person, place, and time.      Comments: Speech intact, moving all four extremities, generalized weakness   Psychiatric:         Mood and Affect: Mood normal.       Significant Labs: Blood Culture: No results for input(s): LABBLOO in the last 48 hours.    BMP:   Recent Labs   Lab 04/18/22  0555   *   *   K 4.5   CL 93*   CO2 23   BUN 43*   CREATININE 2.7*   CALCIUM 7.8*   MG 1.6       CBC:   Recent Labs   Lab 04/17/22  0606 04/18/22  0555   WBC 5.64 5.41   HGB 10.9* 10.9*   HCT 30.5* 30.2*   PLT 76* 79*       CMP:   Recent Labs   Lab 04/17/22  0605 04/18/22  0555   * 122*   K 4.4 4.5   CL 93* 93*   CO2 19* 23   * 115*   BUN 47* 43*   CREATININE 3.0* 2.7*   CALCIUM 7.6* 7.8*   PROT 5.5* 5.8*   ALBUMIN 2.5* 2.6*   BILITOT 1.7* 2.0*   ALKPHOS 88 86   AST 99* 96*   ALT 19 19   ANIONGAP 8 6*   EGFRNONAA 21.6* 24.5*       Cardiac Markers:   Recent Labs   Lab 04/17/22  0606   *       Lactic Acid: No results for input(s): LACTATE in the last 48  hours.  Magnesium:   Recent Labs   Lab 04/17/22  0605 04/18/22  0555   MG 1.6 1.6       Respiratory Culture: No results for input(s): GSRESP, RESPIRATORYC in the last 48 hours.  Troponin: No results for input(s): TROPONINI in the last 48 hours.  TSH:   Recent Labs   Lab 02/09/22  0814   TSH 1.536       Urine Culture: No results for input(s): LABURIN in the last 48 hours.  Urine Studies: No results for input(s): COLORU, APPEARANCEUA, PHUR, SPECGRAV, PROTEINUA, GLUCUA, KETONESU, BILIRUBINUA, OCCULTUA, NITRITE, UROBILINOGEN, LEUKOCYTESUR, RBCUA, WBCUA, BACTERIA, SQUAMEPITHEL, HYALINECASTS in the last 48 hours.    Invalid input(s): WRIGHTSUR      Significant Imaging: I have reviewed and interpreted all pertinent imaging results/findings within the past 24 hours.      Assessment/Plan:      * Fever in immunocomprised patient  T-max 100.6, denies any new localizing source  Pancultures including UA with reflex urine culture, blood culture, chest x-ray being repeated, diagnostic paracentesis  Given only potential source at this time and patient is immunocompromised will treat empirically for SBP with Rocephin  Continue to monitor temperature curve and follow up culture results  Infectious disease consulted    Discontinue Rocephin  No recurrent fevers    Weakness  Patient uses a cane at home.  Family is concerned about stability.  PT ordered  OT ordered  Ambulate    Home walker ordered      Anemia  Chronic and multifactorial      Thrombocytopenia  No evidence of bleeding, due to cirrhosis, monitoring      Hypothyroidism  Continue Synthroid      Metabolic acidosis  Improving.  Sodium bicarbonate per Nephrology        Hyponatremia  Stable.  Nephrology following  Patient's renal function and hyponatremia acutely worsened after single dose of Lasix      Diarrhea  Improving.    C diff negative Giardia negative.  No pathogenic bacteria identified on stool culture.    Continue Creon  Continue loperamide p.r.n.      ILDEFONSO (acute kidney  injury)  Ultrasound transplant kidney no acute changes.  Possibly due to tacrolimus versus hepatorenal syndrome?  Nephrology has been consulted    Continue to hold lisinopril, Aldactone, metformin, lasix  Continue p.o. bicarbonate    24 hour urine per nephrology    Continuing to titrate tacrolimus    Cirrhosis of liver with ascites  [place] [facility] [place]  Repeat paracentesis today without evidence of SBP    NPO midnight  Therapeutic paracentesis in the morning    Interstitial lung disease  Followed by outpatient Pulmonary Dr. Boyer, chest x-ray with continued changes  Pulmonary here have ordered serology workup, follow-up  Continue Scheduled Benzonate  Hycodan b.i.d.    Continue Flonase and Mucinex   DuoNebs t.i.d.  Continue budesonide b.i.d.    3 L home oxygen via nasal cannula with activity ordered      Diabetes mellitus due to underlying condition with chronic kidney disease, with long-term current use of insulin  Continue basal bolus insulin with Accu-Cheks  As needed hypoglycemic measures  Previous HbA1c 6.6%  Continue detemir 22 units q.h.s.  Continue insulin sliding scale    Gout  Continue allopurinol      Personal history of immunosupression therapy  Continuing chronic immunosuppressant      Hypertension  Permissive hypertension per Nephrology at this time      History of renal transplant  Continue sirolimus  Tacrolimus dosing per Nephrology        VTE Risk Mitigation (From admission, onward)         Ordered     IP VTE HIGH RISK PATIENT  Once         04/07/22 1800     Place sequential compression device  Until discontinued         04/07/22 1800                Discharge Planning   LE:      Code Status: Full Code   Is the patient medically ready for discharge?:     Reason for patient still in hospital (select all that apply): Consult recommendations  Discharge Plan A: Home with family   Discharge Delays: None known at this time              Manoj Allen MD  Department of  [facility] Medicine   [facility]

## 2022-04-18 NOTE — PROGRESS NOTES
Nephrology Consult Note        Patient Name: Edgar Jackson  MRN: [id number]    Patient Class: IP- Inpatient   Admission Date: 4/7/2022  Length of Stay: 10 days  Date of Service: 4/18/2022    Attending Physician: Manoj Allen MD  Primary Care Provider: Tyler Castillo MD    Reason for Consult: ildefonso/hyponatremia/acidosis/hyperkalemia/kidney transplant/cirrhosis with ascites/anemia/diarrhea/gout/htn/hypotension/dm2    SUBJECTIVE:     HPI: 61m with kidney transplant and liver disease who recently moved to the area and is yet to establish care is admitted after paracentesis and liver biopsy on 4/6. Labs show ILDEFONSO, hyponatremia, hyperkalemia. He also complains of diarrhea. He is also reportedly on diuretic, spironolactone, ACEi. Received IVF in ER for hypotension.    4/8 VSS, no new complains. Appreciate GI input. Start oral bicarb for acidosis.  4/9 VSS, no new complains. GI work-up for diarrhea in progress.  4/10 VSS. Thrombocytopenia of unclear etiology. Continues to have diarrhea. sCr not worse. Acidosis persists despite oral bicarb, will add bicarb drip to give more bicarb and add more volume. K remains marginal, bicarb drip should help. sNa is low, suspect subtle volume depldtion, more IVF should help.  4/11 VSS. Low grade fever, non-productive cough, CXR unimpressive, Appreciate Pulm input. Hyponatremia worsening - will send urine lytes, stop bicarb gtt and increase oral bicarb to TID. Diarrhea reportedly better with creon.  4/12  Not in his room.  Output not recorded.   4/13  Has diarrhea.  No vomiting or sob.  Weak.      4/14  AFVSS.  1060 cc UOP.  C/o cough and diarrhea  4/15  Diarrhea better.  Coughing at night.  No vomiting.  1900cc uop  4/16  Diarrhea back.  Coughing a lot.  No eating.  4/17  About the same.  Still coughing.  Diarrhea. 700cc output   4/18  24hr urine w/partial results--turned in yesterday afternoon.  Scr trended down some.  Up in chair, c/o pain with coughing, no SOB.  950cc UOP recorded.   VSS.    Past Medical History:   Diagnosis Date    CKD (chronic kidney disease)     Diabetes 2020    Gout     Hypertension 2000    [place] transplant [place]    [place]    Thyroid disease     Unspecified cirrhosis of liver      Past Surgical History:   Procedure Laterality Date    CATARACT EXTRACTION Right 2017    COLONOSCOPY N/A 3/8/2022    Procedure: COLONOSCOPY;  Surgeon: Mio Simmons III, MD;  Location: [facility] ENDO;  Service: Endoscopy;  Laterality: N/A;    ENDOSCOPIC ULTRASOUND OF UPPER GASTROINTESTINAL TRACT N/A 3/8/2022    Procedure: ULTRASOUND, UPPER GI TRACT, ENDOSCOPIC;  Surgeon: Mio Simmons III, MD;  Location: [facility] ENDO;  Service: Endoscopy;  Laterality: N/A;    KIDNEY TRANSPLANT  2004    PORTACATH PLACEMENT  2003    REMOVAL OF VASCULAR ACCESS PORT  2005     Family History   Problem Relation Age of Onset    Kidney disease Father     Heart disease Father      Social History     Tobacco Use    Smoking status: Never Smoker    Smokeless tobacco: Never Used   Substance Use Topics    Alcohol use: Not Currently    Drug use: Never       Review of patient's allergies indicates:  No Known Allergies    Outpatient meds:  No current facility-administered medications on file prior to encounter.     Current Outpatient Medications on File Prior to Encounter   Medication Sig Dispense Refill    allopurinoL (ZYLOPRIM) 100 MG tablet Take 100 mg by mouth once daily.      levothyroxine (SYNTHROID) 50 MCG tablet Take 50 mcg by mouth once daily.      lisinopriL (PRINIVIL,ZESTRIL) 5 MG tablet Take 5 mg by mouth once daily.      metFORMIN (GLUCOPHAGE) 500 MG tablet Take 500 mg by mouth 2 (two) times daily.      metoprolol tartrate (LOPRESSOR) 50 MG tablet Take 50 mg by mouth 2 (two) times daily.      minoxidiL (LONITEN) 2.5 MG tablet Take 2.5 mg by mouth 2 (two) times daily.      pravastatin (PRAVACHOL) 40 MG tablet Take 40 mg by mouth every evening.      sirolimus (RAPAMUNE) 1 MG  "Tab Take 2 mg by mouth once daily.      tacrolimus (PROGRAF) 0.5 MG Cap Take 2 mg by mouth every morning. And 1.5mg in the evening      tacrolimus (PROGRAF) 1 MG Cap Take 1.5 mg by mouth every evening.      TOUJEO SOLOSTAR U-300 INSULIN 300 unit/mL (1.5 mL) InPn pen Inject 30 Units into the skin nightly.      VENTOLIN HFA 90 mcg/actuation inhaler INHALE 2 PUFFS INTO THE LUNGS EVERY 6 (SIX) HOURS AS NEEDED FOR WHEEZING (COUGHING). RESCUE (Patient taking differently: Inhale 1 puff into the lungs every 6 (six) hours as needed.) 18 g 2    alfuzosin (UROXATRAL) 10 mg Tb24 Take 1 tablet (10 mg total) by mouth after dinner. 30 tablet 12    BD NOEMI 2ND GEN PEN NEEDLE 32 gauge x 5/32" Ndle USE AS DIRECTED ONCE DAILY WITH BASAGLAR      sildenafiL (VIAGRA) 100 MG tablet Take 1 tablet (100 mg total) by mouth daily as needed for Erectile Dysfunction. 6 tablet 6    [DISCONTINUED] furosemide (LASIX) 20 MG tablet       [DISCONTINUED] spironolactone (ALDACTONE) 50 MG tablet          Scheduled meds:      Infusions:      PRN meds:      Review of Systems:  Review of Systems   Constitutional: Positive for malaise/fatigue. Negative for chills, fever and weight loss.   HENT: Negative for hearing loss and nosebleeds.    Eyes: Negative for blurred vision, double vision and photophobia.   Respiratory: Negative for cough, shortness of breath and wheezing.    Cardiovascular: Negative for chest pain, palpitations and leg swelling.   Gastrointestinal: Positive for diarrhea. Negative for abdominal pain, constipation, heartburn, nausea and vomiting.   Genitourinary: Negative for dysuria, frequency and urgency.   Musculoskeletal: Negative for falls, joint pain and myalgias.   Skin: Negative for itching and rash.   Neurological: Positive for weakness. Negative for dizziness, speech change, focal weakness, loss of consciousness and headaches.   Endo/Heme/Allergies: Does not bruise/bleed easily.   Psychiatric/Behavioral: Negative for " depression and substance abuse. The patient is not nervous/anxious.      OBJECTIVE:     Vital Signs and IO (Last 24H):  Temp:  [98.1 °F (36.7 °C)-99.2 °F (37.3 °C)]   Pulse:  [65-85]   Resp:  [16-18]   BP: (121-136)/(80-91)   SpO2:  [93 %-95 %]   I/O last 3 completed shifts:  In: 1360 [P.O.:1360]  Out: 1250 [Urine:1250]    Wt Readings from Last 5 Encounters:   04/17/22 64.2 kg (141 lb 8.6 oz)   04/06/22 68 kg (150 lb)   03/30/22 67.9 kg (149 lb 12.8 oz)   03/17/22 67 kg (147 lb 11.3 oz)   03/04/22 72.6 kg (160 lb)     Physical Exam:  Constitutional:       General: He is not in acute distress.     Appearance: He is well-developed. He is not diaphoretic.   HENT:      Head: Normocephalic and atraumatic.      Mouth/Throat:      Mouth: Mucous membranes are moist.   Eyes:      General: No scleral icterus.     Pupils: Pupils are equal, round, and reactive to light.   Cardiovascular:      Rate and Rhythm: Normal rate and regular rhythm.   Pulmonary:      Effort: Pulmonary effort is normal. No respiratory distress.      Breath sounds: No stridor.   Abdominal:      General: There is no distension.      Palpations: Abdomen is soft.   Musculoskeletal:         General: No deformity. Normal range of motion.      Cervical back: Neck supple.   Skin:     General: Skin is warm and dry.      Findings: No erythema or rash.   Neurological:      Mental Status: He is alert and oriented to person, place, and time.      Cranial Nerves: No cranial nerve deficit.   Psychiatric:         Behavior: Behavior normal.     Body mass index is 21.52 kg/m².    Laboratory:  Recent Labs   Lab 04/16/22  0715 04/17/22  0605 04/18/22  0555   * 120* 122*   K 4.6 4.4 4.5   CL 93* 93* 93*   CO2 19* 19* 23   BUN 47* 47* 43*   CREATININE 3.2* 3.0* 2.7*   ESTGFRAFRICA 23.1* 24.9* 28.3*   EGFRNONAA 20.0* 21.6* 24.5*   * 135* 115*       Recent Labs   Lab 04/16/22  0715 04/17/22  0605 04/18/22  0555   CALCIUM 7.5* 7.6* 7.8*   ALBUMIN 2.5* 2.5* 2.6*   MG  1.7 1.6 1.6             No results for input(s): POCTGLUCOSE in the last 168 hours.    Recent Labs   Lab 12/30/21  0655 02/09/22  0814   Hemoglobin A1C 7.7 H 6.6 H       Recent Labs   Lab 04/16/22  0715 04/17/22  0606 04/18/22  0555   WBC 4.54 5.64 5.41   HGB 11.2* 10.9* 10.9*   HCT 31.5* 30.5* 30.2*   PLT 59* 76* 79*   MCV 85 85 84   MCHC 35.6 35.7 36.1*   MONO 19.4*  0.9 17.7*  1.0 15.5*  0.8       Recent Labs   Lab 04/16/22  0715 04/17/22  0605 04/18/22  0555   BILITOT 1.8* 1.7* 2.0*   PROT 5.9* 5.5* 5.8*   ALBUMIN 2.5* 2.5* 2.6*   ALKPHOS 89 88 86   ALT 23 19 19   * 99* 96*       Recent Labs   Lab 02/03/22  0603 04/07/22  1515 04/11/22  1438 04/16/22  1330   Color, UA Yellow Yellow Yellow  --    Appearance, UA Clear Clear Clear  --    pH, UA 6.0 6.0 6.0  --    Specific Gravity, UA 1.010 1.020 1.010  --    Protein, UA Negative Trace A Negative  --    Glucose, UA Trace A 2+ A 1+ A  --    Ketones, UA Negative Negative Negative  --    Urobilinogen, UA Negative Negative Negative  --    Bilirubin (UA) Negative Negative Negative  --    Occult Blood UA Negative Negative Negative  --    Nitrite, UA Negative Negative Negative  --    RBC, UA  --   --   --  3  3   WBC, UA  --   --   --  2  2   Bacteria  --   --   --  Negative  Negative   Hyaline Casts, UA  --   --   --  11 A  11 A             Microbiology Results (last 7 days)     Procedure Component Value Units Date/Time    Blood culture [[id number]] Collected: 04/11/22 0852    Order Status: Completed Specimen: Blood Updated: 04/16/22 1032     Blood Culture, Routine No growth after 5 days.    Narrative:      2 sets please  Collection has been rescheduled by [facility] at 04/11/2022 08:50 Reason:   Patient unavailable, getting stress test.  Collection has been rescheduled by [facility] at 04/11/2022 08:50 Reason:   Patient unavailable, getting stress test.    Culture, Body Fluid (Aerobic) w/ GS [[id number]] Collected: 04/12/22 1221    Order Status: Completed Specimen: Ascites  Updated: 04/15/22 0923     AEROBIC CULTURE - FLUID No growth     Gram Stain Result Few WBC's      No organisms seen    Narrative:      To rule out SBP order labs: Aerobic culture [[id number]],  Culture, Anaerobic [[id number]], Gram stain [YNT668], Albumin  [TKU371], Protein [QVS252], LDH [LDH701], WBC \T\ Dff  [[id number]].    (rule out SBP) Culture, Anaerobic [[id number]] Collected: 04/12/22 1221    Order Status: Completed Specimen: Ascites Updated: 04/14/22 1452     Anaerobic Culture No anaerobes isolated    Narrative:      To rule out SBP order labs: Aerobic culture [[id number]],  Culture, Anaerobic [VNS162], Gram stain [EMD698], Albumin  [ADG073], Protein [PQY904], LDH [NUG094], WBC \T\ Dff  [OTS0489].    Culture, Respiratory with Gram Stain [[id number]]     Order Status: Sent Specimen: Sputum, Expectorated     Giardia Specific Antigen [[id number]] Collected: 04/08/22 1835    Order Status: Completed Specimen: Stool Updated: 04/13/22 0719     Giardia Antigen - EIA Negative     Comment: Performed at:  [facility]  [id number]  : Zay Lorenzana MD, Phone:  [phone]          Giardia lamblia Ag Source 0    (rule out SBP) Aerobic culture [[id number]] Collected: 04/12/22 1221    Order Status: Canceled Specimen: Ascites     (rule out SBP) Gram stain [[id number]] Collected: 04/12/22 1221    Order Status: Canceled Specimen: Ascites     Gram stain [[id number]]     Order Status: No result Specimen: Ascites     Aerobic culture [[id number]]     Order Status: No result Specimen: Ascites     Culture, Anaerobic [[id number]]     Order Status: No result Specimen: Ascites         ASSESSMENT/PLAN:     ILDEFONSO due to ATN due to hypovolemia and possibly Tacrolimus toxicity  Live related donor kidney transplant for 18 years  Hyponatremia  Hyperkalemia  Acidosis  Hypotension  Diarrhea  DM2  Gout  Anemia  Cirrhosis s/p liver biopsy showing chronic active hepatitis and paracentesis on 4/6  HTN      No  NSAIDs , ACEI/ARB, IV contrast or other nephrotoxins.    Check 24 hour urine creatinine clearance    Keep MAP > 60, SBP > 100.    Dose meds for GFR < 30 ml/min.    Renal diet. K remains marginal, no Lokelma due to diarrhea.    Previous UA is clear, renal transplant US is OK. Continue home immunosuppression meds. Use pt own tacrolimus, hospital can not provide 1.5mg dose with current products available.Control DM2.    Tacrolimus level toxic. Held for a couple of days and resumed at a lower dose yesterday.  Check level on wednesday    Hold lasix.       Continue oral bicarb, increase to TID, stop drip due to worsening hyponatremia.      Tolerate asymptomatic HTN up to -160. Hold home BP meds.    Diagnose and treat diarrhea. GI Consult appreciated.    Continue gout meds.    Thank you for allowing us to participate in the care of your patient!   We will follow the patient and provide recommendations as needed.    Patient care time was spent personally by me on the following activities:     · Obtaining a history.  · Examination of patient.  · Providing medical care at the patients bedside.  · Developing a treatment plan with patient or surrogate and bedside caregivers.  · Ordering and reviewing laboratory studies, radiographic studies, pulse oximetry.  · Ordering and performing treatments and interventions.  · Evaluation of patient's response to treatment.  · Discussions with consultants while on the unit and immediately available to the patient.  · Re-evaluation of the patient's condition.  · Documentation in the medical record.     America Berrios NP      [facility]  [address] [place] [address]  JOCE Mustafa [id number]    [phone] - tel  [phone] - fax    4/18/2022

## 2022-04-18 NOTE — ASSESSMENT & PLAN NOTE
Ultrasound transplant kidney no acute changes.  Possibly due to tacrolimus versus hepatorenal syndrome?  Nephrology has been consulted    Continue to hold lisinopril, Aldactone, metformin, lasix  Continue p.o. bicarbonate    24 hour urine per nephrology    Continuing to titrate tacrolimus

## 2022-04-18 NOTE — CARE UPDATE
04/18/22 0803   Patient Assessment/Suction   Level of Consciousness (AVPU) alert   Respiratory Effort Unlabored   All Lung Fields Breath Sounds clear  (crackles bases)   Cough Type nonproductive   PRE-TX-O2   O2 Device (Oxygen Therapy) room air   SpO2 95 %   Pulse 70   Resp 18   Temp 98.4 °F (36.9 °C)   /89   Positioning   Body Position position changed independently   Head of Bed (HOB) Positioning HOB at 20-30 degrees   Aerosol Therapy   $ Aerosol Therapy Charges Aerosol Treatment   Respiratory Treatment Status (SVN) given   Treatment Route (SVN) mask   Patient Position (SVN) semi-Abad's   Signs of Intolerance (SVN) none   Respiratory Evaluation   $ Care Plan Tech Time 15 min

## 2022-04-18 NOTE — SUBJECTIVE & OBJECTIVE
Interval History:  Diarrhea is improved.  His cough is better, but still present at night.  His worsening abdominal distension and pain with the cough.    Review of Systems   Constitutional:  Negative for fever.   HENT:  Negative for congestion and sore throat.    Respiratory:  Positive for cough.    Cardiovascular:  Negative for chest pain.   Gastrointestinal:  Negative for vomiting.   Genitourinary:  Negative for difficulty urinating.   Skin:  Negative for rash and wound.   Neurological:  Negative for headaches.   Psychiatric/Behavioral:  Negative for confusion.    Objective:     Vital Signs (Most Recent):  Temp: 98.4 °F (36.9 °C) (04/18/22 1348)  Pulse: 71 (04/18/22 1348)  Resp: 18 (04/18/22 1348)  BP: 130/87 (04/18/22 1348)  SpO2: 97 % (04/18/22 1348)   Vital Signs (24h Range):  Temp:  [98.4 °F (36.9 °C)-99.2 °F (37.3 °C)] 98.4 °F (36.9 °C)  Pulse:  [69-85] 71  Resp:  [16-19] 18  SpO2:  [93 %-97 %] 97 %  BP: (121-136)/(80-91) 130/87     Weight: 64.2 kg (141 lb 8.6 oz)  Body mass index is 21.52 kg/m².    Intake/Output Summary (Last 24 hours) at 4/18/2022 1537  Last data filed at 4/18/2022 1156  Gross per 24 hour   Intake 60 ml   Output 700 ml   Net -640 ml        Physical Exam  Vitals and nursing note reviewed.   Constitutional:       General: He is not in acute distress.     Appearance: He is not diaphoretic.      Comments: Sitting in bed, NAD, cooperative   HENT:      Head: Normocephalic and atraumatic.      Mouth/Throat:      Mouth: Mucous membranes are moist.      Comments: No oral thrush  Eyes:      General:         Right eye: No discharge.         Left eye: No discharge.   Cardiovascular:      Rate and Rhythm: Normal rate and regular rhythm.   Pulmonary:      Breath sounds: Wheezing present.      Comments: On RA, inspiratory crackles at bases.  No coughing on exam today.  Abdominal:      General: Bowel sounds are normal.      Palpations: Abdomen is soft.      Tenderness: There is no abdominal tenderness.       Comments: Abdomen distended, but not tense.  Bandage in place right lower quadrant.   Skin:     General: Skin is warm and dry.   Neurological:      Mental Status: He is alert and oriented to person, place, and time.      Comments: Speech intact, moving all four extremities, generalized weakness   Psychiatric:         Mood and Affect: Mood normal.       Significant Labs: Blood Culture: No results for input(s): LABBLOO in the last 48 hours.    BMP:   Recent Labs   Lab 04/18/22  0555   *   *   K 4.5   CL 93*   CO2 23   BUN 43*   CREATININE 2.7*   CALCIUM 7.8*   MG 1.6       CBC:   Recent Labs   Lab 04/17/22  0606 04/18/22  0555   WBC 5.64 5.41   HGB 10.9* 10.9*   HCT 30.5* 30.2*   PLT 76* 79*       CMP:   Recent Labs   Lab 04/17/22  0605 04/18/22  0555   * 122*   K 4.4 4.5   CL 93* 93*   CO2 19* 23   * 115*   BUN 47* 43*   CREATININE 3.0* 2.7*   CALCIUM 7.6* 7.8*   PROT 5.5* 5.8*   ALBUMIN 2.5* 2.6*   BILITOT 1.7* 2.0*   ALKPHOS 88 86   AST 99* 96*   ALT 19 19   ANIONGAP 8 6*   EGFRNONAA 21.6* 24.5*       Cardiac Markers:   Recent Labs   Lab 04/17/22  0606   *       Lactic Acid: No results for input(s): LACTATE in the last 48 hours.  Magnesium:   Recent Labs   Lab 04/17/22  0605 04/18/22  0555   MG 1.6 1.6       Respiratory Culture: No results for input(s): GSRESP, RESPIRATORYC in the last 48 hours.  Troponin: No results for input(s): TROPONINI in the last 48 hours.  TSH:   Recent Labs   Lab 02/09/22  0814   TSH 1.536       Urine Culture: No results for input(s): LABURIN in the last 48 hours.  Urine Studies: No results for input(s): COLORU, APPEARANCEUA, PHUR, SPECGRAV, PROTEINUA, GLUCUA, KETONESU, BILIRUBINUA, OCCULTUA, NITRITE, UROBILINOGEN, LEUKOCYTESUR, RBCUA, WBCUA, BACTERIA, SQUAMEPITHEL, HYALINECASTS in the last 48 hours.    Invalid input(s): SUKHI      Significant Imaging: I have reviewed and interpreted all pertinent imaging results/findings within the past 24 hours.

## 2022-04-19 NOTE — SUBJECTIVE & OBJECTIVE
Interval History:  Diarrhea is improved.  His cough is improving.  He has been abdominal distention today.  Planning paracentesis later.    Review of Systems   Constitutional:  Negative for fever.   HENT:  Negative for congestion and sore throat.    Respiratory:  Positive for cough.    Cardiovascular:  Negative for chest pain.   Gastrointestinal:  Negative for vomiting.   Genitourinary:  Negative for difficulty urinating.   Skin:  Negative for rash and wound.   Neurological:  Negative for headaches.   Psychiatric/Behavioral:  Negative for confusion.    Objective:     Vital Signs (Most Recent):  Temp: 99.3 °F (37.4 °C) (04/19/22 1214)  Pulse: 66 (04/19/22 1214)  Resp: 18 (04/19/22 1214)  BP: 115/82 (04/19/22 1214)  SpO2: 100 % (04/19/22 1214)   Vital Signs (24h Range):  Temp:  [98.5 °F (36.9 °C)-99.7 °F (37.6 °C)] 99.3 °F (37.4 °C)  Pulse:  [64-77] 66  Resp:  [18] 18  SpO2:  [93 %-100 %] 100 %  BP: (115-133)/(75-88) 115/82     Weight: 64.2 kg (141 lb 8.6 oz)  Body mass index is 21.52 kg/m².    Intake/Output Summary (Last 24 hours) at 4/19/2022 1635  Last data filed at 4/19/2022 1202  Gross per 24 hour   Intake 120 ml   Output 5200 ml   Net -5080 ml        Physical Exam  Vitals and nursing note reviewed.   Constitutional:       General: He is not in acute distress.     Appearance: He is not diaphoretic.      Comments: Sitting in bed, NAD, cooperative   HENT:      Head: Normocephalic and atraumatic.      Mouth/Throat:      Mouth: Mucous membranes are moist.      Comments: No oral thrush  Eyes:      General:         Right eye: No discharge.         Left eye: No discharge.   Cardiovascular:      Rate and Rhythm: Normal rate and regular rhythm.   Pulmonary:      Breath sounds: Wheezing present.      Comments: On RA, inspiratory crackles at bases.  No coughing on exam today.  Abdominal:      General: Bowel sounds are normal.      Palpations: Abdomen is soft.      Tenderness: There is no abdominal tenderness.      Comments:  Abdomen distended, but not tense.  Bandage in place right lower quadrant.   Skin:     General: Skin is warm and dry.   Neurological:      Mental Status: He is alert and oriented to person, place, and time.      Comments: Speech intact, moving all four extremities, generalized weakness   Psychiatric:         Mood and Affect: Mood normal.       Significant Labs: Blood Culture: No results for input(s): LABBLOO in the last 48 hours.    BMP:   Recent Labs   Lab 04/19/22  0542   *   *   K 4.8   CL 92*   CO2 23   BUN 43*   CREATININE 2.7*   CALCIUM 7.6*   MG 1.7       CBC:   Recent Labs   Lab 04/18/22  0555 04/19/22  0542   WBC 5.41 4.96   HGB 10.9* 9.9*   HCT 30.2* 28.5*   PLT 79* 83*       CMP:   Recent Labs   Lab 04/18/22  0555 04/19/22  0542   * 121*   K 4.5 4.8   CL 93* 92*   CO2 23 23   * 112*   BUN 43* 43*   CREATININE 2.7* 2.7*   CALCIUM 7.8* 7.6*   PROT 5.8* 5.3*   ALBUMIN 2.6* 2.3*   BILITOT 2.0* 2.3*   ALKPHOS 86 80   AST 96* 84*   ALT 19 17   ANIONGAP 6* 6*   EGFRNONAA 24.5* 24.5*       Cardiac Markers: No results for input(s): CKMB, MYOGLOBIN, BNP, TROPISTAT in the last 48 hours.    Lactic Acid: No results for input(s): LACTATE in the last 48 hours.  Magnesium:   Recent Labs   Lab 04/18/22  0555 04/19/22  0542   MG 1.6 1.7       Respiratory Culture: No results for input(s): GSRESP, RESPIRATORYC in the last 48 hours.  Troponin: No results for input(s): TROPONINI in the last 48 hours.  TSH:   Recent Labs   Lab 02/09/22  0814   TSH 1.536       Urine Culture: No results for input(s): LABURIN in the last 48 hours.  Urine Studies: No results for input(s): COLORU, APPEARANCEUA, PHUR, SPECGRAV, PROTEINUA, GLUCUA, KETONESU, BILIRUBINUA, OCCULTUA, NITRITE, UROBILINOGEN, LEUKOCYTESUR, RBCUA, WBCUA, BACTERIA, SQUAMEPITHEL, HYALINECASTS in the last 48 hours.    Invalid input(s): SUKHI      Significant Imaging: I have reviewed and interpreted all pertinent imaging results/findings within the  past 24 hours.

## 2022-04-19 NOTE — PLAN OF CARE
Problem: Adult Inpatient Plan of Care  Goal: Plan of Care Review  4/18/2022 2208 by Darcy Manning RN  Outcome: Ongoing, Progressing  4/18/2022 2208 by Darcy Manning RN  Outcome: Ongoing, Progressing  Goal: Patient-Specific Goal (Individualized)  4/18/2022 2208 by Darcy Manning RN  Outcome: Ongoing, Progressing  4/18/2022 2208 by Darcy Manning RN  Outcome: Ongoing, Progressing  Goal: Absence of Hospital-Acquired Illness or Injury  4/18/2022 2208 by Darcy Manning RN  Outcome: Ongoing, Progressing  4/18/2022 2208 by Darcy Manning RN  Outcome: Ongoing, Progressing

## 2022-04-19 NOTE — PROGRESS NOTES
[facility] Medicine  Progress Note    Patient Name: Edgar Jackson  MRN: [id number]  Patient Class: IP- Inpatient   Admission Date: 4/7/2022  Length of Stay: 11 days  Attending Physician: Manoj Allen MD  Primary Care Provider: Tyler Castillo MD        Subjective:     Principal Problem:Fever        HPI:  Mr. Jackson is a 61-year-old male with a past medical history of renal failure status post renal transplant 2004, NIDDM2, ILD, and cirrhosis who presents today with complaints of elevated creatinine on outpatient labs.  It is severe.  It is associated with recent liver biopsy and paracentesis and recent addition of Lasix and spironolactone.  He denies fever, chills, nausea, vomiting, diarrhea, chest pain, shortness of breath, loss of consciousness.  He has a chronic cough that is productive of clear/white/yellow sputum at baseline which he states has worsened over the last 2 months. He had a liver biopsy and paracentesis yesterday at [facility] with Dr. Osullivan and his creatinine was 3.1. He was called and referred to the ED for further work up.       Overview/Hospital Course:  Patient with a history of remote renal transplant, secondary to hypertensive renal failure, on chronic immunosuppressant (sirolimus and tacrolimus), diagnosis of cirrhosis who underwent recent transjugular biopsy (pathology with chronic mild to moderate active hepatitis with bridging fibrosis and nodules) as well as paracentesis (2750 cc removed) on 04/06.  He also was seen by Pulmonary for concern of interstitial lung disease and reports ongoing dyspnea on exertion with cough.  He was referred to the ED due to abnormal outpatient labs with acute kidney injury.  He was borderline hypotensive in the ED, creatinine 3.1.  Also noted to have hyponatremia, hyperkalemia with metabolic acidosis.  Ongoing diarrhea quantified as 4 bowel movements per day.  On 04/08, renal function with creatinine 2.7, minimal urine output, on  bicarbonate drip, stool studies in process, Nephrology and Gastroenterology following.  On 04/09 still having diarrhea, about 8 bowel movements overnight, states no relation to time of day, usually after every meal, blood pressure is improved, BUN/creatinine 37/2.7.  On 04/10 states overall he feels better, Creon was started yesterday and diarrhea is improving, states he believes he is producing more urine, BUN/creatinine 40/2.6, Nephrology resuming bicarbonate drip, pulmonary consulted. On 4/11 developed fever and work up started, which was largely unrevealing.  Paracentesis was performed; fluid studies were not consistent with SBP.  Patient completed 5 days of IV ceftriaxone.  Patient's tacrolimus dosing was adjusted with improvement in his renal function.      Interval History:  Diarrhea is improved.  His cough is improving.  He has been abdominal distention today.  Planning paracentesis later.    Review of Systems   Constitutional:  Negative for fever.   HENT:  Negative for congestion and sore throat.    Respiratory:  Positive for cough.    Cardiovascular:  Negative for chest pain.   Gastrointestinal:  Negative for vomiting.   Genitourinary:  Negative for difficulty urinating.   Skin:  Negative for rash and wound.   Neurological:  Negative for headaches.   Psychiatric/Behavioral:  Negative for confusion.    Objective:     Vital Signs (Most Recent):  Temp: 99.3 °F (37.4 °C) (04/19/22 1214)  Pulse: 66 (04/19/22 1214)  Resp: 18 (04/19/22 1214)  BP: 115/82 (04/19/22 1214)  SpO2: 100 % (04/19/22 1214)   Vital Signs (24h Range):  Temp:  [98.5 °F (36.9 °C)-99.7 °F (37.6 °C)] 99.3 °F (37.4 °C)  Pulse:  [64-77] 66  Resp:  [18] 18  SpO2:  [93 %-100 %] 100 %  BP: (115-133)/(75-88) 115/82     Weight: 64.2 kg (141 lb 8.6 oz)  Body mass index is 21.52 kg/m².    Intake/Output Summary (Last 24 hours) at 4/19/2022 1635  Last data filed at 4/19/2022 1202  Gross per 24 hour   Intake 120 ml   Output 5200 ml   Net -5080 ml         Physical Exam  Vitals and nursing note reviewed.   Constitutional:       General: He is not in acute distress.     Appearance: He is not diaphoretic.      Comments: Sitting in bed, NAD, cooperative   HENT:      Head: Normocephalic and atraumatic.      Mouth/Throat:      Mouth: Mucous membranes are moist.      Comments: No oral thrush  Eyes:      General:         Right eye: No discharge.         Left eye: No discharge.   Cardiovascular:      Rate and Rhythm: Normal rate and regular rhythm.   Pulmonary:      Breath sounds: Wheezing present.      Comments: On RA, inspiratory crackles at bases.  No coughing on exam today.  Abdominal:      General: Bowel sounds are normal.      Palpations: Abdomen is soft.      Tenderness: There is no abdominal tenderness.      Comments: Abdomen distended, but not tense.  Bandage in place right lower quadrant.   Skin:     General: Skin is warm and dry.   Neurological:      Mental Status: He is alert and oriented to person, place, and time.      Comments: Speech intact, moving all four extremities, generalized weakness   Psychiatric:         Mood and Affect: Mood normal.       Significant Labs: Blood Culture: No results for input(s): LABBLOO in the last 48 hours.    BMP:   Recent Labs   Lab 04/19/22  0542   *   *   K 4.8   CL 92*   CO2 23   BUN 43*   CREATININE 2.7*   CALCIUM 7.6*   MG 1.7       CBC:   Recent Labs   Lab 04/18/22  0555 04/19/22  0542   WBC 5.41 4.96   HGB 10.9* 9.9*   HCT 30.2* 28.5*   PLT 79* 83*       CMP:   Recent Labs   Lab 04/18/22  0555 04/19/22  0542   * 121*   K 4.5 4.8   CL 93* 92*   CO2 23 23   * 112*   BUN 43* 43*   CREATININE 2.7* 2.7*   CALCIUM 7.8* 7.6*   PROT 5.8* 5.3*   ALBUMIN 2.6* 2.3*   BILITOT 2.0* 2.3*   ALKPHOS 86 80   AST 96* 84*   ALT 19 17   ANIONGAP 6* 6*   EGFRNONAA 24.5* 24.5*       Cardiac Markers: No results for input(s): CKMB, MYOGLOBIN, BNP, TROPISTAT in the last 48 hours.    Lactic Acid: No results for input(s):  LACTATE in the last 48 hours.  Magnesium:   Recent Labs   Lab 04/18/22  0555 04/19/22  0542   MG 1.6 1.7       Respiratory Culture: No results for input(s): GSRESP, RESPIRATORYC in the last 48 hours.  Troponin: No results for input(s): TROPONINI in the last 48 hours.  TSH:   Recent Labs   Lab 02/09/22  0814   TSH 1.536       Urine Culture: No results for input(s): LABURIN in the last 48 hours.  Urine Studies: No results for input(s): COLORU, APPEARANCEUA, PHUR, SPECGRAV, PROTEINUA, GLUCUA, KETONESU, BILIRUBINUA, OCCULTUA, NITRITE, UROBILINOGEN, LEUKOCYTESUR, RBCUA, WBCUA, BACTERIA, SQUAMEPITHEL, HYALINECASTS in the last 48 hours.    Invalid input(s): WRIGHTSUR      Significant Imaging: I have reviewed and interpreted all pertinent imaging results/findings within the past 24 hours.      Assessment/Plan:      * Fever in immunocomprised patient  T-max 100.6, denies any new localizing source  Pancultures including UA with reflex urine culture, blood culture, chest x-ray being repeated, diagnostic paracentesis  Given only potential source at this time and patient is immunocompromised will treat empirically for SBP with Rocephin  Continue to monitor temperature curve and follow up culture results  Infectious disease consulted    Discontinue Rocephin  No recurrent fevers    Weakness  Patient uses a cane at home.  Family is concerned about stability.  PT ordered  OT ordered  Ambulate    Home walker ordered  Ambulatory referral to PT upon discharge      Anemia  Chronic and multifactorial      Thrombocytopenia  No evidence of bleeding, due to cirrhosis, monitoring      Hypothyroidism  Continue Synthroid      Metabolic acidosis  Improving.  Sodium bicarbonate per Nephrology        Hyponatremia  Stable.  Nephrology following        Diarrhea  Improving.    C diff negative Giardia negative.  No pathogenic bacteria identified on stool culture.    Continue Creon  Continue loperamide p.r.n.      ILDEFONSO (acute kidney injury)  Ultrasound  transplant kidney no acute changes.  Possibly due to tacrolimus versus hepatorenal syndrome?  Nephrology has been consulted    Continue to hold lisinopril, Aldactone, metformin, lasix  Continue p.o. bicarbonate  Lasix and albumin today per nephrology    24 hour urine per nephrology.  Despite successful 24 hour urine collection, laboratory is trying to locate the specimen    Continuing to titrate tacrolimus    Cirrhosis of liver with ascites  S/p transjugular liver biopsy and paracentesis at [facility] [place]  Repeat paracentesis today without evidence of SBP    NPO midnight  Therapeutic paracentesis today    Interstitial lung disease  Followed by outpatient Pulmonary Dr. Boyer, chest x-ray with continued changes  Pulmonary here have ordered serology workup, follow-up  Continue Scheduled Benzonate  Hycodan b.i.d.    Continue Flonase and Mucinex   DuoNebs t.i.d.  Continue budesonide b.i.d.    3 L home oxygen via nasal cannula with activity ordered  amb referral to PT upon discharge      Diabetes mellitus due to underlying condition with chronic kidney disease, with long-term current use of insulin  Continue basal bolus insulin with Accu-Cheks  As needed hypoglycemic measures  Previous HbA1c 6.6%  Continue detemir 22 units q.h.s.  Continue insulin sliding scale    Gout  Continue allopurinol      Personal history of immunosupression therapy  Continuing chronic immunosuppressant      Hypertension  Permissive hypertension per Nephrology at this time      History of renal transplant  Continue sirolimus  Tacrolimus dosing per Nephrology        VTE Risk Mitigation (From admission, onward)         Ordered     IP VTE HIGH RISK PATIENT  Once         04/07/22 1800     Place sequential compression device  Until discontinued         04/07/22 1800                Discharge Planning   LE:      Code Status: Full Code   Is the patient medically ready for discharge?:     Reason for patient still in hospital (select all that apply): Treatment and  Consult recommendations  Discharge Plan A: Home with family   Discharge Delays: None known at this time              Manoj Allen MD  Department of Hospital Medicine   [facility]

## 2022-04-19 NOTE — PROGRESS NOTES
[facility]  Adult Nutrition   Progress Note (Follow-Up)    SUMMARY      Recommendations:   1.  Renal diet as tolerated.  2.  to obtain pt meal choices daily.       Goals:   Goals: 1. Patient intake will be > 75% estimated energy needs. 2. Labs to trend to target range.    Dietitian Rounds Brief  Patient off unit for paracentesis at visit. Diet just advanced to renal.    Diet order: NPO    % Intake of Estimated Energy Needs: 0%  % Meal Intake: NPO    Estimated/Assessed Needs  Weight Used For Calorie Calculations: 63.6 kg (140 lb 3.4 oz)  Energy Calorie Requirements (kcal): 9009-6997 kcal/day (25-30 kcal/kg)     Protein Requirements: 50-64 gm/day (0.8-1.0 gm / kg)  Weight Used For Protein Calculations: 63.6 kg (140 lb 3.4 oz)  Fluid Requirements (mL): 1ml/kcal or per MD     RDA Method (mL): 1590       Weight History:  Wt Readings from Last 5 Encounters:   04/17/22 64.2 kg (141 lb 8.6 oz)   04/06/22 68 kg (150 lb)   03/30/22 67.9 kg (149 lb 12.8 oz)   03/17/22 67 kg (147 lb 11.3 oz)   03/04/22 72.6 kg (160 lb)        Medications:Pertinent Medications Reviewed  Scheduled Meds:   albumin human 25%  25 g Intravenous Once    albuterol-ipratropium  3 mL Nebulization TID WAKE    allopurinoL  100 mg Oral Daily    benzonatate  200 mg Oral TID    budesonide  0.5 mg Nebulization Q12H    fluticasone propionate  2 spray Each Nostril Daily    furosemide (LASIX) injection  20 mg Intravenous Once    guaiFENesin  600 mg Oral BID    hydrocodone-chlorpheniramine  5 mL Oral BID    insulin detemir U-100  20 Units Subcutaneous QHS    levothyroxine  50 mcg Oral Daily    lipase-protease-amylase 12,000-38,000-60,000 units  2 capsule Oral QID (WM & HS)    metoprolol tartrate  50 mg Oral BID    pravastatin  40 mg Oral QHS    sirolimus  2 mg Oral Daily    sodium bicarbonate  1,300 mg Oral TID    tacrolimus  1.5 mg Oral BID    tamsulosin  0.4 mg Oral Daily     Continuous Infusions:  PRN Meds:.acetaminophen,  albuterol-ipratropium, dextromethorphan-guaiFENesin  mg/5 ml, dextrose 50%, dextrose 50%, glucagon (human recombinant), glucose, glucose, insulin aspart U-100, loperamide, melatonin, morphine, naloxone, ondansetron, oxyCODONE, polyethylene glycol, sodium chloride 0.9%    Labs: Pertinent Labs Reviewed  Clinical Chemistry:  Recent Labs   Lab 04/19/22  0542   *   K 4.8   CL 92*   CO2 23   *   BUN 43*   CREATININE 2.7*   CALCIUM 7.6*   PROT 5.3*   ALBUMIN 2.3*   BILITOT 2.3*   ALKPHOS 80   AST 84*   ALT 17   ANIONGAP 6*   ESTGFRAFRICA 28.3*   EGFRNONAA 24.5*   MG 1.7     CBC:   Recent Labs   Lab 04/19/22  0542   WBC 4.96   RBC 3.28*   HGB 9.9*   HCT 28.5*   PLT 83*   MCV 87   MCH 30.2   MCHC 34.7     Lipid Panel:  No results for input(s): CHOL, HDL, LDLCALC, TRIG, CHOLHDL in the last 168 hours.  Cardiac Profile:  Recent Labs   Lab 04/17/22  0606   *     Inflammatory Labs:  No results for input(s): CRP in the last 168 hours.  Diabetes:  No results for input(s): HGBA1C, POCTGLUCOSE in the last 168 hours.  Thyroid & Parathyroid:  No results for input(s): TSH, FREET4, W4EXHTD, N2TRWVO, THYROIDAB in the last 168 hours.    Nutrition Risk  Level of Risk/Frequency of Follow-up: high Carola Guillen, DANITZA 04/19/2022 3:20 PM

## 2022-04-19 NOTE — ASSESSMENT & PLAN NOTE
[place] [facility] [place]peat paracentesis today without evidence of SBP    NPO midnight  Therapeutic paracentesis today

## 2022-04-19 NOTE — PLAN OF CARE
Pt to via w/c. On 2 liters of O2 per n/c.   Pt a,a,ox3.  ID x 2 identifiers.  Assisted to us table.  Pre scan initiated.

## 2022-04-19 NOTE — ASSESSMENT & PLAN NOTE
Patient uses a cane at home.  Family is concerned about stability.  PT ordered  OT ordered  Ambulate    Home walker ordered  Ambulatory referral to PT upon discharge

## 2022-04-19 NOTE — PLAN OF CARE
Paracentesis completed.  4.1 liters of clear kellen fluid drained.  Pt chantale well.  Gauze and band aid dressing to right abd.  Report called to pt's nurse, Franck.  Pt to floor via w/c.

## 2022-04-19 NOTE — PLAN OF CARE
04/18/22 2043   Patient Assessment/Suction   Level of Consciousness (AVPU) alert   Respiratory Effort Normal;Unlabored   Expansion/Accessory Muscles/Retractions no use of accessory muscles   All Lung Fields Breath Sounds Anterior:;clear;wheezes, expiratory   DEEDEE Breath Sounds wheezes, expiratory   RUL Breath Sounds clear   Rhythm/Pattern, Respiratory unlabored   Cough Frequency infrequent   PRE-TX-O2   O2 Device (Oxygen Therapy) nasal cannula   $ Is the patient on Low Flow Oxygen? Yes   Flow (L/min) 2   SpO2 97 %   Pulse Oximetry Type Intermittent   $ Pulse Oximetry - Multiple Charge Pulse Oximetry - Multiple   Pulse 67   Resp 18   Aerosol Therapy   $ Aerosol Therapy Charges Aerosol Treatment   Daily Review of Necessity (SVN) completed   Respiratory Treatment Status (SVN) given   Treatment Route (SVN) mask;oxygen   Patient Position (SVN) semi-Abad's   Post Treatment Assessment (SVN) breath sounds unchanged   Signs of Intolerance (SVN) none   Breath Sounds Post-Respiratory Treatment   Post-treatment Heart Rate (beats/min) 68   Post-treatment Resp Rate (breaths/min) 18   Education   $ Education Bronchodilator;15 min   Respiratory Evaluation   $ Care Plan Tech Time 15 min

## 2022-04-19 NOTE — PROGRESS NOTES
Nephrology Progress Note        Patient Name: Edgar Jackson  MRN: [id number]    Patient Class: IP- Inpatient   Admission Date: 4/7/2022  Length of Stay: 11 days  Date of Service: 4/19/2022    Attending Physician: Manoj Allen MD  Primary Care Provider: Tyler Castillo MD    Reason for Consult: ildefonso/hyponatremia/acidosis/hyperkalemia/kidney transplant/cirrhosis with ascites/anemia/diarrhea/gout/htn/hypotension/dm2    SUBJECTIVE:     HPI: 61m with kidney transplant and liver disease who recently moved to the area and is yet to establish care is admitted after paracentesis and liver biopsy on 4/6. Labs show ILDEFONSO, hyponatremia, hyperkalemia. He also complains of diarrhea. He is also reportedly on diuretic, spironolactone, ACEi. Received IVF in ER for hypotension.    4/8 VSS, no new complains. Appreciate GI input. Start oral bicarb for acidosis.  4/9 VSS, no new complains. GI work-up for diarrhea in progress.  4/10 VSS. Thrombocytopenia of unclear etiology. Continues to have diarrhea. sCr not worse. Acidosis persists despite oral bicarb, will add bicarb drip to give more bicarb and add more volume. K remains marginal, bicarb drip should help. sNa is low, suspect subtle volume depldtion, more IVF should help.  4/11 VSS. Low grade fever, non-productive cough, CXR unimpressive, Appreciate Pulm input. Hyponatremia worsening - will send urine lytes, stop bicarb gtt and increase oral bicarb to TID. Diarrhea reportedly better with creon.  4/12  Not in his room.  Output not recorded.   4/13  Has diarrhea.  No vomiting or sob.  Weak.      4/14  AFVSS.  1060 cc UOP.  C/o cough and diarrhea  4/15  Diarrhea better.  Coughing at night.  No vomiting.  1900cc uop  4/16  Diarrhea back.  Coughing a lot.  No eating.  4/17  About the same.  Still coughing.  Diarrhea. 700cc output   4/18  24hr urine w/partial results--turned in yesterday afternoon.  Scr trended down some.  Up in chair, c/o pain with coughing, no SOB.  950cc UOP recorded.   "VSS.  4/19 went for para- got off 4.1L, BP stable, on RA, UOP 950cc- spoke with lab and cannot find his urine- working on it    Outpatient meds:  No current facility-administered medications on file prior to encounter.     Current Outpatient Medications on File Prior to Encounter   Medication Sig Dispense Refill    allopurinoL (ZYLOPRIM) 100 MG tablet Take 100 mg by mouth once daily.      levothyroxine (SYNTHROID) 50 MCG tablet Take 50 mcg by mouth once daily.      lisinopriL (PRINIVIL,ZESTRIL) 5 MG tablet Take 5 mg by mouth once daily.      metFORMIN (GLUCOPHAGE) 500 MG tablet Take 500 mg by mouth 2 (two) times daily.      metoprolol tartrate (LOPRESSOR) 50 MG tablet Take 50 mg by mouth 2 (two) times daily.      minoxidiL (LONITEN) 2.5 MG tablet Take 2.5 mg by mouth 2 (two) times daily.      pravastatin (PRAVACHOL) 40 MG tablet Take 40 mg by mouth every evening.      sirolimus (RAPAMUNE) 1 MG Tab Take 2 mg by mouth once daily.      tacrolimus (PROGRAF) 0.5 MG Cap Take 2 mg by mouth every morning. And 1.5mg in the evening      tacrolimus (PROGRAF) 1 MG Cap Take 1.5 mg by mouth every evening.      TOUJEO SOLOSTAR U-300 INSULIN 300 unit/mL (1.5 mL) InPn pen Inject 30 Units into the skin nightly.      VENTOLIN HFA 90 mcg/actuation inhaler INHALE 2 PUFFS INTO THE LUNGS EVERY 6 (SIX) HOURS AS NEEDED FOR WHEEZING (COUGHING). RESCUE (Patient taking differently: Inhale 1 puff into the lungs every 6 (six) hours as needed.) 18 g 2    alfuzosin (UROXATRAL) 10 mg Tb24 Take 1 tablet (10 mg total) by mouth after dinner. 30 tablet 12    BD NOEMI 2ND GEN PEN NEEDLE 32 gauge x 5/32" Ndle USE AS DIRECTED ONCE DAILY WITH BASAGLAR      sildenafiL (VIAGRA) 100 MG tablet Take 1 tablet (100 mg total) by mouth daily as needed for Erectile Dysfunction. 6 tablet 6    [DISCONTINUED] furosemide (LASIX) 20 MG tablet       [DISCONTINUED] spironolactone (ALDACTONE) 50 MG tablet          Scheduled meds:      Infusions:      PRN " meds:      Review of Systems:  Neg    OBJECTIVE:     Vital Signs and IO (Last 24H):  Temp:  [98.4 °F (36.9 °C)-99.7 °F (37.6 °C)]   Pulse:  [64-77]   Resp:  [18]   BP: (115-133)/(75-88)   SpO2:  [93 %-100 %]   I/O last 3 completed shifts:  In: 180 [P.O.:180]  Out: 1450 [Urine:1450]    Wt Readings from Last 5 Encounters:   04/17/22 64.2 kg (141 lb 8.6 oz)   04/06/22 68 kg (150 lb)   03/30/22 67.9 kg (149 lb 12.8 oz)   03/17/22 67 kg (147 lb 11.3 oz)   03/04/22 72.6 kg (160 lb)     Physical Exam:  Constitutional: nad, aao x 3  Heart: rrr, no m/r/g, wwp, gen edema  Lungs: ctab, no w/r/r/c, no lb  Abdomen: s/nt/nd, +BS      Body mass index is 21.52 kg/m².    Laboratory:  Recent Labs   Lab 04/17/22  0605 04/18/22  0555 04/19/22  0542   * 122* 121*   K 4.4 4.5 4.8   CL 93* 93* 92*   CO2 19* 23 23   BUN 47* 43* 43*   CREATININE 3.0* 2.7* 2.7*   ESTGFRAFRICA 24.9* 28.3* 28.3*   EGFRNONAA 21.6* 24.5* 24.5*   * 115* 112*       Recent Labs   Lab 04/17/22  0605 04/18/22  0555 04/19/22  0542   CALCIUM 7.6* 7.8* 7.6*   ALBUMIN 2.5* 2.6* 2.3*   MG 1.6 1.6 1.7             No results for input(s): POCTGLUCOSE in the last 168 hours.    Recent Labs   Lab 12/30/21  0655 02/09/22  0814   Hemoglobin A1C 7.7 H 6.6 H       Recent Labs   Lab 04/17/22  0606 04/18/22  0555 04/19/22  0542   WBC 5.64 5.41 4.96   HGB 10.9* 10.9* 9.9*   HCT 30.5* 30.2* 28.5*   PLT 76* 79* 83*   MCV 85 84 87   MCHC 35.7 36.1* 34.7   MONO 17.7*  1.0 15.5*  0.8 16.9*  0.8       Recent Labs   Lab 04/17/22  0605 04/18/22  0555 04/19/22  0542   BILITOT 1.7* 2.0* 2.3*   PROT 5.5* 5.8* 5.3*   ALBUMIN 2.5* 2.6* 2.3*   ALKPHOS 88 86 80   ALT 19 19 17   AST 99* 96* 84*       Recent Labs   Lab 02/03/22  0603 04/07/22  1515 04/11/22  1438 04/16/22  1330   Color, UA Yellow Yellow Yellow  --    Appearance, UA Clear Clear Clear  --    pH, UA 6.0 6.0 6.0  --    Specific Gravity, UA 1.010 1.020 1.010  --    Protein, UA Negative Trace A Negative  --    Glucose, UA  Trace A 2+ A 1+ A  --    Ketones, UA Negative Negative Negative  --    Urobilinogen, UA Negative Negative Negative  --    Bilirubin (UA) Negative Negative Negative  --    Occult Blood UA Negative Negative Negative  --    Nitrite, UA Negative Negative Negative  --    RBC, UA  --   --   --  3  3   WBC, UA  --   --   --  2  2   Bacteria  --   --   --  Negative  Negative   Hyaline Casts, UA  --   --   --  11 A  11 A             Microbiology Results (last 7 days)     Procedure Component Value Units Date/Time    Blood culture [[id number]] Collected: 04/11/22 0852    Order Status: Completed Specimen: Blood Updated: 04/16/22 1032     Blood Culture, Routine No growth after 5 days.    Narrative:      2 sets please  Collection has been rescheduled by [facility] at 04/11/2022 08:50 Reason:   Patient unavailable, getting stress test.  Collection has been rescheduled by [facility] at 04/11/2022 08:50 Reason:   Patient unavailable, getting stress test.    Culture, Body Fluid (Aerobic) w/ GS [[id number]] Collected: 04/12/22 1221    Order Status: Completed Specimen: Ascites Updated: 04/15/22 0923     AEROBIC CULTURE - FLUID No growth     Gram Stain Result Few WBC's      No organisms seen    Narrative:      To rule out SBP order labs: Aerobic culture [[id number]],  Culture, Anaerobic [HLP011], Gram stain [[id number]], Albumin  [RQW350], Protein [AYQ306], LDH [[id number]], WBC \T\ Dff  [[id number]].    (rule out SBP) Culture, Anaerobic [[id number]] Collected: 04/12/22 1221    Order Status: Completed Specimen: Ascites Updated: 04/14/22 1452     Anaerobic Culture No anaerobes isolated    Narrative:      To rule out SBP order labs: Aerobic culture [[id number]],  Culture, Anaerobic [[id number]], Gram stain [DQH056], Albumin  [RWK734], Protein [JUI441], LDH [[id number]], WBC \T\ Dff  [[id number]].    Culture, Respiratory with Gram Stain [[id number]]     Order Status: Sent Specimen: Sputum, Expectorated     Giardia Specific Antigen [[id number]] Collected: 04/08/22 1835    Order  Status: Completed Specimen: Stool Updated: 04/13/22 0719     Giardia Antigen - EIA Negative     Comment: Performed at:  [facility]  [address], [place]  [id number]  : Zay Lorenzana MD, Phone:  [phone]          Giardia lamblia Ag Source 0        ASSESSMENT/PLAN:     ILDEFONSO due to ATN due to hypovolemia and possible tacrolimus toxicity  Live related donor kidney transplant for 18 years  Cirrhosis s/p liver biopsy showing chronic active hepatitis and paracentesis on 4/6  HTN  Hyponatremia  Hyperkalemia  Acidosis  SHPT  Anemia    - awaiting 24 hour urine CrCl  - ordered 25g albumin with 20mg IV lasix  - needs a regimen of lasix/aldactone ultimately  - continue renal diet, 1.5L fluid restriction  - continue bicarb supplement  - BMM, hematologic parameters are stable    Thank you for allowing us to participate in the care of your patient!   We will follow the patient and provide recommendations as needed.    Patient care time was spent personally by me on the following activities: > 35 min    · Obtaining a history.  · Examination of patient.  · Providing medical care at the patients bedside.  · Developing a treatment plan with patient or surrogate and bedside caregivers.  · Ordering and reviewing laboratory studies, radiographic studies, pulse oximetry.  · Ordering and performing treatments and interventions.  · Evaluation of patient's response to treatment.  · Discussions with consultants while on the unit and immediately available to the patient.  · Re-evaluation of the patient's condition.  · Documentation in the medical record.     Isac Castellanos MD      [facility]  [address] [place] [address]    [phone] - tel  [phone] - fax    4/19/2022

## 2022-04-19 NOTE — PT/OT/SLP PROGRESS
Physical Therapy Treatment    Patient Name:  Edgar Jackson   MRN:  [id number]    Recommendations:     Discharge Recommendations:  home health PT   Discharge Equipment Recommendations: walker, rolling   Barriers to discharge: increased assist with mobility and pain    Assessment:     Edgar Jackson is a 61 y.o. male admitted with a medical diagnosis of Fever.  He presents with the following impairments/functional limitations:  impaired endurance, gait instability, impaired balance, pain, impaired cardiopulmonary response to activity.  Pt agreeable to visit. Pt agreeable to ambulate in the hallway. Pt tolerated session well.    Rehab Prognosis: Fair; patient would benefit from acute skilled PT services to address these deficits and reach maximum level of function.    Recent Surgery: * No surgery found *      Plan:     During this hospitalization, patient to be seen 5 x/week to address the identified rehab impairments via gait training, therapeutic activities, therapeutic exercises and progress toward the following goals:    · Plan of Care Expires:  05/16/22    Subjective     Chief Complaint: none verbalized  Patient/Family Comments/goals: return home  Pain/Comfort:  · Pain Rating 1: 0/10      Objective:     Communicated with RN prior to session.  Patient found up in chair with telemetry, oxygen upon PT entry to room.     General Precautions: Standard, fall   Orthopedic Precautions:N/A   Braces: N/A  Respiratory Status: Nasal cannula, flow 2 L/min     Functional Mobility:  · Transfers:     · Sit to Stand:  contact guard assistance with rolling walker  · Gait: x 200' with RW and CGA, with pt able to navigate obstacles without cuing.      AM-PAC 6 CLICK MOBILITY          Therapeutic Activities and Exercises:   Pt educated on importance of time OOB, importance of intermittent mobility, safe techniques for transfers/ambulation, discharge recommendations/options, and use of call light for assistance and fall prevention.      Patient left  up in chair with all lines intact, call button in reach and RN notified..    GOALS:   Multidisciplinary Problems     Physical Therapy Goals        Problem: Physical Therapy    Goal Priority Disciplines Outcome Goal Variances Interventions   Physical Therapy Goal     PT, PT/OT      Description: Goals to be met by: discharge     Patient will increase functional independence with mobility by performin. Gait  x 150 feet with Supervision using LRAD  2. Ascend/descend 10 stair with right Handrails Contact Guard Assistance using LRAD                     Time Tracking:     PT Received On: 22  PT Start Time: 1310     PT Stop Time: 1318  PT Total Time (min): 8 min     Billable Minutes: Gait Training 8    Treatment Type: Treatment  PT/PTA: PTA     PTA Visit Number: 1     2022

## 2022-04-19 NOTE — ASSESSMENT & PLAN NOTE
Ultrasound transplant kidney no acute changes.  Possibly due to tacrolimus versus hepatorenal syndrome?  Nephrology has been consulted    Continue to hold lisinopril, Aldactone, metformin, lasix  Continue p.o. bicarbonate  Lasix and albumin today per nephrology    24 hour urine per nephrology.  Despite successful 24 hour urine collection, laboratory is trying to locate the specimen    Continuing to titrate tacrolimus

## 2022-04-20 NOTE — SUBJECTIVE & OBJECTIVE
Interval History:  Diarrhea is improved.  His cough is improving.  Patient feels improved after his paracentesis yesterday.  Tolerated Lasix and Aldactone well yesterday with improvement in his serum sodium.  Creatinine was elevated slightly from the day prior, but this is a marginal change.    Review of Systems   Constitutional:  Negative for fever.   HENT:  Negative for congestion and sore throat.    Respiratory:  Positive for cough.    Cardiovascular:  Negative for chest pain.   Gastrointestinal:  Negative for vomiting.   Genitourinary:  Negative for difficulty urinating.   Skin:  Negative for rash and wound.   Neurological:  Negative for headaches.   Psychiatric/Behavioral:  Negative for confusion.    Objective:     Vital Signs (Most Recent):  Temp: 98.1 °F (36.7 °C) (04/20/22 1244)  Pulse: 69 (04/20/22 1353)  Resp: 18 (04/20/22 1353)  BP: 131/87 (04/20/22 1244)  SpO2: 96 % (04/20/22 1353)   Vital Signs (24h Range):  Temp:  [98.1 °F (36.7 °C)-99.9 °F (37.7 °C)] 98.1 °F (36.7 °C)  Pulse:  [62-76] 69  Resp:  [16-20] 18  SpO2:  [92 %-99 %] 96 %  BP: (117-140)/(78-88) 131/87     Weight: 64.2 kg (141 lb 8.6 oz)  Body mass index is 21.52 kg/m².    Intake/Output Summary (Last 24 hours) at 4/20/2022 1705  Last data filed at 4/20/2022 1300  Gross per 24 hour   Intake 1575 ml   Output 1825 ml   Net -250 ml        Physical Exam  Vitals and nursing note reviewed.   Constitutional:       General: He is not in acute distress.     Appearance: He is not diaphoretic.      Comments: Sitting in bed, NAD, cooperative   HENT:      Head: Normocephalic and atraumatic.      Mouth/Throat:      Mouth: Mucous membranes are moist.      Comments: No oral thrush  Eyes:      General:         Right eye: No discharge.         Left eye: No discharge.   Cardiovascular:      Rate and Rhythm: Normal rate and regular rhythm.   Pulmonary:      Breath sounds: No wheezing.      Comments: On RA, inspiratory crackles at bases.  No coughing on exam  today.  Abdominal:      General: Bowel sounds are normal.      Palpations: Abdomen is soft.      Tenderness: There is no abdominal tenderness.   Skin:     General: Skin is warm and dry.   Neurological:      Mental Status: He is alert and oriented to person, place, and time.      Comments: Speech intact, moving all four extremities, generalized weakness   Psychiatric:         Mood and Affect: Mood normal.       Significant Labs: Blood Culture: No results for input(s): LABBLOO in the last 48 hours.    BMP:   Recent Labs   Lab 04/20/22  0544   *   *   K 4.7   CL 93*   CO2 27   BUN 46*   CREATININE 2.9*   CALCIUM 7.8*   MG 1.5*       CBC:   Recent Labs   Lab 04/19/22  0542 04/20/22  0544   WBC 4.96 3.72*   HGB 9.9* 10.1*   HCT 28.5* 28.5*   PLT 83* 71*       CMP:   Recent Labs   Lab 04/19/22  0542 04/20/22  0544   * 125*   K 4.8 4.7   CL 92* 93*   CO2 23 27   * 125*   BUN 43* 46*   CREATININE 2.7* 2.9*   CALCIUM 7.6* 7.8*   PROT 5.3* 5.5*   ALBUMIN 2.3* 2.7*   BILITOT 2.3* 2.4*   ALKPHOS 80 79   AST 84* 80*   ALT 17 16   ANIONGAP 6* 5*   EGFRNONAA 24.5* 22.5*       Cardiac Markers: No results for input(s): CKMB, MYOGLOBIN, BNP, TROPISTAT in the last 48 hours.    Lactic Acid: No results for input(s): LACTATE in the last 48 hours.  Magnesium:   Recent Labs   Lab 04/19/22  0542 04/20/22  0544   MG 1.7 1.5*       Respiratory Culture: No results for input(s): GSRESP, RESPIRATORYC in the last 48 hours.  Troponin: No results for input(s): TROPONINI in the last 48 hours.  TSH:   Recent Labs   Lab 02/09/22  0814   TSH 1.536       Urine Culture: No results for input(s): LABURIN in the last 48 hours.  Urine Studies: No results for input(s): COLORU, APPEARANCEUA, PHUR, SPECGRAV, PROTEINUA, GLUCUA, KETONESU, BILIRUBINUA, OCCULTUA, NITRITE, UROBILINOGEN, LEUKOCYTESUR, RBCUA, WBCUA, BACTERIA, SQUAMEPITHEL, HYALINECASTS in the last 48 hours.    Invalid input(s): SUKHI      Significant Imaging: I have  reviewed and interpreted all pertinent imaging results/findings within the past 24 hours.

## 2022-04-20 NOTE — ASSESSMENT & PLAN NOTE
Ultrasound transplant kidney no acute changes.  Possibly due to tacrolimus versus hepatorenal syndrome?  Nephrology has been consulted    Continue to hold lisinopril, Aldactone, metformin  Continue p.o. bicarbonate  Repeat Lasix and albumin today per Nephrology  24 hour urine per nephrology.  Continuing tacrolimus  Repeat BMP in a.m.

## 2022-04-20 NOTE — PT/OT/SLP PROGRESS
Physical Therapy Treatment    Patient Name:  Edgar Jackson   MRN:  [id number]    Recommendations:     Discharge Recommendations:  home health PT   Discharge Equipment Recommendations: walker, rolling   Barriers to discharge: increased assist with mobility    Assessment:     Edgar Jackson is a 61 y.o. male admitted with a medical diagnosis of Fever.  He presents with the following impairments/functional limitations:  impaired endurance, gait instability, impaired balance, pain, impaired cardiopulmonary response to activity.  Pt agreeable to visit. Pt agreeable to ambulation in the hallway. Pt tolerated session well.    Rehab Prognosis: Fair; patient would benefit from acute skilled PT services to address these deficits and reach maximum level of function.    Recent Surgery: * No surgery found *      Plan:     During this hospitalization, patient to be seen 5 x/week to address the identified rehab impairments via gait training, therapeutic activities, therapeutic exercises and progress toward the following goals:    · Plan of Care Expires:  05/16/22    Subjective     Chief Complaint: none verbalized  Patient/Family Comments/goals: return home  Pain/Comfort:  · Pain Rating 1: 0/10      Objective:     Communicated with RN prior to session.  Patient found HOB elevated with telemetry, oxygen upon PT entry to room.     General Precautions: Standard, fall   Orthopedic Precautions:N/A   Braces: N/A  Respiratory Status: Nasal cannula, flow 2 L/min     Functional Mobility:  · Bed Mobility:     · Supine to Sit: independence  · Transfers:     · Sit to Stand:  stand by assistance with rolling walker  · Gait: x 200' with RW and CGA progressing to SBA.      AM-PAC 6 CLICK MOBILITY          Therapeutic Activities and Exercises:   Pt educated on importance of time OOB, importance of intermittent mobility, safe techniques for transfers/ambulation, discharge recommendations/options, and use of call light for assistance and fall  prevention.      Patient left up in chair with all lines intact, call button in reach and RN notified..    GOALS:   Multidisciplinary Problems     Physical Therapy Goals        Problem: Physical Therapy    Goal Priority Disciplines Outcome Goal Variances Interventions   Physical Therapy Goal     PT, PT/OT      Description: Goals to be met by: discharge     Patient will increase functional independence with mobility by performin. Gait  x 150 feet with Supervision using LRAD  2. Ascend/descend 10 stair with right Handrails Contact Guard Assistance using LRAD                     Time Tracking:     PT Received On: 22  PT Start Time: 1134     PT Stop Time: 1144  PT Total Time (min): 10 min     Billable Minutes: Gait Training 10    Treatment Type: Treatment  PT/PTA: PTA     PTA Visit Number: 2     2022

## 2022-04-20 NOTE — PROGRESS NOTES
Nephrology Progress Note        Patient Name: Edgar Jackson  MRN: [id number]    Patient Class: IP- Inpatient   Admission Date: 4/7/2022  Length of Stay: 12 days  Date of Service: 4/20/2022    Attending Physician: Manoj Allen MD  Primary Care Provider: Tyler Castillo MD    Reason for Consult: ildefonso/hyponatremia/acidosis/hyperkalemia/kidney transplant/cirrhosis with ascites/anemia/diarrhea/gout/htn/hypotension/dm2    SUBJECTIVE:     HPI: 61m with kidney transplant and liver disease who recently moved to the area and is yet to establish care is admitted after paracentesis and liver biopsy on 4/6. Labs show ILDEFONSO, hyponatremia, hyperkalemia. He also complains of diarrhea. He is also reportedly on diuretic, spironolactone, ACEi. Received IVF in ER for hypotension.    4/8 VSS, no new complains. Appreciate GI input. Start oral bicarb for acidosis.  4/9 VSS, no new complains. GI work-up for diarrhea in progress.  4/10 VSS. Thrombocytopenia of unclear etiology. Continues to have diarrhea. sCr not worse. Acidosis persists despite oral bicarb, will add bicarb drip to give more bicarb and add more volume. K remains marginal, bicarb drip should help. sNa is low, suspect subtle volume depldtion, more IVF should help.  4/11 VSS. Low grade fever, non-productive cough, CXR unimpressive, Appreciate Pulm input. Hyponatremia worsening - will send urine lytes, stop bicarb gtt and increase oral bicarb to TID. Diarrhea reportedly better with creon.  4/12  Not in his room.  Output not recorded.   4/13  Has diarrhea.  No vomiting or sob.  Weak.      4/14  AFVSS.  1060 cc UOP.  C/o cough and diarrhea  4/15  Diarrhea better.  Coughing at night.  No vomiting.  1900cc uop  4/16  Diarrhea back.  Coughing a lot.  No eating.  4/17  About the same.  Still coughing.  Diarrhea. 700cc output   4/18  24hr urine w/partial results--turned in yesterday afternoon.  Scr trended down some.  Up in chair, c/o pain with coughing, no SOB.  950cc UOP recorded.   "VSS.  4/19 went for para- got off 4.1L, BP stable, on RA, UOP 950cc- spoke with lab and cannot find his urine- working on it  4/20 VSS, on RA-2L,  UOP 1.7L, 24 hour urine CrCl 25, feels a little better today    Outpatient meds:  No current facility-administered medications on file prior to encounter.     Current Outpatient Medications on File Prior to Encounter   Medication Sig Dispense Refill    allopurinoL (ZYLOPRIM) 100 MG tablet Take 100 mg by mouth once daily.      levothyroxine (SYNTHROID) 50 MCG tablet Take 50 mcg by mouth once daily.      lisinopriL (PRINIVIL,ZESTRIL) 5 MG tablet Take 5 mg by mouth once daily.      metFORMIN (GLUCOPHAGE) 500 MG tablet Take 500 mg by mouth 2 (two) times daily.      metoprolol tartrate (LOPRESSOR) 50 MG tablet Take 50 mg by mouth 2 (two) times daily.      minoxidiL (LONITEN) 2.5 MG tablet Take 2.5 mg by mouth 2 (two) times daily.      pravastatin (PRAVACHOL) 40 MG tablet Take 40 mg by mouth every evening.      sirolimus (RAPAMUNE) 1 MG Tab Take 2 mg by mouth once daily.      tacrolimus (PROGRAF) 0.5 MG Cap Take 2 mg by mouth every morning. And 1.5mg in the evening      tacrolimus (PROGRAF) 1 MG Cap Take 1.5 mg by mouth every evening.      TOUJEO SOLOSTAR U-300 INSULIN 300 unit/mL (1.5 mL) InPn pen Inject 30 Units into the skin nightly.      VENTOLIN HFA 90 mcg/actuation inhaler INHALE 2 PUFFS INTO THE LUNGS EVERY 6 (SIX) HOURS AS NEEDED FOR WHEEZING (COUGHING). RESCUE (Patient taking differently: Inhale 1 puff into the lungs every 6 (six) hours as needed.) 18 g 2    alfuzosin (UROXATRAL) 10 mg Tb24 Take 1 tablet (10 mg total) by mouth after dinner. 30 tablet 12    BD NOEMI 2ND GEN PEN NEEDLE 32 gauge x 5/32" Ndle USE AS DIRECTED ONCE DAILY WITH BASAGLAR      sildenafiL (VIAGRA) 100 MG tablet Take 1 tablet (100 mg total) by mouth daily as needed for Erectile Dysfunction. 6 tablet 6    [DISCONTINUED] furosemide (LASIX) 20 MG tablet       [DISCONTINUED] " spironolactone (ALDACTONE) 50 MG tablet          Scheduled meds:      Infusions:      PRN meds:      Review of Systems:  Neg    OBJECTIVE:     Vital Signs and IO (Last 24H):  Temp:  [98.8 °F (37.1 °C)-99.9 °F (37.7 °C)]   Pulse:  [62-76]   Resp:  [16-20]   BP: (115-140)/(75-88)   SpO2:  [92 %-100 %]   I/O last 3 completed shifts:  In: 820 [P.O.:820]  Out: 6575 [Urine:2475; Other:4100]    Wt Readings from Last 5 Encounters:   04/17/22 64.2 kg (141 lb 8.6 oz)   04/06/22 68 kg (150 lb)   03/30/22 67.9 kg (149 lb 12.8 oz)   03/17/22 67 kg (147 lb 11.3 oz)   03/04/22 72.6 kg (160 lb)     Physical Exam:  Constitutional: nad, aao x 3  Heart: rrr, no m/r/g, wwp, gen edema  Lungs: ctab, no w/r/r/c, no lb  Abdomen: s/nt/nd, +BS    Body mass index is 21.52 kg/m².    Laboratory:  Recent Labs   Lab 04/18/22  0555 04/19/22  0542 04/20/22  0544   * 121* 125*   K 4.5 4.8 4.7   CL 93* 92* 93*   CO2 23 23 27   BUN 43* 43* 46*   CREATININE 2.7* 2.7* 2.9*   ESTGFRAFRICA 28.3* 28.3* 26.0*   EGFRNONAA 24.5* 24.5* 22.5*   * 112* 125*       Recent Labs   Lab 04/18/22  0555 04/19/22  0542 04/20/22  0544   CALCIUM 7.8* 7.6* 7.8*   ALBUMIN 2.6* 2.3* 2.7*   MG 1.6 1.7 1.5*             No results for input(s): POCTGLUCOSE in the last 168 hours.    Recent Labs   Lab 12/30/21  0655 02/09/22  0814   Hemoglobin A1C 7.7 H 6.6 H       Recent Labs   Lab 04/18/22  0555 04/19/22  0542 04/20/22  0544   WBC 5.41 4.96 3.72*   HGB 10.9* 9.9* 10.1*   HCT 30.2* 28.5* 28.5*   PLT 79* 83* 71*   MCV 84 87 85   MCHC 36.1* 34.7 35.4   MONO 15.5*  0.8 16.9*  0.8 18.0*  0.7       Recent Labs   Lab 04/18/22  0555 04/19/22  0542 04/20/22  0544   BILITOT 2.0* 2.3* 2.4*   PROT 5.8* 5.3* 5.5*   ALBUMIN 2.6* 2.3* 2.7*   ALKPHOS 86 80 79   ALT 19 17 16   AST 96* 84* 80*       Recent Labs   Lab 02/03/22  0603 04/07/22  1515 04/11/22  1438 04/16/22  1330   Color, UA Yellow Yellow Yellow  --    Appearance, UA Clear Clear Clear  --    pH, UA 6.0 6.0 6.0  --     Specific Gravity, UA 1.010 1.020 1.010  --    Protein, UA Negative Trace A Negative  --    Glucose, UA Trace A 2+ A 1+ A  --    Ketones, UA Negative Negative Negative  --    Urobilinogen, UA Negative Negative Negative  --    Bilirubin (UA) Negative Negative Negative  --    Occult Blood UA Negative Negative Negative  --    Nitrite, UA Negative Negative Negative  --    RBC, UA  --   --   --  3  3   WBC, UA  --   --   --  2  2   Bacteria  --   --   --  Negative  Negative   Hyaline Casts, UA  --   --   --  11 A  11 A             Microbiology Results (last 7 days)     Procedure Component Value Units Date/Time    Blood culture [[id number]] Collected: 04/11/22 0852    Order Status: Completed Specimen: Blood Updated: 04/16/22 1032     Blood Culture, Routine No growth after 5 days.    Narrative:      2 sets please  Collection has been rescheduled by [facility] at 04/11/2022 08:50 Reason:   Patient unavailable, getting stress test.  Collection has been rescheduled by [facility] at 04/11/2022 08:50 Reason:   Patient unavailable, getting stress test.    Culture, Body Fluid (Aerobic) w/ GS [[id number]] Collected: 04/12/22 1221    Order Status: Completed Specimen: Ascites Updated: 04/15/22 0923     AEROBIC CULTURE - FLUID No growth     Gram Stain Result Few WBC's      No organisms seen    Narrative:      To rule out SBP order labs: Aerobic culture [[id number]],  Culture, Anaerobic [[id number]], Gram stain [[id number]], Albumin  [JKV185], Protein [PHA546], LDH [VAY677], WBC \T\ Dff  [[id number]].    (rule out SBP) Culture, Anaerobic [[id number]] Collected: 04/12/22 1221    Order Status: Completed Specimen: Ascites Updated: 04/14/22 1452     Anaerobic Culture No anaerobes isolated    Narrative:      To rule out SBP order labs: Aerobic culture [[id number]],  Culture, Anaerobic [[id number]], Gram stain [[id number]], Albumin  [COW167], Protein [LVH006], LDH [HSQ801], WBC \T\ Dff  [[id number]].    Culture, Respiratory with Gram Stain [[id number]]     Order Status: Sent  Specimen: Sputum, Expectorated         ASSESSMENT/PLAN:     ILDEFONSO due to ATN due to hypovolemia and possible tacrolimus toxicity  Live related donor kidney transplant for 18 years  Cirrhosis s/p liver biopsy showing chronic active hepatitis and paracentesis on 4/6 and 4/19  Hyponatremia  Hyperkalemia  Acidosis  SHPT  Anemia    - 24 hour urine CrCl 25- no acute RRT needs  - continue current IS regimen  - ordered 25g albumin with 20mg IV lasix on 4/19- SCr bumped slightly but hyponatremia is improved and he made a good amount of urine- repeat regimen today  - needs a regimen of lasix/aldactone ultimately  - continue renal diet, 1.5L fluid restriction  - acidosis is resolved- stop bicarb supplement  - BMM, hematologic parameters are stable    Thank you for allowing us to participate in the care of your patient!   We will follow the patient and provide recommendations as needed.    Patient care time was spent personally by me on the following activities: > 35 min    · Obtaining a history.  · Examination of patient.  · Providing medical care at the patients bedside.  · Developing a treatment plan with patient or surrogate and bedside caregivers.  · Ordering and reviewing laboratory studies, radiographic studies, pulse oximetry.  · Ordering and performing treatments and interventions.  · Evaluation of patient's response to treatment.  · Discussions with consultants while on the unit and immediately available to the patient.  · Re-evaluation of the patient's condition.  · Documentation in the medical record.     Isac Castellanos MD      [facility]  [address] [place] [address]  J[place] Mustafa [id number]    [phone] - tel  [phone] - fax    4/20/2022

## 2022-04-20 NOTE — CARE UPDATE
04/19/22 2000   Patient Assessment/Suction   Level of Consciousness (AVPU) alert   Respiratory Effort Normal   Expansion/Accessory Muscles/Retractions no use of accessory muscles   All Lung Fields Breath Sounds equal bilaterally   Rhythm/Pattern, Respiratory unlabored   PRE-TX-O2   O2 Device (Oxygen Therapy) nasal cannula   $ Is the patient on Low Flow Oxygen? Yes   Flow (L/min) 2   SpO2 99 %   Pulse Oximetry Type Intermittent   $ Pulse Oximetry - Multiple Charge Pulse Oximetry - Multiple   Pulse 73   Resp 16   Aerosol Therapy   $ Aerosol Therapy Charges Aerosol Treatment   Daily Review of Necessity (SVN) completed   Respiratory Treatment Status (SVN) given   Treatment Route (SVN) mask   Patient Position (SVN) semi-Abad's   Post Treatment Assessment (SVN) breath sounds unchanged;vital signs unchanged   Signs of Intolerance (SVN) none   Education   $ Education Bronchodilator;15 min   Respiratory Evaluation   $ Care Plan Tech Time 15 min

## 2022-04-20 NOTE — PROGRESS NOTES
[facility] Medicine  Progress Note    Patient Name: Edgar Jackson  MRN: [id number]  Patient Class: IP- Inpatient   Admission Date: 4/7/2022  Length of Stay: 12 days  Attending Physician: Manoj Allen MD  Primary Care Provider: Tyler Castillo MD        Subjective:     Principal Problem:Fever        HPI:  Mr. Jackson is a 61-year-old male with a past medical history of renal failure status post renal transplant 2004, NIDDM2, ILD, and cirrhosis who presents today with complaints of elevated creatinine on outpatient labs.  It is severe.  It is associated with recent liver biopsy and paracentesis and recent addition of Lasix and spironolactone.  He denies fever, chills, nausea, vomiting, diarrhea, chest pain, shortness of breath, loss of consciousness.  He has a chronic cough that is productive of clear/white/yellow sputum at baseline which he states has worsened over the last 2 months. He had a liver biopsy and paracentesis yesterday at [facility] with Dr. Osullivan and his creatinine was 3.1. He was called and referred to the ED for further work up.       Overview/Hospital Course:  Patient with a history of remote renal transplant, secondary to hypertensive renal failure, on chronic immunosuppressant (sirolimus and tacrolimus), diagnosis of cirrhosis who underwent recent transjugular biopsy (pathology with chronic mild to moderate active hepatitis with bridging fibrosis and nodules) as well as paracentesis (2750 cc removed) on 04/06.  He also was seen by Pulmonary for concern of interstitial lung disease and reports ongoing dyspnea on exertion with cough.  He was referred to the ED due to abnormal outpatient labs with acute kidney injury.  He was borderline hypotensive in the ED, creatinine 3.1.  Also noted to have hyponatremia, hyperkalemia with metabolic acidosis.  Ongoing diarrhea quantified as 4 bowel movements per day.  On 04/08, renal function with creatinine 2.7, minimal urine output, on  bicarbonate drip, stool studies in process, Nephrology and Gastroenterology following.  On 04/09 still having diarrhea, about 8 bowel movements overnight, states no relation to time of day, usually after every meal, blood pressure is improved, BUN/creatinine 37/2.7.  On 04/10 states overall he feels better, Creon was started yesterday and diarrhea is improving, states he believes he is producing more urine, BUN/creatinine 40/2.6, Nephrology resuming bicarbonate drip, pulmonary consulted. On 4/11 developed fever and work up started, which was largely unrevealing.  Paracentesis was performed; fluid studies were not consistent with SBP.  Patient completed 5 days of IV ceftriaxone.  Patient's tacrolimus dosing was adjusted with improvement in his renal function.      Interval History:  Diarrhea is improved.  His cough is improving.  Patient feels improved after his paracentesis yesterday.  Tolerated Lasix and Aldactone well yesterday with improvement in his serum sodium.  Creatinine was elevated slightly from the day prior, but this is a marginal change.    Review of Systems   Constitutional:  Negative for fever.   HENT:  Negative for congestion and sore throat.    Respiratory:  Positive for cough.    Cardiovascular:  Negative for chest pain.   Gastrointestinal:  Negative for vomiting.   Genitourinary:  Negative for difficulty urinating.   Skin:  Negative for rash and wound.   Neurological:  Negative for headaches.   Psychiatric/Behavioral:  Negative for confusion.    Objective:     Vital Signs (Most Recent):  Temp: 98.1 °F (36.7 °C) (04/20/22 1244)  Pulse: 69 (04/20/22 1353)  Resp: 18 (04/20/22 1353)  BP: 131/87 (04/20/22 1244)  SpO2: 96 % (04/20/22 1353)   Vital Signs (24h Range):  Temp:  [98.1 °F (36.7 °C)-99.9 °F (37.7 °C)] 98.1 °F (36.7 °C)  Pulse:  [62-76] 69  Resp:  [16-20] 18  SpO2:  [92 %-99 %] 96 %  BP: (117-140)/(78-88) 131/87     Weight: 64.2 kg (141 lb 8.6 oz)  Body mass index is 21.52  kg/m².    Intake/Output Summary (Last 24 hours) at 4/20/2022 1705  Last data filed at 4/20/2022 1300  Gross per 24 hour   Intake 1575 ml   Output 1825 ml   Net -250 ml        Physical Exam  Vitals and nursing note reviewed.   Constitutional:       General: He is not in acute distress.     Appearance: He is not diaphoretic.      Comments: Sitting in bed, NAD, cooperative   HENT:      Head: Normocephalic and atraumatic.      Mouth/Throat:      Mouth: Mucous membranes are moist.      Comments: No oral thrush  Eyes:      General:         Right eye: No discharge.         Left eye: No discharge.   Cardiovascular:      Rate and Rhythm: Normal rate and regular rhythm.   Pulmonary:      Breath sounds: No wheezing.      Comments: On RA, inspiratory crackles at bases.  No coughing on exam today.  Abdominal:      General: Bowel sounds are normal.      Palpations: Abdomen is soft.      Tenderness: There is no abdominal tenderness.   Skin:     General: Skin is warm and dry.   Neurological:      Mental Status: He is alert and oriented to person, place, and time.      Comments: Speech intact, moving all four extremities, generalized weakness   Psychiatric:         Mood and Affect: Mood normal.       Significant Labs: Blood Culture: No results for input(s): LABBLOO in the last 48 hours.    BMP:   Recent Labs   Lab 04/20/22  0544   *   *   K 4.7   CL 93*   CO2 27   BUN 46*   CREATININE 2.9*   CALCIUM 7.8*   MG 1.5*       CBC:   Recent Labs   Lab 04/19/22  0542 04/20/22  0544   WBC 4.96 3.72*   HGB 9.9* 10.1*   HCT 28.5* 28.5*   PLT 83* 71*       CMP:   Recent Labs   Lab 04/19/22  0542 04/20/22  0544   * 125*   K 4.8 4.7   CL 92* 93*   CO2 23 27   * 125*   BUN 43* 46*   CREATININE 2.7* 2.9*   CALCIUM 7.6* 7.8*   PROT 5.3* 5.5*   ALBUMIN 2.3* 2.7*   BILITOT 2.3* 2.4*   ALKPHOS 80 79   AST 84* 80*   ALT 17 16   ANIONGAP 6* 5*   EGFRNONAA 24.5* 22.5*       Cardiac Markers: No results for input(s): CKMB,  MYOGLOBIN, BNP, TROPISTAT in the last 48 hours.    Lactic Acid: No results for input(s): LACTATE in the last 48 hours.  Magnesium:   Recent Labs   Lab 04/19/22  0542 04/20/22  0544   MG 1.7 1.5*       Respiratory Culture: No results for input(s): GSRESP, RESPIRATORYC in the last 48 hours.  Troponin: No results for input(s): TROPONINI in the last 48 hours.  TSH:   Recent Labs   Lab 02/09/22  0814   TSH 1.536       Urine Culture: No results for input(s): LABURIN in the last 48 hours.  Urine Studies: No results for input(s): COLORU, APPEARANCEUA, PHUR, SPECGRAV, PROTEINUA, GLUCUA, KETONESU, BILIRUBINUA, OCCULTUA, NITRITE, UROBILINOGEN, LEUKOCYTESUR, RBCUA, WBCUA, BACTERIA, SQUAMEPITHEL, HYALINECASTS in the last 48 hours.    Invalid input(s): WRIGHTSUR      Significant Imaging: I have reviewed and interpreted all pertinent imaging results/findings within the past 24 hours.      Assessment/Plan:      * Fever in immunocomprised patient  T-max 100.6, denies any new localizing source  Pancultures including UA with reflex urine culture, blood culture, chest x-ray being repeated, diagnostic paracentesis  Given only potential source at this time and patient is immunocompromised will treat empirically for SBP with Rocephin  Continue to monitor temperature curve and follow up culture results  Infectious disease consulted    Discontinue Rocephin  No recurrent fevers    Weakness  Patient uses a cane at home.  Family is concerned about stability.  PT ordered  OT ordered  Ambulate    Home walker ordered  Ambulatory referral to PT upon discharge      Anemia  Chronic and multifactorial      Thrombocytopenia  No evidence of bleeding, due to cirrhosis, monitoring      Hypothyroidism  Continue Synthroid      Metabolic acidosis  Improving.  Sodium bicarbonate per Nephrology        Hyponatremia  Stable.  Nephrology following        Diarrhea  Improving.    C diff negative Giardia negative.  No pathogenic bacteria identified on stool  culture.    Continue Creon  Continue loperamide p.r.n.      ILDEFONSO (acute kidney injury)  Ultrasound transplant kidney no acute changes.  Possibly due to tacrolimus versus hepatorenal syndrome?  Nephrology has been consulted    Continue to hold lisinopril, Aldactone, metformin  Continue p.o. bicarbonate  Repeat Lasix and albumin today per Nephrology  24 hour urine per nephrology.  Continuing tacrolimus  Repeat BMP in a.m.    Cirrhosis of liver with ascites  S/p transjugular liver biopsy and paracentesis at [facility]  Repeat paracentesis today without evidence of SBP    NPO midnight  Therapeutic paracentesis today    Interstitial lung disease  Followed by outpatient Pulmonary Dr. Boyer, chest x-ray with continued changes  Pulmonary here have ordered serology workup, follow-up  Continue Scheduled Benzonate  Hycodan b.i.d.    Continue Flonase and Mucinex   DuoNebs t.i.d.  Continue budesonide b.i.d.    3 L home oxygen via nasal cannula with activity ordered  amb referral to PT upon discharge      Diabetes mellitus due to underlying condition with chronic kidney disease, with long-term current use of insulin  Continue basal bolus insulin with Accu-Cheks  As needed hypoglycemic measures  Previous HbA1c 6.6%  Continue detemir 22 units q.h.s.  Continue insulin sliding scale    Gout  Continue allopurinol      Personal history of immunosupression therapy  Continuing chronic immunosuppressant      Hypertension  Permissive hypertension per Nephrology at this time      History of renal transplant  Continue sirolimus  Tacrolimus dosing per Nephrology        VTE Risk Mitigation (From admission, onward)         Ordered     IP VTE HIGH RISK PATIENT  Once         04/07/22 1800     Place sequential compression device  Until discontinued         04/07/22 1800                Discharge Planning   LE:      Code Status: Full Code   Is the patient medically ready for discharge?:     Reason for patient still in hospital (select all that apply):  Treatment and Consult recommendations  Discharge Plan A: Home with family   Discharge Delays: None known at this time      Possible discharge in the morning pending renal function and final evaluation by Nephrology        Manoj Allen MD  Department of Hospital Medicine   [facility]

## 2022-04-20 NOTE — CARE UPDATE
04/20/22 0931   Patient Assessment/Suction   Level of Consciousness (AVPU) alert   Respiratory Effort Normal   Expansion/Accessory Muscles/Retractions no use of accessory muscles   All Lung Fields Breath Sounds clear   Rhythm/Pattern, Respiratory unlabored   PRE-TX-O2   O2 Device (Oxygen Therapy) nasal cannula   $ Is the patient on Low Flow Oxygen? Yes   Flow (L/min) 2  (decreased to 1L)   SpO2 98 %   Pulse Oximetry Type Intermittent   $ Pulse Oximetry - Multiple Charge Pulse Oximetry - Multiple   Pulse 62   Resp 18   Aerosol Therapy   $ Aerosol Therapy Charges Aerosol Treatment  (duo+pulmicort)   Daily Review of Necessity (SVN) completed   Respiratory Treatment Status (SVN) given   Treatment Route (SVN) mask;oxygen   Patient Position (SVN) HOB elevated   Post Treatment Assessment (SVN) breath sounds unchanged;vital signs unchanged   Signs of Intolerance (SVN) none   Education   $ Education Bronchodilator;15 min   Respiratory Evaluation   $ Care Plan Tech Time 15 min   $ Eval/Re-eval Charges Re-evaluation

## 2022-04-21 NOTE — CARE UPDATE
04/21/22 1404   Home Oxygen Qualification   $ Home O2 Qualification Tech time 15 minutes   Room Air SpO2 At Rest 95 %   Room Air SpO2 During Ambulation (!) 87 %   SpO2 During Ambulation on O2 94 %   Heart Rate on O2 76 bpm   Ambulation O2 LPM 1 LPM   SpO2 Post Ambulation 98 %   Post Ambulation Heart Rate 80 bpm   Post Ambulation O2 LPM 1 LPM   Home O2 Eval Comments Patients sats dropped to 87% while ambulating and increased to 94% on 1L O2. Patient needs home O2.

## 2022-04-21 NOTE — PLAN OF CARE
Problem: Physical Therapy  Goal: Physical Therapy Goal  Description: Goals to be met by: discharge     Patient will increase functional independence with mobility by performin. Gait  x 150 feet with Supervision using LRAD  2. Ascend/descend 10 stair with right Handrails Contact Guard Assistance using LRAD    Outcome: Ongoing, Progressing

## 2022-04-21 NOTE — CARE UPDATE
04/21/22 0707   Patient Assessment/Suction   Level of Consciousness (AVPU) alert   Respiratory Effort Normal   Expansion/Accessory Muscles/Retractions no use of accessory muscles   All Lung Fields Breath Sounds clear   Rhythm/Pattern, Respiratory no shortness of breath reported   Cough Frequency infrequent   Cough Type dry   PRE-TX-O2   O2 Device (Oxygen Therapy) nasal cannula   $ Is the patient on Low Flow Oxygen? Yes   Flow (L/min) 1  (placed on RA sats decreased to 93% placed back on 1L)   SpO2 98 %   Pulse Oximetry Type Intermittent   $ Pulse Oximetry - Multiple Charge Pulse Oximetry - Multiple   Pulse 65   Resp 18   Aerosol Therapy   $ Aerosol Therapy Charges Aerosol Treatment   Daily Review of Necessity (SVN) completed   Respiratory Treatment Status (SVN) given   Treatment Route (SVN) mask;oxygen   Patient Position (SVN) HOB elevated   Post Treatment Assessment (SVN) breath sounds unchanged;vital signs unchanged   Signs of Intolerance (SVN) none   Education   $ Education Bronchodilator;15 min   Respiratory Evaluation   $ Care Plan Tech Time 15 min   $ Eval/Re-eval Charges Re-evaluation

## 2022-04-21 NOTE — PLAN OF CARE
04/21/22 1125   Post-Acute Status   Post-Acute Authorization HME   HME Status Pending Qualifying Diagnosis  (Patient needs walker and home oxygen. Need walk test)   Discharge Delays (!) Home Medical Equipment (Insurance, Delivery)   Discharge Plan   Discharge Plan A Home   Discharge Plan B Home   CM notified that patient needs home oxygen and walker. Patient will need walk test. Notified bedside RN and MD.     1140 Secure chat [facility] DME for Walker. Still need a walk test to determine home oxygen needs.     Declined PT referral per MD notes.   Patient is not clear from CM standpoint

## 2022-04-21 NOTE — CARE UPDATE
04/20/22 1938   Patient Assessment/Suction   Level of Consciousness (AVPU) alert   Respiratory Effort Normal;Unlabored   Expansion/Accessory Muscles/Retractions no use of accessory muscles   All Lung Fields Breath Sounds diminished;clear   Rhythm/Pattern, Respiratory unlabored;pattern regular   Cough Frequency frequent   Cough Type good;productive   PRE-TX-O2   O2 Device (Oxygen Therapy) nasal cannula   $ Is the patient on Low Flow Oxygen? Yes   Flow (L/min) 1   SpO2 (!) 94 %   Pulse Oximetry Type Intermittent   $ Pulse Oximetry - Multiple Charge Pulse Oximetry - Multiple   Pulse 69   Resp 18   Aerosol Therapy   $ Aerosol Therapy Charges Aerosol Treatment   Daily Review of Necessity (SVN) completed   Respiratory Treatment Status (SVN) given   Treatment Route (SVN) mask;oxygen   Patient Position (SVN) HOB elevated   Post Treatment Assessment (SVN) breath sounds unchanged   Signs of Intolerance (SVN) none   Education   $ Education Bronchodilator;15 min   Respiratory Evaluation   $ Care Plan Tech Time 15 min   $ Eval/Re-eval Charges Re-evaluation   Evaluation For Re-Eval 5+ day

## 2022-04-21 NOTE — PROGRESS NOTES
Nephrology Progress Note        Patient Name: Edgar Jackson  MRN: [id number]    Patient Class: IP- Inpatient   Admission Date: 4/7/2022  Length of Stay: 13 days  Date of Service: 4/21/2022    Attending Physician: Manoj Allen MD  Primary Care Provider: Tyler Castillo MD    Reason for Consult: ildefonso/hyponatremia/acidosis/hyperkalemia/kidney transplant/cirrhosis with ascites/anemia/diarrhea/gout/htn/hypotension/dm2    SUBJECTIVE:     HPI: 61m with kidney transplant and liver disease who recently moved to the area and is yet to establish care is admitted after paracentesis and liver biopsy on 4/6. Labs show ILDEFONSO, hyponatremia, hyperkalemia. He also complains of diarrhea. He is also reportedly on diuretic, spironolactone, ACEi. Received IVF in ER for hypotension.    4/8 VSS, no new complains. Appreciate GI input. Start oral bicarb for acidosis.  4/9 VSS, no new complains. GI work-up for diarrhea in progress.  4/10 VSS. Thrombocytopenia of unclear etiology. Continues to have diarrhea. sCr not worse. Acidosis persists despite oral bicarb, will add bicarb drip to give more bicarb and add more volume. K remains marginal, bicarb drip should help. sNa is low, suspect subtle volume depldtion, more IVF should help.  4/11 VSS. Low grade fever, non-productive cough, CXR unimpressive, Appreciate Pulm input. Hyponatremia worsening - will send urine lytes, stop bicarb gtt and increase oral bicarb to TID. Diarrhea reportedly better with creon.  4/12  Not in his room.  Output not recorded.   4/13  Has diarrhea.  No vomiting or sob.  Weak.      4/14  AFVSS.  1060 cc UOP.  C/o cough and diarrhea  4/15  Diarrhea better.  Coughing at night.  No vomiting.  1900cc uop  4/16  Diarrhea back.  Coughing a lot.  No eating.  4/17  About the same.  Still coughing.  Diarrhea. 700cc output   4/18  24hr urine w/partial results--turned in yesterday afternoon.  Scr trended down some.  Up in chair, c/o pain with coughing, no SOB.  950cc UOP recorded.   "VSS.  4/19 went for para- got off 4.1L, BP stable, on RA, UOP 950cc- spoke with lab and cannot find his urine- working on it  4/20 VSS, on RA-2L,  UOP 1.7L, 24 hour urine CrCl 25, feels a little better today  4/21 feels better, UOP 1.5L    Outpatient meds:  No current facility-administered medications on file prior to encounter.     Current Outpatient Medications on File Prior to Encounter   Medication Sig Dispense Refill    allopurinoL (ZYLOPRIM) 100 MG tablet Take 100 mg by mouth once daily.      levothyroxine (SYNTHROID) 50 MCG tablet Take 50 mcg by mouth once daily.      metoprolol tartrate (LOPRESSOR) 50 MG tablet Take 50 mg by mouth 2 (two) times daily.      pravastatin (PRAVACHOL) 40 MG tablet Take 40 mg by mouth every evening.      sirolimus (RAPAMUNE) 1 MG Tab Take 2 mg by mouth once daily.      VENTOLIN HFA 90 mcg/actuation inhaler INHALE 2 PUFFS INTO THE LUNGS EVERY 6 (SIX) HOURS AS NEEDED FOR WHEEZING (COUGHING). RESCUE (Patient taking differently: Inhale 1 puff into the lungs every 6 (six) hours as needed.) 18 g 2    [DISCONTINUED] lisinopriL (PRINIVIL,ZESTRIL) 5 MG tablet Take 5 mg by mouth once daily.      [DISCONTINUED] metFORMIN (GLUCOPHAGE) 500 MG tablet Take 500 mg by mouth 2 (two) times daily.      [DISCONTINUED] minoxidiL (LONITEN) 2.5 MG tablet Take 2.5 mg by mouth 2 (two) times daily.      [DISCONTINUED] tacrolimus (PROGRAF) 0.5 MG Cap Take 2 mg by mouth every morning. And 1.5mg in the evening      [DISCONTINUED] tacrolimus (PROGRAF) 1 MG Cap Take 1.5 mg by mouth every evening.      [DISCONTINUED] TOUJEO SOLOSTAR U-300 INSULIN 300 unit/mL (1.5 mL) InPn pen Inject 30 Units into the skin nightly.      alfuzosin (UROXATRAL) 10 mg Tb24 Take 1 tablet (10 mg total) by mouth after dinner. 30 tablet 12    BD NOEMI 2ND GEN PEN NEEDLE 32 gauge x 5/32" Ndle USE AS DIRECTED ONCE DAILY WITH BASAGLAR      sildenafiL (VIAGRA) 100 MG tablet Take 1 tablet (100 mg total) by mouth daily as needed " for Erectile Dysfunction. 6 tablet 6    [DISCONTINUED] spironolactone (ALDACTONE) 50 MG tablet          Scheduled meds:      Infusions:      PRN meds:      Review of Systems:  Neg    OBJECTIVE:     Vital Signs and IO (Last 24H):  Temp:  [98 °F (36.7 °C)-100.1 °F (37.8 °C)]   Pulse:  [63-72]   Resp:  [17-18]   BP: (112-142)/(76-90)   SpO2:  [88 %-98 %]   I/O last 3 completed shifts:  In: 1275 [P.O.:1125; I.V.:150]  Out: 2725 [Urine:2525; Emesis/NG output:200]    Wt Readings from Last 5 Encounters:   04/17/22 64.2 kg (141 lb 8.6 oz)   04/06/22 68 kg (150 lb)   03/30/22 67.9 kg (149 lb 12.8 oz)   03/17/22 67 kg (147 lb 11.3 oz)   03/04/22 72.6 kg (160 lb)     Physical Exam:  Constitutional: nad, aao x 3  Heart: rrr, no m/r/g, wwp, gen edema  Lungs: ctab, no w/r/r/c, no lb  Abdomen: s/nt/nd, +BS    Body mass index is 21.52 kg/m².    Laboratory:  Recent Labs   Lab 04/19/22  0542 04/20/22  0544 04/21/22  0506   * 125* 123*   K 4.8 4.7 4.8   CL 92* 93* 91*   CO2 23 27 23   BUN 43* 46* 47*   CREATININE 2.7* 2.9* 2.9*   ESTGFRAFRICA 28.3* 26.0* 26.0*   EGFRNONAA 24.5* 22.5* 22.5*   * 125* 152*       Recent Labs   Lab 04/19/22  0542 04/20/22  0544 04/21/22  0506   CALCIUM 7.6* 7.8* 7.6*   ALBUMIN 2.3* 2.7* 2.8*   MG 1.7 1.5* 1.7             No results for input(s): POCTGLUCOSE in the last 168 hours.    Recent Labs   Lab 12/30/21  0655 02/09/22  0814   Hemoglobin A1C 7.7 H 6.6 H       Recent Labs   Lab 04/19/22  0542 04/20/22  0544 04/21/22  0506   WBC 4.96 3.72* 4.78   HGB 9.9* 10.1* 9.5*   HCT 28.5* 28.5* 26.6*   PLT 83* 71* 74*   MCV 87 85 84   MCHC 34.7 35.4 35.7   MONO 16.9*  0.8 18.0*  0.7 15.5*  0.7       Recent Labs   Lab 04/19/22  0542 04/20/22  0544 04/21/22  0506   BILITOT 2.3* 2.4* 2.5*   PROT 5.3* 5.5* 5.3*   ALBUMIN 2.3* 2.7* 2.8*   ALKPHOS 80 79 88   ALT 17 16 18   AST 84* 80* 95*       Recent Labs   Lab 02/03/22  0603 04/07/22  1515 04/11/22  1438 04/16/22  1330   Color, UA Yellow Yellow Yellow   --    Appearance, UA Clear Clear Clear  --    pH, UA 6.0 6.0 6.0  --    Specific Gravity, UA 1.010 1.020 1.010  --    Protein, UA Negative Trace A Negative  --    Glucose, UA Trace A 2+ A 1+ A  --    Ketones, UA Negative Negative Negative  --    Urobilinogen, UA Negative Negative Negative  --    Bilirubin (UA) Negative Negative Negative  --    Occult Blood UA Negative Negative Negative  --    Nitrite, UA Negative Negative Negative  --    RBC, UA  --   --   --  3  3   WBC, UA  --   --   --  2  2   Bacteria  --   --   --  Negative  Negative   Hyaline Casts, UA  --   --   --  11 A  11 A             Microbiology Results (last 7 days)     Procedure Component Value Units Date/Time    Culture, Respiratory with Gram Stain [[id number]] Collected: 04/20/22 1842    Order Status: Completed Specimen: Sputum, Expectorated Updated: 04/21/22 0958     Respiratory Culture Normal respiratory joe     Gram Stain (Respiratory) <10 epithelial cells per low power field.     Gram Stain (Respiratory) Few WBC's     Gram Stain (Respiratory) Few Gram positive cocci     Gram Stain (Respiratory) Few Gram negative rods    Blood culture [[id number]] Collected: 04/11/22 0852    Order Status: Completed Specimen: Blood Updated: 04/16/22 1032     Blood Culture, Routine No growth after 5 days.    Narrative:      2 sets please  Collection has been rescheduled by [facility] at 04/11/2022 08:50 Reason:   Patient unavailable, getting stress test.  Collection has been rescheduled by [facility] at 04/11/2022 08:50 Reason:   Patient unavailable, getting stress test.    Culture, Body Fluid (Aerobic) w/ GS [[id number]] Collected: 04/12/22 1221    Order Status: Completed Specimen: Ascites Updated: 04/15/22 0923     AEROBIC CULTURE - FLUID No growth     Gram Stain Result Few WBC's      No organisms seen    Narrative:      To rule out SBP order labs: Aerobic culture [[id number]],  Culture, Anaerobic [[id number]], Gram stain [MIM642], Albumin  [EMZ623], Protein [QSG902], LDH  [HYU313], WBC \T\ Dff  [[id number]].    (rule out SBP) Culture, Anaerobic [[id number]] Collected: 04/12/22 1221    Order Status: Completed Specimen: Ascites Updated: 04/14/22 1452     Anaerobic Culture No anaerobes isolated    Narrative:      To rule out SBP order labs: Aerobic culture [[id number]],  Culture, Anaerobic [[id number]], Gram stain [LLS354], Albumin  [GFW167], Protein [LUS890], LDH [MNF328], WBC \T\ Dff  [[id number]].        ASSESSMENT/PLAN:     ILDEFONSO due to ATN due to hypovolemia and possible tacrolimus toxicity  Live related donor kidney transplant for 18 years  Cirrhosis s/p liver biopsy showing chronic active hepatitis and paracentesis on 4/6 and 4/19  Hyponatremia  Hyperkalemia  Acidosis  SHPT  Anemia    - 24 hour urine CrCl 25- no acute RRT needs  - continue current IS regimen  - d/c with lasix 20mg PO daily- hold lisinopril, aldactone, minoxidil, metformin  - BMP on Monday and f/u with Dr. Villagomez in 2-4 weeks  - continue renal diet, 1.5L fluid restriction at discharge  - BMM, hematologic parameters are stable    Thank you for allowing us to participate in the care of your patient!   We will follow the patient and provide recommendations as needed.    Patient care time was spent personally by me on the following activities: > 35 min    · Obtaining a history.  · Examination of patient.  · Providing medical care at the patients bedside.  · Developing a treatment plan with patient or surrogate and bedside caregivers.  · Ordering and reviewing laboratory studies, radiographic studies, pulse oximetry.  · Ordering and performing treatments and interventions.  · Evaluation of patient's response to treatment.  · Discussions with consultants while on the unit and immediately available to the patient.  · Re-evaluation of the patient's condition.  · Documentation in the medical record.     Isac Castellanos MD      [facility]  [address] [place] [address]  [place] [address]    [phone] - tel  [phone] -  fax    4/21/2022

## 2022-04-21 NOTE — DISCHARGE SUMMARY
[facility] Medicine  Discharge Summary      Patient Name: Edgar Jackson  MRN: [id number]  Patient Class: IP- Inpatient  Admission Date: 4/7/2022  Hospital Length of Stay: 13 days  Discharge Date and Time:  04/21/2022 10:42 AM  Attending Physician: Manoj Allen MD   Discharging Provider: Manoj Allen MD  Primary Care Provider: Tyler Castillo MD      HPI:   Mr. Jackson is a 61-year-old male with a past medical history of renal failure status post renal transplant 2004, NIDDM2, ILD, and cirrhosis who presents today with complaints of elevated creatinine on outpatient labs.  It is severe.  It is associated with recent liver biopsy and paracentesis and recent addition of Lasix and spironolactone.  He denies fever, chills, nausea, vomiting, diarrhea, chest pain, shortness of breath, loss of consciousness.  He has a chronic cough that is productive of clear/white/yellow sputum at baseline which he states has worsened over the last 2 months. He had a liver biopsy and paracentesis yesterday at [facility] with Dr. Osullivan and his creatinine was 3.1. He was called and referred to the ED for further work up.       * No surgery found *      Hospital Course:   Patient with a history of remote renal transplant (2/2 hypertensive renal failure), on chronic immunosuppressant (sirolimus and tacrolimus), diagnosis of cirrhosis who underwent recent transjugular biopsy (pathology with chronic mild to moderate active hepatitis with bridging fibrosis and nodules) as well as paracentesis (2750 cc removed) on 04/06.  He was referred to the ED due to abnormal outpatient labs with acute kidney injury.  He was borderline hypotensive in the ED, creatinine 3.1.  Also noted to have hyponatremia, hyperkalemia with metabolic acidosis.  Ongoing diarrhea quantified as 4 bowel movements per day. Stool studies and culture unrevealing. Started on Creon with improvement in diarrhea. On 4/11 developed fever, but workup was unrevealing.   Paracentesis was performed; fluid studies were not consistent with SBP.  Patient completed 5 days of IV ceftriaxone for empiric therapy. No repeat revers afterwards.  Patient's tacrolimus dosing was adjusted to 1.5mg BID with improvement in his renal function. Patient tolerated lasix and albumin dosing with stable renal function. Per discussion with nephrology, patient may discharge home today with PO lasix 20mg, outpatient BMP on Monday (to be forwarded to Nephrology), and follow up in clinic with nephrology in 2 weeks.     Patient was evaluated by PT and was advised to use a walker, which was ordered. He declined ambulatory referral to PT.     He also was seen by Pulmonary for concern of interstitial lung disease and reports ongoing dyspnea on exertion with cough. Patient cough improved with tussionex and tessalon. He qualified for 3L home oxygen, which was ordered. He will follow with Dr. Boyer of pulmonology for further ILD evaluation.    He will continue to follow with GI.    Physical Exam    Constitutional:       General: He is not in acute distress.     Appearance: He is not diaphoretic.      Comments: Sitting in bed, NAD, cooperative   Cardiovascular:      Rate and Rhythm: Normal rate and regular rhythm.   Pulmonary:      Breath sounds: No wheezing.      Comments: On RA, inspiratory crackles at bases.  No coughing on exam today.  Abdominal:      General: Bowel sounds are normal.      Palpations: Abdomen is soft.      Tenderness: There is no abdominal tenderness. Paracentesis bandage in place.  Skin:     General: Skin is warm and dry. BLE edema.  Neurological:      Mental Status: He is alert and oriented to person, place, and time.      Comments: Speech intact, moving all four extremities  Psychiatric:         Mood and Affect: Mood normal.        Goals of Care Treatment Preferences:  Code Status: Full Code      Consults:   Consults (From admission, onward)        Status Ordering Provider     Inpatient  consult to Infectious Diseases  Once        Provider:  Carin Haskins MD    Completed NOHEMI SENA.     Inpatient consult to Pulmonology  Once        Provider:  Robert Gu MD    Completed NOHEMI SENA.     Inpatient consult to Gastroenterology  Once        Provider:  Inge Chow MD    Acknowledged NOHEMI SENA.     Inpatient consult to Nephrology  Once        Provider:  Dion Leo MD    Completed BALAJI THOMAS     Inpatient consult to Nephrology  Once        Provider:  Dion Leo MD    Completed CHERELLE GUY new Assessment & Plan notes have been filed under this hospital service since the last note was generated.  Service: Hospital Medicine    Final Active Diagnoses:    Diagnosis Date Noted POA    PRINCIPAL PROBLEM:  Fever in immunocomprised patient [R50.9] 04/11/2022 No    Weakness [R53.1] 04/15/2022 Yes    Diarrhea [R19.7] 04/08/2022 Yes     Chronic    Hyponatremia [E87.1] 04/08/2022 Yes    Metabolic acidosis [E87.2] 04/08/2022 Yes    Hypothyroidism [E03.9] 04/08/2022 Yes     Chronic    Thrombocytopenia [D69.6] 04/08/2022 Yes    Anemia [D64.9] 04/08/2022 Yes     Chronic    ILDEFONSO (acute kidney injury) [N17.9] 04/07/2022 Yes    Cirrhosis of liver with ascites [K74.60, R18.8] 03/30/2022 Yes    Interstitial lung disease [J84.9] 03/30/2022 Yes    Diabetes mellitus due to underlying condition with chronic kidney disease, with long-term current use of insulin [E08.22, Z79.4] 02/08/2022 Not Applicable    Hypertension [I10] 02/08/2022 Yes    Gout [M10.9] 02/08/2022 Yes    History of renal transplant [Z94.0] 02/08/2022 Not Applicable      Problems Resolved During this Admission:    Diagnosis Date Noted Date Resolved POA    Hyperkalemia [E87.5] 04/07/2022 04/09/2022 Yes       Discharged Condition: fair    Disposition: Home or Self Care    Follow Up:   Follow-up Information     Isac Castellanos MD. Schedule an appointment as soon as  "possible for a visit in 2 week(s).    Specialty: Nephrology  Why: Make a follow up appointment in 2 weeks  Contact information:  [address]  [facility]  [place] [address]  [phone]             Tyler Castillo MD Follow up in 1 month(s).    Specialty: Family Medicine  Contact information:  [address]  [place] [address]  [phone]             Makeda Boyre MD. Schedule an appointment as soon as possible for a visit in 2 week(s).    Specialties: Pulmonary Disease, Critical Care Medicine  Why: For evaluation for lung disease  Contact information:  [address]  [address]  [place] [address]  [phone]             Mio Simmons III, MD. Schedule an appointment as soon as possible for a visit in 2 week(s).    Specialty: Gastroenterology  Contact information:  [address] [facility]  [place] [address]  [phone]                       Patient Instructions:      OXYGEN FOR HOME USE     Order Specific Question Answer Comments   Liter Flow 3    Duration With activity    Qualifying Test Performed at: Activity    Oxygen saturation at rest 94    Oxygen saturation with activity 84    Oxygen saturation with activity on oxygen 93    Portable mode: continuous    Route nasal cannula    Device: home concentrator with portable concentrator    Length of need (in months): 99 mos    Patient condition with qualifying saturation Other - List qualifying diagnosis and code    Select a diagnosis & list the code in the comments Interstitial lung disease [857282]    Height: 5' 8" (1.727 m)    Weight: 63.6 kg (140 lb 3.4 oz)    Alternative treatment measures have been tried or considered and deemed clinically ineffective. Yes      WALKER FOR HOME USE     Order Specific Question Answer Comments   Type of Walker: Adult (5'4"-6'6")    With wheels? Yes    Height: 5' 8" (1.727 m)    Weight: 64.2 kg (141 lb 8.6 oz)    Length of need (1-99 months): 99    Does patient have medical equipment at home? cane, " straight    Please check all that apply: Patient's condition impairs ambulation.      Basic Metabolic Panel   Standing Status: Future Standing Exp. Date: 06/20/23   Scheduling Instructions: Please schedule with lab for Monday 4/25     Diet renal     Activity as tolerated       Significant Diagnostic Studies: Labs:   CMP   Recent Labs   Lab 04/20/22  0544 04/21/22  0506   * 123*   K 4.7 4.8   CL 93* 91*   CO2 27 23   * 152*   BUN 46* 47*   CREATININE 2.9* 2.9*   CALCIUM 7.8* 7.6*   PROT 5.5* 5.3*   ALBUMIN 2.7* 2.8*   BILITOT 2.4* 2.5*   ALKPHOS 79 88   AST 80* 95*   ALT 16 18   ANIONGAP 5* 9   ESTGFRAFRICA 26.0* 26.0*   EGFRNONAA 22.5* 22.5*    and CBC   Recent Labs   Lab 04/20/22  0544 04/21/22  0506   WBC 3.72* 4.78   HGB 10.1* 9.5*   HCT 28.5* 26.6*   PLT 71* 74*       Pending Diagnostic Studies:     Procedure Component Value Units Date/Time    LDH, Peritoneal, Pleural Fluid or NEAL Drainage, In-House Ascites [[id number]] Collected: 04/12/22 1222    Order Status: Sent Lab Status: No result     Specimen: Ascites     Protein, Peritoneal, Pleural Fluid or NEAL Drainage, In-House Ascites [[id number]] Collected: 04/12/22 1222    Order Status: Sent Lab Status: No result     Specimen: Ascites     Urinalysis [[id number]] Collected: 04/11/22 1438    Order Status: Sent Lab Status: In process Updated: 04/11/22 1439    Specimen: Urine, Clean Catch          Medications:  Reconciled Home Medications:      Medication List      START taking these medications    dextromethorphan-guaiFENesin  mg/5 ml  mg/5 mL liquid  Commonly known as: ROBITUSSIN-DM  Take 5 mLs by mouth every 6 (six) hours as needed (cough).     fluticasone propionate 50 mcg/actuation nasal spray  Commonly known as: FLONASE  2 sprays (100 mcg total) by Each Nostril route once daily.  Start taking on: April 22, 2022     guaiFENesin 600 mg 12 hr tablet  Commonly known as: MUCINEX  Take 1 tablet (600 mg total) by mouth 2 (two) times daily. for 10  "days     hydrocodone-chlorpheniramine 10-8 mg/5 mL suspension  Commonly known as: TUSSIONEX  Take 5 mLs by mouth every 12 (twelve) hours as needed for Cough or Congestion.     lipase-protease-amylase 12,000-38,000-60,000 units Cpdr  Commonly known as: CREON  Take 2 capsules by mouth 4 (four) times daily with meals and nightly.     loperamide 2 mg capsule  Commonly known as: IMODIUM  Take 1 capsule (2 mg total) by mouth 4 (four) times daily as needed for Diarrhea.        CHANGE how you take these medications    benzonatate 200 MG capsule  Commonly known as: TESSALON  Take 1 capsule (200 mg total) by mouth 3 (three) times daily.  What changed:   · when to take this  · reasons to take this     furosemide 20 MG tablet  Commonly known as: LASIX  Take 1 tablet (20 mg total) by mouth once daily.  What changed:   · how much to take  · how to take this  · when to take this     tacrolimus 0.5 MG Cap  Commonly known as: PROGRAF  Take 3 capsules (1.5 mg total) by mouth every 12 (twelve) hours.  What changed:   · how much to take  · when to take this  · additional instructions  · Another medication with the same name was removed. Continue taking this medication, and follow the directions you see here.     TOUJEO SOLOSTAR U-300 INSULIN 300 unit/mL (1.5 mL) Inpn pen  Generic drug: insulin glargine (TOUJEO)  Inject 20 Units into the skin nightly.  What changed: how much to take     VENTOLIN HFA 90 mcg/actuation inhaler  Generic drug: albuterol  INHALE 2 PUFFS INTO THE LUNGS EVERY 6 (SIX) HOURS AS NEEDED FOR WHEEZING (COUGHING). RESCUE  What changed: See the new instructions.        CONTINUE taking these medications    alfuzosin 10 mg Tb24  Commonly known as: UROXATRAL  Take 1 tablet (10 mg total) by mouth after dinner.     allopurinoL 100 MG tablet  Commonly known as: ZYLOPRIM  Take 100 mg by mouth once daily.     BD NOEMI 2ND GEN PEN NEEDLE 32 gauge x 5/32" Ndle  Generic drug: pen needle, diabetic  USE AS DIRECTED ONCE DAILY WITH " BASAGLAR     levothyroxine 50 MCG tablet  Commonly known as: SYNTHROID  Take 50 mcg by mouth once daily.     metoprolol tartrate 50 MG tablet  Commonly known as: LOPRESSOR  Take 50 mg by mouth 2 (two) times daily.     pravastatin 40 MG tablet  Commonly known as: PRAVACHOL  Take 40 mg by mouth every evening.     sildenafiL 100 MG tablet  Commonly known as: VIAGRA  Take 1 tablet (100 mg total) by mouth daily as needed for Erectile Dysfunction.     sirolimus 1 MG Tab  Commonly known as: RAPAMUNE  Take 2 mg by mouth once daily.        STOP taking these medications    lactulose 20 gram/30 mL Soln  Commonly known as: CHRONULAC     lisinopriL 5 MG tablet  Commonly known as: PRINIVIL,ZESTRIL     metFORMIN 500 MG tablet  Commonly known as: GLUCOPHAGE     minoxidiL 2.5 MG tablet  Commonly known as: LONITEN     spironolactone 50 MG tablet  Commonly known as: ALDACTONE            Indwelling Lines/Drains at time of discharge:   Lines/Drains/Airways     None                 Time spent on the discharge of patient: 53 minutes         Manoj Allen MD  Department of Hospital Medicine  [facility]

## 2022-04-21 NOTE — PLAN OF CARE
Problem: Adult Inpatient Plan of Care  Goal: Plan of Care Review  4/21/2022 1731 by Holley Tomlinson LPN  Outcome: Met  4/21/2022 1442 by Holley Tomlinson LPN  Outcome: Ongoing, Progressing  Goal: Patient-Specific Goal (Individualized)  4/21/2022 1731 by Holley Tomlinson LPN  Outcome: Met  4/21/2022 1442 by Holley Tomlinson LPN  Outcome: Ongoing, Progressing  Goal: Absence of Hospital-Acquired Illness or Injury  4/21/2022 1731 by Holley Tomlinson LPN  Outcome: Met  4/21/2022 1442 by Holley Tomlinson LPN  Outcome: Ongoing, Progressing  Goal: Optimal Comfort and Wellbeing  4/21/2022 1731 by Holley Tomlinson LPN  Outcome: Met  4/21/2022 1442 by Holley Tomlinson LPN  Outcome: Ongoing, Progressing  Goal: Readiness for Transition of Care  4/21/2022 1731 by Holley Tomlinson LPN  Outcome: Met  4/21/2022 1442 by Holley Tomlinson LPN  Outcome: Ongoing, Progressing     Problem: Fluid and Electrolyte Imbalance (Acute Kidney Injury/Impairment)  Goal: Fluid and Electrolyte Balance  4/21/2022 1731 by Holley Tomlinson LPN  Outcome: Met  4/21/2022 1442 by Holley Tomlinson LPN  Outcome: Ongoing, Progressing     Problem: Oral Intake Inadequate (Acute Kidney Injury/Impairment)  Goal: Optimal Nutrition Intake  4/21/2022 1731 by Holley Tomlinson LPN  Outcome: Met  4/21/2022 1442 by Holley Tomlinson LPN  Outcome: Ongoing, Progressing     Problem: Renal Function Impairment (Acute Kidney Injury/Impairment)  Goal: Effective Renal Function  4/21/2022 1731 by Holley Tomlinson LPN  Outcome: Met  4/21/2022 1442 by Holley Tomlinson LPN  Outcome: Ongoing, Progressing     Problem: Infection  Goal: Absence of Infection Signs and Symptoms  4/21/2022 1731 by Holley Tomlinson LPN  Outcome: Met  4/21/2022 1442 by Holley Tomlinson LPN  Outcome: Ongoing, Progressing     Problem: Fall Injury Risk  Goal: Absence of Fall and Fall-Related Injury  4/21/2022 1731 by Holley Tomlinson LPN  Outcome: Met  4/21/2022 1442 by  Holley Tomlinson LPN  Outcome: Ongoing, Progressing

## 2022-04-21 NOTE — PT/OT/SLP PROGRESS
"Physical Therapy Treatment    Patient Name:  Edgar Jackson   MRN:  [id number]    Recommendations:     Discharge Recommendations:  home health PT, outpatient PT (Pt needs continued strengthening to progress off of rolling walker.)   Discharge Equipment Recommendations: walker, rolling   Barriers to discharge: None    Assessment:     Edgar Jackson is a 61 y.o. male admitted with a medical diagnosis of Fever.  He presents with the following impairments/functional limitations:  weakness, impaired endurance, impaired functional mobilty, gait instability, impaired balance, impaired cardiopulmonary response to activity.    Pt found HOB elevated slightly on 1Lpm O2 by nc. Pt agreeable to gait training and doffed his nc (97% on RA prior to gt training); Pt tolerated session fairly, still needing RW for safety/stability during gt. PT recommending pt have follow up PT (HH vs OP) for continued strengthening to progress off of RW and back to SC as he used PTA.    Rehab Prognosis: Fair; patient would benefit from acute skilled PT services to address these deficits and reach maximum level of function.    Recent Surgery: * No surgery found *      Plan:     During this hospitalization, patient to be seen 5 x/week to address the identified rehab impairments via gait training, therapeutic activities, therapeutic exercises and progress toward the following goals:    · Plan of Care Expires:  05/16/22    Subjective     Chief Complaint: "I still feel unsteady and need the walker."  Patient/Family Comments/goals: HH vs OP PT  Pain/Comfort:  · Pain Rating 1: 0/10      Objective:     Communicated with TIFFANY Babb prior to session.  Patient found HOB elevated with oxygen, peripheral IV, telemetry, pulse ox (continuous) upon PT entry to room.     General Precautions: Standard, fall, diabetic   Orthopedic Precautions:N/A   Braces: N/A  Respiratory Status: Nasal cannula, flow 1 L/min( pt doffed O2 for gait, 97% on RA before gait, 90% after, but pt's telemetry " monitor alarmed for V-tach. PT reported to RN after gt trial).     Functional Mobility:  · Bed Mobility:     · Scooting: stand by assistance  · Supine to Sit: contact guard assistance and minimum assistance  · Transfers:     · Sit to Stand:  stand by assistance and contact guard assistance with rolling walker  · Gait: x 200 feet with rolling walker and CGA > SBA for safety due to instability.      AM-PAC 6 CLICK MOBILITY          Therapeutic Activities and Exercises:   Pt was educated on the following: call light use, importance of OOB activity and functional mobility to negate the negative effects of prolonged bed rest during this hospitalization, safe transfers/ambulation and discharge planning recommendations/options.      Patient left up in chair with all lines intact, call button in reach and RN notified..    GOALS:   Multidisciplinary Problems     Physical Therapy Goals        Problem: Physical Therapy    Goal Priority Disciplines Outcome Goal Variances Interventions   Physical Therapy Goal     PT, PT/OT Ongoing, Progressing     Description: Goals to be met by: discharge     Patient will increase functional independence with mobility by performin. Gait  x 150 feet with Supervision using LRAD  2. Ascend/descend 10 stair with right Handrails Contact Guard Assistance using LRAD                     Time Tracking:     PT Received On: 22  PT Start Time: 1028     PT Stop Time: 1044  PT Total Time (min): 16 min     Billable Minutes: Gait Training 16    Treatment Type: Treatment  PT/PTA: PT     PTA Visit Number: 2     2022

## 2022-04-21 NOTE — PLAN OF CARE
04/21/22 1530   Final Note   Assessment Type Final Discharge Note   Anticipated Discharge Disposition Home   Post-Acute Status   Post-Acute Authorization HME;Other  (Oxygen 1 E tank set up and 1 rolling walker)   HME Status Set-up Complete/Auth obtained   Discharge Delays None known at this time   CM received approval from Mauro to pull one RW and 1 E Tank set up. Provided equipment and education to patient, rental agreement signed. PT and RT to educate patient on walker and O2    Once education complete no further needs from CM.

## 2022-04-25 NOTE — TELEPHONE ENCOUNTER
Called and spoke with patient's daughter and got patient appt for within 2 weeks.          ----- Message from Felipe May sent at 4/25/2022  9:48 AM CDT -----  Contact: pt's wife Meenu at [phone]  Type:  Sooner Appointment Request  Caller is requesting a sooner appointment.  Caller declined first available appointment listed below.  Caller will not accept being placed on the waitlist and is requesting a message be sent to doctor.  Name of Caller:  pt's wife Meenu  When is the first available appointment?  5/17/22  Symptoms:  2 wk f/u  Best Call Back Number:  [phone]  Additional Information:  pt's wife Meenu is calling the office to schedule a two week follow up after being discharged from the hospital on 4/22/22 but none come up in the system. Please call back and advise.

## 2022-04-28 PROBLEM — J96.10 CHRONIC RESPIRATORY FAILURE: Status: ACTIVE | Noted: 2022-01-01

## 2022-04-28 NOTE — PLAN OF CARE
Problem: Adult Inpatient Plan of Care  Goal: Plan of Care Review  Outcome: Ongoing, Progressing  Goal: Patient-Specific Goal (Individualized)  Outcome: Ongoing, Progressing  Goal: Absence of Hospital-Acquired Illness or Injury  Outcome: Ongoing, Progressing  Goal: Optimal Comfort and Wellbeing  Outcome: Ongoing, Progressing  Goal: Readiness for Transition of Care  Outcome: Ongoing, Progressing     Problem: Fluid and Electrolyte Imbalance (Acute Kidney Injury/Impairment)  Goal: Fluid and Electrolyte Balance  Outcome: Ongoing, Progressing     Problem: Oral Intake Inadequate (Acute Kidney Injury/Impairment)  Goal: Optimal Nutrition Intake  Outcome: Ongoing, Progressing     Problem: Renal Function Impairment (Acute Kidney Injury/Impairment)  Goal: Effective Renal Function  Outcome: Ongoing, Progressing     Problem: Oral Intake Inadequate  Goal: Improved Oral Intake  Outcome: Ongoing, Progressing

## 2022-04-28 NOTE — H&P
"[facility] - Emergency Dept  Hospital Medicine  History & Physical    Patient Name: Edgar Jackson  MRN: [id number]  Patient Class: IP- Inpatient  Admission Date: 4/27/2022  Attending Physician: Herbert Treviño MD  Primary Care Provider: Tyler Castillo MD         Patient information was obtained from patient, spouse/SO, past medical records and ER records.     Subjective:     Principal Problem:ILDEFONSO (acute kidney injury)    Chief Complaint:   Chief Complaint   Patient presents with    abnormal labs     Reports "creatinine elevated from yesterday lab draw at kidney doctor"           HPI: Patient is a 61-year-old  male with history of renal transplant for hypertensive nephropathy in 2004 maintained on immunosuppressive therapy, recent diagnosis of cirrhosis thought to be secondary to LOPEZ, type 2 diabetes mellitus, interstitial lung disease who was recently discharged from our facility after being managed for ILDEFONSO returns today after outpatient labs revealed worsening serum creatinine.    Patient was discharged on 4/21 after a 13 day stay for ILDEFONSO and decompensated cirrhosis.  He was evaluated by multiple specialists including Nephrology, pulmonology, GI as well as Infectious Disease.  He underwent paracentesis and there is no evidence of SBP.  He received 5 days of empiric ceftriaxone.  He was started on pancreatic enzyme supplements for diarrhea.    Since his discharge patient noticed increasing generalized weakness.  Diarrhea is slightly improved.  He does admit to abdominal distension which has recurred after paracentesis.  He denies fever but admits to chills which are chronic.  Cough is about the same and is predominantly dry.  He also endorses left lower quadrant abdominal pain which is worse with coughing.      Rest of the 10 point review of systems is negative except as mentioned above.      Past Medical History:   Diagnosis Date    CKD (chronic kidney disease)     Diabetes 2020    Gout     " "Hypertension 2000    [place]    Thyroid disease     Unspecified cirrhosis of liver        Past Surgical History:   Procedure Laterality Date    CATARACT EXTRACTION Right 2017    COLONOSCOPY N/A 3/8/2022    Procedure: COLONOSCOPY;  Surgeon: Mio Simmons III, MD;  Location: [facility] ENDO;  Service: Endoscopy;  Laterality: N/A;    ENDOSCOPIC ULTRASOUND OF UPPER GASTROINTESTINAL TRACT N/A 3/8/2022    Procedure: ULTRASOUND, UPPER GI TRACT, ENDOSCOPIC;  Surgeon: Mio Simmons III, MD;  Location: [facility] ENDO;  Service: Endoscopy;  Laterality: N/A;    KIDNEY TRANSPLANT  2004    PORTACATH PLACEMENT  2003    REMOVAL OF VASCULAR ACCESS PORT  2005       Review of patient's allergies indicates:  No Known Allergies    No current facility-administered medications on file prior to encounter.     Current Outpatient Medications on File Prior to Encounter   Medication Sig    alfuzosin (UROXATRAL) 10 mg Tb24 Take 1 tablet (10 mg total) by mouth after dinner.    allopurinoL (ZYLOPRIM) 100 MG tablet Take 100 mg by mouth once daily.    BD NOEMI 2ND GEN PEN NEEDLE 32 gauge x 5/32" Ndle USE AS DIRECTED ONCE DAILY WITH BASAGLAR    benzonatate (TESSALON) 200 MG capsule Take 1 capsule (200 mg total) by mouth 3 (three) times daily.    dextromethorphan-guaiFENesin  mg/5 ml (ROBITUSSIN-DM)  mg/5 mL liquid Take 5 mLs by mouth every 6 (six) hours as needed (cough).    fluticasone propionate (FLONASE) 50 mcg/actuation nasal spray 2 sprays (100 mcg total) by Each Nostril route once daily.    furosemide (LASIX) 20 MG tablet Take 1 tablet (20 mg total) by mouth once daily.    guaiFENesin (MUCINEX) 600 mg 12 hr tablet Take 1 tablet (600 mg total) by mouth 2 (two) times daily. for 10 days    hydrocodone-chlorpheniramine (TUSSIONEX) 10-8 mg/5 mL suspension Take 5 mLs by mouth every 12 (twelve) hours as needed for Cough or Congestion.    levothyroxine (SYNTHROID) 50 MCG tablet " Take 50 mcg by mouth once daily.    lipase-protease-amylase 12,000-38,000-60,000 units (CREON) CpDR Take 2 capsules by mouth 4 (four) times daily with meals and nightly.    loperamide (IMODIUM) 2 mg capsule Take 1 capsule (2 mg total) by mouth 4 (four) times daily as needed for Diarrhea.    metoprolol tartrate (LOPRESSOR) 50 MG tablet Take 50 mg by mouth 2 (two) times daily.    pravastatin (PRAVACHOL) 40 MG tablet Take 40 mg by mouth every evening.    sildenafiL (VIAGRA) 100 MG tablet Take 1 tablet (100 mg total) by mouth daily as needed for Erectile Dysfunction.    sirolimus (RAPAMUNE) 1 MG Tab Take 2 mg by mouth once daily.    tacrolimus (PROGRAF) 0.5 MG Cap Take 3 capsules (1.5 mg total) by mouth every 12 (twelve) hours.    TOUJEO SOLOSTAR U-300 INSULIN 300 unit/mL (1.5 mL) InPn pen Inject 20 Units into the skin nightly.    VENTOLIN HFA 90 mcg/actuation inhaler INHALE 2 PUFFS INTO THE LUNGS EVERY 6 (SIX) HOURS AS NEEDED FOR WHEEZING (COUGHING). RESCUE (Patient taking differently: Inhale 1 puff into the lungs every 6 (six) hours as needed.)    [DISCONTINUED] lisinopriL (PRINIVIL,ZESTRIL) 5 MG tablet Take 5 mg by mouth once daily.    [DISCONTINUED] metFORMIN (GLUCOPHAGE) 500 MG tablet Take 500 mg by mouth 2 (two) times daily.    [DISCONTINUED] minoxidiL (LONITEN) 2.5 MG tablet Take 2.5 mg by mouth 2 (two) times daily.    [DISCONTINUED] spironolactone (ALDACTONE) 50 MG tablet      Family History       Problem Relation (Age of Onset)    Heart disease Father    Kidney disease Father          Tobacco Use    Smoking status: Never Smoker    Smokeless tobacco: Never Used   Substance and Sexual Activity    Alcohol use: Not Currently    Drug use: Never    Sexual activity: Not on file       Objective:     Vital Signs (Most Recent):  Temp: 98 °F (36.7 °C) (04/27/22 1918)  Pulse: 69 (04/27/22 2117)  Resp: 14 (04/27/22 1918)  BP: 127/79 (04/27/22 2117)  SpO2: (!) 94 % (04/27/22 2117)   Vital Signs (24h  Range):  Temp:  [98 °F (36.7 °C)] 98 °F (36.7 °C)  Pulse:  [64-80] 69  Resp:  [14] 14  SpO2:  [94 %-96 %] 94 %  BP: (102-127)/(73-79) 127/79     Weight: 63.5 kg (140 lb)  Body mass index is 21.29 kg/m².    Physical Exam      General: Patient resting comfortably in no acute distress. Chronically ill-appearing.  Eyes:  No pallor.  Sclera icteric  Lungs:  End-expiratory wheeze audible.  No tachypnea or accessory muscle use.  Bibasilar crackles audible  Cor: Regular rate and rhythm.  2+ pitting pedal edema   Abd: Soft.  Left lower quadrant tenderness noted.  Tympanic note on percussion over bilateral flanks  Musculoskeletal: No arthropathy, deformity.  Skin: No rashes, swelling, or erythema.  Neuro: A&O x3. Moving all extremities equally  Ext: No clubbing. No cyanosis. Peripheral pulses +      Significant Labs: All pertinent labs within the past 24 hours have been reviewed.  CBC:   Recent Labs   Lab 04/26/22 1750 04/27/22 1945   WBC 5.15 8.15   HGB 11.4* 11.9*   HCT 34.0* 34.2*   * 153     CMP:   Recent Labs   Lab 04/26/22 1750 04/27/22 1945   * 128*   K 4.4 4.0   CL 96 97   CO2 22* 20*    71   BUN 56* 58*   CREATININE 4.3* 4.7*   CALCIUM 8.0* 8.2*   PROT 6.2 6.5   ALBUMIN 2.9* 3.1*   BILITOT 4.4* 4.9*   ALKPHOS 87 99   * 127*   ALT 18 21   ANIONGAP 9 11   EGFRNONAA 14.0* 12.5*       Significant Imaging: I have reviewed all pertinent imaging results/findings within the past 24 hours.    Imaging Results              X-Ray Chest AP Portable (Final result)  Result time 04/27/22 21:41:16      Final result by Robert Mei MD (04/27/22 21:41:16)                   Narrative:    PORTABLE CHEST X-RAY    CLINICAL HISTORY: Cough    COMPARISON: 4/16/2022.    FINDINGS: Portable AP view of the chest was obtained with overlying monitor leads in place. Lungs are under aerated. There are chronic appearing interstitial and fibrotic changes. There are increasing opacities bilaterally mostly in the  peripheral perihilar regions consistent with worsening pneumonia. No edema or significant pleural fluid by portable imaging. Normal heart size.    IMPRESSION: Chronic parenchymal changes with increasing peripheral perihilar opacities felt to be related to worsening pneumonia      Electronically signed by:  Robert Mei MD  4/27/2022 9:41 PM CDT Workstation: [id number]                                     CT Abdomen Pelvis  Without Contrast (Final result)  Result time 04/27/22 21:40:08      Final result by Robert Mei MD (04/27/22 21:40:08)                   Narrative:    NONCONTRAST CT SCANS ABDOMEN AND PELVIS    HISTORY: LLQ abdominal pain    COMPARISON: None.    TECHNIQUE:Radiation dose reduction techniques were utilized, including automated exposure control and exposure modulation based on body size.  Axial images were obtained from the lung bases to the symphysis pubis without oral or IV contrast per request.    FINDINGS:  Right greater than left pleural effusions are partially visualized. Small pericardial effusion. Liver is small with indistinct contour possibly indicative of cirrhosis. Spleen enlarged at 14 cm possibly indicative of portal hypertension.    Native kidneys are markedly atrophic with vascular and nonobstructing collecting system calculi. There is a right pelvic transplant kidney without nephrolithiasis or hydronephrosis demonstrated.    Remaining abdominal organs are otherwise unremarkable without benefit of IV contrast. There is some focal dilatation of the infrarenal abdominal aorta but it is not technically aneurysmal at 2.5 cm. Moderate atheromatous plaque.    Normal appendix. No constipation or diverticular disease. The GI tract not opacified for assessment but non obstructive in appearance. Moderate ascites without loculated fluid collection.      IMPRESSION:  1.  Small pericardial effusion with right greater than left pleural effusions.  2. Findings suggesting cirrhosis with portal  hypertension.  3. Bilateral nephrolithiasis, nonobstructing.  4. Moderate ascites          Electronically signed by:  Robert Mei MD  4/27/2022 9:40 PM CDT Workstation: [id number]                                      Assessment/Plan:     * ILDEFONSO (acute kidney injury)  Could be prerenal in the setting of continued use of diuretics and advanced cirrhosis  Hepatorenal syndrome is most likely etiology  Hold furosemide  Gentle IV fluids  Trial of IV albumin 100 g for 2 days  Gastroenterology and nephrology consultation  Continue tacrolimus and sirolimus      Cirrhosis of liver with ascites  Worsening transaminitis when compared to recent labs  MELD sodium score is 33 points conferring approximately 65% estimated 90 day mortality  Moderate ascites on imaging  Gastroenterology consultation as above  Holding diuretics secondary to ILDEFONSO  Monitor clinically for need of paracentesis      Hyponatremia  Hypervolemic hyponatremia from volume overload  Monitor with daily labs      Interstitial lung disease  Follows with Dr. Boyer  Supplemental oxygen to maintain SpO2 greater than 90%  Chest x-ray with worsening infiltrates however suspicion for infectious etiology is low  Recently finished 5 days of empiric ceftriaxone for possible SBP  Received a dose of vancomycin as well as cefepime in the ED  Monitor off antibiotics      Personal history of immunosupression therapy  Status post renal transplant in 2004  Continue sirolimus and tacrolimus for immunosuppressive therapy      VET risk high. SCDs     Herbert Treviño MD  Department of Hospital Medicine   [facility] - Emergency Dept

## 2022-04-28 NOTE — ASSESSMENT & PLAN NOTE
Worsening transaminitis when compared to recent labs  MELD sodium score is 33 points conferring approximately 65% estimated 90 day mortality  Moderate ascites on imaging  Gastroenterology consultation as above  Holding diuretics secondary to ILDEFONSO  Monitor clinically for need of paracentesis

## 2022-04-28 NOTE — ASSESSMENT & PLAN NOTE
Follows with Dr. Boyer  Supplemental oxygen to maintain SpO2 greater than 90%  Chest x-ray with worsening infiltrates however suspicion for infectious etiology is low  Recently finished 5 days of empiric ceftriaxone for possible SBP  Received a dose of vancomycin as well as cefepime in the ED  Monitor off antibiotics

## 2022-04-28 NOTE — CONSULTS
"GASTROENTEROLOGY INPATIENT CONSULT NOTE  Patient Name: Edgar Ricketts MRN: [id number]  Patient : 1960    Admit Date: 2022  Service date: 2022    Reason for Consult: diarrhea, cirrhosis, renal failure cirrhosis    PCP: Tyler Castillo MD    Chief Complaint   Patient presents with    abnormal labs     Reports "creatinine elevated from yesterday lab draw at kidney doctor"          HPI: Patient is a 61 y.o. male with PMHx  diarrhea, cirrhosis, renal failure cirrhosis complicated by ascites, s/p renal transplant with ILDEFONSO admitted with progression of ILDEFONSO and diarrhea.  Reports 3 loose stool per day. No nausea,vomiting.  Similar to prior admissions.  Cscope last month urnemarkable    Fecal elastase 405    Calprotectin 42   Latest Reference Range & Units 22 18:35   C. diff Antigen Negative  Negative   C difficile Toxins A+B, EIA Negative  Negative [1]   Elastase 1, Fecal ug Elast./g Test Not Performed [2]   Giardia Antigen - EIA Negative  Negative [3]   Cryptosporidium Antigen Negative  Negative [4]   Stool Exam-Ova,Cysts,Parasites  Final report [5]   Stool WBC No neutrophils seen  No neutrophils seen   Giardia lamblia Ag Source  0   OVA AND PARASITES RESULT 1  Comment [6]       [Chart Review  Liver Bx- 22-   Final Pathologic Diagnosis 1. Liver, biopsy:   Chronic mild-moderately active hepatitis with bridging fibrosis and nodule   formation (Grade 2, Stage 3).   See comment.        Overall, the biopsy shows a mild-moderately active chronic hepatitis   associated with stage 3 fibrosis. Causes of chronic hepatitis should be   clinically and serologically excluded, including infectious, autoimmune,   metabolic, and drug-induced etiologies. The mild macrovesicular steatosis   raises the possibility of LOPEZ/MURTAZA and risk factors should be clinically   excluded.     EUS 3/8/22- barretts  Colon 3/8/22- normal  Past Medical History:  Past Medical History:   Diagnosis Date    CKD (chronic kidney disease)  " "   Diabetes 2020    Gout     Hypertension 2000    [place] 01[place]    [place]    Thyroid disease     Unspecified cirrhosis of liver         Past Surgical History:  Past Surgical History:   Procedure Laterality Date    CATARACT EXTRACTION Right 2017    COLONOSCOPY N/A 3/8/2022    Procedure: COLONOSCOPY;  Surgeon: Mio Simmons III, MD;  Location: [facility] ENDO;  Service: Endoscopy;  Laterality: N/A;    ENDOSCOPIC ULTRASOUND OF UPPER GASTROINTESTINAL TRACT N/A 3/8/2022    Procedure: ULTRASOUND, UPPER GI TRACT, ENDOSCOPIC;  Surgeon: Mio Simmons III, MD;  Location: [facility] ENDO;  Service: Endoscopy;  Laterality: N/A;    KIDNEY TRANSPLANT  2004    PORTACATH PLACEMENT  2003    REMOVAL OF VASCULAR ACCESS PORT  2005        Home Medications:  Medications Prior to Admission   Medication Sig Dispense Refill Last Dose    allopurinoL (ZYLOPRIM) 100 MG tablet Take 100 mg by mouth once daily.   4/27/2022 at Unknown time    BD NOEMI 2ND GEN PEN NEEDLE 32 gauge x 5/32" Ndle USE AS DIRECTED ONCE DAILY WITH BASAGLAR   4/27/2022 at Unknown time    benzonatate (TESSALON) 200 MG capsule Take 1 capsule (200 mg total) by mouth 3 (three) times daily. 90 capsule 0 4/27/2022 at Unknown time    dextromethorphan-guaiFENesin  mg/5 ml (ROBITUSSIN-DM)  mg/5 mL liquid Take 5 mLs by mouth every 6 (six) hours as needed (cough). 473 mL 0 4/27/2022 at Unknown time    fluticasone propionate (FLONASE) 50 mcg/actuation nasal spray 2 sprays (100 mcg total) by Each Nostril route once daily. 16 g 1 Past Month at Unknown time    furosemide (LASIX) 20 MG tablet Take 1 tablet (20 mg total) by mouth once daily. 30 tablet 11 4/27/2022 at Unknown time    guaiFENesin (MUCINEX) 600 mg 12 hr tablet Take 1 tablet (600 mg total) by mouth 2 (two) times daily. for 10 days 20 tablet 0 Past Week at Unknown time    hydrocodone-chlorpheniramine (TUSSIONEX) 10-8 mg/5 mL suspension Take 5 mLs by mouth every 12 (twelve) " hours as needed for Cough or Congestion. 115 mL 0 Past Month at Unknown time    levothyroxine (SYNTHROID) 50 MCG tablet Take 50 mcg by mouth once daily.   4/27/2022 at Unknown time    lipase-protease-amylase 12,000-38,000-60,000 units (CREON) CpDR Take 2 capsules by mouth 4 (four) times daily with meals and nightly. 240 capsule 11 4/27/2022 at Unknown time    loperamide (IMODIUM) 2 mg capsule Take 1 capsule (2 mg total) by mouth 4 (four) times daily as needed for Diarrhea. 40 capsule 0 4/27/2022 at Unknown time    metoprolol tartrate (LOPRESSOR) 50 MG tablet Take 50 mg by mouth 2 (two) times daily.   4/27/2022 at Unknown time    pravastatin (PRAVACHOL) 40 MG tablet Take 40 mg by mouth every evening.   4/27/2022 at Unknown time    sildenafiL (VIAGRA) 100 MG tablet Take 1 tablet (100 mg total) by mouth daily as needed for Erectile Dysfunction. 6 tablet 6 Past Month at Unknown time    sirolimus (RAPAMUNE) 1 MG Tab Take 2 mg by mouth once daily.   4/27/2022 at Unknown time    tacrolimus (PROGRAF) 0.5 MG Cap Take 3 capsules (1.5 mg total) by mouth every 12 (twelve) hours. 180 capsule 11 4/27/2022 at Unknown time    TOUJEO SOLOSTAR U-300 INSULIN 300 unit/mL (1.5 mL) InPn pen Inject 20 Units into the skin nightly.   Past Week at Unknown time    VENTOLIN HFA 90 mcg/actuation inhaler INHALE 2 PUFFS INTO THE LUNGS EVERY 6 (SIX) HOURS AS NEEDED FOR WHEEZING (COUGHING). RESCUE (Patient taking differently: Inhale 1 puff into the lungs every 6 (six) hours as needed.) 18 g 2 Past Week at Unknown time    alfuzosin (UROXATRAL) 10 mg Tb24 Take 1 tablet (10 mg total) by mouth after dinner. 30 tablet 12        Inpatient Medications:   insulin detemir U-100  15 Units Subcutaneous QHS    levothyroxine  50 mcg Oral Before breakfast    lipase-protease-amylase 12,000-38,000-60,000 units  2 capsule Oral QID (WM & HS)    pravastatin  10 mg Oral QHS    sirolimus  2 mg Oral Daily    tacrolimus  1.5 mg Oral BID    tamsulosin   "0.4 mg Oral Daily     acetaminophen, albuterol, benzonatate, dextrose 50%, dextrose 50%, glucagon (human recombinant), glucose, glucose, guaiFENesin-codeine 100-10 mg/5 ml, insulin aspart U-100, loperamide, melatonin, naloxone, ondansetron, polyethylene glycol    Review of patient's allergies indicates:  No Known Allergies    Social History:   Social History     Occupational History    Not on file   Tobacco Use    Smoking status: Never Smoker    Smokeless tobacco: Never Used   Substance and Sexual Activity    Alcohol use: Not Currently    Drug use: Never    Sexual activity: Not on file       Family History:   Family History   Problem Relation Age of Onset    Kidney disease Father     Heart disease Father        Review of Systems:  A 10 point review of systems was performed and was normal, except as mentioned in the HPI, including constitutional, HEENT, heme, lymph, cardiovascular, respiratory, gastrointestinal, genitourinary, neurologic, endocrine, psychiatric and musculoskeletal.      OBJECTIVE:    Physical Exam:  24 Hour Vital Sign Ranges: Temp:  [98 °F (36.7 °C)-98.9 °F (37.2 °C)] 98.9 °F (37.2 °C)  Pulse:  [60-80] 73  Resp:  [14-18] 18  SpO2:  [92 %-96 %] 92 %  BP: (102-136)/(73-85) 127/85  Most recent vitals: /85   Pulse 73   Temp 98.9 °F (37.2 °C) (Oral)   Resp 18   Ht 5' 8" (1.727 m)   Wt 65.3 kg (143 lb 15.4 oz)   SpO2 (!) 92%   BMI 21.89 kg/m²    GEN: well-developed, well-nourished, awake and alert, non-toxic appearing adult  HEENT: PERRL, sclera anicteric, oral mucosa pink and moist without lesion  NECK: trachea midline; Good ROM  CV: regular rate and rhythm, no murmurs or gallops  RESP: clear to auscultation bilaterally, no wheezes, rhonci or rales  ABD: soft, non-tender, non-distended, normal bowel sounds  EXT: no swelling or edema, 2+ pulses distally  SKIN: no rashes or jaundice  PSYCH: normal affect    Labs:   Recent Labs     04/26/22  1750 04/27/22  1945 04/28/22  0454   WBC 5.15 " 8.15 3.45*   MCV 88 87 85   * 153 80*     Recent Labs     04/26/22  1750 04/27/22  1945 04/28/22  0454   * 128* 127*   K 4.4 4.0 4.9   CL 96 97 97   CO2 22* 20* 24   BUN 56* 58* 62*    71 146*     No results for input(s): ALB in the last 72 hours.    Invalid input(s): ALKP, SGOT, SGPT, TBIL, DBIL, TPRO  Recent Labs     04/26/22  1750   INR 1.5         Radiology Review:  X-Ray Chest AP Portable   Final Result      CT Abdomen Pelvis  Without Contrast   Final Result      US Transplant Kidney With Doppler    (Results Pending)         IMPRESSION / RECOMMENDATIONS:  Chronic diarrhea  -cryptosporidium negative  -check microspodidium/cyclospora, stool pcr  -use imodium 2-3 times per day as needed  -consider side effect of immunosuppression with tacro/sirolimus- check levels     ILDEFONSO  -possible HRS or rejection  -defer to nephrology    Cirrhosis  -following with [facility].      Thank you for this consult.    Mo Sanchez  4/28/2022  1:32 PM

## 2022-04-28 NOTE — PLAN OF CARE
Problem: Oral Intake Inadequate  Goal: Improved Oral Intake  Intervention: Promote and Optimize Oral Intake  Flowsheets (Taken 4/28/2022 1236)  Oral Nutrition Promotion:   calorie-dense foods provided   calorie-dense liquids provided

## 2022-04-28 NOTE — ASSESSMENT & PLAN NOTE
Status post renal transplant in 2004  Continue sirolimus and tacrolimus for immunosuppressive therapy

## 2022-04-28 NOTE — PLAN OF CARE
22 1156   Discharge Assessment   Assessment Type Discharge Planning Assessment   Confirmed/corrected address, phone number and insurance Yes   Confirmed Demographics Correct on Facesheet   Source of Information patient;health record   Reason For Admission ILDEFONSO   Lives With spouse   Do you expect to return to your current living situation? Yes   Do you have help at home or someone to help you manage your care at home? Yes   Who are your caregiver(s) and their phone number(s)? Spouse Meenu [phone]   Prior to hospitilization cognitive status: Alert/Oriented   Current cognitive status: Alert/Oriented   Walking or Climbing Stairs Difficulty ambulation difficulty, requires equipment   Mobility Management walker and cane   Dressing/Bathing Difficulty bathing difficulty, assistance 1 person   Dressing/Bathing Management spouse assists with bathing   Do you have any problems with:   (spouse drives to appts and groceries/errands)   Equipment Currently Used at Home oxygen;walker, rolling;cane, straight  (Front wheel walker)   Readmission within 30 days? Yes   Patient currently being followed by outpatient case management? No   Do you currently have service(s) that help you manage your care at home? No   Do you take prescription medications? Yes   Do you have prescription coverage?   (BCBS)   Do you have any problems affording any of your prescribed medications? No   Is the patient taking medications as prescribed? yes   Who is going to help you get home at discharge? Spouse   How do you get to doctors appointments? family or friend will provide   Are you on dialysis? No   Do you take coumadin? No   Discharge Plan A Home with family   Discharge Plan B Home with family   DME Needed Upon Discharge  none   Discharge Plan discussed with: Patient   Discharge Barriers Identified None   Relationship/Environment   Name(s) of Who Lives With Patient Meenu CABALLERO met with patient at bedside. Verified name and . Introduced self  and CM role with patient. Works as . Spouse assists patient with bathing and drives. Spouse also assists with any other needs patient may have. DC home last week with walker and home oxygen. Currently patient is not on oxygen in room. DCP is to home. LE pending clinical progression. Patient states was admitted due to his labs being elevated.

## 2022-04-28 NOTE — ED PROVIDER NOTES
"Encounter Date: 4/27/2022       History     Chief Complaint   Patient presents with    abnormal labs     Reports "creatinine elevated from yesterday lab draw at kidney doctor"        62 yo man with PMH remote renal transplant (2004 secondary to hypertensive renal failure), on chronic immunosuppressant (sirolimus and tacrolimus), diagnosis of cirrhosis who underwent recent transjugular biopsy (pathology with chronic mild to moderate active hepatitis with bridging fibrosis and nodules) returns to the ED with abnormal labs.  The patient had outpatient labs yesterday that showed an elevated creatinine level and he was instructed to come back to the emergency department.  He states that he is still having diarrhea.  He reports that he has diarrhea every 2-3 hours.  It is nonbloody in nature.  He does have some left lower quadrant abdominal pain.  He also reports that he still has a cough.  He denies any fevers or chills.  He has been taking all his medications as prescribed.        Review of patient's allergies indicates:  No Known Allergies  Past Medical History:   Diagnosis Date    CKD (chronic kidney disease)     Diabetes 2020    Gout     Hypertension 2000    [place]    Thyroid disease     Unspecified cirrhosis of liver      Past Surgical History:   Procedure Laterality Date    CATARACT EXTRACTION Right 2017    COLONOSCOPY N/A 3/8/2022    Procedure: COLONOSCOPY;  Surgeon: Mio Simmons III, MD;  Location: [facility] ENDO;  Service: Endoscopy;  Laterality: N/A;    ENDOSCOPIC ULTRASOUND OF UPPER GASTROINTESTINAL TRACT N/A 3/8/2022    Procedure: ULTRASOUND, UPPER GI TRACT, ENDOSCOPIC;  Surgeon: Mio Simmons III, MD;  Location: [facility] ENDO;  Service: Endoscopy;  Laterality: N/A;    KIDNEY TRANSPLANT  2004    PORTACATH PLACEMENT  2003    REMOVAL OF VASCULAR ACCESS PORT  2005     Family History   Problem Relation Age of Onset    Kidney disease Father     Heart " disease Father      Social History     Tobacco Use    Smoking status: Never Smoker    Smokeless tobacco: Never Used   Substance Use Topics    Alcohol use: Not Currently    Drug use: Never     Review of Systems   Constitutional: Negative for fever.   HENT: Negative for sore throat.    Respiratory: Negative for shortness of breath.    Cardiovascular: Negative for chest pain.   Gastrointestinal: Positive for abdominal pain, diarrhea and nausea. Negative for vomiting.   Genitourinary: Negative for dysuria.   Musculoskeletal: Negative for back pain.   Skin: Negative for rash.   Neurological: Negative for weakness and headaches.   Hematological: Does not bruise/bleed easily.   Psychiatric/Behavioral: Negative for confusion.   All other systems reviewed and are negative.      Physical Exam     Initial Vitals [04/27/22 1918]   BP Pulse Resp Temp SpO2   102/78 80 14 98 °F (36.7 °C) 96 %      MAP       --         Physical Exam    Nursing note and vitals reviewed.  Constitutional: He appears well-developed and well-nourished. No distress.   HENT:   Head: Normocephalic and atraumatic.   Eyes: EOM are normal. Scleral icterus is present.   Neck:   Normal range of motion.  Cardiovascular: Normal rate, regular rhythm, normal heart sounds and intact distal pulses.   No murmur heard.  Pulmonary/Chest: No respiratory distress. He has no wheezes. He has rales ( right lower lobe and left upper lobe).   Abdominal: Abdomen is soft. He exhibits no distension. There is abdominal tenderness (Mild left lower quadrant without rebound or guarding). There is no rebound and no guarding.   Musculoskeletal:         General: Edema ( 2+ bilateral lower extremity) present. Normal range of motion.      Cervical back: Normal range of motion.     Neurological: He is alert and oriented to person, place, and time. GCS score is 15. GCS eye subscore is 4. GCS verbal subscore is 5. GCS motor subscore is 6.   Skin: Skin is warm and dry. Capillary refill  takes less than 2 seconds.   Psychiatric: He has a normal mood and affect.         ED Course   Procedures  Labs Reviewed   COMPREHENSIVE METABOLIC PANEL - Abnormal; Notable for the following components:       Result Value    Sodium 128 (*)     CO2 20 (*)     BUN 58 (*)     Creatinine 4.7 (*)     Calcium 8.2 (*)     Albumin 3.1 (*)     Total Bilirubin 4.9 (*)      (*)     eGFR if  14.5 (*)     eGFR if non  12.5 (*)     All other components within normal limits   CBC W/ AUTO DIFFERENTIAL - Abnormal; Notable for the following components:    RBC 3.92 (*)     Hemoglobin 11.9 (*)     Hematocrit 34.2 (*)     Immature Granulocytes 1.1 (*)     Immature Grans (Abs) 0.09 (*)     Mono # 1.1 (*)     Lymph % 16.0 (*)     All other components within normal limits   CULTURE, BLOOD   CULTURE, BLOOD   PHOSPHORUS   MAGNESIUM   SARS-COV-2 RNA AMPLIFICATION, QUAL   TACROLIMUS LEVEL   CK   CK   TROPONIN I   B-TYPE NATRIURETIC PEPTIDE   URINALYSIS, REFLEX TO URINE CULTURE   TACROLIMUS LEVEL   PROCALCITONIN          Imaging Results          X-Ray Chest AP Portable (Final result)  Result time 04/27/22 21:41:16    Final result by Robert Mei MD (04/27/22 21:41:16)                 Narrative:    PORTABLE CHEST X-RAY    CLINICAL HISTORY: Cough    COMPARISON: 4/16/2022.    FINDINGS: Portable AP view of the chest was obtained with overlying monitor leads in place. Lungs are under aerated. There are chronic appearing interstitial and fibrotic changes. There are increasing opacities bilaterally mostly in the peripheral perihilar regions consistent with worsening pneumonia. No edema or significant pleural fluid by portable imaging. Normal heart size.    IMPRESSION: Chronic parenchymal changes with increasing peripheral perihilar opacities felt to be related to worsening pneumonia      Electronically signed by:  Robert Mei MD  4/27/2022 9:41 PM CDT Workstation: [id number]                             CT  Abdomen Pelvis  Without Contrast (Final result)  Result time 04/27/22 21:40:08    Final result by Robert Mei MD (04/27/22 21:40:08)                 Narrative:    NONCONTRAST CT SCANS ABDOMEN AND PELVIS    HISTORY: LLQ abdominal pain    COMPARISON: None.    TECHNIQUE:Radiation dose reduction techniques were utilized, including automated exposure control and exposure modulation based on body size.  Axial images were obtained from the lung bases to the symphysis pubis without oral or IV contrast per request.    FINDINGS:  Right greater than left pleural effusions are partially visualized. Small pericardial effusion. Liver is small with indistinct contour possibly indicative of cirrhosis. Spleen enlarged at 14 cm possibly indicative of portal hypertension.    Native kidneys are markedly atrophic with vascular and nonobstructing collecting system calculi. There is a right pelvic transplant kidney without nephrolithiasis or hydronephrosis demonstrated.    Remaining abdominal organs are otherwise unremarkable without benefit of IV contrast. There is some focal dilatation of the infrarenal abdominal aorta but it is not technically aneurysmal at 2.5 cm. Moderate atheromatous plaque.    Normal appendix. No constipation or diverticular disease. The GI tract not opacified for assessment but non obstructive in appearance. Moderate ascites without loculated fluid collection.      IMPRESSION:  1.  Small pericardial effusion with right greater than left pleural effusions.  2. Findings suggesting cirrhosis with portal hypertension.  3. Bilateral nephrolithiasis, nonobstructing.  4. Moderate ascites          Electronically signed by:  Robert Mei MD  4/27/2022 9:40 PM CDT Workstation: [id number]                            X-Rays:   Independently Interpreted Readings:   Chest X-Ray: Diffuse patchy airspace opacities increase in nature from prior study     Medications   cefepime in dextrose 5 % IVPB 2 g (has no administration  in time range)   vancomycin - pharmacy to dose (has no administration in time range)   vancomycin in dextrose 5 % 1 gram/250 mL IVPB 1,000 mg (has no administration in time range)     Medical Decision Making:   ED Management:  61-year-old male returns emergency department with abnormal labs.  Here he has strict an elevated creatinine that has essentially doubled since his discharge.  Electrolytes are overall unremarkable.  Chest x-ray shows worsening infiltrates.  With his cough will treat with antibiotics and add on procal and cardiac enzymes.  CT scan without acute abnormality to explain the patient's left lower quadrant pain.  Will admit for further management of acute kidney injury.    Anastacia Hagen MD  Emergency Medicine  04/27/2022 10:25 PM                        Clinical Impression:   Final diagnoses:  [R05.9] Cough  [N17.9] ILDEFONSO (acute kidney injury) (Primary)  [K74.60, R18.8] Cirrhosis of liver with ascites, unspecified hepatic cirrhosis type  [J18.9] Multifocal pneumonia          ED Disposition Condition    Admit               Anastacia Hagen MD  04/27/22 3928

## 2022-04-28 NOTE — ASSESSMENT & PLAN NOTE
Could be prerenal in the setting of continued use of diuretics and advanced cirrhosis  Hepatorenal syndrome is most likely etiology  Hold furosemide  Gentle IV fluids  Trial of IV albumin 100 g for 2 days  Gastroenterology and nephrology consultation  Continue tacrolimus and sirolimus

## 2022-04-28 NOTE — PROGRESS NOTES
"[facility]  Adult Nutrition   Progress Note (Initial Assessment)     SUMMARY     Recommendations  Recommendation/Intervention:   1. Recommend Diabetic 1800 kcal Renal diet.  2. Suggest Ensure pudding BID and ProtGold TID.  3.  to obtain meal choices daily.    Goals: 1. Patient to consume >/= 75% estimated needs.  Nutrition Goal Status: new    Communication of RD Recs: reviewed with RN    Dietitian Rounds Brief  RD screen for MST 3. Patient with significant weight loss over past 3 months. Likely fluid related. Last admit had paracentesis. Patient had good intake and appetite last admit. Patient report poor appetite and reflux. Offered strategies for limiting reflux. Wants chocolate supplement. Will give Ensure Pudding BID (340 kcal, 8 gm protein) and ProTGold TID (210kcal, 51 gm protein) RD to monitor change in status, intake of meals and ONS.    Malnutrition Assessment   Patient appears stated age. No overt signs of malnutrition.    Diet order:   Current Diet Order: Renal Diabetic Diet     % Intake of Estimated Energy Needs: 0 - 25 %  % Meal Intake: 0 - 25 %    Energy Calories Required: not meeting needs  Protein Required: not meeting needs  Fluid Required: not meeting needs  Tolerance: tolerating    Anthropometrics  Temp: 98.8 °F (37.1 °C)  Height Method: Stated  Height: 5' 8" (172.7 cm)  Height (inches): 68 in  Weight Method: Bed Scale  Weight: 65.3 kg (143 lb 15.4 oz)  Weight (lb): 143.96 lb  Ideal Body Weight (IBW), Male: 154 lb  % Ideal Body Weight, Male (lb): 93.48 %  BMI (Calculated): 21.9  BMI Grade: 18.5-24.9 - normal     Estimated/Assessed Needs  Weight Used For Calorie Calculations: 65.3 kg (143 lb 15.4 oz)  Energy Calorie Requirements (kcal): 2499-2118 kcal/day (25-30kcal/kg)     Protein Requirements: 65-78 gm/day (1.0-1.2 gm/kg)  Weight Used For Protein Calculations: 65.3 kg (143 lb 15.4 oz)  Fluid Requirements (mL): 1ml per kcal or per MD     RDA Method (mL): 1632      Reason for " Assessment  Reason For Assessment: identified at risk by screening criteria  Diagnosis: liver disease, renal disease (MST 3)  Relevant Medical History: gout, cirrhosis, kidney transplant, CKD, DM2, HTN  Interdisciplinary Rounds: did not attend    Nutrition Risk Screen  Nutrition Risk Screen: no indicators present     MST Score: 3  Have you recently lost weight without trying?: Yes: 14-23 lbs  Weight loss score: 2  Have you been eating poorly because of a decreased appetite?: Yes  Appetite score: 1       Weight History:  Wt Readings from Last 5 Encounters:   04/28/22 65.3 kg (143 lb 15.4 oz)   04/17/22 64.2 kg (141 lb 8.6 oz)   04/06/22 68 kg (150 lb)   03/30/22 67.9 kg (149 lb 12.8 oz)   03/17/22 67 kg (147 lb 11.3 oz)      Wt loss 19lbs in 3 months; 11% significant. Likely fluid related.    Lab/Procedures/Meds: Pertinent Labs/Meds Reviewed    Medications:Pertinent Medications Reviewed  Scheduled Meds:   levothyroxine  50 mcg Oral Before breakfast    lipase-protease-amylase 12,000-38,000-60,000 units  2 capsule Oral QID (WM & HS)    pravastatin  40 mg Oral QHS    sirolimus  2 mg Oral Daily    tacrolimus  1.5 mg Oral BID    tamsulosin  0.4 mg Oral Daily     Continuous Infusions:   sodium chloride 0.9% 75 mL/hr at 04/28/22 0142     PRN Meds:.acetaminophen, albuterol, benzonatate, guaiFENesin-codeine 100-10 mg/5 ml, loperamide, melatonin, naloxone, ondansetron, polyethylene glycol, Pharmacy to dose Vancomycin consult **AND** vancomycin - pharmacy to dose    Labs: Pertinent Labs Reviewed  Clinical Chemistry:  Recent Labs   Lab 04/27/22  1945 04/28/22  0454   * 127*   K 4.0 4.9   CL 97 97   CO2 20* 24   GLU 71 146*   BUN 58* 62*   CREATININE 4.7* 4.9*   CALCIUM 8.2* 7.6*   PROT 6.5 5.6*   ALBUMIN 3.1* 2.8*   BILITOT 4.9* 4.0*   ALKPHOS 99 74   * 96*   ALT 21 19   ANIONGAP 11 6*   ESTGFRAFRICA 14.5* 13.8*   EGFRNONAA 12.5* 11.9*   MG 1.7 1.7   PHOS 4.1  --      CBC:   Recent Labs   Lab 04/28/22  6469    WBC 3.45*   RBC 3.15*   HGB 9.4*   HCT 26.8*   PLT 80*   MCV 85   MCH 29.8   MCHC 35.1     Cardiac Profile:  Recent Labs   Lab 04/27/22  1945 04/28/22  0013   BNP  --  217*   CPK 81  --    TROPONINI <0.030  --      Monitor and Evaluation  Food and Nutrient Intake: energy intake, food and beverage intake  Food and Nutrient Adminstration: diet order  Knowledge/Beliefs/Attitudes: food and nutrition knowledge/skill  Physical Activity and Function: nutrition-related ADLs and IADLs  Anthropometric Measurements: weight, weight change, body mass index  Biochemical Data, Medical Tests and Procedures: electrolyte and renal panel, lipid profile, gastrointestinal profile, glucose/endocrine profile, inflammatory profile  Nutrition-Focused Physical Findings: overall appearance     Nutrition Risk  Level of Risk/Frequency of Follow-up: high     Nutrition Follow-Up  RD Follow-up?: Yes      Carola Guillen RD, LDN 04/28/2022 10:09 AM

## 2022-04-28 NOTE — CONSULTS
Nephrology Consult Note        Patient Name: Edgar Jackson  MRN: [id number]    Patient Class: IP- Inpatient   Admission Date: 4/27/2022  Length of Stay: 1 days  Date of Service: 4/28/2022    Attending Physician: Solomon Gardner MD  Primary Care Provider: Tyler Castillo MD    Reason for Consult: jason/ckd3/KT/diarrhea/cirrhosis/cough/hyponatremia/anemia/htn    SUBJECTIVE:     HPI: 61M with remote renal transplant (2004, secondary to hypertensive renal failure), on chronic immunosuppressant (sirolimus and tacrolimus), recent diagnosis of cirrhosis who underwent recent transjugular biopsy (pathology with chronic mild to moderate active hepatitis with bridging fibrosis and nodules) returns to the ED with abnormal labs. Outpatient labs  showed elevated creatinine and he was instructed to come back to the emergency department. He is still having diarrhea every 2-3 hours, non-bloody with some left lower quadrant abdominal pain. He also reports that persistent cough but denies any fevers or chills.  He has been taking all his medications as prescribed.    Past Medical History:   Diagnosis Date    CKD (chronic kidney disease)     Diabetes 2020    Gout     Hypertension 2000    [place]    Thyroid disease     Unspecified cirrhosis of liver      Past Surgical History:   Procedure Laterality Date    CATARACT EXTRACTION Right 2017    COLONOSCOPY N/A 3/8/2022    Procedure: COLONOSCOPY;  Surgeon: Mio Simmons III, MD;  Location: [facility];  Service: Endoscopy;  Laterality: N/A;    ENDOSCOPIC ULTRASOUND OF UPPER GASTROINTESTINAL TRACT N/A 3/8/2022    Procedure: ULTRASOUND, UPPER GI TRACT, ENDOSCOPIC;  Surgeon: Mio Simmons III, MD;  Location: [facility] END[facility];  Service: Endoscopy;  Laterality: N/A;    KIDNEY TRANSPLANT  2004    PORTACATH PLACEMENT  2003    REMOVAL OF VASCULAR ACCESS PORT  2005     Family History   Problem Relation Age of Onset    Kidney disease Father     Heart disease  "Father      Social History     Tobacco Use    Smoking status: Never Smoker    Smokeless tobacco: Never Used   Substance Use Topics    Alcohol use: Not Currently    Drug use: Never       Review of patient's allergies indicates:  No Known Allergies    Outpatient meds:  No current facility-administered medications on file prior to encounter.     Current Outpatient Medications on File Prior to Encounter   Medication Sig Dispense Refill    allopurinoL (ZYLOPRIM) 100 MG tablet Take 100 mg by mouth once daily.      BD NOEMI 2ND GEN PEN NEEDLE 32 gauge x 5/32" Ndle USE AS DIRECTED ONCE DAILY WITH BASAGLAR      benzonatate (TESSALON) 200 MG capsule Take 1 capsule (200 mg total) by mouth 3 (three) times daily. 90 capsule 0    dextromethorphan-guaiFENesin  mg/5 ml (ROBITUSSIN-DM)  mg/5 mL liquid Take 5 mLs by mouth every 6 (six) hours as needed (cough). 473 mL 0    fluticasone propionate (FLONASE) 50 mcg/actuation nasal spray 2 sprays (100 mcg total) by Each Nostril route once daily. 16 g 1    furosemide (LASIX) 20 MG tablet Take 1 tablet (20 mg total) by mouth once daily. 30 tablet 11    guaiFENesin (MUCINEX) 600 mg 12 hr tablet Take 1 tablet (600 mg total) by mouth 2 (two) times daily. for 10 days 20 tablet 0    hydrocodone-chlorpheniramine (TUSSIONEX) 10-8 mg/5 mL suspension Take 5 mLs by mouth every 12 (twelve) hours as needed for Cough or Congestion. 115 mL 0    levothyroxine (SYNTHROID) 50 MCG tablet Take 50 mcg by mouth once daily.      lipase-protease-amylase 12,000-38,000-60,000 units (CREON) CpDR Take 2 capsules by mouth 4 (four) times daily with meals and nightly. 240 capsule 11    loperamide (IMODIUM) 2 mg capsule Take 1 capsule (2 mg total) by mouth 4 (four) times daily as needed for Diarrhea. 40 capsule 0    metoprolol tartrate (LOPRESSOR) 50 MG tablet Take 50 mg by mouth 2 (two) times daily.      pravastatin (PRAVACHOL) 40 MG tablet Take 40 mg by mouth every evening.      sildenafiL " (VIAGRA) 100 MG tablet Take 1 tablet (100 mg total) by mouth daily as needed for Erectile Dysfunction. 6 tablet 6    sirolimus (RAPAMUNE) 1 MG Tab Take 2 mg by mouth once daily.      tacrolimus (PROGRAF) 0.5 MG Cap Take 3 capsules (1.5 mg total) by mouth every 12 (twelve) hours. 180 capsule 11    TOUJEO SOLOSTAR U-300 INSULIN 300 unit/mL (1.5 mL) InPn pen Inject 20 Units into the skin nightly.      VENTOLIN HFA 90 mcg/actuation inhaler INHALE 2 PUFFS INTO THE LUNGS EVERY 6 (SIX) HOURS AS NEEDED FOR WHEEZING (COUGHING). RESCUE (Patient taking differently: Inhale 1 puff into the lungs every 6 (six) hours as needed.) 18 g 2    alfuzosin (UROXATRAL) 10 mg Tb24 Take 1 tablet (10 mg total) by mouth after dinner. 30 tablet 12    [DISCONTINUED] lisinopriL (PRINIVIL,ZESTRIL) 5 MG tablet Take 5 mg by mouth once daily.      [DISCONTINUED] metFORMIN (GLUCOPHAGE) 500 MG tablet Take 500 mg by mouth 2 (two) times daily.      [DISCONTINUED] minoxidiL (LONITEN) 2.5 MG tablet Take 2.5 mg by mouth 2 (two) times daily.      [DISCONTINUED] spironolactone (ALDACTONE) 50 MG tablet          Scheduled meds:   levothyroxine  50 mcg Oral Before breakfast    lipase-protease-amylase 12,000-38,000-60,000 units  2 capsule Oral QID (WM & HS)    pravastatin  40 mg Oral QHS    sirolimus  2 mg Oral Daily    tacrolimus  1.5 mg Oral BID    tamsulosin  0.4 mg Oral Daily       Infusions:   sodium chloride 0.9% 75 mL/hr at 04/28/22 0142       PRN meds:  acetaminophen, albuterol, benzonatate, guaiFENesin-codeine 100-10 mg/5 ml, loperamide, melatonin, naloxone, ondansetron, polyethylene glycol, Pharmacy to dose Vancomycin consult **AND** vancomycin - pharmacy to dose    Review of Systems:  Review of Systems   Constitutional: Positive for malaise/fatigue. Negative for chills, fever and weight loss.   HENT: Negative for hearing loss and nosebleeds.    Eyes: Negative for blurred vision, double vision and photophobia.   Respiratory: Positive for  cough. Negative for shortness of breath and wheezing.    Cardiovascular: Negative for chest pain, palpitations and leg swelling.   Gastrointestinal: Positive for diarrhea. Negative for abdominal pain, constipation, heartburn, nausea and vomiting.   Genitourinary: Negative for dysuria, frequency and urgency.   Musculoskeletal: Negative for falls, joint pain and myalgias.   Skin: Negative for itching and rash.   Neurological: Positive for weakness. Negative for dizziness, speech change, focal weakness, loss of consciousness and headaches.   Endo/Heme/Allergies: Does not bruise/bleed easily.   Psychiatric/Behavioral: Negative for depression and substance abuse. The patient is not nervous/anxious.      OBJECTIVE:     Vital Signs and IO (Last 24H):  Temp:  [98 °F (36.7 °C)-98.8 °F (37.1 °C)]   Pulse:  [60-80]   Resp:  [14-18]   BP: (102-136)/(73-84)   SpO2:  [92 %-96 %]   I/O last 3 completed shifts:  In: -   Out: 100 [Urine:100]    Wt Readings from Last 5 Encounters:   04/28/22 65.3 kg (143 lb 15.4 oz)   04/17/22 64.2 kg (141 lb 8.6 oz)   04/06/22 68 kg (150 lb)   03/30/22 67.9 kg (149 lb 12.8 oz)   03/17/22 67 kg (147 lb 11.3 oz)     Physical Exam:  Physical Exam  Constitutional:       Appearance: He is well-developed. He is not diaphoretic.   HENT:      Head: Normocephalic and atraumatic.   Eyes:      General: No scleral icterus.     Pupils: Pupils are equal, round, and reactive to light.   Cardiovascular:      Rate and Rhythm: Normal rate and regular rhythm.   Pulmonary:      Effort: Pulmonary effort is normal. No respiratory distress.      Breath sounds: No stridor.   Abdominal:      General: There is no distension.      Palpations: Abdomen is soft.   Musculoskeletal:         General: No deformity. Normal range of motion.      Cervical back: Neck supple.   Skin:     General: Skin is warm and dry.      Findings: No erythema or rash.   Neurological:      Mental Status: He is alert and oriented to person, place, and  time.      Cranial Nerves: No cranial nerve deficit.   Psychiatric:         Behavior: Behavior normal.         Body mass index is 21.89 kg/m².    Laboratory:  Recent Labs   Lab 04/26/22 1750 04/27/22 1945 04/28/22  0454   * 128* 127*   K 4.4 4.0 4.9   CL 96 97 97   CO2 22* 20* 24   BUN 56* 58* 62*   CREATININE 4.3* 4.7* 4.9*   ESTGFRAFRICA 16.1* 14.5* 13.8*   EGFRNONAA 14.0* 12.5* 11.9*    71 146*       Recent Labs   Lab 04/26/22 1750 04/27/22 1945 04/28/22  0454   CALCIUM 8.0* 8.2* 7.6*   ALBUMIN 2.9* 3.1* 2.8*   PHOS  --  4.1  --    MG  --  1.7 1.7             No results for input(s): POCTGLUCOSE in the last 168 hours.    Recent Labs   Lab 12/30/21  0655 02/09/22  0814   Hemoglobin A1C 7.7 H 6.6 H       Recent Labs   Lab 04/26/22 1750 04/27/22 1945 04/28/22  0454   WBC 5.15 8.15 3.45*   HGB 11.4* 11.9* 9.4*   HCT 34.0* 34.2* 26.8*   * 153 80*   MCV 88 87 85   MCHC 33.5 34.8 35.1   MONO 13.8  0.7 13.5  1.1*  --        Recent Labs   Lab 04/26/22 1750 04/27/22 1945 04/28/22  0454   BILITOT 4.4* 4.9* 4.0*   PROT 6.2 6.5 5.6*   ALBUMIN 2.9* 3.1* 2.8*   ALKPHOS 87 99 74   ALT 18 21 19   * 127* 96*       Recent Labs   Lab 04/07/22  1515 04/11/22  1438 04/16/22  1330 04/28/22  0220   Color, UA Yellow Yellow  --  Yellow   Appearance, UA Clear Clear  --  Hazy A   pH, UA 6.0 6.0  --  6.0   Specific Gravity, UA 1.020 1.010  --  1.015   Protein, UA Trace A Negative  --  Trace A   Glucose, UA 2+ A 1+ A  --  Trace A   Ketones, UA Negative Negative  --  Negative   Urobilinogen, UA Negative Negative  --  Negative   Bilirubin (UA) Negative Negative  --  Negative   Occult Blood UA Negative Negative  --  Trace A   Nitrite, UA Negative Negative  --  Negative   RBC, UA  --   --  3  3  --    WBC, UA  --   --  2  2  --    Bacteria  --   --  Negative  Negative  --    Hyaline Casts, UA  --   --  11 A  11 A  --              Microbiology Results (last 7 days)     Procedure Component Value Units  Date/Time    Blood culture #1 **CANNOT BE ORDERED STAT** [[id number]] Collected: 04/28/22 0701    Order Status: Sent Specimen: Blood Updated: 04/28/22 0711    Narrative:      Collection has been rescheduled by [email or web address] at 04/28/2022 04:55 Reason:   Got first set do second set in a bit   Collection has been rescheduled by [email or web address] at 04/28/2022 04:55 Reason:   Got first set do second set in a bit     Blood culture #2 **CANNOT BE ORDERED STAT** [[id number]] Collected: 04/28/22 0454    Order Status: Sent Specimen: Blood Updated: 04/28/22 0618        ASSESSMENT/PLAN:     ILDEFONSO  CKD stage 3, sCr 1.6 in 3/2022  s/p kidney transplant  Chronic diarrhea  Cirrhosis  Cough  Hyponatremia  No NSAIDs, ACEI/ARB, IV contrast or other nephrotoxins.  Keep MAP > 60, SBP > 100.  Dose meds for GFR < 30 ml/min.  Renal diet - low K, low phos.  Continue immunosuppression per home regimen.  Check UA + microscopy.  Check renal transplant US.  Check tacrolimus trough levels.  Agree with IVF for now.    Anemia of CKD  Hgb and HCT are acceptable. Monitor.  Will provide SHELLIE and/or IV iron PRN.    HTN  BP seem controlled.   Tolerate asymptomatic HTN up to -160.  HOLD home BP meds.    Thank you for allowing us to participate in the care of your patient!   We will follow the patient and provide recommendations as needed.    Patient care time was spent personally by me on the following activities:   · Obtaining a history.  · Examination of patient.  · Providing medical care at the patients bedside.  · Developing a treatment plan with patient or surrogate and bedside caregivers.  · Ordering and reviewing laboratory studies, radiographic studies, pulse oximetry.  · Ordering and performing treatments and interventions.  · Evaluation of patient's response to treatment.  · Discussions with consultants while on the unit and immediately available to the patient.  · Re-evaluation of the patient's condition.  · Documentation in the medical record.     Dion A  MD Ahmet    [facility]  [address] [place] [address]  J[place]E Mustafa [id number]    [phone] - tel  [phone] - fax    4/28/2022

## 2022-04-28 NOTE — ASSESSMENT & PLAN NOTE
Worsening transaminitis when compared to recent labs  Moderate ascites on imaging  Gastroenterology MD on Board  MRCP either as outpatient or inpatient

## 2022-04-28 NOTE — SUBJECTIVE & OBJECTIVE
Interval History:     Review of Systems   Constitutional:  Positive for fatigue. Negative for activity change and appetite change.   HENT:  Negative for congestion and dental problem.    Eyes:  Negative for discharge and itching.   Respiratory:  Negative for shortness of breath.    Cardiovascular:  Negative for chest pain.   Gastrointestinal:  Negative for abdominal distention and abdominal pain.   Endocrine: Negative for cold intolerance.   Genitourinary:  Negative for difficulty urinating and dysuria.   Musculoskeletal:  Negative for arthralgias and back pain.   Skin:  Negative for color change.   Neurological:  Positive for weakness. Negative for dizziness and facial asymmetry.   Hematological:  Negative for adenopathy.   Psychiatric/Behavioral:  Negative for agitation and behavioral problems.    Objective:     Vital Signs (Most Recent):  Temp: 98.9 °F (37.2 °C) (04/28/22 1128)  Pulse: 73 (04/28/22 1128)  Resp: 18 (04/28/22 1128)  BP: 127/85 (04/28/22 1128)  SpO2: (!) 92 % (04/28/22 1128)   Vital Signs (24h Range):  Temp:  [98 °F (36.7 °C)-98.9 °F (37.2 °C)] 98.9 °F (37.2 °C)  Pulse:  [60-80] 73  Resp:  [14-18] 18  SpO2:  [92 %-96 %] 92 %  BP: (102-136)/(73-85) 127/85     Weight: 65.3 kg (143 lb 15.4 oz)  Body mass index is 21.89 kg/m².    Intake/Output Summary (Last 24 hours) at 4/28/2022 1314  Last data filed at 4/28/2022 0947  Gross per 24 hour   Intake 120 ml   Output 100 ml   Net 20 ml      Physical Exam  Vitals and nursing note reviewed.   Constitutional:       Appearance: He is well-developed.   HENT:      Head: Atraumatic.      Right Ear: External ear normal.      Left Ear: External ear normal.      Nose: Nose normal.      Mouth/Throat:      Mouth: Mucous membranes are moist.   Eyes:      General: No scleral icterus.  Cardiovascular:      Rate and Rhythm: Normal rate.   Pulmonary:      Effort: Pulmonary effort is normal.   Abdominal:      General: There is no distension.   Musculoskeletal:          General: Normal range of motion.      Cervical back: Full passive range of motion without pain and normal range of motion.   Skin:     General: Skin is warm.   Neurological:      Mental Status: He is alert and oriented to person, place, and time.   Psychiatric:         Behavior: Behavior normal.       Significant Labs: All pertinent labs within the past 24 hours have been reviewed.  CBC:   Recent Labs   Lab 04/26/22 1750 04/27/22 1945 04/28/22  0454   WBC 5.15 8.15 3.45*   HGB 11.4* 11.9* 9.4*   HCT 34.0* 34.2* 26.8*   * 153 80*     CMP:   Recent Labs   Lab 04/26/22 1750 04/27/22 1945 04/28/22  0454   * 128* 127*   K 4.4 4.0 4.9   CL 96 97 97   CO2 22* 20* 24    71 146*   BUN 56* 58* 62*   CREATININE 4.3* 4.7* 4.9*   CALCIUM 8.0* 8.2* 7.6*   PROT 6.2 6.5 5.6*   ALBUMIN 2.9* 3.1* 2.8*   BILITOT 4.4* 4.9* 4.0*   ALKPHOS 87 99 74   * 127* 96*   ALT 18 21 19   ANIONGAP 9 11 6*   EGFRNONAA 14.0* 12.5* 11.9*       Significant Imaging: I have reviewed all pertinent imaging results/findings within the past 24 hours.

## 2022-04-28 NOTE — ASSESSMENT & PLAN NOTE
Could be prerenal in the setting of continued use of diuretics and advanced cirrhosis / Hepatorenal syndrome   Had Gentle IV fluids upon admission  Hold diuretics/nephrotoxins  USS transplant kidney ordered   Nephro MD on Board  Continue tacrolimus and sirolimus  Monitor renal panels closely

## 2022-04-28 NOTE — FIRST PROVIDER EVALUATION
" Emergency Department TeleTriage Encounter Note      CHIEF COMPLAINT    Chief Complaint   Patient presents with    abnormal labs     Reports "creatinine elevated from yesterday lab draw at kidney doctor"          VITAL SIGNS   Initial Vitals [04/27/22 1918]   BP Pulse Resp Temp SpO2   102/78 80 14 98 °F (36.7 °C) 96 %      MAP       --            ALLERGIES    Review of patient's allergies indicates:  No Known Allergies    PROVIDER TRIAGE NOTE  61-year-old male arrives to the emergency department with a the chief complaint of abnormal labs.  The patient has a history of cirrhosis and failed renal transplant.  Patient recently admitted for acute kidney injury.  Discharged and has returned with abnormal labs again.  Creatinine today trending upwards from his previous lab values at discharge.  Patient does not appear to be feeling all that well.  Majority of the history obtained from the patient's daughter.      ORDERS  Labs Reviewed   COMPREHENSIVE METABOLIC PANEL   CBC W/ AUTO DIFFERENTIAL   PHOSPHORUS   MAGNESIUM   SARS-COV-2 RNA AMPLIFICATION, QUAL       ED Orders (720h ago, onward)    Start Ordered     Status Ordering Provider    04/27/22 1945 04/27/22 1935  sodium chloride 0.9% bolus 500 mL  ED 1 Time         Ordered LISY DANIELLE.    04/27/22 1936 04/27/22 1935  Phosphorus  STAT         Ordered LISY DANIELLE    04/27/22 1936 04/27/22 1935  Magnesium  STAT         Ordered LISY DANIELLE    04/27/22 1936 04/27/22 1935  COVID-19 Rapid Screening  STAT         Ordered LISY DANIELLE    04/27/22 1934 04/27/22 1933  Saline lock IV  Once         Ordered LISY DANIELLE    04/27/22 1934 04/27/22 1933  Comprehensive Metabolic Panel  STAT         Ordered LISY DANIELLE    04/27/22 1934 04/27/22 1933  CBC Auto Differential  STAT         Ordered LISY DANIELLE            Virtual Visit Note: The provider triage portion of this emergency department evaluation and documentation was performed via " [employer], a HIPAA-compliant telemedicine application, in concert with a tele-presenter in the room. A face to face patient evaluation with one of my colleagues will occur once the patient is placed in an emergency department room.      DISCLAIMER: This note was prepared with [employer] voice recognition transcription software. Garbled syntax, mangled pronouns, and other bizarre constructions may be attributed to that software system.

## 2022-04-28 NOTE — HOSPITAL COURSE
Patient with Transplanted kidney, on immunosuppressants ,admitted with worsening renal failure and new onset liver cirrhosis with ascites.  Pt has ongoing chronic cough and chronic diarrhea and opportunistic infections needs to be r/o.  Pt was treated with gentle hydration/Diuretics and Nephrotoxins were held with no improvement in renal function.  Sirolimus was continued and Tacrolimus was put on hold(awaiting serum tacrolimus levels)  Pt will benefit from renal biopsy and care in a higher hospital setting  Later pt was discharged to [facility] under hospitalist service and pt will be evaluated by GI and Nephro teams while pt is there

## 2022-04-28 NOTE — ED NOTES
PT REPORTS BEING SENT HERE FOR ABNORMAL LAB VALUES. C/O LLQ PRESSURE WHEN COUGHING. FREQUENT NONPRODUCTIVE COUGH. NO ACUTE DISTRESS NOTED. STABLE CONDITION. FAMILY MEMBER AT BEDSIDE.

## 2022-04-28 NOTE — HPI
Patient is a 61-year-old  male with history of renal transplant for hypertensive nephropathy in 2004 maintained on immunosuppressive therapy, recent diagnosis of cirrhosis thought to be secondary to LOPEZ, type 2 diabetes mellitus, interstitial lung disease who was recently discharged from our facility after being managed for ILDEFONSO returns today after outpatient labs revealed worsening serum creatinine.    Patient was discharged on 4/21 after a 13 day stay for ILDEFONSO and decompensated cirrhosis.  He was evaluated by multiple specialists including Nephrology, pulmonology, GI as well as Infectious Disease.  He underwent paracentesis and there is no evidence of SBP.  He received 5 days of empiric ceftriaxone.  He was started on pancreatic enzyme supplements for diarrhea.    Since his discharge patient noticed increasing generalized weakness.  Diarrhea is slightly improved.  He does admit to abdominal distension which has recurred after paracentesis.  He denies fever but admits to chills which are chronic.  Cough is about the same and is predominantly dry.  He also endorses left lower quadrant abdominal pain which is worse with coughing.      Rest of the 10 point review of systems is negative except as mentioned above.

## 2022-04-28 NOTE — PROGRESS NOTES
[facility]  Progress Note    Patient Name: Edgar Jackson  MRN: [id number]  Patient Class: IP- Inpatient   Admission Date: 4/27/2022  Length of Stay: 1 days  Attending Physician: Solomon Gardner MD  Primary Care Provider: Tyler Castillo MD        Subjective:     Principal Problem:ILDEFONSO (acute kidney injury)        HPI:  Patient is a 61-year-old  male with history of renal transplant for hypertensive nephropathy in 2004 maintained on immunosuppressive therapy, recent diagnosis of cirrhosis thought to be secondary to LOPEZ, type 2 diabetes mellitus, interstitial lung disease who was recently discharged from our facility after being managed for ILDEFONSO returns today after outpatient labs revealed worsening serum creatinine.    Patient was discharged on 4/21 after a 13 day stay for ILDEFONSO and decompensated cirrhosis.  He was evaluated by multiple specialists including Nephrology, pulmonology, GI as well as Infectious Disease.  He underwent paracentesis and there is no evidence of SBP.  He received 5 days of empiric ceftriaxone.  He was started on pancreatic enzyme supplements for diarrhea.    Since his discharge patient noticed increasing generalized weakness.  Diarrhea is slightly improved.  He does admit to abdominal distension which has recurred after paracentesis.  He denies fever but admits to chills which are chronic.  Cough is about the same and is predominantly dry.  He also endorses left lower quadrant abdominal pain which is worse with coughing.      Rest of the 10 point review of systems is negative except as mentioned above.      Overview/Hospital Course:  04/28  Sitting on bed with no distress  Crt levels on higher range compared to baseline  C/o cough which is a chromic issue       Interval History:     Review of Systems   Constitutional:  Positive for fatigue. Negative for activity change and appetite change.   HENT:  Negative for congestion and dental problem.    Eyes:  Negative for  discharge and itching.   Respiratory:  Negative for shortness of breath.    Cardiovascular:  Negative for chest pain.   Gastrointestinal:  Negative for abdominal distention and abdominal pain.   Endocrine: Negative for cold intolerance.   Genitourinary:  Negative for difficulty urinating and dysuria.   Musculoskeletal:  Negative for arthralgias and back pain.   Skin:  Negative for color change.   Neurological:  Positive for weakness. Negative for dizziness and facial asymmetry.   Hematological:  Negative for adenopathy.   Psychiatric/Behavioral:  Negative for agitation and behavioral problems.    Objective:     Vital Signs (Most Recent):  Temp: 98.9 °F (37.2 °C) (04/28/22 1128)  Pulse: 73 (04/28/22 1128)  Resp: 18 (04/28/22 1128)  BP: 127/85 (04/28/22 1128)  SpO2: (!) 92 % (04/28/22 1128)   Vital Signs (24h Range):  Temp:  [98 °F (36.7 °C)-98.9 °F (37.2 °C)] 98.9 °F (37.2 °C)  Pulse:  [60-80] 73  Resp:  [14-18] 18  SpO2:  [92 %-96 %] 92 %  BP: (102-136)/(73-85) 127/85     Weight: 65.3 kg (143 lb 15.4 oz)  Body mass index is 21.89 kg/m².    Intake/Output Summary (Last 24 hours) at 4/28/2022 1314  Last data filed at 4/28/2022 0947  Gross per 24 hour   Intake 120 ml   Output 100 ml   Net 20 ml      Physical Exam  Vitals and nursing note reviewed.   Constitutional:       Appearance: He is well-developed.   HENT:      Head: Atraumatic.      Right Ear: External ear normal.      Left Ear: External ear normal.      Nose: Nose normal.      Mouth/Throat:      Mouth: Mucous membranes are moist.   Eyes:      General: No scleral icterus.  Cardiovascular:      Rate and Rhythm: Normal rate.   Pulmonary:      Effort: Pulmonary effort is normal.   Abdominal:      General: There is no distension.   Musculoskeletal:         General: Normal range of motion.      Cervical back: Full passive range of motion without pain and normal range of motion.   Skin:     General: Skin is warm.   Neurological:      Mental Status: He is alert and  oriented to person, place, and time.   Psychiatric:         Behavior: Behavior normal.       Significant Labs: All pertinent labs within the past 24 hours have been reviewed.  CBC:   Recent Labs   Lab 04/26/22 1750 04/27/22 1945 04/28/22  0454   WBC 5.15 8.15 3.45*   HGB 11.4* 11.9* 9.4*   HCT 34.0* 34.2* 26.8*   * 153 80*     CMP:   Recent Labs   Lab 04/26/22 1750 04/27/22 1945 04/28/22  0454   * 128* 127*   K 4.4 4.0 4.9   CL 96 97 97   CO2 22* 20* 24    71 146*   BUN 56* 58* 62*   CREATININE 4.3* 4.7* 4.9*   CALCIUM 8.0* 8.2* 7.6*   PROT 6.2 6.5 5.6*   ALBUMIN 2.9* 3.1* 2.8*   BILITOT 4.4* 4.9* 4.0*   ALKPHOS 87 99 74   * 127* 96*   ALT 18 21 19   ANIONGAP 9 11 6*   EGFRNONAA 14.0* 12.5* 11.9*       Significant Imaging: I have reviewed all pertinent imaging results/findings within the past 24 hours.      Assessment/Plan:      * ILDEFONSO (acute kidney injury)  Could be prerenal in the setting of continued use of diuretics and advanced cirrhosis / Hepatorenal syndrome   Had Gentle IV fluids upon admission  Hold diuretics/nephrotoxins  USS transplant kidney ordered   Nephro MD on Board  Continue tacrolimus and sirolimus  Monitor renal panels closely      Chronic respiratory failure  On Home oxygen     Thrombocytopenia  On and off in this pt  Expected in Liver cirrhosis/ESLD      Hyponatremia  Hypervolemic hyponatremia from volume overload ?  Monitor with daily labs      Cirrhosis of liver with ascites  Worsening transaminitis when compared to recent labs  Moderate ascites on imaging  Gastroenterology MD on Board  MRCP either as outpatient or inpatient      Interstitial lung disease  No acute process going on   On Home oxygen      Diabetes mellitus due to underlying condition with chronic kidney disease, with long-term current use of insulin  Maintain insulin regime      Personal history of immunosupression therapy  Status post renal transplant in 2004  Continue sirolimus and tacrolimus for  immunosuppressive therapy        VTE Risk Mitigation (From admission, onward)         Ordered     IP VTE LOW RISK PATIENT  Once         04/28/22 0042     Place sequential compression device  Until discontinued         04/28/22 0042                Discharge Planning   LE: 4/30/2022     Code Status: Full Code   Is the patient medically ready for discharge?: No    Reason for patient still in hospital (select all that apply): Treatment  Discharge Plan A: Home with family                  Solomon Gardner MD  Department of Hospital Medicine   [facility]

## 2022-04-28 NOTE — PROGRESS NOTES
Pt is unable to control his cough. We spoke with the patients nurse. She gave the patient something for his cough. It is not helping. Mri abdomen requires approximately 20 -25 breathe in, hold breath & breath out instructions. Patient was not even able to stop coughing while in his room. Mri extremely sensitive to motion. We will be unable to get adequate diagnostic images with the pt unable to hold still & hold his breath the multiple times needed.

## 2022-04-28 NOTE — SUBJECTIVE & OBJECTIVE
"Past Medical History:   Diagnosis Date    CKD (chronic kidney disease)     Diabetes 2020    Gout     Hypertension 2000    Kidney transplant recipient 01/2004    [place]    Thyroid disease     Unspecified cirrhosis of liver        Past Surgical History:   Procedure Laterality Date    CATARACT EXTRACTION Right 2017    COLONOSCOPY N/A 3/8/2022    Procedure: COLONOSCOPY;  Surgeon: Mio Simmons III, MD;  Location: [facility] ENDO;  Service: Endoscopy;  Laterality: N/A;    ENDOSCOPIC ULTRASOUND OF UPPER GASTROINTESTINAL TRACT N/A 3/8/2022    Procedure: ULTRASOUND, UPPER GI TRACT, ENDOSCOPIC;  Surgeon: Mio Simmons III, MD;  Location: [facility] ENDO;  Service: Endoscopy;  Laterality: N/A;    KIDNEY TRANSPLANT  2004    PORTACATH PLACEMENT  2003    REMOVAL OF VASCULAR ACCESS PORT  2005       Review of patient's allergies indicates:  No Known Allergies    No current facility-administered medications on file prior to encounter.     Current Outpatient Medications on File Prior to Encounter   Medication Sig    alfuzosin (UROXATRAL) 10 mg Tb24 Take 1 tablet (10 mg total) by mouth after dinner.    allopurinoL (ZYLOPRIM) 100 MG tablet Take 100 mg by mouth once daily.    BD NOEMI 2ND GEN PEN NEEDLE 32 gauge x 5/32" Ndle USE AS DIRECTED ONCE DAILY WITH BASAGLAR    benzonatate (TESSALON) 200 MG capsule Take 1 capsule (200 mg total) by mouth 3 (three) times daily.    dextromethorphan-guaiFENesin  mg/5 ml (ROBITUSSIN-DM)  mg/5 mL liquid Take 5 mLs by mouth every 6 (six) hours as needed (cough).    fluticasone propionate (FLONASE) 50 mcg/actuation nasal spray 2 sprays (100 mcg total) by Each Nostril route once daily.    furosemide (LASIX) 20 MG tablet Take 1 tablet (20 mg total) by mouth once daily.    guaiFENesin (MUCINEX) 600 mg 12 hr tablet Take 1 tablet (600 mg total) by mouth 2 (two) times daily. for 10 days    hydrocodone-chlorpheniramine (TUSSIONEX) 10-8 mg/5 mL suspension Take 5 mLs by mouth every 12 (twelve) hours " as needed for Cough or Congestion.    levothyroxine (SYNTHROID) 50 MCG tablet Take 50 mcg by mouth once daily.    lipase-protease-amylase 12,000-38,000-60,000 units (CREON) CpDR Take 2 capsules by mouth 4 (four) times daily with meals and nightly.    loperamide (IMODIUM) 2 mg capsule Take 1 capsule (2 mg total) by mouth 4 (four) times daily as needed for Diarrhea.    metoprolol tartrate (LOPRESSOR) 50 MG tablet Take 50 mg by mouth 2 (two) times daily.    pravastatin (PRAVACHOL) 40 MG tablet Take 40 mg by mouth every evening.    sildenafiL (VIAGRA) 100 MG tablet Take 1 tablet (100 mg total) by mouth daily as needed for Erectile Dysfunction.    sirolimus (RAPAMUNE) 1 MG Tab Take 2 mg by mouth once daily.    tacrolimus (PROGRAF) 0.5 MG Cap Take 3 capsules (1.5 mg total) by mouth every 12 (twelve) hours.    TOUJEO SOLOSTAR U-300 INSULIN 300 unit/mL (1.5 mL) InPn pen Inject 20 Units into the skin nightly.    VENTOLIN HFA 90 mcg/actuation inhaler INHALE 2 PUFFS INTO THE LUNGS EVERY 6 (SIX) HOURS AS NEEDED FOR WHEEZING (COUGHING). RESCUE (Patient taking differently: Inhale 1 puff into the lungs every 6 (six) hours as needed.)    [DISCONTINUED] lisinopriL (PRINIVIL,ZESTRIL) 5 MG tablet Take 5 mg by mouth once daily.    [DISCONTINUED] metFORMIN (GLUCOPHAGE) 500 MG tablet Take 500 mg by mouth 2 (two) times daily.    [DISCONTINUED] minoxidiL (LONITEN) 2.5 MG tablet Take 2.5 mg by mouth 2 (two) times daily.    [DISCONTINUED] spironolactone (ALDACTONE) 50 MG tablet      Family History       Problem Relation (Age of Onset)    Heart disease Father    Kidney disease Father          Tobacco Use    Smoking status: Never Smoker    Smokeless tobacco: Never Used   Substance and Sexual Activity    Alcohol use: Not Currently    Drug use: Never    Sexual activity: Not on file       Objective:     Vital Signs (Most Recent):  Temp: 98 °F (36.7 °C) (04/27/22 1918)  Pulse: 69 (04/27/22 2117)  Resp: 14 (04/27/22 1918)  BP: 127/79 (04/27/22  2117)  SpO2: (!) 94 % (04/27/22 2117)   Vital Signs (24h Range):  Temp:  [98 °F (36.7 °C)] 98 °F (36.7 °C)  Pulse:  [64-80] 69  Resp:  [14] 14  SpO2:  [94 %-96 %] 94 %  BP: (102-127)/(73-79) 127/79     Weight: 63.5 kg (140 lb)  Body mass index is 21.29 kg/m².    Physical Exam      General: Patient resting comfortably in no acute distress. Chronically ill-appearing.  Eyes:  No pallor.  Sclera icteric  Lungs:  End-expiratory wheeze audible.  No tachypnea or accessory muscle use.  Bibasilar crackles audible  Cor: Regular rate and rhythm.  2+ pitting pedal edema   Abd: Soft.  Left lower quadrant tenderness noted.  Tympanic note on percussion over bilateral flanks  Musculoskeletal: No arthropathy, deformity.  Skin: No rashes, swelling, or erythema.  Neuro: A&O x3. Moving all extremities equally  Ext: No clubbing. No cyanosis. Peripheral pulses +      Significant Labs: All pertinent labs within the past 24 hours have been reviewed.  CBC:   Recent Labs   Lab 04/26/22 1750 04/27/22 1945   WBC 5.15 8.15   HGB 11.4* 11.9*   HCT 34.0* 34.2*   * 153     CMP:   Recent Labs   Lab 04/26/22 1750 04/27/22 1945   * 128*   K 4.4 4.0   CL 96 97   CO2 22* 20*    71   BUN 56* 58*   CREATININE 4.3* 4.7*   CALCIUM 8.0* 8.2*   PROT 6.2 6.5   ALBUMIN 2.9* 3.1*   BILITOT 4.4* 4.9*   ALKPHOS 87 99   * 127*   ALT 18 21   ANIONGAP 9 11   EGFRNONAA 14.0* 12.5*       Significant Imaging: I have reviewed all pertinent imaging results/findings within the past 24 hours.    Imaging Results              X-Ray Chest AP Portable (Final result)  Result time 04/27/22 21:41:16      Final result by Robert Mei MD (04/27/22 21:41:16)                   Narrative:    PORTABLE CHEST X-RAY    CLINICAL HISTORY: Cough    COMPARISON: 4/16/2022.    FINDINGS: Portable AP view of the chest was obtained with overlying monitor leads in place. Lungs are under aerated. There are chronic appearing interstitial and fibrotic changes. There  are increasing opacities bilaterally mostly in the peripheral perihilar regions consistent with worsening pneumonia. No edema or significant pleural fluid by portable imaging. Normal heart size.    IMPRESSION: Chronic parenchymal changes with increasing peripheral perihilar opacities felt to be related to worsening pneumonia      Electronically signed by:  Robert Mei MD  4/27/2022 9:41 PM CDT Workstation: [id number]                                     CT Abdomen Pelvis  Without Contrast (Final result)  Result time 04/27/22 21:40:08      Final result by Robert Mei MD (04/27/22 21:40:08)                   Narrative:    NONCONTRAST CT SCANS ABDOMEN AND PELVIS    HISTORY: LLQ abdominal pain    COMPARISON: None.    TECHNIQUE:Radiation dose reduction techniques were utilized, including automated exposure control and exposure modulation based on body size.  Axial images were obtained from the lung bases to the symphysis pubis without oral or IV contrast per request.    FINDINGS:  Right greater than left pleural effusions are partially visualized. Small pericardial effusion. Liver is small with indistinct contour possibly indicative of cirrhosis. Spleen enlarged at 14 cm possibly indicative of portal hypertension.    Native kidneys are markedly atrophic with vascular and nonobstructing collecting system calculi. There is a right pelvic transplant kidney without nephrolithiasis or hydronephrosis demonstrated.    Remaining abdominal organs are otherwise unremarkable without benefit of IV contrast. There is some focal dilatation of the infrarenal abdominal aorta but it is not technically aneurysmal at 2.5 cm. Moderate atheromatous plaque.    Normal appendix. No constipation or diverticular disease. The GI tract not opacified for assessment but non obstructive in appearance. Moderate ascites without loculated fluid collection.      IMPRESSION:  1.  Small pericardial effusion with right greater than left pleural  effusions.  2. Findings suggesting cirrhosis with portal hypertension.  3. Bilateral nephrolithiasis, nonobstructing.  4. Moderate ascites          Electronically signed by:  Robert Mei MD  4/27/2022 9:40 PM CDT Workstation: [id number]

## 2022-04-29 NOTE — PROGRESS NOTES
[facility] Medicine  Progress Note    Patient Name: Edgar Jackson  MRN: [id number]  Patient Class: IP- Inpatient   Admission Date: 4/27/2022  Length of Stay: 2 days  Attending Physician: Solomon Gardner MD  Primary Care Provider: Tyler Castillo MD        Subjective:     Principal Problem:ILDEFONSO (acute kidney injury)        HPI:  Patient is a 61-year-old  male with history of renal transplant for hypertensive nephropathy in 2004 maintained on immunosuppressive therapy, recent diagnosis of cirrhosis thought to be secondary to LOPEZ, type 2 diabetes mellitus, interstitial lung disease who was recently discharged from our facility after being managed for ILDEFONSO returns today after outpatient labs revealed worsening serum creatinine.    Patient was discharged on 4/21 after a 13 day stay for ILDEFONSO and decompensated cirrhosis.  He was evaluated by multiple specialists including Nephrology, pulmonology, GI as well as Infectious Disease.  He underwent paracentesis and there is no evidence of SBP.  He received 5 days of empiric ceftriaxone.  He was started on pancreatic enzyme supplements for diarrhea.    Since his discharge patient noticed increasing generalized weakness.  Diarrhea is slightly improved.  He does admit to abdominal distension which has recurred after paracentesis.  He denies fever but admits to chills which are chronic.  Cough is about the same and is predominantly dry.  He also endorses left lower quadrant abdominal pain which is worse with coughing.      Rest of the 10 point review of systems is negative except as mentioned above.      Overview/Hospital Course:  04/28  Sitting on bed with no distress  Crt levels on higher range compared to baseline  C/o cough which is a chromic issue     04/29  Renal panels worse today  Pt has no appetite  On and off cough episodes      Interval History:     Review of Systems   Constitutional:  Positive for appetite change. Negative for activity change.   HENT:   Negative for congestion and dental problem.    Eyes:  Negative for discharge and itching.   Respiratory:  Positive for cough. Negative for shortness of breath.    Cardiovascular:  Negative for chest pain.   Gastrointestinal:  Negative for abdominal distention and abdominal pain.   Endocrine: Negative for cold intolerance.   Genitourinary:  Negative for difficulty urinating and dysuria.   Musculoskeletal:  Negative for arthralgias and back pain.   Skin:  Negative for color change.   Neurological:  Negative for dizziness and facial asymmetry.   Hematological:  Negative for adenopathy.   Psychiatric/Behavioral:  Negative for agitation and behavioral problems.    Objective:     Vital Signs (Most Recent):  Temp: 99 °F (37.2 °C) (04/29/22 1145)  Pulse: 82 (04/29/22 1145)  Resp: 18 (04/29/22 1145)  BP: (!) 140/95 (04/29/22 1145)  SpO2: 95 % (04/29/22 1145)   Vital Signs (24h Range):  Temp:  [97.9 °F (36.6 °C)-99.3 °F (37.4 °C)] 99 °F (37.2 °C)  Pulse:  [68-82] 82  Resp:  [18] 18  SpO2:  [90 %-95 %] 95 %  BP: (116-140)/(80-95) 140/95     Weight: 65.3 kg (143 lb 15.4 oz)  Body mass index is 21.89 kg/m².    Intake/Output Summary (Last 24 hours) at 4/29/2022 1313  Last data filed at 4/29/2022 0954  Gross per 24 hour   Intake 360 ml   Output 400 ml   Net -40 ml      Physical Exam  Vitals and nursing note reviewed.   Constitutional:       Appearance: He is well-developed.   HENT:      Head: Atraumatic.      Right Ear: External ear normal.      Left Ear: External ear normal.      Nose: Nose normal.      Mouth/Throat:      Mouth: Mucous membranes are moist.   Eyes:      General: No scleral icterus.  Cardiovascular:      Rate and Rhythm: Normal rate.   Pulmonary:      Effort: Pulmonary effort is normal.   Abdominal:      General: There is no distension.   Musculoskeletal:         General: Normal range of motion.      Cervical back: Full passive range of motion without pain and normal range of motion.   Skin:     General: Skin is  warm.   Neurological:      Mental Status: He is alert and oriented to person, place, and time.   Psychiatric:         Behavior: Behavior normal.       Significant Labs: All pertinent labs within the past 24 hours have been reviewed.  CBC:   Recent Labs   Lab 04/27/22 1945 04/28/22 0454 04/29/22  0526   WBC 8.15 3.45* 5.69   HGB 11.9* 9.4* 10.3*   HCT 34.2* 26.8* 29.4*    80* 87*     CMP:   Recent Labs   Lab 04/27/22 1945 04/28/22 0454 04/29/22  0526   * 127* 124*   K 4.0 4.9 4.9   CL 97 97 95   CO2 20* 24 17*   GLU 71 146* 118*   BUN 58* 62* 66*   CREATININE 4.7* 4.9* 5.4*   CALCIUM 8.2* 7.6* 7.5*   PROT 6.5 5.6* 5.5*   ALBUMIN 3.1* 2.8* 2.5*   BILITOT 4.9* 4.0* 4.2*   ALKPHOS 99 74 77   * 96* 91*   ALT 21 19 16   ANIONGAP 11 6* 12   EGFRNONAA 12.5* 11.9* 10.6*       Significant Imaging: I have reviewed all pertinent imaging results/findings within the past 24 hours.      Assessment/Plan:      * ILDEFONSO (acute kidney injury)  Could be prerenal in the setting of continued use of diuretics and advanced cirrhosis / Hepatorenal syndrome   Had Gentle IV fluids upon admission  Hold diuretics/nephrotoxins  USS transplant kidney done was unremarkable   Nephro MD on Board  Continue  sirolimus  Monitor renal panels closely  [place] [facility]      Chronic respiratory failure  On Home oxygen     Thrombocytopenia  On and off in this pt  Expected in Liver cirrhosis/ESLD      Hyponatremia  Hypervolemic hyponatremia from volume overload ?  Monitor with daily labs      Cirrhosis of liver with ascites  Worsening transaminitis when compared to recent labs  Moderate ascites on imaging  Gastroenterology MD on Board  MRCP either as outpatient or inpatient and may need [place] [facility]      Interstitial lung disease  No acute process going on   On Home oxygen      Diabetes mellitus due to underlying condition with chronic kidney disease, with long-term current use of insulin  Maintain insulin regime      Personal  history of immunosupression therapy  Status post renal transplant in 2004  Continue sirolimus for immunosuppressive therapy  Tacrolimus on hold        VTE Risk Mitigation (From admission, onward)         Ordered     IP VTE LOW RISK PATIENT  Once         04/28/22 0042     Place sequential compression device  Until discontinued         04/28/22 0042                Discharge Planning   LE: 4/30/2022     Code Status: Full Code   Is the patient medically ready for discharge?: No    Reason for patient still in hospital (select all that apply): Treatment  Discharge Plan A: Home with family                  Solomon Gardner MD  Department of Hospital Medicine   [facility]

## 2022-04-29 NOTE — PROGRESS NOTES
Nephrology Consult Note        Patient Name: Edgar Jackson  MRN: [id number]    Patient Class: IP- Inpatient   Admission Date: 4/27/2022  Length of Stay: 2 days  Date of Service: 4/29/2022    Attending Physician: Solomon Gardner MD  Primary Care Provider: Tyler Castillo MD    Reason for Consult: jason/ckd3/KT/diarrhea/cirrhosis/cough/hyponatremia/anemia/htn    SUBJECTIVE:     HPI: 61M with remote renal transplant (2004, secondary to hypertensive renal failure), on chronic immunosuppressant (sirolimus and tacrolimus), recent diagnosis of cirrhosis who underwent recent transjugular biopsy (pathology with chronic mild to moderate active hepatitis with bridging fibrosis and nodules) returns to the ED with abnormal labs. Outpatient labs  showed elevated creatinine and he was instructed to come back to the emergency department. He is still having diarrhea every 2-3 hours, non-bloody with some left lower quadrant abdominal pain. He also reports that persistent cough but denies any fevers or chills.  He has been taking all his medications as prescribed.    4/29 Worsening renal function, hyponatremia, seemingly normal renal transplant US. UA with some trace hematuria/proteinuria, prograf level is cooking, but was very high recently. I think he needs transfer to [facility] for kidney transplant and hepatology/GI eval. I have nothing else to offer to him here, except start dialysis in a few days if not improving. Hold tacrolimus for now.    Past Medical History:   Diagnosis Date    CKD (chronic kidney disease)     Diabetes 2020    Gout     Hypertension 2000    [place]    [place]    Thyroid disease     Unspecified cirrhosis of liver      Past Surgical History:   Procedure Laterality Date    CATARACT EXTRACTION Right 2017    COLONOSCOPY N/A 3/8/2022    Procedure: COLONOSCOPY;  Surgeon: Mio Simmons III, MD;  Location: [facility];  Service: Endoscopy;  Laterality: N/A;    ENDOSCOPIC ULTRASOUND OF UPPER  "GASTROINTESTINAL TRACT N/A 3/8/2022    Procedure: ULTRASOUND, UPPER GI TRACT, ENDOSCOPIC;  Surgeon: Mio Simmons III, MD;  Location: [facility];  Service: Endoscopy;  Laterality: N/A;    KIDNEY TRANSPLANT  2004    PORTACATH PLACEMENT  2003    REMOVAL OF VASCULAR ACCESS PORT  2005     Family History   Problem Relation Age of Onset    Kidney disease Father     Heart disease Father      Social History     Tobacco Use    Smoking status: Never Smoker    Smokeless tobacco: Never Used   Substance Use Topics    Alcohol use: Not Currently    Drug use: Never       Review of patient's allergies indicates:  No Known Allergies    Outpatient meds:  No current facility-administered medications on file prior to encounter.     Current Outpatient Medications on File Prior to Encounter   Medication Sig Dispense Refill    allopurinoL (ZYLOPRIM) 100 MG tablet Take 100 mg by mouth once daily.      BD NOEMI 2ND GEN PEN NEEDLE 32 gauge x 5/32" Ndle USE AS DIRECTED ONCE DAILY WITH BASAGLAR      benzonatate (TESSALON) 200 MG capsule Take 1 capsule (200 mg total) by mouth 3 (three) times daily. 90 capsule 0    dextromethorphan-guaiFENesin  mg/5 ml (ROBITUSSIN-DM)  mg/5 mL liquid Take 5 mLs by mouth every 6 (six) hours as needed (cough). 473 mL 0    fluticasone propionate (FLONASE) 50 mcg/actuation nasal spray 2 sprays (100 mcg total) by Each Nostril route once daily. 16 g 1    furosemide (LASIX) 20 MG tablet Take 1 tablet (20 mg total) by mouth once daily. 30 tablet 11    guaiFENesin (MUCINEX) 600 mg 12 hr tablet Take 1 tablet (600 mg total) by mouth 2 (two) times daily. for 10 days 20 tablet 0    hydrocodone-chlorpheniramine (TUSSIONEX) 10-8 mg/5 mL suspension Take 5 mLs by mouth every 12 (twelve) hours as needed for Cough or Congestion. 115 mL 0    levothyroxine (SYNTHROID) 50 MCG tablet Take 50 mcg by mouth once daily.      lipase-protease-amylase 12,000-38,000-60,000 units (CREON) CpDR Take 2 capsules " by mouth 4 (four) times daily with meals and nightly. 240 capsule 11    loperamide (IMODIUM) 2 mg capsule Take 1 capsule (2 mg total) by mouth 4 (four) times daily as needed for Diarrhea. 40 capsule 0    metoprolol tartrate (LOPRESSOR) 50 MG tablet Take 50 mg by mouth 2 (two) times daily.      pravastatin (PRAVACHOL) 40 MG tablet Take 40 mg by mouth every evening.      sildenafiL (VIAGRA) 100 MG tablet Take 1 tablet (100 mg total) by mouth daily as needed for Erectile Dysfunction. 6 tablet 6    sirolimus (RAPAMUNE) 1 MG Tab Take 2 mg by mouth once daily.      tacrolimus (PROGRAF) 0.5 MG Cap Take 3 capsules (1.5 mg total) by mouth every 12 (twelve) hours. 180 capsule 11    TOUJEO SOLOSTAR U-300 INSULIN 300 unit/mL (1.5 mL) InPn pen Inject 20 Units into the skin nightly.      VENTOLIN HFA 90 mcg/actuation inhaler INHALE 2 PUFFS INTO THE LUNGS EVERY 6 (SIX) HOURS AS NEEDED FOR WHEEZING (COUGHING). RESCUE (Patient taking differently: Inhale 1 puff into the lungs every 6 (six) hours as needed.) 18 g 2    alfuzosin (UROXATRAL) 10 mg Tb24 Take 1 tablet (10 mg total) by mouth after dinner. 30 tablet 12    [DISCONTINUED] lisinopriL (PRINIVIL,ZESTRIL) 5 MG tablet Take 5 mg by mouth once daily.      [DISCONTINUED] metFORMIN (GLUCOPHAGE) 500 MG tablet Take 500 mg by mouth 2 (two) times daily.      [DISCONTINUED] minoxidiL (LONITEN) 2.5 MG tablet Take 2.5 mg by mouth 2 (two) times daily.      [DISCONTINUED] spironolactone (ALDACTONE) 50 MG tablet          Scheduled meds:   insulin detemir U-100  15 Units Subcutaneous QHS    levothyroxine  50 mcg Oral Before breakfast    lipase-protease-amylase 12,000-38,000-60,000 units  2 capsule Oral QID (WM & HS)    pravastatin  10 mg Oral QHS    sirolimus  2 mg Oral Daily    tacrolimus  1.5 mg Oral BID    tamsulosin  0.4 mg Oral Daily       Infusions:      PRN meds:  acetaminophen, albuterol, benzonatate, dextrose 50%, dextrose 50%, glucagon (human recombinant), glucose,  glucose, guaiFENesin-codeine 100-10 mg/5 ml, insulin aspart U-100, loperamide, melatonin, naloxone, ondansetron, polyethylene glycol    Review of Systems:    OBJECTIVE:     Vital Signs and IO (Last 24H):  Temp:  [97.9 °F (36.6 °C)-99.3 °F (37.4 °C)]   Pulse:  [68-82]   Resp:  [18]   BP: (116-127)/(80-85)   SpO2:  [90 %-95 %]   I/O last 3 completed shifts:  In: 360 [P.O.:360]  Out: 500 [Urine:100; Stool:400]    Wt Readings from Last 5 Encounters:   04/28/22 65.3 kg (143 lb 15.4 oz)   04/17/22 64.2 kg (141 lb 8.6 oz)   04/06/22 68 kg (150 lb)   03/30/22 67.9 kg (149 lb 12.8 oz)   03/17/22 67 kg (147 lb 11.3 oz)     Physical Exam:  Physical Exam  Constitutional:       Appearance: He is well-developed. He is not diaphoretic.   HENT:      Head: Normocephalic and atraumatic.   Eyes:      General: No scleral icterus.     Pupils: Pupils are equal, round, and reactive to light.   Cardiovascular:      Rate and Rhythm: Normal rate and regular rhythm.   Pulmonary:      Effort: Pulmonary effort is normal. No respiratory distress.      Breath sounds: No stridor.   Abdominal:      General: There is no distension.      Palpations: Abdomen is soft.   Musculoskeletal:         General: No deformity. Normal range of motion.      Cervical back: Neck supple.   Skin:     General: Skin is warm and dry.      Findings: No erythema or rash.   Neurological:      Mental Status: He is alert and oriented to person, place, and time.      Cranial Nerves: No cranial nerve deficit.   Psychiatric:         Behavior: Behavior normal.       Body mass index is 21.89 kg/m².    Laboratory:  Recent Labs   Lab 04/27/22  1945 04/28/22  0454 04/29/22  0526   * 127* 124*   K 4.0 4.9 4.9   CL 97 97 95   CO2 20* 24 17*   BUN 58* 62* 66*   CREATININE 4.7* 4.9* 5.4*   ESTGFRAFRICA 14.5* 13.8* 12.3*   EGFRNONAA 12.5* 11.9* 10.6*   GLU 71 146* 118*       Recent Labs   Lab 04/27/22  1945 04/28/22  0454 04/29/22  0526   CALCIUM 8.2* 7.6* 7.5*   ALBUMIN 3.1* 2.8*  2.5*   PHOS 4.1  --   --    MG 1.7 1.7 1.7             No results for input(s): POCTGLUCOSE in the last 168 hours.    Recent Labs   Lab 12/30/21  0655 02/09/22  0814   Hemoglobin A1C 7.7 H 6.6 H       Recent Labs   Lab 04/26/22  1750 04/27/22  1945 04/28/22  0454 04/29/22  0526   WBC 5.15 8.15 3.45* 5.69   HGB 11.4* 11.9* 9.4* 10.3*   HCT 34.0* 34.2* 26.8* 29.4*   * 153 80* 87*   MCV 88 87 85 85   MCHC 33.5 34.8 35.1 35.0   MONO 13.8  0.7 13.5  1.1*  --   --        Recent Labs   Lab 04/27/22  1945 04/28/22  0454 04/29/22  0526   BILITOT 4.9* 4.0* 4.2*   PROT 6.5 5.6* 5.5*   ALBUMIN 3.1* 2.8* 2.5*   ALKPHOS 99 74 77   ALT 21 19 16   * 96* 91*       Recent Labs   Lab 04/11/22  1438 04/16/22  1330 04/28/22  0220 04/28/22  1224   Color, UA Yellow  --  Yellow Nena   Appearance, UA Clear  --  Hazy A Hazy A   pH, UA 6.0  --  6.0 6.0   Specific Gravity, UA 1.010  --  1.015 1.010   Protein, UA Negative  --  Trace A Trace A   Glucose, UA 1+ A  --  Trace A 1+ A   Ketones, UA Negative  --  Negative Negative   Urobilinogen, UA Negative  --  Negative Negative   Bilirubin (UA) Negative  --  Negative Negative   Occult Blood UA Negative  --  Trace A Trace A   Nitrite, UA Negative  --  Negative Negative   RBC, UA  --  3  3  --   --    WBC, UA  --  2  2  --   --    Bacteria  --  Negative  Negative  --   --    Hyaline Casts, UA  --  11 A  11 A  --   --              Microbiology Results (last 7 days)     Procedure Component Value Units Date/Time    Blood culture #2 **CANNOT BE ORDERED STAT** [[id number]] Collected: 04/28/22 0454    Order Status: Completed Specimen: Blood Updated: 04/29/22 0832     Blood Culture, Routine No Growth to date      No Growth to date    Blood culture #1 **CANNOT BE ORDERED STAT** [[id number]] Collected: 04/28/22 0701    Order Status: Completed Specimen: Blood Updated: 04/28/22 1558     Blood Culture, Routine No Growth to date    Narrative:      Collection has been rescheduled by CRUZ at  04/28/2022 04:55 Reason:   Got first set do second set in a bit   Collection has been rescheduled by [email or web address] at 04/28/2022 04:55 Reason:   Got first set do second set in a bit         ASSESSMENT/PLAN:     ILDEFONSO  CKD stage 3, sCr 1.6 in 3/2022  s/p kidney transplant  Chronic diarrhea  Cirrhosis  Cough  Hyponatremia  No NSAIDs, ACEI/ARB, IV contrast or other nephrotoxins.  Keep MAP > 60, SBP > 100.  Dose meds for GFR < 30 ml/min.  Renal diet - low K, low phos.  UA + microscopy unimpressive.  Renal transplant US unremarkable.  Tacrolimus trough levels pending, hold today.    Worsening renal function, hyponatremia, seemingly normal renal transplant US. UA with some trace hematuria/proteinuria, prograf level is cooking, but was very high recently. I think he needs transfer to [facility] for kidney transplant and hepatology/GI eval. I have nothing else to offer to him here, except start dialysis in a few days if not improving.    Anemia of CKD  Hgb and HCT are acceptable. Monitor.  Will provide SHELLIE and/or IV iron PRN.    HTN  BP seem controlled.   Tolerate asymptomatic HTN up to -160.  HOLD home BP meds.    Thank you for allowing us to participate in the care of your patient!   We will follow the patient and provide recommendations as needed.    Patient care time was spent personally by me on the following activities:   · Obtaining a history.  · Examination of patient.  · Providing medical care at the patients bedside.  · Developing a treatment plan with patient or surrogate and bedside caregivers.  · Ordering and reviewing laboratory studies, radiographic studies, pulse oximetry.  · Ordering and performing treatments and interventions.  · Evaluation of patient's response to treatment.  · Discussions with consultants while on the unit and immediately available to the patient.  · Re-evaluation of the patient's condition.  · Documentation in the medical record.     Dion Leo MD    [facility]  [address] [place] [address]  [address]  [place] [address]    [phone] - tel  [phone] - fax    4/29/2022

## 2022-04-29 NOTE — ASSESSMENT & PLAN NOTE
Worsening transaminitis when compared to recent labs  Moderate ascites on imaging  Gastroenterology MD on Board  MRCP either as outpatient or inpatient and may need transfer to [facility]

## 2022-04-29 NOTE — PLAN OF CARE
04/29/22 1436   Discharge Reassessment   Assessment Type Discharge Planning Reassessment   Did the patient's condition or plan change since previous assessment? Yes   Discharge Plan A Home   Discharge Plan B Other  (May need to transfer to [facility])   DME Needed Upon Discharge  none   S/P renal transplant 2004. Readmitted to hospital this week for ILDEFONSO. Cr 4.7. Tmax 24 hours: 99.3. Per review of progress notes, may need transfer to [facility].   CM will continue to follow and offer dcp resource if needed.

## 2022-04-29 NOTE — SUBJECTIVE & OBJECTIVE
Interval History:     Review of Systems   Constitutional:  Positive for appetite change. Negative for activity change.   HENT:  Negative for congestion and dental problem.    Eyes:  Negative for discharge and itching.   Respiratory:  Positive for cough. Negative for shortness of breath.    Cardiovascular:  Negative for chest pain.   Gastrointestinal:  Negative for abdominal distention and abdominal pain.   Endocrine: Negative for cold intolerance.   Genitourinary:  Negative for difficulty urinating and dysuria.   Musculoskeletal:  Negative for arthralgias and back pain.   Skin:  Negative for color change.   Neurological:  Negative for dizziness and facial asymmetry.   Hematological:  Negative for adenopathy.   Psychiatric/Behavioral:  Negative for agitation and behavioral problems.    Objective:     Vital Signs (Most Recent):  Temp: 99 °F (37.2 °C) (04/29/22 1145)  Pulse: 82 (04/29/22 1145)  Resp: 18 (04/29/22 1145)  BP: (!) 140/95 (04/29/22 1145)  SpO2: 95 % (04/29/22 1145)   Vital Signs (24h Range):  Temp:  [97.9 °F (36.6 °C)-99.3 °F (37.4 °C)] 99 °F (37.2 °C)  Pulse:  [68-82] 82  Resp:  [18] 18  SpO2:  [90 %-95 %] 95 %  BP: (116-140)/(80-95) 140/95     Weight: 65.3 kg (143 lb 15.4 oz)  Body mass index is 21.89 kg/m².    Intake/Output Summary (Last 24 hours) at 4/29/2022 1313  Last data filed at 4/29/2022 0954  Gross per 24 hour   Intake 360 ml   Output 400 ml   Net -40 ml      Physical Exam  Vitals and nursing note reviewed.   Constitutional:       Appearance: He is well-developed.   HENT:      Head: Atraumatic.      Right Ear: External ear normal.      Left Ear: External ear normal.      Nose: Nose normal.      Mouth/Throat:      Mouth: Mucous membranes are moist.   Eyes:      General: No scleral icterus.  Cardiovascular:      Rate and Rhythm: Normal rate.   Pulmonary:      Effort: Pulmonary effort is normal.   Abdominal:      General: There is no distension.   Musculoskeletal:         General: Normal range of  motion.      Cervical back: Full passive range of motion without pain and normal range of motion.   Skin:     General: Skin is warm.   Neurological:      Mental Status: He is alert and oriented to person, place, and time.   Psychiatric:         Behavior: Behavior normal.       Significant Labs: All pertinent labs within the past 24 hours have been reviewed.  CBC:   Recent Labs   Lab 04/27/22 1945 04/28/22 0454 04/29/22  0526   WBC 8.15 3.45* 5.69   HGB 11.9* 9.4* 10.3*   HCT 34.2* 26.8* 29.4*    80* 87*     CMP:   Recent Labs   Lab 04/27/22 1945 04/28/22 0454 04/29/22  0526   * 127* 124*   K 4.0 4.9 4.9   CL 97 97 95   CO2 20* 24 17*   GLU 71 146* 118*   BUN 58* 62* 66*   CREATININE 4.7* 4.9* 5.4*   CALCIUM 8.2* 7.6* 7.5*   PROT 6.5 5.6* 5.5*   ALBUMIN 3.1* 2.8* 2.5*   BILITOT 4.9* 4.0* 4.2*   ALKPHOS 99 74 77   * 96* 91*   ALT 21 19 16   ANIONGAP 11 6* 12   EGFRNONAA 12.5* 11.9* 10.6*       Significant Imaging: I have reviewed all pertinent imaging results/findings within the past 24 hours.

## 2022-04-29 NOTE — ASSESSMENT & PLAN NOTE
Could be prerenal in the setting of continued use of diuretics and advanced cirrhosis / Hepatorenal syndrome   Had Gentle IV fluids upon admission  Hold diuretics/nephrotoxins  USS transplant kidney done was unremarkable   Nephro MD on Board  Continue  sirolimus  Monitor renal panels closely  May need Transfer to [facility]

## 2022-04-29 NOTE — ASSESSMENT & PLAN NOTE
Status post renal transplant in 2004  Continue sirolimus for immunosuppressive therapy  Tacrolimus on hold

## 2022-04-29 NOTE — PLAN OF CARE
04/29/22 1444   Patient Assessment/Suction   Level of Consciousness (AVPU) alert   Respiratory Effort Normal;Unlabored   All Lung Fields Breath Sounds clear   PRE-TX-O2   O2 Device (Oxygen Therapy) room air   SpO2 98 %   Pulse Oximetry Type Intermittent   $ Pulse Oximetry - Multiple Charge Pulse Oximetry - Multiple   Pulse 85   Resp 18   Inhaler   $ Inhaler Charges PRN treatment not required   Respiratory Treatment Status (Inhaler) PRN treatment not required   Education   $ Education Bronchodilator;15 min   Respiratory Evaluation   $ Care Plan Tech Time 15 min

## 2022-04-30 PROBLEM — R05.3 CHRONIC COUGH: Status: ACTIVE | Noted: 2022-01-01

## 2022-04-30 PROBLEM — E11.9 TYPE 2 DIABETES MELLITUS WITHOUT COMPLICATION, WITHOUT LONG-TERM CURRENT USE OF INSULIN: Status: ACTIVE | Noted: 2022-01-01

## 2022-04-30 PROBLEM — M1A.00X0 CHRONIC GOUTY ARTHRITIS: Status: ACTIVE | Noted: 2021-01-01

## 2022-04-30 PROBLEM — K52.9 CHRONIC DIARRHEA: Status: ACTIVE | Noted: 2022-01-01

## 2022-04-30 PROBLEM — J31.0 CHRONIC RHINITIS: Status: ACTIVE | Noted: 2019-03-08

## 2022-04-30 PROBLEM — R91.8 MULTIPLE LUNG NODULES: Status: ACTIVE | Noted: 2020-06-05

## 2022-04-30 NOTE — NURSING
Report received from Sumaya Chau LPN at [facility]. Expect ambulance transport in 1-2 hours.

## 2022-04-30 NOTE — PROGRESS NOTES
[facility]  Progress Note    Patient Name: Edgar Jackson  MRN: [id number]  Patient Class: IP- Inpatient   Admission Date: 4/27/2022  Length of Stay: 3 days  Attending Physician: Solomon Gardner MD  Primary Care Provider: Tyler Castillo MD        Subjective:     Principal Problem:ILDEFONSO (acute kidney injury)        HPI:  Patient is a 61-year-old  male with history of renal transplant for hypertensive nephropathy in 2004 maintained on immunosuppressive therapy, recent diagnosis of cirrhosis thought to be secondary to LOPEZ, type 2 diabetes mellitus, interstitial lung disease who was recently discharged from our facility after being managed for ILDEFONSO returns today after outpatient labs revealed worsening serum creatinine.    Patient was discharged on 4/21 after a 13 day stay for ILDEFONSO and decompensated cirrhosis.  He was evaluated by multiple specialists including Nephrology, pulmonology, GI as well as Infectious Disease.  He underwent paracentesis and there is no evidence of SBP.  He received 5 days of empiric ceftriaxone.  He was started on pancreatic enzyme supplements for diarrhea.    Since his discharge patient noticed increasing generalized weakness.  Diarrhea is slightly improved.  He does admit to abdominal distension which has recurred after paracentesis.  He denies fever but admits to chills which are chronic.  Cough is about the same and is predominantly dry.  He also endorses left lower quadrant abdominal pain which is worse with coughing.      Rest of the 10 point review of systems is negative except as mentioned above.      Overview/Hospital Course:  04/28  Sitting on bed with no distress  Crt levels on higher range compared to baseline  C/o cough which is a chromic issue     04/29  Renal panels worse today  Pt has no appetite  On and off cough episodes    04/30  Renal panels much worse  Pt got accepted to [facility] for further evaluation  Chronic cough and diarrhea persists      Interval  History:     Review of Systems   Constitutional:  Negative for activity change and appetite change.   HENT:  Negative for congestion and dental problem.    Eyes:  Negative for discharge and itching.   Respiratory:  Positive for cough. Negative for shortness of breath.    Cardiovascular:  Negative for chest pain.   Gastrointestinal:  Positive for diarrhea. Negative for abdominal distention and abdominal pain.   Endocrine: Negative for cold intolerance.   Genitourinary:  Negative for difficulty urinating and dysuria.   Musculoskeletal:  Negative for arthralgias and back pain.   Skin:  Negative for color change.   Neurological:  Negative for dizziness and facial asymmetry.   Hematological:  Negative for adenopathy.   Psychiatric/Behavioral:  Negative for agitation and behavioral problems.    Objective:     Vital Signs (Most Recent):  Temp: (!) 37.4 °F (3 °C) (04/30/22 1120)  Pulse: 92 (04/30/22 1120)  Resp: 18 (04/30/22 1120)  BP: (!) 157/100 (04/30/22 1128)  SpO2: 97 % (04/30/22 1120)   Vital Signs (24h Range):  Temp:  [37.4 °F (3 °C)-99.1 °F (37.3 °C)] 37.4 °F (3 °C)  Pulse:  [79-92] 92  Resp:  [18] 18  SpO2:  [94 %-100 %] 97 %  BP: (132-157)/() 157/100     Weight: 65.3 kg (143 lb 15.4 oz)  Body mass index is 21.89 kg/m².    Intake/Output Summary (Last 24 hours) at 4/30/2022 1314  Last data filed at 4/30/2022 1200  Gross per 24 hour   Intake 590 ml   Output 625 ml   Net -35 ml      Physical Exam  Vitals and nursing note reviewed.   Constitutional:       Appearance: He is well-developed.   HENT:      Head: Atraumatic.      Right Ear: External ear normal.      Left Ear: External ear normal.      Nose: Nose normal.      Mouth/Throat:      Mouth: Mucous membranes are moist.   Eyes:      General: No scleral icterus.  Cardiovascular:      Rate and Rhythm: Normal rate.   Pulmonary:      Effort: Pulmonary effort is normal.   Abdominal:      General: There is no distension.   Musculoskeletal:         General: Normal  range of motion.      Cervical back: Full passive range of motion without pain and normal range of motion.   Skin:     General: Skin is warm.   Neurological:      Mental Status: He is alert and oriented to person, place, and time.   Psychiatric:         Behavior: Behavior normal.       Significant Labs: All pertinent labs within the past 24 hours have been reviewed.  CBC:   Recent Labs   Lab 04/29/22  0526 04/30/22  0502   WBC 5.69 6.22   HGB 10.3* 10.7*   HCT 29.4* 30.3*   PLT 87* 80*     CMP:   Recent Labs   Lab 04/29/22  0526 04/30/22  0502   * 124*   K 4.9 5.3*   CL 95 96   CO2 17* 18*   * 80   BUN 66* 68*   CREATININE 5.4* 5.8*   CALCIUM 7.5* 7.7*   PROT 5.5* 5.6*   ALBUMIN 2.5* 2.6*   BILITOT 4.2* 4.5*   ALKPHOS 77 79   AST 91* 90*   ALT 16 15   ANIONGAP 12 10   EGFRNONAA 10.6* 9.7*       Significant Imaging: I have reviewed all pertinent imaging results/findings within the past 24 hours.      Assessment/Plan:      * ILDEFONSO (acute kidney injury)  Could be prerenal in the setting of continued use of diuretics and advanced cirrhosis / Hepatorenal syndrome   Had Gentle IV fluids upon admission  Holding  diuretics/nephrotoxins  USS transplant kidney done was unremarkable   Nephro MD on Board  Continue  sirolimus  Monitor renal panels closely  [place] [facility]       Chronic cough  Ongoing issue      Chronic diarrhea  Ongoing issue      Chronic respiratory failure  On Home oxygen     Thrombocytopenia  On and off in this pt  Expected in Liver cirrhosis/ESLD      Hyponatremia  Hypervolemic hyponatremia from volume overload ?  Monitor with daily labs      Cirrhosis of liver with ascites  Worsening transaminitis when compared to recent labs  Moderate ascites on imaging  Gastroenterology MD on Board  Transfer [place] [facility] got [place]       Interstitial lung disease  No acute process going on   On Home oxygen      Diabetes mellitus due to underlying condition with chronic kidney disease, with long-term  current use of insulin  Maintain insulin regime      Personal history of immunosupression therapy  Status post renal transplant in 2004  Continue sirolimus for immunosuppressive therapy  Tacrolimus on hold        VTE Risk Mitigation (From admission, onward)         Ordered     IP VTE LOW RISK PATIENT  Once         04/28/22 0042     Place sequential compression device  Until discontinued         04/28/22 0042                Discharge Planning   LE: 4/30/2022     Code Status: Full Code   Is the patient medically ready for discharge?: No    Reason for patient still in hospital (select all that apply): Pending disposition awaiting Transfer to [facility] [place]  Discharge Plan A: Home                  Solomon Gardner MD  Department of Hospital Medicine   [facility]ly [facility]

## 2022-04-30 NOTE — HPI
Mr Renetta is a 61yoM with PMHx of renal transplant for hypertensive nephropathy (2004) immunosuppressed on tacrolimus/sirolimus,  therapy, LOPEZ cirrhosis, NIDDM, and ILD who was treated at OSH for ILDEFONSO 2/2 decompensated cirrhosis and worsening portal vein hypertension with HRS. Pt reported that he had been experiencing abdominal distention with associated diarrhea (4-5/day) over several weeks, along with a constant nagging cough. On presenting to OSH, multiple paracentesis (3) were performed to relieve the ascitic fluid. No evidence of SBP on fluid analysis. Creon was added to diet to aide in food digestion in setting of cirrhosis. Loperamide provided symptomatic treatment of diarrhea. Dextromethorphan-guaifenesin syrup added to reduce cough exacerbations. Despite efforts to manage symptoms, pt in need of  transplant surgery evaluation of the renal/hepatic injury, resulting in tranfer to [facility].     On arrival to [facility] (4/30/2022), pt has experienced increased generalized weakness. Loperamide was continued for diarrhea management. Abdominal distention recurred, despite prior paracentesis. Cough has been fairly constant and predominantly dry. Pt now endorses new left lower quadrant abdominal pain, worsened by cough. KTM and Hepatology consults placed for management of HRS.

## 2022-04-30 NOTE — CARE UPDATE
04/29/22 1928   PRE-TX-O2   O2 Device (Oxygen Therapy) room air   SpO2 97 %   Pulse Oximetry Type Intermittent   $ Pulse Oximetry - Multiple Charge Pulse Oximetry - Multiple   Pulse 92   Resp 18   Temp 98.6 °F (37 °C)   BP (!) 140/98   Education   $ Education Bronchodilator;15 min   Respiratory Evaluation   $ Care Plan Tech Time 15 min   $ Eval/Re-eval Charges Re-evaluation

## 2022-04-30 NOTE — DISCHARGE SUMMARY
[facility]  Discharge Summary      Patient Name: Edgar Jackson  MRN: [id number]  Patient Class: IP- Inpatient  Admission Date: 4/27/2022  Hospital Length of Stay: 3 days  Discharge Date and Time:  04/30/2022 4:03 PM  Attending Physician: Solomon Gardner MD   Discharging Provider: Solomon Gardner MD  Primary Care Provider: Tyler Castillo MD      HPI:   Patient is a 61-year-old  male with history of renal transplant for hypertensive nephropathy in 2004 maintained on immunosuppressive therapy, recent diagnosis of cirrhosis thought to be secondary to LOPEZ, type 2 diabetes mellitus, interstitial lung disease who was recently discharged from our facility after being managed for ILDEFONSO returns today after outpatient labs revealed worsening serum creatinine.    Patient was discharged on 4/21 after a 13 day stay for ILDEFONSO and decompensated cirrhosis.  He was evaluated by multiple specialists including Nephrology, pulmonology, GI as well as Infectious Disease.  He underwent paracentesis and there is no evidence of SBP.  He received 5 days of empiric ceftriaxone.  He was started on pancreatic enzyme supplements for diarrhea.    Since his discharge patient noticed increasing generalized weakness.  Diarrhea is slightly improved.  He does admit to abdominal distension which has recurred after paracentesis.  He denies fever but admits to chills which are chronic.  Cough is about the same and is predominantly dry.  He also endorses left lower quadrant abdominal pain which is worse with coughing.      Rest of the 10 point review of systems is negative except as mentioned above.      * No surgery found *      Hospital Course:   Patient with Transplanted kidney, on immunosuppressants ,admitted with worsening renal failure and new onset liver cirrhosis with ascites.  Pt has ongoing chronic cough and chronic diarrhea and opportunistic infections needs to be r/o.  Pt was treated with gentle hydration/Diuretics and  Nephrotoxins were held with no improvement in renal function.  Sirolimus was continued and Tacrolimus was put on hold(awaiting serum tacrolimus levels)  Pt will benefit from renal biopsy and care in a higher hospital setting  Later pt was discharged to [facility] under hospitalist service and pt will be evaluated by GI and Nephro teams while pt is there        Goals of Care Treatment Preferences:  Code Status: Full Code      Consults:   Consults (From admission, onward)        Status Ordering Provider     Inpatient consult to Gastroenterology  Once        Provider:  Mio Simmons III, MD    Completed MARIO HAHN     Inpatient consult to Nephrology  Once        Provider:  Dion Leo MD    Completed MARIO HAHN     Inpatient consult to Hospitalist  Once        Provider:  Mario Hahn MD    Acknowledged MARIO HAHN          No new Assessment & Plan notes have been filed under this hospital service since the last note was generated.  Service: Hospital Medicine    Final Active Diagnoses:    Diagnosis Date Noted POA    PRINCIPAL PROBLEM:  ILDEFONSO (acute kidney injury) [N17.9] 04/07/2022 Yes    Chronic diarrhea [K52.9] 04/30/2022 Unknown    Chronic cough [R05.3] 04/30/2022 Unknown    Chronic respiratory failure [J96.10] 04/28/2022 Unknown    Hyponatremia [E87.1] 04/08/2022 Yes    Thrombocytopenia [D69.6] 04/08/2022 Yes    Cirrhosis of liver with ascites [K74.60, R18.8] 03/30/2022 Yes    Interstitial lung disease [J84.9] 03/30/2022 Yes    Personal history of immunosupression therapy [Z92.25] 02/08/2022 Not Applicable    Diabetes mellitus due to underlying condition with chronic kidney disease, with long-term current use of insulin [E08.22, Z79.4] 02/08/2022 Not Applicable      Problems Resolved During this Admission:       Discharged Condition: good    Disposition: Another Health Care Inst*    Follow Up:    Patient Instructions:   No discharge procedures on  "file.    Significant Diagnostic Studies: Labs:   CMP   Recent Labs   Lab 04/29/22  0526 04/30/22  0502   * 124*   K 4.9 5.3*   CL 95 96   CO2 17* 18*   * 80   BUN 66* 68*   CREATININE 5.4* 5.8*   CALCIUM 7.5* 7.7*   PROT 5.5* 5.6*   ALBUMIN 2.5* 2.6*   BILITOT 4.2* 4.5*   ALKPHOS 77 79   AST 91* 90*   ALT 16 15   ANIONGAP 12 10   ESTGFRAFRICA 12.3* 11.2*   EGFRNONAA 10.6* 9.7*    and CBC   Recent Labs   Lab 04/29/22  0526 04/30/22  0502   WBC 5.69 6.22   HGB 10.3* 10.7*   HCT 29.4* 30.3*   PLT 87* 80*       Pending Diagnostic Studies:     Procedure Component Value Units Date/Time    Cryptosporidium antigen, stool [[id number]] Collected: 04/28/22 1353    Order Status: Sent Lab Status: In process Updated: 04/28/22 1718    Specimen: Stool     Gastrointestinal Pathogens Panel, PCR [[id number]] Collected: 04/28/22 1353    Order Status: Sent Lab Status: In process Updated: 04/28/22 1718    Specimen: Stool          Medications:  Reconciled Home Medications:      Medication List      ASK your doctor about these medications    alfuzosin 10 mg Tb24  Commonly known as: UROXATRAL  Take 1 tablet (10 mg total) by mouth after dinner.     allopurinoL 100 MG tablet  Commonly known as: ZYLOPRIM  Take 100 mg by mouth once daily.     BD NOEMI 2ND GEN PEN NEEDLE 32 gauge x 5/32" Ndle  Generic drug: pen needle, diabetic  USE AS DIRECTED ONCE DAILY WITH BASAGLAR     benzonatate 200 MG capsule  Commonly known as: TESSALON  Take 1 capsule (200 mg total) by mouth 3 (three) times daily.     dextromethorphan-guaiFENesin  mg/5 ml  mg/5 mL liquid  Commonly known as: ROBITUSSIN-DM  Take 5 mLs by mouth every 6 (six) hours as needed (cough).     fluticasone propionate 50 mcg/actuation nasal spray  Commonly known as: FLONASE  2 sprays (100 mcg total) by Each Nostril route once daily.     furosemide 20 MG tablet  Commonly known as: LASIX  Take 1 tablet (20 mg total) by mouth once daily.     guaiFENesin 600 mg 12 hr " tablet  Commonly known as: MUCINEX  Take 1 tablet (600 mg total) by mouth 2 (two) times daily. for 10 days     hydrocodone-chlorpheniramine 10-8 mg/5 mL suspension  Commonly known as: TUSSIONEX  Take 5 mLs by mouth every 12 (twelve) hours as needed for Cough or Congestion.     levothyroxine 50 MCG tablet  Commonly known as: SYNTHROID  Take 50 mcg by mouth once daily.     lipase-protease-amylase 12,000-38,000-60,000 units Cpdr  Commonly known as: CREON  Take 2 capsules by mouth 4 (four) times daily with meals and nightly.     lisinopriL 5 MG tablet  Commonly known as: PRINIVIL,ZESTRIL  Take 5 mg by mouth once daily.     loperamide 2 mg capsule  Commonly known as: IMODIUM  Take 1 capsule (2 mg total) by mouth 4 (four) times daily as needed for Diarrhea.     metFORMIN 500 MG tablet  Commonly known as: GLUCOPHAGE  Take 500 mg by mouth 2 (two) times daily.     metoprolol tartrate 50 MG tablet  Commonly known as: LOPRESSOR  Take 50 mg by mouth 2 (two) times daily.     minoxidiL 2.5 MG tablet  Commonly known as: LONITEN  Take 2.5 mg by mouth 2 (two) times daily.     pravastatin 40 MG tablet  Commonly known as: PRAVACHOL  Take 40 mg by mouth every evening.     sildenafiL 100 MG tablet  Commonly known as: VIAGRA  Take 1 tablet (100 mg total) by mouth daily as needed for Erectile Dysfunction.     sirolimus 1 MG Tab  Commonly known as: RAPAMUNE  Take 2 mg by mouth once daily.     spironolactone 50 MG tablet  Commonly known as: ALDACTONE     tacrolimus 0.5 MG Cap  Commonly known as: PROGRAF  Take 3 capsules (1.5 mg total) by mouth every 12 (twelve) hours.     TOUJEO SOLOSTAR U-300 INSULIN 300 unit/mL (1.5 mL) Inpn pen  Generic drug: insulin glargine (TOUJEO)  Inject 20 Units into the skin nightly.     VENTOLIN HFA 90 mcg/actuation inhaler  Generic drug: albuterol  INHALE 2 PUFFS INTO THE LUNGS EVERY 6 (SIX) HOURS AS NEEDED FOR WHEEZING (COUGHING). RESCUE            Indwelling Lines/Drains at time of discharge:    Lines/Drains/Airways     None               Physical Exam  Cardiovascular:      Rate and Rhythm: Normal rate.   Neurological:      General: No focal deficit present.      Mental Status: He is alert.       Time spent on the discharge of patient: 45  minutes         Solomon Gardner MD  Department of Hospital Medicine  [facility]

## 2022-04-30 NOTE — PROVIDER TRANSFER
Outside Transfer Acceptance Note / Regional Referral Center    Referring facility: [facility]   Referring provider: PATTI WALDRON  Accepting facility: [facility]  Accepting provider: RASHARD DUQUE  Reason for transfer:  Need Hepatology/Kidney Transplant Medicine  Transfer diagnosis:  Acute kidney injury with decompensated cirrhosis  Transfer specialty requested: Nephrology /Hepatology  Transfer specialty notified: no  Transfer level: NUMBER 1-5: 2  Bed type requested: stepdown  Isolation status: No active isolations   Admission class or status: IP- Inpatient      Narrative   61-year-old male with a history of renal failure with kidney transplant in 2004 (on sirolimus/tacrolimus), diabetes, interstitial lung disease, and cirrhosis [facility] with acute kidney injury. Since discharge he noted increasing weakness.  Diarrhea was improved, but abdominal distension recurred.  He had no fever.  He was admitted back to the hospital with acute kidney injury, cirrhosis, and hyponatremia.  He was noted to have chronic diarrhea.  Stool studies have been ordered.  He also has a recent diagnosis of cirrhosis with liver biopsy on April 6 that showed mild to moderately active hepatitis with bridging fibrosis and nodule formation.  Clinical course has been complicated by decompensated cirrhosis. He was seen by Nephrology on April 29, and recommendation was for holding tacrolimus that day while awaiting levels.  Referring provider noted patient is hemodynamically stable with no alteration in mentation.  His chronic diarrhea has been unchanged during his stay.  They are requesting transfer for evaluation by Hepatology and Kidney Transplant Medicine.  Will plan transfer to Hospital Medicine at [facility] for further evaluation/treatment. MELD score 34.     He was recently in the hospital at [facility] April 7-21 for acute kidney injury and cirrhosis.  During  that initial stay he was seen by Gastroenterology, Nephrology, Pulmonary, and Infectious Diseases.  Paracentesis had no evidence of SBP.  He received pancreatic enzyme supplements and IV antibiotics.      COVID negative April 27 April 29:  Sodium 124, potassium 5.3, chloride 96, CO2 18, BUN 68, creatinine 5.8 (2.7 on April 19), glucose 80, AST 90, ALT 15, magnesium 1.6, total bilirubin 4.5, white blood cells 6.22, hemoglobin 10.7, hematocrit 30.3, platelets 80  Blood cultures have no growth to date  Several stool studies are pending    CT abdomen and pelvis on April 27 showed small pericardial effusion with right greater than left pleural effusion, findings suggesting cirrhosis with portal hypertension, bilateral nephrolithiasis (nonobstructing), moderate ascites.    Chest x-ray showed chronic parenchymal changes with increasing peripheral perihilar opacities felt to be related to worsening pneumonia.    Ultrasound of the transplant kidney showed the transplant renal artery and vein are patent.  No evidence of hemodynamically significant stenosis.  Mild increased echogenicity in the transplant kidney, similar to prior previous no hydronephrosis.  Moderate volume abdominal and pelvic ascites.    Liver biopsy April 6 showed chronic mild-moderately active hepatitis with bridging fibrosis and nodule formation.    Objective     Vitals: Temp: (P) 99.1 °F (37.3 °C) (04/30/22 0457)  Pulse: (P) 83 (04/30/22 0457)  Resp: (P) 18 (04/30/22 0457)  BP: (!) 137/93 (04/30/22 0029)  SpO2: (P) 100 % (04/30/22 0457)  Recent Labs:   CBC:   Recent Labs   Lab 04/29/22  0526 04/30/22  0502   WBC 5.69 6.22   HGB 10.3* 10.7*   HCT 29.4* 30.3*   PLT 87* 80*     CMP:   Recent Labs   Lab 04/29/22  0526 04/30/22  0502   * 124*   K 4.9 5.3*   CL 95 96   CO2 17* 18*   * 80   BUN 66* 68*   CREATININE 5.4* 5.8*   CALCIUM 7.5* 7.7*   PROT 5.5* 5.6*   ALBUMIN 2.5* 2.6*   BILITOT 4.2* 4.5*   ALKPHOS 77 79   AST 91* 90*   ALT 16 15    ANIONGAP 12 10   EGFRNONAA 10.6* 9.7*     Recent imaging: see above   IV access:        Peripheral IV - Single Lumen 04/27/22 1940 20 G Left;Lateral Forearm (Active)   Site Assessment Clean;Intact;Dry;No redness;No swelling;No warmth;No drainage 04/29/22 1945   Extremity Assessment Distal to IV No swelling;No warmth;No redness;No abnormal discoloration 04/29/22 1945   Line Status Saline locked;Flushed 04/29/22 1945   Dressing Status Clean;Dry;Intact 04/29/22 1945   Dressing Intervention Integrity maintained 04/29/22 1945   Reason Not Rotated Not due 04/27/22 1940       Allergies: Review of patient's allergies indicates:  No Known Allergies   NPO: No      Anticoagulation:   Anticoagulants     None           Instructions    1. Admit to Hospital Medicine  2. Consult Kidney Transplant Medicine  3. Consult Hepatology    Upon patient arrival to floor, please send SecureChat to [facility] [place] [facility] or call extension [phone] (if no answer, do NOT leave a callback number after the beep, rather please send a SecureChat to [facility] [place] [facility]), for Hospital Medicine admit team assignment and for additional admit orders for the patient.  Do not page the attending physician associated with the patient on arrival (this physician may not be on duty at the time of arrival).  Rather, always send a SecureChat to [facility] [place] [facility] or call [phone] to reach the triage physician for orders and team assignment.    IVA Rock MD  Hospital Medicine Staff  Cell: [phone]

## 2022-04-30 NOTE — PROGRESS NOTES
Nephrology Consult Note        Patient Name: Edgar Jackson  MRN: [id number]    Patient Class: IP- Inpatient   Admission Date: 4/27/2022  Length of Stay: 3 days  Date of Service: 4/30/2022    Attending Physician: Solomon Gardner MD  Primary Care Provider: Tyler Castillo MD    Reason for Consult: ildefonso/ckd3/KT/diarrhea/cirrhosis/cough/hyponatremia/anemia/htn    SUBJECTIVE:     HPI: 61M with remote renal transplant (2004, secondary to hypertensive renal failure), on chronic immunosuppressant (sirolimus and tacrolimus), recent diagnosis of cirrhosis who underwent recent transjugular biopsy (pathology with chronic mild to moderate active hepatitis with bridging fibrosis and nodules) returns to the ED with abnormal labs. Outpatient labs  showed elevated creatinine and he was instructed to come back to the emergency department. He is still having diarrhea every 2-3 hours, non-bloody with some left lower quadrant abdominal pain. He also reports that persistent cough but denies any fevers or chills.  He has been taking all his medications as prescribed.    4/29 Worsening renal function, hyponatremia, seemingly normal renal transplant US. UA with some trace hematuria/proteinuria, prograf level is cooking, but was very high recently. I think he needs transfer to [facility] for kidney transplant and hepatology/GI eval. I have nothing else to offer to him here, except start dialysis in a few days if not improving. Hold tacrolimus for now.  4/30 VSS. Progressively worsening renal function in a transplant kidney without clear explanation. UA is unimpressive, renal transplant US is OK, tacrolimus levels pending - was in the 20's 10 days ago. Holding tacrolimus for now. In 2/2022, sCr was 1, in 3/2022 was 1.6, had ILDEFONSO in beginning of April with sCr peaking 3.1, down to 2.2, with a second bout in mid-April with peak 3.2 and recovery to 2.7 (tac level was in the 20's) and now relentless worsening on this admission. Will repeat UA again today.    Past Medical  "History:   Diagnosis Date    CKD (chronic kidney disease)     Diabetes 2020    Gout     Hypertension 2000    [place] 01[place]    [place]    Thyroid disease     Unspecified cirrhosis of liver      Past Surgical History:   Procedure Laterality Date    CATARACT EXTRACTION Right 2017    COLONOSCOPY N/A 3/8/2022    Procedure: COLONOSCOPY;  Surgeon: Mio Simmons III, MD;  Location: [facility] ENDO;  Service: Endoscopy;  Laterality: N/A;    ENDOSCOPIC ULTRASOUND OF UPPER GASTROINTESTINAL TRACT N/A 3/8/2022    Procedure: ULTRASOUND, UPPER GI TRACT, ENDOSCOPIC;  Surgeon: Mio Simmons III, MD;  Location: [facility] ENDO;  Service: Endoscopy;  Laterality: N/A;    KIDNEY TRANSPLANT  2004    PORTACATH PLACEMENT  2003    REMOVAL OF VASCULAR ACCESS PORT  2005     Family History   Problem Relation Age of Onset    Kidney disease Father     Heart disease Father      Social History     Tobacco Use    Smoking status: Never Smoker    Smokeless tobacco: Never Used   Substance Use Topics    Alcohol use: Not Currently    Drug use: Never       Review of patient's allergies indicates:  No Known Allergies    Outpatient meds:  No current facility-administered medications on file prior to encounter.     Current Outpatient Medications on File Prior to Encounter   Medication Sig Dispense Refill    allopurinoL (ZYLOPRIM) 100 MG tablet Take 100 mg by mouth once daily.      BD NOEMI 2ND GEN PEN NEEDLE 32 gauge x 5/32" Ndle USE AS DIRECTED ONCE DAILY WITH BASAGLAR      benzonatate (TESSALON) 200 MG capsule Take 1 capsule (200 mg total) by mouth 3 (three) times daily. 90 capsule 0    dextromethorphan-guaiFENesin  mg/5 ml (ROBITUSSIN-DM)  mg/5 mL liquid Take 5 mLs by mouth every 6 (six) hours as needed (cough). 473 mL 0    fluticasone propionate (FLONASE) 50 mcg/actuation nasal spray 2 sprays (100 mcg total) by Each Nostril route once daily. 16 g 1    furosemide (LASIX) 20 MG tablet Take 1 " tablet (20 mg total) by mouth once daily. 30 tablet 11    guaiFENesin (MUCINEX) 600 mg 12 hr tablet Take 1 tablet (600 mg total) by mouth 2 (two) times daily. for 10 days 20 tablet 0    hydrocodone-chlorpheniramine (TUSSIONEX) 10-8 mg/5 mL suspension Take 5 mLs by mouth every 12 (twelve) hours as needed for Cough or Congestion. 115 mL 0    levothyroxine (SYNTHROID) 50 MCG tablet Take 50 mcg by mouth once daily.      lipase-protease-amylase 12,000-38,000-60,000 units (CREON) CpDR Take 2 capsules by mouth 4 (four) times daily with meals and nightly. 240 capsule 11    loperamide (IMODIUM) 2 mg capsule Take 1 capsule (2 mg total) by mouth 4 (four) times daily as needed for Diarrhea. 40 capsule 0    metoprolol tartrate (LOPRESSOR) 50 MG tablet Take 50 mg by mouth 2 (two) times daily.      pravastatin (PRAVACHOL) 40 MG tablet Take 40 mg by mouth every evening.      sildenafiL (VIAGRA) 100 MG tablet Take 1 tablet (100 mg total) by mouth daily as needed for Erectile Dysfunction. 6 tablet 6    sirolimus (RAPAMUNE) 1 MG Tab Take 2 mg by mouth once daily.      tacrolimus (PROGRAF) 0.5 MG Cap Take 3 capsules (1.5 mg total) by mouth every 12 (twelve) hours. 180 capsule 11    TOUJEO SOLOSTAR U-300 INSULIN 300 unit/mL (1.5 mL) InPn pen Inject 20 Units into the skin nightly.      VENTOLIN HFA 90 mcg/actuation inhaler INHALE 2 PUFFS INTO THE LUNGS EVERY 6 (SIX) HOURS AS NEEDED FOR WHEEZING (COUGHING). RESCUE (Patient taking differently: Inhale 1 puff into the lungs every 6 (six) hours as needed.) 18 g 2    alfuzosin (UROXATRAL) 10 mg Tb24 Take 1 tablet (10 mg total) by mouth after dinner. 30 tablet 12    [DISCONTINUED] lisinopriL (PRINIVIL,ZESTRIL) 5 MG tablet Take 5 mg by mouth once daily.      [DISCONTINUED] metFORMIN (GLUCOPHAGE) 500 MG tablet Take 500 mg by mouth 2 (two) times daily.      [DISCONTINUED] minoxidiL (LONITEN) 2.5 MG tablet Take 2.5 mg by mouth 2 (two) times daily.      [DISCONTINUED] spironolactone  (ALDACTONE) 50 MG tablet          Scheduled meds:   insulin detemir U-100  15 Units Subcutaneous QHS    levothyroxine  50 mcg Oral Before breakfast    lipase-protease-amylase 12,000-38,000-60,000 units  2 capsule Oral QID (WM & HS)    pravastatin  10 mg Oral QHS    sirolimus  2 mg Oral Daily    sodium zirconium cyclosilicate  10 g Oral Daily    tamsulosin  0.4 mg Oral Daily       Infusions:      PRN meds:  acetaminophen, albuterol, benzonatate, dextrose 50%, dextrose 50%, glucagon (human recombinant), glucose, glucose, guaiFENesin-codeine 100-10 mg/5 ml, insulin aspart U-100, loperamide, melatonin, naloxone, ondansetron, oxyCODONE, polyethylene glycol    Review of Systems:    OBJECTIVE:     Vital Signs and IO (Last 24H):  Temp:  [98.6 °F (37 °C)-99.1 °F (37.3 °C)]   Pulse:  [79-92]   Resp:  [18]   BP: (132-140)/(87-98)   SpO2:  [94 %-100 %]   I/O last 3 completed shifts:  In: 470 [P.O.:470]  Out: 1025 [Urine:225; Stool:800]    Wt Readings from Last 5 Encounters:   04/28/22 65.3 kg (143 lb 15.4 oz)   04/17/22 64.2 kg (141 lb 8.6 oz)   04/06/22 68 kg (150 lb)   03/30/22 67.9 kg (149 lb 12.8 oz)   03/17/22 67 kg (147 lb 11.3 oz)     Physical Exam:  Physical Exam  Constitutional:       Appearance: He is well-developed. He is not diaphoretic.   HENT:      Head: Normocephalic and atraumatic.   Eyes:      General: No scleral icterus.     Pupils: Pupils are equal, round, and reactive to light.   Cardiovascular:      Rate and Rhythm: Normal rate and regular rhythm.   Pulmonary:      Effort: Pulmonary effort is normal. No respiratory distress.      Breath sounds: No stridor.   Abdominal:      General: There is no distension.      Palpations: Abdomen is soft.   Musculoskeletal:         General: No deformity. Normal range of motion.      Cervical back: Neck supple.   Skin:     General: Skin is warm and dry.      Findings: No erythema or rash.   Neurological:      Mental Status: He is alert and oriented to person, place,  and time.      Cranial Nerves: No cranial nerve deficit.   Psychiatric:         Behavior: Behavior normal.       Body mass index is 21.89 kg/m².    Laboratory:  Recent Labs   Lab 04/28/22 0454 04/29/22  0526 04/30/22  0502   * 124* 124*   K 4.9 4.9 5.3*   CL 97 95 96   CO2 24 17* 18*   BUN 62* 66* 68*   CREATININE 4.9* 5.4* 5.8*   ESTGFRAFRICA 13.8* 12.3* 11.2*   EGFRNONAA 11.9* 10.6* 9.7*   * 118* 80       Recent Labs   Lab 04/27/22 1945 04/28/22 0454 04/29/22 0526 04/30/22  0502   CALCIUM 8.2* 7.6* 7.5* 7.7*   ALBUMIN 3.1* 2.8* 2.5* 2.6*   PHOS 4.1  --   --   --    MG 1.7 1.7 1.7 1.6             No results for input(s): POCTGLUCOSE in the last 168 hours.    Recent Labs   Lab 12/30/21  0655 02/09/22  0814   Hemoglobin A1C 7.7 H 6.6 H       Recent Labs   Lab 04/26/22  1750 04/27/22 1945 04/28/22 0454 04/29/22  0526 04/30/22  0502   WBC 5.15 8.15 3.45* 5.69 6.22   HGB 11.4* 11.9* 9.4* 10.3* 10.7*   HCT 34.0* 34.2* 26.8* 29.4* 30.3*   * 153 80* 87* 80*   MCV 88 87 85 85 85   MCHC 33.5 34.8 35.1 35.0 35.3   MONO 13.8  0.7 13.5  1.1*  --   --   --        Recent Labs   Lab 04/28/22 0454 04/29/22  0526 04/30/22  0502   BILITOT 4.0* 4.2* 4.5*   PROT 5.6* 5.5* 5.6*   ALBUMIN 2.8* 2.5* 2.6*   ALKPHOS 74 77 79   ALT 19 16 15   AST 96* 91* 90*       Recent Labs   Lab 04/11/22  1438 04/16/22  1330 04/28/22  0220 04/28/22  1224   Color, UA Yellow  --  Yellow Nena   Appearance, UA Clear  --  Hazy A Hazy A   pH, UA 6.0  --  6.0 6.0   Specific Gravity, UA 1.010  --  1.015 1.010   Protein, UA Negative  --  Trace A Trace A   Glucose, UA 1+ A  --  Trace A 1+ A   Ketones, UA Negative  --  Negative Negative   Urobilinogen, UA Negative  --  Negative Negative   Bilirubin (UA) Negative  --  Negative Negative   Occult Blood UA Negative  --  Trace A Trace A   Nitrite, UA Negative  --  Negative Negative   RBC, UA  --  3  3  --   --    WBC, UA  --  2  2  --   --    Bacteria  --  Negative  Negative  --   --     Hyaline Casts, UA  --  11 A  11 A  --   --              Microbiology Results (last 7 days)     Procedure Component Value Units Date/Time    Blood culture #1 **CANNOT BE ORDERED STAT** [[id number]] Collected: 04/28/22 0701    Order Status: Completed Specimen: Blood Updated: 04/29/22 1032     Blood Culture, Routine No Growth to date      No Growth to date    Narrative:      Collection has been rescheduled by [email or web address] at 04/28/2022 04:55 Reason:   Got first set do second set in a bit   Collection has been rescheduled by [email or web address] at 04/28/2022 04:55 Reason:   Got first set do second set in a bit     Blood culture #2 **CANNOT BE ORDERED STAT** [[id number]] Collected: 04/28/22 0454    Order Status: Completed Specimen: Blood Updated: 04/29/22 0832     Blood Culture, Routine No Growth to date      No Growth to date        ASSESSMENT/PLAN:     ILDEFONSO  CKD stage 3, sCr 1.6 in 3/2022  [place]  Chronic diarrhea  Cirrhosis  Cough  Hyponatremia  No NSAIDs, ACEI/ARB, IV contrast or other nephrotoxins.  Keep MAP > 60, SBP > 100.  Dose meds for GFR < 30 ml/min.  Renal diet - low K, low phos.  UA + microscopy unimpressive.  Renal transplant US unremarkable.  Tacrolimus trough levels pending, hold tacrolimus since 4/29.    Worsening renal function, hyponatremia, seemingly normal renal transplant US. UA with some trace hematuria/proteinuria, prograf level is cooking, but was very high recently. I think [place] needs [place] [facility] [place] and hepatology/GI eval. I have nothing else to offer to him here, except start dialysis in a few days if not improving - he probably need a renal graft biopsy at this point. Not sure how the diarrhea and nagging cough factors in the equation but he is at risk for opportunistic infections, especially if his TAC level is high.    Anemia of CKD  Hgb and HCT are acceptable. Monitor.  Will provide SHELLIE and/or IV iron PRN.    HTN  BP seem controlled.   Tolerate  asymptomatic HTN up to -160.  HOLD home BP meds.    Thank you for allowing us to participate in the care of your patient!   We will follow the patient and provide recommendations as needed.    Patient care time was spent personally by me on the following activities:   · Obtaining a history.  · Examination of patient.  · Providing medical care at the patients bedside.  · Developing a treatment plan with patient or surrogate and bedside caregivers.  · Ordering and reviewing laboratory studies, radiographic studies, pulse oximetry.  · Ordering and performing treatments and interventions.  · Evaluation of patient's response to treatment.  · Discussions with consultants while on the unit and immediately available to the patient.  · Re-evaluation of the patient's condition.  · Documentation in the medical record.     Dion Leo MD    [facility]  [address] [place] [id number] [phone] - tel  [phone] - fax    4/30/2022

## 2022-04-30 NOTE — SUBJECTIVE & OBJECTIVE
Interval History:     Review of Systems   Constitutional:  Negative for activity change and appetite change.   HENT:  Negative for congestion and dental problem.    Eyes:  Negative for discharge and itching.   Respiratory:  Positive for cough. Negative for shortness of breath.    Cardiovascular:  Negative for chest pain.   Gastrointestinal:  Positive for diarrhea. Negative for abdominal distention and abdominal pain.   Endocrine: Negative for cold intolerance.   Genitourinary:  Negative for difficulty urinating and dysuria.   Musculoskeletal:  Negative for arthralgias and back pain.   Skin:  Negative for color change.   Neurological:  Negative for dizziness and facial asymmetry.   Hematological:  Negative for adenopathy.   Psychiatric/Behavioral:  Negative for agitation and behavioral problems.    Objective:     Vital Signs (Most Recent):  Temp: (!) 37.4 °F (3 °C) (04/30/22 1120)  Pulse: 92 (04/30/22 1120)  Resp: 18 (04/30/22 1120)  BP: (!) 157/100 (04/30/22 1128)  SpO2: 97 % (04/30/22 1120)   Vital Signs (24h Range):  Temp:  [37.4 °F (3 °C)-99.1 °F (37.3 °C)] 37.4 °F (3 °C)  Pulse:  [79-92] 92  Resp:  [18] 18  SpO2:  [94 %-100 %] 97 %  BP: (132-157)/() 157/100     Weight: 65.3 kg (143 lb 15.4 oz)  Body mass index is 21.89 kg/m².    Intake/Output Summary (Last 24 hours) at 4/30/2022 1314  Last data filed at 4/30/2022 1200  Gross per 24 hour   Intake 590 ml   Output 625 ml   Net -35 ml      Physical Exam  Vitals and nursing note reviewed.   Constitutional:       Appearance: He is well-developed.   HENT:      Head: Atraumatic.      Right Ear: External ear normal.      Left Ear: External ear normal.      Nose: Nose normal.      Mouth/Throat:      Mouth: Mucous membranes are moist.   Eyes:      General: No scleral icterus.  Cardiovascular:      Rate and Rhythm: Normal rate.   Pulmonary:      Effort: Pulmonary effort is normal.   Abdominal:      General: There is no distension.   Musculoskeletal:         General:  Normal range of motion.      Cervical back: Full passive range of motion without pain and normal range of motion.   Skin:     General: Skin is warm.   Neurological:      Mental Status: He is alert and oriented to person, place, and time.   Psychiatric:         Behavior: Behavior normal.       Significant Labs: All pertinent labs within the past 24 hours have been reviewed.  CBC:   Recent Labs   Lab 04/29/22  0526 04/30/22  0502   WBC 5.69 6.22   HGB 10.3* 10.7*   HCT 29.4* 30.3*   PLT 87* 80*     CMP:   Recent Labs   Lab 04/29/22  0526 04/30/22  0502   * 124*   K 4.9 5.3*   CL 95 96   CO2 17* 18*   * 80   BUN 66* 68*   CREATININE 5.4* 5.8*   CALCIUM 7.5* 7.7*   PROT 5.5* 5.6*   ALBUMIN 2.5* 2.6*   BILITOT 4.2* 4.5*   ALKPHOS 77 79   AST 91* 90*   ALT 16 15   ANIONGAP 12 10   EGFRNONAA 10.6* 9.7*       Significant Imaging: I have reviewed all pertinent imaging results/findings within the past 24 hours.

## 2022-04-30 NOTE — PLAN OF CARE
Problem: Adult Inpatient Plan of Care  Goal: Plan of Care Review  Outcome: Ongoing, Progressing  Goal: Patient-Specific Goal (Individualized)  Outcome: Ongoing, Progressing  Goal: Absence of Hospital-Acquired Illness or Injury  Outcome: Ongoing, Progressing  Goal: Optimal Comfort and Wellbeing  Outcome: Ongoing, Progressing  Goal: Readiness for Transition of Care  Outcome: Ongoing, Progressing     Problem: Oral Intake Inadequate (Acute Kidney Injury/Impairment)  Goal: Optimal Nutrition Intake  Outcome: Ongoing, Progressing     Problem: Renal Function Impairment (Acute Kidney Injury/Impairment)  Goal: Effective Renal Function  Outcome: Ongoing, Progressing

## 2022-04-30 NOTE — PLAN OF CARE
Problem: Adult Inpatient Plan of Care  Goal: Plan of Care Review  4/30/2022 1636 by Sumaya Chau LPN  Outcome: Ongoing, Progressing  4/30/2022 0736 by Sumaya Chau LPN  Outcome: Ongoing, Progressing  Goal: Patient-Specific Goal (Individualized)  4/30/2022 1636 by Sumaya Chau LPN  Outcome: Ongoing, Progressing  4/30/2022 0736 by Sumaya Chau LPN  Outcome: Ongoing, Progressing  Goal: Absence of Hospital-Acquired Illness or Injury  4/30/2022 1636 by Sumaya Chau LPN  Outcome: Ongoing, Progressing  4/30/2022 0736 by Sumaya Chau LPN  Outcome: Ongoing, Progressing  Goal: Optimal Comfort and Wellbeing  4/30/2022 1636 by Sumaya Chau LPN  Outcome: Ongoing, Progressing  4/30/2022 0736 by Sumaya Chau LPN  Outcome: Ongoing, Progressing  Goal: Readiness for Transition of Care  4/30/2022 1636 by Sumaya Chau LPN  Outcome: Ongoing, Progressing  4/30/2022 0736 by Sumaya Chau LPN  Outcome: Ongoing, Progressing     Problem: Fluid and Electrolyte Imbalance (Acute Kidney Injury/Impairment)  Goal: Fluid and Electrolyte Balance  4/30/2022 1636 by Sumaya Chau LPN  Outcome: Ongoing, Progressing  4/30/2022 0736 by Sumaya Chau LPN  Outcome: Ongoing, Progressing     Problem: Oral Intake Inadequate (Acute Kidney Injury/Impairment)  Goal: Optimal Nutrition Intake  4/30/2022 1636 by Sumaya Chau LPN  Outcome: Ongoing, Progressing  4/30/2022 0736 by Sumaya Chau LPN  Outcome: Ongoing, Progressing     Problem: Renal Function Impairment (Acute Kidney Injury/Impairment)  Goal: Effective Renal Function  4/30/2022 1636 by Sumaya Chau LPN  Outcome: Ongoing, Progressing  4/30/2022 0736 by Sumaya Chau LPN  Outcome: Ongoing, Progressing     Problem: Oral Intake Inadequate  Goal: Improved Oral Intake  4/30/2022 1636 by Sumaya Chau LPN  Outcome: Ongoing, Progressing  4/30/2022 0736 by Sumaya Chau LPN  Outcome: Ongoing, Progressing

## 2022-04-30 NOTE — NURSING
Patient arrived to U [id number] from [facility].  Spouse at bedside.  Hospital Med P team notified of patient arrival

## 2022-04-30 NOTE — ASSESSMENT & PLAN NOTE
Could be prerenal in the setting of continued use of diuretics and advanced cirrhosis / Hepatorenal syndrome   Had Gentle IV fluids upon admission  Holding  diuretics/nephrotoxins  USS transplant kidney done was unremarkable   Nephro MD on Board  Continue  sirolimus  Monitor renal panels closely  [place] [facility] [place]

## 2022-04-30 NOTE — CARE UPDATE
04/30/22 1515   Patient Assessment/Suction   Level of Consciousness (AVPU) alert   Respiratory Effort Normal   Expansion/Accessory Muscles/Retractions no use of accessory muscles   All Lung Fields Breath Sounds clear   Cough Frequency infrequent   Cough Type dry   PRE-TX-O2   O2 Device (Oxygen Therapy) room air   SpO2 98 %   Pulse Oximetry Type Intermittent   $ Pulse Oximetry - Multiple Charge Pulse Oximetry - Multiple   Pulse 88   Resp 18   Temp 99.5 °F (37.5 °C)   /85   Inhaler   $ Inhaler Charges PRN treatment not required   Education   $ Education Other (see comment);15 min  (SATS)   Respiratory Evaluation   $ Care Plan Tech Time 15 min   $ Eval/Re-eval Charges Re-evaluation

## 2022-04-30 NOTE — ASSESSMENT & PLAN NOTE
Worsening transaminitis when compared to recent labs  Moderate ascites on imaging  Gastroenterology MD on Board  [place] [facility] [place]

## 2022-04-30 NOTE — PLAN OF CARE
Patient assigned to IM1  Urinal at bedside, awaiting urine samples  Pt has a chronic cough  VSS.  Temp at sending facility was 99.5 this afternoon.  99.1 on arrival here.  Monitor

## 2022-04-30 NOTE — NURSING
Transfer center called [place] [address] [facility] to TIFFANY Etienne, p/u in 1-2 hours

## 2022-05-01 NOTE — ASSESSMENT & PLAN NOTE
On sirolimus and prograf at home   Hold at this time   Check levels.  Previously both were supratherapeutic

## 2022-05-01 NOTE — SUBJECTIVE & OBJECTIVE
Past Medical History:   Diagnosis Date    BPH (benign prostatic hyperplasia)     Diabetes 2020    Gout     Hypertension 2000    Hypothyroidism 04/08/2022    Interstitial lung disease 03/30/2022    Kidney transplant recipient 01/2004    [place] 2* hypertensive nephropathy    Unspecified cirrhosis of liver     ?LOPEZ       Past Surgical History:   Procedure Laterality Date    CATARACT EXTRACTION Right 2017    COLONOSCOPY N/A 3/8/2022    Procedure: COLONOSCOPY;  Surgeon: Mio Simmons III, MD;  Location: [facility] ENDO;  Service: Endoscopy;  Laterality: N/A;    ENDOSCOPIC ULTRASOUND OF UPPER GASTROINTESTINAL TRACT N/A 3/8/2022    Procedure: ULTRASOUND, UPPER GI TRACT, ENDOSCOPIC;  Surgeon: Mio Simmons III, MD;  Location: [facility] ENDO;  Service: Endoscopy;  Laterality: N/A;    KIDNEY TRANSPLANT  2004    PORTACATH PLACEMENT  2003    REMOVAL OF VASCULAR ACCESS PORT  2005       Review of patient's allergies indicates:  No Known Allergies    Current Facility-Administered Medications on File Prior to Encounter   Medication    [DISCONTINUED] acetaminophen tablet 650 mg    [DISCONTINUED] albuterol inhaler 1 puff    [DISCONTINUED] benzonatate capsule 100 mg    [DISCONTINUED] dextrose 50% injection 12.5 g    [DISCONTINUED] dextrose 50% injection 25 g    [DISCONTINUED] glucagon (human recombinant) injection 1 mg    [DISCONTINUED] glucose chewable tablet 16 g    [DISCONTINUED] glucose chewable tablet 24 g    [DISCONTINUED] guaiFENesin-codeine 100-10 mg/5 ml syrup 10 mL    [DISCONTINUED] insulin aspart U-100 pen 1-10 Units    [DISCONTINUED] insulin detemir U-100 pen 15 Units    [DISCONTINUED] levothyroxine tablet 50 mcg    [DISCONTINUED] lipase-protease-amylase 12,000-38,000-60,000 units per capsule 2 capsule    [DISCONTINUED] loperamide capsule 2 mg    [DISCONTINUED] melatonin tablet 6 mg    [DISCONTINUED] naloxone 0.4 mg/mL injection 0.02 mg    [DISCONTINUED] ondansetron injection 4 mg     "[DISCONTINUED] oxyCODONE immediate release tablet 10 mg    [DISCONTINUED] polyethylene glycol packet 17 g    [DISCONTINUED] pravastatin tablet 10 mg    [DISCONTINUED] sirolimus tablet 2 mg    [DISCONTINUED] sodium zirconium cyclosilicate packet 10 g    [DISCONTINUED] tamsulosin 24 hr capsule 0.4 mg     Current Outpatient Medications on File Prior to Encounter   Medication Sig    alfuzosin (UROXATRAL) 10 mg Tb24 Take 1 tablet (10 mg total) by mouth after dinner.    allopurinoL (ZYLOPRIM) 100 MG tablet Take 100 mg by mouth once daily.    BD NOEMI 2ND GEN PEN NEEDLE 32 gauge x 5/32" Ndle USE AS DIRECTED ONCE DAILY WITH BASAGLAR    benzonatate (TESSALON) 200 MG capsule Take 1 capsule (200 mg total) by mouth 3 (three) times daily.    dextromethorphan-guaiFENesin  mg/5 ml (ROBITUSSIN-DM)  mg/5 mL liquid Take 5 mLs by mouth every 6 (six) hours as needed (cough).    fluticasone propionate (FLONASE) 50 mcg/actuation nasal spray 2 sprays (100 mcg total) by Each Nostril route once daily.    furosemide (LASIX) 20 MG tablet Take 1 tablet (20 mg total) by mouth once daily.    guaiFENesin (MUCINEX) 600 mg 12 hr tablet Take 1 tablet (600 mg total) by mouth 2 (two) times daily. for 10 days    hydrocodone-chlorpheniramine (TUSSIONEX) 10-8 mg/5 mL suspension Take 5 mLs by mouth every 12 (twelve) hours as needed for Cough or Congestion.    levothyroxine (SYNTHROID) 50 MCG tablet Take 50 mcg by mouth once daily.    lipase-protease-amylase 12,000-38,000-60,000 units (CREON) CpDR Take 2 capsules by mouth 4 (four) times daily with meals and nightly.    loperamide (IMODIUM) 2 mg capsule Take 1 capsule (2 mg total) by mouth 4 (four) times daily as needed for Diarrhea.    metoprolol tartrate (LOPRESSOR) 50 MG tablet Take 50 mg by mouth 2 (two) times daily.    pravastatin (PRAVACHOL) 40 MG tablet Take 40 mg by mouth every evening.    sildenafiL (VIAGRA) 100 MG tablet Take 1 tablet (100 mg total) by mouth daily as " needed for Erectile Dysfunction.    sirolimus (RAPAMUNE) 1 MG Tab Take 2 mg by mouth once daily.    tacrolimus (PROGRAF) 0.5 MG Cap Take 3 capsules (1.5 mg total) by mouth every 12 (twelve) hours.    TOUJEO SOLOSTAR U-300 INSULIN 300 unit/mL (1.5 mL) InPn pen Inject 20 Units into the skin nightly.    VENTOLIN HFA 90 mcg/actuation inhaler INHALE 2 PUFFS INTO THE LUNGS EVERY 6 (SIX) HOURS AS NEEDED FOR WHEEZING (COUGHING). RESCUE (Patient taking differently: Inhale 1 puff into the lungs every 6 (six) hours as needed.)    [DISCONTINUED] lisinopriL (PRINIVIL,ZESTRIL) 5 MG tablet Take 5 mg by mouth once daily.    [DISCONTINUED] metFORMIN (GLUCOPHAGE) 500 MG tablet Take 500 mg by mouth 2 (two) times daily.    [DISCONTINUED] minoxidiL (LONITEN) 2.5 MG tablet Take 2.5 mg by mouth 2 (two) times daily.    [DISCONTINUED] spironolactone (ALDACTONE) 50 MG tablet      Family History       Problem Relation (Age of Onset)    Heart disease Father    Kidney disease Father          Tobacco Use    Smoking status: Never Smoker    Smokeless tobacco: Never Used   Substance and Sexual Activity    Alcohol use: Not Currently    Drug use: Never    Sexual activity: Not on file     Review of Systems   Constitutional:  Negative for activity change and appetite change.   HENT:  Negative for congestion, dental problem and sore throat.    Eyes:  Negative for discharge and itching.   Respiratory:  Positive for cough. Negative for shortness of breath, wheezing and stridor.    Cardiovascular:  Negative for chest pain.   Gastrointestinal:  Positive for diarrhea. Negative for abdominal distention and abdominal pain.   Endocrine: Negative for cold intolerance.   Genitourinary:  Negative for difficulty urinating and dysuria.   Musculoskeletal:  Negative for arthralgias and back pain.   Skin:  Negative for color change.   Neurological:  Negative for dizziness, tremors, syncope and facial asymmetry.   Hematological:  Negative for adenopathy.    Psychiatric/Behavioral:  Negative for agitation and behavioral problems.    Objective:     Vital Signs (Most Recent):  Temp: 99.4 °F (37.4 °C) (04/30/22 2030)  Pulse: 91 (04/30/22 2030)  Resp: 17 (04/30/22 2030)  BP: 139/87 (04/30/22 2030)  SpO2: 96 % (04/30/22 2030) Vital Signs (24h Range):  Temp:  [37.4 °F (3 °C)-99.5 °F (37.5 °C)] 99.4 °F (37.4 °C)  Pulse:  [83-92] 91  Resp:  [16-18] 17  SpO2:  [96 %-100 %] 96 %  BP: (134-157)/() 139/87        There is no height or weight on file to calculate BMI.    Physical Exam  Vitals and nursing note reviewed.   Constitutional:       General: He is awake.      Appearance: Normal appearance. He is well-developed.   HENT:      Head: Atraumatic.      Right Ear: External ear normal.      Left Ear: External ear normal.      Nose: Nose normal.      Mouth/Throat:      Mouth: Mucous membranes are moist.   Eyes:      General: No scleral icterus.  Cardiovascular:      Rate and Rhythm: Normal rate and regular rhythm.      Pulses: Normal pulses.           Radial pulses are 2+ on the right side and 2+ on the left side.        Dorsalis pedis pulses are 2+ on the right side and 2+ on the left side.      Heart sounds: Normal heart sounds. No murmur heard.  Pulmonary:      Effort: Pulmonary effort is normal. No respiratory distress.      Breath sounds: Rales present. No wheezing.   Abdominal:      General: Bowel sounds are normal. There is distension.      Palpations: Abdomen is soft. There is fluid wave.      Tenderness: There is abdominal tenderness. There is no guarding.   Musculoskeletal:         General: No swelling or tenderness. Normal range of motion.      Cervical back: Full passive range of motion without pain and normal range of motion.   Lymphadenopathy:      Cervical: No cervical adenopathy.   Skin:     General: Skin is warm.      Coloration: Skin is not jaundiced or pale.   Neurological:      Mental Status: He is alert and oriented to person, place, and time.    Psychiatric:         Attention and Perception: Attention normal.         Behavior: Behavior normal. Behavior is cooperative.         Cognition and Memory: Cognition normal.           Significant Labs: All pertinent labs within the past 24 hours have been reviewed.    Significant Imaging: I have reviewed all pertinent imaging results/findings within the past 24 hours.

## 2022-05-01 NOTE — CONSULTS
[facility] - Transplant Stepdown  Kidney Transplant  Progress Note      Reason for Follow-up: Reassessment of  recipient and management of immunosuppression.          Subjective:     History of Present Illness:   61yoM with PMHx of renal transplant for hypertensive nephropathy (2004) immunosuppressed on tacrolimus/sirolimus,  therapy, LOPEZ cirrhosis, NIDDM, and ILD who was treated at OSH for ILDEFONSO 2/2 decompensated cirrhosis and worsening portal vein hypertension with HRS.   Pt reported that he had been experiencing abdominal distention with associated diarrhea (4-5/day) over several weeks, along with a constant nagging cough.  He has been having weekly paracentesis in the past month.   Despite efforts to manage symptoms, pt in need of  transplant surgery evaluation of the renal/hepatic injury, resulting in tranfer to [facility].    Interval History:  He is currently comfortable in bed, denies any issues to me.     Past Medical and Surgical History: Mr. Jackson has a past medical history of BPH (benign prostatic hyperplasia), Diabetes (2020), Gout, Hypertension (2000), Hypothyroidism (04/08/2022), Interstitial lung disease (03/30/2022), Kidney transplant recipient (01/2004), and Unspecified cirrhosis of liver.  He has a past surgical history that includes Kidney transplant (2004); Portacath placement (2003); Removal of vascular access port (2005); Cataract extraction (Right, 2017); Colonoscopy (N/A, 3/8/2022); and Endoscopic ultrasound of upper gastrointestinal tract (N/A, 3/8/2022).    Past Social and Family History: Mr. Jackson reports that he has never smoked. He has never used smokeless tobacco. He reports previous alcohol use. He reports that he does not use drugs.His family history includes Heart disease in his father; Kidney disease in his father.    Intake/Output - Last 3 Shifts         04/29 0700  04/30 0659 04/30 0700  05/01 0659 05/01 0700  05/02 0659    P.O.  237     Total Intake(mL/kg)  237 (3.6)     Urine (mL/kg/hr)  475   "   Stool  0     Total Output  475     Net  -238            Stool Occurrence  1 x              Review of Systems   Constitutional:  Positive for activity change. Negative for fever.   HENT:  Negative for sinus pain and sore throat.    Respiratory:  Negative for cough, shortness of breath and wheezing.    Cardiovascular:  Negative for chest pain, palpitations and leg swelling.   Gastrointestinal:  Positive for diarrhea.   Genitourinary:  Negative for difficulty urinating and dysuria.   Skin:  Negative for color change.   Allergic/Immunologic: Positive for immunocompromised state.   Psychiatric/Behavioral:  Negative for agitation and confusion.    Objective:     Vital Signs (Most Recent):  Temp: 100.2 °F (37.9 °C) (05/01/22 1216)  Pulse: 75 (05/01/22 0838)  Resp: 20 (05/01/22 0838)  BP: (!) 150/92 (05/01/22 0838)  SpO2: (!) 93 % (05/01/22 0838)   Vital Signs (24h Range):  Temp:  [97.3 °F (36.3 °C)-100.2 °F (37.9 °C)] 100.2 °F (37.9 °C)  Pulse:  [72-91] 75  Resp:  [16-20] 20  SpO2:  [93 %-98 %] 93 %  BP: (132-150)/(84-92) 150/92     Weight: 65 kg (143 lb 4.8 oz)  Height: 5' 8" (172.7 cm)  Body mass index is 21.79 kg/m².    Physical Exam  Constitutional:       General: He is not in acute distress.     Appearance: He is ill-appearing. He is not toxic-appearing.   Eyes:      Extraocular Movements: Extraocular movements intact.   Cardiovascular:      Rate and Rhythm: Normal rate and regular rhythm.   Pulmonary:      Effort: No respiratory distress.      Breath sounds: No wheezing or rales.   Abdominal:      General: There is no distension.      Palpations: Abdomen is soft.      Tenderness: There is no abdominal tenderness.   Musculoskeletal:      Right lower leg: No edema.      Left lower leg: No edema.   Skin:     Coloration: Skin is not jaundiced.   Neurological:      Mental Status: He is oriented to person, place, and time. Mental status is at baseline.   Psychiatric:         Judgment: Judgment normal.       Significant " Labs:  BMP:   Recent Labs   Lab 04/30/22  0502 04/30/22  1947 05/01/22  0918   GLU 80 110 73   * 124* 126*   K 5.3* 5.3* 4.8   CL 96 97 97   CO2 18* 16* 18*   BUN 68* 64* 64*   CREATININE 5.8* 6.0* 5.7*   CALCIUM 7.7* 7.9* 8.4*       Diagnostics:  Reviewed     Assessment/Plan:     * ILDEFONSO (acute kidney injury)  His renal functions were normal beginning of Feb.   After that it appears he had ILDEFONSO - possibly related to pre-renal states  Which led to CKD stage III, which has progressed to stage IV over the course of last few weeks     At this time no indication for hemodialysis.   Has consented for HD in case needed.   Monitor I/Os   US renal transplant unremarkable.  This may be HRS pathophysiology now. HRS is diagnosis of exclusion. Suggest to hold BP meds and diuretics. Continue albumin challenge for 48 hours. Rule out SBP, other sepsis etiologies. Repeat UA clean catch to rule out hematuria.   Other etiologies suggest toxicities with immunosuppresion    Chronic diarrhea  Reports since past 2 months   stool studies in progress   Check CMV as well.      Hyponatremia  This is likely in setting of renal failure and liver cirrhosis   Would be hard to normalize  Continue fluid restriction upto 1.2 liter/day      Cirrhosis of liver with ascites  Work up per Hepatology   AFP significantly elevated   Liver biopsy -- Chronic mild-moderately active hepatitis with bridging fibrosis and nodule   formation (Grade 2, Stage 3).      Personal history of immunosupression therapy  On sirolimus and prograf at home   Hold at this time   Check levels.  Previously both were supratherapeutic       Transplanted kidney  S/p kidney transplant living donor more then 10 years ago at [facility].   Reports no episodes of rejections or complications post surgery   ESRD - related to HTN. Currently has no access for HD         Larry Lopez MD  Kidney Transplant  [facility] - Transplant Stepdown

## 2022-05-01 NOTE — PROGRESS NOTES
Notified Dr. Narayanan of patient's temp = 100.4.  Patient with non-productive cough and no other s/s of infection noted at this time.  MD states notify with next temp check.  Will continue to monitor patient.

## 2022-05-01 NOTE — PT/OT/SLP EVAL
Occupational Therapy   Evaluation    Name: Edgar Jackson  MRN: [id number]  Admitting Diagnosis:  ILDEFONSO (acute kidney injury)  Recent Surgery: * No surgery found *      Recommendations:     Discharge Recommendations: home  Discharge Equipment Recommendations:  none  Barriers to discharge:  None    Assessment:     Edgar Jackson is a 61 y.o. male with a medical diagnosis of ILDEFONSO (acute kidney injury).  He presents with decreased independence with ADL's. Performance deficits affecting function: weakness, impaired endurance, impaired self care skills, impaired functional mobilty, impaired balance, decreased upper extremity function, decreased lower extremity function.      Rehab Prognosis: Good; patient would benefit from acute skilled OT services to address these deficits and reach maximum level of function.       Plan:     Patient to be seen 3 x/week to address the above listed problems via self-care/home management, therapeutic activities, therapeutic exercises  · Plan of Care Expires: 05/31/22  · Plan of Care Reviewed with: patient, spouse    Subjective   Pt reported that he was doing worse 2 weeks.  Chief Complaint: weakness  Patient/Family Comments/goals: to get stronger (does not want therapy upon discharge home    Occupational Profile:  Living Environment: Pt resides with spouse in 2 story house with 10 steps (B) rails to enter & ~ 10 steps with rails to get to 2nd floor of house where his bed & bath are located.  Pt does have full bath & extra bedroom on 1st floor if needed.  Pt requires (A) at times for ADL's due to decreased energy but prior to 3 months ago was fully independent.  Pt currently uses SPC for ambulation. Pt has not driven in the last 2 months & works as a  (mostly computer work).  Pt enjoys fishing.  Equipment Used at Home:  cane, straight, walker, rolling  Assistance upon Discharge: spouse    Pain/Comfort:  · Pain Rating 1: 0/10  · Pain Rating Post-Intervention 1: 0/10    Patients  cultural, spiritual, Yarsanism conflicts given the current situation: no    Objective:     Communicated with: RN prior to session.  Patient found supine with telemetry (spouse present during session) upon OT entry to room.    General Precautions: Standard, fall   Orthopedic Precautions:N/A   Braces: N/A  Respiratory Status: Room air    Occupational Performance:    Bed Mobility:    · Patient completed Supine to Sit with supervision  · Patient completed Sit to Supine with supervision    Functional Mobility/Transfers:  · Patient completed Sit <> Stand Transfer with contact guard assistance  with  straight cane   · Functional Mobility: CGA with functional mobility in room using SPC    Activities of Daily Living:  · Upper Body Dressing: stand by assistance donning gown around back while seated EOB  · Lower Body Dressing: contact guard assistance donning socks seated EOB    Cognitive/Visual Perceptual:  Cognitive/Psychosocial Skills:     -       Oriented to: Person, Place, Time and Situation   -       Follows Commands/attention:Follows multistep  commands  -       Safety awareness/insight to disability: intact     Physical Exam:  Sensation:    -       Impaired  light/touch fingertips of LUE  Dominant hand:    -       right  Upper Extremity Range of Motion:  BUE WFL  Upper Extremity Strength: BUE WFL   Strength: BUE WFL    AMPAC 6 Click ADL:  AMPAC Total Score: 19    Treatment & Education:  Discussed HH upon discharge & pt reported that he did not prefer therapy upon discharge.  Provided education regarding role of OT, POC, & discharge recommendations with pt & pt's spouse verbalizing understanding.  Pt had no further questions & when asked whether there were any concerns pt reported none.      Education:    Patient left supine with all lines intact, call button in reach, RN notified and spouse present    GOALS:   Multidisciplinary Problems     Occupational Therapy Goals        Problem: Occupational Therapy    Goal  Priority Disciplines Outcome Interventions   Occupational Therapy Goal     OT, PT/OT Ongoing, Progressing    Description: Goals to be met by: 5/19     Patient will increase functional independence with ADLs by performing:    UE Dressing with Modified [place].  LE Dressing with Modified [place].  Grooming while standing with Modified [place].  Toileting from toilet with Modified [place] for hygiene and clothing management.   Supine to sit with Modified [place].  Step transfer with Modified [place] using AD as needed  Upper extremity exercise program x15 reps per handout, with independence.                     History:     Past Medical History:   Diagnosis Date    BPH (benign prostatic hyperplasia)     Diabetes 2020    Gout     Hypertension 2000    Hypothyroidism 04/08/2022    Interstitial lung disease 03/30/2022    Kidney transplant recipient 01/2004    [place] 2* hypertensive nephropathy    Unspecified cirrhosis of liver     ?LOPEZ       Past Surgical History:   Procedure Laterality Date    CATARACT EXTRACTION Right 2017    COLONOSCOPY N/A 3/8/2022    Procedure: COLONOSCOPY;  Surgeon: Mio Simmons III, MD;  [facility];  Service: Endoscopy;  Laterality: N/A;    ENDOSCOPIC ULTRASOUND OF UPPER GASTROINTESTINAL TRACT N/A 3/8/2022    Procedure: ULTRASOUND, UPPER GI TRACT, ENDOSCOPIC;  Surgeon: Mio Simmons III, MD;  Location: [facility];  Service: Endoscopy;  Laterality: N/A;    KIDNEY TRANSPLANT  2004    PORTACATH PLACEMENT  2003    REMOVAL OF VASCULAR ACCESS PORT  2005       Time Tracking:     OT Date of Treatment: 05/01/22  OT Start Time: 1149  OT Stop Time: 1208  OT Total Time (min): 19 min    Billable Minutes:Evaluation 10  Therapeutic Activity 9    5/1/2022

## 2022-05-01 NOTE — ASSESSMENT & PLAN NOTE
Start basal/ prandial insulin as needed and titrate SSI to maintain serum [id number]. Start diabetic diet.

## 2022-05-01 NOTE — ASSESSMENT & PLAN NOTE
Pt started on loperamide at OSH with adequate response, will continue anti-nausea medication at [facility]. Trended labs daily and adjusted for any electrolyte abnormality. Pt with prior negative Cdiff test.

## 2022-05-01 NOTE — SUBJECTIVE & OBJECTIVE
Past Medical History:   Diagnosis Date    BPH (benign prostatic hyperplasia)     Diabetes 2020    Gout     Hypertension 2000    Hypothyroidism 04/08/2022    Interstitial lung disease 03/30/2022    Kidney transplant recipient 01/2004    [place] 2* hypertensive nephropathy    Unspecified cirrhosis of liver     ?LOPEZ       Past Surgical History:   Procedure Laterality Date    CATARACT EXTRACTION Right 2017    COLONOSCOPY N/A 3/8/2022    Procedure: COLONOSCOPY;  Surgeon: Mio Simmons III, MD;  Location: [facility] ENDO;  Service: Endoscopy;  Laterality: N/A;    ENDOSCOPIC ULTRASOUND OF UPPER GASTROINTESTINAL TRACT N/A 3/8/2022    Procedure: ULTRASOUND, UPPER GI TRACT, ENDOSCOPIC;  Surgeon: Mio Simmons III, MD;  Location: [facility] ENDO;  Service: Endoscopy;  Laterality: N/A;    KIDNEY TRANSPLANT  2004    PORTACATH PLACEMENT  2003    REMOVAL OF VASCULAR ACCESS PORT  2005       Review of patient's allergies indicates:  No Known Allergies  Family History       Problem Relation (Age of Onset)    Heart disease Father    Kidney disease Father          Tobacco Use    Smoking status: Never Smoker    Smokeless tobacco: Never Used   Substance and Sexual Activity    Alcohol use: Not Currently    Drug use: Never    Sexual activity: Not on file     Review of Systems   Constitutional: Negative.    HENT: Negative.     Eyes: Negative.    Respiratory: Negative.     Cardiovascular:  Positive for leg swelling.   Gastrointestinal:  Positive for abdominal distention.   Endocrine: Negative.    Genitourinary: Negative.    Musculoskeletal: Negative.    Skin: Negative.    Allergic/Immunologic: Negative.    Neurological: Negative.    Hematological: Negative.    Psychiatric/Behavioral: Negative.     Objective:     Vital Signs (Most Recent):  Temp: 100.2 °F (37.9 °C) (05/01/22 1216)  Pulse: 75 (05/01/22 0838)  Resp: 20 (05/01/22 0838)  BP: (!) 150/92 (05/01/22 0838)  SpO2: (!) 93 % (05/01/22 0838)   Vital Signs (24h Range):  Temp:  [97.3 °F  (36.3 °C)-100.2 °F (37.9 °C)] 100.2 °F (37.9 °C)  Pulse:  [72-91] 75  Resp:  [16-20] 20  SpO2:  [93 %-98 %] 93 %  BP: (132-150)/(84-92) 150/92     Weight: 65 kg (143 lb 4.8 oz) (05/01/22 0330)  Body mass index is 21.79 kg/m².      Intake/Output Summary (Last 24 hours) at 5/1/2022 1233  Last data filed at 5/1/2022 0520  Gross per 24 hour   Intake 237 ml   Output 475 ml   Net -238 ml       Lines/Drains/Airways       Peripheral Intravenous Line  Duration                  Peripheral IV - Single Lumen 04/27/22 1940 20 G Left;Lateral Forearm 3 days                    Physical Exam    Gen: NAD, lying comfortably  HENT: NCAT, oropharynx clear  Eyes: anicteric sclerae, EOMI grossly  Neck: supple, no visible masses/goiter  Cardiac: RRR, no M/R/G, S1/S2 present  Lungs: CTAB, no crackles, no wheezes  Abd: soft, NT/ND, normoactive BS, no rebound  Ext: 1+ LE edema, warm, well perfused  Skin: skin intact on exposed body parts, no visible rashes, lesions  Neuro: A&Ox4, neuro exam grossly intact, moves all extremities  Psych: appropriate mood, affect      Significant Labs:  CBC:   Recent Labs   Lab 04/30/22 0502 04/30/22 1946 05/01/22  0918   WBC 6.22 6.86 6.86   HGB 10.7* 10.5* 9.9*   HCT 30.3* 30.7* 27.8*   PLT 80* 91* 83*     CMP:   Recent Labs   Lab 05/01/22  0918   GLU 73   CALCIUM 8.4*   ALBUMIN 3.4*   PROT 6.2   *   K 4.8   CO2 18*   CL 97   BUN 64*   CREATININE 5.7*   ALKPHOS 96   ALT 12   AST 86*   BILITOT 5.7*     Coagulation:   Recent Labs   Lab 04/30/22 1947 05/01/22  0918   INR 1.5* 1.6*   APTT 47.4*  --        Significant Imaging:  Imaging results within the past 24 hours have been reviewed.

## 2022-05-01 NOTE — ASSESSMENT & PLAN NOTE
S/p kidney transplant living donor more then 10 years ago at [place].   Reports no episodes of rejections or complications post surgery   ESRD - related to HTN. Currently has no access for HD

## 2022-05-01 NOTE — PLAN OF CARE
Problem: Occupational Therapy  Goal: Occupational Therapy Goal  Description: Goals to be met by: 5/19     Patient will increase functional independence with ADLs by performing:    UE Dressing with Modified [place].  LE Dressing with Modified [place].  Grooming while standing with Modified [place].  Toileting from toilet with Modified [place] for hygiene and clothing management.   Supine to sit with Modified [place].  Step transfer with Modified [place] using AD as needed  Upper extremity exercise program x15 reps per handout, with independence.    Outcome: Ongoing, Progressing     OT eval completed.

## 2022-05-01 NOTE — CONSULTS
[facility] - Transplant Stepdown  Gastroenterology  Consult Note    Patient Name: Edgar Jackson  MRN: [id number]  Admission Date: 4/30/2022  Hospital Length of Stay: 1 days  Code Status: Full Code   Attending Provider: Jarred Dyson MD   Consulting Provider: Randal Ibrahim MD  Primary Care Physician: Tyler Castillo MD  Principal Problem:ILDEFONSO (acute kidney injury)    Inpatient consult to Hepatology  Consult performed by: Randal Ibrahim MD  Consult ordered by: Mansoor Narayanan MD        Subjective:     HPI:  61 M w/ PMH hypertensive nephropathy s/p renal transplant 2004 on chronic immunosuppression, LOPEZ cirrhosis, DM2, ILD transferred from [facility] with ILDEFONSO on CKD, volume overload and ascites.  Patient follows in hepatology clinic where he was evaluated and underwent biopsy with labs/imaging/biopsy consistent with LOPEZ cirrhosis.  Patient reported over past several weeks he has undergone multiple paracenteses.  Transferred for further evaluation by renal transplant services and hepatology.      On transfer, INR 1.6, Cr 5.7, BUN 64, Na 126, Bili 5.7, tacrolimus level 18.8, Sirolimus 27.6.  Patient without complaint on interview.  Denied EtOH use, history of viral hepatitis.  Serologic workup unremarkable.  AFP 69.2.        Past Medical History:   Diagnosis Date    BPH (benign prostatic hyperplasia)     Diabetes 2020    Gout     Hypertension 2000    Hypothyroidism 04/08/2022    Interstitial lung disease 03/30/2022    Kidney transplant recipient 01/2004    [place] 2* hypertensive nephropathy    Unspecified cirrhosis of liver     ?LOPEZ       Past Surgical History:   Procedure Laterality Date    CATARACT EXTRACTION Right 2017    COLONOSCOPY N/A 3/8/2022    Procedure: COLONOSCOPY;  Surgeon: Mio Simmons III, MD;  Location: [facility];  Service: Endoscopy;  Laterality: N/A;    ENDOSCOPIC ULTRASOUND OF UPPER GASTROINTESTINAL TRACT N/A 3/8/2022    Procedure: ULTRASOUND, UPPER GI TRACT, ENDOSCOPIC;  Surgeon: Mio WILL  Troy RICHARDSON MD;  Location: [facility];  Service: Endoscopy;  Laterality: N/A;    KIDNEY TRANSPLANT  2004    PORTACATH PLACEMENT  2003    REMOVAL OF VASCULAR ACCESS PORT  2005       Review of patient's allergies indicates:  No Known Allergies  Family History       Problem Relation (Age of Onset)    Heart disease Father    Kidney disease Father          Tobacco Use    Smoking status: Never Smoker    Smokeless tobacco: Never Used   Substance and Sexual Activity    Alcohol use: Not Currently    Drug use: Never    Sexual activity: Not on file     Review of Systems   Constitutional: Negative.    HENT: Negative.     Eyes: Negative.    Respiratory: Negative.     Cardiovascular:  Positive for leg swelling.   Gastrointestinal:  Positive for abdominal distention.   Endocrine: Negative.    Genitourinary: Negative.    Musculoskeletal: Negative.    Skin: Negative.    Allergic/Immunologic: Negative.    Neurological: Negative.    Hematological: Negative.    Psychiatric/Behavioral: Negative.     Objective:     Vital Signs (Most Recent):  Temp: 100.2 °F (37.9 °C) (05/01/22 1216)  Pulse: 75 (05/01/22 0838)  Resp: 20 (05/01/22 0838)  BP: (!) 150/92 (05/01/22 0838)  SpO2: (!) 93 % (05/01/22 0838)   Vital Signs (24h Range):  Temp:  [97.3 °F (36.3 °C)-100.2 °F (37.9 °C)] 100.2 °F (37.9 °C)  Pulse:  [72-91] 75  Resp:  [16-20] 20  SpO2:  [93 %-98 %] 93 %  BP: (132-150)/(84-92) 150/92     Weight: 65 kg (143 lb 4.8 oz) (05/01/22 0330)  Body mass index is 21.79 kg/m².      Intake/Output Summary (Last 24 hours) at 5/1/2022 1233  Last data filed at 5/1/2022 0520  Gross per 24 hour   Intake 237 ml   Output 475 ml   Net -238 ml       Lines/Drains/Airways       Peripheral Intravenous Line  Duration                  Peripheral IV - Single Lumen 04/27/22 1940 20 G Left;Lateral Forearm 3 days                    Physical Exam    Gen: NAD, lying comfortably  HENT: NCAT, oropharynx clear  Eyes: anicteric sclerae, EOMI grossly  Neck: supple, no  visible masses/goiter  Cardiac: RRR, no M/R/G, S1/S2 present  Lungs: CTAB, no crackles, no wheezes  Abd: soft, NT/ND, normoactive BS, no rebound  Ext: 1+ LE edema, warm, well perfused  Skin: skin intact on exposed body parts, no visible rashes, lesions  Neuro: A&Ox4, neuro exam grossly intact, moves all extremities  Psych: appropriate mood, affect      Significant Labs:  CBC:   Recent Labs   Lab 04/30/22  0502 04/30/22 1946 05/01/22 0918   WBC 6.22 6.86 6.86   HGB 10.7* 10.5* 9.9*   HCT 30.3* 30.7* 27.8*   PLT 80* 91* 83*     CMP:   Recent Labs   Lab 05/01/22 0918   GLU 73   CALCIUM 8.4*   ALBUMIN 3.4*   PROT 6.2   *   K 4.8   CO2 18*   CL 97   BUN 64*   CREATININE 5.7*   ALKPHOS 96   ALT 12   AST 86*   BILITOT 5.7*     Coagulation:   Recent Labs   Lab 04/30/22 1947 05/01/22  0918   INR 1.5* 1.6*   APTT 47.4*  --        Significant Imaging:  Imaging results within the past 24 hours have been reviewed.    Assessment/Plan:     Cirrhosis of liver with ascites  Patient with history of renal transplant for hypertensive nephropathy on chronic immunosuppression now with decompensated cirrhosis with ascites and volume overload.  ILDEFONSO on CKD.  Elevated immunosuppressive medications.  Previous viral and serologic evaluations unremarkable.  AFP 69.2.  Will follow up transplant nephrology recommendations but at some point will need CT triple phase liver to evaluate elevated AFP (likely after decision for dialysis has been made).  In interim paracentesis, empiric ABx.    Recommendations:  -paracentesis, cell count, culture, albumin, protein, cytology  -empiric Abx  -f/u transplant nephrology recommendations  -Will need CT triple phase liver but will need to coordinate with transplant nephrology        Thank you for your consult. I will follow-up with patient. Please contact us if you have any additional questions.    Randal Ibrahim MD  Gastroenterology  [facility] - Transplant Stepdown

## 2022-05-01 NOTE — SUBJECTIVE & OBJECTIVE
Subjective:     History of Present Illness:   61yoM with PMHx of renal transplant for hypertensive nephropathy (2004) immunosuppressed on tacrolimus/sirolimus,  therapy, LOPEZ cirrhosis, NIDDM, and ILD who was treated at OSH for ILDEFONSO 2/2 decompensated cirrhosis and worsening portal vein hypertension with HRS.   Pt reported that he had been experiencing abdominal distention with associated diarrhea (4-5/day) over several weeks, along with a constant nagging cough.  He has been having weekly paracentesis in the past month.   Despite efforts to manage symptoms, pt in need of  transplant surgery evaluation of the renal/hepatic injury, resulting in tranfer to [facility].    Interval History:  He is currently comfortable in bed, denies any issues to me.     Past Medical and Surgical History: Mr. Jackson has a past medical history of BPH (benign prostatic hyperplasia), Diabetes (2020), Gout, Hypertension (2000), Hypothyroidism (04/08/2022), Interstitial lung disease (03/30/2022), Kidney transplant recipient (01/2004), and Unspecified cirrhosis of liver.  He has a past surgical history that includes Kidney transplant (2004); Portacath placement (2003); Removal of vascular access port (2005); Cataract extraction (Right, 2017); Colonoscopy (N/A, 3/8/2022); and Endoscopic ultrasound of upper gastrointestinal tract (N/A, 3/8/2022).    Past Social and Family History: Mr. Jackson reports that he has never smoked. He has never used smokeless tobacco. He reports previous alcohol use. He reports that he does not use drugs.His family history includes Heart disease in his father; Kidney disease in his father.    Intake/Output - Last 3 Shifts         04/29 0700  04/30 0659 04/30 0700  05/01 0659 05/01 0700  05/02 0659    P.O.  237     Total Intake(mL/kg)  237 (3.6)     Urine (mL/kg/hr)  475     Stool  0     Total Output  475     Net  -238            Stool Occurrence  1 x              Review of Systems   Constitutional:  Positive for activity change.  "Negative for fever.   HENT:  Negative for sinus pain and sore throat.    Respiratory:  Negative for cough, shortness of breath and wheezing.    Cardiovascular:  Negative for chest pain, palpitations and leg swelling.   Gastrointestinal:  Positive for diarrhea.   Genitourinary:  Negative for difficulty urinating and dysuria.   Skin:  Negative for color change.   Allergic/Immunologic: Positive for immunocompromised state.   Psychiatric/Behavioral:  Negative for agitation and confusion.    Objective:     Vital Signs (Most Recent):  Temp: 100.2 °F (37.9 °C) (05/01/22 1216)  Pulse: 75 (05/01/22 0838)  Resp: 20 (05/01/22 0838)  BP: (!) 150/92 (05/01/22 0838)  SpO2: (!) 93 % (05/01/22 0838)   Vital Signs (24h Range):  Temp:  [97.3 °F (36.3 °C)-100.2 °F (37.9 °C)] 100.2 °F (37.9 °C)  Pulse:  [72-91] 75  Resp:  [16-20] 20  SpO2:  [93 %-98 %] 93 %  BP: (132-150)/(84-92) 150/92     Weight: 65 kg (143 lb 4.8 oz)  Height: 5' 8" (172.7 cm)  Body mass index is 21.79 kg/m².    Physical Exam  Constitutional:       General: He is not in acute distress.     Appearance: He is ill-appearing. He is not toxic-appearing.   Eyes:      Extraocular Movements: Extraocular movements intact.   Cardiovascular:      Rate and Rhythm: Normal rate and regular rhythm.   Pulmonary:      Effort: No respiratory distress.      Breath sounds: No wheezing or rales.   Abdominal:      General: There is no distension.      Palpations: Abdomen is soft.      Tenderness: There is no abdominal tenderness.   Musculoskeletal:      Right lower leg: No edema.      Left lower leg: No edema.   Skin:     Coloration: Skin is not jaundiced.   Neurological:      Mental Status: He is oriented to person, place, and time. Mental status is at baseline.   Psychiatric:         Judgment: Judgment normal.       Significant Labs:  BMP:   Recent Labs   Lab 04/30/22  0502 04/30/22  1947 05/01/22  0918   GLU 80 110 73   * 124* 126*   K 5.3* 5.3* 4.8   CL 96 97 97   CO2 18* 16* " 18*   BUN 68* 64* 64*   CREATININE 5.8* 6.0* 5.7*   CALCIUM 7.7* 7.9* 8.4*       Diagnostics:  Reviewed

## 2022-05-01 NOTE — ASSESSMENT & PLAN NOTE
Patient with acute kidney injury likely d/t IVVD/Dehydration Which is currently stable. Labs reviewed- Renal function/electrolytes with Estimated Creatinine Clearance: 12.1 mL/min (A) (based on SCr of 6 mg/dL (H)). according to latest data. Monitor urine output and serial BMP and adjust therapy as needed. Avoid nephrotoxins and renally dose meds for GFR listed above. Gentle IV fluid PRN. Ultrasound transplant kidney no acute changes. Possibly due to tacrolimus versus hepatorenal syndrome, Nephrology has been consulted. Bicard oerdered for low serum levels  - continue Toprol  - start Continue. Bicarbonate PRN  - repeat lasix and albumin today per Nephrology  - f/u labs: 24 hour urine, urine sodium, urine chloride  - evaluate urine sediment  - continue tacrolimus when troughs are therapeutc  - trend daily BMP/RFP

## 2022-05-01 NOTE — PLAN OF CARE
Patient remaining free from injury and ambulating with assistance from cane and wife.  Patient worked with PT and recommended assist when OOB.  Patient voided 400cc over 8 hours.  Patient t max = 100.4.  Patient denies chills and no s/s of infection noted.  Creatinine and Potassium trending down.  Tacrolimus and Sirolimus level elevated and Tacro and Sirolimus remain on hold.  Patient displays dry cough and sats 88-94%.  Resp treatments to be ordered.  Patient denies pain.  Patient to have diagnostic para today.  One loose stool noted and Creon taken with lunch.  Patient with poor appetite.  Patient ate fruit cup for breakfast and one piece of turkey for lunch.  Patient reports that everything he eats tastes sour including water.  Patient BG in 70's and asymptomatic for hypoglycemia.  Patient being followed by KTS and hepatology.

## 2022-05-01 NOTE — ASSESSMENT & PLAN NOTE
Continue scheduled benzonotate tablet 200mg TID. For persistent cough add dextromethorphan-guaiefesin syrup. compare/contrast serial CXR for worsening inflammatory process.

## 2022-05-01 NOTE — ASSESSMENT & PLAN NOTE
Renal transplant performed in 2004. Pt continued on sirolimus and tacrolimus for immuno suppression. KTM consulted on admission, appreciate recs. Trough levels ordered with both medications.currently held.    - trend renal funtion tests   - restart tacrolimus when levels permit  - restart sirolimus as directed by KTM

## 2022-05-01 NOTE — ASSESSMENT & PLAN NOTE
Pt recently diagnosed with cirrhosis 2/2 to LOPEZ. Pulmonary vein hypertension now present with with multiple episodes of paracentesis. Largest abdominal fluid reduction just over 5L.  - evaluate for additional paracentesis while inpatient  - f/u labs for ascitic fluid.  - identify significant cytology, cultures and gram staining  - outpatient hepatology consult

## 2022-05-01 NOTE — ASSESSMENT & PLAN NOTE
CXR performed on admission, with serial imaging reviewed. Continued scheduled Benzonate daily. Flonase added daily PRN with DuoNebs q6h PRN. Supplemental oxygen PRN. Continuous pulse oxymetry ordered.  - manage SpO2 88-93% by adjusting O2 flow  - perform ambulatory test for home oxygen if patient requires  - Pulmonology consult PRN

## 2022-05-01 NOTE — HPI
Edgar Jackson is a 61 M w/ PMH hypertensive nephropathy s/p renal transplant 2004 on chronic immunosuppression, LOPEZ cirrhosis, DM2, ILD transferred from OSH with ILDEFONSO on CKD, volume overload and ascites.      He was treated at OSH for ILDEFONSO 2/2 decompensated cirrhosis and worsening portal vein hypertension with HRS. provided symptomatic treatment of diarrhea.   He was hospitalized from 4/7 to 4/21/2022 for worsening Cr. He was received 5 days of ceftriaxone for empiric CAP - had paracentesis without evidence of SBP. He was readmitted at [facility] on 4/27 due to worsening Cr, and was transferred here on 4/30. Diarrhea (up to four times a day) non bloody, liquid stool, 20 lb weight loss (in 3 months). He tried loperamide which mildly helped with the diarrhea. Denied previous hx of chronic diarrhea. Denied family hx of GI/Colon cancer. Had colonoscopy in March that was normal. CT abdomen with no GI concerns. GI consulted for colonoscopy to further evaluate chronic diarrhea in setting of new fever.

## 2022-05-01 NOTE — ASSESSMENT & PLAN NOTE
This is likely in setting of renal failure and liver cirrhosis   Would be hard to normalize  Continue fluid restriction upto 1.2 liter/day

## 2022-05-01 NOTE — PROGRESS NOTES
Notified Dr. Razo of critical [place] level of 27.3.  Order received to check level in am.  Will continue to monitor patient.

## 2022-05-01 NOTE — HPI
Edgar Jackson is a 61 y.o. male w/ PMHx of renal transplant for hypertensive nephropathy (2004) immunosuppressed on tacrolimus/sirolimus,  therapy, LOPEZ cirrhosis, NIDDM, and ILD who was treated at OSH for ILDEFONSO 2/2 decompensated cirrhosis and worsening portal vein hypertension with HRS.   Pt reported that he had been experiencing abdominal distention with associated diarrhea (4-5/day) over several weeks, along with a constant nagging cough.  He has been having weekly paracentesis in the past month.   Despite efforts to manage symptoms, pt in need of  transplant surgery evaluation of the renal/hepatic injury, resulting in tranfer to [facility] [place].  He reports no recent NSAID use.

## 2022-05-01 NOTE — ASSESSMENT & PLAN NOTE
Patient with history of renal transplant for hypertensive nephropathy on chronic immunosuppression now with decompensated cirrhosis with ascites and volume overload.  ILDEFONSO on CKD.  Elevated immunosuppressive medications.  Previous viral and serologic evaluations unremarkable.  AFP 69.2.  Will follow up transplant nephrology recommendations but at some point will need CT triple phase liver to evaluate elevated AFP (likely after decision for dialysis has been made).  In interim paracentesis, empiric ABx.    Recommendations:  -paracentesis, cell count, culture, albumin, protein, cytology  -empiric Abx  -f/u transplant nephrology recommendations  -Will need CT triple phase liver but will need to coordinate with transplant nephrology

## 2022-05-01 NOTE — ASSESSMENT & PLAN NOTE
Work up per Hepatology   AFP significantly elevated   Liver biopsy -- Chronic mild-moderately active hepatitis with bridging fibrosis and nodule   formation (Grade 2, Stage 3).

## 2022-05-01 NOTE — H&P
[facility] - Transplant [facility] Medicine  History & Physical    Patient Name: Edgar Jackson  MRN: [id number]  Patient Class: IP- Inpatient  Admission Date: 4/30/2022  Attending Physician: Jarred Dyson MD   Primary Care Provider: Tyler Castillo MD         Patient information was obtained from patient, spouse/SO and ER records.     Subjective:     Principal Problem:ILDEFONSO (acute kidney injury)    Chief Complaint:   Chief Complaint   Patient presents with    Abnormal Lab     Elevated creatinine        HPI: Mr Jackson is a 61yoM with PMHx of renal transplant for hypertensive nephropathy (2004) immunosuppressed on tacrolimus/sirolimus,  therapy, LOPEZ cirrhosis, NIDDM, and ILD who was treated at OSH for ILDEFONSO 2/2 decompensated cirrhosis and worsening portal vein hypertension with HRS. Pt reported that he had been experiencing abdominal distention with associated diarrhea (4-5/day) over several weeks, along with a constant nagging cough. On presenting to OSH, multiple paracentesis (3) were performed to relieve the ascitic fluid. No evidence of SBP on fluid analysis. Creon was added to diet to aide in food digestion in setting of cirrhosis. Loperamide provided symptomatic treatment of diarrhea. Dextromethorphan-guaifenesin syrup added to reduce cough exacerbations. Despite efforts to manage symptoms, pt in need of  transplant surgery evaluation of the renal/hepatic injury, resulting in tranfer to [facility].     On arrival to [facility] (4/30/2022), pt has experienced increased generalized weakness. Loperamide was continued for diarrhea management. Abdominal distention recurred, despite prior paracentesis. Cough has been fairly constant and predominantly dry. Pt now endorses new left lower quadrant abdominal pain, worsened by cough. KTM and Hepatology consults placed for management of HRS.      Past Medical History:   Diagnosis Date    BPH (benign prostatic hyperplasia)     Diabetes 2020    Gout     Hypertension 2000     Hypothyroidism 04/08/2022    Interstitial lung disease 03/30/2022    Kidney transplant recipient 01/2004    [place] 2* hypertensive nephropathy    Unspecified cirrhosis of liver     ?LOPEZ       Past Surgical History:   Procedure Laterality Date    CATARACT EXTRACTION Right 2017    COLONOSCOPY N/A 3/8/2022    Procedure: COLONOSCOPY;  Surgeon: Mio Simmons III, MD;  Location: [facility] ENDO;  Service: Endoscopy;  Laterality: N/A;    ENDOSCOPIC ULTRASOUND OF UPPER GASTROINTESTINAL TRACT N/A 3/8/2022    Procedure: ULTRASOUND, UPPER GI TRACT, ENDOSCOPIC;  Surgeon: Mio Simmons III, MD;  Location: [facility] ENDO;  Service: Endoscopy;  Laterality: N/A;    KIDNEY TRANSPLANT  2004    PORTACATH PLACEMENT  2003    REMOVAL OF VASCULAR ACCESS PORT  2005       Review of patient's allergies indicates:  No Known Allergies    Current Facility-Administered Medications on File Prior to Encounter   Medication    [DISCONTINUED] acetaminophen tablet 650 mg    [DISCONTINUED] albuterol inhaler 1 puff    [DISCONTINUED] benzonatate capsule 100 mg    [DISCONTINUED] dextrose 50% injection 12.5 g    [DISCONTINUED] dextrose 50% injection 25 g    [DISCONTINUED] glucagon (human recombinant) injection 1 mg    [DISCONTINUED] glucose chewable tablet 16 g    [DISCONTINUED] glucose chewable tablet 24 g    [DISCONTINUED] guaiFENesin-codeine 100-10 mg/5 ml syrup 10 mL    [DISCONTINUED] insulin aspart U-100 pen 1-10 Units    [DISCONTINUED] insulin detemir U-100 pen 15 Units    [DISCONTINUED] levothyroxine tablet 50 mcg    [DISCONTINUED] lipase-protease-amylase 12,000-38,000-60,000 units per capsule 2 capsule    [DISCONTINUED] loperamide capsule 2 mg    [DISCONTINUED] melatonin tablet 6 mg    [DISCONTINUED] naloxone 0.4 mg/mL injection 0.02 mg    [DISCONTINUED] ondansetron injection 4 mg    [DISCONTINUED] oxyCODONE immediate release tablet 10 mg    [DISCONTINUED] polyethylene glycol packet 17 g    [DISCONTINUED]  "pravastatin tablet 10 mg    [DISCONTINUED] sirolimus tablet 2 mg    [DISCONTINUED] sodium zirconium cyclosilicate packet 10 g    [DISCONTINUED] tamsulosin 24 hr capsule 0.4 mg     Current Outpatient Medications on File Prior to Encounter   Medication Sig    alfuzosin (UROXATRAL) 10 mg Tb24 Take 1 tablet (10 mg total) by mouth after dinner.    allopurinoL (ZYLOPRIM) 100 MG tablet Take 100 mg by mouth once daily.    BD NOEMI 2ND GEN PEN NEEDLE 32 gauge x 5/32" Ndle USE AS DIRECTED ONCE DAILY WITH BASAGLAR    benzonatate (TESSALON) 200 MG capsule Take 1 capsule (200 mg total) by mouth 3 (three) times daily.    dextromethorphan-guaiFENesin  mg/5 ml (ROBITUSSIN-DM)  mg/5 mL liquid Take 5 mLs by mouth every 6 (six) hours as needed (cough).    fluticasone propionate (FLONASE) 50 mcg/actuation nasal spray 2 sprays (100 mcg total) by Each Nostril route once daily.    furosemide (LASIX) 20 MG tablet Take 1 tablet (20 mg total) by mouth once daily.    guaiFENesin (MUCINEX) 600 mg 12 hr tablet Take 1 tablet (600 mg total) by mouth 2 (two) times daily. for 10 days    hydrocodone-chlorpheniramine (TUSSIONEX) 10-8 mg/5 mL suspension Take 5 mLs by mouth every 12 (twelve) hours as needed for Cough or Congestion.    levothyroxine (SYNTHROID) 50 MCG tablet Take 50 mcg by mouth once daily.    lipase-protease-amylase 12,000-38,000-60,000 units (CREON) CpDR Take 2 capsules by mouth 4 (four) times daily with meals and nightly.    loperamide (IMODIUM) 2 mg capsule Take 1 capsule (2 mg total) by mouth 4 (four) times daily as needed for Diarrhea.    metoprolol tartrate (LOPRESSOR) 50 MG tablet Take 50 mg by mouth 2 (two) times daily.    pravastatin (PRAVACHOL) 40 MG tablet Take 40 mg by mouth every evening.    sildenafiL (VIAGRA) 100 MG tablet Take 1 tablet (100 mg total) by mouth daily as needed for Erectile Dysfunction.    sirolimus (RAPAMUNE) 1 MG Tab Take 2 mg by mouth once daily.    tacrolimus (PROGRAF) " 0.5 MG Cap Take 3 capsules (1.5 mg total) by mouth every 12 (twelve) hours.    TOUJEO SOLOSTAR U-300 INSULIN 300 unit/mL (1.5 mL) InPn pen Inject 20 Units into the skin nightly.    VENTOLIN HFA 90 mcg/actuation inhaler INHALE 2 PUFFS INTO THE LUNGS EVERY 6 (SIX) HOURS AS NEEDED FOR WHEEZING (COUGHING). RESCUE (Patient taking differently: Inhale 1 puff into the lungs every 6 (six) hours as needed.)    [DISCONTINUED] lisinopriL (PRINIVIL,ZESTRIL) 5 MG tablet Take 5 mg by mouth once daily.    [DISCONTINUED] metFORMIN (GLUCOPHAGE) 500 MG tablet Take 500 mg by mouth 2 (two) times daily.    [DISCONTINUED] minoxidiL (LONITEN) 2.5 MG tablet Take 2.5 mg by mouth 2 (two) times daily.    [DISCONTINUED] spironolactone (ALDACTONE) 50 MG tablet      Family History       Problem Relation (Age of Onset)    Heart disease Father    Kidney disease Father          Tobacco Use    Smoking status: Never Smoker    Smokeless tobacco: Never Used   Substance and Sexual Activity    Alcohol use: Not Currently    Drug use: Never    Sexual activity: Not on file     Review of Systems   Constitutional:  Negative for activity change and appetite change.   HENT:  Negative for congestion, dental problem and sore throat.    Eyes:  Negative for discharge and itching.   Respiratory:  Positive for cough. Negative for shortness of breath, wheezing and stridor.    Cardiovascular:  Negative for chest pain.   Gastrointestinal:  Positive for diarrhea. Negative for abdominal distention and abdominal pain.   Endocrine: Negative for cold intolerance.   Genitourinary:  Negative for difficulty urinating and dysuria.   Musculoskeletal:  Negative for arthralgias and back pain.   Skin:  Negative for color change.   Neurological:  Negative for dizziness, tremors, syncope and facial asymmetry.   Hematological:  Negative for adenopathy.   Psychiatric/Behavioral:  Negative for agitation and behavioral problems.    Objective:     Vital Signs (Most  Recent):  Temp: 99.4 °F (37.4 °C) (04/30/22 2030)  Pulse: 91 (04/30/22 2030)  Resp: 17 (04/30/22 2030)  BP: 139/87 (04/30/22 2030)  SpO2: 96 % (04/30/22 2030) Vital Signs (24h Range):  Temp:  [37.4 °F (3 °C)-99.5 °F (37.5 °C)] 99.4 °F (37.4 °C)  Pulse:  [83-92] 91  Resp:  [16-18] 17  SpO2:  [96 %-100 %] 96 %  BP: (134-157)/() 139/87        There is no height or weight on file to calculate BMI.    Physical Exam  Vitals and nursing note reviewed.   Constitutional:       General: He is awake.      Appearance: Normal appearance. He is well-developed.   HENT:      Head: Atraumatic.      Right Ear: External ear normal.      Left Ear: External ear normal.      Nose: Nose normal.      Mouth/Throat:      Mouth: Mucous membranes are moist.   Eyes:      General: No scleral icterus.  Cardiovascular:      Rate and Rhythm: Normal rate and regular rhythm.      Pulses: Normal pulses.           Radial pulses are 2+ on the right side and 2+ on the left side.        Dorsalis pedis pulses are 2+ on the right side and 2+ on the left side.      Heart sounds: Normal heart sounds. No murmur heard.  Pulmonary:      Effort: Pulmonary effort is normal. No respiratory distress.      Breath sounds: Rales present. No wheezing.   Abdominal:      General: Bowel sounds are normal. There is distension.      Palpations: Abdomen is soft. There is fluid wave.      Tenderness: There is abdominal tenderness. There is no guarding.   Musculoskeletal:         General: No swelling or tenderness. Normal range of motion.      Cervical back: Full passive range of motion without pain and normal range of motion.   Lymphadenopathy:      Cervical: No cervical adenopathy.   Skin:     General: Skin is warm.      Coloration: Skin is not jaundiced or pale.   Neurological:      Mental Status: He is alert and oriented to person, place, and time.   Psychiatric:         Attention and Perception: Attention normal.         Behavior: Behavior normal. Behavior is  cooperative.         Cognition and Memory: Cognition normal.           Significant Labs: All pertinent labs within the past 24 hours have been reviewed.    Significant Imaging: I have reviewed all pertinent imaging results/findings within the past 24 hours.    Assessment/Plan:     * ILDEFONSO (acute kidney injury)  Patient with acute kidney injury likely d/t IVVD/Dehydration Which is currently stable. Labs reviewed- Renal function/electrolytes with Estimated Creatinine Clearance: 12.1 mL/min (A) (based on SCr of 6 mg/dL (H)). according to latest data. Monitor urine output and serial BMP and adjust therapy as needed. Avoid nephrotoxins and renally dose meds for GFR listed above. Gentle IV fluid PRN. Ultrasound transplant kidney no acute changes. Possibly due to tacrolimus versus hepatorenal syndrome, Nephrology has been consulted. Bicard oerdered for low serum levels  - continue Toprol  - start Continue. Bicarbonate PRN  - repeat lasix and albumin today per Nephrology  - f/u labs: 24 hour urine, urine sodium, urine chloride  - evaluate urine sediment  - continue tacrolimus when troughs are therapeutc  - trend daily BMP/RFP      Type 2 diabetes mellitus without complication, without long-term current use of insulin  Start basal/ prandial insulin as needed and titrate SSI to maintain serum [id number]. Start diabetic diet.      Chronic cough  Continue scheduled benzonotate tablet 200mg TID. For persistent cough add dextromethorphan-guaiefesin syrup. compare/contrast serial CXR for worsening inflammatory process.    Chronic diarrhea  Pt started on loperamide at OSH with adequate response, will continue anti-nausea medication at [facility]. Trended labs daily and adjusted for any electrolyte abnormality. Pt with prior negative Cdiff test.      Thrombocytopenia  Thrombocytopenia likely in setting of hepatic cirrhosis. Hold anticoagulation.      Hypothyroidism  Continued on home medication: synthroid 50mcg qd      Cirrhosis of liver  with ascites  Pt recently diagnosed with cirrhosis 2/2 to LOPEZ. Pulmonary vein hypertension now present with with multiple episodes of paracentesis. Largest abdominal fluid reduction just over 5L.  - evaluate for additional paracentesis while inpatient  - f/u labs for ascitic fluid.  - identify significant cytology, cultures and gram staining  - outpatient hepatology consult    Interstitial lung disease  CXR performed on admission, with serial imaging reviewed. Continued scheduled Benzonate daily. Flonase added daily PRN with DuoNebs q6h PRN. Supplemental oxygen PRN. Continuous pulse oxymetry ordered.  - manage SpO2 88-93% by adjusting O2 flow  - perform ambulatory test for home oxygen if patient requires  - Pulmonology consult PRN    Gout  Continue allopurinol for prophylaxis      Transplanted kidney  Renal transplant performed in 2004. Pt continued on sirolimus and tacrolimus for immuno suppression. KTM consulted on admission, appreciate recs. Trough levels ordered with both medications.currently held.    - trend renal funtion tests   - restart tacrolimus when levels permit  - restart sirolimus as directed by KTM      VTE Risk Mitigation (From admission, onward)         Ordered     IP VTE HIGH RISK PATIENT  Once         04/30/22 1810     Place sequential compression device  Until discontinued         04/30/22 1810                   Mansoor Narayanan MD  Department of Hospital Medicine   [facility] - Transplant Stepdown

## 2022-05-01 NOTE — ASSESSMENT & PLAN NOTE
His renal functions were normal beginning of Feb.   After that it appears he had ILDEFONSO - possibly related to pre-renal states  Which led to CKD stage III, which has progressed to stage IV over the course of last few weeks     At this time no indication for hemodialysis.   Has consented for HD in case needed.   Monitor I/Os   US renal transplant unremarkable.  This may be HRS pathophysiology now. HRS is diagnosis of exclusion. Suggest to hold BP meds and diuretics. Continue albumin challenge for 48 hours. Rule out SBP, other sepsis etiologies. Repeat UA clean catch to rule out hematuria.   Other etiologies suggest toxicities with immunosuppresion   Spoke with pt. He states weight is stable 327-328lbs. He reported 325lbs last week. Pt states some swelling to LE. Nothing new or worsening. Pt denies bloating, chest pain, SOB, cough, wheezing, dizziness, nausea, vomiting, diarrhea, constipation. Pt instructed to call if symptoms develop/worsen.

## 2022-05-02 PROBLEM — A41.9 SEPSIS: Status: ACTIVE | Noted: 2022-01-01

## 2022-05-02 PROBLEM — R50.9 FEVER: Status: RESOLVED | Noted: 2022-01-01 | Resolved: 2022-01-01

## 2022-05-02 NOTE — HOSPITAL COURSE
Pt admitted to hospital medicine for treatment of ILDEFONSO likely secondary to decompensated cirrhosis. Consulted Hepatology and KTM, appreciate recs. Performed diagnostic paracentesis to verify nature of ascitic fluid and to rule out SBP. Pt had a fever of 100.7F per arm pit and 100.5F orally. CXR significant for prior ILD but no acute process. Blood cultures collected and Vanc/Cefepime started. ID consulted, appreciate recs. Diagnostic paracentesis performed with results indicative of portal hypertension. Fluid cultures are negative to date. CT Chest w/o contrast significant for bilateral opacities concerning for pneumonia. Pulmonology consulted, appreciate recs. Bronchoscopy performed on 5/04/22 for further evaluation of lung pathology seen on CT lung scan. Ascitic fluid aerobic culture positive for E.Faecalis on amoxicillin. 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 Furthermore, renal function has not improved and patient currently with recommendation for HD per nephro. First session w/ UF happened 5/13. Worsening ileus/partial SBO as of 5/14. Improved as of 5/16.

## 2022-05-02 NOTE — ASSESSMENT & PLAN NOTE
61 y.o. male with a [facility] ESRD 2/2 HTN s/p kidney transplant in 1/2004 (in [place]; on Tacrolimus/Sirolimus), c/b LOPEZ cirrhosis, ILD, T2DM (A1C 6.6 in 2/2022), hypothyroidism, BPH, gout; admitted on 4/30/2022 (transferred from [facility]) for abnormal labs and need for transplant liver/kidney evaluation. He was hospitalized from 4/7 to 4/21/2022 for worsening Cr. He was received 5 days of ceftriaxone for empiric CAP - had paracentesis without evidence of SBP. He was readmitted at [facility] on 4/27 due to worsening Cr, and was transferred here on 4/30. He reports being lousy for hte past 3 months, chronic dry cough, decreased appetite, diarrhea (up to four times a day), 20 lb weight loss (in 3 months). Patient developed fever to 100.7 on 5/1. Cr 4.5-5 (baseline 1.6), prompting ID consult. He was started on empiric vancomycin and cefepime.     Recommendations:  - Consider CT chest to evaluate ILD.   - Check respiratory infection panel  - Check fungal markers: blood Aspergillus antigen, blood Fungitell, urine Histoplasma antigen, urine Blastomyces antigen, blood cryptococcus antigen  - Check blood AFB culture.   - Follow up CMV PCR.  - Consider MRSA nasal PCR  - Defer Transplant Kidney/primary team to manage IS - sirolimus is associated with ILD.  - Regarding to chronic diarrhea - recent work up unremarkable. Continue to monitor.

## 2022-05-02 NOTE — PLAN OF CARE
Pt is AAOx4, afebrile, and VSS overnight. Pt has LLQ paracentesis site w/ scant serosanguinous drainage. Pt had cont nonproductive cough intermittently overnight and received PRN guaifenesin w/ mild relief. Pt received IVPB cefepime and vanc overnight for possible bacteremia. Pt O2 sat remain in upper 80s overnight. Will cont to josefina. See flow sheet for values. Pt has 2 BMs overnight. Pt denied dizziness overnight. Pt is in bed in the lowest position, wheels locked, and call light in reach.

## 2022-05-02 NOTE — ASSESSMENT & PLAN NOTE
Pt started on loperamide at OSH with adequate response, will continue anti-nausea medication at [facility]. Trended labs daily and adjusted for any electrolyte abnormality. Pt with prior negative Cdiff test.    - CMV ordered.

## 2022-05-02 NOTE — PLAN OF CARE
Patient is AAOx3. Patient reports that he is still having diarrhea, imodium given per orders. Patient placed on 2L Os per nasal cannula,O2 stats dropped to 88%. Monitoring the patient's blood sugar AC&HS. PT/OT are working with the patient. Patient sat up in the chair for a few hours today. Reminded the patient to call for assistance. Call light and personal items are within reach.

## 2022-05-02 NOTE — ASSESSMENT & PLAN NOTE
PMHx of interstitial lung disease (lung scarring). Continue scheduled benzonotate tablet 200mg TID. For persistent cough add dextromethorphan-guaiefesin syrup. Compare/contrast serial CXR for worsening inflammatory process.

## 2022-05-02 NOTE — ASSESSMENT & PLAN NOTE
CXR performed on admission, with serial imaging reviewed. Continued scheduled Benzonate daily. Flonase added daily PRN with DuoNebs q6h PRN. Supplemental oxygen PRN. Continuous pulse oxymetry ordered.  - manage SpO2 88-93% by adjusting O2 flow  - perform ambulatory test for home oxygen if required.   - F/u pulm outpatient.

## 2022-05-02 NOTE — ASSESSMENT & PLAN NOTE
Patient with acute kidney injury likely d/t IVVD/Dehydration Which is currently stable. Labs reviewed- Renal function/electrolytes with Estimated Creatinine Clearance: 12.7 mL/min (A) (based on SCr of 5.7 mg/dL (H)). according to latest data. Monitor urine output and serial BMP and adjust therapy as needed. Avoid nephrotoxins and renally dose meds for GFR listed above. Gentle IV fluid PRN. Ultrasound transplant kidney no acute changes. Possibly due to tacrolimus versus hepatorenal syndrome, Nephrology has been consulted. Toprolol continued.  Bicarb ordered for low serum levels. Repeat lasix and albumin dosing per Nephrology  - f/u labs: 24 hour urine, urine sodium, urine chloride  - evaluate urine sediment  - hold sirolimus and tacrolimus  - trend daily BMP/RFP

## 2022-05-02 NOTE — SUBJECTIVE & OBJECTIVE
Interval History: No acute events overnight. Pt reported that he slept well and ate a good breakfast with no nausea. No abdominal change or pain reported.    Review of Systems   Constitutional:  Negative for activity change and appetite change.   HENT:  Negative for congestion, dental problem and sore throat.    Eyes:  Negative for discharge and itching.   Respiratory:  Positive for cough. Negative for shortness of breath, wheezing and stridor.    Cardiovascular:  Negative for chest pain.   Gastrointestinal:  Positive for diarrhea. Negative for abdominal distention and abdominal pain.   Endocrine: Negative for cold intolerance.   Genitourinary:  Negative for difficulty urinating and dysuria.   Musculoskeletal:  Negative for arthralgias and back pain.   Skin:  Negative for color change.   Neurological:  Negative for dizziness, tremors, syncope and facial asymmetry.   Hematological:  Negative for adenopathy.   Psychiatric/Behavioral:  Negative for agitation and behavioral problems.    Objective:     Vital Signs (Most Recent):  Temp: 99.4 °F (37.4 °C) (05/01/22 2015)  Pulse: 76 (05/01/22 2015)  Resp: 18 (05/01/22 2015)  BP: 122/72 (05/01/22 2015)  SpO2: (!) 89 % (05/01/22 2015)   Vital Signs (24h Range):  Temp:  [97.3 °F (36.3 °C)-100.7 °F (38.2 °C)] 99.4 °F (37.4 °C)  Pulse:  [72-76] 76  Resp:  [17-26] 18  SpO2:  [89 %-93 %] 89 %  BP: (122-150)/(72-92) 122/72     Weight: 65 kg (143 lb 4.8 oz)  Body mass index is 21.79 kg/m².    Intake/Output Summary (Last 24 hours) at 5/1/2022 2033  Last data filed at 5/1/2022 1800  Gross per 24 hour   Intake 540 ml   Output 1100 ml   Net -560 ml      Physical Exam  Vitals and nursing note reviewed.   Constitutional:       General: He is awake.      Appearance: Normal appearance. He is well-developed.   HENT:      Head: Atraumatic.      Right Ear: External ear normal.      Left Ear: External ear normal.      Nose: Nose normal.      Mouth/Throat:      Mouth: Mucous membranes are moist.    Eyes:      General: No scleral icterus.  Cardiovascular:      Rate and Rhythm: Normal rate and regular rhythm.      Pulses: Normal pulses.           Radial pulses are 2+ on the right side and 2+ on the left side.        Dorsalis pedis pulses are 2+ on the right side and 2+ on the left side.      Heart sounds: Normal heart sounds. No murmur heard.  Pulmonary:      Effort: Pulmonary effort is normal. No respiratory distress.      Breath sounds: Rales present. No wheezing.   Abdominal:      General: Bowel sounds are normal. There is distension.      Palpations: Abdomen is soft. There is fluid wave.      Tenderness: There is abdominal tenderness. There is no guarding.   Musculoskeletal:         General: No swelling or tenderness. Normal range of motion.      Cervical back: Full passive range of motion without pain and normal range of motion.   Lymphadenopathy:      Cervical: No cervical adenopathy.   Skin:     General: Skin is warm.      Coloration: Skin is not jaundiced or pale.   Neurological:      Mental Status: He is alert and oriented to person, place, and time.      Sensory: No sensory deficit.   Psychiatric:         Attention and Perception: Attention normal.         Behavior: Behavior normal. Behavior is cooperative.         Cognition and Memory: Cognition normal.       Significant Labs: All pertinent labs within the past 24 hours have been reviewed.    Significant Imaging: I have reviewed all pertinent imaging results/findings within the past 24 hours.

## 2022-05-02 NOTE — CONSULTS
[facility]   Infectious Diseases  Consult Note    Patient Name: Edgar Jackson  MRN: [id number]  Admission Date: 4/30/2022  Hospital Length of Stay: 2 days  Attending Physician: Jarred Dyson MD  Primary Care Provider: Tyler Castillo MD     Isolation Status: No active isolations    Patient information was obtained from patient, past medical records and ER records.      Inpatient consult to Infectious Diseases  Consult performed by: Cesar Husain MD  Consult ordered by: Nikos Fritz MD        Assessment/Plan:     Fever in immunocomprised patient  61 y.o. male with a PMH ESRD 2/2 HTN s/p kidney transplant in 1/2004 (in [place]; on Tacrolimus/Sirolimus), c/b LOPEZ cirrhosis, ILD, T2DM (A1C 6.6 in 2/2022), hypothyroidism, BPH, gout; admitted on 4/30/2022 (transferred from [facility]) for abnormal labs and need for transplant liver/kidney evaluation. He was hospitalized from 4/7 to 4/21/2022 for worsening Cr. He was received 5 days of ceftriaxone for empiric CAP - had paracentesis without evidence of SBP. He was readmitted at [facility] on 4/27 due to worsening Cr, and was transferred here on 4/30. He reports being lousy for hte past 3 months, chronic dry cough, decreased appetite, diarrhea (up to four times a day), 20 lb weight loss (in 3 months). Patient developed fever to 100.7 on 5/1. Cr 4.5-5 (baseline 1.6), prompting ID consult. He was started on empiric vancomycin and cefepime.     Recommendations:  - Consider CT chest to evaluate ILD.   - Check respiratory infection panel  - Check fungal markers: blood Aspergillus antigen, blood Fungitell, urine Histoplasma antigen, urine Blastomyces antigen, blood cryptococcus antigen  - Check blood AFB culture.   - Follow up CMV PCR.  - Consider MRSA nasal PCR  - Defer Transplant Kidney/primary team to manage IS - sirolimus is associated with ILD.  - Regarding to chronic diarrhea - recent work up unremarkable. Continue to  monitor.           Thank you for your consult. I will follow-up with patient. Please contact us if you have any additional questions.    Cesar Husain MD  Infectious Diseases  [facility]     Subjective:     Principal Problem: ILDEFONSO (acute kidney injury)    HPI: Mr Edgar Jackson is a 61 y.o. [place] male with a PMH ESRD 2/2 HTN s/p kidney transplant in 1/2004 (in [place]; on Tacrolimus/Sirolimus), c/b LOEPZ cirrhosis, ILD, T2DM (A1C 6.6 in 2/2022), hypothyroidism, BPH, gout; admitted on 4/30/2022 (transferred from [facility]) for abnormal labs and need for transplant liver/kidney evaluation. Patient is a [employer]  and moved from [place], [place] (lived for 20 years) to [place] since 9/2021. He was hospitalized from 4/7 to 4/21/2022 for worsening Cr. He was received 5 days of ceftriaxone for empiric CAP - had paracentesis without evidence of SBP. He was readmitted at [facility] on 4/27 due to worsening Cr, and was transferred here on 4/30 for transplant specialities evaluation. He reports being lousy for hte past 3 months. He has chronic dry cough, decreased appetite, diarrhea (up to four times a day), 20 lb weight loss for the past 3 months. Patient developed fever to 100.7 on 5/1. Cr 4.5-5 (baseline 1.6). He was started on empiric vancomycin and cefepime. Our team was consulted for antibiotic assistance.     Patient immigrated from [facility] since [place]. He previously enjoyed outdoor activities. He currently lives with his wife in [place]. No pets.                 Past Medical History:   Diagnosis Date    BPH (benign prostatic hyperplasia)     Diabetes 2020    Gout     Hypertension 2000    Hypothyroidism 04/08/2022    Interstitial lung disease 03/30/2022    Kidney transplant recipient 01/2004    [place] 2* hypertensive nephropathy    Unspecified cirrhosis of liver     ?LOPEZ       Past Surgical History:   Procedure Laterality Date    CATARACT EXTRACTION Right  "2017    COLONOSCOPY N/A 3/8/2022    Procedure: COLONOSCOPY;  Surgeon: Mio Simmons III, MD;  Location: [facility] ENDO;  Service: Endoscopy;  Laterality: N/A;    ENDOSCOPIC ULTRASOUND OF UPPER GASTROINTESTINAL TRACT N/A 3/8/2022    Procedure: ULTRASOUND, UPPER GI TRACT, ENDOSCOPIC;  Surgeon: Mio Simmons III, MD;  Location: [facility] ENDO;  Service: Endoscopy;  Laterality: N/A;    KIDNEY TRANSPLANT      PORTACATH PLACEMENT      REMOVAL OF VASCULAR ACCESS PORT         Review of patient's allergies indicates:  No Known Allergies    Medications:  Medications Prior to Admission   Medication Sig    alfuzosin (UROXATRAL) 10 mg Tb24 Take 1 tablet (10 mg total) by mouth after dinner.    allopurinoL (ZYLOPRIM) 100 MG tablet Take 100 mg by mouth once daily.    BD NOEMI 2ND GEN PEN NEEDLE 32 gauge x 5/32" Ndle USE AS DIRECTED ONCE DAILY WITH BASAGLAR    benzonatate (TESSALON) 200 MG capsule Take 1 capsule (200 mg total) by mouth 3 (three) times daily.    [] dextromethorphan-guaiFENesin  mg/5 ml (ROBITUSSIN-DM)  mg/5 mL liquid Take 5 mLs by mouth every 6 (six) hours as needed (cough).    fluticasone propionate (FLONASE) 50 mcg/actuation nasal spray 2 sprays (100 mcg total) by Each Nostril route once daily.    furosemide (LASIX) 20 MG tablet Take 1 tablet (20 mg total) by mouth once daily.    [] guaiFENesin (MUCINEX) 600 mg 12 hr tablet Take 1 tablet (600 mg total) by mouth 2 (two) times daily. for 10 days    hydrocodone-chlorpheniramine (TUSSIONEX) 10-8 mg/5 mL suspension Take 5 mLs by mouth every 12 (twelve) hours as needed for Cough or Congestion.    levothyroxine (SYNTHROID) 50 MCG tablet Take 50 mcg by mouth once daily.    lipase-protease-amylase 12,000-38,000-60,000 units (CREON) CpDR Take 2 capsules by mouth 4 (four) times daily with meals and nightly.    [] loperamide (IMODIUM) 2 mg capsule Take 1 capsule (2 mg total) by mouth 4 (four) times daily as needed " "for Diarrhea.    metoprolol tartrate (LOPRESSOR) 50 MG tablet Take 50 mg by mouth 2 (two) times daily.    pravastatin (PRAVACHOL) 40 MG tablet Take 40 mg by mouth every evening.    sildenafiL (VIAGRA) 100 MG tablet Take 1 tablet (100 mg total) by mouth daily as needed for Erectile Dysfunction.    sirolimus (RAPAMUNE) 1 MG Tab Take 2 mg by mouth once daily.    tacrolimus (PROGRAF) 0.5 MG Cap Take 3 capsules (1.5 mg total) by mouth every 12 (twelve) hours.    TOUJEO SOLOSTAR U-300 INSULIN 300 unit/mL (1.5 mL) InPn pen Inject 20 Units into the skin nightly.    VENTOLIN HFA 90 mcg/actuation inhaler INHALE 2 PUFFS INTO THE LUNGS EVERY 6 (SIX) HOURS AS NEEDED FOR WHEEZING (COUGHING). RESCUE (Patient taking differently: Inhale 1 puff into the lungs every 6 (six) hours as needed.)     Antibiotics (From admission, onward)                Start     Stop Route Frequency Ordered    05/01/22 2115  cefepime in dextrose 5 % 1 gram/50 mL IVPB 1 g         -- IV Every 24 hours (non-standard times) 05/01/22 2051 05/01/22 2102  vancomycin - pharmacy to dose  (vancomycin IVPB)        "And" Linked Group Details    -- IV pharmacy to manage frequency 05/01/22 2051 04/30/22 2230  mupirocin 2 % ointment         05/05 2059 Nasl 2 times daily 04/30/22 2126          Antifungals (From admission, onward)                None          Antivirals (From admission, onward)      None             Immunization History   Administered Date(s) Administered    COVID-19, MRNA, LN-S, PF (Pfizer) (Purple Cap) 12/06/2021    Influenza - High Dose - PF (65 years and older) 10/10/2018    Influenza - Quadrivalent 10/10/2018, 11/23/2019    Influenza - Quadrivalent - MDCK - PF 12/26/2021    Influenza - Quadrivalent - PF *Preferred* (6 months and older) 11/23/2019    Influenza - Trivalent (ADULT) 12/28/2017    Influenza - Trivalent - PF (ADULT) 10/25/2004, 10/30/2020    Pneumococcal Polysaccharide - 23 Valent 12/28/2017    Tetanus 12/28/2017 "       Family History       Problem Relation (Age of Onset)    Heart disease Father    Kidney disease Father          Social History     Socioeconomic History    Marital status:    Tobacco Use    Smoking status: Never Smoker    Smokeless tobacco: Never Used   Substance and Sexual Activity    Alcohol use: Not Currently    Drug use: Never     Review of Systems   Constitutional:  Positive for activity change, appetite change, fatigue, fever and unexpected weight change. Negative for chills.   HENT:  Negative for congestion and sore throat.    Eyes:  Negative for visual disturbance.   Respiratory:  Positive for cough. Negative for shortness of breath.    Cardiovascular:  Negative for chest pain and leg swelling.   Gastrointestinal:  Positive for abdominal pain and diarrhea. Negative for constipation, nausea and vomiting.   Endocrine: Negative for polydipsia.   Genitourinary:  Negative for dysuria.   Musculoskeletal:  Negative for arthralgias, back pain and myalgias.   Skin:  Negative for rash and wound.   Allergic/Immunologic: Positive for immunocompromised state. Negative for food allergies.   Neurological:  Negative for headaches.   Hematological:  Does not bruise/bleed easily.   Psychiatric/Behavioral:  Negative for confusion.    All other systems reviewed and are negative.  Objective:     Vital Signs (Most Recent):  Temp: (!) 100.5 °F (38.1 °C) (05/02/22 1645)  Pulse: 68 (05/02/22 1645)  Resp: (!) 27 (05/02/22 1645)  BP: 122/72 (05/02/22 1645)  SpO2: (!) 94 % (05/02/22 1645)   Vital Signs (24h Range):  Temp:  [98.1 °F (36.7 °C)-100.7 °F (38.2 °C)] 100.5 °F (38.1 °C)  Pulse:  [65-79] 68  Resp:  [15-30] 27  SpO2:  [87 %-98 %] 94 %  BP: (115-144)/(68-85) 122/72     Weight: 64.3 kg (141 lb 12.1 oz)  Body mass index is 21.55 kg/m².    Estimated Creatinine Clearance: 12.8 mL/min (A) (based on SCr of 5.6 mg/dL (H)).    Physical Exam  Vitals and nursing note reviewed.   Constitutional:       General: He is awake.       Appearance: He is well-developed. He is ill-appearing.   HENT:      Head: Normocephalic and atraumatic.      Right Ear: External ear normal.      Left Ear: External ear normal.      Nose:      Comments: On nasal cannula     Mouth/Throat:      Mouth: Mucous membranes are moist.   Eyes:      General: Scleral icterus present.   Cardiovascular:      Rate and Rhythm: Normal rate and regular rhythm.      Pulses: Normal pulses.           Radial pulses are 2+ on the right side and 2+ on the left side.        Dorsalis pedis pulses are 2+ on the right side and 2+ on the left side.      Heart sounds: Normal heart sounds. No murmur heard.  Pulmonary:      Effort: No respiratory distress.      Breath sounds: Rhonchi present. No wheezing.   Abdominal:      General: Bowel sounds are normal. There is distension.      Palpations: Abdomen is soft. There is fluid wave.      Tenderness: There is abdominal tenderness (left sided. paracentesis site on bandage). There is no guarding.   Musculoskeletal:         General: Normal range of motion.      Cervical back: Full passive range of motion without pain and neck supple.      Right lower leg: No edema.      Left lower leg: No edema.   Lymphadenopathy:      Cervical: No cervical adenopathy.   Skin:     General: Skin is warm.      Coloration: Skin is jaundiced.   Neurological:      Mental Status: He is alert and oriented to person, place, and time.   Psychiatric:         Attention and Perception: Attention normal.         Mood and Affect: Mood normal.         Behavior: Behavior normal. Behavior is cooperative.         Thought Content: Thought content normal.         Cognition and Memory: Cognition normal.         Judgment: Judgment normal.       Significant Labs: Blood Culture:   Recent Labs   Lab 02/02/22  1056 04/11/22  0852 04/28/22  0454 04/28/22  0701 05/01/22  2132   LABBLOO No growth after 5 days.  No growth after 5 days. No growth after 5 days. No Growth to date  No Growth to  date  No Growth to date  No Growth to date  No Growth to date No Growth to date  No Growth to date  No Growth to date  No Growth to date  No Growth to date No Growth to date  No Growth to date     CBC:   Recent Labs   Lab 04/30/22 1946 05/01/22 0918 05/02/22  0740   WBC 6.86 6.86 6.38   HGB 10.5* 9.9* 9.7*   HCT 30.7* 27.8* 27.4*   PLT 91* 83* 77*     CMP:   Recent Labs   Lab 04/30/22 1947 05/01/22 0918 05/02/22  0809   * 126* 126*   K 5.3* 4.8 4.6   CL 97 97 97   CO2 16* 18* 17*    73 126*   BUN 64* 64* 64*   CREATININE 6.0* 5.7* 5.6*   CALCIUM 7.9* 8.4* 8.1*   PROT 5.8* 6.2 5.5*   ALBUMIN 2.5* 3.4* 3.0*   BILITOT 5.2* 5.7* 6.0*   ALKPHOS 105 96 84   AST 97* 86* 77*   ALT 14 12 10   ANIONGAP 11 11 12   EGFRNONAA 9.3* 9.9* 10.1*     Urine Studies:   Recent Labs   Lab 04/30/22 0713 05/02/22  1219   COLORU Yellow Yellow   APPEARANCEUA Hazy* Clear   PHUR 5.0 5.0   SPECGRAV 1.010 1.010   PROTEINUA 1+* 1+*   GLUCUA Trace* 2+*   KETONESU Negative Negative   BILIRUBINUA Negative Negative   OCCULTUA 1+* 2+*   NITRITE Negative Negative   UROBILINOGEN Negative  --    LEUKOCYTESUR Negative Negative   RBCUA 10* 2   WBCUA 5 3   BACTERIA Negative Rare   SQUAMEPITHEL 4  --    HYALINECASTS 17* 0     All pertinent labs within the past 24 hours have been reviewed.    5/1 Paracentesis:  (seg 1, L 51, M 46, meso 2)    Gram - no wbc/organisms seen    5/1 BCx NGTD      Significant Imaging: I have reviewed all pertinent imaging results/findings within the past 24 hours.    CT A/P  IMPRESSION:  1.  Small pericardial effusion with right greater than left pleural effusions.  2. Findings suggesting cirrhosis with portal hypertension.  3. Bilateral nephrolithiasis, nonobstructing.  4. Moderate ascites      US Kidney  Transplant renal artery and vein are patent, with no evidence of hemodynamically significant stenosis.  Mild increased echogenicity of the transplant kidney, similar to prior.  No  hydronephrosis.  Moderate volume abdominal and pelvic ascites.    CXR  Single portable chest view is submitted.  There is diminished depth of inspiration, when accounting for this the appearance of the cardiomediastinal silhouette is stable.  There is opacity involving the peripheral right upper lobe consistent with confluent infiltrates/airspace disease with mild progression, additional opacity seen centrally on the left appears similar to the prior study and is consistent with pulmonary infiltrate/airspace disease.  There is also accentuation of pulmonary bronchovascular markings associated with diminished depth of inspiration, as well as a general pattern of mild interstitial infiltrates superimposed on chronic change.  There is no evidence for pleural effusion or pneumothorax.  The visualized osseous structures demonstrate chronic change.  Impression:  Bilateral pattern of pulmonary infiltrates, as discussed above.

## 2022-05-02 NOTE — PROCEDURES
"Edgar Jackson is a 61 y.o. male patient.    Temp: (!) 100.5 °F (38.1 °C) (22 1730)  Pulse: 73 (22 1600)  Resp: 17 (22)  BP: 135/83 (22 1600)  SpO2: (!) 92 % (22)  Weight: 65 kg (143 lb 4.8 oz) (22)  Height: 5' 8" (172.7 cm) (22)       Paracentesis    Date/Time: 2022 7:04 PM  Location procedure was performed: [facility] MEDICINE  Performed by: Mansoor Narayanan MD  Authorized by: Mansoor Narayanan MD   Pre-operative diagnosis: decompensated LOPEZ cirrhosis  Post-operative diagnosis: decompensated LOPEZ cirrhosis  Consent Done: Yes  Consent: Verbal consent obtained. Written consent obtained.  Consent given by: patient  Patient understanding: patient states understanding of the procedure being performed  Patient consent: the patient's understanding of the procedure matches consent given  Procedure consent: procedure consent matches procedure scheduled  Relevant documents: relevant documents present and verified  Test results: test results available and properly labeled  Site marked: the operative site was marked  Imaging studies: imaging studies not available  Patient identity confirmed: , MRN, name and verbally with patient  Time out: Immediately prior to procedure a "time out" was called to verify the correct patient, procedure, equipment, support staff and site/side marked as required.  Initial or subsequent exam: initial  Procedure purpose: diagnostic  Indications: abdominal discomfort secondary to ascites and new onset ascites  Anesthesia: local infiltration    Anesthesia:  Local Anesthetic: lidocaine 1% without epinephrine  Anesthetic total: 10 mL    Patient sedated: no  Preparation: Patient was prepped and draped in the usual sterile fashion.  Needle gauge: 22  Ultrasound guidance: yes  Puncture site: left lower quadrant  Fluid removed: 250(ml)  Fluid appearance: serosanguinous  Dressing: 4x4 sterile gauze  Complications: No  Estimated blood loss (mL): " 1  Specimens: Yes  Implants: No  Patient tolerance: Patient tolerated the procedure well with no immediate complications          5/1/2022

## 2022-05-02 NOTE — ASSESSMENT & PLAN NOTE
PMHx of interstitial lung disease (lung scarring). Continue scheduled benzonotate tablet 200mg TID. For persistent cough add dextromethorphan-guaiefesin syrup. Compare/contrast serial CXR for worsening inflammatory process.    - See fever.

## 2022-05-02 NOTE — PLAN OF CARE
05/02/22 0836   Final Note   Assessment Type Final Discharge Note   Anticipated Discharge Disposition ANOTHER [facility]   Post-Acute Status   Discharge Delays None known at this time   4/30 Transferred to [facility] under hospitalist service and pt will be evaluated by GI and Nephro teams while pt is there

## 2022-05-02 NOTE — ASSESSMENT & PLAN NOTE
Patient with acute kidney injury likely d/t IVVD/Dehydration Which is currently stable. Labs reviewed- Renal function/electrolytes with Estimated Creatinine Clearance: 12.8 mL/min (A) (based on SCr of 5.6 mg/dL (H)). according to latest data. Monitor urine output and serial BMP and adjust therapy as needed. Avoid nephrotoxins and renally dose meds for GFR listed above. Gentle IV fluid PRN. Ultrasound transplant kidney no acute changes. Possibly due to tacrolimus versus hepatorenal syndrome, Nephrology has been consulted. Toprolol continued.  Bicarb ordered for low serum levels. Repeat lasix and albumin dosing per Nephrology    - KTM following.   - hold sirolimus and tacrolimus  - trend daily BMP/RFP

## 2022-05-02 NOTE — PLAN OF CARE
[facility] - Transplant Stepdown  Initial Discharge Assessment       Primary Care Provider: Tyler Castillo MD    Admission Diagnosis: Decompensated hepatic cirrhosis [K72.90, K74.60]    Admission Date: 4/30/2022  Expected Discharge Date: 5/6/2022    Discharge Barriers Identified: (P) None    Payor: [facility] / Plan: BCBS ALL OUT OF STATE / Product Type: PPO /     Extended Emergency Contact Information  Primary Emergency Contact: Meenu Jackson  Address: [address] [place] [address]           [place] [address] [facility]  Home Phone: [phone]  Mobile Phone: [phone]  Relation: Spouse  Preferred language: English   needed? No    Discharge Plan A: (P) Home with family, Home Health  Discharge Plan B: (P) Home, Home with family      [id number] [place] - [address]  [address]  [place] [address]  Phone: [phone] Fax: [phone]    [facility] - [place], [place] - [address]  [address]  [place] [address]  Phone: [phone] Fax: [phone]    [id number] [place] - [address]  [address]  [place] [address]  Phone: [phone] Fax: [phone]      Initial Assessment (most recent)       Adult Discharge Assessment - 05/02/22 1521          Discharge Assessment    Assessment Type Discharge Planning Assessment (P)      Confirmed/corrected address, phone number and insurance Yes (P)      Confirmed Demographics Correct on Facesheet (P)      Source of Information patient (P)    completed at bs with patient    Does patient/caregiver understand observation status Yes (P)      Communicated LE with patient/caregiver Yes (P)      Reason For Admission decompensated hepatic ci (P)      Lives With spouse (P)      Facility Arrived From: [facility] (P)      Do you expect to return to your current living situation? Yes (P)      Do you have help at home or someone to help you manage  your care at home? Yes (P)      Who are your caregiver(s) and their phone number(s)? Wife Meenu Jackson  (P)      Prior to hospitilization cognitive status: Alert/Oriented (P)      Current cognitive status: Alert/Oriented (P)      Walking or Climbing Stairs Difficulty ambulation difficulty, requires equipment (P)      Mobility Management uses a cane (P)      Dressing/Bathing Difficulty other (see comments) (P)    patient states he has a little help from his wife with bathing and dressing    Home Accessibility wheelchair accessible (P)      Home Layout Able to live on 1st floor (P)      Equipment Currently Used at Home cane, quad;wheelchair;walker, rolling;oxygen (P)    PRN O2 at home; not sure of supplier, may be [facility]    Readmission within 30 days? Yes (P)      Do you currently have service(s) that help you manage your care at home? No (P)      Do you take prescription medications? Yes (P)      Do you have prescription coverage? Yes (P)      Do you have any problems affording any of your prescribed medications? No (P)      Is the patient taking medications as prescribed? yes (P)      Who is going to help you get home at discharge? Wife (P)      How do you get to doctors appointments? family or friend will provide (P)      Are you on dialysis? No (P)      Do you take coumadin? No (P)      Discharge Plan A Home with family;Home Health (P)      Discharge Plan B Home;Home with family (P)      DME Needed Upon Discharge  other (see comments) (P)    TBD    Discharge Plan discussed with: Patient (P)    at bs    Discharge Barriers Identified None (P)                       Vandana Jamison LMSW  Case Management Social Worker   [facility]

## 2022-05-02 NOTE — ASSESSMENT & PLAN NOTE
Thrombocytopenia likely in setting of hepatic cirrhosis. Hold anticoagulation and DVT prophylaxis for thrombocytopenia.

## 2022-05-02 NOTE — SUBJECTIVE & OBJECTIVE
Interval History:  Patient received paracentesis on 05/01.  Negative for SBP.  Spiked fever afterwards.  Septic workup.  Chest x-ray showed consolidation at the middle lobe of the left lung.  Started on broad-spectrum antibiotics.  Id consult placed given comorbidity and immunocompromised status.    Review of Systems   Constitutional:  Positive for fever. Negative for activity change and appetite change.   HENT:  Negative for congestion, dental problem and sore throat.    Eyes:  Negative for discharge and itching.   Respiratory:  Positive for cough. Negative for shortness of breath, wheezing and stridor.    Cardiovascular:  Negative for chest pain.   Gastrointestinal:  Positive for diarrhea. Negative for abdominal distention and abdominal pain.   Endocrine: Negative for cold intolerance.   Genitourinary:  Negative for difficulty urinating and dysuria.   Musculoskeletal:  Negative for arthralgias and back pain.   Skin:  Negative for color change.   Neurological:  Negative for dizziness, tremors, syncope and facial asymmetry.   Hematological:  Negative for adenopathy.   Psychiatric/Behavioral:  Negative for agitation and behavioral problems.    Objective:     Vital Signs (Most Recent):  Temp: 99.8 °F (37.7 °C) (05/02/22 1157)  Pulse: 67 (05/02/22 1600)  Resp: (!) 21 (05/02/22 1600)  BP: 117/69 (05/02/22 1600)  SpO2: 98 % (05/02/22 1600)   Vital Signs (24h Range):  Temp:  [98.1 °F (36.7 °C)-100.7 °F (38.2 °C)] 99.8 °F (37.7 °C)  Pulse:  [65-79] 67  Resp:  [15-30] 21  SpO2:  [87 %-98 %] 98 %  BP: (115-144)/(68-85) 117/69     Weight: 64.3 kg (141 lb 12.1 oz)  Body mass index is 21.55 kg/m².    Intake/Output Summary (Last 24 hours) at 5/2/2022 1621  Last data filed at 5/2/2022 1200  Gross per 24 hour   Intake 1380 ml   Output 1325 ml   Net 55 ml      Physical Exam  Vitals and nursing note reviewed.   Constitutional:       General: He is awake.      Appearance: Normal appearance. He is well-developed.   HENT:      Head:  Atraumatic.      Right Ear: External ear normal.      Left Ear: External ear normal.      Nose: Nose normal.      Mouth/Throat:      Mouth: Mucous membranes are moist.   Eyes:      General: No scleral icterus.  Cardiovascular:      Rate and Rhythm: Normal rate and regular rhythm.      Pulses: Normal pulses.           Radial pulses are 2+ on the right side and 2+ on the left side.        Dorsalis pedis pulses are 2+ on the right side and 2+ on the left side.      Heart sounds: Normal heart sounds. No murmur heard.  Pulmonary:      Effort: Pulmonary effort is normal. No respiratory distress.      Breath sounds: Rales present. No wheezing.   Abdominal:      General: Bowel sounds are normal. There is distension.      Palpations: Abdomen is soft. There is fluid wave.      Tenderness: There is abdominal tenderness. There is no guarding.   Musculoskeletal:         General: No swelling or tenderness. Normal range of motion.      Cervical back: Full passive range of motion without pain and normal range of motion.   Lymphadenopathy:      Cervical: No cervical adenopathy.   Skin:     General: Skin is warm.      Coloration: Skin is not jaundiced or pale.   Neurological:      Mental Status: He is alert and oriented to person, place, and time.      Sensory: No sensory deficit.   Psychiatric:         Attention and Perception: Attention normal.         Behavior: Behavior normal. Behavior is cooperative.         Cognition and Memory: Cognition normal.       Significant Labs: All pertinent labs within the past 24 hours have been reviewed.  Recent Lab Results  (Last 5 results in the past 24 hours)        05/02/22  1219   05/02/22  0809   05/02/22  0740   05/01/22  2148   05/01/22  2132        Albumin   3.0             Alkaline Phosphatase   84             ALT   10             Anion Gap   12             Appearance, UA Clear               AST   77             Bacteria, UA Rare               Baso #     0.03           Basophil %     0.5            Bilirubin (UA) Negative               BILIRUBIN TOTAL   6.0  Comment: For infants and newborns, interpretation of results should be based  on gestational age, weight and in agreement with clinical  observations.    Premature Infant recommended reference ranges:  Up to 24 hours.............<8.0 mg/dL  Up to 48 hours............<12.0 mg/dL  3-5 days..................<15.0 mg/dL  6-29 days.................<15.0 mg/dL               Blood Culture, Routine         No Growth to date  [P]                No Growth to date  [P]       BUN   64             Calcium   8.1             Chloride   97             CO2   17             Color, UA Yellow               Creatinine   5.6             Differential Method     Automated           eGFR if    11.7             eGFR if non    10.1  Comment: Calculation used to obtain the estimated glomerular filtration  rate (eGFR) is the CKD-EPI equation.                Eos #     0.3           Eosinophil %     5.3           Glucose   126             Glucose, UA 2+               Gran # (ANC)     4.9           Gran %     76.5           Hematocrit     27.4           Hemoglobin     9.7           Hyaline Casts, UA 0               Immature Grans (Abs)     0.05  Comment: Mild elevation in immature granulocytes is non specific and   can be seen in a variety of conditions including stress response,   acute inflammation, trauma and pregnancy. Correlation with other   laboratory and clinical findings is essential.             Immature Granulocytes     0.8           INR     1.6  Comment: Coumadin Therapy:  2.0 - 3.0 for INR for all indicators except mechanical heart valves  and antiphospholipid syndromes which should use 2.5 - 3.5.             Ketones, UA Negative               Leukocytes, UA Negative               Lymph #     0.4           Lymph %     6.7           Magnesium   1.5             MCH     29.8           MCHC     35.4           MCV     84            Microscopic Comment SEE COMMENT  Comment: Other formed elements not mentioned in the report are not   present in the microscopic examination.                  Mono #     0.7           Mono %     10.2           MPV     10.3           NITRITE UA Negative               nRBC     0           Occult Blood UA 2+               pH, UA 5.0               Phosphorus   3.5             Platelets     77           POCT Glucose       131         Potassium   4.6             PROTEIN TOTAL   5.5             Protein, UA 1+  Comment: Recommend a 24 hour urine protein or a urine   protein/creatinine ratio if globulin induced proteinuria is  clinically suspected.                 Protime     15.9           RBC     3.26           RBC, UA 2               RDW     13.4           Sirolimus Lvl     22.1  Comment: Sirolimus therapeutic range (trough) for Kidney   Transplant: 4.0 - 15.0 ng/mL.  Testing performed by a chemiluminescent microparticle   immunoassay on the Abbott Alinity i System.             Sodium   126             Specific [place], UA 1.010               Specimen UA Urine, Clean Catch               Tacrolimus Lvl     15.2  Comment: Testing performed by a chemiluminescent microparticle   immunoassay on the Abbott Alinity i System.             WBC, UA 3               WBC     6.38                                   [P] - Preliminary Result               Significant Imaging: I have reviewed all pertinent imaging results/findings within the past 24 hours.

## 2022-05-02 NOTE — HPI
Mr Edgar Jackson is a 61 y.o. [place] male with a PMH ESRD 2/2 HTN s/p kidney transplant in 1/2004 (in [place]; on Tacrolimus/Sirolimus), c/b LOPEZ cirrhosis, ILD, T2DM (A1C 6.6 in 2/2022), hypothyroidism, BPH, gout; admitted on 4/30/2022 (transferred from [facility]) for abnormal labs and need for transplant liver/kidney evaluation. Patient is a [employer]  and moved from [place], [place] (lived for 20 years) to [place] since 9/2021. He was hospitalized from 4/7 to 4/21/2022 for worsening Cr. He was received 5 days of ceftriaxone for empiric CAP - had paracentesis without evidence of SBP. He was readmitted at [facility] on 4/27 due to worsening Cr, and was transferred here on 4/30 for transplant specialities evaluation. He reports being lousy for hte past 3 months. He has chronic dry cough, decreased appetite, diarrhea (up to four times a day), 20 lb weight loss for the past 3 months. Patient developed fever to 100.7 on 5/1. Cr 4.5-5 (baseline 1.6). He was started on empiric vancomycin and cefepime. Our team was consulted for antibiotic assistance.     Patient immigrated from [facility] since 1975. He previously enjoyed outdoor activities. He currently lives with his wife in [place]. No pets.

## 2022-05-02 NOTE — PROGRESS NOTES
Pharmacokinetic Initial Assessment: IV Vancomycin    Assessment/Plan:  Patient last received vancomycin 1000 mg on 4/28  Random level obtained which resulted at 9.9 mcg/mL  Initiate vancomycin pulse dosing with subsequent doses when random concentrations are less than 20 mcg/mL. Re-dose with vancomycin 750 mg x 1 tonight  Desired empiric serum trough concentration is 10 to 20 mcg/mL  Draw vancomycin random level on 5/3 with AM labs    Pharmacy will continue to follow and monitor vancomycin.      Please contact pharmacy at extension [phone] with any questions regarding this assessment.     Thank you for the consult,   Betzaida Campbell       Patient brief summary:  Edgar Jackson is a 61 y.o. male initiated on antimicrobial therapy with IV Vancomycin for treatment of suspected bacteremia    Drug Allergies:   Review of patient's allergies indicates:  No Known Allergies    Actual Body Weight:   65 kg    Renal Function:   Estimated Creatinine Clearance: 12.7 mL/min (A) (based on SCr of 5.7 mg/dL (H)).     Dialysis Method (if applicable):  N/A    CBC (last 72 hours):  Recent Labs   Lab Result Units 04/29/22  0526 04/30/22  0502 04/30/22 1946 05/01/22  0918   WBC K/uL 5.69 6.22 6.86 6.86   Hemoglobin g/dL 10.3* 10.7* 10.5* 9.9*   Hematocrit % 29.4* 30.3* 30.7* 27.8*   Platelets K/uL 87* 80* 91* 83*   Gran % %  --   --  71.0 69.0   Lymph % %  --   --  13.4* 13.0*   Mono % %  --   --  11.7 12.2   Eosinophil % %  --   --  2.5 4.7   Basophil % %  --   --  0.4 0.4   Differential Method   --   --  Automated Automated       Metabolic Panel (last 72 hours):  Recent Labs   Lab Result Units 04/29/22  0526 04/30/22  0502 04/30/22  0713 04/30/22 1947 04/30/22  2311 05/01/22  0918 05/01/22  1921   Sodium mmol/L 124* 124*  --  124*  --  126*  --    Sodium, Urine mmol/L  --   --   --   --  29  --   --    Potassium mmol/L 4.9 5.3*  --  5.3*  --  4.8  --    Potassium, Urine mmol/L  --   --   --   --  19  --   --    Chloride mmol/L 95 96  --  97  --  97  --     Chloride, Urine mmol/L  --   --   --   --  20*  --   --    CO2 mmol/L 17* 18*  --  16*  --  18*  --    Glucose mg/dL 118* 80  --  110  --  73  --    Glucose, Fluid mg/dL  --   --   --   --   --   --  84   Glucose, UA   --   --  Trace*  --   --   --   --    BUN mg/dL 66* 68*  --  64*  --  64*  --    Creatinine mg/dL 5.4* 5.8*  --  6.0*  --  5.7*  --    Creatinine, Urine mg/dL  --   --   --   --  94.0  --   --    Albumin g/dL 2.5* 2.6*  --  2.5*  --  3.4*  --    Total Bilirubin mg/dL 4.2* 4.5*  --  5.2*  --  5.7*  --    Alkaline Phosphatase U/L 77 79  --  105  --  96  --    AST U/L 91* 90*  --  97*  --  86*  --    ALT U/L 16 15  --  14  --  12  --    Magnesium mg/dL 1.7 1.6  --  1.5*  --  1.6  --    Phosphorus mg/dL  --   --   --  4.0  --  3.7  --        Drug levels (last 3 results):  Recent Labs   Lab Result Units 05/01/22  0918   Vancomycin, Random ug/mL 9.9       Microbiologic Results:  Microbiology Results (last 7 days)     Procedure Component Value Units Date/Time    Blood culture [[id number]] Collected: 05/01/22 2132    Order Status: Sent Specimen: Blood Updated: 05/01/22 2132    Narrative:      Collection has been rescheduled by [facility] at 05/01/2022 20:55 Reason:   Patient unavailable ON [facility]   Collection has been rescheduled by 2 at 05/01/2022 20:55 Reason:   Patient unavailable ON [facility]     Blood culture [[id number]] Collected: 05/01/22 2132    Order Status: Sent Specimen: Blood Updated: 05/01/22 2132    Narrative:      Collection has been rescheduled by 2 at 05/01/2022 20:55 Reason:   Patient unavailable ON [facility]   Collection has been rescheduled by [facility] at 05/01/2022 20:55 Reason:   Patient unavailable ON [facility]     Gram stain [[id number]] Collected: 05/01/22 2012    Order Status: Sent Specimen: Ascites Updated: 05/01/22 2039    Culture, Anaerobic [[id number]] Collected: 05/01/22 2012    Order Status: Sent Specimen: Ascites Updated: 05/01/22 2038    Aerobic culture [[id number]] Collected: 05/01/22 2012    Order  Status: Sent Specimen: Ascites Updated: 05/01/22 2037    Culture, Body Fluid - Bactec [[id number]] Collected: 05/01/22 2013    Order Status: Sent Specimen: Body Fluid from Ascites Updated: 05/01/22 2036    Fungus culture [[id number]] Collected: 05/01/22 2012    Order Status: Sent Specimen: Ascites Updated: 05/01/22 2035    Culture, Respiratory with Gram Stain [[id number]]     Order Status: No result Specimen: Respiratory

## 2022-05-02 NOTE — ASSESSMENT & PLAN NOTE
Pt recently diagnosed with cirrhosis 2/2 to LOPEZ. Pulmonary vein hypertension now present with with multiple episodes of paracentesis. Largest abdominal fluid reduction just over 5L. Diagnostic paracentesis performed at [facility]. Lab work being processed currently.  - Labs negative for SBP.   - identify significant cytology, cultures and gram staining  - Hepatology and KTM coordinating for possible transplant.   - Echo ordered for transplant eval.

## 2022-05-02 NOTE — ASSESSMENT & PLAN NOTE
Start basal/ prandial insulin as needed and titrate SSI to maintain serum [id number]. Start diabetic diet.

## 2022-05-02 NOTE — ASSESSMENT & PLAN NOTE
Start basal/ prandial insulin as needed and titrate SSI to maintain serum [id number]. Start diabetic diet.

## 2022-05-02 NOTE — PROGRESS NOTES
Bedside para done per Dr. Narayanan.  Patient tolerated procedure well.  Will continue to monitor patient.

## 2022-05-02 NOTE — ASSESSMENT & PLAN NOTE
Patient received paracentesis on 05/01.  Negative for SBP.  Spiked fever afterwards.  Septic workup.  Chest x-ray showed consolidation at the middle lobe of the left lung.  Started on broad-spectrum antibiotics.  Id consult placed given comorbidity and immunocompromised status.     - On broad spectrum abx.   - ID consult placed.

## 2022-05-02 NOTE — ASSESSMENT & PLAN NOTE
Stable. Urine sodium 29, urine osm 297. DDX low effective arterial volume vs SIADH.     - KTM following.   - Cont monitoring.

## 2022-05-02 NOTE — PLAN OF CARE
Problem: Physical Therapy  Goal: Physical Therapy Goal  Description: Goals to be met by: 2022     Patient will increase functional independence with mobility by performin. Supine to sit with Modified [place]  2. Sit to stand transfer with Modified [place]  3. Gait  x 200 feet with Modified [place] using Rolling Walker or SPC.   4. Ascend/descend 10 stair with bilateral Handrails Contact Guard Assistance using No Assistive Device.     Outcome: Ongoing, Progressing     Pt evaluated and appropriate goals established.

## 2022-05-02 NOTE — ASSESSMENT & PLAN NOTE
His renal functions were normal beginning of Feb.   After that it appears he had ILDEFONSO - possibly related to pre-renal states  Which led to CKD stage III, which has progressed to stage IV over the course of last few weeks     At this time no indication for hemodialysis.   Has consented for HD in case needed.   Monitor I/Os   US renal transplant unremarkable.    This may be HRS pathophysiology now. HRS is diagnosis of exclusion.  Suggest albumin

## 2022-05-02 NOTE — ASSESSMENT & PLAN NOTE
S/p kidney transplant living donor more then 10 years ago at [place].   Reports no episodes of rejections or complications post surgery   ESRD - related to HTN. Currently has no access for HD

## 2022-05-02 NOTE — PROGRESS NOTES
[facility] - Transplant [facility] Medicine  Progress Note    Patient Name: Edgar Jackson  MRN: [id number]  Patient Class: IP- Inpatient   Admission Date: 4/30/2022  Length of Stay: 2 days  Attending Physician: Jarred Dyson MD  Primary Care Provider: Tyler Castillo MD        Subjective:     Principal Problem:ILDEFONSO (acute kidney injury)        HPI:  Mr Jackson is a 61yoM with PMHx of renal transplant for hypertensive nephropathy (2004) immunosuppressed on tacrolimus/sirolimus,  therapy, LOPEZ cirrhosis, NIDDM, and ILD who was treated at OSH for ILDEFONSO 2/2 decompensated cirrhosis and worsening portal vein hypertension with HRS. Pt reported that he had been experiencing abdominal distention with associated diarrhea (4-5/day) over several weeks, along with a constant nagging cough. On presenting to OSH, multiple paracentesis (3) were performed to relieve the ascitic fluid. No evidence of SBP on fluid analysis. Creon was added to diet to aide in food digestion in setting of cirrhosis. Loperamide provided symptomatic treatment of diarrhea. Dextromethorphan-guaifenesin syrup added to reduce cough exacerbations. Despite efforts to manage symptoms, pt in need of  transplant surgery evaluation of the renal/hepatic injury, resulting in tranfer to [facility] [place].     On arrival to [facility] (4/30/2022), pt has experienced increased generalized weakness. Loperamide was continued for diarrhea management. Abdominal distention recurred, despite prior paracentesis. Cough has been fairly constant and predominantly dry. Pt now endorses new left lower quadrant abdominal pain, worsened by cough. KTM and Hepatology consults placed for management of HRS.      Overview/Hospital Course:  Pt admitted to hospital medicine for treatment of ILDEFONSO likely secondary to decompensated cirrhosis. Consulted Hepatology and KTM, appreciate recs. Performed diagnostic paracentesis to verify nature of ascitic fluid and to rule out SBP. Pt had a fever of 100.7F per arm pit and  100.5F orally. CXR was performed prior with no acute findings. Blood cultures collected and Vanc/Cefepime started. ID consult placed for further recs.      Interval History:  Patient received paracentesis on 05/01.  Negative for SBP.  Spiked fever afterwards.  Septic workup.  Chest x-ray showed consolidation at the middle lobe of the left lung.  Started on broad-spectrum antibiotics.  Id consult placed given comorbidity and immunocompromised status.    Review of Systems   Constitutional:  Positive for fever. Negative for activity change and appetite change.   HENT:  Negative for congestion, dental problem and sore throat.    Eyes:  Negative for discharge and itching.   Respiratory:  Positive for cough. Negative for shortness of breath, wheezing and stridor.    Cardiovascular:  Negative for chest pain.   Gastrointestinal:  Positive for diarrhea. Negative for abdominal distention and abdominal pain.   Endocrine: Negative for cold intolerance.   Genitourinary:  Negative for difficulty urinating and dysuria.   Musculoskeletal:  Negative for arthralgias and back pain.   Skin:  Negative for color change.   Neurological:  Negative for dizziness, tremors, syncope and facial asymmetry.   Hematological:  Negative for adenopathy.   Psychiatric/Behavioral:  Negative for agitation and behavioral problems.    Objective:     Vital Signs (Most Recent):  Temp: 99.8 °F (37.7 °C) (05/02/22 1157)  Pulse: 67 (05/02/22 1600)  Resp: (!) 21 (05/02/22 1600)  BP: 117/69 (05/02/22 1600)  SpO2: 98 % (05/02/22 1600)   Vital Signs (24h Range):  Temp:  [98.1 °F (36.7 °C)-100.7 °F (38.2 °C)] 99.8 °F (37.7 °C)  Pulse:  [65-79] 67  Resp:  [15-30] 21  SpO2:  [87 %-98 %] 98 %  BP: (115-144)/(68-85) 117/69     Weight: 64.3 kg (141 lb 12.1 oz)  Body mass index is 21.55 kg/m².    Intake/Output Summary (Last 24 hours) at 5/2/2022 1621  Last data filed at 5/2/2022 1200  Gross per 24 hour   Intake 1380 ml   Output 1325 ml   Net 55 ml      Physical  Exam  Vitals and nursing note reviewed.   Constitutional:       General: He is awake.      Appearance: Normal appearance. He is well-developed.   HENT:      Head: Atraumatic.      Right Ear: External ear normal.      Left Ear: External ear normal.      Nose: Nose normal.      Mouth/Throat:      Mouth: Mucous membranes are moist.   Eyes:      General: No scleral icterus.  Cardiovascular:      Rate and Rhythm: Normal rate and regular rhythm.      Pulses: Normal pulses.           Radial pulses are 2+ on the right side and 2+ on the left side.        Dorsalis pedis pulses are 2+ on the right side and 2+ on the left side.      Heart sounds: Normal heart sounds. No murmur heard.  Pulmonary:      Effort: Pulmonary effort is normal. No respiratory distress.      Breath sounds: Rales present. No wheezing.   Abdominal:      General: Bowel sounds are normal. There is distension.      Palpations: Abdomen is soft. There is fluid wave.      Tenderness: There is abdominal tenderness. There is no guarding.   Musculoskeletal:         General: No swelling or tenderness. Normal range of motion.      Cervical back: Full passive range of motion without pain and normal range of motion.   Lymphadenopathy:      Cervical: No cervical adenopathy.   Skin:     General: Skin is warm.      Coloration: Skin is not jaundiced or pale.   Neurological:      Mental Status: He is alert and oriented to person, place, and time.      Sensory: No sensory deficit.   Psychiatric:         Attention and Perception: Attention normal.         Behavior: Behavior normal. Behavior is cooperative.         Cognition and Memory: Cognition normal.       Significant Labs: All pertinent labs within the past 24 hours have been reviewed.  Recent Lab Results  (Last 5 results in the past 24 hours)        05/02/22  1219   05/02/22  0809   05/02/22  0740   05/01/22  2148   05/01/22  2132        Albumin   3.0             Alkaline Phosphatase   84             ALT   10              Anion Gap   12             Appearance, UA Clear               AST   77             Bacteria, UA Rare               Baso #     0.03           Basophil %     0.5           Bilirubin (UA) Negative               BILIRUBIN TOTAL   6.0  Comment: For infants and newborns, interpretation of results should be based  on gestational age, weight and in agreement with clinical  observations.    Premature Infant recommended reference ranges:  Up to 24 hours.............<8.0 mg/dL  Up to 48 hours............<12.0 mg/dL  3-5 days..................<15.0 mg/dL  6-29 days.................<15.0 mg/dL               Blood Culture, Routine         No Growth to date  [P]                No Growth to date  [P]       BUN   64             Calcium   8.1             Chloride   97             CO2   17             Color, UA Yellow               Creatinine   5.6             Differential Method     Automated           eGFR if    11.7             eGFR if non    10.1  Comment: Calculation used to obtain the estimated glomerular filtration  rate (eGFR) is the CKD-EPI equation.                Eos #     0.3           Eosinophil %     5.3           Glucose   126             Glucose, UA 2+               Gran # (ANC)     4.9           Gran %     76.5           Hematocrit     27.4           Hemoglobin     9.7           Hyaline Casts, UA 0               Immature Grans (Abs)     0.05  Comment: Mild elevation in immature granulocytes is non specific and   can be seen in a variety of conditions including stress response,   acute inflammation, trauma and pregnancy. Correlation with other   laboratory and clinical findings is essential.             Immature Granulocytes     0.8           INR     1.6  Comment: Coumadin Therapy:  2.0 - 3.0 for INR for all indicators except mechanical heart valves  and antiphospholipid syndromes which should use 2.5 - 3.5.             Ketones, UA Negative               Leukocytes, UA Negative                Lymph #     0.4           Lymph %     6.7           Magnesium   1.5             MCH     29.8           MCHC     35.4           MCV     84           Microscopic Comment SEE COMMENT  Comment: Other formed elements not mentioned in the report are not   present in the microscopic examination.                  Mono #     0.7           Mono %     10.2           MPV     10.3           NITRITE UA Negative               nRBC     0           Occult Blood UA 2+               pH, UA 5.0               Phosphorus   3.5             Platelets     77           POCT Glucose       131         Potassium   4.6             PROTEIN TOTAL   5.5             Protein, UA 1+  Comment: Recommend a 24 hour urine protein or a urine   protein/creatinine ratio if globulin induced proteinuria is  clinically suspected.                 Protime     15.9           RBC     3.26           RBC, UA 2               RDW     13.4           Sirolimus Lvl     22.1  Comment: Sirolimus therapeutic range (trough) for Kidney   Transplant: 4.0 - 15.0 ng/mL.  Testing performed by a chemiluminescent microparticle   immunoassay on the Abbott Alinity i System.             Sodium   126             Specific [place], UA 1.010               Specimen UA Urine, Clean Catch               Tacrolimus Lvl     15.2  Comment: Testing performed by a chemiluminescent microparticle   immunoassay on the [employer] i System.             WBC, UA 3               WBC     6.38                                   [P] - Preliminary Result               Significant Imaging: I have reviewed all pertinent imaging results/findings within the past 24 hours.      Assessment/Plan:      * ILDEFONSO (acute kidney injury)  Patient with acute kidney injury likely d/t IVVD/Dehydration Which is currently stable. Labs reviewed- Renal function/electrolytes with Estimated Creatinine Clearance: 12.8 mL/min (A) (based on SCr of 5.6 mg/dL (H)). according to latest data. Monitor urine output and serial BMP and  adjust therapy as needed. Avoid nephrotoxins and renally dose meds for GFR listed above. Gentle IV fluid PRN. Ultrasound transplant kidney no acute changes. Possibly due to tacrolimus versus hepatorenal syndrome, Nephrology has been consulted. Toprolol continued.  Bicarb ordered for low serum levels. Repeat lasix and albumin dosing per Nephrology    - KTM following.   - hold sirolimus and tacrolimus  - trend daily BMP/RFP      Type 2 diabetes mellitus without complication, without long-term current use of insulin  Start basal/ prandial insulin as needed and titrate SSI to maintain serum [id number]. Start diabetic diet.      Chronic cough  PMHx of interstitial lung disease (lung scarring). Continue scheduled benzonotate tablet 200mg TID. For persistent cough add dextromethorphan-guaiefesin syrup. Compare/contrast serial CXR for worsening inflammatory process.    - See fever.     Chronic diarrhea  Pt started on loperamide at OSH with adequate response, will continue anti-nausea medication at [facility]. Trended labs daily and adjusted for any electrolyte abnormality. Pt with prior negative Cdiff test.    - CMV ordered.       Fever in immunocomprised patient  Patient received paracentesis on 05/01.  Negative for SBP.  Spiked fever afterwards.  Septic workup.  Chest x-ray showed consolidation at the middle lobe of the left lung.  Started on broad-spectrum antibiotics.  Id consult placed given comorbidity and immunocompromised status.     - On broad spectrum abx.   - ID consult placed.       Thrombocytopenia  Thrombocytopenia likely in setting of hepatic cirrhosis. Hold anticoagulation and DVT prophylaxis for thrombocytopenia.      Hypothyroidism  Continued on home medication: synthroid 50mcg qd      Hyponatremia  Stable. Urine sodium 29, urine osm 297. DDX low effective arterial volume vs SIADH.     - KTM following.   - Cont monitoring.         Cirrhosis of liver with ascites  Pt recently diagnosed with cirrhosis 2/2 to  LOPEZ. Pulmonary vein hypertension now present with with multiple episodes of paracentesis. Largest abdominal fluid reduction just over 5L. Diagnostic paracentesis performed at [facility] [place]. Lab work being processed currently.  - Labs negative for SBP.   - identify significant cytology, cultures and gram staining  - Hepatology and KTM coordinating for possible transplant.   - Echo ordered for transplant eval.     Interstitial lung disease  CXR performed on admission, with serial imaging reviewed. Continued scheduled Benzonate daily. Flonase added daily PRN with DuoNebs q6h PRN. Supplemental oxygen PRN. Continuous pulse oxymetry ordered.  - manage SpO2 88-93% by adjusting O2 flow  - perform ambulatory test for home oxygen if required.   - F/u pulm outpatient.       Gout  Continue allopurinol for prophylaxis.       Transplanted kidney  Renal transplant performed in 2004. Pt continued on sirolimus and tacrolimus for immuno suppression. KTM consulted on admission, appreciate recs. Trough levels ordered with both medications.currently held.    - trend renal funtion tests   - restart tacrolimus when levels permit  - restart sirolimus as directed by KTM      VTE Risk Mitigation (From admission, onward)         Ordered     IP VTE HIGH RISK PATIENT  Once         04/30/22 1810     Place sequential compression device  Until discontinued         04/30/22 1810                Discharge Planning   LE: 5/6/2022     Code Status: Full Code   Is the patient medically ready for discharge?: No    Reason for patient still in hospital (select all that apply): Patient trending condition  Discharge Plan A: Home with family, Home Health                  Nikos Fritz MD  Department of Hospital Medicine   [facility] - Transplant Stepdown

## 2022-05-02 NOTE — TREATMENT PLAN
Hepatology Treatment Plan    Edgar Jackson is a 61 y.o. male admitted to hospital 4/30/2022 (Hospital Day: 3) due to ILDEFONSO (acute kidney injury).     Interval History  No acute events overnight.      Objective  Temp:  [98.1 °F (36.7 °C)-100.7 °F (38.2 °C)] 99.4 °F (37.4 °C) (05/02 0800)  Pulse:  [65-79] 79 (05/02 1000)  BP: (116-145)/(69-89) 116/69 (05/02 1000)  Resp:  [15-30] 29 (05/02 1000)  SpO2:  [87 %-97 %] 92 % (05/02 1000)      Laboratory    Lab Results   Component Value Date    WBC 6.38 05/02/2022    HGB 9.7 (L) 05/02/2022    HCT 27.4 (L) 05/02/2022    MCV 84 05/02/2022    PLT 77 (L) 05/02/2022       Lab Results   Component Value Date     (L) 05/02/2022    K 4.6 05/02/2022    CL 97 05/02/2022    CO2 17 (L) 05/02/2022    BUN 64 (H) 05/02/2022    CREATININE 5.6 (H) 05/02/2022    CALCIUM 8.1 (L) 05/02/2022       Lab Results   Component Value Date    ALBUMIN 3.0 (L) 05/02/2022    ALT 10 05/02/2022    AST 77 (H) 05/02/2022    ALKPHOS 84 05/02/2022    BILITOT 6.0 (H) 05/02/2022       Lab Results   Component Value Date    INR 1.6 (H) 05/02/2022    INR 1.6 (H) 05/01/2022    INR 1.5 (H) 04/30/2022       MELD-Na score: 35 at 5/2/2022  8:09 AM  MELD score: 32 at 5/2/2022  8:09 AM  Calculated from:  Serum Creatinine: 5.6 mg/dL (Using max of 4 mg/dL) at 5/2/2022  8:09 AM  Serum Sodium: 126 mmol/L at 5/2/2022  8:09 AM  Total Bilirubin: 6.0 mg/dL at 5/2/2022  8:09 AM  INR(ratio): 1.6 at 5/2/2022  7:40 AM  Age: 61 years    Plan  - continue to follow up transplant nephrology recc's  - ECHO  - will need CT triple phase liver for further evaluation of elevated AFP  - will seek approval for transplant evaluation for liver/kidney transplant  - please obtain daily CBC, BMP, LFT, INR  - Plan of care was discussed with primary team    Thank you for involving us in the care of Edgar Jackson. Please call with any additional concerns or questions.    Randal Ibrahim MD  Gastroenterology Fellow

## 2022-05-02 NOTE — PROGRESS NOTES
[facility] - Transplant Stepdown  Kidney Transplant  Progress Note      Reason for Follow-up: Reassessment of Kidney Transplant Referral - 5/2/2022 recipient and management of immunosuppression.        Subjective:   History of Present Illness:  61yoM with PMHx of renal transplant for hypertensive nephropathy (2004) immunosuppressed on tacrolimus/sirolimus,  therapy, LOPEZ cirrhosis, NIDDM, and ILD who was treated at OSH for ILDEFONSO 2/2 decompensated cirrhosis and worsening portal vein hypertension with HRS.   Pt reported that he had been experiencing abdominal distention with associated diarrhea (4-5/day) over several weeks, along with a constant nagging cough.  He has been having weekly paracentesis in the past month.   Despite efforts to manage symptoms, pt in need of  transplant surgery evaluation of the renal/hepatic injury, resulting in tranfer to [facility] [place].    He reports no recent NSAID use.     Hospital Course:  No notes on file    Interval History:  No acute overnight events  States that he had 2 Bowl Movements       Past Medical, Surgical, Family, and Social History:   Unchanged from H&P.    Scheduled Meds:   allopurinoL  100 mg Oral Daily    benzonatate  200 mg Oral TID    ceFEPime (MAXIPIME) IVPB  1 g Intravenous Q24H    furosemide  20 mg Oral Daily    levothyroxine  50 mcg Oral Daily    lipase-protease-amylase 12,000-38,000-60,000 units  2 capsule Oral QID (WM & HS)    metoprolol tartrate  50 mg Oral BID    mupirocin   Nasal BID    sodium bicarbonate  650 mg Oral TID     Continuous Infusions:  PRN Meds:albuterol-ipratropium, dextromethorphan-guaiFENesin  mg/5 ml, dextrose 10%, fluticasone propionate, glucagon (human recombinant), glucose, glucose, insulin aspart U-100, loperamide, naloxone, sodium chloride 0.9%, Pharmacy to dose Vancomycin consult **AND** vancomycin - pharmacy to dose    Intake/Output - Last 3 Shifts         04/30 0700  05/01 0659 05/01 0700 05/02 0659 05/02 0700 05/03 0659    P.O.  "237 900 360    I.V. (mL/kg)  0 (0)     Other  0     Total Intake(mL/kg) 237 (3.6) 900 (14) 360 (5.6)    Urine (mL/kg/hr) 475 1125 (0.7) 300 (0.6)    Emesis/NG output  0     Other  0     Stool 0 0     Blood  0     Total Output 475 1125 300    Net -238 -225 +60           Urine Occurrence  1 x     Stool Occurrence 1 x 2 x     Emesis Occurrence  0 x              Review of Systems   Objective:     Vital Signs (Most Recent):  Temp: 99.8 °F (37.7 °C) (05/02/22 1157)  Pulse: 69 (05/02/22 1200)  Resp: 20 (05/02/22 1200)  BP: 131/78 (05/02/22 1200)  SpO2: (!) 91 % (05/02/22 1200)   Vital Signs (24h Range):  Temp:  [98.1 °F (36.7 °C)-100.7 °F (38.2 °C)] 99.8 °F (37.7 °C)  Pulse:  [65-79] 69  Resp:  [15-30] 20  SpO2:  [87 %-97 %] 91 %  BP: (116-145)/(69-89) 131/78     Weight: 64.3 kg (141 lb 12.1 oz)  Height: 5' 8" (172.7 cm)  Body mass index is 21.55 kg/m².    Physical Exam    Constitutional: Awake, alert, and oriented x3. Well developed and well nourished. In no acute distress.   HEENT: Normocephalic and atraumatic. Oropharynx is clear and moist. No oropharyngeal exudate. PERRLA, EOMI.   Neck: Normal range of motion. Neck supple. No JVD present. No bruits. No tracheal deviation present. No thyromegaly.   Cardiovascular: Regular rate/rhythm, +S1/S2, No murmurs/rubs/gallops  Pulmonary/Chest: Clear to auscultation bilaterally, No wheezes/rales/rhonchi   Abdominal: Soft, Nontender, Nondistended, Bowel sounds normal, No rebound or guarding, No hepatosplenomegaly, No ascites   Musculoskeletal: Normal range of motion. No edema and no tenderness.   Lymphadenopathy: No cervical lymphadenopathy   Neurological: Awake, alert, and oriented to person, place, time. Reflexes normal. CN II-XII grossly intact. Strength 5/5 throughout.   Skin: No rash, No diaphoresis, No erythema.       Laboratory:  CBC:   Recent Labs   Lab 04/30/22  1946 05/01/22  0918 05/02/22  0740   WBC 6.86 6.86 6.38   RBC 3.60* 3.26* 3.26*   HGB 10.5* 9.9* 9.7*   HCT " 30.7* 27.8* 27.4*   PLT 91* 83* 77*   MCV 85 85 84   MCH 29.2 30.4 29.8   MCHC 34.2 35.6 35.4     BMP:   Recent Labs   Lab 04/30/22 1947 05/01/22  0918 05/02/22  0809    73 126*   * 126* 126*   K 5.3* 4.8 4.6   CL 97 97 97   CO2 16* 18* 17*   BUN 64* 64* 64*   CREATININE 6.0* 5.7* 5.6*   CALCIUM 7.9* 8.4* 8.1*       Diagnostic Results:  Abdominal X-Ray: No results found for this or any previous visit.  Chest X-Ray: No results found for this or any previous visit.    Assessment/Plan:     * ILDEFONSO (acute kidney injury)  His renal functions were normal beginning of Feb.   After that it appears he had ILDEFONSO - possibly related to pre-renal states  Which led to CKD stage III, which has progressed to stage IV over the course of last few weeks     At this time no indication for hemodialysis.   Has consented for HD in case needed.   Monitor I/Os   US renal transplant unremarkable.    Pt will need  6 weeks of consecutive GFR noted to be below <25 to be evaluated for liver transplant    Chronic diarrhea  Reports since past 2 months   stool studies in progress   CMV pending       Chronic Hyponatremia  This is likely in setting of renal failure and liver cirrhosis   Would be hard to normalize  Continue fluid restriction upto 1.2 liter/day    Cirrhosis of liver with ascites  Work up per Hepatology   AFP significantly elevated   Liver biopsy -- Chronic mild-moderately active hepatitis with bridging fibrosis and nodule   formation (Grade 2, Stage 3).      Personal history of immunosupression therapy  On sirolimus and prograf at home   Hold at this time   Sirolmius Level: 22.1 on 5/2     Transplanted kidney  S/p kidney transplant living donor more then 10 years ago at [facility].   Reports no episodes of rejections or complications post surgery   ESRD - related to HTN. Currently has no access for HD       Lilliam Topete MD  Kidney Transplant  [facility] - Transplant Stepdown

## 2022-05-02 NOTE — SUBJECTIVE & OBJECTIVE
Subjective:   History of Present Illness:  61yoM with PMHx of renal transplant for hypertensive nephropathy (2004) immunosuppressed on tacrolimus/sirolimus,  therapy, LOPEZ cirrhosis, NIDDM, and ILD who was treated at OSH for ILDEFONSO 2/2 decompensated cirrhosis and worsening portal vein hypertension with HRS.   Pt reported that he had been experiencing abdominal distention with associated diarrhea (4-5/day) over several weeks, along with a constant nagging cough.  He has been having weekly paracentesis in the past month.   Despite efforts to manage symptoms, pt in need of  transplant surgery evaluation of the renal/hepatic injury, resulting in tranfer to [facility] [place].    He reports no recent NSAID use.         Hospital Course:  No notes on file    Interval History:  No acute overnight events  States that he had 2 Bowl Movements       Past Medical, Surgical, Family, and Social History:   Unchanged from H&P.    Scheduled Meds:   allopurinoL  100 mg Oral Daily    benzonatate  200 mg Oral TID    ceFEPime (MAXIPIME) IVPB  1 g Intravenous Q24H    furosemide  20 mg Oral Daily    levothyroxine  50 mcg Oral Daily    lipase-protease-amylase 12,000-38,000-60,000 units  2 capsule Oral QID (WM & HS)    metoprolol tartrate  50 mg Oral BID    mupirocin   Nasal BID    sodium bicarbonate  650 mg Oral TID     Continuous Infusions:  PRN Meds:albuterol-ipratropium, dextromethorphan-guaiFENesin  mg/5 ml, dextrose 10%, fluticasone propionate, glucagon (human recombinant), glucose, glucose, insulin aspart U-100, loperamide, naloxone, sodium chloride 0.9%, Pharmacy to dose Vancomycin consult **AND** vancomycin - pharmacy to dose    Intake/Output - Last 3 Shifts         04/30 0700  05/01 0659 05/01 0700  05/02 0659 05/02 0700  05/03 0659    P.O. 237 900 360    I.V. (mL/kg)  0 (0)     Other  0     Total Intake(mL/kg) 237 (3.6) 900 (14) 360 (5.6)    Urine (mL/kg/hr) 475 1125 (0.7) 300 (0.6)    Emesis/NG output  0     Other  0     Stool 0 0      "Blood  0     Total Output 475 1125 300    Net -238 -225 +60           Urine Occurrence  1 x     Stool Occurrence 1 x 2 x     Emesis Occurrence  0 x              Review of Systems   Objective:     Vital Signs (Most Recent):  Temp: 99.8 °F (37.7 °C) (05/02/22 1157)  Pulse: 69 (05/02/22 1200)  Resp: 20 (05/02/22 1200)  BP: 131/78 (05/02/22 1200)  SpO2: (!) 91 % (05/02/22 1200)   Vital Signs (24h Range):  Temp:  [98.1 °F (36.7 °C)-100.7 °F (38.2 °C)] 99.8 °F (37.7 °C)  Pulse:  [65-79] 69  Resp:  [15-30] 20  SpO2:  [87 %-97 %] 91 %  BP: (116-145)/(69-89) 131/78     Weight: 64.3 kg (141 lb 12.1 oz)  Height: 5' 8" (172.7 cm)  Body mass index is 21.55 kg/m².    Physical Exam    Laboratory:  CBC:   Recent Labs   Lab 04/30/22 1946 05/01/22  0918 05/02/22  0740   WBC 6.86 6.86 6.38   RBC 3.60* 3.26* 3.26*   HGB 10.5* 9.9* 9.7*   HCT 30.7* 27.8* 27.4*   PLT 91* 83* 77*   MCV 85 85 84   MCH 29.2 30.4 29.8   MCHC 34.2 35.6 35.4     BMP:   Recent Labs   Lab 04/30/22 1947 05/01/22  0918 05/02/22  0809    73 126*   * 126* 126*   K 5.3* 4.8 4.6   CL 97 97 97   CO2 16* 18* 17*   BUN 64* 64* 64*   CREATININE 6.0* 5.7* 5.6*   CALCIUM 7.9* 8.4* 8.1*       Diagnostic Results:  Abdominal X-Ray: No results found for this or any previous visit.  Chest X-Ray: No results found for this or any previous visit.  "

## 2022-05-02 NOTE — PROGRESS NOTES
[facility] - [facility] Medicine  Progress Note    Patient Name: Edgar Jackson  MRN: [id number]  Patient Class: IP- Inpatient   Admission Date: 4/30/2022  Length of Stay: 1 days  Attending Physician: Jarred Dyson MD  Primary Care Provider: Tylre Castillo MD        Subjective:     Principal Problem:ILDEFONSO (acute kidney injury)        HPI:  Mr Jackson is a 61yoM with PMHx of renal transplant for hypertensive nephropathy (2004) immunosuppressed on tacrolimus/sirolimus,  therapy, LOPEZ cirrhosis, NIDDM, and ILD who was treated at OSH for ILDEFONSO 2/2 decompensated cirrhosis and worsening portal vein hypertension with HRS. Pt reported that he had been experiencing abdominal distention with associated diarrhea (4-5/day) over several weeks, along with a constant nagging cough. On presenting to OSH, multiple paracentesis (3) were performed to relieve the ascitic fluid. No evidence of SBP on fluid analysis. Creon was added to diet to aide in food digestion in setting of cirrhosis. Loperamide provided symptomatic treatment of diarrhea. Dextromethorphan-guaifenesin syrup added to reduce cough exacerbations. Despite efforts to manage symptoms, pt in need of  transplant surgery evaluation of the renal/hepatic injury, resulting in tranfer to [facility] [place].     On arrival to [facility] (4/30/2022), pt has experienced increased generalized weakness. Loperamide was continued for diarrhea management. Abdominal distention recurred, despite prior paracentesis. Cough has been fairly constant and predominantly dry. Pt now endorses new left lower quadrant abdominal pain, worsened by cough. KTM and Hepatology consults placed for management of HRS.      Overview/Hospital Course:  Pt admitted to hospital medicine for treatment of ILDEFONSO likely secondary to decompensated cirrhosis. Consulted Hepatology and KTM, appreciate recs. Performed diagnostic paracentesis to verify nature of ascitic fluid and to rule out SBP. Pt had a fever of 100.7F per arm pit and  100.5F orally. CXR was performed prior with no acute findings. Blood cultures collected and Vanc/Cefepime started. ID consult placed for further recs.      Interval History: No acute events overnight. Pt reported that he slept well and ate a good breakfast with no nausea. No abdominal change or pain reported.    Review of Systems   Constitutional:  Negative for activity change and appetite change.   HENT:  Negative for congestion, dental problem and sore throat.    Eyes:  Negative for discharge and itching.   Respiratory:  Positive for cough. Negative for shortness of breath, wheezing and stridor.    Cardiovascular:  Negative for chest pain.   Gastrointestinal:  Positive for diarrhea. Negative for abdominal distention and abdominal pain.   Endocrine: Negative for cold intolerance.   Genitourinary:  Negative for difficulty urinating and dysuria.   Musculoskeletal:  Negative for arthralgias and back pain.   Skin:  Negative for color change.   Neurological:  Negative for dizziness, tremors, syncope and facial asymmetry.   Hematological:  Negative for adenopathy.   Psychiatric/Behavioral:  Negative for agitation and behavioral problems.    Objective:     Vital Signs (Most Recent):  Temp: 99.4 °F (37.4 °C) (05/01/22 2015)  Pulse: 76 (05/01/22 2015)  Resp: 18 (05/01/22 2015)  BP: 122/72 (05/01/22 2015)  SpO2: (!) 89 % (05/01/22 2015)   Vital Signs (24h Range):  Temp:  [97.3 °F (36.3 °C)-100.7 °F (38.2 °C)] 99.4 °F (37.4 °C)  Pulse:  [72-76] 76  Resp:  [17-26] 18  SpO2:  [89 %-93 %] 89 %  BP: (122-150)/(72-92) 122/72     Weight: 65 kg (143 lb 4.8 oz)  Body mass index is 21.79 kg/m².    Intake/Output Summary (Last 24 hours) at 5/1/2022 2033  Last data filed at 5/1/2022 1800  Gross per 24 hour   Intake 540 ml   Output 1100 ml   Net -560 ml      Physical Exam  Vitals and nursing note reviewed.   Constitutional:       General: He is awake.      Appearance: Normal appearance. He is well-developed.   HENT:      Head: Atraumatic.       Right Ear: External ear normal.      Left Ear: External ear normal.      Nose: Nose normal.      Mouth/Throat:      Mouth: Mucous membranes are moist.   Eyes:      General: No scleral icterus.  Cardiovascular:      Rate and Rhythm: Normal rate and regular rhythm.      Pulses: Normal pulses.           Radial pulses are 2+ on the right side and 2+ on the left side.        Dorsalis pedis pulses are 2+ on the right side and 2+ on the left side.      Heart sounds: Normal heart sounds. No murmur heard.  Pulmonary:      Effort: Pulmonary effort is normal. No respiratory distress.      Breath sounds: Rales present. No wheezing.   Abdominal:      General: Bowel sounds are normal. There is distension.      Palpations: Abdomen is soft. There is fluid wave.      Tenderness: There is abdominal tenderness. There is no guarding.   Musculoskeletal:         General: No swelling or tenderness. Normal range of motion.      Cervical back: Full passive range of motion without pain and normal range of motion.   Lymphadenopathy:      Cervical: No cervical adenopathy.   Skin:     General: Skin is warm.      Coloration: Skin is not jaundiced or pale.   Neurological:      Mental Status: He is alert and oriented to person, place, and time.      Sensory: No sensory deficit.   Psychiatric:         Attention and Perception: Attention normal.         Behavior: Behavior normal. Behavior is cooperative.         Cognition and Memory: Cognition normal.       Significant Labs: All pertinent labs within the past 24 hours have been reviewed.    Significant Imaging: I have reviewed all pertinent imaging results/findings within the past 24 hours.      Assessment/Plan:      * ILDEFONSO (acute kidney injury)  Patient with acute kidney injury likely d/t IVVD/Dehydration Which is currently stable. Labs reviewed- Renal function/electrolytes with Estimated Creatinine Clearance: 12.7 mL/min (A) (based on SCr of 5.7 mg/dL (H)). according to latest data. Monitor  urine output and serial BMP and adjust therapy as needed. Avoid nephrotoxins and renally dose meds for GFR listed above. Gentle IV fluid PRN. Ultrasound transplant kidney no acute changes. Possibly due to tacrolimus versus hepatorenal syndrome, Nephrology has been consulted. Toprolol continued.  Bicarb ordered for low serum levels. Repeat lasix and albumin dosing per Nephrology  - f/u labs: 24 hour urine, urine sodium, urine chloride  - evaluate urine sediment  - hold sirolimus and tacrolimus  - trend daily BMP/RFP      Type 2 diabetes mellitus without complication, without long-term current use of insulin  Start basal/ prandial insulin as needed and titrate SSI to maintain serum [id number]. Start diabetic diet.      Chronic cough  PMHx of interstitial lung disease (lung scarring). Continue scheduled benzonotate tablet 200mg TID. For persistent cough add dextromethorphan-guaiefesin syrup. Compare/contrast serial CXR for worsening inflammatory process.    Chronic diarrhea  Pt started on loperamide at OSH with adequate response, will continue anti-nausea medication at [facility]. Trended labs daily and adjusted for any electrolyte abnormality. Pt with prior negative Cdiff test.      Thrombocytopenia  Thrombocytopenia likely in setting of hepatic cirrhosis. Hold anticoagulation and DVT prophylaxis for thrombocytopenia.      Hypothyroidism  Continued on home medication: synthroid 50mcg qd      Cirrhosis of liver with ascites  Pt recently diagnosed with cirrhosis 2/2 to LOPEZ. Pulmonary vein hypertension now present with with multiple episodes of paracentesis. Largest abdominal fluid reduction just over 5L. Diagnostic paracentesis performed at [facility]. Lab work being processed currently.  - f/u labs for ascitic fluid.  - identify significant cytology, cultures and gram staining  - outpatient hepatology consult    Interstitial lung disease  CXR performed on admission, with serial imaging reviewed. Continued scheduled Benzonate  daily. Flonase added daily PRN with DuoNebs q6h PRN. Supplemental oxygen PRN. Continuous pulse oxymetry ordered.  - manage SpO2 88-93% by adjusting O2 flow  - perform ambulatory test for home oxygen if patient requires  - Pulmonology consult PRN    Gout  Continue allopurinol for prophylaxis.       Transplanted kidney  Renal transplant performed in 2004. Pt continued on sirolimus and tacrolimus for immuno suppression. KTM consulted on admission, appreciate recs. Trough levels ordered with both medications.currently held.    - trend renal funtion tests   - restart tacrolimus when levels permit  - restart sirolimus as directed by KTM      VTE Risk Mitigation (From admission, onward)         Ordered     IP VTE HIGH RISK PATIENT  Once         04/30/22 1810     Place sequential compression device  Until discontinued         04/30/22 1810                Discharge Planning   LE: 5/6/2022     Code Status: Full Code   Is the patient medically ready for discharge?: No    Reason for patient still in hospital (select all that apply): Treatment             Mansoor Narayanan MD  Department of Hospital Medicine   [facility] - Transplant Stepdown

## 2022-05-02 NOTE — SUBJECTIVE & OBJECTIVE
"Past Medical History:   Diagnosis Date    BPH (benign prostatic hyperplasia)     Diabetes 2020    Gout     Hypertension 2000    Hypothyroidism 2022    Interstitial lung disease 2022    Kidney transplant recipient 2004    [place] 2* hypertensive nephropathy    Unspecified cirrhosis of liver     ?LOPEZ       Past Surgical History:   Procedure Laterality Date    CATARACT EXTRACTION Right 2017    COLONOSCOPY N/A 3/8/2022    Procedure: COLONOSCOPY;  Surgeon: Mio Simmons III, MD;  Location: [facility] ENDO;  Service: Endoscopy;  Laterality: N/A;    ENDOSCOPIC ULTRASOUND OF UPPER GASTROINTESTINAL TRACT N/A 3/8/2022    Procedure: ULTRASOUND, UPPER GI TRACT, ENDOSCOPIC;  Surgeon: Mio Simmons III, MD;  Location: [facility] ENDO;  Service: Endoscopy;  Laterality: N/A;    KIDNEY TRANSPLANT      PORTACATH PLACEMENT      REMOVAL OF VASCULAR ACCESS PORT         Review of patient's allergies indicates:  No Known Allergies    Medications:  Medications Prior to Admission   Medication Sig    alfuzosin (UROXATRAL) 10 mg Tb24 Take 1 tablet (10 mg total) by mouth after dinner.    allopurinoL (ZYLOPRIM) 100 MG tablet Take 100 mg by mouth once daily.    BD NOEMI 2ND GEN PEN NEEDLE 32 gauge x 5/32" Ndle USE AS DIRECTED ONCE DAILY WITH BASAGLAR    benzonatate (TESSALON) 200 MG capsule Take 1 capsule (200 mg total) by mouth 3 (three) times daily.    [] dextromethorphan-guaiFENesin  mg/5 ml (ROBITUSSIN-DM)  mg/5 mL liquid Take 5 mLs by mouth every 6 (six) hours as needed (cough).    fluticasone propionate (FLONASE) 50 mcg/actuation nasal spray 2 sprays (100 mcg total) by Each Nostril route once daily.    furosemide (LASIX) 20 MG tablet Take 1 tablet (20 mg total) by mouth once daily.    [] guaiFENesin (MUCINEX) 600 mg 12 hr tablet Take 1 tablet (600 mg total) by mouth 2 (two) times daily. for 10 days    hydrocodone-chlorpheniramine (TUSSIONEX) 10-8 mg/5 mL suspension Take 5 mLs by mouth " "every 12 (twelve) hours as needed for Cough or Congestion.    levothyroxine (SYNTHROID) 50 MCG tablet Take 50 mcg by mouth once daily.    lipase-protease-amylase 12,000-38,000-60,000 units (CREON) CpDR Take 2 capsules by mouth 4 (four) times daily with meals and nightly.    [] loperamide (IMODIUM) 2 mg capsule Take 1 capsule (2 mg total) by mouth 4 (four) times daily as needed for Diarrhea.    metoprolol tartrate (LOPRESSOR) 50 MG tablet Take 50 mg by mouth 2 (two) times daily.    pravastatin (PRAVACHOL) 40 MG tablet Take 40 mg by mouth every evening.    sildenafiL (VIAGRA) 100 MG tablet Take 1 tablet (100 mg total) by mouth daily as needed for Erectile Dysfunction.    sirolimus (RAPAMUNE) 1 MG Tab Take 2 mg by mouth once daily.    tacrolimus (PROGRAF) 0.5 MG Cap Take 3 capsules (1.5 mg total) by mouth every 12 (twelve) hours.    TOUJEO SOLOSTAR U-300 INSULIN 300 unit/mL (1.5 mL) InPn pen Inject 20 Units into the skin nightly.    VENTOLIN HFA 90 mcg/actuation inhaler INHALE 2 PUFFS INTO THE LUNGS EVERY 6 (SIX) HOURS AS NEEDED FOR WHEEZING (COUGHING). RESCUE (Patient taking differently: Inhale 1 puff into the lungs every 6 (six) hours as needed.)     Antibiotics (From admission, onward)                Start     Stop Route Frequency Ordered    22  cefepime in dextrose 5 % 1 gram/50 mL IVPB 1 g         -- IV Every 24 hours (non-standard times) 22  vancomycin - pharmacy to dose  (vancomycin IVPB)        "And" Linked Group Details    -- IV pharmacy to manage frequency 22  mupirocin 2 % ointment         2059 Nasl 2 times daily 22          Antifungals (From admission, onward)                None          Antivirals (From admission, onward)      None             Immunization History   Administered Date(s) Administered    COVID-19, MRNA, LN-S, PF (Pfizer) (Purple Cap) 2021    Influenza - High Dose - PF (65 years and older) " 10/10/2018    Influenza - Quadrivalent 10/10/2018, 11/23/2019    Influenza - Quadrivalent - MDCK - PF 12/26/2021    Influenza - Quadrivalent - PF *Preferred* (6 months and older) 11/23/2019    Influenza - Trivalent (ADULT) 12/28/2017    Influenza - Trivalent - PF (ADULT) 10/25/2004, 10/30/2020    Pneumococcal Polysaccharide - 23 Valent 12/28/2017    Tetanus 12/28/2017       Family History       Problem Relation (Age of Onset)    Heart disease Father    Kidney disease Father          Social History     Socioeconomic History    Marital status:    Tobacco Use    Smoking status: Never Smoker    Smokeless tobacco: Never Used   Substance and Sexual Activity    Alcohol use: Not Currently    Drug use: Never     Review of Systems   Constitutional:  Positive for activity change, appetite change, fatigue, fever and unexpected weight change. Negative for chills.   HENT:  Negative for congestion and sore throat.    Eyes:  Negative for visual disturbance.   Respiratory:  Positive for cough. Negative for shortness of breath.    Cardiovascular:  Negative for chest pain and leg swelling.   Gastrointestinal:  Positive for abdominal pain and diarrhea. Negative for constipation, nausea and vomiting.   Endocrine: Negative for polydipsia.   Genitourinary:  Negative for dysuria.   Musculoskeletal:  Negative for arthralgias, back pain and myalgias.   Skin:  Negative for rash and wound.   Allergic/Immunologic: Positive for immunocompromised state. Negative for food allergies.   Neurological:  Negative for headaches.   Hematological:  Does not bruise/bleed easily.   Psychiatric/Behavioral:  Negative for confusion.    All other systems reviewed and are negative.  Objective:     Vital Signs (Most Recent):  Temp: (!) 100.5 °F (38.1 °C) (05/02/22 1645)  Pulse: 68 (05/02/22 1645)  Resp: (!) 27 (05/02/22 1645)  BP: 122/72 (05/02/22 1645)  SpO2: (!) 94 % (05/02/22 1645)   Vital Signs (24h Range):  Temp:  [98.1 °F (36.7 °C)-100.7 °F (38.2  °C)] 100.5 °F (38.1 °C)  Pulse:  [65-79] 68  Resp:  [15-30] 27  SpO2:  [87 %-98 %] 94 %  BP: (115-144)/(68-85) 122/72     Weight: 64.3 kg (141 lb 12.1 oz)  Body mass index is 21.55 kg/m².    Estimated Creatinine Clearance: 12.8 mL/min (A) (based on SCr of 5.6 mg/dL (H)).    Physical Exam  Vitals and nursing note reviewed.   Constitutional:       General: He is awake.      Appearance: He is well-developed. He is ill-appearing.   HENT:      Head: Normocephalic and atraumatic.      Right Ear: External ear normal.      Left Ear: External ear normal.      Nose:      Comments: On nasal cannula     Mouth/Throat:      Mouth: Mucous membranes are moist.   Eyes:      General: Scleral icterus present.   Cardiovascular:      Rate and Rhythm: Normal rate and regular rhythm.      Pulses: Normal pulses.           Radial pulses are 2+ on the right side and 2+ on the left side.        Dorsalis pedis pulses are 2+ on the right side and 2+ on the left side.      Heart sounds: Normal heart sounds. No murmur heard.  Pulmonary:      Effort: No respiratory distress.      Breath sounds: Rhonchi present. No wheezing.   Abdominal:      General: Bowel sounds are normal. There is distension.      Palpations: Abdomen is soft. There is fluid wave.      Tenderness: There is abdominal tenderness (left sided. paracentesis site on bandage). There is no guarding.   Musculoskeletal:         General: Normal range of motion.      Cervical back: Full passive range of motion without pain and neck supple.      Right lower leg: No edema.      Left lower leg: No edema.   Lymphadenopathy:      Cervical: No cervical adenopathy.   Skin:     General: Skin is warm.      Coloration: Skin is jaundiced.   Neurological:      Mental Status: He is alert and oriented to person, place, and time.   Psychiatric:         Attention and Perception: Attention normal.         Mood and Affect: Mood normal.         Behavior: Behavior normal. Behavior is cooperative.          Thought Content: Thought content normal.         Cognition and Memory: Cognition normal.         Judgment: Judgment normal.       Significant Labs: Blood Culture:   Recent Labs   Lab 02/02/22  1056 04/11/22  0852 04/28/22  0454 04/28/22  0701 05/01/22  2132   LABBLOO No growth after 5 days.  No growth after 5 days. No growth after 5 days. No Growth to date  No Growth to date  No Growth to date  No Growth to date  No Growth to date No Growth to date  No Growth to date  No Growth to date  No Growth to date  No Growth to date No Growth to date  No Growth to date     CBC:   Recent Labs   Lab 04/30/22 1946 05/01/22  0918 05/02/22  0740   WBC 6.86 6.86 6.38   HGB 10.5* 9.9* 9.7*   HCT 30.7* 27.8* 27.4*   PLT 91* 83* 77*     CMP:   Recent Labs   Lab 04/30/22 1947 05/01/22  0918 05/02/22  0809   * 126* 126*   K 5.3* 4.8 4.6   CL 97 97 97   CO2 16* 18* 17*    73 126*   BUN 64* 64* 64*   CREATININE 6.0* 5.7* 5.6*   CALCIUM 7.9* 8.4* 8.1*   PROT 5.8* 6.2 5.5*   ALBUMIN 2.5* 3.4* 3.0*   BILITOT 5.2* 5.7* 6.0*   ALKPHOS 105 96 84   AST 97* 86* 77*   ALT 14 12 10   ANIONGAP 11 11 12   EGFRNONAA 9.3* 9.9* 10.1*     Urine Studies:   Recent Labs   Lab 04/30/22  0713 05/02/22  1219   COLORU Yellow Yellow   APPEARANCEUA Hazy* Clear   PHUR 5.0 5.0   SPECGRAV 1.010 1.010   PROTEINUA 1+* 1+*   GLUCUA Trace* 2+*   KETONESU Negative Negative   BILIRUBINUA Negative Negative   OCCULTUA 1+* 2+*   NITRITE Negative Negative   UROBILINOGEN Negative  --    LEUKOCYTESUR Negative Negative   RBCUA 10* 2   WBCUA 5 3   BACTERIA Negative Rare   SQUAMEPITHEL 4  --    HYALINECASTS 17* 0     All pertinent labs within the past 24 hours have been reviewed.    5/1 Paracentesis:  (seg 1, L 51, M 46, meso 2)    Gram - no wbc/organisms seen    5/1 BCx NGTD      Significant Imaging: I have reviewed all pertinent imaging results/findings within the past 24 hours.    CT A/P  IMPRESSION:  1.  Small pericardial effusion with right  greater than left pleural effusions.  2. Findings suggesting cirrhosis with portal hypertension.  3. Bilateral nephrolithiasis, nonobstructing.  4. Moderate ascites      US Kidney  Transplant renal artery and vein are patent, with no evidence of hemodynamically significant stenosis.  Mild increased echogenicity of the transplant kidney, similar to prior.  No hydronephrosis.  Moderate volume abdominal and pelvic ascites.    CXR  Single portable chest view is submitted.  There is diminished depth of inspiration, when accounting for this the appearance of the cardiomediastinal silhouette is stable.  There is opacity involving the peripheral right upper lobe consistent with confluent infiltrates/airspace disease with mild progression, additional opacity seen centrally on the left appears similar to the prior study and is consistent with pulmonary infiltrate/airspace disease.  There is also accentuation of pulmonary bronchovascular markings associated with diminished depth of inspiration, as well as a general pattern of mild interstitial infiltrates superimposed on chronic change.  There is no evidence for pleural effusion or pneumothorax.  The visualized osseous structures demonstrate chronic change.  Impression:  Bilateral pattern of pulmonary infiltrates, as discussed above.

## 2022-05-02 NOTE — PT/OT/SLP EVAL
Physical Therapy Evaluation and Treatment    Patient Name:  Edgar Jackson   MRN:  [id number]    Recommendations:     Discharge Recommendations:  home health PT   Discharge Equipment Recommendations: none   Barriers to discharge: None    Assessment:     Edgar Jackson is a 61 y.o. male admitted with a medical diagnosis of ILDEFONSO (acute kidney injury).  He presents with the following impairments/functional limitations:  weakness, impaired endurance, impaired self care skills, impaired functional mobilty, gait instability, impaired balance. Pt able to ambulate with contact guard assistance with use of a SPC. Pt trialed RW, demo'd improved stability and able to tolerate increased activity. PT encouraged use of RW and frequent OOB mobility with daily walks with staff supervision. Pt agreeable. Pt would continue to benefit from acute skilled therapy intervention to address deficits and progress toward prior level of function.       Rehab Prognosis: Good; patient would benefit from acute skilled PT services to address these deficits and reach maximum level of function.    Recent Surgery: * No surgery found *      Plan:     During this hospitalization, patient to be seen 3 x/week to address the identified rehab impairments via gait training, therapeutic activities, therapeutic exercises, neuromuscular re-education and progress toward the following goals:    · Plan of Care Expires:  06/02/22    Subjective     Chief Complaint: Pt c/o weakness and fatigue  Patient/Family Comments/goals: to get better   Pain/Comfort:  · Pain Rating 1: 0/10  · Pain Rating Post-Intervention 1: 0/10    Patients cultural, spiritual, Mu-ism conflicts given the current situation: no    Living Environment:  Pt lives with his spouse in a [facility] with 10 JAIDEN, 10 steps to reach 2nd floor where bed/bath are located. Pt able to stay on first floor if needed.   Prior to admission, patients level of function was independent with mobility and ADLs until ~3 months ago,  currently uses a SPC with occasional assistance with ADLs.  Equipment used at home: cane, straight, walker, rolling.  DME owned (not currently used): none.  Upon discharge, patient will have assistance from his spouse.    Objective:     Communicated with RN prior to session.  Patient found HOB elevated with telemetry  upon PT entry to room.    General Precautions: Standard, fall   Orthopedic Precautions:N/A   Braces: N/A  Respiratory Status: Room air    Exams:  · Cognitive Exam:  Patient is AAOx4, followed all commands, communicates clearly and fluently  · Gross Motor Coordination:  WFL  · RLE ROM: WFL  · RLE Strength: grossly 4/5  · LLE ROM: WFL  · LLE Strength:grossly 4/5     Functional Mobility:  · Bed Mobility:     · Supine to Sit: stand by assistance  · Transfers:     · Sit to Stand: 1x from EOB with stand by assistance with straight cane, 1x from chair with stand by assistance with use of arm rests   Gait: Pt ambulated 10 ft with SPC and contact guard assistance. Pt then ambulated 30 ft with RW and stand by assistance. Pt demo'd small step size, decreased foot clearance, narrow MICA, excessive sway. Cuing provided for forward gaze, upright posture and to maintain RW in close proximity to body.     Therapeutic Activities and Exercises:   Pt educated on role of PT/POC. Pt verbalized understanding.   Pt encouraged to only perform OOB mobility with assistance from nursing/therapy. Pt agreeable.   Pt encouraged to ambulate daily with assistance/supervision from nursing/therapy. Pt agreeable.  Pt educated on seated exercises to perform 20x, 3x/day. Exercises included bilateral LAQ, marching, ankle DF/PF      AM-PAC 6 CLICK MOBILITY  Total Score:21     Patient left up in chair with all lines intact, call button in reach and RN notified.    GOALS:   Multidisciplinary Problems     Physical Therapy Goals        Problem: Physical Therapy    Goal Priority Disciplines Outcome Goal Variances Interventions   Physical Therapy  Goal     PT, PT/OT Ongoing, Progressing     Description: Goals to be met by: 2022     Patient will increase functional independence with mobility by performin. Supine to sit with Modified [place]  2. Sit to stand transfer with Modified [place]  3. Gait  x 200 feet with Modified [place] using Rolling Walker or SPC.   4. Ascend/descend 10 stair with bilateral Handrails Contact Guard Assistance using No Assistive Device.                      History:     Past Medical History:   Diagnosis Date    BPH (benign prostatic hyperplasia)     Diabetes     Gout     Hypertension     Hypothyroidism 2022    Interstitial lung disease 2022    Kidney transplant recipient 2004    [place] 2* hypertensive nephropathy    Unspecified cirrhosis of liver     ?LOPEZ       Past Surgical History:   Procedure Laterality Date    CATARACT EXTRACTION Right 2017    COLONOSCOPY N/A 3/8/2022    Procedure: COLONOSCOPY;  Surgeon: Mio Simmons III, MD;  Location: [facility];  Service: Endoscopy;  Laterality: N/A;    ENDOSCOPIC ULTRASOUND OF UPPER GASTROINTESTINAL TRACT N/A 3/8/2022    Procedure: ULTRASOUND, UPPER GI TRACT, ENDOSCOPIC;  Surgeon: Mio Simmons III, MD;  Location: [facility];  Service: Endoscopy;  Laterality: N/A;    KIDNEY TRANSPLANT      PORTACATH PLACEMENT      REMOVAL OF VASCULAR ACCESS PORT         Time Tracking:     PT Received On: 22  PT Start Time: 0950     PT Stop Time: 1007  PT Total Time (min): 17 min     Billable Minutes: Evaluation 7 mins  and Gait Training 10 mins       2022

## 2022-05-03 PROBLEM — R91.8 LUNG INFILTRATE ON CT: Status: ACTIVE | Noted: 2022-01-01

## 2022-05-03 NOTE — ASSESSMENT & PLAN NOTE
"This patient does have evidence of infective focus  My overall impression is sepsis. Vital signs were reviewed and noted in progress note.  Antibiotics given-   Antibiotics (From admission, onward)            Start     Stop Route Frequency Ordered    05/01/22 2115  cefepime in dextrose 5 % 1 gram/50 mL IVPB 1 g         -- IV Every 24 hours (non-standard times) 05/01/22 2051 05/01/22 2102  vancomycin - pharmacy to dose  (vancomycin IVPB)        "And" Linked Group Details    -- IV pharmacy to manage frequency 05/01/22 2051 04/30/22 2230  mupirocin 2 % ointment         05/05 2059 Nasl 2 times daily 04/30/22 2126        Cultures were taken-   Microbiology Results (last 7 days)     Procedure Component Value Units Date/Time    Culture, MRSA [[id number]]     Order Status: No result Specimen: MRSA source from Nares, Left     Aerobic culture [[id number]]  (Abnormal) Collected: 05/01/22 2012    Order Status: Completed Specimen: Ascites Updated: 05/03/22 1143     Aerobic Bacterial Culture ENTEROCOCCUS FAECALIS  Rare  Susceptibility pending      Cryptococcal antigen [[id number]] Collected: 05/02/22 1939    Order Status: Completed Specimen: Blood, Venous Updated: 05/03/22 1115     Cryptococcal Ag, Blood Negative    Culture, Body Fluid - Bactec [[id number]] Collected: 05/01/22 2013    Order Status: Completed Specimen: Body Fluid from Ascites Updated: 05/03/22 0746     Body Fluid Culture, Sterile No growth to date    Blood culture [[id number]] Collected: 05/02/22 1941    Order Status: Completed Specimen: Blood Updated: 05/03/22 0745     Blood Culture, Routine No Growth to date    Blood culture [[id number]] Collected: 05/02/22 1938    Order Status: Completed Specimen: Blood Updated: 05/03/22 0745     Blood Culture, Routine No Growth to date    Culture, Anaerobic [[id number]] Collected: 05/01/22 2012    Order Status: Completed Specimen: Ascites Updated: 05/03/22 0644     Anaerobic Culture Culture in progress    Blood culture " [[id number]] Collected: 05/01/22 2132    Order Status: Completed Specimen: Blood Updated: 05/03/22 0613     Blood Culture, Routine No Growth to date      No Growth to date    Narrative:      Collection has been rescheduled by [facility] at 05/01/2022 20:55 Reason:   Patient unavailable ON BSCC   Collection has been rescheduled by [facility] at 05/01/2022 20:55 Reason:   Patient unavailable ON BSCC     Blood culture [[id number]] Collected: 05/01/22 2132    Order Status: Completed Specimen: Blood Updated: 05/03/22 0613     Blood Culture, Routine No Growth to date      No Growth to date    Narrative:      Collection has been rescheduled by [facility] at 05/01/2022 20:55 Reason:   Patient unavailable ON BSCC   Collection has been rescheduled by [facility] at 05/01/2022 20:55 Reason:   Patient unavailable ON BSCC     AFB Culture & Smear [[id number]] Collected: 05/03/22 0104    Order Status: Sent Specimen: Blood from Arm, Right Updated: 05/03/22 0110    AFB Culture & Smear [[id number]]     Order Status: Canceled Specimen: Blood     Respiratory Infection Panel (PCR), Nasopharyngeal [[id number]] Collected: 05/02/22 2044    Order Status: Completed Specimen: Nasopharyngeal Swab Updated: 05/02/22 2252     Respiratory Infection Panel Source NP Swab     Adenovirus Not Detected     Coronavirus 229E, Common Cold Virus Not Detected     Coronavirus HKU1, Common Cold Virus Not Detected     Coronavirus NL63, Common Cold Virus Not Detected     Coronavirus OC43, Common Cold Virus Not Detected     Comment: The Coronavirus strains detected in this test cause the common cold.  These strains are not the COVID-19 (novel Coronavirus)strain   associated with the respiratory disease outbreak.          SARS-CoV2 (COVID-19) Qualitative PCR Not Detected     Human Metapneumovirus Not Detected     Human Rhinovirus/Enterovirus Not Detected     Influenza A (subtypes H1, H1-2009,H3) Not Detected     Influenza B Not Detected     Parainfluenza Virus 1 Not Detected     Parainfluenza  Virus 2 Not Detected     Parainfluenza Virus 3 Not Detected     Parainfluenza Virus 4 Not Detected     Respiratory Syncytial Virus Not Detected     Bordetella Parapertussis (NK2472) Not Detected     Bordetella pertussis (ptxP) Not Detected     Chlamydia pneumoniae Not Detected     Mycoplasma pneumoniae Not Detected    Narrative:      For all other respiratory sources, order [id number] -  Respiratory Viral Panel by PCR    AFB Culture & Smear [[id number]]     Order Status: Canceled Specimen: Blood     Blood culture [[id number]]     Order Status: Canceled Specimen: Blood     Fungus culture [[id number]] Collected: 05/01/22 2012    Order Status: Completed Specimen: Ascites Updated: 05/02/22 0924     Fungus (Mycology) Culture Culture in progress    Gram stain [[id number]] Collected: 05/01/22 2012    Order Status: Completed Specimen: Ascites Updated: 05/01/22 2233     Gram Stain Result No WBC's      No organisms seen    Culture, Respiratory with Gram Stain [[id number]]     Order Status: No result Specimen: Respiratory         Latest lactate reviewed, they are-  Recent Labs   Lab 04/30/22  2142 05/01/22  0204   LACTATE 1.1 0.9       Blood and fluid cultures performed: NGTD. Bronchoscopy to be performed. Respiratory viral panel negative. Follow up results and respiratory cultures.

## 2022-05-03 NOTE — CONSULTS
[facility] - Transplant Stepdown  Pulmonology  Consult Note    Patient Name: Edgar Jackson  MRN: [id number]  Admission Date: 4/30/2022  Hospital Length of Stay: 3 days  Code Status: Full Code  Attending Physician: Jarred Dyson MD  Primary Care Provider: Tyler Castillo MD   Principal Problem: ILDEFONSO (acute kidney injury)    Inpatient consult to Pulmonology  Consult performed by: Sol Og MD  Consult ordered by: Nikos Fritz MD        Subjective:     HPI:  Edgar Jackson is a 61-year-old male with a medical history of ESRD secondary to HTN s/p kidney transplant in 2004 (on Tacrolimus/Sirolimus), LOPEZ cirrhosis, ILD diagnosed in 2020, T2DM, hypothyroidism who was transferred from [facility] on 4/30/2022 for abnormal labs and liver/kidney transplant evaluation. He was hospitalized from 4/7 - 4/21 for worsening creatinine. He was discharged home after being treated with broad spectrum antiobiotics but presented to [facility] again on 4/27 where he was found to have worsening creatinine and was subsequently transferred to [facility] 4/30 for the transplant evaluation.    Since February 2022 he has been experiencing a chronic dry cough, shortness of breath, decreased exercise tolerance, and difficulty performing ADLs such as grooming, dressing, and cooking.  He lives at home with his wife in [place] who assists him.  He reports that he can only climb half a flight of stairs before becoming short of breath.  He has also experienced decreased appetite, diarrhea (2-4x a day), and a 20 pound weight loss over the past 3 months.  The patient immigrated to [place] from [place] in 1976 where he worked as an  for [employer].  In January 2022 he moved to live with his son in [place].  He then moved to [place].  During his time in [place], he stated that he experienced a modest improvement in his cough and correlated his cough with the humidity levels.    Of note, in 6/2020 he underwent BAL with biopsy in [place] due to concern for  pulmonary nodules found on CT imaging (unavailable for our review).  Infectious workup was negative and pathology is unavailable for our review.  Last PFTs were normal in 10/2003.    2/2/22 CT chest: Mild to moderate scattered peripheral paraseptal predominant interstitial reticular lung markings with punctate calcified nodular densities.  Trace L > R pleural effusions. Small pericardial effusion.  5/2/22 CT chest: new broncho-centric consolidative opacities with a background pattern consistent with ILD.  Mild increase in pleural effusions.  Small pericardial effusion.    Smoking: negative  Other substances: negative  Travel: no international travel since immigrating in 1976  Incarceration/homelessness: negative  Occupational/environmental exposures: negative  Pets/hobbies: no pets or concerning hobbies  Recent illness: kidney transplant failure  B-Symptoms: 20 pound unintentional weight loss over 3 months       Past Medical History:   Diagnosis Date    BPH (benign prostatic hyperplasia)     Diabetes 2020    Gout     Hypertension 2000    Hypothyroidism 04/08/2022    Interstitial lung disease 03/30/2022    Kidney transplant recipient 01/2004    [place] 2* hypertensive nephropathy    Unspecified cirrhosis of liver     ?LOPEZ       Past Surgical History:   Procedure Laterality Date    CATARACT EXTRACTION Right 2017    COLONOSCOPY N/A 3/8/2022    Procedure: COLONOSCOPY;  Surgeon: Mio Simmons III, MD;  Location: [facility] END[facility];  Service: Endoscopy;  Laterality: N/A;    ENDOSCOPIC ULTRASOUND OF UPPER GASTROINTESTINAL TRACT N/A 3/8/2022    Procedure: ULTRASOUND, UPPER GI TRACT, ENDOSCOPIC;  Surgeon: Mio Simmons III, MD;  Location: [facility];  Service: Endoscopy;  Laterality: N/A;    KIDNEY TRANSPLANT  2004    PORTACATH PLACEMENT  2003    REMOVAL OF VASCULAR ACCESS PORT  2005       Review of patient's allergies indicates:  No Known Allergies    Family History       Problem Relation (Age of Onset)     Heart disease Father    Kidney disease Father          Tobacco Use    Smoking status: Never Smoker    Smokeless tobacco: Never Used   Substance and Sexual Activity    Alcohol use: Not Currently    Drug use: Never    Sexual activity: Not on file         Review of Systems   Constitutional:  Positive for activity change, fatigue and fever. Negative for appetite change, chills and diaphoresis.   Respiratory:  Positive for cough. Negative for choking, chest tightness and shortness of breath.    Cardiovascular:  Negative for chest pain, palpitations and leg swelling.   Gastrointestinal: Negative.    Genitourinary:  Positive for decreased urine volume.   Musculoskeletal: Negative.    Skin:  Positive for color change.   Psychiatric/Behavioral: Negative.     Objective:     Vital Signs (Most Recent):  Temp: 98.1 °F (36.7 °C) (05/03/22 1140)  Pulse: (!) 57 (05/03/22 1140)  Resp: 11 (05/03/22 0914)  BP: (!) 148/80 (05/03/22 1140)  SpO2: 96 % (05/03/22 1215)   Vital Signs (24h Range):  Temp:  [98.1 °F (36.7 °C)-100.9 °F (38.3 °C)] 98.1 °F (36.7 °C)  Pulse:  [57-68] 57  Resp:  [11-27] 11  SpO2:  [94 %-99 %] 96 %  BP: (102-148)/(69-81) 148/80     Weight: 60.6 kg (133 lb 9.6 oz)  Body mass index is 20.31 kg/m².      Intake/Output Summary (Last 24 hours) at 5/3/2022 1514  Last data filed at 5/3/2022 0400  Gross per 24 hour   Intake 581.24 ml   Output 600 ml   Net -18.76 ml       Physical Exam  Vitals and nursing note reviewed.   Constitutional:       Appearance: Normal appearance. He is normal weight.   Eyes:      General: Scleral icterus present.   Cardiovascular:      Rate and Rhythm: Normal rate and regular rhythm.   Pulmonary:      Effort: Pulmonary effort is normal. No respiratory distress.      Breath sounds: Rales (LLL) present. No wheezing.   Abdominal:      General: There is no distension.      Tenderness: There is no abdominal tenderness.   Musculoskeletal:         General: Swelling present.      Right lower leg:  Edema present.      Left lower leg: Edema (trace) present.   Skin:     Coloration: Skin is jaundiced.   Neurological:      Mental Status: He is alert.       Vents:       Lines/Drains/Airways       Peripheral Intravenous Line  Duration                  Peripheral IV - Single Lumen 05/02/22 2155 22 G Anterior;Right Forearm <1 day         Peripheral IV - Single Lumen 05/03/22 0115 22 G Anterior;Proximal;Right Forearm <1 day                    Significant Labs:    CBC/Anemia Profile:  Recent Labs   Lab 05/02/22  0740 05/03/22  0708   WBC 6.38 8.78   HGB 9.7* 10.0*   HCT 27.4* 28.6*   PLT 77* 89*   MCV 84 85   RDW 13.4 13.8        Chemistries:  Recent Labs   Lab 05/02/22  0809 05/03/22  0708   * 124*   K 4.6 4.5   CL 97 96   CO2 17* 16*   BUN 64* 66*   CREATININE 5.6* 5.6*   CALCIUM 8.1* 8.3*   ALBUMIN 3.0* 2.9*   PROT 5.5* 5.7*   BILITOT 6.0* 6.2*   ALKPHOS 84 92   ALT 10 8*   AST 77* 68*   MG 1.5* 1.5*   PHOS 3.5 4.0       All pertinent labs within the past 24 hours have been reviewed.    Significant Imaging:   I have reviewed all pertinent imaging results/findings within the past 24 hours.    Assessment/Plan:     Lung infiltrate on CT  Patient with kidney txp in 2004 on sirolimus and tacrolimus presenting as a transfer for transplant eval having persistent fevers.     CT demonstrates new nodular consolidation in R and L lungs with air bronchograms and some progression of reticular markings concerning for a diffuse parenchymal lung disease.    DDx: atypical bacterial infection VS fungal infection VS organizing pneumonia    Recommendations   - plan for bronchoscopy with BAL tomorrow  - appears patient slated to have colonoscopy tomorrow, will liaise with GI team to ensure bronchoscopy is logistically feasible   - if not, will plan for Thursday  - please keep NPO after midnight and hold all anticoagulation  - will send various studies including bacterial and fungal cultures  - antimicrobial therapy guidance per ID  team, will work in conjunction with them        Interstitial lung disease  Pt with basilar predominant reticular markings without honeycombing on CT with some progression since February 2022. Patient has had bronchoscopy in the past for pulmonary abnormalities, but those studies are not available to us.     Autoimmune work up thus for for his ILD has been unrevealing and he may need a lung biopsy to determine his diagnosis. At this time it does not appear to be the primary  of his acute illness, although it likely plays some role.     Will need pulmonology follow up on discharge.           Thank you for your consult. I will follow-up with patient. Please contact us if you have any additional questions.     Sol Og MD  Pulmonology  [facility] - Transplant Stepdown

## 2022-05-03 NOTE — ASSESSMENT & PLAN NOTE
Patient received paracentesis on 05/01, negative for SBP.  Spiked fever afterwards, 100.7F. Septic workup started, broad spectrum abx coverage with Vanc/Cefepime. Chest x-ray showed consolidation at the middle lobe of the left lung. CT chest w/o contrast with similar findings, concerning for pneumonia.  Lactic acid wnl and no leukocytosis.ID consult placed given comorbidity and immunocompromised status. Pulmonology consulted, given chronic ILD findings with new bilateral opacities. Bronchoscopy to be performed. Bcx and Fcx NGTD. Respiratory viral panel negative.  - F/U cultures  - trend vitals and CBC  - trend inflammatory markers

## 2022-05-03 NOTE — PT/OT/SLP PROGRESS
Occupational Therapy      Patient Name:  Edgar Jackson   MRN:  [id number]    Patient not seen today secondary to Patient unwilling to participate. Pt found with his head underneath his covers and unwilling to work with therapy. Pt reports that he did not sleep well last night. Educated on the importance of getting OOB and working with therapy.  Will follow-up on next scheduled treatment date.    5/3/2022

## 2022-05-03 NOTE — SUBJECTIVE & OBJECTIVE
Past Medical History:   Diagnosis Date    BPH (benign prostatic hyperplasia)     Diabetes 2020    Gout     Hypertension 2000    Hypothyroidism 04/08/2022    Interstitial lung disease 03/30/2022    Kidney transplant recipient 01/2004    [place] 2* hypertensive nephropathy    Unspecified cirrhosis of liver     ?LOPEZ       Past Surgical History:   Procedure Laterality Date    CATARACT EXTRACTION Right 2017    COLONOSCOPY N/A 3/8/2022    Procedure: COLONOSCOPY;  Surgeon: Mio Simmons III, MD;  Location: [facility] ENDO;  Service: Endoscopy;  Laterality: N/A;    ENDOSCOPIC ULTRASOUND OF UPPER GASTROINTESTINAL TRACT N/A 3/8/2022    Procedure: ULTRASOUND, UPPER GI TRACT, ENDOSCOPIC;  Surgeon: Mio Simmons III, MD;  Location: [facility] ENDO;  Service: Endoscopy;  Laterality: N/A;    KIDNEY TRANSPLANT  2004    PORTACATH PLACEMENT  2003    REMOVAL OF VASCULAR ACCESS PORT  2005       Review of patient's allergies indicates:  No Known Allergies    Family History       Problem Relation (Age of Onset)    Heart disease Father    Kidney disease Father          Tobacco Use    Smoking status: Never Smoker    Smokeless tobacco: Never Used   Substance and Sexual Activity    Alcohol use: Not Currently    Drug use: Never    Sexual activity: Not on file         Review of Systems   Constitutional:  Positive for activity change, fatigue and fever. Negative for appetite change, chills and diaphoresis.   Respiratory:  Positive for cough. Negative for choking, chest tightness and shortness of breath.    Cardiovascular:  Negative for chest pain, palpitations and leg swelling.   Gastrointestinal: Negative.    Genitourinary:  Positive for decreased urine volume.   Musculoskeletal: Negative.    Skin:  Positive for color change.   Psychiatric/Behavioral: Negative.     Objective:     Vital Signs (Most Recent):  Temp: 98.1 °F (36.7 °C) (05/03/22 1140)  Pulse: (!) 57 (05/03/22 1140)  Resp: 11 (05/03/22 0914)  BP: (!) 148/80 (05/03/22 1140)  SpO2:  96 % (05/03/22 1215)   Vital Signs (24h Range):  Temp:  [98.1 °F (36.7 °C)-100.9 °F (38.3 °C)] 98.1 °F (36.7 °C)  Pulse:  [57-68] 57  Resp:  [11-27] 11  SpO2:  [94 %-99 %] 96 %  BP: (102-148)/(69-81) 148/80     Weight: 60.6 kg (133 lb 9.6 oz)  Body mass index is 20.31 kg/m².      Intake/Output Summary (Last 24 hours) at 5/3/2022 1514  Last data filed at 5/3/2022 0400  Gross per 24 hour   Intake 581.24 ml   Output 600 ml   Net -18.76 ml       Physical Exam  Vitals and nursing note reviewed.   Constitutional:       Appearance: Normal appearance. He is normal weight.   Eyes:      General: Scleral icterus present.   Cardiovascular:      Rate and Rhythm: Normal rate and regular rhythm.   Pulmonary:      Effort: Pulmonary effort is normal. No respiratory distress.      Breath sounds: Rales (LLL) present. No wheezing.   Abdominal:      General: There is no distension.      Tenderness: There is no abdominal tenderness.   Musculoskeletal:         General: Swelling present.      Right lower leg: Edema present.      Left lower leg: Edema (trace) present.   Skin:     Coloration: Skin is jaundiced.   Neurological:      Mental Status: He is alert.       Vents:       Lines/Drains/Airways       Peripheral Intravenous Line  Duration                  Peripheral IV - Single Lumen 05/02/22 2155 22 G Anterior;Right Forearm <1 day         Peripheral IV - Single Lumen 05/03/22 0115 22 G Anterior;Proximal;Right Forearm <1 day                    Significant Labs:    CBC/Anemia Profile:  Recent Labs   Lab 05/02/22  0740 05/03/22  0708   WBC 6.38 8.78   HGB 9.7* 10.0*   HCT 27.4* 28.6*   PLT 77* 89*   MCV 84 85   RDW 13.4 13.8        Chemistries:  Recent Labs   Lab 05/02/22  0809 05/03/22  0708   * 124*   K 4.6 4.5   CL 97 96   CO2 17* 16*   BUN 64* 66*   CREATININE 5.6* 5.6*   CALCIUM 8.1* 8.3*   ALBUMIN 3.0* 2.9*   PROT 5.5* 5.7*   BILITOT 6.0* 6.2*   ALKPHOS 84 92   ALT 10 8*   AST 77* 68*   MG 1.5* 1.5*   PHOS 3.5 4.0       All  pertinent labs within the past 24 hours have been reviewed.    Significant Imaging:   I have reviewed all pertinent imaging results/findings within the past 24 hours.

## 2022-05-03 NOTE — PROGRESS NOTES
Pharmacokinetic Assessment Follow Up: IV Vancomycin    Vancomycin serum concentration assessment(s):    - 32 hour vancomycin level resulted at 14.7 mcg/mL  - Goal trough 15-20 mcg/mL  - ILDEFONSO on transplanted kidney, Scr today is 5.6 mg/dL    Vancomycin Regimen Plan:    - Vancomycin 250 mg x1  - Obtain random level with AM labs 5/4    Drug levels (last 3 results):  Recent Labs   Lab Result Units 05/01/22  0918 05/03/22  0708   Vancomycin, Random ug/mL 9.9 14.7       Pharmacy will continue to follow and monitor vancomycin.    Please contact pharmacy at extension [phone] for questions regarding this assessment.    Thank you for the consult,   Julia Yeondam Roh       Patient brief summary:  Edgar Jackson is a 61 y.o. male initiated on antimicrobial therapy with IV Vancomycin for treatment of Fevers    Drug Allergies:   Review of patient's allergies indicates:  No Known Allergies    Actual Body Weight:   60.6 kg     Renal Function:   Estimated Creatinine Clearance: 12 mL/min (A) (based on SCr of 5.6 mg/dL (H)).,     CBC (last 72 hours):  Recent Labs   Lab Result Units 04/30/22 1946 05/01/22 0918 05/02/22  0740 05/03/22  0708   WBC K/uL 6.86 6.86 6.38 8.78   Hemoglobin g/dL 10.5* 9.9* 9.7* 10.0*   Hematocrit % 30.7* 27.8* 27.4* 28.6*   Platelets K/uL 91* 83* 77* 89*   Gran % % 71.0 69.0 76.5* 80.5*   Lymph % % 13.4* 13.0* 6.7* 7.2*   Mono % % 11.7 12.2 10.2 8.3   Eosinophil % % 2.5 4.7 5.3 2.8   Basophil % % 0.4 0.4 0.5 0.5   Differential Method  Automated Automated Automated Automated       Metabolic Panel (last 72 hours):  Recent Labs   Lab Result Units 04/30/22 1947 04/30/22 2311 05/01/22 0918 05/01/22  1921 05/02/22  0809 05/02/22  1219 05/03/22  0708   Sodium mmol/L 124*  --  126*  --  126*  --  124*   Sodium, Urine mmol/L  --  29  --   --   --   --   --    Potassium mmol/L 5.3*  --  4.8  --  4.6  --  4.5   Potassium, Urine mmol/L  --  19  --   --   --   --   --    Chloride mmol/L 97  --  97  --  97  --  96   Chloride,  Urine mmol/L  --  20*  --   --   --   --   --    CO2 mmol/L 16*  --  18*  --  17*  --  16*   Glucose mg/dL 110  --  73  --  126*  --  191*   Glucose, Fluid mg/dL  --   --   --  84  --   --   --    Glucose, UA   --   --   --   --   --  2+*  --    BUN mg/dL 64*  --  64*  --  64*  --  66*   Creatinine mg/dL 6.0*  --  5.7*  --  5.6*  --  5.6*   Creatinine, Urine mg/dL  --  94.0  --   --   --   --   --    Albumin g/dL 2.5*  --  3.4*  --  3.0*  --  2.9*   Total Bilirubin mg/dL 5.2*  --  5.7*  --  6.0*  --  6.2*   Alkaline Phosphatase U/L 105  --  96  --  84  --  92   AST U/L 97*  --  86*  --  77*  --  68*   ALT U/L 14  --  12  --  10  --  8*   Magnesium mg/dL 1.5*  --  1.6  --  1.5*  --  1.5*   Phosphorus mg/dL 4.0  --  3.7  --  3.5  --  4.0       Vancomycin Administrations:  vancomycin given in the last 96 hours                   vancomycin 750 mg in dextrose 5 % 250 mL IVPB (ready to mix system) (mg) 750 mg New Bag 05/01/22 7352                Microbiologic Results:  Microbiology Results (last 7 days)     Procedure Component Value Units Date/Time    Culture, Body Fluid - Bactec [[id number]] Collected: 05/01/22 2013    Order Status: Completed Specimen: Body Fluid from Ascites Updated: 05/03/22 0746     Body Fluid Culture, Sterile No growth to date    Blood culture [[id number]] Collected: 05/02/22 1941    Order Status: Completed Specimen: Blood Updated: 05/03/22 0745     Blood Culture, Routine No Growth to date    Blood culture [[id number]] Collected: 05/02/22 1938    Order Status: Completed Specimen: Blood Updated: 05/03/22 0745     Blood Culture, Routine No Growth to date    Aerobic culture [[id number]] Collected: 05/01/22 2012    Order Status: Completed Specimen: Ascites Updated: 05/03/22 0718     Aerobic Bacterial Culture Further report to follow    Culture, Anaerobic [[id number]] Collected: 05/01/22 2012    Order Status: Completed Specimen: Ascites Updated: 05/03/22 0644     Anaerobic Culture Culture in progress    Blood  culture [[id number]] Collected: 05/01/22 2132    Order Status: Completed Specimen: Blood Updated: 05/03/22 0613     Blood Culture, Routine No Growth to date      No Growth to date    Narrative:      Collection has been rescheduled by [facility] at 05/01/2022 20:55 Reason:   Patient unavailable ON BSCC   Collection has been rescheduled by [facility] at 05/01/2022 20:55 Reason:   Patient unavailable ON BSCC     Blood culture [[id number]] Collected: 05/01/22 2132    Order Status: Completed Specimen: Blood Updated: 05/03/22 0613     Blood Culture, Routine No Growth to date      No Growth to date    Narrative:      Collection has been rescheduled by [facility] at 05/01/2022 20:55 Reason:   Patient unavailable ON BSCC   Collection has been rescheduled by [facility] at 05/01/2022 20:55 Reason:   Patient unavailable ON BSCC     AFB Culture & Smear [[id number]] Collected: 05/03/22 0104    Order Status: Sent Specimen: Blood from Arm, Right Updated: 05/03/22 0110    AFB Culture & Smear [[id number]]     Order Status: Canceled Specimen: Blood     Respiratory Infection Panel (PCR), Nasopharyngeal [[id number]] Collected: 05/02/22 2044    Order Status: Completed Specimen: Nasopharyngeal Swab Updated: 05/02/22 2252     Respiratory Infection Panel Source NP Swab     Adenovirus Not Detected     Coronavirus 229E, Common Cold Virus Not Detected     Coronavirus HKU1, Common Cold Virus Not Detected     Coronavirus NL63, Common Cold Virus Not Detected     Coronavirus OC43, Common Cold Virus Not Detected     Comment: The Coronavirus strains detected in this test cause the common cold.  These strains are not the COVID-19 (novel Coronavirus)strain   associated with the respiratory disease outbreak.          SARS-CoV2 (COVID-19) Qualitative PCR Not Detected     Human Metapneumovirus Not Detected     Human Rhinovirus/Enterovirus Not Detected     Influenza A (subtypes H1, H1-2009,H3) Not Detected     Influenza B Not Detected     Parainfluenza Virus 1 Not Detected      Parainfluenza Virus 2 Not Detected     Parainfluenza Virus 3 Not Detected     Parainfluenza Virus 4 Not Detected     Respiratory Syncytial Virus Not Detected     Bordetella Parapertussis (ZR4120) Not Detected     Bordetella pertussis (ptxP) Not Detected     Chlamydia pneumoniae Not Detected     Mycoplasma pneumoniae Not Detected    Narrative:      For all other respiratory sources, order [id number] -  Respiratory Viral Panel by PCR    Cryptococcal antigen [[id number]] Collected: 05/02/22 1939    Order Status: Sent Specimen: Blood, Venous Updated: 05/02/22 2004    AFB Culture & Smear [[id number]]     Order Status: Canceled Specimen: Blood     Blood culture [[id number]]     Order Status: Canceled Specimen: Blood     Fungus culture [[id number]] Collected: 05/01/22 2012    Order Status: Completed Specimen: Ascites Updated: 05/02/22 0924     Fungus (Mycology) Culture Culture in progress    Gram stain [[id number]] Collected: 05/01/22 2012    Order Status: Completed Specimen: Ascites Updated: 05/01/22 2233     Gram Stain Result No WBC's      No organisms seen    Culture, Respiratory with Gram Stain [[id number]]     Order Status: No result Specimen: Respiratory

## 2022-05-03 NOTE — SUBJECTIVE & OBJECTIVE
Subjective:   History of Present Illness:  61yoM with PMHx of renal transplant for hypertensive nephropathy (2004) immunosuppressed on tacrolimus/sirolimus,  therapy, LOPEZ cirrhosis, NIDDM, and ILD who was treated at OSH for ILDEFONSO 2/2 decompensated cirrhosis and worsening portal vein hypertension with HRS.   Pt reported that he had been experiencing abdominal distention with associated diarrhea (4-5/day) over several weeks, along with a constant nagging cough.  He has been having weekly paracentesis in the past month.   Despite efforts to manage symptoms, pt in need of  transplant surgery evaluation of the renal/hepatic injury[place] [facility] [place]    He reports no recent NSAID use.         Hospital Course:  No notes on file    Interval History:  Pt still reports to have loose stools  Did report fever of 100.7       Past Medical, Surgical, Family, and Social History:   Unchanged from H&P.    Scheduled Meds:   allopurinoL  100 mg Oral Daily    benzonatate  200 mg Oral TID    ceFEPime (MAXIPIME) IVPB  1 g Intravenous Q24H    furosemide  20 mg Oral Daily    levothyroxine  50 mcg Oral Before breakfast    lipase-protease-amylase 12,000-38,000-60,000 units  2 capsule Oral QID (WM & HS)    metoprolol tartrate  50 mg Oral BID    mupirocin   Nasal BID    sodium bicarbonate  1,300 mg Oral TID     Continuous Infusions:  PRN Meds:acetaminophen, albuterol-ipratropium, dextromethorphan-guaiFENesin  mg/5 ml, dextrose 10%, fluticasone propionate, glucagon (human recombinant), glucose, glucose, insulin aspart U-100, loperamide, naloxone, sodium chloride 0.9%, Pharmacy to dose Vancomycin consult **AND** vancomycin - pharmacy to dose    Intake/Output - Last 3 Shifts         05/01 0700  05/02 0659 05/02 0700  05/03 0659 05/03 0700  05/04 0659    P.O. 900 1360     I.V. (mL/kg) 0 (0) 0 (0)     Other 0 0     IV Piggyback  61.2     Total Intake(mL/kg) 900 (14) 1421.2 (23.5)     Urine (mL/kg/hr) 1125 (0.7) 1200 (0.8) 1 (0)     "Emesis/NG output 0 0 1    Other 0 0     Stool 0 0     Blood 0 0     Total Output 1125 1200 2    Net -225 +221.2 -2           Urine Occurrence 1 x 1 x 1 x    Stool Occurrence 2 x 2 x 1 x    Emesis Occurrence 0 x 0 x              Review of Systems   Constitutional:  Positive for activity change.   HENT: Negative.     Eyes: Negative.    Respiratory: Negative.     Gastrointestinal:  Positive for diarrhea.   Endocrine: Negative.    Genitourinary: Negative.    Musculoskeletal: Negative.    Skin: Negative.    Allergic/Immunologic: Negative.    Neurological: Negative.    Hematological: Negative.    Psychiatric/Behavioral: Negative.      Objective:     Vital Signs (Most Recent):  Temp: 98.3 °F (36.8 °C) (05/03/22 1626)  Pulse: 68 (05/03/22 1626)  Resp: 11 (05/03/22 0914)  BP: (!) 147/84 (05/03/22 1626)  SpO2: 97 % (05/03/22 1626)   Vital Signs (24h Range):  Temp:  [98.1 °F (36.7 °C)-100.9 °F (38.3 °C)] 98.3 °F (36.8 °C)  Pulse:  [57-68] 68  Resp:  [11-22] 11  SpO2:  [95 %-99 %] 97 %  BP: (102-148)/(69-84) 147/84     Weight: 60.6 kg (133 lb 9.6 oz)  Height: 5' 8" (172.7 cm)  Body mass index is 20.31 kg/m².    Physical Exam  HENT:      Head: Normocephalic.   Eyes:      Pupils: Pupils are equal, round, and reactive to light.   Cardiovascular:      Rate and Rhythm: Normal rate.   Pulmonary:      Effort: Pulmonary effort is normal.   Abdominal:      General: Abdomen is flat.   Musculoskeletal:      Cervical back: Normal range of motion.   Skin:     General: Skin is warm.      Capillary Refill: Capillary refill takes less than 2 seconds.   Neurological:      Mental Status: He is alert.       Laboratory:  CBC:   Recent Labs   Lab 05/01/22  0918 05/02/22  0740 05/03/22  0708   WBC 6.86 6.38 8.78   RBC 3.26* 3.26* 3.35*   HGB 9.9* 9.7* 10.0*   HCT 27.8* 27.4* 28.6*   PLT 83* 77* 89*   MCV 85 84 85   MCH 30.4 29.8 29.9   MCHC 35.6 35.4 35.0     CMP:   Recent Labs   Lab 05/01/22  0918 05/02/22  0809 05/03/22  0708   GLU 73 126* 191* "   CALCIUM 8.4* 8.1* 8.3*   ALBUMIN 3.4* 3.0* 2.9*   PROT 6.2 5.5* 5.7*   * 126* 124*   K 4.8 4.6 4.5   CO2 18* 17* 16*   CL 97 97 96   BUN 64* 64* 66*   CREATININE 5.7* 5.6* 5.6*   ALKPHOS 96 84 92   ALT 12 10 8*   AST 86* 77* 68*     Labs within the past 24 hours have been reviewed.    Diagnostic Results:  Chest X-Ray: No results found for this or any previous visit.

## 2022-05-03 NOTE — PLAN OF CARE
Pt is AAOX4, stand by assist, VSS. Vanc 250 mg infused. VISI monitoring in place. Blood glucose monitoring performed and treated as ordered. Doppler US bilateral iliac obtained. Echo ordered. Clear liquid diet now; NPO at midnight for EGD- prep at bedside. Pt's bed in lowest position, call bell within reach, nonskid socks on, and RN encouraged pt to call for any assistance needed. RN rounded on pt throughout shift. Will continue to monitor.

## 2022-05-03 NOTE — ASSESSMENT & PLAN NOTE
Patient with kidney txp in 2004 on sirolimus and tacrolimus presenting as a transfer for transplant eval having persistent fevers.     CT demonstrates new nodular consolidation in R and L lungs with air bronchograms and some progression of reticular markings concerning for a diffuse parenchymal lung disease.    DDx: atypical bacterial infection VS fungal infection VS organizing pneumonia    Recommendations   - plan for bronchoscopy with BAL tomorrow  - appears patient slated to have colonoscopy tomorrow, will liaise with GI team to ensure bronchoscopy is logistically feasible   - if not, will plan for Thursday  - please keep NPO after midnight and hold all anticoagulation  - will send various studies including bacterial and fungal cultures  - antimicrobial therapy guidance per ID team, will work in conjunction with them

## 2022-05-03 NOTE — PLAN OF CARE
AAOx3, tmax 99.5, w/o c/o pain. Bg monitored achs and tx per orders. Pt on 2L NC. CT of chest done. ID following pt. IV cefepime given last night. Plan for vanc trough in am. Blood cx repeated. Labs sent and urine sent. Resp infection panel sent. ECHO ordered. Pt educated on 1200 fr. Pt still has dry cough. PRN and scheduled cough medication given. PT/OT ordered. Pt able to position self independently with walker. Pt in lowest position, side rails up x2, non-skid foot wear in place, call light within reach, pt verbalized understanding to call RN when needed.  Hand hygiene practiced per protocol.  Will continue to monitor.

## 2022-05-03 NOTE — PROGRESS NOTES
[facility] - Transplant Stepdown  Kidney Transplant  Progress Note      Reason for Follow-up: Reassessment of Kidney Transplant Referral - 5/2/2022 recipient and management of immunosuppression.        Subjective:   History of Present Illness:  61yoM with PMHx of renal transplant for hypertensive nephropathy (2004) immunosuppressed on tacrolimus/sirolimus,  therapy, LOPEZ cirrhosis, NIDDM, and ILD who was treated at OSH for ILDEFONSO 2/2 decompensated cirrhosis and worsening portal vein hypertension with HRS.   Pt reported that he had been experiencing abdominal distention with associated diarrhea (4-5/day) over several weeks, along with a constant nagging cough.  He has been having weekly paracentesis in the past month.   Despite efforts to manage symptoms, pt in need of  transplant surgery evaluation of the renal/hepatic injury, resulting in [place] [facility] [place].    He reports no recent NSAID use.         Hospital Course:  No notes on file    Interval History:  Pt still reports to have loose stools  Did report fever of 100.7       Past Medical, Surgical, Family, and Social History:   Unchanged from H&P.    Scheduled Meds:   allopurinoL  100 mg Oral Daily    benzonatate  200 mg Oral TID    ceFEPime (MAXIPIME) IVPB  1 g Intravenous Q24H    furosemide  20 mg Oral Daily    levothyroxine  50 mcg Oral Before breakfast    lipase-protease-amylase 12,000-38,000-60,000 units  2 capsule Oral QID (WM & HS)    metoprolol tartrate  50 mg Oral BID    mupirocin   Nasal BID    sodium bicarbonate  1,300 mg Oral TID     Continuous Infusions:  PRN Meds:acetaminophen, albuterol-ipratropium, dextromethorphan-guaiFENesin  mg/5 ml, dextrose 10%, fluticasone propionate, glucagon (human recombinant), glucose, glucose, insulin aspart U-100, loperamide, naloxone, sodium chloride 0.9%, Pharmacy to dose Vancomycin consult **AND** vancomycin - pharmacy to dose    Intake/Output - Last 3 Shifts         05/01 0700 05/02 0659 05/02 0700 05/03  "0659 05/03 0700  05/04 0659    P.O. 900 1360     I.V. (mL/kg) 0 (0) 0 (0)     Other 0 0     IV Piggyback  61.2     Total Intake(mL/kg) 900 (14) 1421.2 (23.5)     Urine (mL/kg/hr) 1125 (0.7) 1200 (0.8) 1 (0)    Emesis/NG output 0 0 1    Other 0 0     Stool 0 0     Blood 0 0     Total Output 1125 1200 2    Net -225 +221.2 -2           Urine Occurrence 1 x 1 x 1 x    Stool Occurrence 2 x 2 x 1 x    Emesis Occurrence 0 x 0 x              Review of Systems   Constitutional:  Positive for activity change.   HENT: Negative.     Eyes: Negative.    Respiratory: Negative.     Gastrointestinal:  Positive for diarrhea.   Endocrine: Negative.    Genitourinary: Negative.    Musculoskeletal: Negative.    Skin: Negative.    Allergic/Immunologic: Negative.    Neurological: Negative.    Hematological: Negative.    Psychiatric/Behavioral: Negative.      Objective:     Vital Signs (Most Recent):  Temp: 98.3 °F (36.8 °C) (05/03/22 1626)  Pulse: 68 (05/03/22 1626)  Resp: 11 (05/03/22 0914)  BP: (!) 147/84 (05/03/22 1626)  SpO2: 97 % (05/03/22 1626)   Vital Signs (24h Range):  Temp:  [98.1 °F (36.7 °C)-100.9 °F (38.3 °C)] 98.3 °F (36.8 °C)  Pulse:  [57-68] 68  Resp:  [11-22] 11  SpO2:  [95 %-99 %] 97 %  BP: (102-148)/(69-84) 147/84     Weight: 60.6 kg (133 lb 9.6 oz)  Height: 5' 8" (172.7 cm)  Body mass index is 20.31 kg/m².    Physical Exam  HENT:      Head: Normocephalic.   Eyes:      Pupils: Pupils are equal, round, and reactive to light.   Cardiovascular:      Rate and Rhythm: Normal rate.   Pulmonary:      Effort: Pulmonary effort is normal.   Abdominal:      General: Abdomen is flat.   Musculoskeletal:      Cervical back: Normal range of motion.   Skin:     General: Skin is warm.      Capillary Refill: Capillary refill takes less than 2 seconds.   Neurological:      Mental Status: He is alert.       Laboratory:  CBC:   Recent Labs   Lab 05/01/22  0918 05/02/22  0740 05/03/22  0708   WBC 6.86 6.38 8.78   RBC 3.26* 3.26* 3.35*   HGB " 9.9* 9.7* 10.0*   HCT 27.8* 27.4* 28.6*   PLT 83* 77* 89*   MCV 85 84 85   MCH 30.4 29.8 29.9   MCHC 35.6 35.4 35.0     CMP:   Recent Labs   Lab 05/01/22  0918 05/02/22  0809 05/03/22  0708   GLU 73 126* 191*   CALCIUM 8.4* 8.1* 8.3*   ALBUMIN 3.4* 3.0* 2.9*   PROT 6.2 5.5* 5.7*   * 126* 124*   K 4.8 4.6 4.5   CO2 18* 17* 16*   CL 97 97 96   BUN 64* 64* 66*   CREATININE 5.7* 5.6* 5.6*   ALKPHOS 96 84 92   ALT 12 10 8*   AST 86* 77* 68*     Labs within the past 24 hours have been reviewed.    Diagnostic Results:  Chest X-Ray: No results found for this or any previous visit.    Assessment/Plan:     * ILDEFONSO (acute kidney injury)  His renal functions were normal beginning of Feb.   After that it appears he had ILDEFONSO - possibly related to pre-renal states  Which led to CKD stage III, which has progressed to stage IV over the course of last few weeks     At this time no indication for hemodialysis.   Has consented for HD in case needed.   Monitor I/Os   US renal transplant unremarkable.    Cr: 6.0-->5.7-->5.8-->5.6 (on 5/3)    Chronic diarrhea  Reports since past 2 months   stool studies in progress   Will undergo C-Scope on 5/4 with GI    Metabolic acidosis  - Started NaHCO3 1300 TID      Hyponatremia  This is likely in setting of renal failure and liver cirrhosis   Would be hard to normalize  Continue fluid restriction upto 1.2 liter/day      Cirrhosis of liver with ascites  Work up per Hepatology   AFP significantly elevated   Liver biopsy -- Chronic mild-moderately active hepatitis with bridging fibrosis and nodule   formation (Grade 2, Stage 3).      Personal history of immunosupression therapy  On sirolimus and prograf at home   Hold at this time   Currently supratherapeutic       Transplanted kidney  S/p kidney transplant living donor more then 10 years ago at [place].   Reports no episodes of rejections or complications post surgery   ESRD - related to HTN. Currently has no access for HD         [facility]  MD Yoselyn  Kidney Transplant  [facility] - Transplant Stepdown

## 2022-05-03 NOTE — ASSESSMENT & PLAN NOTE
S/p kidney transplant living donor more then 10 years ago at [place].   Reports no episodes of rejections or complications post surgery   ESRD - related to HTN. Currently has no access for HD

## 2022-05-03 NOTE — ASSESSMENT & PLAN NOTE
Edgar Jackson is a 61 M w/ PMH hypertensive nephropathy s/p renal transplant 2004 on chronic immunosuppression, LOPEZ cirrhosis, DM2, ILD transferred from OSH with ILDEFONSO on CKD, volume overload and ascites.         Had colonoscopy in March that was normal. CT abdomen with no GI concerns. GI consulted for colonoscopy to further evaluate chronic diarrhea in setting of new fever.     Plan:   - Will plan for colonoscopy tomorrow   - Clear liquid diet today (order placed)  - NPO after midnight (order placed)  - Please correct electrolyte abnormalities prior to the procedure

## 2022-05-03 NOTE — ASSESSMENT & PLAN NOTE
Pt with Hx of chronic ILD with bilateral areas of lung scarring. Regions appear to be slightly larger in size when compared to previous imaging. New findings of bilateral infiltrates noted on recent CT Chest w/o contrast concerning for pneumonia. Acute fever reported over the past two evenings. Pt started on broad spectrum abx with septic workup performed. Respiratory viral panel negative. Pulmonology consulted, appreciate recs. Bronchoscopy to be performed. (See associated problem ILD)  - f/u bronchoscopy results/cultures

## 2022-05-03 NOTE — ASSESSMENT & PLAN NOTE
PMHx of interstitial lung disease (lung scarring). Continue scheduled benzonotate tablet 200mg TID. For persistent cough add dextromethorphan-guaiefesin syrup. Compare/contrast serial CXR for worsening inflammatory process.  (See associated problems: ILD & fever)

## 2022-05-03 NOTE — TREATMENT PLAN
GI Treatment Plan:    - Will defer colonoscopy to 05/05 given plans for bronchoscopy tomorrow.     Please call back with questions or if patient has change in clinical status.    Richard Herrera MD  Gastroenterology/Hepatology, PGY IV  [facility]

## 2022-05-03 NOTE — ASSESSMENT & PLAN NOTE
Pt with basilar predominant reticular markings without honeycombing on CT with some progression since February 2022. Patient has had bronchoscopy in the past for pulmonary abnormalities, but those studies are not available to us.     Autoimmune work up thus for for his ILD has been unrevealing and he may need a lung biopsy to determine his diagnosis. At this time it does not appear to be the primary  of his acute illness, although it likely plays some role.     Will need pulmonology follow up on discharge.

## 2022-05-03 NOTE — ASSESSMENT & PLAN NOTE
CXR performed on admission, with serial imaging reviewed. Continued scheduled Benzonate daily. Flonase added daily PRN with DuoNebs q6h PRN. Supplemental oxygen PRN. Continuous pulse oxymetry ordered. SpO2 maintained 88-93% by adjusting O2 flow. CT chest w/o contrast significant for bilateral infiltrates, concerning for pneumonia. Pulmonology consulted, appreciate recs. Pt to undergo Bronchoscopy 5/04/22.  - perform ambulatory test for home oxygen if required.   - F/u pulm outpatient.

## 2022-05-03 NOTE — ASSESSMENT & PLAN NOTE
Pt started on loperamide at OSH with adequate response, will continue anti-nausea medication at [facility]. Trended labs daily and adjusted for any electrolyte abnormality. Pt with prior negative Cdiff test. GI to perform colonoscopy for ongoing diarrhea.  - F/U CMV lab.

## 2022-05-03 NOTE — ASSESSMENT & PLAN NOTE
Patient with acute kidney injury likely d/t IVVD/Dehydration Which is currently stable. Labs reviewed- Renal function/electrolytes with Estimated Creatinine Clearance: 12 mL/min (A) (based on SCr of 5.6 mg/dL (H)). according to latest data. Monitor urine output and serial BMP and adjust therapy as needed. Avoid nephrotoxins and renally dose meds for GFR listed above. Gentle IV fluid PRN. Ultrasound transplant kidney no acute changes. Possibly due to tacrolimus versus hepatorenal syndrome, Nephrology has been consulted. Toprolol continued.  Bicarb ordered for low serum levels. Repeat lasix and albumin dosing per Nephrology. Cr/GFR to be trended for 6 weeks for evaluation.  - KTM following.   - hold sirolimus and tacrolimus  - trend daily BMP/RFP

## 2022-05-03 NOTE — PROGRESS NOTES
Hepatology Treatment Plan    Edgar Jackson is a 61 y.o. male admitted to hospital 4/30/2022 (Hospital Day: 4) due to ILDEFONSO (acute kidney injury).     Interval History  Febrile overnight.  On vanc/cefepime.  Cultures negative thus far.  ECHO planned for today.     Objective  Temp:  [98.1 °F (36.7 °C)-100.9 °F (38.3 °C)] 98.1 °F (36.7 °C) (05/03 1140)  Pulse:  [57-71] 57 (05/03 1140)  BP: (102-148)/(68-81) 148/80 (05/03 1140)  Resp:  [11-27] 11 (05/03 0914)  SpO2:  [93 %-99 %] 98 % (05/03 1140)    General: Alert, Oriented x3, no distress  Neurologic: Asterixis absent  Abdomen: Normoactive bowel sounds. Non-distended. Normal tympany. Soft. Non-tender. No peritoneal signs.    Laboratory    Lab Results   Component Value Date    WBC 8.78 05/03/2022    HGB 10.0 (L) 05/03/2022    HCT 28.6 (L) 05/03/2022    MCV 85 05/03/2022    PLT 89 (L) 05/03/2022       Lab Results   Component Value Date     (L) 05/03/2022    K 4.5 05/03/2022    CL 96 05/03/2022    CO2 16 (L) 05/03/2022    BUN 66 (H) 05/03/2022    CREATININE 5.6 (H) 05/03/2022    CALCIUM 8.3 (L) 05/03/2022       Lab Results   Component Value Date    ALBUMIN 2.9 (L) 05/03/2022    ALT 8 (L) 05/03/2022    AST 68 (H) 05/03/2022    ALKPHOS 92 05/03/2022    BILITOT 6.2 (H) 05/03/2022       Lab Results   Component Value Date    INR 1.6 (H) 05/03/2022    INR 1.6 (H) 05/02/2022    INR 1.6 (H) 05/01/2022       MELD-Na score: 35 at 5/3/2022  7:08 AM  MELD score: 32 at 5/3/2022  7:08 AM  Calculated from:  Serum Creatinine: 5.6 mg/dL (Using max of 4 mg/dL) at 5/3/2022  7:08 AM  Serum Sodium: 124 mmol/L (Using min of 125 mmol/L) at 5/3/2022  7:08 AM  Total Bilirubin: 6.2 mg/dL at 5/3/2022  7:08 AM  INR(ratio): 1.6 at 5/3/2022  7:08 AM  Age: 61 years     Plan  - f/u transplant nephrology recommendations  -f/u ECHO  - patient will need a contrasted CT triple phase liver but will need to be coordinated with transplant nephrology and likely initiation of dialysis.  - agree with GI consult for  colonoscopy/infectious diarrhea workup.  - please obtain daily CBC, BMP, LFT, INR  - Plan of care was discussed with primary team    Thank you for involving us in the care of Long Le. Please call with any additional concerns or questions.    Randal Ibrahim MD  Gastroenterology Fellow

## 2022-05-03 NOTE — SUBJECTIVE & OBJECTIVE
Interval History: Pt had another low grade fever(100.7F) overnight and received a dose tylenol for relief. He had no additional symptoms and reported feeling at baseline this morning.    Review of Systems   Constitutional:  Negative for activity change and appetite change.   HENT:  Negative for congestion, dental problem and sore throat.    Eyes:  Negative for discharge and itching.   Respiratory:  Positive for cough. Negative for shortness of breath, wheezing and stridor.    Cardiovascular:  Negative for chest pain.   Gastrointestinal:  Positive for diarrhea. Negative for abdominal distention, abdominal pain, blood in stool and constipation.   Endocrine: Negative for cold intolerance.   Genitourinary:  Negative for difficulty urinating and dysuria.   Musculoskeletal:  Negative for arthralgias and back pain.   Skin:  Negative for color change.   Neurological:  Negative for dizziness, tremors, syncope and facial asymmetry.   Hematological:  Negative for adenopathy.   Psychiatric/Behavioral:  Negative for agitation and behavioral problems.    Objective:     Vital Signs (Most Recent):  Temp: 98.1 °F (36.7 °C) (05/03/22 1140)  Pulse: (!) 57 (05/03/22 1140)  Resp: 11 (05/03/22 0914)  BP: (!) 148/80 (05/03/22 1140)  SpO2: 96 % (05/03/22 1215)   Vital Signs (24h Range):  Temp:  [98.1 °F (36.7 °C)-100.9 °F (38.3 °C)] 98.1 °F (36.7 °C)  Pulse:  [57-68] 57  Resp:  [11-27] 11  SpO2:  [94 %-99 %] 96 %  BP: (102-148)/(69-81) 148/80     Weight: 60.6 kg (133 lb 9.6 oz)  Body mass index is 20.31 kg/m².    Intake/Output Summary (Last 24 hours) at 5/3/2022 1504  Last data filed at 5/3/2022 0400  Gross per 24 hour   Intake 581.24 ml   Output 600 ml   Net -18.76 ml      Physical Exam  Vitals and nursing note reviewed.   Constitutional:       General: He is awake. He is not in acute distress.     Appearance: He is well-developed. He is ill-appearing.   HENT:      Head: Atraumatic.      Right Ear: External ear normal.      Left Ear:  External ear normal.      Nose: Nose normal.      Mouth/Throat:      Mouth: Mucous membranes are moist.      Pharynx: No posterior oropharyngeal erythema.   Eyes:      General: Scleral icterus present.      Extraocular Movements: Extraocular movements intact.   Cardiovascular:      Rate and Rhythm: Normal rate and regular rhythm.      Pulses: Normal pulses.           Radial pulses are 2+ on the right side and 2+ on the left side.        Dorsalis pedis pulses are 2+ on the right side and 2+ on the left side.      Heart sounds: Normal heart sounds. No murmur heard.  Pulmonary:      Effort: Pulmonary effort is normal. No respiratory distress.      Breath sounds: Rales present. No wheezing.   Abdominal:      General: Bowel sounds are normal. There is distension.      Palpations: Abdomen is soft. There is fluid wave.      Tenderness: There is abdominal tenderness. There is no guarding.   Musculoskeletal:         General: No swelling or tenderness. Normal range of motion.      Cervical back: Full passive range of motion without pain and normal range of motion. No tenderness.   Lymphadenopathy:      Cervical: No cervical adenopathy.   Skin:     General: Skin is warm.      Coloration: Skin is not pale.   Neurological:      Mental Status: He is alert and oriented to person, place, and time.      Sensory: No sensory deficit.   Psychiatric:         Attention and Perception: Attention normal.         Behavior: Behavior normal. Behavior is cooperative.         Cognition and Memory: Cognition normal.       Significant Labs: All pertinent labs within the past 24 hours have been reviewed.    Significant Imaging: I have reviewed all pertinent imaging results/findings within the past 24 hours.

## 2022-05-03 NOTE — NURSING
Spoke to Dr. De La O regarding pt's temp 100.9. MD stated to give tylenol and we will continue to monitor.

## 2022-05-03 NOTE — CONSULTS
[facility] - Transplant Stepdown  Gastroenterology  Consult Note    Patient Name: Edgar Jackson  MRN: [id number]  Admission Date: 4/30/2022  Hospital Length of Stay: 3 days  Code Status: Full Code   Attending Provider: Jarerd Dyson MD   Consulting Provider: Chris Owens MD  Primary Care Physician: Tyler Castillo MD  Principal Problem:ILDEFONSO (acute kidney injury)    Inpatient consult to Gastroenterology  Consult performed by: Chris Owens MD  Consult ordered by: Nikos Fritz MD        Subjective:     HPI:  Edgar Jackson is a 61 M w/ PMH hypertensive nephropathy s/p renal transplant 2004 on chronic immunosuppression, LOPEZ cirrhosis, DM2, ILD transferred from OSH with ILDEFONSO on CKD, volume overload and ascites.      He was treated at OSH for ILDEFONSO 2/2 decompensated cirrhosis and worsening portal vein hypertension with HRS. provided symptomatic treatment of diarrhea.   He was hospitalized from 4/7 to 4/21/2022 for worsening Cr. He was received 5 days of ceftriaxone for empiric CAP - had paracentesis without evidence of SBP. He was read[facility] at [facility] on 4/27 due to worsening Cr, and was transferred here on 4/30. Diarrhea (up to four times a day) non bloody, liquid stool, 20 lb weight loss (in 3 months). He tried loperamide which mildly helped with the diarrhea. Denied previous hx of chronic diarrhea. Denied family hx of GI/Colon cancer. Had colonoscopy in March that was normal. CT abdomen with no GI concerns. GI consulted for colonoscopy to further evaluate chronic diarrhea in setting of new fever.         Past Medical History:   Diagnosis Date    BPH (benign prostatic hyperplasia)     Diabetes 2020    Gout     Hypertension 2000    Hypothyroidism 04/08/2022    Interstitial lung disease 03/30/2022    Kidney transplant recipient 01/2004    [place] 2* hypertensive nephropathy    Unspecified cirrhosis of liver     ?LOPEZ       Past Surgical History:   Procedure Laterality Date    CATARACT EXTRACTION Right 2017     COLONOSCOPY N/A 3/8/2022    Procedure: COLONOSCOPY;  Surgeon: Mio Simmons III, MD;  Location: [facility] ENDO;  Service: Endoscopy;  Laterality: N/A;    ENDOSCOPIC ULTRASOUND OF UPPER GASTROINTESTINAL TRACT N/A 3/8/2022    Procedure: ULTRASOUND, UPPER GI TRACT, ENDOSCOPIC;  Surgeon: Mio Simmons III, MD;  Location: [facility] ENDO;  Service: Endoscopy;  Laterality: N/A;    KIDNEY TRANSPLANT  2004    PORTACATH PLACEMENT  2003    REMOVAL OF VASCULAR ACCESS PORT  2005       Review of patient's allergies indicates:  No Known Allergies  Family History       Problem Relation (Age of Onset)    Heart disease Father    Kidney disease Father          Tobacco Use    Smoking status: Never Smoker    Smokeless tobacco: Never Used   Substance and Sexual Activity    Alcohol use: Not Currently    Drug use: Never    Sexual activity: Not on file     Review of Systems   Constitutional:  Positive for activity change, appetite change, fatigue, fever and unexpected weight change.   HENT:  Negative for congestion, sore throat and trouble swallowing.    Respiratory:  Positive for cough and shortness of breath. Negative for wheezing.    Cardiovascular:  Negative for chest pain, palpitations and leg swelling.   Gastrointestinal:  Positive for diarrhea and nausea. Negative for abdominal distention, blood in stool and constipation.   Genitourinary:  Positive for decreased urine volume. Negative for dysuria and hematuria.   Musculoskeletal:  Positive for back pain and gait problem. Negative for arthralgias.   Skin:  Positive for color change and pallor. Negative for rash.   Neurological:  Positive for headaches. Negative for dizziness and facial asymmetry.   Psychiatric/Behavioral:  Negative for agitation, behavioral problems and confusion.    Objective:     Vital Signs (Most Recent):  Temp: 98.1 °F (36.7 °C) (05/03/22 1140)  Pulse: (!) 57 (05/03/22 1140)  Resp: 11 (05/03/22 0914)  BP: (!) 148/80 (05/03/22 1140)  SpO2: 96 % (05/03/22  1215)   Vital Signs (24h Range):  Temp:  [98.1 °F (36.7 °C)-100.9 °F (38.3 °C)] 98.1 °F (36.7 °C)  Pulse:  [57-71] 57  Resp:  [11-27] 11  SpO2:  [93 %-99 %] 96 %  BP: (102-148)/(68-81) 148/80     Weight: 60.6 kg (133 lb 9.6 oz) (05/03/22 0600)  Body mass index is 20.31 kg/m².      Intake/Output Summary (Last 24 hours) at 5/3/2022 1317  Last data filed at 5/3/2022 0400  Gross per 24 hour   Intake 581.24 ml   Output 600 ml   Net -18.76 ml       Lines/Drains/Airways       Peripheral Intravenous Line  Duration                  Peripheral IV - Single Lumen 05/02/22 2155 22 G Anterior;Right Forearm <1 day         Peripheral IV - Single Lumen 05/03/22 0115 22 G Anterior;Proximal;Right Forearm <1 day                    Physical Exam  Constitutional:       Appearance: Normal appearance. He is ill-appearing.   HENT:      Head: Normocephalic and atraumatic.      Nose: Nose normal.      Mouth/Throat:      Mouth: Mucous membranes are moist.   Eyes:      General: Scleral icterus present.      Extraocular Movements: Extraocular movements intact.      Pupils: Pupils are equal, round, and reactive to light.   Cardiovascular:      Rate and Rhythm: Normal rate and regular rhythm.      Pulses: Normal pulses.   Pulmonary:      Effort: Pulmonary effort is normal. No respiratory distress.      Breath sounds: No wheezing or rales.   Abdominal:      General: Bowel sounds are normal. There is no distension.      Tenderness: There is no abdominal tenderness. There is no guarding.   Musculoskeletal:         General: No swelling.      Right lower leg: No edema.      Left lower leg: No edema.   Skin:     General: Skin is warm.      Findings: No lesion or rash.   Neurological:      General: No focal deficit present.      Mental Status: He is alert and oriented to person, place, and time. Mental status is at baseline.   Psychiatric:         Mood and Affect: Mood normal.         Behavior: Behavior normal.         Thought Content: Thought content  normal.       Significant Labs:  CBC:   Recent Labs   Lab 05/02/22  0740 05/03/22  0708   WBC 6.38 8.78   HGB 9.7* 10.0*   HCT 27.4* 28.6*   PLT 77* 89*     All pertinent lab results from the last 24 hours have been reviewed.    Significant Imaging:  Imaging results within the past 24 hours have been reviewed.    Assessment/Plan:     Chronic diarrhea  Long Le is a 61 M w/ PMH hypertensive nephropathy s/p renal transplant 2004 on chronic immunosuppression, LOPEZ cirrhosis, DM2, ILD transferred from OSH with ILDEFONSO on CKD, volume overload and ascites.         Had colonoscopy in March that was normal. CT abdomen with no GI concerns. GI consulted for colonoscopy to further evaluate chronic diarrhea in setting of new fever.     Plan:   - Will plan for colonoscopy tomorrow   - Clear liquid diet today (order placed)  - NPO after midnight (order placed)  - Please correct electrolyte abnormalities prior to the procedure           Thank you for your consult. I will follow-up with patient. Please contact us if you have any additional questions.    Chris Owens MD  Gastroenterology  [facility] - Trans[facility] Step[facility]

## 2022-05-03 NOTE — PROGRESS NOTES
Rachid Cuevas - Transplant [facility] Medicine  Progress Note    Patient Name: Edgar Jackson  MRN: [id number]  Patient Class: IP- Inpatient   Admission Date: 4/30/2022  Length of Stay: 3 days  Attending Physician: Jarred Dyson MD  Primary Care Provider: Tyler Castillo MD        Subjective:     Principal Problem:ILDEFONSO (acute kidney injury)    HPI:  Mr Jackson is a 61yoM with PMHx of renal transplant for hypertensive nephropathy (2004) immunosuppressed on tacrolimus/sirolimus,  therapy, LOPEZ cirrhosis, NIDDM, and ILD who was treated at OSH for ILDEFONSO 2/2 decompensated cirrhosis and worsening portal vein hypertension with HRS. Pt reported that he had been experiencing abdominal distention with associated diarrhea (4-5/day) over several weeks, along with a constant nagging cough. On presenting to OSH, multiple paracentesis (3) were performed to relieve the ascitic fluid. No evidence of SBP on fluid analysis. Creon was added to diet to aide in food digestion in setting of cirrhosis. Loperamide provided symptomatic treatment of diarrhea. Dextromethorphan-guaifenesin syrup added to reduce cough exacerbations. Despite efforts to manage symptoms, pt in need of  transplant surgery evaluation of the renal/hepatic injury, resulting in tranfer to [facility].     On arrival to [facility] (4/30/2022), pt has experienced increased generalized weakness. Loperamide was continued for diarrhea management. Abdominal distention recurred, despite prior paracentesis. Cough has been fairly constant and predominantly dry. Pt now endorses new left lower quadrant abdominal pain, worsened by cough. KTM and Hepatology consults placed for management of HRS.      Overview/Hospital Course:  Pt admitted to hospital medicine for treatment of ILDEFONSO likely secondary to decompensated cirrhosis. Consulted Hepatology and KTM, appreciate recs. Performed diagnostic paracentesis to verify nature of ascitic fluid and to rule out SBP. Pt had a fever of 100.7F per arm pit and 100.5F  orally. CXR significant for prior ILD but no acute process. Blood cultures collected and Vanc/Cefepime started. ID consulted, appreciate recs. Diagnostic paracentesis performed with results indicative of portal vein hypertension. Fluid cultures are negative to date. CT Chest w/o contrast significant for bilateral opacities concerning for pneumonia. Pulmonology consulted, appreciate recs. Pt NPO with anticoagulants held at midnight on 5/04/22 for Bronchoscopy.      Interval History: Pt had another low grade fever(100.7F) overnight and received a dose tylenol for relief. He had no additional symptoms and reported feeling at baseline this morning.    Review of Systems   Constitutional:  Negative for activity change and appetite change.   HENT:  Negative for congestion, dental problem and sore throat.    Eyes:  Negative for discharge and itching.   Respiratory:  Positive for cough. Negative for shortness of breath, wheezing and stridor.    Cardiovascular:  Negative for chest pain.   Gastrointestinal:  Positive for diarrhea. Negative for abdominal distention, abdominal pain, blood in stool and constipation.   Endocrine: Negative for cold intolerance.   Genitourinary:  Negative for difficulty urinating and dysuria.   Musculoskeletal:  Negative for arthralgias and back pain.   Skin:  Negative for color change.   Neurological:  Negative for dizziness, tremors, syncope and facial asymmetry.   Hematological:  Negative for adenopathy.   Psychiatric/Behavioral:  Negative for agitation and behavioral problems.    Objective:     Vital Signs (Most Recent):  Temp: 98.1 °F (36.7 °C) (05/03/22 1140)  Pulse: (!) 57 (05/03/22 1140)  Resp: 11 (05/03/22 0914)  BP: (!) 148/80 (05/03/22 1140)  SpO2: 96 % (05/03/22 1215)   Vital Signs (24h Range):  Temp:  [98.1 °F (36.7 °C)-100.9 °F (38.3 °C)] 98.1 °F (36.7 °C)  Pulse:  [57-68] 57  Resp:  [11-27] 11  SpO2:  [94 %-99 %] 96 %  BP: (102-148)/(69-81) 148/80     Weight: 60.6 kg (133 lb 9.6  oz)  Body mass index is 20.31 kg/m².    Intake/Output Summary (Last 24 hours) at 5/3/2022 1504  Last data filed at 5/3/2022 0400  Gross per 24 hour   Intake 581.24 ml   Output 600 ml   Net -18.76 ml      Physical Exam  Vitals and nursing note reviewed.   Constitutional:       General: He is awake. He is not in acute distress.     Appearance: He is well-developed. He is ill-appearing.   HENT:      Head: Atraumatic.      Right Ear: External ear normal.      Left Ear: External ear normal.      Nose: Nose normal.      Mouth/Throat:      Mouth: Mucous membranes are moist.      Pharynx: No posterior oropharyngeal erythema.   Eyes:      General: Scleral icterus present.      Extraocular Movements: Extraocular movements intact.   Cardiovascular:      Rate and Rhythm: Normal rate and regular rhythm.      Pulses: Normal pulses.           Radial pulses are 2+ on the right side and 2+ on the left side.        Dorsalis pedis pulses are 2+ on the right side and 2+ on the left side.      Heart sounds: Normal heart sounds. No murmur heard.  Pulmonary:      Effort: Pulmonary effort is normal. No respiratory distress.      Breath sounds: Rales present. No wheezing.   Abdominal:      General: Bowel sounds are normal. There is distension.      Palpations: Abdomen is soft. There is fluid wave.      Tenderness: There is abdominal tenderness. There is no guarding.   Musculoskeletal:         General: No swelling or tenderness. Normal range of motion.      Cervical back: Full passive range of motion without pain and normal range of motion. No tenderness.   Lymphadenopathy:      Cervical: No cervical adenopathy.   Skin:     General: Skin is warm.      Coloration: Skin is not pale.   Neurological:      Mental Status: He is alert and oriented to person, place, and time.      Sensory: No sensory deficit.   Psychiatric:         Attention and Perception: Attention normal.         Behavior: Behavior normal. Behavior is cooperative.          Cognition and Memory: Cognition normal.       Significant Labs: All pertinent labs within the past 24 hours have been reviewed.    Significant Imaging: I have reviewed all pertinent imaging results/findings within the past 24 hours.      Assessment/Plan:      * ILDEFONSO (acute kidney injury)  Patient with acute kidney injury likely d/t IVVD/Dehydration Which is currently stable. Labs reviewed- Renal function/electrolytes with Estimated Creatinine Clearance: 12 mL/min (A) (based on SCr of 5.6 mg/dL (H)). according to latest data. Monitor urine output and serial BMP and adjust therapy as needed. Avoid nephrotoxins and renally dose meds for GFR listed above. Gentle IV fluid PRN. Ultrasound transplant kidney no acute changes. Possibly due to tacrolimus versus hepatorenal syndrome, Nephrology has been consulted. Toprolol continued.  Bicarb ordered for low serum levels. Repeat lasix and albumin dosing per Nephrology. Cr/GFR to be trended for 6 weeks for evaluation.  - KTM following.   - hold sirolimus and tacrolimus  - trend daily BMP/RFP      Lung infiltrate on CT  Pt with Hx of chronic ILD with bilateral areas of lung scarring. Regions appear to be slightly larger in size when compared to previous imaging. New findings of bilateral infiltrates noted on recent CT Chest w/o contrast concerning for pneumonia. Acute fever reported over the past two evenings. Pt started on broad spectrum abx with septic workup performed. Respiratory viral panel negative. Pulmonology consulted, appreciate recs. Bronchoscopy to be performed. (See associated problem ILD)  - f/u bronchoscopy results/cultures    Sepsis  This patient does have evidence of infective focus  My overall impression is sepsis. Vital signs were reviewed and noted in progress note.  Antibiotics given-   Antibiotics (From admission, onward)            Start     Stop Route Frequency Ordered    05/01/22 2115  cefepime in dextrose 5 % 1 gram/50 mL IVPB 1 g         -- IV Every 24  "hours (non-standard times) 05/01/22 2051 05/01/22 2102  vancomycin - pharmacy to dose  (vancomycin IVPB)        "And" Linked Group Details    -- IV pharmacy to manage frequency 05/01/22 2051 04/30/22 2230  mupirocin 2 % ointment         05/05 2059 Nasl 2 times daily 04/30/22 2126        Cultures were taken-   Microbiology Results (last 7 days)     Procedure Component Value Units Date/Time    Culture, MRSA [[id number]]     Order Status: No result Specimen: MRSA source from Nares, Left     Aerobic culture [[id number]]  (Abnormal) Collected: 05/01/22 2012    Order Status: Completed Specimen: Ascites Updated: 05/03/22 1143     Aerobic Bacterial Culture ENTEROCOCCUS FAECALIS  Rare  Susceptibility pending      Cryptococcal antigen [[id number]] Collected: 05/02/22 1939    Order Status: Completed Specimen: Blood, Venous Updated: 05/03/22 1115     Cryptococcal Ag, Blood Negative    Culture, Body Fluid - Bactec [[id number]] Collected: 05/01/22 2013    Order Status: Completed Specimen: Body Fluid from Ascites Updated: 05/03/22 0746     Body Fluid Culture, Sterile No growth to date    Blood culture [[id number]] Collected: 05/02/22 1941    Order Status: Completed Specimen: Blood Updated: 05/03/22 0745     Blood Culture, Routine No Growth to date    Blood culture [[id number]] Collected: 05/02/22 1938    Order Status: Completed Specimen: Blood Updated: 05/03/22 0745     Blood Culture, Routine No Growth to date    Culture, Anaerobic [[id number]] Collected: 05/01/22 2012    Order Status: Completed Specimen: Ascites Updated: 05/03/22 0644     Anaerobic Culture Culture in progress    Blood culture [[id number]] Collected: 05/01/22 2132    Order Status: Completed Specimen: Blood Updated: 05/03/22 0613     Blood Culture, Routine No Growth to date      No Growth to date    Narrative:      Collection has been rescheduled by [facility] at 05/01/2022 20:55 Reason:   Patient unavailable ON BSCC   Collection has been rescheduled by [facility] at 05/01/2022 " 20:55 Reason:   Patient unavailable ON BSCC     Blood culture [[id number]] Collected: 05/01/22 2132    Order Status: Completed Specimen: Blood Updated: 05/03/22 0613     Blood Culture, Routine No Growth to date      No Growth to date    Narrative:      Collection has been rescheduled by [facility] at 05/01/2022 20:55 Reason:   Patient unavailable ON BSCC   Collection has been rescheduled by [facility] at 05/01/2022 20:55 Reason:   Patient unavailable ON BSCC     AFB Culture & Smear [[id number]] Collected: 05/03/22 0104    Order Status: Sent Specimen: Blood from Arm, Right Updated: 05/03/22 0110    AFB Culture & Smear [[id number]]     Order Status: Canceled Specimen: Blood     Respiratory Infection Panel (PCR), Nasopharyngeal [[id number]] Collected: 05/02/22 2044    Order Status: Completed Specimen: Nasopharyngeal Swab Updated: 05/02/22 2252     Respiratory Infection Panel Source NP Swab     Adenovirus Not Detected     Coronavirus 229E, Common Cold Virus Not Detected     Coronavirus HKU1, Common Cold Virus Not Detected     Coronavirus NL63, Common Cold Virus Not Detected     Coronavirus OC43, Common Cold Virus Not Detected     Comment: The Coronavirus strains detected in this test cause the common cold.  These strains are not the COVID-19 (novel Coronavirus)strain   associated with the respiratory disease outbreak.          SARS-CoV2 (COVID-19) Qualitative PCR Not Detected     Human Metapneumovirus Not Detected     Human Rhinovirus/Enterovirus Not Detected     Influenza A (subtypes H1, H1-2009,H3) Not Detected     Influenza B Not Detected     Parainfluenza Virus 1 Not Detected     Parainfluenza Virus 2 Not Detected     Parainfluenza Virus 3 Not Detected     Parainfluenza Virus 4 Not Detected     Respiratory Syncytial Virus Not Detected     Bordetella Parapertussis (JB4855) Not Detected     Bordetella pertussis (ptxP) Not Detected     Chlamydia pneumoniae Not Detected     Mycoplasma pneumoniae Not Detected    Narrative:      For all  other respiratory sources, order [id number] -  Respiratory Viral Panel by PCR    AFB Culture & Smear [[id number]]     Order Status: Canceled Specimen: Blood     Blood culture [[id number]]     Order Status: Canceled Specimen: Blood     Fungus culture [[id number]] Collected: 05/01/22 2012    Order Status: Completed Specimen: Ascites Updated: 05/02/22 0924     Fungus (Mycology) Culture Culture in progress    Gram stain [[id number]] Collected: 05/01/22 2012    Order Status: Completed Specimen: Ascites Updated: 05/01/22 2233     Gram Stain Result No WBC's      No organisms seen    Culture, Respiratory with Gram Stain [[id number]]     Order Status: No result Specimen: Respiratory         Latest lactate reviewed, they are-  Recent Labs   Lab 04/30/22  2142 05/01/22  0204   LACTATE 1.1 0.9       Blood and fluid cultures performed: NGTD. Bronchoscopy to be performed. Respiratory viral panel negative. Follow up results and respiratory cultures.      Type 2 diabetes mellitus without complication, without long-term current use of insulin  Start basal/ prandial insulin as needed and titrate SSI to maintain serum [id number]. Start diabetic diet.      Chronic cough  PMHx of interstitial lung disease (lung scarring). Continue scheduled benzonotate tablet 200mg TID. For persistent cough add dextromethorphan-guaiefesin syrup. Compare/contrast serial CXR for worsening inflammatory process.  (See associated problems: ILD & fever)     Chronic diarrhea  Pt started on loperamide at OSH with adequate response, will continue anti-nausea medication at [facility]. Trended labs daily and adjusted for any electrolyte abnormality. Pt with prior negative Cdiff test. GI to perform colonoscopy for ongoing diarrhea.  - F/U CMV lab.       Fever in immunocomprised patient  Patient received paracentesis on 05/01, negative for SBP.  Spiked fever afterwards, 100.7F. Septic workup started, broad spectrum abx coverage with Vanc/Cefepime. Chest x-ray showed  consolidation at the middle lobe of the left lung. CT chest w/o contrast with similar findings, concerning for pneumonia.  Lactic acid wnl and no leukocytosis.ID consult placed given comorbidity and immunocompromised status. Pulmonology consulted, given chronic ILD findings with new bilateral opacities. Bronchoscopy to be performed. Bcx and Fcx NGTD. Respiratory viral panel negative.  - F/U cultures  - trend vitals and CBC  - trend inflammatory markers      Thrombocytopenia  Thrombocytopenia likely in setting of hepatic cirrhosis. Hold anticoagulation and DVT prophylaxis for thrombocytopenia.      Hypothyroidism  Continued on home medication: synthroid 50mcg qd      Hyponatremia  Stable. Urine sodium 29, urine osm 297. DDX low effective arterial volume vs SIADH.     - KTM following.   - Cont monitoring.         Cirrhosis of liver with ascites  Pt recently diagnosed with cirrhosis 2/2 to LOPEZ. Pulmonary vein hypertension now present with with multiple episodes of paracentesis. Largest abdominal fluid reduction just over 5L. Diagnostic paracentesis performed at [facility]. Lab work being processed currently.  - Labs negative for SBP.   - identify significant cytology, cultures and gram staining  - Hepatology and KTM coordinating for possible transplant.   - Echo ordered for transplant eval.     Interstitial lung disease  CXR performed on admission, with serial imaging reviewed. Continued scheduled Benzonate daily. Flonase added daily PRN with DuoNebs q6h PRN. Supplemental oxygen PRN. Continuous pulse oxymetry ordered. SpO2 maintained 88-93% by adjusting O2 flow. CT chest w/o contrast significant for bilateral infiltrates, concerning for pneumonia. Pulmonology consulted, appreciate recs. Pt to undergo Bronchoscopy 5/04/22.  - perform ambulatory test for home oxygen if required.   - F/u pulm outpatient.       Gout  Continue allopurinol for prophylaxis.       Transplanted kidney  Renal transplant performed in 2004. Pt continued  on sirolimus and tacrolimus for immuno suppression. KTM consulted on admission, appreciate recs. Trough levels ordered with both medications.currently held.    - trend renal funtion tests   - restart tacrolimus when levels permit  - restart sirolimus as directed by KTM      VTE Risk Mitigation (From admission, onward)         Ordered     IP VTE HIGH RISK PATIENT  Once         04/30/22 1810     Place sequential compression device  Until discontinued         04/30/22 1810                Discharge Planning   LE: 5/6/2022     Code Status: Full Code   Is the patient medically ready for discharge?: No    Reason for patient still in hospital (select all that apply): Treatment  Discharge Plan A: Home with family, Home Health          Mansoor Narayanan MD  Department of Hospital Medicine   [facility] - Transplant Stepdown

## 2022-05-03 NOTE — NURSING
RN reported to Dr. Clyde RODARTE 1 pt's . MD ordered to hold Lopressor and administer Lasix. RN will follow orders and continue to monitor.

## 2022-05-03 NOTE — HPI
62 yo with hx of renal transplant (2003), LOPEZ cirrhosis, ILD (unknown etiology), T2DM, hypothyroidism who was transferred from [facility] on 4/30/2022 for abnormal labs and liver/kidney transplant evaluation. He was hospitalized from 4/7 - 4/21 for worsening creatinine. He was discharged home after being treated with broad spectrum antiobiotics but presented to [facility] again on 4/27 where he was found to have worsening creatinine and was subsequently transferred to [facility] 4/30 for transplant evaluation. During hospital stay at [facility], Ct chest demonstrated new opacifications bilaterally. 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 He reports improvement in his cough and shortness of breath over the course of hospital stay, though still gets fatigued and winded with minimal exertion. His cough is dry and worse at night. He has persistent chills but denies fevers, LE swelling, and orthopnea. He has a hx of tobacco use and quit 20 years ago. He was born in [facility] but has not traveled internationally since 1976. He lives at home with his wife in [place] who assists him. He has no hx of incarceration or homelessness. He has had progressive renal failure and is now uremic with plans to start on HD. Inpatient workup for kidney/liver transplant underway.    Background history: Since Feb, he was experiencing a chronic dry cough, shortness of breath, decreased exercise tolerance, and difficulty performing ADLs such as grooming, dressing, and cooking.  He reports that he can only climb half a flight of stairs before becoming short of breath.  He has also experienced decreased appetite, diarrhea (2-4x a day), and a 20 pound weight loss over the past 3 months.  The patient immigrated to [place] from [place] in 1976 where he worked as an  for [employer].  In January 2022 he moved to live with his son in [place].  He then moved to [place].  During  his time in [place], he stated that he experienced a modest improvement in his cough and correlated his cough with the humidity levels.

## 2022-05-03 NOTE — SUBJECTIVE & OBJECTIVE
Past Medical History:   Diagnosis Date    BPH (benign prostatic hyperplasia)     Diabetes 2020    Gout     Hypertension 2000    Hypothyroidism 04/08/2022    Interstitial lung disease 03/30/2022    Kidney transplant recipient 01/2004    [place] 2* hypertensive nephropathy    Unspecified cirrhosis of liver     ?LOPEZ       Past Surgical History:   Procedure Laterality Date    CATARACT EXTRACTION Right 2017    COLONOSCOPY N/A 3/8/2022    Procedure: COLONOSCOPY;  Surgeon: Mio Simmons III, MD;  Location: [facility] ENDO;  Service: Endoscopy;  Laterality: N/A;    ENDOSCOPIC ULTRASOUND OF UPPER GASTROINTESTINAL TRACT N/A 3/8/2022    Procedure: ULTRASOUND, UPPER GI TRACT, ENDOSCOPIC;  Surgeon: Mio Simmons III, MD;  Location: [facility] ENDO;  Service: Endoscopy;  Laterality: N/A;    KIDNEY TRANSPLANT  2004    PORTACATH PLACEMENT  2003    REMOVAL OF VASCULAR ACCESS PORT  2005       Review of patient's allergies indicates:  No Known Allergies  Family History       Problem Relation (Age of Onset)    Heart disease Father    Kidney disease Father          Tobacco Use    Smoking status: Never Smoker    Smokeless tobacco: Never Used   Substance and Sexual Activity    Alcohol use: Not Currently    Drug use: Never    Sexual activity: Not on file     Review of Systems   Constitutional:  Positive for activity change, appetite change, fatigue, fever and unexpected weight change.   HENT:  Negative for congestion, sore throat and trouble swallowing.    Respiratory:  Positive for cough and shortness of breath. Negative for wheezing.    Cardiovascular:  Negative for chest pain, palpitations and leg swelling.   Gastrointestinal:  Positive for diarrhea and nausea. Negative for abdominal distention, blood in stool and constipation.   Genitourinary:  Positive for decreased urine volume. Negative for dysuria and hematuria.   Musculoskeletal:  Positive for back pain and gait problem. Negative for arthralgias.   Skin:  Positive for color  change and pallor. Negative for rash.   Neurological:  Positive for headaches. Negative for dizziness and facial asymmetry.   Psychiatric/Behavioral:  Negative for agitation, behavioral problems and confusion.    Objective:     Vital Signs (Most Recent):  Temp: 98.1 °F (36.7 °C) (05/03/22 1140)  Pulse: (!) 57 (05/03/22 1140)  Resp: 11 (05/03/22 0914)  BP: (!) 148/80 (05/03/22 1140)  SpO2: 96 % (05/03/22 1215)   Vital Signs (24h Range):  Temp:  [98.1 °F (36.7 °C)-100.9 °F (38.3 °C)] 98.1 °F (36.7 °C)  Pulse:  [57-71] 57  Resp:  [11-27] 11  SpO2:  [93 %-99 %] 96 %  BP: (102-148)/(68-81) 148/80     Weight: 60.6 kg (133 lb 9.6 oz) (05/03/22 0600)  Body mass index is 20.31 kg/m².      Intake/Output Summary (Last 24 hours) at 5/3/2022 1317  Last data filed at 5/3/2022 0400  Gross per 24 hour   Intake 581.24 ml   Output 600 ml   Net -18.76 ml       Lines/Drains/Airways       Peripheral Intravenous Line  Duration                  Peripheral IV - Single Lumen 05/02/22 2155 22 G Anterior;Right Forearm <1 day         Peripheral IV - Single Lumen 05/03/22 0115 22 G Anterior;Proximal;Right Forearm <1 day                    Physical Exam  Constitutional:       Appearance: Normal appearance. He is ill-appearing.   HENT:      Head: Normocephalic and atraumatic.      Nose: Nose normal.      Mouth/Throat:      Mouth: Mucous membranes are moist.   Eyes:      General: Scleral icterus present.      Extraocular Movements: Extraocular movements intact.      Pupils: Pupils are equal, round, and reactive to light.   Cardiovascular:      Rate and Rhythm: Normal rate and regular rhythm.      Pulses: Normal pulses.   Pulmonary:      Effort: Pulmonary effort is normal. No respiratory distress.      Breath sounds: No wheezing or rales.   Abdominal:      General: Bowel sounds are normal. There is no distension.      Tenderness: There is no abdominal tenderness. There is no guarding.   Musculoskeletal:         General: No swelling.      Right  lower leg: No edema.      Left lower leg: No edema.   Skin:     General: Skin is warm.      Findings: No lesion or rash.   Neurological:      General: No focal deficit present.      Mental Status: He is alert and oriented to person, place, and time. Mental status is at baseline.   Psychiatric:         Mood and Affect: Mood normal.         Behavior: Behavior normal.         Thought Content: Thought content normal.       Significant Labs:  CBC:   Recent Labs   Lab 05/02/22  0740 05/03/22  0708   WBC 6.38 8.78   HGB 9.7* 10.0*   HCT 27.4* 28.6*   PLT 77* 89*     All pertinent lab results from the last 24 hours have been reviewed.    Significant Imaging:  Imaging results within the past 24 hours have been reviewed.

## 2022-05-04 PROBLEM — E43 SEVERE MALNUTRITION: Status: ACTIVE | Noted: 2022-01-01

## 2022-05-04 PROBLEM — K65.2 SBP (SPONTANEOUS BACTERIAL PERITONITIS): Status: ACTIVE | Noted: 2022-01-01

## 2022-05-04 NOTE — TREATMENT PLAN
Brief GI Treatment Plan    GI initially consulted on 5/3 for diarrhea.    Interval History:  Went for bronch today, sleeping during visit.  Wife at bedside, reports 1 BM last night, none this AM.  Discussed plan for colonoscopy tomorrow, 5/5.  She is aware.    Plan:  - CLD today, 5/4  - NPO at midnight 5/5  - Colonoscopy  Tomorrow, 5/5, for diarrhea  - GoLytely Split bowel prep to start this evening 1800  - monitor and replete electrolytes as needed.  - please contact GI with any acte changes in patient's clinical status    Thank you for your consult.  We will continue to follow.  Please contact GI with any questions or concerns.    Theodora Martin DNP  [facility]

## 2022-05-04 NOTE — PROGRESS NOTES
[facility] - Transplant Stepdown  Infectious Disease  Progress Note    Patient Name: Edgar Jackson  MRN: [id number]  Admission Date: 4/30/2022  Length of Stay: 4 days  Attending Physician: Jarred Dyson MD  Primary Care Provider: Tyler Castillo MD    Isolation Status: Airborne  Assessment/Plan:      Fever in immunocomprised patient  61 y.o. male with a PMH ESRD 2/2 HTN s/p kidney transplant in 1/2004 (in [place]; on Tacrolimus/Sirolimus), c/b LOPEZ cirrhosis, ILD, T2DM (A1C 6.6 in 2/2022), hypothyroidism, BPH, gout was admitted on 4/30/2022 (transferred from [facility]) for abnormal labs and need for transplant liver/kidney evaluation.  Complaints of malaise, chronic cough, diarrhea, weight loss, and found febrile.  Lives in [place], [place] before moving to [place] 9/2021.  CT with opacifications, reverse halo sign.    Recommendations:  -Continue vancomycin, cefepime  -Start posaconazole  -Airborne precautions until TB rule out, need AFB x3  -Will follow-up BAL studies: cell count with diff, gram stain, bacterial / legionella / fungal / AFB cultures, TEM-respiratory pathogens panel, Aspergillus Ag, MTB PCR, Pneumocystis PCR, [id number]  -Will follow-up on fungal markers, CMV already in process  -GI consulted, planned for colonoscopy        Thank you for your consult. I will follow-up with patient. Please contact us if you have any additional questions.    Zheng Chamberlain MD  Infectious Disease  [facility] - Transplant Stepdown    Subjective:     Principal Problem:ILDEFONSO (acute kidney injury)    HPI: Mr Edgar Jackson is a 61 y.o. [place]ol[place] male with a PMH ESRD 2/2 HTN s/p kidney transplant in 1/2004 (in [place]; on Tacrolimus/Sirolimus), c/b LOPEZ cirrhosis, ILD, T2DM (A1C 6.6 in 2/2022), hypothyroidism, BPH, gout; admitted on 4/30/2022 (transferred from [facility]) for abnormal labs and need for transplant liver/kidney evaluation. Patient is a moka5A  and moved from [place], [place] (lived for 20 years) to [place] since 9/2021. He  was hospitalized from 4/7 to 4/21/2022 for worsening Cr. He was received 5 days of ceftriaxone for empiric CAP - had paracentesis without evidence of SBP. He was readmitted at [facility] on 4/27 due to worsening Cr, and was transferred here on 4/30 for transplant specialities evaluation. He reports being lousy for hte past 3 months. He has chronic dry cough, decreased appetite, diarrhea (up to four times a day), 20 lb weight loss for the past 3 months. Patient developed fever to 100.7 on 5/1. Cr 4.5-5 (baseline 1.6). He was started on empiric vancomycin and cefepime. Our team was consulted for antibiotic assistance.     Patient immigrated from [place] since 1975. He previously enjoyed outdoor activities. He currently lives with his wife in [place]. No pets.               Interval History: Afebrile last 24 hours.  Underwent bronchoscopy today.    Review of Systems   Constitutional:  Negative for chills and fever.   HENT:  Negative for congestion and sore throat.    Eyes:  Negative for visual disturbance.   Respiratory:  Positive for cough. Negative for shortness of breath.    Cardiovascular:  Negative for chest pain and leg swelling.   Gastrointestinal:  Positive for diarrhea. Negative for constipation, nausea and vomiting.   Endocrine: Negative for polydipsia.   Genitourinary:  Negative for dysuria.   Musculoskeletal:  Negative for arthralgias, back pain and myalgias.   Skin:  Negative for rash and wound.   Allergic/Immunologic: Positive for immunocompromised state. Negative for food allergies.   Neurological:  Negative for headaches.   Hematological:  Does not bruise/bleed easily.   Psychiatric/Behavioral:  Negative for confusion.    All other systems reviewed and are negative.  Objective:     Vital Signs (Most Recent):  Temp: 98.3 °F (36.8 °C) (05/04/22 1148)  Pulse: 66 (05/04/22 1148)  Resp: 17 (05/04/22 1148)  BP: 132/80 (05/04/22 1148)  SpO2: 96 % (05/04/22 0924) Vital Signs (24h Range):  Temp:  [98.2 °F  (36.8 °C)-99.5 °F (37.5 °C)] 98.3 °F (36.8 °C)  Pulse:  [59-70] 66  Resp:  [13-26] 17  SpO2:  [94 %-99 %] 96 %  BP: (112-138)/(68-82) 132/80     Weight: 59.9 kg (132 lb)  Body mass index is 20.07 kg/m².    Estimated Creatinine Clearance: 12.3 mL/min (A) (based on SCr of 5.4 mg/dL (H)).    Physical Exam  Vitals and nursing note reviewed.   Constitutional:       General: He is awake. He is not in acute distress.     Appearance: He is well-developed. He is not toxic-appearing or diaphoretic.   HENT:      Head: Normocephalic and atraumatic.      Right Ear: External ear normal.      Left Ear: External ear normal.      Nose:      Comments: On nasal cannula     Mouth/Throat:      Mouth: Mucous membranes are moist.      Pharynx: No oropharyngeal exudate or posterior oropharyngeal erythema.      Comments: -erythematous right lower lip with small amount of dry blood  Eyes:      General: Scleral icterus present.   Cardiovascular:      Rate and Rhythm: Normal rate and regular rhythm.      Pulses: Normal pulses.      Heart sounds: Normal heart sounds. No murmur heard.  Pulmonary:      Effort: No respiratory distress.      Breath sounds: No wheezing.   Abdominal:      General: Bowel sounds are normal.      Palpations: Abdomen is soft.      Tenderness: Tenderness: left sided. paracentesis site on bandage.   Musculoskeletal:         General: Normal range of motion.      Cervical back: Full passive range of motion without pain and neck supple.      Right lower leg: No edema.      Left lower leg: No edema.   Lymphadenopathy:      Cervical: No cervical adenopathy.   Skin:     General: Skin is warm.      Coloration: Skin is jaundiced.      Findings: No lesion or rash.   Neurological:      Mental Status: He is alert and oriented to person, place, and time.   Psychiatric:         Attention and Perception: Attention normal.         Mood and Affect: Mood normal.         Behavior: Behavior normal. Behavior is cooperative.         Thought  Content: Thought content normal.         Cognition and Memory: Cognition normal.         Judgment: Judgment normal.       Significant Labs: All pertinent labs within the past 24 hours have been reviewed.    Significant Imaging: I have reviewed all pertinent imaging results/findings within the past 24 hours.

## 2022-05-04 NOTE — PLAN OF CARE
AAOx3, afebrile, w/o c/o pain. Bg monitored achs. Pt on 2L NC. Plan for bronch today-Pt is NPO. Plan for colonoscopy on 5/5. Echo ordered. IV abx continued. MRSA nasal swabs sent for left and rt nostril. Legionella urine sent. Pt still having dry cough and diarrhea. Pt able to position self independently in bed. Stand by assist OOB. Pt in lowest position, side rails up x2, non-skid foot wear in place, call light within reach, pt verbalized understanding to call RN when needed.  Hand hygiene practiced per protocol. Will continue to monitor.

## 2022-05-04 NOTE — SUBJECTIVE & OBJECTIVE
Interval History: Afebrile last 24 hours.  Underwent bronchoscopy today.    Review of Systems   Constitutional:  Negative for chills and fever.   HENT:  Negative for congestion and sore throat.    Eyes:  Negative for visual disturbance.   Respiratory:  Positive for cough. Negative for shortness of breath.    Cardiovascular:  Negative for chest pain and leg swelling.   Gastrointestinal:  Positive for diarrhea. Negative for constipation, nausea and vomiting.   Endocrine: Negative for polydipsia.   Genitourinary:  Negative for dysuria.   Musculoskeletal:  Negative for arthralgias, back pain and myalgias.   Skin:  Negative for rash and wound.   Allergic/Immunologic: Positive for immunocompromised state. Negative for food allergies.   Neurological:  Negative for headaches.   Hematological:  Does not bruise/bleed easily.   Psychiatric/Behavioral:  Negative for confusion.    All other systems reviewed and are negative.  Objective:     Vital Signs (Most Recent):  Temp: 98.3 °F (36.8 °C) (05/04/22 1148)  Pulse: 66 (05/04/22 1148)  Resp: 17 (05/04/22 1148)  BP: 132/80 (05/04/22 1148)  SpO2: 96 % (05/04/22 0924) Vital Signs (24h Range):  Temp:  [98.2 °F (36.8 °C)-99.5 °F (37.5 °C)] 98.3 °F (36.8 °C)  Pulse:  [59-70] 66  Resp:  [13-26] 17  SpO2:  [94 %-99 %] 96 %  BP: (112-138)/(68-82) 132/80     Weight: 59.9 kg (132 lb)  Body mass index is 20.07 kg/m².    Estimated Creatinine Clearance: 12.3 mL/min (A) (based on SCr of 5.4 mg/dL (H)).    Physical Exam  Vitals and nursing note reviewed.   Constitutional:       General: He is awake. He is not in acute distress.     Appearance: He is well-developed. He is not toxic-appearing or diaphoretic.   HENT:      Head: Normocephalic and atraumatic.      Right Ear: External ear normal.      Left Ear: External ear normal.      Nose:      Comments: On nasal cannula     Mouth/Throat:      Mouth: Mucous membranes are moist.      Pharynx: No oropharyngeal exudate or posterior oropharyngeal  erythema.      Comments: -erythematous right lower lip with small amount of dry blood  Eyes:      General: Scleral icterus present.   Cardiovascular:      Rate and Rhythm: Normal rate and regular rhythm.      Pulses: Normal pulses.      Heart sounds: Normal heart sounds. No murmur heard.  Pulmonary:      Effort: No respiratory distress.      Breath sounds: No wheezing.   Abdominal:      General: Bowel sounds are normal.      Palpations: Abdomen is soft.      Tenderness: Tenderness: left sided. paracentesis site on bandage.   Musculoskeletal:         General: Normal range of motion.      Cervical back: Full passive range of motion without pain and neck supple.      Right lower leg: No edema.      Left lower leg: No edema.   Lymphadenopathy:      Cervical: No cervical adenopathy.   Skin:     General: Skin is warm.      Coloration: Skin is jaundiced.      Findings: No lesion or rash.   Neurological:      Mental Status: He is alert and oriented to person, place, and time.   Psychiatric:         Attention and Perception: Attention normal.         Mood and Affect: Mood normal.         Behavior: Behavior normal. Behavior is cooperative.         Thought Content: Thought content normal.         Cognition and Memory: Cognition normal.         Judgment: Judgment normal.       Significant Labs: All pertinent labs within the past 24 hours have been reviewed.    Significant Imaging: I have reviewed all pertinent imaging results/findings within the past 24 hours.

## 2022-05-04 NOTE — ASSESSMENT & PLAN NOTE
"This patient does have evidence of infective focus  My overall impression is sepsis. Vital signs were reviewed and noted in progress note.  Antibiotics given-   Antibiotics (From admission, onward)            Start     Stop Route Frequency Ordered    05/01/22 2115  cefepime in dextrose 5 % 1 gram/50 mL IVPB 1 g         -- IV Every 24 hours (non-standard times) 05/01/22 2051 05/01/22 2102  vancomycin - pharmacy to dose  (vancomycin IVPB)        "And" Linked Group Details    -- IV pharmacy to manage frequency 05/01/22 2051 04/30/22 2230  mupirocin 2 % ointment         05/05 2059 Nasl 2 times daily 04/30/22 2126        Cultures were taken-   Microbiology Results (last 7 days)     Procedure Component Value Units Date/Time    Direct AFB stain [[id number]] Collected: 05/04/22 0836    Order Status: Completed Specimen: Respiratory from BAL, RUL Updated: 05/04/22 1131     Direct Acid Fast No acid fast bacilli seen.    Narrative:      BAL    Culture, Respiratory with Gram Stain [[id number]] Collected: 05/04/22 0836    Order Status: Completed Specimen: Respiratory from BAL, RUL Updated: 05/04/22 1129     Gram Stain (Respiratory) No organisms seen     Gram Stain (Respiratory) Rare WBC's    KOH prep [[id number]] Collected: 05/04/22 0836    Order Status: Completed Specimen: Respiratory from BAL, RUL Updated: 05/04/22 1101     KOH Prep No yeast or fungal elements seen    Narrative:      BAL    Culture, Anaerobic [[id number]] Collected: 05/01/22 2012    Order Status: Completed Specimen: Ascites Updated: 05/04/22 1046     Anaerobic Culture Culture in progress    AFB Culture & Smear [[id number]] Collected: 05/03/22 0104    Order Status: Completed Specimen: Blood from Arm, Right Updated: 05/04/22 0927     AFB Culture & Smear Culture in progress    Fungus culture [[id number]] Collected: 05/04/22 0836    Order Status: Sent Specimen: Respiratory from BAL, RUL Updated: 05/04/22 0921    AFB Culture & Smear [[id number]] Collected: 05/04/22 " 0836    Order Status: Sent Specimen: Respiratory from BAL, RUL Updated: 05/04/22 0920    Respiratory Viral Panel by PCR (Sources other than NP Swab) Juliannaner; Sputum (BAL) [[id number]] Collected: 05/04/22 0835    Order Status: Sent Specimen: Bronchial Wash Updated: 05/04/22 0914    Culture, Respiratory [[id number]] Collected: 05/04/22 0836    Order Status: Canceled Specimen: Respiratory from BAL, RUL     Gram stain [[id number]] Collected: 05/04/22 0836    Order Status: Canceled Specimen: Body Fluid from Lung, RUL     Aerobic culture [[id number]]  (Abnormal) Collected: 05/01/22 2012    Order Status: Completed Specimen: Ascites Updated: 05/04/22 0738     Aerobic Bacterial Culture ENTEROCOCCUS FAECALIS  Rare  Susceptibility pending      Blood culture [[id number]] Collected: 05/01/22 2132    Order Status: Completed Specimen: Blood Updated: 05/04/22 0612     Blood Culture, Routine No Growth to date      No Growth to date      No Growth to date    Narrative:      Collection has been rescheduled by [facility] at 05/01/2022 20:55 Reason:   Patient unavailable ON BSCC   Collection has been rescheduled by [facility] at 05/01/2022 20:55 Reason:   Patient unavailable ON BSCC     Blood culture [[id number]] Collected: 05/01/22 2132    Order Status: Completed Specimen: Blood Updated: 05/04/22 0612     Blood Culture, Routine No Growth to date      No Growth to date      No Growth to date    Narrative:      Collection has been rescheduled by [facility] at 05/01/2022 20:55 Reason:   Patient unavailable ON BSCC   Collection has been rescheduled by [facility] at 05/01/2022 20:55 Reason:   Patient unavailable ON BSCC     Blood culture [[id number]] Collected: 05/02/22 1938    Order Status: Completed Specimen: Blood Updated: 05/03/22 2212     Blood Culture, Routine No Growth to date      No Growth to date    Blood culture [[id number]] Collected: 05/02/22 1941    Order Status: Completed Specimen: Blood Updated: 05/03/22 2212     Blood Culture, Routine No Growth to date       No Growth to date    Culture, Body Fluid - Bactec [[id number]] Collected: 05/01/22 2013    Order Status: Completed Specimen: Body Fluid from Ascites Updated: 05/03/22 2212     Body Fluid Culture, Sterile No growth to date      No Growth to date    Culture, MRSA [[id number]] Collected: 05/03/22 2142    Order Status: Sent Specimen: MRSA source from Nares, Right Updated: 05/03/22 2201    Culture, MRSA [[id number]] Collected: 05/03/22 2115    Order Status: Sent Specimen: MRSA source from Nares, Left Updated: 05/03/22 2157    Cryptococcal antigen [[id number]] Collected: 05/02/22 1939    Order Status: Completed Specimen: Blood, Venous Updated: 05/03/22 1115     Cryptococcal Ag, Blood Negative    AFB Culture & Smear [[id number]]     Order Status: Canceled Specimen: Blood     Respiratory Infection Panel (PCR), Nasopharyngeal [[id number]] Collected: 05/02/22 2044    Order Status: Completed Specimen: Nasopharyngeal Swab Updated: 05/02/22 2252     Respiratory Infection Panel Source NP Swab     Adenovirus Not Detected     Coronavirus 229E, Common Cold Virus Not Detected     Coronavirus HKU1, Common Cold Virus Not Detected     Coronavirus NL63, Common Cold Virus Not Detected     Coronavirus OC43, Common Cold Virus Not Detected     Comment: The Coronavirus strains detected in this test cause the common cold.  These strains are not the COVID-19 (novel Coronavirus)strain   associated with the respiratory disease outbreak.          SARS-CoV2 (COVID-19) Qualitative PCR Not Detected     Human Metapneumovirus Not Detected     Human Rhinovirus/Enterovirus Not Detected     Influenza A (subtypes H1, H1-2009,H3) Not Detected     Influenza B Not Detected     Parainfluenza Virus 1 Not Detected     Parainfluenza Virus 2 Not Detected     Parainfluenza Virus 3 Not Detected     Parainfluenza Virus 4 Not Detected     Respiratory Syncytial Virus Not Detected     Bordetella Parapertussis (MS4718) Not Detected     Bordetella pertussis (ptxP) Not Detected      Chlamydia pneumoniae Not Detected     Mycoplasma pneumoniae Not Detected    Narrative:      For all other respiratory sources, order [id number] -  Respiratory Viral Panel by PCR    AFB Culture & Smear [[id number]]     Order Status: Canceled Specimen: Blood     Blood culture [[id number]]     Order Status: Canceled Specimen: Blood     Fungus culture [[id number]] Collected: 05/01/22 2012    Order Status: Completed Specimen: Ascites Updated: 05/02/22 0924     Fungus (Mycology) Culture Culture in progress    Gram stain [[id number]] Collected: 05/01/22 2012    Order Status: Completed Specimen: Ascites Updated: 05/01/22 2233     Gram Stain Result No WBC's      No organisms seen        Latest lactate reviewed, they are-  No results for input(s): LACTATE in the last 72 hours.    Blood and fluid cultures performed: NGTD. Bronchoscopy to be performed. Respiratory viral panel negative. Follow up results and respiratory cultures.

## 2022-05-04 NOTE — PHYSICIAN QUERY
PT Name: Edgar Jackson  MR #: [id number]     DOCUMENTATION CLARIFICATION     CDS/: Cheryl Dial RN             Contact information:[email or web address]  This form is a permanent document in the medical record.    Query Date: May 4, 2022    By submitting this query, we are merely seeking further clarification of documentation.  Please utilize your independent clinical judgment when addressing the question(s) below.    The Medical Record contains the following:   Indicator Supporting Clinical Findings Location in Medical Record    Kidney (Renal) Insufficiency     x Kidney (Renal) Failure/Injury ILDEFONSO-Patient with acute kidney injury likely d/t IVVD/Dehydration     ILDEFONSO   His renal functions were normal beginning of Feb.   After that it appears he had ILDEFONSO - possibly related to pre-renal states  Which led to CKD stage III, which has progressed to stage IV over the course of last few weeks      4/30 HP      5/3 Transplant MD PN     x Nephrotoxic Agents Tacro remains elevated and on hold.   May have CNI toxicity contributing to ILDEFONSO - watch for other signs immunosuppression related toxicity as well     Possibly due to tacrolimus versus hepatorenal syndrome 5/3 Transplant Kidney MD PN    4/30  HP       x BUN/Creatinine           GFR  04/30 04/30 05/01 05/02 05/03 05/04   BUN 68  64 64  64  66  67    Cr 5.8  6.0  5.7  5.6  5.6  5.4    GFR 9.7  9.3  9.9  10.1  10.1  10.6       His renal functions were normal beginning of Feb.    4/30-5/4 Lab              5/3 Transplant MD PN    Urine: Casts         Eosinophils      Dehydration      Nausea/Vomiting      Dialysis/CRRT     x Treatment - continue Toprol  - start Continue. Bicarbonate PRN  - repeat lasix and albumin today per Nephrology  - f/u labs: 24 hour urine, urine sodium, urine chloride  - evaluate urine sediment  - continue tacrolimus when troughs are therapeutc  - trend daily BMP/RFP 4/30 HP    Other         [facility] approved diagnostic criteria for acute  kidney injury is based on KDIGO criteria:    An increase in serum creatinine > 0.3mg/dl within 48 hours  OR  Increase in serum creatinine to > 1.5x baseline, which is known or presumed to have occurred within the prior 7 days  OR  Urine volume <0.5 ml/kg/hr for 6 hours       The clinical guidelines noted above are only a system guideline. It does not replace the providers clinical judgment.     Provider- If possible, please further specify type of ILDEFONSO:     [    ] Acute Kidney Failure/Injury with Tubular Necrosis  -   Damage to the tubule cells of the kidney. Common triggers: shock, hypotension, IV contrast, rhabdomyolysis, medications     [  x  ] Unspecified Acute Kidney Failure/Injury       [    ] Other Acute Kidney Failure/Injury (please specify): ____________       [    ] Other (please specify): _______________________________   [  ] Clinically Undetermined           Please document in your progress notes daily for the duration of treatment until resolved and include in your discharge summary.    References:   KDIGO Clinical Practice Guideline for Acute Kidney Injury. (2012, March). Retrieved October 21, 2020, from https://kdigo.org/wp-content/uploads/2016/10/WCSET-2872-GKB-Guideline-English.pdf    JUANITO Sandhu MD, NICKIE Cheung MD, & BOSTON Brown MD. (1960). Renal medullary necrosis [Abstract]. The American Journal of Medicine, 29(1), 132-156. Doi:https://www.sciencedirect.com/science/article/abs/pii/9322364917971707    BOSTON Hernandez MD, & MAGDALENE Spencer MD, MS. (2020, June 18). Definition and staging of chronic kidney disease in adults ([id number] NICKIE Cardoza MD, ScD & [id number] ALEKSANDRA Nagel MD, MSc, Eds.). Retrieved October 21, 2020, from https://www.ZeeWhere/contents/definition-and-staging-of-chronic-kidney-disease-in-adults?search=ckd%20staging&source=search_result&selectedTitle=1~150&usage_type=default&display_rank=1     ROSA Griffin MD, FACP. (2015, Sunshine 15). Acute kidney  injury revisited. Retrieved October 21, 2020, from https://acphospitalist.org/archives/2015/06/coding-acute-kidney-injury.htm    ZENOBIA Beasley MD. (2019, July). Renal Cortical Necrosis. Retrieved October 21, 2020, from https://www.FID3/professional/genitourinary-disorders/renovascular-disorders/renal-cortical-necrosis    Form No. 95742

## 2022-05-04 NOTE — PT/OT/SLP PROGRESS
"Physical Therapy  Treatment    Patient Name:  Edgar Jackson   MRN:  [id number]    Recommendations:     Discharge Recommendations:  home health PT   Discharge Equipment Recommendations: none   Barriers to discharge: decreased functional mobility and fall risk    Assessment:     Edgar Jackson is a 61 y.o. male admitted with a medical diagnosis of ILDEFONSO (acute kidney injury).  Pt demonstrates the below listed impairments with decreased tolerance to functional mobility, impaired endurance and gait instability being the most limiting.  Pt demonstrates decreased tolerance to out of bed mobility.  Pt noted to be drowsy and easily fatigable, pt [facility] for procedures this day where he had anesthesia.  He was found supine where he had a BM in his sleep, PT assisted with self care in standing.  Pt requires skilled PT due to patient's status as: a fall risk to allow safe d/c home.     Impairments and functional limitations:  weakness, impaired endurance, impaired self care skills, impaired functional mobilty, gait instability, impaired balance, decreased lower extremity function.  These deficits affect their roles and responsibilities in which they were able to complete prior to admit.  Rehab Prognosis:   Good ; patient would benefit from acute skilled PT services 3 x/week to address these deficits, improve quality of life, focus on recovery of impairments, provide patient/caregiver education, reduce fall risk, and reach maximum level of function.  Pt is highly  motivated to participated in skilled PT.    Recent Surgery:   Procedure(s) (LRB):  COLONOSCOPY (N/A)  EGD (ESOPHAGOGASTRODUODENOSCOPY) (N/A) * Surgery Date in Future *    Plan:     During this hospitalization, patient to be seen 3 x/week to address the identified rehab impairments via gait training, therapeutic activities, therapeutic exercises, neuromuscular re-education and progress toward the following goals:    · Plan of Care Expires:  06/02/22    Subjective     Chief Complaint: "I " "just feet tired"  Patient/Family Comments/Goals: Return home  Pain/Comfort:  · Pain Rating 1: 0/10    Objective:     Communicated with RN prior to session.  Patient found supine with telemetry, oxygen upon PT entry to room.     General Precautions: Standard, fall   Orthopedic Precautions:N/A   Braces: N/A  Oxygen Device:      Functional Mobility:  · Bed Mobility:  Rolling Left: stand by assistance  · Scooting: stand by assistance  · Supine to Sit: minimum assistance for trunk management  · Head of bed position: HOB elevated   · EOB with SBA to minimum assist  · L LOB due to fatigue     · Transfers:  Sit to Stand: contact guard assistance minimum assistance with rolling walker with cues for hand placement  · Pt performs x4 sit to stands   · Bed to Chair: contact guard assistance with rolling walker with cues for hand placement using Step Transfer to Left   · Pt takes 4-5 L lateral steps     · Gait: Pt highly fatigued following self care, ambulation not safe this day     · Balance:   Position Score Time   Static Sitting FAIR: Maintains without assist, but is unable to take any challenges 5 minute(s)   Dynamic Sitting POOR+: MINIMAL assist to maintain 5 minute(s)   Static Standing FAIR-: Maintains without assist but is inconsistent 2 minute(s)   Dynamic Standing FAIR-: Maintains without assist but is inconsistent 1 minute(s)     AM-PAC 6 CLICK MOBILITY  Turning over in bed (including adjusting bedclothes, sheets and blankets)?: 3  Sitting down on and standing up from a chair with arms (e.g., wheelchair, bedside commode, etc.): 3  Moving from lying on back to sitting on the side of the bed?: 3  Moving to and from a bed to a chair (including a wheelchair)?: 3  Need to walk in hospital room?: 3  Climbing 3-5 steps with a railing?: 2  Basic Mobility Total Score: 17     Therapeutic Activities:  Patient educated on role of acute care PT and PT POC, safety while in hospital including calling nurse for mobility, call light " usage, benefits of out of bed mobility, walker management, breathing technique, fall risk, assistive device use, bed mobility , transfers, gait technique, positioning, posture, risks of prolonged bed rest, possible discharge disposition  and benefits of continued PT by explanation and demonstration.    Patient demonstrates good understanding of education provided this day.   Whiteboard updated   Pt assisted with doffing soiled clothing and donning a clean pair of underwear, pants, and a gown.  Pt assisted with maximal assist with standing BM care.     Therapeutic Exercises:  n/a    Patient left up in chair with all lines intact, call button in reach, RN notified and family present.    GOALS:   Multidisciplinary Problems     Physical Therapy Goals        Problem: Physical Therapy    Goal Priority Disciplines Outcome Goal Variances Interventions   Physical Therapy Goal     PT, PT/OT Ongoing, Progressing     Description: Goals to be met by: 2022     Patient will increase functional independence with mobility by performin. Supine to sit with Modified [place]  2. Sit to stand transfer with Modified [place]  3. Gait  x 200 feet with Modified [place] using Rolling Walker or SPC.   4. Ascend/descend 10 stair with bilateral Handrails Contact Guard Assistance using No Assistive Device.                      Time Tracking:     PT Received On: 22  PT Start Time: 1505     PT Stop Time: 1534  PT Total Time (min): 29 min     Billable Minutes: Therapeutic Activity 29    Treatment Type: Treatment  PT/PTA: PT     PTA Visit Number: 0     2022

## 2022-05-04 NOTE — PLAN OF CARE
Recommendations     1. When medically able, ADAT to Renal diet + ONS     2. When able to resume diet, add Novasource renal BID     3. RD to monitor and follow     Goals: Meet % of EEN, EPN by RD f/u date  Nutrition Goal Status: new  Communication of RD Recs:  (POC)    Aubree Pablo  PL-CORTNEYN

## 2022-05-04 NOTE — SEDATION DOCUMENTATION
Specimens obtained during Bronchoscopy:  BAL  ml nacl in 30 ml return.  Verbal report given to Luba to include documentation charted in procedural sedation documentation.  Patient to be NPO 1 hour post procedure and place in PO tolerance at 0930.  Moderate concious sedation was performed and cardiorespiratory functions were monitored the entire procedure by Sandy Albarran RN.  Sedation began at 0816  and concluded at 0845.  The patient tolerated the procedure well.  Sandy Albarran RN
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
## 2022-05-04 NOTE — PT/OT/SLP PROGRESS
Occupational Therapy      Patient Name:  Edgar Jackson   MRN:  [id number]    Patient not seen today secondary to away for bronchoscopy upon first attempt. For second attempt, Pt recently returned from procedure and very lethargic. Upon third attempt, Pt receiving an Echo. Will follow-up on next scheduled treatment date.    5/4/2022

## 2022-05-04 NOTE — PROGRESS NOTES
[facility] - Transplant Stepdown  Kidney Transplant  Progress Note      Reason for Follow-up: Reassessment of Kidney Transplant Referral - 5/2/2022 recipient and management of immunosuppression.      Subjective:   History of Present Illness:  61yoM with PMHx of renal transplant for hypertensive nephropathy (2004) immunosuppressed on tacrolimus/sirolimus,  therapy, LOPEZ cirrhosis, NIDDM, and ILD who was treated at OSH for ILDEFONSO 2/2 decompensated cirrhosis and worsening portal vein hypertension with HRS.   Pt reported that he had been experiencing abdominal distention with associated diarrhea (4-5/day) over several weeks, along with a constant nagging cough.  He has been having weekly paracentesis in the past month.   Despite efforts to manage symptoms, pt in need of  transplant surgery evaluation of the renal/hepatic injury, resulting in tranfer to [facility].    He reports no recent NSAID use.     Mr. Jackson is a 61 y.o. year old male who is status post Kidney Transplant Referral - 5/2/2022.    His maintenance immunosuppression consists of:   Immunosuppressants (From admission, onward)                None            Hospital Course:  No notes on file    Interval History:  - No acute overnight events  - Reports one watery stool  - Will undergo Bronchoscopy on 5/4    Past Medical, Surgical, Family, and Social History:   Unchanged from H&P.    Scheduled Meds:   albumin human 25%  25 g Intravenous TID    allopurinoL  100 mg Oral Daily    benzonatate  200 mg Oral TID    ceFEPime (MAXIPIME) IVPB  1 g Intravenous Q24H    furosemide  20 mg Oral Daily    levothyroxine  50 mcg Oral Before breakfast    lipase-protease-amylase 12,000-38,000-60,000 units  2 capsule Oral QID (WM & HS)    metoprolol tartrate  50 mg Oral BID    mupirocin   Nasal BID    posaconazole  300 mg Oral BID    [START ON 5/5/2022] posaconazole  300 mg Oral Daily    sodium bicarbonate  1,300 mg Oral TID     Continuous Infusions:  PRN Meds:acetaminophen,  "albuterol-ipratropium, dextromethorphan-guaiFENesin  mg/5 ml, dextrose 10%, fluticasone propionate, glucagon (human recombinant), glucose, glucose, insulin aspart U-100, loperamide, naloxone, sodium chloride 0.9%, Pharmacy to dose Vancomycin consult **AND** vancomycin - pharmacy to dose    Intake/Output - Last 3 Shifts         05/02 0700 05/03 0659 05/03 0700 05/04 0659 05/04 0700 05/05 0659    P.O. 1360 360     I.V. (mL/kg) 0 (0) 0 (0)     Other 0 0     IV Piggyback 61.2 49.9     Total Intake(mL/kg) 1421.2 (23.5) 409.9 (6.8)     Urine (mL/kg/hr) 1200 (0.8) 350 (0.2)     Emesis/NG output 0 0     Other 0 0     Stool 0 0     Blood 0 0     Total Output 1200 350     Net +221.2 +59.9            Urine Occurrence 1 x 4 x     Stool Occurrence 2 x 4 x     Emesis Occurrence 0 x 0 x              Review of Systems   Constitutional: Negative.    HENT: Negative.     Respiratory: Negative.     Cardiovascular: Negative.    Gastrointestinal:  Positive for diarrhea.   Endocrine: Negative.    Genitourinary: Negative.    Musculoskeletal: Negative.    Skin: Negative.    Allergic/Immunologic: Negative.    Neurological: Negative.    Hematological: Negative.    Psychiatric/Behavioral: Negative.      Objective:     Vital Signs (Most Recent):  Temp: 98.3 °F (36.8 °C) (05/04/22 1148)  Pulse: 66 (05/04/22 1148)  Resp: 17 (05/04/22 1148)  BP: 132/80 (05/04/22 1148)  SpO2: 96 % (05/04/22 0924) Vital Signs (24h Range):  Temp:  [98.2 °F (36.8 °C)-99.5 °F (37.5 °C)] 98.3 °F (36.8 °C)  Pulse:  [59-70] 66  Resp:  [13-26] 17  SpO2:  [94 %-99 %] 96 %  BP: (112-147)/(68-84) 132/80     Weight: 60.1 kg (132 lb 7.9 oz)  Height: 5' 8" (172.7 cm)  Body mass index is 20.15 kg/m².    Physical Exam    Laboratory:  CBC:   Recent Labs   Lab 05/02/22  0740 05/03/22  0708 05/04/22  0722   WBC 6.38 8.78 7.10   RBC 3.26* 3.35* 3.32*   HGB 9.7* 10.0* 9.7*   HCT 27.4* 28.6* 27.6*   PLT 77* 89* 85*   MCV 84 85 83   MCH 29.8 29.9 29.2   MCHC 35.4 35.0 35.1 "     CMP:   Recent Labs   Lab 05/02/22  0809 05/03/22  0708 05/04/22  0722   * 191* 159*   CALCIUM 8.1* 8.3* 8.1*   ALBUMIN 3.0* 2.9* 2.7*   PROT 5.5* 5.7* 5.4*   * 124* 124*   K 4.6 4.5 4.6   CO2 17* 16* 18*   CL 97 96 96   BUN 64* 66* 67*   CREATININE 5.6* 5.6* 5.4*   ALKPHOS 84 92 91   ALT 10 8* 8*   AST 77* 68* 58*       Diagnostic Results:  Reviewed     Assessment/Plan:     * ILDEFONSO (acute kidney injury)  His renal functions were normal beginning of Feb.   After that it appears he had ILDEFONSO - possibly related to pre-renal states  Which led to CKD stage III, which has progressed to stage IV over the course of last few weeks     At this time no indication for hemodialysis.   Has consented for HD in case needed.   Monitor I/Os   US renal transplant unremarkable.  Cr: 6.0-->5.7-->5.8-->5.3-->5.4 (on 5/4)      Chronic diarrhea  Reports since past 2 months   stool studies in progress   Will undergo C-Scope on 5/5 with GI    Metabolic acidosis  - Continue NaHCO3 1300 TID  - Likely secondary to diarrhea     Hyponatremia  This is likely in setting of renal failure and liver cirrhosis   Would be hard to normalize  Continue fluid restriction upto 1.2 liter/day      Cirrhosis of liver with ascites  Work up per Hepatology   AFP significantly elevated   Liver biopsy -- Chronic mild-moderately active hepatitis with bridging fibrosis and nodule   formation (Grade 2, Stage 3).      Personal history of immunosupression therapy  On sirolimus and prograf at home   Hold at this time   Currently supratherapeutic   Levels noted to be >13 on 5.4      Transplanted kidney  S/p kidney transplant living donor more then 10 years ago at [facility].   Reports no episodes of rejections or complications post surgery   ESRD - related to HTN. Currently has no access for HD           Lilliam Topete MD  Kidney Transplant  [facility] - Transplant Stepdown

## 2022-05-04 NOTE — SUBJECTIVE & OBJECTIVE
Interval History: Tmax 100.9 last 24 hours.  Still with cough and diarrhea.  Was previously living in [place], [place] until 9/2021 when he moved to [place].  Hikes/walks as a hobby.    Review of Systems   Constitutional:  Positive for fever. Negative for chills.   HENT:  Negative for congestion and sore throat.    Eyes:  Negative for visual disturbance.   Respiratory:  Positive for cough. Negative for shortness of breath.    Cardiovascular:  Negative for chest pain and leg swelling.   Gastrointestinal:  Positive for diarrhea. Negative for constipation, nausea and vomiting.   Endocrine: Negative for polydipsia.   Genitourinary:  Negative for dysuria.   Musculoskeletal:  Negative for arthralgias, back pain and myalgias.   Skin:  Negative for rash and wound.   Allergic/Immunologic: Positive for immunocompromised state. Negative for food allergies.   Neurological:  Negative for headaches.   Hematological:  Does not bruise/bleed easily.   Psychiatric/Behavioral:  Negative for confusion.    All other systems reviewed and are negative.  Objective:     Vital Signs (Most Recent):  Temp: 98.3 °F (36.8 °C) (05/03/22 1626)  Pulse: 68 (05/03/22 1626)  Resp: 11 (05/03/22 0914)  BP: (!) 147/84 (05/03/22 1626)  SpO2: 97 % (05/03/22 1626)   Vital Signs (24h Range):  Temp:  [98.1 °F (36.7 °C)-100.9 °F (38.3 °C)] 98.3 °F (36.8 °C)  Pulse:  [57-68] 68  Resp:  [11-22] 11  SpO2:  [95 %-99 %] 97 %  BP: (102-148)/(69-84) 147/84     Weight: 60.6 kg (133 lb 9.6 oz)  Body mass index is 20.31 kg/m².    Estimated Creatinine Clearance: 12 mL/min (A) (based on SCr of 5.6 mg/dL (H)).    Physical Exam  Vitals and nursing note reviewed.   Constitutional:       General: He is awake. He is not in acute distress.     Appearance: He is well-developed. He is not toxic-appearing or diaphoretic.   HENT:      Head: Normocephalic and atraumatic.      Right Ear: External ear normal.      Left Ear: External ear normal.      Nose:      Comments: On nasal  cannula     Mouth/Throat:      Mouth: Mucous membranes are moist.      Pharynx: No oropharyngeal exudate or posterior oropharyngeal erythema.   Eyes:      General: Scleral icterus present.   Cardiovascular:      Rate and Rhythm: Normal rate and regular rhythm.      Pulses: Normal pulses.           Radial pulses are 2+ on the right side and 2+ on the left side.        Dorsalis pedis pulses are 2+ on the right side and 2+ on the left side.      Heart sounds: Normal heart sounds. No murmur heard.  Pulmonary:      Effort: No respiratory distress.      Breath sounds: No wheezing.   Abdominal:      General: Bowel sounds are normal. There is no distension.      Palpations: Abdomen is soft. There is fluid wave.      Tenderness: Tenderness: left sided. paracentesis site on bandage. There is no guarding.   Musculoskeletal:         General: Normal range of motion.      Cervical back: Full passive range of motion without pain and neck supple.      Right lower leg: No edema.      Left lower leg: No edema.   Lymphadenopathy:      Cervical: No cervical adenopathy.   Skin:     General: Skin is warm.      Coloration: Skin is jaundiced.      Findings: No lesion or rash.   Neurological:      Mental Status: He is alert and oriented to person, place, and time.   Psychiatric:         Attention and Perception: Attention normal.         Mood and Affect: Mood normal.         Behavior: Behavior normal. Behavior is cooperative.         Thought Content: Thought content normal.         Cognition and Memory: Cognition normal.         Judgment: Judgment normal.       Significant Labs: All pertinent labs within the past 24 hours have been reviewed.    Significant Imaging: I have reviewed all pertinent imaging results/findings within the past 24 hours.

## 2022-05-04 NOTE — ASSESSMENT & PLAN NOTE
61 y.o. male with a PMH ESRD 2/2 HTN s/p kidney transplant in 1/2004 (in [place]; on Tacrolimus/Sirolimus), c/b LOPEZ cirrhosis, ILD, T2DM (A1C 6.6 in 2/2022), hypothyroidism, BPH, gout was admitted on 4/30/2022 (transferred from [facility]) for abnormal labs and need for transplant liver/kidney evaluation.  Complaints of malaise, chronic cough, diarrhea, weight loss, and found febrile.  Lives in [place], [place] before moving to [place] 9/2021.  CT with opacifications, reverse halo sign.    Recommendations:  -Continue vancomycin, cefepime  -Airborne precautions until TB rule out, need AFB x3  -Will follow-up BAL studies: cell count with diff, gram stain, bacterial / legionella / fungal / AFB cultures, TEM-respiratory pathogens panel, Aspergillus Ag, MTB PCR, Pneumocystis PCR, [id number]  -Will follow-up on fungal markers, CMV already in process  -GI consulted, planned for colonoscopy

## 2022-05-04 NOTE — ASSESSMENT & PLAN NOTE
61 y.o. male with a PMH ESRD 2/2 HTN s/p kidney transplant in 1/2004 (in [place]; on Tacrolimus/Sirolimus), c/b LOPEZ cirrhosis, ILD, T2DM (A1C 6.6 in 2/2022), hypothyroidism, BPH, gout was admitted on 4/30/2022 (transferred from [facility]) for abnormal labs and need for transplant liver/kidney evaluation.  Complaints of malaise, chronic cough, diarrhea, weight loss, and found febrile.  Lives in [place], [place] before moving to [place] 9/2021.  CT with opacifications, reverse halo sign.    Recommendations:  -Pulmonology consulted, planned for bronchoscopy  -Send BAL studies: cell count with diff, gram stain, bacterial / legionella / fungal / AFB cultures, TEM-respiratory pathogens panel, Aspergillus Ag, MTB PCR, Pneumocystis PCR, miscellaneous send out - [id number] send to [facility]  -Will check for coccidioidomycosis  -Will follow-up on fungal markers, CMV already in process  -GI consulted, planned for colonoscopy

## 2022-05-04 NOTE — ASSESSMENT & PLAN NOTE
On sirolimus and prograf at home   Hold at this time   Currently supratherapeutic   Levels noted to be >13 on 5.4

## 2022-05-04 NOTE — ASSESSMENT & PLAN NOTE
Pt taking immunosuppressive agents (sacrolimus and tacrolimus) 2/2 to renal transplant in 2004. Currently on abx treatment for sepsis and SBP. On going lab workups include  Microbiology Results (last 7 days)     Procedure Component Value Units Date/Time    Direct AFB stain [[id number]] Collected: 05/04/22 0836    Order Status: Completed Specimen: Respiratory from BAL, RUL Updated: 05/04/22 1131     Direct Acid Fast No acid fast bacilli seen.    Narrative:      BAL    Culture, Respiratory with Gram Stain [[id number]] Collected: 05/04/22 0836    Order Status: Completed Specimen: Respiratory from BAL, RUL Updated: 05/04/22 1129     Gram Stain (Respiratory) No organisms seen     Gram Stain (Respiratory) Rare WBC's    KOH prep [[id number]] Collected: 05/04/22 0836    Order Status: Completed Specimen: Respiratory from BAL, RUL Updated: 05/04/22 1101     KOH Prep No yeast or fungal elements seen    Narrative:      BAL    Culture, Anaerobic [[id number]] Collected: 05/01/22 2012    Order Status: Completed Specimen: Ascites Updated: 05/04/22 1046     Anaerobic Culture Culture in progress    AFB Culture & Smear [[id number]] Collected: 05/03/22 0104    Order Status: Completed Specimen: Blood from Arm, Right Updated: 05/04/22 0927     AFB Culture & Smear Culture in progress    Fungus culture [[id number]] Collected: 05/04/22 0836    Order Status: Sent Specimen: Respiratory from BAL, RUL Updated: 05/04/22 0921    AFB Culture & Smear [[id number]] Collected: 05/04/22 0836    Order Status: Sent Specimen: Respiratory from BAL, RUL Updated: 05/04/22 0920    Respiratory Viral Panel by PCR (Sources other than NP Swab) [facility]; Sputum (BAL) [[id number]] Collected: 05/04/22 0835    Order Status: Sent Specimen: Bronchial Wash Updated: 05/04/22 0914    Culture, Respiratory [[id number]] Collected: 05/04/22 0836    Order Status: Canceled Specimen: Respiratory from BAL, RUL     Gram stain [[id number]] Collected: 05/04/22 0836    Order Status: Canceled  Specimen: Body Fluid from Lung, RUL     Aerobic culture [[id number]]  (Abnormal) Collected: 05/01/22 2012    Order Status: Completed Specimen: Ascites Updated: 05/04/22 0738     Aerobic Bacterial Culture ENTEROCOCCUS FAECALIS  Rare  Susceptibility pending      Blood culture [[id number]] Collected: 05/01/22 2132    Order Status: Completed Specimen: Blood Updated: 05/04/22 0612     Blood Culture, Routine No Growth to date      No Growth to date      No Growth to date    Narrative:      Collection has been rescheduled by [facility] at 05/01/2022 20:55 Reason:   Patient unavailable ON BSCC   Collection has been rescheduled by [facility] at 05/01/2022 20:55 Reason:   Patient unavailable ON BSCC     Blood culture [[id number]] Collected: 05/01/22 2132    Order Status: Completed Specimen: Blood Updated: 05/04/22 0612     Blood Culture, Routine No Growth to date      No Growth to date      No Growth to date    Narrative:      Collection has been rescheduled by [facility] at 05/01/2022 20:55 Reason:   Patient unavailable ON BSCC   Collection has been rescheduled by [facility] at 05/01/2022 20:55 Reason:   Patient unavailable ON BSCC     Blood culture [[id number]] Collected: 05/02/22 1938    Order Status: Completed Specimen: Blood Updated: 05/03/22 2212     Blood Culture, Routine No Growth to date      No Growth to date    Blood culture [[id number]] Collected: 05/02/22 1941    Order Status: Completed Specimen: Blood Updated: 05/03/22 2212     Blood Culture, Routine No Growth to date      No Growth to date    Culture, Body Fluid - Bactec [[id number]] Collected: 05/01/22 2013    Order Status: Completed Specimen: Body Fluid from Ascites Updated: 05/03/22 2212     Body Fluid Culture, Sterile No growth to date      No Growth to date    Culture, MRSA [[id number]] Collected: 05/03/22 2142    Order Status: Sent Specimen: MRSA source from Nares, Right Updated: 05/03/22 2201    Culture, MRSA [[id number]] Collected: 05/03/22 2115    Order Status: Sent Specimen: MRSA source  from [facility]ares, Left Updated: 05/03/22 2157    Cryptococcal antigen [[id number]] Collected: 05/02/22 1939    Order Status: Completed Specimen: Blood, Venous Updated: 05/03/22 1115     Cryptococcal Ag, Blood Negative    AFB Culture & Smear [[id number]]     Order Status: Canceled Specimen: Blood     Respiratory Infection Panel (PCR), Nasopharyngeal [[id number]] Collected: 05/02/22 2044    Order Status: Completed Specimen: Nasopharyngeal Swab Updated: 05/02/22 2252     Respiratory Infection Panel Source NP Swab     Adenovirus Not Detected     Coronavirus 229E, Common Cold Virus Not Detected     Coronavirus HKU1, Common Cold Virus Not Detected     Coronavirus NL63, Common Cold Virus Not Detected     Coronavirus OC43, Common Cold Virus Not Detected     Comment: The Coronavirus strains detected in this test cause the common cold.  These strains are not the COVID-19 (novel Coronavirus)strain   associated with the respiratory disease outbreak.          SARS-CoV2 (COVID-19) Qualitative PCR Not Detected     Human Metapneumovirus Not Detected     Human Rhinovirus/Enterovirus Not Detected     Influenza A (subtypes H1, H1-2009,H3) Not Detected     Influenza B Not Detected     Parainfluenza Virus 1 Not Detected     Parainfluenza Virus 2 Not Detected     Parainfluenza Virus 3 Not Detected     Parainfluenza Virus 4 Not Detected     Respiratory Syncytial Virus Not Detected     Bordetella Parapertussis (HY8978) Not Detected     Bordetella pertussis (ptxP) Not Detected     Chlamydia pneumoniae Not Detected     Mycoplasma pneumoniae Not Detected    Narrative:      For all other respiratory sources, order [id number] -  Respiratory Viral Panel by PCR    AFB Culture & Smear [[id number]]     Order Status: Canceled Specimen: Blood     Blood culture [[id number]]     Order Status: Canceled Specimen: Blood     Fungus culture [[id number]] Collected: 05/01/22 2012    Order Status: Completed Specimen: Ascites Updated: 05/02/22 0924     Fungus (Mycology)  Culture Culture in progress    Gram stain [[id number]] Collected: 05/01/22 2012    Order Status: Completed Specimen: Ascites Updated: 05/01/22 0041     Gram Stain Result No WBC's      No organisms seen

## 2022-05-04 NOTE — TREATMENT PLAN
Hepatology Treatment Plan    Edgar Jackson is a 61 y.o. male admitted to hospital 4/30/2022 (Hospital Day: 5) due to ILDEFONSO (acute kidney injury).     Interval History  No acute events overnight.  Approved for inpatient evaluation    Objective  Temp:  [98.1 °F (36.7 °C)-99.5 °F (37.5 °C)] 98.5 °F (36.9 °C) (05/04 0924)  Pulse:  [57-70] 66 (05/04 0924)  BP: (112-148)/(68-84) 122/73 (05/04 0924)  Resp:  [13-26] 20 (05/04 0924)  SpO2:  [94 %-99 %] 96 % (05/04 0924)      Laboratory    Lab Results   Component Value Date    WBC 7.10 05/04/2022    HGB 9.7 (L) 05/04/2022    HCT 27.6 (L) 05/04/2022    MCV 83 05/04/2022    PLT 85 (L) 05/04/2022       Lab Results   Component Value Date     (L) 05/04/2022    K 4.6 05/04/2022    CL 96 05/04/2022    CO2 18 (L) 05/04/2022    BUN 67 (H) 05/04/2022    CREATININE 5.4 (H) 05/04/2022    CALCIUM 8.1 (L) 05/04/2022       Lab Results   Component Value Date    ALBUMIN 2.7 (L) 05/04/2022    ALT 8 (L) 05/04/2022    AST 58 (H) 05/04/2022    ALKPHOS 91 05/04/2022    BILITOT 4.9 (H) 05/04/2022       Lab Results   Component Value Date    INR 1.6 (H) 05/04/2022    INR 1.5 (H) 05/03/2022    INR 1.6 (H) 05/03/2022       MELD-Na score: 35 at 5/4/2022  7:22 AM  MELD score: 31 at 5/4/2022  7:22 AM  Calculated from:  Serum Creatinine: 5.4 mg/dL (Using max of 4 mg/dL) at 5/4/2022  7:22 AM  Serum Sodium: 124 mmol/L (Using min of 125 mmol/L) at 5/4/2022  7:22 AM  Total Bilirubin: 4.9 mg/dL at 5/4/2022  7:22 AM  INR(ratio): 1.6 at 5/4/2022  7:22 AM  Age: 61 years    Plan  - continue to follow up transplant nephrology rec's    Approved for inpatient liver/kidney transplant evaluation.     - ECHO  - CT triple phase liver  - f/u infectious workup  - colonoscopy per GI  - bronchoscopy per pulm    - will seek approval for transplant evaluation for liver/kidney transplant  - please obtain daily CBC, BMP, LFT, INR  - Plan of care was discussed with primary team    Thank you for involving us in the care of Edgar Darius  Please call with any additional concerns or questions.    Randal Ibrahim MD  Gastroenterology Fellow

## 2022-05-04 NOTE — ASSESSMENT & PLAN NOTE
His renal functions were normal beginning of Feb.   After that it appears he had ILDEFONSO - possibly related to pre-renal states  Which led to CKD stage III, which has progressed to stage IV over the course of last few weeks     At this time no indication for hemodialysis.   Has consented for HD in case needed.   Monitor I/Os   US renal transplant unremarkable.    This may be HRS pathophysiology now. HRS is diagnosis of exclusion.

## 2022-05-04 NOTE — PROGRESS NOTES
Pharmacokinetic Assessment Follow Up: IV Vancomycin    Vancomycin serum concentration assessment/plan:   Random level resulted as 16.3 mcg/mL; Goal 15-20 mcg/mL, Sepsis   Patient meets criteria for ILDEFONSO   Re-dose Vancomycin 500 mg IV x 1; continue to dose by level when the random is less than 20 mcg/mL   Next level to be drawn on 5/5 at 0430    Drug levels (last 3 results):  Recent Labs   Lab Result Units 05/03/22  0708 05/04/22  0722   Vancomycin, Random ug/mL 14.7 16.3     Pharmacy will continue to follow and monitor vancomycin.    Please contact pharmacy at extension [phone] for questions regarding this assessment.    Thank you for the consult,   Nati Cao     Patient brief summary:  Edgar Jackson is a 61 y.o. male initiated on antimicrobial therapy with IV Vancomycin for treatment of Fevers    Drug Allergies:   Review of patient's allergies indicates:  No Known Allergies    Actual Body Weight:   60.1 kg     Renal Function:   Estimated Creatinine Clearance: 12.4 mL/min (A) (based on SCr of 5.4 mg/dL (H)).,     CBC (last 72 hours):  Recent Labs   Lab Result Units 05/02/22  0740 05/03/22  0708 05/04/22  0722   WBC K/uL 6.38 8.78 7.10   Hemoglobin g/dL 9.7* 10.0* 9.7*   Hematocrit % 27.4* 28.6* 27.6*   Platelets K/uL 77* 89* 85*   Gran % % 76.5* 80.5* 74.5*   Lymph % % 6.7* 7.2* 9.0*   Mono % % 10.2 8.3 12.1   Eosinophil % % 5.3 2.8 3.4   Basophil % % 0.5 0.5 0.3   Differential Method  Automated Automated Automated       Metabolic Panel (last 72 hours):  Recent Labs   Lab Result Units 05/01/22  1921 05/02/22  0809 05/02/22  1219 05/03/22  0708 05/04/22  0722   Sodium mmol/L  --  126*  --  124* 124*   Potassium mmol/L  --  4.6  --  4.5 4.6   Chloride mmol/L  --  97  --  96 96   CO2 mmol/L  --  17*  --  16* 18*   Glucose mg/dL  --  126*  --  191* 159*   Glucose, Fluid mg/dL 84  --   --   --   --    Glucose, UA   --   --  2+*  --   --    BUN mg/dL  --  64*  --  66* 67*   Creatinine mg/dL  --  5.6*  --  5.6* 5.4*    Albumin g/dL  --  3.0*  --  2.9* 2.7*   Total Bilirubin mg/dL  --  6.0*  --  6.2* 4.9*   Alkaline Phosphatase U/L  --  84  --  92 91   AST U/L  --  77*  --  68* 58*   ALT U/L  --  10  --  8* 8*   Magnesium mg/dL  --  1.5*  --  1.5*  --    Phosphorus mg/dL  --  3.5  --  4.0  --        Vancomycin Administrations:  vancomycin given in the last 96 hours                   vancomycin 750 mg in dextrose 5 % 250 mL IVPB (ready to mix system) (mg) 750 mg New Bag 05/01/22 2322                Microbiologic Results:  Microbiology Results (last 7 days)     Procedure Component Value Units Date/Time    AFB Culture & Smear [[id number]] Collected: 05/03/22 0104    Order Status: Completed Specimen: Blood from Arm, Right Updated: 05/04/22 0927     AFB Culture & Smear Culture in progress    KOH prep [[id number]] Collected: 05/04/22 0836    Order Status: Sent Specimen: Respiratory from BAL, RUL Updated: 05/04/22 0921    Fungus culture [[id number]] Collected: 05/04/22 0836    Order Status: Sent Specimen: Respiratory from BAL, RUL Updated: 05/04/22 0921    Direct AFB stain [[id number]] Collected: 05/04/22 0836    Order Status: Sent Specimen: Respiratory from BAL, RUL Updated: 05/04/22 0920    AFB Culture & Smear [[id number]] Collected: 05/04/22 0836    Order Status: Sent Specimen: Respiratory from BAL, RUL Updated: 05/04/22 0920    Culture, Respiratory with Gram Stain [[id number]] Collected: 05/04/22 0836    Order Status: Sent Specimen: Respiratory from BAL, RUL Updated: 05/04/22 0917    Respiratory Viral Panel by PCR (Sources other than NP Swab) [facility]; Sputum (BAL) [[id number]] Collected: 05/04/22 0835    Order Status: Sent Specimen: Bronchial Wash Updated: 05/04/22 0914    Culture, Respiratory [[id number]] Collected: 05/04/22 0836    Order Status: Canceled Specimen: Respiratory from BAL, RUL     Gram stain [[id number]] Collected: 05/04/22 0836    Order Status: Canceled Specimen: Body Fluid from Lung, RUL     Aerobic culture [[id number]]   (Abnormal) Collected: 05/01/22 2012    Order Status: Completed Specimen: Ascites Updated: 05/04/22 0738     Aerobic Bacterial Culture ENTEROCOCCUS FAECALIS  Rare  Susceptibility pending      Blood culture [[id number]] Collected: 05/01/22 2132    Order Status: Completed Specimen: Blood Updated: 05/04/22 0612     Blood Culture, Routine No Growth to date      No Growth to date      No Growth to date    Narrative:      Collection has been rescheduled by [facility] at 05/01/2022 20:55 Reason:   Patient unavailable ON BSCC   Collection has been rescheduled by 2 at 05/01/2022 20:55 Reason:   Patient unavailable ON BSCC     Blood culture [[id number]] Collected: 05/01/22 2132    Order Status: Completed Specimen: Blood Updated: 05/04/22 0612     Blood Culture, Routine No Growth to date      No Growth to date      No Growth to date    Narrative:      Collection has been rescheduled by [facility] at 05/01/2022 20:55 Reason:   Patient unavailable ON BSCC   Collection has been rescheduled by [facility] at 05/01/2022 20:55 Reason:   Patient unavailable ON BSCC     Blood culture [[id number]] Collected: 05/02/22 1938    Order Status: Completed Specimen: Blood Updated: 05/03/22 2212     Blood Culture, Routine No Growth to date      No Growth to date    Blood culture [[id number]] Collected: 05/02/22 1941    Order Status: Completed Specimen: Blood Updated: 05/03/22 2212     Blood Culture, Routine No Growth to date      No Growth to date    Culture, Body Fluid - Bactec [[id number]] Collected: 05/01/22 2013    Order Status: Completed Specimen: Body Fluid from Ascites Updated: 05/03/22 2212     Body Fluid Culture, Sterile No growth to date      No Growth to date    Culture, MRSA [[id number]] Collected: 05/03/22 2142    Order Status: Sent Specimen: MRSA source from Nares, Right Updated: 05/03/22 2201    Culture, MRSA [[id number]] Collected: 05/03/22 2115    Order Status: Sent Specimen: MRSA source from Nares, Left Updated: 05/03/22 2157    Cryptococcal antigen  [[id number]] Collected: 05/02/22 1939    Order Status: Completed Specimen: Blood, Venous Updated: 05/03/22 1115     Cryptococcal Ag, Blood Negative    Culture, Anaerobic [[id number]] Collected: 05/01/22 2012    Order Status: Completed Specimen: Ascites Updated: 05/03/22 0644     Anaerobic Culture Culture in progress    AFB Culture & Smear [[id number]]     Order Status: Canceled Specimen: Blood     Respiratory Infection Panel (PCR), Nasopharyngeal [[id number]] Collected: 05/02/22 2044    Order Status: Completed Specimen: Nasopharyngeal Swab Updated: 05/02/22 2252     Respiratory Infection Panel Source NP Swab     Adenovirus Not Detected     Coronavirus 229E, Common Cold Virus Not Detected     Coronavirus HKU1, Common Cold Virus Not Detected     Coronavirus NL63, Common Cold Virus Not Detected     Coronavirus OC43, Common Cold Virus Not Detected     Comment: The Coronavirus strains detected in this test cause the common cold.  These strains are not the COVID-19 (novel Coronavirus)strain   associated with the respiratory disease outbreak.          SARS-CoV2 (COVID-19) Qualitative PCR Not Detected     Human Metapneumovirus Not Detected     Human Rhinovirus/Enterovirus Not Detected     Influenza A (subtypes H1, H1-2009,H3) Not Detected     Influenza B Not Detected     Parainfluenza Virus 1 Not Detected     Parainfluenza Virus 2 Not Detected     Parainfluenza Virus 3 Not Detected     Parainfluenza Virus 4 Not Detected     Respiratory Syncytial Virus Not Detected     Bordetella Parapertussis (WF8912) Not Detected     Bordetella pertussis (ptxP) Not Detected     Chlamydia pneumoniae Not Detected     Mycoplasma pneumoniae Not Detected    Narrative:      For all other respiratory sources, order [id number] -  Respiratory Viral Panel by PCR    AFB Culture & Smear [[id number]]     Order Status: Canceled Specimen: Blood     Blood culture [[id number]]     Order Status: Canceled Specimen: Blood     Fungus culture [[id number]] Collected:  05/01/22 2012    Order Status: Completed Specimen: Ascites Updated: 05/02/22 0924     Fungus (Mycology) Culture Culture in progress    Gram stain [[id number]] Collected: 05/01/22 2012    Order Status: Completed Specimen: Ascites Updated: 05/01/22 2233     Gram Stain Result No WBC's      No organisms seen

## 2022-05-04 NOTE — ASSESSMENT & PLAN NOTE
S/p kidney transplant living donor more then 10 years ago at [facility].   Reports no episodes of rejections or complications post surgery   ESRD - related to HTN. Currently has no access for HD

## 2022-05-04 NOTE — SUBJECTIVE & OBJECTIVE
Subjective:   History of Present Illness:  61yoM with PMHx of renal transplant for hypertensive nephropathy (2004) immunosuppressed on tacrolimus/sirolimus,  therapy, LOPEZ cirrhosis, NIDDM, and ILD who was treated at OSH for ILDEFONSO 2/2 decompensated cirrhosis and worsening portal vein hypertension with HRS.   Pt reported that he had been experiencing abdominal distention with associated diarrhea (4-5/day) over several weeks, along with a constant nagging cough.  He has been having weekly paracentesis in the past month.   Despite efforts to manage symptoms, pt in need of  transplant surgery evaluation of the renal/hepatic injury, resulting in [facility].    He reports no recent NSAID use.     Mr. Jackson is a 61 y.o. year old male who is status post Kidney Transplant Referral - 5/2/2022.    His maintenance immunosuppression consists of:   Immunosuppressants (From admission, onward)                None            Hospital Course:  No notes on file    Interval History:  - No acute overnight events  - Reports one watery stool  - Will undergo Bronchoscopy on 5/4    Past Medical, Surgical, Family, and Social History:   Unchanged from H&P.    Scheduled Meds:   albumin human 25%  25 g Intravenous TID    allopurinoL  100 mg Oral Daily    benzonatate  200 mg Oral TID    ceFEPime (MAXIPIME) IVPB  1 g Intravenous Q24H    furosemide  20 mg Oral Daily    levothyroxine  50 mcg Oral Before breakfast    lipase-protease-amylase 12,000-38,000-60,000 units  2 capsule Oral QID (WM & HS)    metoprolol tartrate  50 mg Oral BID    mupirocin   Nasal BID    posaconazole  300 mg Oral BID    [START ON 5/5/2022] posaconazole  300 mg Oral Daily    sodium bicarbonate  1,300 mg Oral TID     Continuous Infusions:  PRN Meds:acetaminophen, albuterol-ipratropium, dextromethorphan-guaiFENesin  mg/5 ml, dextrose 10%, fluticasone propionate, glucagon (human recombinant), glucose, glucose, insulin aspart U-100, loperamide, naloxone, sodium chloride  "0.9%, Pharmacy to dose Vancomycin consult **AND** vancomycin - pharmacy to dose    Intake/Output - Last 3 Shifts         05/02 0700 05/03 0659 05/03 0700 05/04 0659 05/04 0700 05/05 0659    P.O. 1360 360     I.V. (mL/kg) 0 (0) 0 (0)     Other 0 0     IV Piggyback 61.2 49.9     Total Intake(mL/kg) 1421.2 (23.5) 409.9 (6.8)     Urine (mL/kg/hr) 1200 (0.8) 350 (0.2)     Emesis/NG output 0 0     Other 0 0     Stool 0 0     Blood 0 0     Total Output 1200 350     Net +221.2 +59.9            Urine Occurrence 1 x 4 x     Stool Occurrence 2 x 4 x     Emesis Occurrence 0 x 0 x              Review of Systems   Constitutional: Negative.    HENT: Negative.     Respiratory: Negative.     Cardiovascular: Negative.    Gastrointestinal:  Positive for diarrhea.   Endocrine: Negative.    Genitourinary: Negative.    Musculoskeletal: Negative.    Skin: Negative.    Allergic/Immunologic: Negative.    Neurological: Negative.    Hematological: Negative.    Psychiatric/Behavioral: Negative.      Objective:     Vital Signs (Most Recent):  Temp: 98.3 °F (36.8 °C) (05/04/22 1148)  Pulse: 66 (05/04/22 1148)  Resp: 17 (05/04/22 1148)  BP: 132/80 (05/04/22 1148)  SpO2: 96 % (05/04/22 0924) Vital Signs (24h Range):  Temp:  [98.2 °F (36.8 °C)-99.5 °F (37.5 °C)] 98.3 °F (36.8 °C)  Pulse:  [59-70] 66  Resp:  [13-26] 17  SpO2:  [94 %-99 %] 96 %  BP: (112-147)/(68-84) 132/80     Weight: 60.1 kg (132 lb 7.9 oz)  Height: 5' 8" (172.7 cm)  Body mass index is 20.15 kg/m².    Physical Exam    Laboratory:  CBC:   Recent Labs   Lab 05/02/22  0740 05/03/22  0708 05/04/22  0722   WBC 6.38 8.78 7.10   RBC 3.26* 3.35* 3.32*   HGB 9.7* 10.0* 9.7*   HCT 27.4* 28.6* 27.6*   PLT 77* 89* 85*   MCV 84 85 83   MCH 29.8 29.9 29.2   MCHC 35.4 35.0 35.1     CMP:   Recent Labs   Lab 05/02/22  0809 05/03/22  0708 05/04/22  0722   * 191* 159*   CALCIUM 8.1* 8.3* 8.1*   ALBUMIN 3.0* 2.9* 2.7*   PROT 5.5* 5.7* 5.4*   * 124* 124*   K 4.6 4.5 4.6   CO2 17* 16* " 18*   CL 97 96 96   BUN 64* 66* 67*   CREATININE 5.6* 5.6* 5.4*   ALKPHOS 84 92 91   ALT 10 8* 8*   AST 77* 68* 58*       Diagnostic Results:  Reviewed

## 2022-05-04 NOTE — PLAN OF CARE
61 year-old male admitted 4/30/22 for liver and kidney workup, IDLEFONSO, cough, diarrhea. Pt has hx of Kidney txp 2004, LOPEZ, HTN, DM2, interstital lung disease, BPH, portal vein HTN  -airborne precautions for pending TB  -AAOx4, lethargic  -22 G Rt FA x2  -Albumin 25 g  -Cefepime IVPB  -NC @ 2L  -Bronch with washout today/cultures sent  -Accuchecks AC/HS  -1 episode of incont urine/BM  -echo complete/EF 65%  -clear liquid diet/NPO at midnight  -go lytely to start at 1800  -PT/OT today, pt sitting up in chair  -spouse at bedside, pt sitting up in chair, wheels locked, non-skid socks in place, call light within reach

## 2022-05-04 NOTE — SEDATION DOCUMENTATION
H and P updated-inpatient, patient placed on cardiac monitor, anesthesia Plan:  Conscious sedation, ASA verified-yes, Airway exam performed-yes, Personal or Family history of anesthesia complications-No  Consent signed and witnessed, spouse at bedside, Sandy Albarran RN

## 2022-05-04 NOTE — ASSESSMENT & PLAN NOTE
Pt with Hx of chronic ILD with bilateral areas of lung scarring. Regions appear to be slightly larger in size when compared to previous imaging. New findings of bilateral infiltrates noted on recent CT Chest w/o contrast concerning for pneumonia. Acute fever reported over the past two evenings. Pt started on broad spectrum abx with septic workup performed. Respiratory viral panel negative. Pulmonology consulted, appreciate recs. Bronchoscopy to be performed. (See associated problem ILD)  - f/u bronchoscopy cx negative

## 2022-05-04 NOTE — PHARMACY MED REC
"Admission Medication History     The home medication history was taken by Yessenia Luis.    You may go to "Admission" then "Reconcile Home Medications" tabs to review and/or act upon these items.      The home medication list has been updated by the Pharmacy department.    Please read ALL comments highlighted in yellow.    Please address this information as you see fit.     Feel free to contact us if you have any questions or require assistance.          Medications listed below were obtained from: Patient/family(SPOUSE)  PTA Medications   Medication Sig    alfuzosin (UROXATRAL) 10 mg Tb24 Take 1 tablet (10 mg total) by mouth after dinner.    allopurinoL (ZYLOPRIM) 100 MG tablet Take 100 mg by mouth once daily.    benzonatate (TESSALON) 200 MG capsule Take 1 capsule (200 mg total) by mouth 3 (three) times daily.    dextromethorphan-guaiFENesin  mg/5 ml (ROBITUSSIN-DM)  mg/5 mL liquid Take 5 mLs by mouth every 6 (six) hours as needed (cough).    furosemide (LASIX) 20 MG tablet Take 1 tablet (20 mg total) by mouth once daily.    levothyroxine (SYNTHROID) 50 MCG tablet Take 50 mcg by mouth once daily.    lipase-protease-amylase 12,000-38,000-60,000 units (CREON) CpDR Take 2 capsules by mouth 4 (four) times daily with meals and nightly.    loperamide (IMODIUM) 2 mg capsule Take 1 capsule (2 mg total) by mouth 4 (four) times daily as needed for Diarrhea.    metoprolol tartrate (LOPRESSOR) 50 MG tablet Take 50 mg by mouth 2 (two) times daily.    pravastatin (PRAVACHOL) 40 MG tablet Take 40 mg by mouth every evening.    sirolimus (RAPAMUNE) 1 MG Tab Take 2 mg by mouth once daily.    tacrolimus (PROGRAF) 0.5 MG Cap Take 3 capsules (1.5 mg total) by mouth every 12 (twelve) hours.    TOUJEO SOLOSTAR U-300 INSULIN 300 unit/mL (1.5 mL) InPn pen Inject 20 Units into the skin nightly.    VENTOLIN HFA 90 mcg/actuation inhaler INHALE 2 PUFFS INTO THE LUNGS EVERY 6 (SIX) HOURS AS NEEDED FOR WHEEZING " "(COUGHING). RESCUE (Patient taking differently: Inhale 1 puff into the lungs every 6 (six) hours as needed.)    BD NOEMI 2ND GEN PEN NEEDLE 32 gauge x " Ndle USE AS DIRECTED ONCE DAILY WITH BASAGLAR    fluticasone propionate (FLONASE) 50 mcg/actuation nasal spray 2 sprays (100 mcg total) by Each Nostril route once daily.    [] guaiFENesin (MUCINEX) 600 mg 12 hr tablet Take 1 tablet (600 mg total) by mouth 2 (two) times daily. for 10 days    [] hydrocodone-chlorpheniramine (TUSSIONEX) 10-8 mg/5 mL suspension Take 5 mLs by mouth every 12 (twelve) hours as needed for Cough or Congestion.    sildenafiL (VIAGRA) 100 MG tablet Take 1 tablet (100 mg total) by mouth daily as needed for Erectile Dysfunction.           Yessenia Luis  EXT [id number]                  .        "

## 2022-05-04 NOTE — CONSULTS
[facility] - Transplant Stepdown  Adult Nutrition  Consult Note    Liver transplant evaluation    SUMMARY     Recommendations    1. When medically able, ADAT to Renal diet + ONS    2. When able to resume diet, add Novasource renal BID    3. RD to monitor and follow    Goals: Meet % of EEN, EPN by RD f/u date  Nutrition Goal Status: new  Communication of RD Recs:  (POC)    Assessment and Plan    Severe malnutrition  Nutrition Problem  Severe protein-calorie malnutrition    Related to (etiology):   Chronic illness    Signs and Symptoms (as evidenced by):   Wt loss of 28 lb (17.5%) in 3 months  Energy intake <75% of EEN for >3 months  Moderate muscle mass depletion (temples, clavicles, shoulders, thigh, calf)    Interventions/Recommendations (treatment strategy):  Collaboration with other providers    Nutrition Diagnosis Status:   New    Malnutrition Assessment  Malnutrition Type: chronic illness  Energy Intake: severe energy intake   Weight Loss (Malnutrition): greater than 7.5% in 3 months  Energy Intake (Malnutrition): less than 75% for greater than or equal to 3 months  Muscle Mass (Malnutrition): moderate depletion     Orbital Region (Subcutaneous Fat Loss): well nourished  Upper Arm Region (Subcutaneous Fat Loss): well nourished   [place] Region (Muscle Loss): moderate depletion  Clavicle Bone Region (Muscle Loss): moderate depletion  Clavicle and Acromion Bone Region (Muscle Loss): moderate depletion  Dorsal Hand (Muscle Loss): moderate depletion  Anterior Thigh Region (Muscle Loss): mild depletion  Posterior Calf Region (Muscle Loss): mild depletion   Edema (Fluid Accumulation): 1-->trace     Subcutaneous Fat Loss (Final Summary): well nourished  Muscle Loss Evaluation (Final Summary): moderate protein-calorie malnutrition    Severe Weight Loss (Malnutrition): greater than 7.5% in 3 months    Reason for Assessment    Reason For Assessment: consult (Liver tx evaluation)  Diagnosis: renal disease, liver  "disease  Relevant Medical History: ILDEFONSO, T2DM, Kidney Tx ()  Interdisciplinary Rounds: did not attend    General Information Comments:   S/p kidney Tx (). On Tacrolimus/Sirolimus. Transplant liver/kidney evaluation.     Pt was sleeping on bed with family at bedside. Family denies N/V and chewing/swallowing difficulties. Family reported pt has decreased appetite over the past 3 months due to altered taste and "everything tasted sour". The UBW was 160lb according to family, indicated a significant wt loss of 28 lb (17.5%) in 3 months. NFPE completed today, pt has moderate muscle mass depletion. 1+ generalize edema per chart. Pt met the criteria for severe malnutrition related to chronic illness.   Pt do not follow any kind of diet at home. Often use soy sauce and fish sauce in home cooking. Discussed low Na diet, rec alternate ways to season food. Pt verbalize understanding and all questions been answered.     Nutrition Discharge Planningm Na education provided on 22. Discussed foods to limit or avoid and benefits of diet adherence. Post transplant expectations/guidelines also introduced. Appropriate education material, RD contact information provided. No other needs identified.      Nutrition Risk Screen    Nutrition Risk Screen: no indicators present    Nutrition/Diet History    Spiritual, Cultural Beliefs, Confucianism Practices, Values that Affect Care: no    Anthropometrics    Temp: 98.3 °F (36.8 °C)  Height: 5' 8" (172.7 cm)  Height (inches): 68 in  Weight Method: Standard Scale  Weight: 60.1 kg (132 lb 7.9 oz)  Weight (lb): 132.5 lb  Ideal Body Weight (IBW), Male: 154 lb  % Ideal Body Weight, Male (lb): 93.05 %  BMI (Calculated): 20.2  Usual Body Weight (UBW), k kg  % Usual Body Weight: 82.5     Lab/Procedures/Meds    Pertinent Labs Reviewed: reviewed  Pertinent Labs Comments: Na 124, Glucose 159, BUN 67, Cr 5.4, AST 58, ALT 8, eGFR 10.6    Pertinent Medications Reviewed: reviewed  Pertinent " Medications Comments: albumin, NaHCO3, furosemide, metoprolol, lipase/protease/amylase    Estimated/Assessed Needs    Weight Used For Calorie Calculations: 60.1 kg (132 lb 7.9 oz)  Energy Calorie Requirements (kcal): 1502 - 1803 kcal   Energy Need Method: Kcal/kg (25-30 kcal/kg)     Protein Requirements: 60-72g (1-1.2g/kg)  Weight Used For Protein Calculations: 60.1 kg (132 lb 7.9 oz)     Fluid Requirements (mL): per MD  CHO Requirement: 225g      Nutrition Prescription Ordered    Current Diet Order: NPO    Evaluation of Received Nutrient/Fluid Intake    I/O: -0.18 L since admit  Energy Calories Required: not meeting needs  Protein Required: not meeting needs  Comments: LBM: 5/4  % Intake of Estimated Energy Needs: 0%  % Meal Intake: 0%    Nutrition Risk    Level of Risk/Frequency of Follow-up: low - moderate     Monitor and Evaluation    Food and Nutrient Intake: energy intake, food and beverage intake  Food and Nutrient Adminstration: diet order  Knowledge/Beliefs/Attitudes: food and nutrition knowledge/skill, beliefs and attitudes  Physical Activity and Function: nutrition-related ADLs and IADLs  Anthropometric Measurements: height/length, weight, weight change, body mass index  Biochemical Data, Medical Tests and Procedures: electrolyte and renal panel, gastrointestinal profile, glucose/endocrine profile, inflammatory profile, lipid profile  Nutrition-Focused Physical Findings: overall appearance     Nutrition Follow-Up    RD Follow-up?: Yes     Aubree ALCALA

## 2022-05-04 NOTE — PROGRESS NOTES
[facility] - Transplant [facility] Medicine  Progress Note    Patient Name: Edgar Jackson  MRN: [id number]  Patient Class: IP- Inpatient   Admission Date: 4/30/2022  Length of Stay: 4 days  Attending Physician: Jarred Dyson MD  Primary Care Provider: Tyler Castillo MD        Subjective:     Principal Problem:ILDEFONSO (acute kidney injury)        HPI:  Mr Jackson is a 61yoM with PMHx of renal transplant for hypertensive nephropathy (2004) immunosuppressed on tacrolimus/sirolimus,  therapy, LOPEZ cirrhosis, NIDDM, and ILD who was treated at OSH for ILDEFONSO 2/2 decompensated cirrhosis and worsening portal vein hypertension with HRS. Pt reported that he had been experiencing abdominal distention with associated diarrhea (4-5/day) over several weeks, along with a constant nagging cough. On presenting to OSH, multiple paracentesis (3) were performed to relieve the ascitic fluid. No evidence of SBP on fluid analysis. Creon was added to diet to aide in food digestion in setting of cirrhosis. Loperamide provided symptomatic treatment of diarrhea. Dextromethorphan-guaifenesin syrup added to reduce cough exacerbations. Despite efforts to manage symptoms, pt in need of  transplant surgery evaluation of the renal/hepatic injury, resulting in tranfer to [facility] [place].     On arrival to [facility] (4/30/2022), pt has experienced increased generalized weakness. Loperamide was continued for diarrhea management. Abdominal distention recurred, despite prior paracentesis. Cough has been fairly constant and predominantly dry. Pt now endorses new left lower quadrant abdominal pain, worsened by cough. KTM and Hepatology consults placed for management of HRS.      Overview/Hospital Course:  Pt admitted to hospital medicine for treatment of ILDEFONSO likely secondary to decompensated cirrhosis. Consulted Hepatology and KTM, appreciate recs. Performed diagnostic paracentesis to verify nature of ascitic fluid and to rule out SBP. Pt had a fever of 100.7F per arm pit and  100.5F orally. CXR significant for prior ILD but no acute process. Blood cultures collected and Vanc/Cefepime started. ID consulted, appreciate recs. Diagnostic paracentesis performed with results indicative of portal vein hypertension. Fluid cultures are negative to date. CT Chest w/o contrast significant for bilateral opacities concerning for pneumonia. Pulmonology consulted, appreciate recs. Bronchoscopy performed on 5/04/22 for further evaluation of lung pathology seen on CT lung scan. Ascitic fluid aerobic culture positive for E.Faecalis with sensitivity tests pending, continued vancomycin.       No new subjective & objective note has been filed under this hospital service since the last note was generated.      Assessment/Plan:      * ILDEFONSO (acute kidney injury)  Patient with acute kidney injury likely d/t IVVD/Dehydration Which is currently stable. Labs reviewed- Renal function/electrolytes with Estimated Creatinine Clearance: 12 mL/min (A) (based on SCr of 5.6 mg/dL (H)). according to latest data. Monitor urine output and serial BMP and adjust therapy as needed. Avoid nephrotoxins and renally dose meds for GFR listed above. Gentle IV fluid PRN. Ultrasound transplant kidney no acute changes. Possibly due to tacrolimus versus hepatorenal syndrome, Nephrology has been consulted. Toprolol continued.  Bicarb ordered for low serum levels. Repeat lasix and albumin dosing per Nephrology. Cr/GFR to be trended for 6 weeks for evaluation.  - KTM following.   - hold sirolimus and tacrolimus  - trend daily BMP/RFP      Lung infiltrate on CT  Pt with Hx of chronic ILD with bilateral areas of lung scarring. Regions appear to be slightly larger in size when compared to previous imaging. New findings of bilateral infiltrates noted on recent CT Chest w/o contrast concerning for pneumonia. Acute fever reported over the past two evenings. Pt started on broad spectrum abx with septic workup performed. Respiratory viral panel  "negative. Pulmonology consulted, appreciate recs. Bronchoscopy to be performed. (See associated problem ILD)  - f/u bronchoscopy results/cultures    Sepsis  This patient does have evidence of infective focus  My overall impression is sepsis. Vital signs were reviewed and noted in progress note.  Antibiotics given-   Antibiotics (From admission, onward)            Start     Stop Route Frequency Ordered    05/01/22 2115  cefepime in dextrose 5 % 1 gram/50 mL IVPB 1 g         -- IV Every 24 hours (non-standard times) 05/01/22 2051 05/01/22 2102  vancomycin - pharmacy to dose  (vancomycin IVPB)        "And" Linked Group Details    -- IV pharmacy to manage frequency 05/01/22 2051 04/30/22 2230  mupirocin 2 % ointment         05/05 2059 Nasl 2 times daily 04/30/22 2126        Cultures were taken-   Microbiology Results (last 7 days)     Procedure Component Value Units Date/Time    Direct AFB stain [[id number]] Collected: 05/04/22 0836    Order Status: Completed Specimen: Respiratory from BAL, RUL Updated: 05/04/22 1131     Direct Acid Fast No acid fast bacilli seen.    Narrative:      BAL    Culture, Respiratory with Gram Stain [[id number]] Collected: 05/04/22 0836    Order Status: Completed Specimen: Respiratory from BAL, RUL Updated: 05/04/22 1129     Gram Stain (Respiratory) No organisms seen     Gram Stain (Respiratory) Rare WBC's    KOH prep [[id number]] Collected: 05/04/22 0836    Order Status: Completed Specimen: Respiratory from BAL, RUL Updated: 05/04/22 1101     KOH Prep No yeast or fungal elements seen    Narrative:      BAL    Culture, Anaerobic [[id number]] Collected: 05/01/22 2012    Order Status: Completed Specimen: Ascites Updated: 05/04/22 1046     Anaerobic Culture Culture in progress    AFB Culture & Smear [[id number]] Collected: 05/03/22 0104    Order Status: Completed Specimen: Blood from Arm, Right Updated: 05/04/22 0927     AFB Culture & Smear Culture in progress    Fungus culture [[id number]] " Collected: 05/04/22 0836    Order Status: Sent Specimen: Respiratory from BAL, RUL Updated: 05/04/22 0921    AFB Culture & Smear [[id number]] Collected: 05/04/22 0836    Order Status: Sent Specimen: Respiratory from BAL, RUL Updated: 05/04/22 0920    Respiratory Viral Panel by PCR (Sources other than NP Swab) [facility]; Sputum (BAL) [[id number]] Collected: 05/04/22 0835    Order Status: Sent Specimen: Bronchial Wash Updated: 05/04/22 0914    Culture, Respiratory [[id number]] Collected: 05/04/22 0836    Order Status: Canceled Specimen: Respiratory from BAL, RUL     Gram stain [[id number]] Collected: 05/04/22 0836    Order Status: Canceled Specimen: Body Fluid from Lung, RUL     Aerobic culture [[id number]]  (Abnormal) Collected: 05/01/22 2012    Order Status: Completed Specimen: Ascites Updated: 05/04/22 0738     Aerobic Bacterial Culture ENTEROCOCCUS FAECALIS  Rare  Susceptibility pending      Blood culture [[id number]] Collected: 05/01/22 2132    Order Status: Completed Specimen: Blood Updated: 05/04/22 0612     Blood Culture, Routine No Growth to date      No Growth to date      No Growth to date    Narrative:      Collection has been rescheduled by [facility] at 05/01/2022 20:55 Reason:   Patient unavailable ON BSCC   Collection has been rescheduled by [facility] at 05/01/2022 20:55 Reason:   Patient unavailable ON BSCC     Blood culture [[id number]] Collected: 05/01/22 2132    Order Status: Completed Specimen: Blood Updated: 05/04/22 0612     Blood Culture, Routine No Growth to date      No Growth to date      No Growth to date    Narrative:      Collection has been rescheduled by [facility] at 05/01/2022 20:55 Reason:   Patient unavailable ON BSCC   Collection has been rescheduled by 2 at 05/01/2022 20:55 Reason:   Patient unavailable ON BSCC     Blood culture [[id number]] Collected: 05/02/22 1938    Order Status: Completed Specimen: Blood Updated: 05/03/22 2212     Blood Culture, Routine No Growth to date      No Growth to date    Blood  culture [[id number]] Collected: 05/02/22 1941    Order Status: Completed Specimen: Blood Updated: 05/03/22 2212     Blood Culture, Routine No Growth to date      No Growth to date    Culture, Body Fluid - Bactec [[id number]] Collected: 05/01/22 2013    Order Status: Completed Specimen: Body Fluid from Ascites Updated: 05/03/22 2212     Body Fluid Culture, Sterile No growth to date      No Growth to date    Culture, MRSA [[id number]] Collected: 05/03/22 2142    Order Status: Sent Specimen: MRSA source from Nares, Right Updated: 05/03/22 2201    Culture, MRSA [[id number]] Collected: 05/03/22 2115    Order Status: Sent Specimen: MRSA source from Nares, Left Updated: 05/03/22 2157    Cryptococcal antigen [[id number]] Collected: 05/02/22 1939    Order Status: Completed Specimen: Blood, Venous Updated: 05/03/22 1115     Cryptococcal Ag, Blood Negative    AFB Culture & Smear [[id number]]     Order Status: Canceled Specimen: Blood     Respiratory Infection Panel (PCR), Nasopharyngeal [[id number]] Collected: 05/02/22 2044    Order Status: Completed Specimen: Nasopharyngeal Swab Updated: 05/02/22 2252     Respiratory Infection Panel Source NP Swab     Adenovirus Not Detected     Coronavirus 229E, Common Cold Virus Not Detected     Coronavirus HKU1, Common Cold Virus Not Detected     Coronavirus NL63, Common Cold Virus Not Detected     Coronavirus OC43, Common Cold Virus Not Detected     Comment: The Coronavirus strains detected in this test cause the common cold.  These strains are not the COVID-19 (novel Coronavirus)strain   associated with the respiratory disease outbreak.          SARS-CoV2 (COVID-19) Qualitative PCR Not Detected     Human Metapneumovirus Not Detected     Human Rhinovirus/Enterovirus Not Detected     Influenza A (subtypes H1, H1-2009,H3) Not Detected     Influenza B Not Detected     Parainfluenza Virus 1 Not Detected     Parainfluenza Virus 2 Not Detected     Parainfluenza Virus 3 Not Detected     Parainfluenza  Virus 4 Not Detected     Respiratory Syncytial Virus Not Detected     Bordetella Parapertussis ([id number]) Not Detected     Bordetella pertussis (ptxP) Not Detected     Chlamydia pneumoniae Not Detected     Mycoplasma pneumoniae Not Detected    Narrative:      For all other respiratory sources, order [id number] -  Respiratory Viral Panel by PCR    AFB Culture & Smear [[id number]]     Order Status: Canceled Specimen: Blood     Blood culture [[id number]]     Order Status: Canceled Specimen: Blood     Fungus culture [[id number]] Collected: 05/01/22 2012    Order Status: Completed Specimen: Ascites Updated: 05/02/22 0924     Fungus (Mycology) Culture Culture in progress    Gram stain [[id number]] Collected: 05/01/22 2012    Order Status: Completed Specimen: Ascites Updated: 05/01/22 2233     Gram Stain Result No WBC's      No organisms seen        Latest lactate reviewed, they are-  No results for input(s): LACTATE in the last 72 hours.    Blood and fluid cultures performed: NGTD. Bronchoscopy to be performed. Respiratory viral panel negative. Follow up results and respiratory cultures.      Type 2 diabetes mellitus without complication, without long-term current use of insulin  Start basal/ prandial insulin as needed and titrate SSI to maintain serum [id number]. Start diabetic diet.      Chronic cough  PMHx of interstitial lung disease (lung scarring). Continue scheduled benzonotate tablet 200mg TID. For persistent cough add dextromethorphan-guaiefesin syrup. Compare/contrast serial CXR for worsening inflammatory process.  (See associated problems: ILD & fever)     Chronic diarrhea  Pt started on loperamide at OSH with adequate response, will continue anti-nausea medication at [facility]. Trended labs daily and adjusted for any electrolyte abnormality. Pt with prior negative Cdiff test. GI to perform colonoscopy for ongoing diarrhea.  - F/U CMV lab.       Fever in immunocomprised patient  Patient received paracentesis on 05/01,  negative for SBP.  Spiked fever afterwards, 100.7F. Septic workup started, broad spectrum abx coverage with Vanc/Cefepime. Chest x-ray showed consolidation at the middle lobe of the left lung. CT chest w/o contrast with similar findings, concerning for pneumonia.  Lactic acid wnl and no leukocytosis.ID consult placed given comorbidity and immunocompromised status. Pulmonology consulted, given chronic ILD findings with new bilateral opacities. Bronchoscopy to be performed. Bcx and Fcx NGTD. Respiratory viral panel negative.  - F/U cultures  - trend vitals and CBC  - trend inflammatory markers      Thrombocytopenia  Thrombocytopenia likely in setting of hepatic cirrhosis. Hold anticoagulation and DVT prophylaxis for thrombocytopenia.      Hypothyroidism  Continued on home medication: synthroid 50mcg qd      Hyponatremia  Stable. Urine sodium 29, urine osm 297. DDX low effective arterial volume vs SIADH.     - KTM following.   - Cont monitoring.         Cirrhosis of liver with ascites  Pt recently diagnosed with cirrhosis 2/2 to LOPEZ. Pulmonary vein hypertension now present with with multiple episodes of paracentesis. Largest abdominal fluid reduction just over 5L. Diagnostic paracentesis performed at [facility]. Lab work being processed currently.  - Labs negative for SBP.   - identify significant cytology, cultures and gram staining  - Hepatology and KTM coordinating for possible transplant.   - Echo ordered for transplant eval.     Interstitial lung disease  CXR performed on admission, with serial imaging reviewed. Continued scheduled Benzonate daily. Flonase added daily PRN with DuoNebs q6h PRN. Supplemental oxygen PRN. Continuous pulse oxymetry ordered. SpO2 maintained 88-93% by adjusting O2 flow. CT chest w/o contrast significant for bilateral infiltrates, concerning for pneumonia. Pulmonology consulted, appreciate recs. Pt to undergo Bronchoscopy 5/04/22.  - perform ambulatory test for home oxygen if required.   - F/u  pulm outpatient.       Gout  Continue allopurinol for prophylaxis.       Personal history of immunosupression therapy  Pt taking immunosuppressive agents (sacrolimus and tacrolimus) 2/2 to renal transplant in 2004. Currently on abx treatment for sepsis and SBP. On going lab workups include  Microbiology Results (last 7 days)     Procedure Component Value Units Date/Time    Direct AFB stain [[id number]] Collected: 05/04/22 0836    Order Status: Completed Specimen: Respiratory from BAL, RUL Updated: 05/04/22 1131     Direct Acid Fast No acid fast bacilli seen.    Narrative:      BAL    Culture, Respiratory with Gram Stain [[id number]] Collected: 05/04/22 0836    Order Status: Completed Specimen: Respiratory from BAL, RUL Updated: 05/04/22 1129     Gram Stain (Respiratory) No organisms seen     Gram Stain (Respiratory) Rare WBC's    KOH prep [[id number]] Collected: 05/04/22 0836    Order Status: Completed Specimen: Respiratory from BAL, RUL Updated: 05/04/22 1101     KOH Prep No yeast or fungal elements seen    Narrative:      BAL    Culture, Anaerobic [[id number]] Collected: 05/01/22 2012    Order Status: Completed Specimen: Ascites Updated: 05/04/22 1046     Anaerobic Culture Culture in progress    AFB Culture & Smear [[id number]] Collected: 05/03/22 0104    Order Status: Completed Specimen: Blood from Arm, Right Updated: 05/04/22 0927     AFB Culture & Smear Culture in progress    Fungus culture [[id number]] Collected: 05/04/22 0836    Order Status: Sent Specimen: Respiratory from BAL, RUL Updated: 05/04/22 0921    AFB Culture & Smear [[id number]] Collected: 05/04/22 0836    Order Status: Sent Specimen: Respiratory from BAL, RUL Updated: 05/04/22 0920    Respiratory Viral Panel by PCR (Sources other than NP Swab) [facility]; Sputum (BAL) [[id number]] Collected: 05/04/22 0835    Order Status: Sent Specimen: Bronchial Wash Updated: 05/04/22 0914    Culture, Respiratory [[id number]] Collected: 05/04/22 0836    Order Status:  Canceled Specimen: Respiratory from BAL, RUL     Gram stain [[id number]] Collected: 05/04/22 0836    Order Status: Canceled Specimen: Body Fluid from Lung, RUL     Aerobic culture [[id number]]  (Abnormal) Collected: 05/01/22 2012    Order Status: Completed Specimen: Ascites Updated: 05/04/22 0738     Aerobic Bacterial Culture ENTEROCOCCUS FAECALIS  Rare  Susceptibility pending      Blood culture [[id number]] Collected: 05/01/22 2132    Order Status: Completed Specimen: Blood Updated: 05/04/22 0612     Blood Culture, Routine No Growth to date      No Growth to date      No Growth to date    Narrative:      Collection has been rescheduled by [facility] at 05/01/2022 20:55 Reason:   Patient unavailable ON BSCC   Collection has been rescheduled by [facility] at 05/01/2022 20:55 Reason:   Patient unavailable ON BSCC     Blood culture [[id number]] Collected: 05/01/22 2132    Order Status: Completed Specimen: Blood Updated: 05/04/22 0612     Blood Culture, Routine No Growth to date      No Growth to date      No Growth to date    Narrative:      Collection has been rescheduled by [facility] at 05/01/2022 20:55 Reason:   Patient unavailable ON BSCC   Collection has been rescheduled by [facility] at 05/01/2022 20:55 Reason:   Patient unavailable ON BSCC     Blood culture [[id number]] Collected: 05/02/22 1938    Order Status: Completed Specimen: Blood Updated: 05/03/22 2212     Blood Culture, Routine No Growth to date      No Growth to date    Blood culture [[id number]] Collected: 05/02/22 1941    Order Status: Completed Specimen: Blood Updated: 05/03/22 2212     Blood Culture, Routine No Growth to date      No Growth to date    Culture, Body Fluid - Bactec [[id number]] Collected: 05/01/22 2013    Order Status: Completed Specimen: Body Fluid from Ascites Updated: 05/03/22 2212     Body Fluid Culture, Sterile No growth to date      No Growth to date    Culture, MRSA [[id number]] Collected: 05/03/22 2142    Order Status: Sent Specimen: MRSA source from Nares,  Right Updated: 05/03/22 2201    Culture, MRSA [[id number]] Collected: 05/03/22 2115    Order Status: Sent Specimen: MRSA source from Nares, Left Updated: 05/03/22 2157    Cryptococcal antigen [[id number]] Collected: 05/02/22 1939    Order Status: Completed Specimen: Blood, Venous Updated: 05/03/22 1115     Cryptococcal Ag, Blood Negative    AFB Culture & Smear [[id number]]     Order Status: Canceled Specimen: Blood     Respiratory Infection Panel (PCR), Nasopharyngeal [[id number]] Collected: 05/02/22 2044    Order Status: Completed Specimen: Nasopharyngeal Swab Updated: 05/02/22 2252     Respiratory Infection Panel Source NP Swab     Adenovirus Not Detected     Coronavirus 229E, Common Cold Virus Not Detected     Coronavirus HKU1, Common Cold Virus Not Detected     Coronavirus NL63, Common Cold Virus Not Detected     Coronavirus OC43, Common Cold Virus Not Detected     Comment: The Coronavirus strains detected in this test cause the common cold.  These strains are not the COVID-19 (novel Coronavirus)strain   associated with the respiratory disease outbreak.          SARS-CoV2 (COVID-19) Qualitative PCR Not Detected     Human Metapneumovirus Not Detected     Human Rhinovirus/Enterovirus Not Detected     Influenza A (subtypes H1, H1-2009,H3) Not Detected     Influenza B Not Detected     Parainfluenza Virus 1 Not Detected     Parainfluenza Virus 2 Not Detected     Parainfluenza Virus 3 Not Detected     Parainfluenza Virus 4 Not Detected     Respiratory Syncytial Virus Not Detected     Bordetella Parapertussis ([id number]) Not Detected     Bordetella pertussis (ptxP) Not Detected     Chlamydia pneumoniae Not Detected     Mycoplasma pneumoniae Not Detected    Narrative:      For all other respiratory sources, order [id number] -  Respiratory Viral Panel by PCR    AFB Culture & Smear [[id number]]     Order Status: Canceled Specimen: Blood     Blood culture [[id number]]     Order Status: Canceled Specimen: Blood     Fungus culture  [[id number]] Collected: 05/01/22 2012    Order Status: Completed Specimen: Ascites Updated: 05/02/22 0924     Fungus (Mycology) Culture Culture in progress    Gram stain [[id number]] Collected: 05/01/22 2012    Order Status: Completed Specimen: Ascites Updated: 05/01/22 2233     Gram Stain Result No WBC's      No organisms seen          Transplanted kidney  Renal transplant performed in 2004. Pt continued on sirolimus and tacrolimus for immuno suppression. KTM consulted on admission, appreciate recs. Trough levels ordered with both medications.currently held.    - trend renal funtion tests   - restart tacrolimus when levels permit  - restart sirolimus as directed by KTM    VTE Risk Mitigation (From admission, onward)         Ordered     IP VTE HIGH RISK PATIENT  Once         04/30/22 1810     Place sequential compression device  Until discontinued         04/30/22 1810                Discharge Planning   LE: 5/6/2022     Code Status: Full Code   Is the patient medically ready for discharge?: No    Reason for patient still in hospital (select all that apply): Treatment  Discharge Plan A: Home with family, Home Health          Mansoor Narayanan MD  Department of Hospital Medicine   [facility] - Transplant Stepdown

## 2022-05-04 NOTE — PROGRESS NOTES
[facility] - Transplant Stepdown  Infectious Disease  Progress Note    Patient Name: Edgar Jackson  MRN: [id number]  Admission Date: 4/30/2022  Length of Stay: 3 days  Attending Physician: Jarred Dyson MD  Primary Care Provider: Tyler Castillo MD    Isolation Status: No active isolations  Assessment/Plan:      Fever in immunocomprised patient  61 y.o. male with a PMH ESRD 2/2 HTN s/p kidney transplant in 1/2004 (in [place]; on Tacrolimus/Sirolimus), c/b LOPEZ cirrhosis, ILD, T2DM (A1C 6.6 in 2/2022), hypothyroidism, BPH, gout was admitted on 4/30/2022 (transferred from [facility]) for abnormal labs and need for transplant liver/kidney evaluation.  Complaints of malaise, chronic cough, diarrhea, weight loss, and found febrile.  Lives in [place], [place] before moving to [place] 9/2021.  CT with opacifications, reverse halo sign.    Recommendations:  -Continue vancomycin, cefepime  -Pulmonology consulted, planned for bronchoscopy  -Send BAL studies: cell count with diff, gram stain, bacterial / legionella / fungal / AFB cultures, TEM-respiratory pathogens panel, Aspergillus Ag, MTB PCR, Pneumocystis PCR, miscellaneous send out - [id number] send to [facility]  -Will check for coccidioidomycosis Ab  -Will follow-up on fungal markers, CMV already in process  -GI consulted, planned for colonoscopy      Thank you for your consult. I will follow-up with patient. Please contact us if you have any additional questions.    Zheng Chamberlain MD  Infectious Disease  [facility] - Transplant Stepdown    Subjective:     Principal Problem:ILDEFONSO (acute kidney injury)    HPI: Mr Edgar Jackson is a 61 y.o. Spanish male with a PMH ESRD 2/2 HTN s/p kidney transplant in 1/2004 (in [place]; on Tacrolimus/Sirolimus), c/b LOPEZ cirrhosis, ILD, T2DM (A1C 6.6 in 2/2022), hypothyroidism, BPH, gout; admitted on 4/30/2022 (transferred from [facility]) for abnormal labs and need for transplant liver/kidney evaluation. Patient is a [employer]  and moved from [facility]  [place] (lived for 20 years) to [place] since 9/2021. He was hospitalized from 4/7 to 4/21/2022 for worsening Cr. He was received 5 days of ceftriaxone for empiric CAP - had paracentesis without evidence of SBP. He was read[place] at [place] on 4/27 due to worsening Cr, and was transferred here on 4/30 for transplant specialities evaluation. He reports being lousy for hte past 3 months. He has chronic dry cough, decreased appetite, diarrhea (up to four times a day), 20 lb weight loss for the past 3 months. Patient developed fever to 100.7 on 5/1. Cr 4.5-5 (baseline 1.6). He was started on empiric vancomycin and cefepime. Our team was consulted for antibiotic assistance.     Patient immigrated from [place] since 1975. He previously enjoyed outdoor activities. He currently lives with his wife in [place]. No pets.               Interval History: Tmax 100.9 last 24 hours.  Still with cough and diarrhea.  Was previously living in [place], [place] until 9/2021 when he moved to [place].  Hikes/walks as a hobby.    Review of Systems   Constitutional:  Positive for fever. Negative for chills.   HENT:  Negative for congestion and sore throat.    Eyes:  Negative for visual disturbance.   Respiratory:  Positive for cough. Negative for shortness of breath.    Cardiovascular:  Negative for chest pain and leg swelling.   Gastrointestinal:  Positive for diarrhea. Negative for constipation, nausea and vomiting.   Endocrine: Negative for polydipsia.   Genitourinary:  Negative for dysuria.   Musculoskeletal:  Negative for arthralgias, back pain and myalgias.   Skin:  Negative for rash and wound.   Allergic/Immunologic: Positive for immunocompromised state. Negative for food allergies.   Neurological:  Negative for headaches.   Hematological:  Does not bruise/bleed easily.   Psychiatric/Behavioral:  Negative for confusion.    All other systems reviewed and are negative.  Objective:     Vital Signs (Most Recent):  Temp:  98.3 °F (36.8 °C) (05/03/22 1626)  Pulse: 68 (05/03/22 1626)  Resp: 11 (05/03/22 0914)  BP: (!) 147/84 (05/03/22 1626)  SpO2: 97 % (05/03/22 1626)   Vital Signs (24h Range):  Temp:  [98.1 °F (36.7 °C)-100.9 °F (38.3 °C)] 98.3 °F (36.8 °C)  Pulse:  [57-68] 68  Resp:  [11-22] 11  SpO2:  [95 %-99 %] 97 %  BP: (102-148)/(69-84) 147/84     Weight: 60.6 kg (133 lb 9.6 oz)  Body mass index is 20.31 kg/m².    Estimated Creatinine Clearance: 12 mL/min (A) (based on SCr of 5.6 mg/dL (H)).    Physical Exam  Vitals and nursing note reviewed.   Constitutional:       General: He is awake. He is not in acute distress.     Appearance: He is well-developed. He is not toxic-appearing or diaphoretic.   HENT:      Head: Normocephalic and atraumatic.      Right Ear: External ear normal.      Left Ear: External ear normal.      Nose:      Comments: On nasal cannula     Mouth/Throat:      Mouth: Mucous membranes are moist.      Pharynx: No oropharyngeal exudate or posterior oropharyngeal erythema.   Eyes:      General: Scleral icterus present.   Cardiovascular:      Rate and Rhythm: Normal rate and regular rhythm.      Pulses: Normal pulses.           Radial pulses are 2+ on the right side and 2+ on the left side.        Dorsalis pedis pulses are 2+ on the right side and 2+ on the left side.      Heart sounds: Normal heart sounds. No murmur heard.  Pulmonary:      Effort: No respiratory distress.      Breath sounds: No wheezing.   Abdominal:      General: Bowel sounds are normal. There is no distension.      Palpations: Abdomen is soft. There is fluid wave.      Tenderness: Tenderness: left sided. paracentesis site on bandage. There is no guarding.   Musculoskeletal:         General: Normal range of motion.      Cervical back: Full passive range of motion without pain and neck supple.      Right lower leg: No edema.      Left lower leg: No edema.   Lymphadenopathy:      Cervical: No cervical adenopathy.   Skin:     General: Skin is warm.       Coloration: Skin is jaundiced.      Findings: No lesion or rash.   Neurological:      Mental Status: He is alert and oriented to person, place, and time.   Psychiatric:         Attention and Perception: Attention normal.         Mood and Affect: Mood normal.         Behavior: Behavior normal. Behavior is cooperative.         Thought Content: Thought content normal.         Cognition and Memory: Cognition normal.         Judgment: Judgment normal.       Significant Labs: All pertinent labs within the past 24 hours have been reviewed.    Significant Imaging: I have reviewed all pertinent imaging results/findings within the past 24 hours.

## 2022-05-04 NOTE — NURSING
PRE EDUCATION TEACHING NOTE    Edgar Le was seen in the hospital today.  Handbook on pre-liver transplant information (see outline below) was given to the patient's wife  and time was allowed for questions.  Patient's wife was at his bedside.  Patient was somnolent and was not able to stay awake for the session.   Informed consent signed by patient's wife and written information given on selection criteria.      LIVER TRANSPLANT WORK-UP EDUCATION  I. NATIONAL REGISTRY LISTING  A. Information for listing  B. Regions  C. Per UNOS, can be listed at more than one center  II. SURGERY  A. Length  B. Complications: bleeding, infection  C. Central lines, drains, Oliveira catheter, incision, endotracheal tube, NG tube  D. Transfusions, cell saver  III. SHORT TERM RECOVERY  A. ICU, PICU, Hospital stay  IV. LONG TERM RECOVERY  A. Labs at home  B. Clinic visits  C. Complications: infection, rejection, readmissions  D. Normal immunity and immunosuppression  E. Incidence of re-admit in 1st year  V. REJECTION  A. Incidence  B. Treatment: Solumedrol, Prograf, Thymoglobulin (actions and side effects)  VI. IMMUNOSUPPRESSIVES  A. Prednisone  B. Imuran/Cellcept  C. Cyclosporin/Prograf  D. Rapamune  E. Need for lifetime compliance  F. Actions and side effects  G. Costs  VII. RECURRENCE OF VIRAL HEPATITIS

## 2022-05-05 NOTE — ASSESSMENT & PLAN NOTE
61 y.o. male with a PMH ESRD 2/2 HTN s/p kidney transplant in 1/2004 (in [place]; on Tacrolimus/Sirolimus), c/b LOPEZ cirrhosis, ILD, T2DM (A1C 6.6 in 2/2022), hypothyroidism, BPH, gout was admitted on 4/30/2022 (transferred from [facility]) for abnormal labs and need for transplant liver/kidney evaluation.  Complaints of malaise, chronic cough, diarrhea, weight loss, and found febrile.  Lives in [place], [place] before moving to [place] 9/2021.  CT with opacifications, reverse halo sign.  Blasto urine Ag +, fungitell >500.  Suggestive of fungal infection; blasto urine Ag can cross react with other fungi and blasto typically not associated with elevated fungitell.    Recommendations:  -Discontinue vancomycin, cefepime  -Continue posaconazole  -Airborne precautions until TB rule out, need AFB x3  -Will follow-up BAL studies: bacterial / legionella / fungal / AFB cultures, TEM-respiratory pathogens panel, Aspergillus Ag, MTB PCR, Pneumocystis PCR, [id number]  -Will follow-up on remaining fungal markers, CMV

## 2022-05-05 NOTE — PROGRESS NOTES
[facility] - Transplant Stepdown  Infectious Disease  Progress Note    Patient Name: Edgar Jackson  MRN: [id number]  Admission Date: 4/30/2022  Length of Stay: 5 days  Attending Physician: Samantha Yune MD  Primary Care Provider: Tyler Castillo MD    Isolation Status: Airborne  Assessment/Plan:      Lung infiltrate on CT  61 y.o. male with a PMH ESRD 2/2 HTN s/p kidney transplant in 1/2004 (in [place]; on Tacrolimus/Sirolimus), c/b LOPEZ cirrhosis, ILD, T2DM (A1C 6.6 in 2/2022), hypothyroidism, BPH, gout was admitted on 4/30/2022 (transferred from [facility]) for abnormal labs and need for transplant liver/kidney evaluation.  Complaints of malaise, chronic cough, diarrhea, weight loss, and found febrile.  Born in [place], lived in [place], [place] before moving to [place] 9/2021.  CT with opacifications, reverse halo sign.  Blasto urine Ag +, fungitell >500.  Suggestive of fungal infection; blasto urine Ag can cross react with other fungi and blasto typically not associated with elevated fungitell.    Recommendations:  -Discontinue vancomycin, cefepime  -Continue posaconazole  -Airborne precautions until TB rule out, need AFB x3  -Will follow-up BAL studies: bacterial / legionella / fungal / AFB cultures, TEM-respiratory pathogens panel, Aspergillus Ag, MTB PCR, Pneumocystis PCR, [id number]  -Will follow-up on remaining fungal markers, CMV  -Informed lab that cocci is on the ddx, biosafety level 3    SBP (spontaneous bacterial peritonitis)  Enterococcus faecalis SBP  -Start ampicillin  -Repeat paracentesis with albumin, cell count, culture    Chronic diarrhea  -GI consulted, plan for colonoscopy after TB rule out        Thank you for your consult. I will follow-up with patient. Please contact us if you have any additional questions.    Zheng Chamberlain MD  Infectious Disease  [facility] - Transplant Stepdown    Subjective:     Principal Problem:ILDEFONSO (acute kidney injury)    HPI: Mr Edgar Jackson is a 61 y.o. [place] male with a PMH ESRD 2/2  HTN s/p kidney transplant in 1/2004 (in [place]; on Tacrolimus/Sirolimus), c/b LOPEZ cirrhosis, ILD, T2DM (A1C 6.6 in 2/2022), hypothyroidism, BPH, gout; admitted on 4/30/2022 (transferred from [facility]) for abnormal labs and need for transplant liver/kidney evaluation. Patient is a [employer]  and moved from [place], [place] (lived for 20 years) to [place] since 9/2021. He was hospitalized from 4/7 to 4/21/2022 for worsening Cr. He was received 5 days of ceftriaxone for empiric CAP - had paracentesis without evidence of SBP. He was readmitted at [facility] on 4/27 due to worsening Cr, and was transferred here on 4/30 for transplant specialities evaluation. He reports being lousy for hte past 3 months. He has chronic dry cough, decreased appetite, diarrhea (up to four times a day), 20 lb weight loss for the past 3 months. Patient developed fever to 100.7 on 5/1. Cr 4.5-5 (baseline 1.6). He was started on empiric vancomycin and cefepime. Our team was consulted for antibiotic assistance.     Patient immigrated from [place] since 1975. He previously enjoyed outdoor activities. He currently lives with his wife in [place]. No pets.               Interval History: Afebrile last 24 hours.  Colonoscopy deferred until TB rule out complete.    Review of Systems   Constitutional:  Negative for chills and fever.   HENT:  Negative for congestion and sore throat.    Eyes:  Negative for visual disturbance.   Respiratory:  Positive for cough. Negative for shortness of breath.    Cardiovascular:  Negative for chest pain and leg swelling.   Gastrointestinal:  Positive for diarrhea. Negative for constipation, nausea and vomiting.   Endocrine: Negative for polydipsia.   Genitourinary:  Negative for dysuria.   Musculoskeletal:  Negative for arthralgias, back pain and myalgias.   Skin:  Negative for rash and wound.   Allergic/Immunologic: Positive for immunocompromised state. Negative for food allergies.   Neurological:   Negative for headaches.   Hematological:  Does not bruise/bleed easily.   Psychiatric/Behavioral:  Negative for confusion.    All other systems reviewed and are negative.  Objective:     Vital Signs (Most Recent):  Temp: 98.5 °F (36.9 °C) (05/05/22 1606)  Pulse: 65 (05/05/22 1606)  Resp: 20 (05/05/22 1215)  BP: 122/67 (05/05/22 1606)  SpO2: 95 % (05/05/22 1215) Vital Signs (24h Range):  Temp:  [97.7 °F (36.5 °C)-99.1 °F (37.3 °C)] 98.5 °F (36.9 °C)  Pulse:  [63-76] 65  Resp:  [15-20] 20  SpO2:  [93 %-99 %] 95 %  BP: (100-129)/(63-82) 122/67     Weight: 61 kg (134 lb 7.7 oz)  Body mass index is 20.45 kg/m².    Estimated Creatinine Clearance: 11.7 mL/min (A) (based on SCr of 5.8 mg/dL (H)).    Physical Exam  Vitals and nursing note reviewed.   Constitutional:       General: He is awake. He is not in acute distress.     Appearance: He is well-developed. He is not toxic-appearing or diaphoretic.   HENT:      Head: Normocephalic and atraumatic.      Right Ear: External ear normal.      Left Ear: External ear normal.      Nose:      Comments: On nasal cannula     Mouth/Throat:      Mouth: Mucous membranes are moist.      Pharynx: No oropharyngeal exudate or posterior oropharyngeal erythema.      Comments: -erythematous right lower lip with small amount of dry blood  Eyes:      General: Scleral icterus present.   Cardiovascular:      Rate and Rhythm: Normal rate and regular rhythm.      Pulses: Normal pulses.      Heart sounds: Normal heart sounds. No murmur heard.  Pulmonary:      Effort: No respiratory distress.      Breath sounds: No wheezing.   Abdominal:      General: Bowel sounds are normal. There is no distension.      Palpations: Abdomen is soft.   Musculoskeletal:         General: Normal range of motion.      Cervical back: Full passive range of motion without pain and neck supple.      Right lower leg: No edema.      Left lower leg: No edema.   Lymphadenopathy:      Cervical: No cervical adenopathy.   Skin:      General: Skin is warm.      Coloration: Skin is jaundiced.      Findings: No lesion or rash.   Neurological:      Mental Status: He is alert and oriented to person, place, and time.   Psychiatric:         Attention and Perception: Attention normal.         Mood and Affect: Mood normal.         Behavior: Behavior normal. Behavior is cooperative.         Thought Content: Thought content normal.         Cognition and Memory: Cognition normal.         Judgment: Judgment normal.       Significant Labs: All pertinent labs within the past 24 hours have been reviewed.    Significant Imaging: I have reviewed all pertinent imaging results/findings within the past 24 hours.

## 2022-05-05 NOTE — ASSESSMENT & PLAN NOTE
61 y.o. male with a PMH ESRD 2/2 HTN s/p kidney transplant in 1/2004 (in [place]; on Tacrolimus/Sirolimus), c/b LOPEZ cirrhosis, ILD, T2DM (A1C 6.6 in 2/2022), hypothyroidism, BPH, gout was admitted on 4/30/2022 (transferred from [facility]) for abnormal labs and need for transplant liver/kidney evaluation.  Complaints of malaise, chronic cough, diarrhea, weight loss, and found febrile.  Born in [place], lived in [place], [place] before moving to [place] 9/2021.  CT with opacifications, reverse halo sign.  Blasto urine Ag +, fungitell >500.  Suggestive of fungal infection; blasto urine Ag can cross react with other fungi and blasto typically not associated with elevated fungitell.    Recommendations:  -Discontinue vancomycin, cefepime  -Continue posaconazole  -Airborne precautions until TB rule out, need AFB x3  -Will follow-up BAL studies: bacterial / legionella / fungal / AFB cultures, TEM-respiratory pathogens panel, Aspergillus Ag, MTB PCR, Pneumocystis PCR, [id number]  -Will follow-up on remaining fungal markers, CMV

## 2022-05-05 NOTE — PLAN OF CARE
AAOx3, afebrile, w/o c/o pain. Bg monitored achs and tx per orders. IV albumin given. Pt on airborne precautions. Still waiting on AFB culture and sputum sample. Plan for colonoscopy in the am. Golytely in process. Pt is NPO. Pt on 2L NC. IV abx continued. Pt able to position self independently in bed. Stand by assist OOB. Pt in lowest position, side rails up x2, non-skid foot wear in place, call light within reach, pt verbalized understanding to call RN when needed.  Hand hygiene practiced per protocol.  Will continue to monitor.

## 2022-05-05 NOTE — SUBJECTIVE & OBJECTIVE
Interval History: Afebrile last 24 hours.  Colonoscopy deferred until TB rule out complete.    Review of Systems   Constitutional:  Negative for chills and fever.   HENT:  Negative for congestion and sore throat.    Eyes:  Negative for visual disturbance.   Respiratory:  Positive for cough. Negative for shortness of breath.    Cardiovascular:  Negative for chest pain and leg swelling.   Gastrointestinal:  Positive for diarrhea. Negative for constipation, nausea and vomiting.   Endocrine: Negative for polydipsia.   Genitourinary:  Negative for dysuria.   Musculoskeletal:  Negative for arthralgias, back pain and myalgias.   Skin:  Negative for rash and wound.   Allergic/Immunologic: Positive for immunocompromised state. Negative for food allergies.   Neurological:  Negative for headaches.   Hematological:  Does not bruise/bleed easily.   Psychiatric/Behavioral:  Negative for confusion.    All other systems reviewed and are negative.  Objective:     Vital Signs (Most Recent):  Temp: 98.5 °F (36.9 °C) (05/05/22 1606)  Pulse: 65 (05/05/22 1606)  Resp: 20 (05/05/22 1215)  BP: 122/67 (05/05/22 1606)  SpO2: 95 % (05/05/22 1215) Vital Signs (24h Range):  Temp:  [97.7 °F (36.5 °C)-99.1 °F (37.3 °C)] 98.5 °F (36.9 °C)  Pulse:  [63-76] 65  Resp:  [15-20] 20  SpO2:  [93 %-99 %] 95 %  BP: (100-129)/(63-82) 122/67     Weight: 61 kg (134 lb 7.7 oz)  Body mass index is 20.45 kg/m².    Estimated Creatinine Clearance: 11.7 mL/min (A) (based on SCr of 5.8 mg/dL (H)).    Physical Exam  Vitals and nursing note reviewed.   Constitutional:       General: He is awake. He is not in acute distress.     Appearance: He is well-developed. He is not toxic-appearing or diaphoretic.   HENT:      Head: Normocephalic and atraumatic.      Right Ear: External ear normal.      Left Ear: External ear normal.      Nose:      Comments: On nasal cannula     Mouth/Throat:      Mouth: Mucous membranes are moist.      Pharynx: No oropharyngeal exudate or  posterior oropharyngeal erythema.      Comments: -erythematous right lower lip with small amount of dry blood  Eyes:      General: Scleral icterus present.   Cardiovascular:      Rate and Rhythm: Normal rate and regular rhythm.      Pulses: Normal pulses.      Heart sounds: Normal heart sounds. No murmur heard.  Pulmonary:      Effort: No respiratory distress.      Breath sounds: No wheezing.   Abdominal:      General: Bowel sounds are normal. There is no distension.      Palpations: Abdomen is soft.   Musculoskeletal:         General: Normal range of motion.      Cervical back: Full passive range of motion without pain and neck supple.      Right lower leg: No edema.      Left lower leg: No edema.   Lymphadenopathy:      Cervical: No cervical adenopathy.   Skin:     General: Skin is warm.      Coloration: Skin is jaundiced.      Findings: No lesion or rash.   Neurological:      Mental Status: He is alert and oriented to person, place, and time.   Psychiatric:         Attention and Perception: Attention normal.         Mood and Affect: Mood normal.         Behavior: Behavior normal. Behavior is cooperative.         Thought Content: Thought content normal.         Cognition and Memory: Cognition normal.         Judgment: Judgment normal.       Significant Labs: All pertinent labs within the past 24 hours have been reviewed.    Significant Imaging: I have reviewed all pertinent imaging results/findings within the past 24 hours.

## 2022-05-05 NOTE — CONSULTS
Consult Note  Liver and KidneyTransplant Surgery    Consult Requested By: Hepatology  Reason for Consult: Liver transplant evaluation    SUBJECTIVE:     History of Present Illness: Patient is a 61 y.o. male with liver disease due to unknown cause  Symptoms and complications of liver disease include ascites (diuretic-dependent), edema, encephalopathy, fatigue, hyponatremia and muscle wasting.     Scheduled Meds:   allopurinoL  100 mg Oral Daily    benzonatate  200 mg Oral TID    ceFEPime (MAXIPIME) IVPB  1 g Intravenous Q24H    furosemide  20 mg Oral Daily    levothyroxine  50 mcg Oral Before breakfast    lipase-protease-amylase 12,000-38,000-60,000 units  2 capsule Oral QID (WM & HS)    metoprolol tartrate  50 mg Oral BID    posaconazole  300 mg Oral Daily    sodium bicarbonate  1,300 mg Oral TID     Continuous Infusions:  PRN Meds:acetaminophen, albuterol-ipratropium, dextromethorphan-guaiFENesin  mg/5 ml, dextrose 10%, fluticasone propionate, glucagon (human recombinant), glucose, glucose, insulin aspart U-100, loperamide, naloxone, sodium chloride 0.9%, Pharmacy to dose Vancomycin consult **AND** vancomycin - pharmacy to dose    Review of patient's allergies indicates:  No Known Allergies    Past Medical History:   Diagnosis Date    BPH (benign prostatic hyperplasia)     Diabetes 2020    Gout     Hypertension 2000    Hypothyroidism 04/08/2022    Interstitial lung disease 03/30/2022    Kidney transplant recipient 01/2004    [place] 2* hypertensive nephropathy    Unspecified cirrhosis of liver     ?LOPEZ     Past Surgical History:   Procedure Laterality Date    CATARACT EXTRACTION Right 2017    COLONOSCOPY N/A 3/8/2022    Procedure: COLONOSCOPY;  Surgeon: Mio Simmons III, MD;  Location: [facility];  Service: Endoscopy;  Laterality: N/A;    ENDOSCOPIC ULTRASOUND OF UPPER GASTROINTESTINAL TRACT N/A 3/8/2022    Procedure: ULTRASOUND, UPPER GI TRACT, ENDOSCOPIC;  Surgeon: Mio WILL  Troy RICHARDSON MD;  Location: [facility] ENDO;  Service: Endoscopy;  Laterality: N/A;    KIDNEY TRANSPLANT  2004    PORTACATH PLACEMENT  2003    REMOVAL OF VASCULAR ACCESS PORT  2005     Family History   Problem Relation Age of Onset    Kidney disease Father     Heart disease Father      Social History     Tobacco Use    Smoking status: Never Smoker    Smokeless tobacco: Never Used   Substance Use Topics    Alcohol use: Not Currently    Drug use: Never      HPI: Mr Jackson is a 61yoM with PMHx of renal transplant for hypertensive nephropathy (2004) immunosuppressed on tacrolimus/sirolimus,  therapy, LOPEZ cirrhosis, NIDDM, and ILD who was treated at OSH for ILDEFONSO 2/2 decompensated cirrhosis and worsening portal vein hypertension with HRS. Pt reported that he had been experiencing abdominal distention with associated diarrhea (4-5/day) over several weeks, along with a constant nagging cough. On presenting to OSH, multiple paracentesis (3) were performed to relieve the ascitic fluid. No evidence of SBP on fluid analysis. Creon was added to diet to aide in food digestion in setting of cirrhosis. Loperamide provided symptomatic treatment of diarrhea. Dextromethorphan-guaifenesin syrup added to reduce cough exacerbations. Despite efforts to manage symptoms, pt in need of  transplant surgery evaluation of the renal/hepatic injury, resulting in tranfer to [facility].     On arrival to [facility] (4/30/2022), pt has experienced increased generalized weakness. Loperamide was continued for diarrhea management. Abdominal distention recurred, despite prior paracentesis. Cough has been fairly constant and predominantly dry. Pt now endorses new left lower quadrant abdominal pain, worsened by cough. KTM and Hepatology consults placed for management of HRS.             Past Medical History:   Diagnosis Date    BPH (benign prostatic hyperplasia)      Diabetes 2020    Gout      Hypertension 2000    Hypothyroidism 04/08/2022    Interstitial  lung disease 03/30/2022    Kidney transplant recipient 01/2004     [place] 2* hypertensive nephropathy    Unspecified cirrhosis of liver       ?LOPEZ               Past Surgical History:   Procedure Laterality Date    CATARACT EXTRACTION Right 2017    COLONOSCOPY N/A 3/8/2022     Procedure: COLONOSCOPY;  Surgeon: Mio Simmons III, MD;  Location: [facility] ENDO;  Service: Endoscopy;  Laterality: N/A;    ENDOSCOPIC ULTRASOUND OF UPPER GASTROINTESTINAL TRACT N/A 3/8/2022     Procedure: ULTRASOUND, UPPER GI TRACT, ENDOSCOPIC;  Surgeon: Mio Simmons III, MD;  Location: [facility] ENDO;  Service: Endoscopy;  Laterality: N/A;    KIDNEY TRANSPLANT   2004    PORTACATH PLACEMENT   2003    REMOVAL OF VASCULAR ACCESS PORT   2005         Review of patient's allergies indicates:  No Known Allergies        OBJECTIVE:     Vital Signs (Most Recent)  Temp: 98.5 °F (36.9 °C) (05/05/22 1606)  Pulse: 65 (05/05/22 1606)  Resp: 20 (05/05/22 1215)  BP: 122/67 (05/05/22 1606)  SpO2: 95 % (05/05/22 1215)    Vital Signs Range (Last 24H):  Temp:  [97.7 °F (36.5 °C)-99.1 °F (37.3 °C)]   Pulse:  [63-76]   Resp:  [15-20]   BP: (100-129)/(63-82)   SpO2:  [93 %-99 %]     Physical Exam:  Small amount of ascites  Bilateral femoral pulses  Right sided kidney incision      ASSESSMENT/PLAN:     Patient Active Problem List   Diagnosis    Transplanted kidney    Personal history of immunosupression therapy    Hyperlipidemia    Gout    Interstitial lung disease    Cirrhosis of liver with ascites    ILDEFONSO (acute kidney injury)    Hyponatremia    Metabolic acidosis    Hypothyroidism    Thrombocytopenia    Anemia    Fever in immunocomprised patient    Weakness    Chronic respiratory failure    Chronic diarrhea    Chronic cough    Chronic gouty arthritis    Chronic rhinitis    Migraine headache    Osteoporosis    Multiple lung nodules    Type 2 diabetes mellitus without complication, without long-term current use of insulin     Sepsis    Lung infiltrate on CT    SBP (spontaneous bacterial peritonitis)    Severe malnutrition       Transplant Candidacy: Patient is a 61 y.o. year old male with unknown cause of cirrhosis being evaluated for possible OLT with kidney re-transplant.  In my opinion, he is a suitable liver/kidney transplant candidate.  He will be presented to selection committee after all tests and evaluations are complete.    It should be noted the patient recently a mTor inhibitor - this may limit wound healing. He may be at higher risk for immunologic complications as this will be a re-transplant. He understands that we may likely perfuse tthe kidney allograft and will implant only if the patients is hemodynamically stable. As the cause of his liver disease is not clear at this time - it is impossible to predict the risk of recurrent disease.      Surgical Complexity A: needs groin lines on right side; small brandon habitus but has ascites  Medical complexity B: renal failure.    UNOS Patient Status  Functional Status: 30% - Severely disabled: hospitalization is indicated, death not imminent  Physical Capacity: Not Applicable (< 1 year old or hospitalized)    Counseling: I discussed with the patient the benefits of liver transplantation.  We discussed the evaluation and listing procedures.  We discussed the MELD system and the associated waiting times.  We discussed national and center specific survival results.  We discussed the option of being multiply listed in different OPOs.   We discussed the risks, benefits and potential complications related to surgery including the risks related to anesthesia, bleeding, infection, primary non-function of the allograft, the risk of reoperation as well as the risk of death.  We discussed the typical post-operative course, length of hospitalization, the long-term use of immunosuppressive therapy as well as the need for long-term routine follow-up.    Coronavirus disease (COVID-19) caused  by severe acute respiratory virus coronavirus 2 (SARS-C0V 2) is associated with increased mortality in solid organ transplant recipients (SOT) compared to non-transplant patients. Vaccine responses to vaccination are depressed against SARS-CoV2 compared to normal individuals but improve with third vaccination doses. Vaccination prior to SOT provides both the best opportunity for transplant candidates to develop protective immunity and to reduce the risk of serious COVID19 infections post transplantation. Organ transplant candidates at [facility] will be required to receive SARS-CoV-2 vaccination prior to being listed with a an active status, whenever possible. Exceptions will be made for disability related reasons or for sincerely held [employer] beliefs.     PHS: I discussed the use of organs from donors with PHS risk criteria, including the testing protocols utilized, as well as data from the literature regarding the likelihood of transmission of hepatitis or HIV.  The patient that he is willing to consider such grafts.  DCD: I discussed the use of organs recovered by donation after cardiac death (DCD), including slightly decreased graft survival and greater risk of arterial and biliary complications. The potential advantage to the recipient is possibly receiving a transplant sooner by accepting such an organ. The patient that he is willing to consider such grafts.  HBcAb: I discussed the use of organs from donors with HBcAb in conjunction with long term use of HBV antiviral drugs, such as lamivudine. The small but measurable risk of hepatitis B seroconversion was discussed as well as the potentially life long need to continue antiviral drugs. The patient that he is willing to consider such grafts.  HCV Non-viremic recipient: I discussed the use of HCV-positive organs in naive recipients, including the risk of viral transmission to the patients or others, potential insurance  barriers for antiviral medication coverage, risk for fibrosing cholestatic hepatitis, death or graft loss. The potential advantage to the recipient is the possibility of receiving a transplant sooner with decreased mortality risk by accepting such an organ. The patient that he is willing to consider such grafts.  LDLT: I discussed the nature of living donor liver transplant, including donor risks and more frequent recipient complications. The patient that he is willing to consider such grafts.  Covid: I discussed that this donor has tested positive for the covid virus, but with very low levels of virus and no evidence of covid disease. Although the risk of transmission is unknown, we believe this donor is not infectious and use of the abdominal organs is safe.  To date, these organs have been used with no evidence of transmission. The patient that he is willing to consider such grafts.

## 2022-05-05 NOTE — ASSESSMENT & PLAN NOTE
S/p kidney transplant living donor more then 10 years ago at [facility].   Reports no episodes of rejections or complications post surgery   ESRD - related to HTN. Currently has no access for HD

## 2022-05-05 NOTE — PROGRESS NOTES
Pharmacokinetic Assessment Follow Up: IV Vancomycin    Vancomycin serum concentration assessment(s):    Vancomycin concentration = 19.4 mcg/mL. At goal    Vancomycin Regimen Plan:  1. Hold vancomycin today  2. Obtain concentration in AM 5/6  3. Goal trough = 15-20 mcg/mL    Thank you for the consult, will continue to follow  Alissa Pyle.D., BCPS  [id number]       Patient brief summary:  Edgar Jackson is a 61 y.o. male initiated on antimicrobial therapy with IV Vancomycin for treatment of sepsis    Drug levels (last 3 results):  Recent Labs   Lab Result Units 05/03/22  0708 05/04/22  0722 05/05/22  0747   Vancomycin, Random ug/mL 14.7 16.3 19.4       Drug Allergies:   Review of patient's allergies indicates:  No Known Allergies    Actual Body Weight:   61 kg    Renal Function:   Estimated Creatinine Clearance: 11.7 mL/min (A) (based on SCr of 5.8 mg/dL (H)).,     CBC (last 72 hours):  Recent Labs   Lab Result Units 05/03/22  0708 05/04/22  0722 05/05/22  0747   WBC K/uL 8.78 7.10 7.85   Hemoglobin g/dL 10.0* 9.7* 9.6*   Hematocrit % 28.6* 27.6* 28.2*   Platelets K/uL 89* 85* 99*   Gran % % 80.5* 74.5* 80.4*   Lymph % % 7.2* 9.0* 7.3*   Mono % % 8.3 12.1 9.3   Eosinophil % % 2.8 3.4 1.8   Basophil % % 0.5 0.3 0.3   Differential Method  Automated Automated Automated       Metabolic Panel (last 72 hours):  Recent Labs   Lab Result Units 05/02/22  1219 05/03/22  0708 05/04/22  0722 05/05/22  0747   Sodium mmol/L  --  124* 124* 128*   Potassium mmol/L  --  4.5 4.6 4.2   Chloride mmol/L  --  96 96 95   CO2 mmol/L  --  16* 18* 18*   Glucose mg/dL  --  191* 159* 180*   Glucose, UA  2+*  --   --   --    BUN mg/dL  --  66* 67* 67*   Creatinine mg/dL  --  5.6* 5.4* 5.8*   Albumin g/dL  --  2.9* 2.7* 3.3*   Total Bilirubin mg/dL  --  6.2* 4.9* 5.6*   Alkaline Phosphatase U/L  --  92 91 92   AST U/L  --  68* 58* 55*   ALT U/L  --  8* 8* 8*   Magnesium mg/dL  --  1.5*  --   --    Phosphorus mg/dL  --  4.0  --   --        Vancomycin  Administrations:  vancomycin given in the last 96 hours                   vancomycin 500 mg in dextrose 5 % 100 mL IVPB (ready to mix system) (mg) 500 mg New Bag 05/04/22 1115    vancomycin (VANCOCIN) 250 mg in dextrose 5 % 100 mL IVPB (mg) 250 mg New Bag 05/03/22 1119    vancomycin 750 mg in dextrose 5 % 250 mL IVPB (ready to mix system) (mg) 750 mg New Bag 05/01/22 2322                Microbiologic Results:  Microbiology Results (last 7 days)     Procedure Component Value Units Date/Time    Fungus culture [[id number]] Collected: 05/04/22 0836    Order Status: Completed Specimen: Respiratory from BAL, RUL Updated: 05/05/22 0942     Fungus (Mycology) Culture Culture in progress    Narrative:      BAL    Culture, Respiratory with Gram Stain [[id number]] Collected: 05/04/22 0836    Order Status: Completed Specimen: Respiratory from BAL, RUL Updated: 05/05/22 0919     Respiratory Culture No Growth     Gram Stain (Respiratory) No organisms seen     Gram Stain (Respiratory) Rare WBC's    AFB Culture & Smear [[id number]]     Order Status: No result Specimen: Sputum     Aerobic culture [[id number]]  (Abnormal)  (Susceptibility) Collected: 05/01/22 2012    Order Status: Completed Specimen: Ascites Updated: 05/05/22 0708     Aerobic Bacterial Culture ENTEROCOCCUS FAECALIS  Rare      Blood culture [[id number]] Collected: 05/01/22 2132    Order Status: Completed Specimen: Blood Updated: 05/05/22 0612     Blood Culture, Routine No Growth to date      No Growth to date      No Growth to date      No Growth to date    Narrative:      Collection has been rescheduled by [facility] at 05/01/2022 20:55 Reason:   Patient unavailable ON BSCC   Collection has been rescheduled by [facility] at 05/01/2022 20:55 Reason:   Patient unavailable ON CC     Blood culture [[id number]] Collected: 05/01/22 2132    Order Status: Completed Specimen: Blood Updated: 05/05/22 0612     Blood Culture, Routine No Growth to date      No Growth to date      No Growth to date       No Growth to date    Narrative:      Collection has been rescheduled by [facility] at 05/01/2022 20:55 Reason:   Patient unavailable ON BSCC   Collection has been rescheduled by [facility] at 05/01/2022 20:55 Reason:   Patient unavailable ON BSCC     Blood culture [[id number]] Collected: 05/02/22 1938    Order Status: Completed Specimen: Blood Updated: 05/04/22 2212     Blood Culture, Routine No Growth to date      No Growth to date      No Growth to date    Blood culture [[id number]] Collected: 05/02/22 1941    Order Status: Completed Specimen: Blood Updated: 05/04/22 2212     Blood Culture, Routine No Growth to date      No Growth to date      No Growth to date    Culture, Body Fluid - Bactec [[id number]] Collected: 05/01/22 2013    Order Status: Completed Specimen: Body Fluid from Ascites Updated: 05/04/22 2212     Body Fluid Culture, Sterile No growth to date      No Growth to date      No Growth to date    AFB Culture & Smear [[id number]]     Order Status: No result Specimen: Sputum     Direct AFB stain [[id number]] Collected: 05/04/22 0836    Order Status: Completed Specimen: Respiratory from BAL, RUL Updated: 05/04/22 1131     Direct Acid Fast No acid fast bacilli seen.    Narrative:      BAL    GLADYS prep [[id number]] Collected: 05/04/22 0836    Order Status: Completed Specimen: Respiratory from BAL, RUL Updated: 05/04/22 1101     KOH Prep No yeast or fungal elements seen    Narrative:      BAL    Culture, Anaerobic [[id number]] Collected: 05/01/22 2012    Order Status: Completed Specimen: Ascites Updated: 05/04/22 1046     Anaerobic Culture Culture in progress    AFB Culture & Smear [[id number]] Collected: 05/03/22 0104    Order Status: Completed Specimen: Blood from Arm, Right Updated: 05/04/22 0927     AFB Culture & Smear Culture in progress    AFB Culture & Smear [[id number]] Collected: 05/04/22 0836    Order Status: Sent Specimen: Respiratory from BAL, RUL Updated: 05/04/22 0920    Culture, Respiratory [[id number]]  Collected: 05/04/22 0836    Order Status: Canceled Specimen: Respiratory from BAL, RUL     Gram stain [[id number]] Collected: 05/04/22 0836    Order Status: Canceled Specimen: Body Fluid from Lung, RUL     Respiratory Viral Panel by PCR (Sources other than NP Swab) [facility]; Sputum (BAL) [[id number]] Collected: 05/04/22 0835    Order Status: Canceled Specimen: Bronchial Wash     Culture, MRSA [[id number]] Collected: 05/03/22 2142    Order Status: Sent Specimen: MRSA source from Nares, Right Updated: 05/03/22 2201    Culture, MRSA [[id number]] Collected: 05/03/22 2115    Order Status: Sent Specimen: MRSA source from Nares, Left Updated: 05/03/22 2157    Cryptococcal antigen [[id number]] Collected: 05/02/22 1939    Order Status: Completed Specimen: Blood, Venous Updated: 05/03/22 1115     Cryptococcal Ag, Blood Negative    AFB Culture & Smear [[id number]]     Order Status: Canceled Specimen: Blood     Respiratory Infection Panel (PCR), Nasopharyngeal [[id number]] Collected: 05/02/22 2044    Order Status: Completed Specimen: Nasopharyngeal Swab Updated: 05/02/22 2252     Respiratory Infection Panel Source NP Swab     Adenovirus Not Detected     Coronavirus 229E, Common Cold Virus Not Detected     Coronavirus HKU1, Common Cold Virus Not Detected     Coronavirus NL63, Common Cold Virus Not Detected     Coronavirus OC43, Common Cold Virus Not Detected     Comment: The Coronavirus strains detected in this test cause the common cold.  These strains are not the COVID-19 (novel Coronavirus)strain   associated with the respiratory disease outbreak.          SARS-CoV2 (COVID-19) Qualitative PCR Not Detected     Human Metapneumovirus Not Detected     Human Rhinovirus/Enterovirus Not Detected     Influenza A (subtypes H1, H1-2009,H3) Not Detected     Influenza B Not Detected     Parainfluenza Virus 1 Not Detected     Parainfluenza Virus 2 Not Detected     Parainfluenza Virus 3 Not Detected     Parainfluenza Virus 4 Not Detected      Respiratory Syncytial Virus Not Detected     Bordetella Parapertussis (ZF8058) Not Detected     Bordetella pertussis (ptxP) Not Detected     Chlamydia pneumoniae Not Detected     Mycoplasma pneumoniae Not Detected    Narrative:      For all other respiratory sources, order [id number] -  Respiratory Viral Panel by PCR    AFB Culture & Smear [[id number]]     Order Status: Canceled Specimen: Blood     Blood culture [[id number]]     Order Status: Canceled Specimen: Blood     Fungus culture [[id number]] Collected: 05/01/22 2012    Order Status: Completed Specimen: Ascites Updated: 05/02/22 0924     Fungus (Mycology) Culture Culture in progress    Gram stain [[id number]] Collected: 05/01/22 2012    Order Status: Completed Specimen: Ascites Updated: 05/01/22 2233     Gram Stain Result No WBC's      No organisms seen

## 2022-05-05 NOTE — ASSESSMENT & PLAN NOTE
His renal functions were normal beginning of Feb.   After that it appears he had ILDEFONSO - possibly related to pre-renal states  Which led to CKD stage III, which has progressed to stage IV over the course of last few weeks     At this time no indication for hemodialysis.   Has consented for HD in case needed.   Monitor I/Os   US renal transplant unremarkable.    This may be HRS pathophysiology now. HRS is diagnosis of exclusion.     We usually recommend starting with Robitussin DM for the cough  If she believes the cough may be from postnasal drip, also recommend starting a nasal spray like Flonase

## 2022-05-05 NOTE — TREATMENT PLAN
GI Treatment Plan:  Patient currently being ruled out for Tuberculosis. Will defer endoscopy until TB effectively ruled out.    Please call back with questions or if patient has change in clinical status.    Richard Herrera MD  Gastroenterology/Hepatology, PGY IV  [facility]

## 2022-05-05 NOTE — NURSING
Patient received at 1345, alert and awake, sitting up in bed. Airborne precautions in place, r/o TB. Patient remains NPO, Dr. Yuen messaged via secured chat to have a diet, new diet now in place. Call bell within reach, no needs at this time.

## 2022-05-05 NOTE — PROGRESS NOTES
[facility] - Transplant Stepdown  Kidney Transplant  Progress Note      Reason for Follow-up: Reassessment of Kidney Transplant Referral - 5/2/2022 recipient and management of immunosuppression.        Subjective:   History of Present Illness:  61yoM with PMHx of renal transplant for hypertensive nephropathy (2004) immunosuppressed on tacrolimus/sirolimus,  therapy, LOPEZ cirrhosis, NIDDM, and ILD who was treated at OSH for ILDEFONSO 2/2 decompensated cirrhosis and worsening portal vein hypertension with HRS.   Pt reported that he had been experiencing abdominal distention with associated diarrhea (4-5/day) over several weeks, along with a constant nagging cough.  He has been having weekly paracentesis in the past month.   Despite efforts to manage symptoms, pt in need of  transplant surgery evaluation of the renal/hepatic injury, resulting in tranfer to [facility] [place].    He reports no recent NSAID use.         Hospital Course:  No notes on file    Interval History:  - No acute overnight events       Past Medical, Surgical, Family, and Social History:   Unchanged from H&P.    Scheduled Meds:   allopurinoL  100 mg Oral Daily    benzonatate  200 mg Oral TID    ceFEPime (MAXIPIME) IVPB  1 g Intravenous Q24H    furosemide  20 mg Oral Daily    levothyroxine  50 mcg Oral Before breakfast    lipase-protease-amylase 12,000-38,000-60,000 units  2 capsule Oral QID (WM & HS)    metoprolol tartrate  50 mg Oral BID    posaconazole  300 mg Oral Daily    sodium bicarbonate  1,300 mg Oral TID     Continuous Infusions:  PRN Meds:acetaminophen, albuterol-ipratropium, dextromethorphan-guaiFENesin  mg/5 ml, dextrose 10%, fluticasone propionate, glucagon (human recombinant), glucose, glucose, insulin aspart U-100, loperamide, naloxone, sodium chloride 0.9%, Pharmacy to dose Vancomycin consult **AND** vancomycin - pharmacy to dose    Intake/Output - Last 3 Shifts         05/03 0700 05/04 0659 05/04 0700 05/05 0659 05/05 0700 05/06 0659     "P.O. 360 2000     I.V. (mL/kg) 0 (0) 100 (1.6)     Other 0 0     IV Piggyback 49.9 48.4     Total Intake(mL/kg) 409.9 (6.8) 2148.4 (35.2)     Urine (mL/kg/hr) 350 (0.2) 100 (0.1)     Emesis/NG output 0 0     Other 0 0     Stool 0 0     Blood 0 0     Total Output 350 100     Net +59.9 +2048.4            Urine Occurrence 4 x 5 x     Stool Occurrence 4 x 6 x 1 x    Emesis Occurrence 0 x 0 x              Review of Systems   Constitutional: Negative.    HENT: Negative.     Eyes: Negative.    Respiratory: Negative.     Cardiovascular: Negative.    Endocrine: Negative.    Genitourinary: Negative.    Skin: Negative.     Objective:     Vital Signs (Most Recent):  Temp: 98.5 °F (36.9 °C) (05/05/22 1215)  Pulse: 64 (05/05/22 1215)  Resp: 20 (05/05/22 1215)  BP: 128/81 (05/05/22 1215)  SpO2: 95 % (05/05/22 1215) Vital Signs (24h Range):  Temp:  [97.7 °F (36.5 °C)-99.1 °F (37.3 °C)] 98.5 °F (36.9 °C)  Pulse:  [63-76] 64  Resp:  [15-20] 20  SpO2:  [93 %-99 %] 95 %  BP: (100-129)/(63-82) 128/81     Weight: 61 kg (134 lb 7.7 oz)  Height: 5' 8" (172.7 cm)  Body mass index is 20.45 kg/m².    Physical Exam  HENT:      Head: Normocephalic.      Mouth/Throat:      Mouth: Mucous membranes are moist.   Eyes:      Pupils: Pupils are equal, round, and reactive to light.   Cardiovascular:      Rate and Rhythm: Normal rate.   Pulmonary:      Effort: Pulmonary effort is normal.   Abdominal:      General: Abdomen is flat.   Musculoskeletal:         General: Normal range of motion.   Skin:     General: Skin is warm.   Neurological:      General: No focal deficit present.      Mental Status: He is alert.       Laboratory:  - Reviewed     Diagnostic Results:  - Reviewed     Assessment/Plan:     * ILDEFONSO (acute kidney injury)  His renal functions were normal beginning of Feb.   After that it appears he had ILDEFONSO - possibly related to pre-renal states  Which led to CKD stage III, which has progressed to stage IV over the course of last few weeks     At " this time no indication for hemodialysis.   Has consented for HD in case needed.   Monitor I/Os   US renal transplant unremarkable.    Cr: 6.0-->5.7-->5.8-->5.3-->5.4-->5.8 (on 5/5)  Urine output noted to be Minimal   Strict I/Os    Metabolic acidosis  - Continue NaHCO3 1300 TID  - Likely secondary to diarrhea     Hyponatremia  This is likely in setting of renal failure and liver cirrhosis   Would be hard to normalize  Continue fluid restriction upto 1.2 liter/day      Cirrhosis of liver with ascites  Work up per Hepatology   AFP significantly elevated   Liver biopsy -- Chronic mild-moderately active hepatitis with bridging fibrosis and nodule   formation (Grade 2, Stage 3).      Personal history of immunosupression therapy  On sirolimus and prograf at home   Hold at this time   Currently supratherapeutic   Levels noted to be >9 on 5.5  Will need to be monitored carfeully- noted to be started on posaconazole on 5/5      Transplanted kidney  S/p kidney transplant living donor more then 10 years ago at [place].   Reports no episodes of rejections or complications post surgery   ESRD - related to HTN. Currently has no access for HD               Lilliam Topete MD  Kidney Transplant  [facility] - Transplant Stepdown

## 2022-05-05 NOTE — TREATMENT PLAN
Hepatology Treatment Plan    Edgar Jackson is a 61 y.o. male admitted to hospital 4/30/2022 (Hospital Day: 6) due to ILDEFONSO (acute kidney injury).     Interval History  No acute events overnight.  Fungitel markedly elevated, blasto Ag positive, started on posa.  Ascites culture showing enterococcus.  On vancomycin    Objective  Temp:  [97.7 °F (36.5 °C)-99.1 °F (37.3 °C)] 97.7 °F (36.5 °C) (05/05 0850)  Pulse:  [63-76] 76 (05/05 0850)  BP: (100-132)/(63-82) 129/82 (05/05 0850)  Resp:  [15-18] 15 (05/05 0850)  SpO2:  [93 %-99 %] 95 % (05/05 0850)      Laboratory    Lab Results   Component Value Date    WBC 7.85 05/05/2022    HGB 9.6 (L) 05/05/2022    HCT 28.2 (L) 05/05/2022    MCV 86 05/05/2022    PLT 99 (L) 05/05/2022       Lab Results   Component Value Date     (L) 05/05/2022    K 4.2 05/05/2022    CL 95 05/05/2022    CO2 18 (L) 05/05/2022    BUN 67 (H) 05/05/2022    CREATININE 5.8 (H) 05/05/2022    CALCIUM 8.3 (L) 05/05/2022       Lab Results   Component Value Date    ALBUMIN 3.3 (L) 05/05/2022    ALT 8 (L) 05/05/2022    AST 55 (H) 05/05/2022    ALKPHOS 92 05/05/2022    BILITOT 5.6 (H) 05/05/2022       Lab Results   Component Value Date    INR 1.6 (H) 05/05/2022    INR 1.6 (H) 05/04/2022    INR 1.5 (H) 05/03/2022       MELD-Na score: 34 at 5/5/2022  7:47 AM  MELD score: 31 at 5/5/2022  7:47 AM  Calculated from:  Serum Creatinine: 5.8 mg/dL (Using max of 4 mg/dL) at 5/5/2022  7:47 AM  Serum Sodium: 128 mmol/L at 5/5/2022  7:47 AM  Total Bilirubin: 5.6 mg/dL at 5/5/2022  7:47 AM  INR(ratio): 1.6 at 5/5/2022  7:47 AM  Age: 61 years    Plan  - continue to follow up transplant nephrology rec's    Approved for inpatient liver/kidney transplant evaluation.  Now with multiple infectious sources that will need to be further evaluated and treated.  Currently on posaconazole for blasto Ag/fungitell (awaiting other fungal studies), On vancomycin for enterococcus ascitic fluid culture.      - f/u nephrology recommendations  -  f/u pulmonary recc's, bronchoscopy studies  - colonoscopy today  - please obtain daily CBC, BMP, LFT, INR  - Plan of care was discussed with primary team    Thank you for involving us in the care of [facility]. Please call with any additional concerns or questions.    Randal Ibraihm MD  Gastroenterology Fellow

## 2022-05-05 NOTE — ASSESSMENT & PLAN NOTE
On sirolimus and prograf at home   Hold at this time   Currently supratherapeutic   Levels noted to be >9 on 5.5  Will need to be monitored carfeully- noted to be started on posaconazole on 5/5

## 2022-05-05 NOTE — SUBJECTIVE & OBJECTIVE
Subjective:   History of Present Illness:  61yoM with PMHx of renal transplant for hypertensive nephropathy (2004) immunosuppressed on tacrolimus/sirolimus,  therapy, LOPEZ cirrhosis, NIDDM, and ILD who was treated at OSH for ILDEFONSO 2/2 decompensated cirrhosis and worsening portal vein hypertension with HRS.   Pt reported that he had been experiencing abdominal distention with associated diarrhea (4-5/day) over several weeks, along with a constant nagging cough.  He has been having weekly paracentesis in the past month.   Despite efforts to manage symptoms, pt in need of  transplant surgery evaluation of the renal/hepatic injury, resulting in tranfer [place] [facility] [place].    He reports no recent NSAID use.         Hospital Course:  No notes on file    Interval History:  - No acute overnight events       Past Medical, Surgical, Family, and Social History:   Unchanged from H&P.    Scheduled Meds:   allopurinoL  100 mg Oral Daily    benzonatate  200 mg Oral TID    ceFEPime (MAXIPIME) IVPB  1 g Intravenous Q24H    furosemide  20 mg Oral Daily    levothyroxine  50 mcg Oral Before breakfast    lipase-protease-amylase 12,000-38,000-60,000 units  2 capsule Oral QID (WM & HS)    metoprolol tartrate  50 mg Oral BID    posaconazole  300 mg Oral Daily    sodium bicarbonate  1,300 mg Oral TID     Continuous Infusions:  PRN Meds:acetaminophen, albuterol-ipratropium, dextromethorphan-guaiFENesin  mg/5 ml, dextrose 10%, fluticasone propionate, glucagon (human recombinant), glucose, glucose, insulin aspart U-100, loperamide, naloxone, sodium chloride 0.9%, Pharmacy to dose Vancomycin consult **AND** vancomycin - pharmacy to dose    Intake/Output - Last 3 Shifts         05/03 0700  05/04 0659 05/04 0700  05/05 0659 05/05 0700  05/06 0659    P.O. 360 2000     I.V. (mL/kg) 0 (0) 100 (1.6)     Other 0 0     IV Piggyback 49.9 48.4     Total Intake(mL/kg) 409.9 (6.8) 2148.4 (35.2)     Urine (mL/kg/hr) 350 (0.2) 100 (0.1)     Emesis/NG output 0 0   "   Other 0 0     Stool 0 0     Blood 0 0     Total Output 350 100     Net +59.9 +2048.4            Urine Occurrence 4 x 5 x     Stool Occurrence 4 x 6 x 1 x    Emesis Occurrence 0 x 0 x              Review of Systems   Constitutional: Negative.    HENT: Negative.     Eyes: Negative.    Respiratory: Negative.     Cardiovascular: Negative.    Endocrine: Negative.    Genitourinary: Negative.    Skin: Negative.     Objective:     Vital Signs (Most Recent):  Temp: 98.5 °F (36.9 °C) (05/05/22 1215)  Pulse: 64 (05/05/22 1215)  Resp: 20 (05/05/22 1215)  BP: 128/81 (05/05/22 1215)  SpO2: 95 % (05/05/22 1215) Vital Signs (24h Range):  Temp:  [97.7 °F (36.5 °C)-99.1 °F (37.3 °C)] 98.5 °F (36.9 °C)  Pulse:  [63-76] 64  Resp:  [15-20] 20  SpO2:  [93 %-99 %] 95 %  BP: (100-129)/(63-82) 128/81     Weight: 61 kg (134 lb 7.7 oz)  Height: 5' 8" (172.7 cm)  Body mass index is 20.45 kg/m².    Physical Exam  HENT:      Head: Normocephalic.      Mouth/Throat:      Mouth: Mucous membranes are moist.   Eyes:      Pupils: Pupils are equal, round, and reactive to light.   Cardiovascular:      Rate and Rhythm: Normal rate.   Pulmonary:      Effort: Pulmonary effort is normal.   Abdominal:      General: Abdomen is flat.   Musculoskeletal:         General: Normal range of motion.   Skin:     General: Skin is warm.   Neurological:      General: No focal deficit present.      Mental Status: He is alert.       Laboratory:  - Reviewed     Diagnostic Results:  - Reviewed   " [Well Nourished] : well nourished [Well Developed] : well developed [Well-Appearing] : well-appearing [Normal Sclera/Conjunctiva] : normal sclera/conjunctiva [PERRL] : pupils equal round and reactive to light [EOMI] : extraocular movements intact [Normal Outer Ear/Nose] : the outer ears and nose were normal in appearance [Normal Oropharynx] : the oropharynx was normal [Normal TMs] : both tympanic membranes were normal [Normal Nasal Mucosa] : the nasal mucosa was normal [No JVD] : no jugular venous distention [No Lymphadenopathy] : no lymphadenopathy [Supple] : supple [Thyroid Normal, No Nodules] : the thyroid was normal and there were no nodules present [No Respiratory Distress] : no respiratory distress  [No Accessory Muscle Use] : no accessory muscle use [Clear to Auscultation] : lungs were clear to auscultation bilaterally [Normal Rate] : normal rate  [Regular Rhythm] : with a regular rhythm [Normal S1, S2] : normal S1 and S2 [No Carotid Bruits] : no carotid bruits [No Abdominal Bruit] : a ~M bruit was not heard ~T in the abdomen [Pedal Pulses Present] : the pedal pulses are present [No Edema] : there was no peripheral edema [No Palpable Aorta] : no palpable aorta [No Extremity Clubbing/Cyanosis] : no extremity clubbing/cyanosis [Soft] : abdomen soft [Non Tender] : non-tender [Non-distended] : non-distended [No Masses] : no abdominal mass palpated [No HSM] : no HSM [Normal Bowel Sounds] : normal bowel sounds [Normal Sphincter Tone] : normal sphincter tone [No Mass] : no mass [Urethral Meatus] : meatus normal [Penis Abnormality] : normal uncircumcised penis [Urinary Bladder Findings] : the bladder was normal on palpation [Scrotum] : the scrotum was normal [Epididymis] : the epididymides were normal [Testes Tenderness] : no tenderness of the testes [Testes Mass (___cm)] : there were no testicular masses [Anus Abnormality] : the anus and perineum were normal [Prostate Tenderness] : the prostate was not tender [No Prostate Nodules] : no prostate nodules [Normal Posterior Cervical Nodes] : no posterior cervical lymphadenopathy [Normal Anterior Cervical Nodes] : no anterior cervical lymphadenopathy [No CVA Tenderness] : no CVA  tenderness [No Spinal Tenderness] : no spinal tenderness [No Joint Swelling] : no joint swelling [Grossly Normal Strength/Tone] : grossly normal strength/tone [No Rash] : no rash [No Skin Lesions] : no skin lesions [Thin Hair] : thin hair [Coordination Grossly Intact] : coordination grossly intact [No Focal Deficits] : no focal deficits [Normal Gait] : normal gait [Deep Tendon Reflexes (DTR)] : deep tendon reflexes were 2+ and symmetric [Speech Grossly Normal] : speech grossly normal [Normal Affect] : the affect was normal [Alert and Oriented x3] : oriented to person, place, and time [Normal Mood] : the mood was normal [Normal Insight/Judgement] : insight and judgment were intact [Stool Occult Blood] : stool negative for occult blood [Acne] : no acne [de-identified] : with murmur  [FreeTextEntry1] : slight enlargement no nodules noted

## 2022-05-05 NOTE — PT/OT/SLP PROGRESS
Occupational Therapy      Patient Name:  Edgar Jackson   MRN:  [id number]    Patient not seen today secondary to RN reported patient going for colonoscopy. Will follow-up for OT session.    5/5/2022

## 2022-05-05 NOTE — PLAN OF CARE
61 year-old male admitted 4/30/22 for liver and kidney workup, ILDEFONSO, cough, diarrhea. Pt has hx of Kidney txp 2004, LOPEZ, HTN, DM2, interstital lung disease, BPH, portal vein HTN  -airborne precautions for pending TB  -AAOx4, lethargic  -22 G Rt FA   -20 G Lt FA  -NC @ 2L  -pt scheduled for EGD/Colonoscopy-cancelled due to pending TB  -Accuchecks AC/HS  -VISI monitoring -130/80's  -multiple episodes of incont urine/BM  -diet to be changed from NPO  -frequent cough-non productive, PRN cough syrup  -spouse at bedside, pt sitting up in chair, wheels locked, non-skid socks in place, call light within reach

## 2022-05-06 PROBLEM — B59 PNEUMOCYSTIS JIROVECI PNEUMONIA: Status: ACTIVE | Noted: 2022-01-01

## 2022-05-06 NOTE — SUBJECTIVE & OBJECTIVE
Interval History: Still complaining of cough, shortness of breath.    Review of Systems   Constitutional:  Negative for chills and fever.   HENT:  Negative for congestion and sore throat.    Eyes:  Negative for visual disturbance.   Respiratory:  Positive for cough and shortness of breath.    Cardiovascular:  Negative for chest pain and leg swelling.   Gastrointestinal:  Positive for diarrhea. Negative for constipation, nausea and vomiting.   Endocrine: Negative for polydipsia.   Genitourinary:  Negative for dysuria.   Musculoskeletal:  Negative for arthralgias, back pain and myalgias.   Skin:  Negative for rash and wound.   Allergic/Immunologic: Positive for immunocompromised state. Negative for food allergies.   Neurological:  Negative for headaches.   Hematological:  Does not bruise/bleed easily.   Psychiatric/Behavioral:  Negative for confusion.    All other systems reviewed and are negative.  Objective:     Vital Signs (Most Recent):  Temp: 99.8 °F (37.7 °C) (05/06/22 0810)  Pulse: 72 (05/06/22 0810)  Resp: 20 (05/06/22 0810)  BP: 127/76 (05/06/22 0810)  SpO2: 95 % (05/06/22 0810) Vital Signs (24h Range):  Temp:  [98.5 °F (36.9 °C)-100 °F (37.8 °C)] 99.8 °F (37.7 °C)  Pulse:  [64-72] 72  Resp:  [16-20] 20  SpO2:  [95 %-97 %] 95 %  BP: (120-141)/(67-81) 127/76     Weight: 61 kg (134 lb 7.7 oz)  Body mass index is 20.45 kg/m².    Estimated Creatinine Clearance: 13.3 mL/min (A) (based on SCr of 5.1 mg/dL (H)).    Physical Exam  Vitals and nursing note reviewed.   Constitutional:       General: He is awake. He is not in acute distress.     Appearance: He is well-developed. He is not toxic-appearing or diaphoretic.   HENT:      Head: Normocephalic and atraumatic.      Right Ear: External ear normal.      Left Ear: External ear normal.      Nose:      Comments: On nasal cannula     Mouth/Throat:      Mouth: Mucous membranes are moist.      Pharynx: No oropharyngeal exudate or posterior oropharyngeal erythema.       Comments: -erythematous right lower lip with small amount of dry blood  Eyes:      General: Scleral icterus present.   Cardiovascular:      Rate and Rhythm: Normal rate and regular rhythm.      Pulses: Normal pulses.      Heart sounds: Normal heart sounds. No murmur heard.  Pulmonary:      Effort: No respiratory distress.      Breath sounds: No wheezing.   Abdominal:      General: Bowel sounds are normal. There is no distension.      Palpations: Abdomen is soft.      Tenderness: There is no abdominal tenderness. There is no guarding.   Musculoskeletal:         General: Normal range of motion.      Cervical back: Full passive range of motion without pain and neck supple.      Right lower leg: No edema.      Left lower leg: No edema.   Lymphadenopathy:      Cervical: No cervical adenopathy.   Skin:     General: Skin is warm.      Coloration: Skin is jaundiced.      Findings: No lesion or rash.   Neurological:      Mental Status: He is alert and oriented to person, place, and time.   Psychiatric:         Attention and Perception: Attention normal.         Mood and Affect: Mood normal.         Behavior: Behavior normal. Behavior is cooperative.         Thought Content: Thought content normal.         Cognition and Memory: Cognition normal.         Judgment: Judgment normal.       Significant Labs: All pertinent labs within the past 24 hours have been reviewed.    Significant Imaging: I have reviewed all pertinent imaging results/findings within the past 24 hours.

## 2022-05-06 NOTE — ASSESSMENT & PLAN NOTE
61 y.o. male with a PMH ESRD 2/2 HTN s/p kidney transplant in 1/2004 (in [place]; on Tacrolimus/Sirolimus), c/b LOPEZ cirrhosis, ILD, T2DM (A1C 6.6 in 2/2022), hypothyroidism, BPH, gout was admitted on 4/30/2022 (transferred from [facility]) for abnormal labs and need for transplant liver/kidney evaluation.  Complaints of malaise, chronic cough, diarrhea, weight loss, and found febrile.  Born in [place], lived in [place], [place] before moving to [place] 9/2021.  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  Blasto urine Ag positive also.    Recommendations:  -Due to kidney function, starting Clindamycin and Primaquine with steroid taper starting at prednisone 40mg BID days 1-5, then 40mg daily days 6-10, then 20mg daily days 11-21  -Continue posaconazole  -Airborne precautions until TB rule out, need 3 total AFB Cx  -Can have RT assist by inducing sputum for AFB Cx collection  -[id number] Viracor in process  -GI consulted, planned for colonoscopy after TB rule out

## 2022-05-06 NOTE — SUBJECTIVE & OBJECTIVE
Subjective:   History of Present Illness:  61yoM with PMHx of renal transplant for hypertensive nephropathy (2004) immunosuppressed on tacrolimus/sirolimus,  therapy, LOPEZ cirrhosis, NIDDM, and ILD who was treated at OSH for ILDEFONSO 2/2 decompensated cirrhosis and worsening portal vein hypertension with HRS.   Pt reported that he had been experiencing abdominal distention with associated diarrhea (4-5/day) over several weeks, along with a constant nagging cough.  He has been having weekly paracentesis in the past month.   Despite efforts to manage symptoms, pt in need of  transplant surgery evaluation of the renal/hepatic injury, [place] [facility] [place]    He reports no recent NSAID use.       Hospital Course:  No notes on file    Interval History:  - No acute overnight events  - Urine Output recorded to 450ml     Past Medical, Surgical, Family, and Social History:   Unchanged from H&P.    Scheduled Meds:   allopurinoL  100 mg Oral Daily    ampicillin IVPB  2 g Intravenous Q12H    benzonatate  200 mg Oral TID    furosemide  20 mg Oral Daily    levothyroxine  50 mcg Oral Before breakfast    lipase-protease-amylase 12,000-38,000-60,000 units  2 capsule Oral QID (WM & HS)    metoprolol tartrate  50 mg Oral BID    posaconazole  300 mg Oral Daily    sodium bicarbonate  1,300 mg Oral TID     Continuous Infusions:  PRN Meds:acetaminophen, albuterol-ipratropium, dextromethorphan-guaiFENesin  mg/5 ml, dextrose 10%, fluticasone propionate, glucagon (human recombinant), glucose, glucose, insulin aspart U-100, loperamide, naloxone, sodium chloride 0.9%    Intake/Output - Last 3 Shifts         05/04 0700  05/05 0659 05/05 0700  05/06 0659 05/06 0700  05/07 0659    P.O. 2000  180    I.V. (mL/kg) 100 (1.6)      Other 0      IV Piggyback 48.4  100    Total Intake(mL/kg) 2148.4 (35.2)  280 (4.6)    Urine (mL/kg/hr) 100 (0.1)  450 (1.1)    Emesis/NG output 0      Other 0      Stool 0      Blood 0      Total Output 100  450  "   Net +2048.4  -170           Urine Occurrence 5 x 2 x     Stool Occurrence 6 x 1 x     Emesis Occurrence 0 x               Review of Systems   All other systems reviewed and are negative.   Objective:     Vital Signs (Most Recent):  Temp: 99.2 °F (37.3 °C) (05/06/22 1145)  Pulse: 65 (05/06/22 1145)  Resp: 20 (05/06/22 1145)  BP: 112/70 (05/06/22 1145)  SpO2: 97 % (05/06/22 1145) Vital Signs (24h Range):  Temp:  [98.5 °F (36.9 °C)-100 °F (37.8 °C)] 99.2 °F (37.3 °C)  Pulse:  [65-72] 65  Resp:  [16-20] 20  SpO2:  [95 %-97 %] 97 %  BP: (112-141)/(67-79) 112/70     Weight: 61 kg (134 lb 7.7 oz)  Height: 5' 8" (172.7 cm)  Body mass index is 20.45 kg/m².    Physical Exam  Constitutional:       Appearance: He is ill-appearing.   HENT:      Mouth/Throat:      Mouth: Mucous membranes are moist.   Eyes:      Pupils: Pupils are equal, round, and reactive to light.   Cardiovascular:      Rate and Rhythm: Normal rate.   Pulmonary:      Effort: Pulmonary effort is normal.   Abdominal:      General: Bowel sounds are normal.   Musculoskeletal:      Cervical back: Normal range of motion.   Skin:     General: Skin is warm.      Capillary Refill: Capillary refill takes less than 2 seconds.   Neurological:      General: No focal deficit present.      Mental Status: He is alert.       Laboratory:  CBC:   Recent Labs   Lab 05/04/22  0722 05/05/22  0747 05/06/22  0608   WBC 7.10 7.85 6.36   RBC 3.32* 3.30* 2.81*   HGB 9.7* 9.6* 8.3*   HCT 27.6* 28.2* 24.2*   PLT 85* 99* 77*   MCV 83 86 86   MCH 29.2 29.1 29.5   MCHC 35.1 34.0 34.3     CMP:   Recent Labs   Lab 05/04/22  0722 05/05/22  0747 05/06/22  0608   * 180* 213*   CALCIUM 8.1* 8.3* 7.9*   ALBUMIN 2.7* 3.3* 2.7*   PROT 5.4* 5.6* 4.8*   * 128* 125*   K 4.6 4.2 3.9   CO2 18* 18* 19*   CL 96 95 95   BUN 67* 67* 71*   CREATININE 5.4* 5.8* 5.1*   ALKPHOS 91 92 83   ALT 8* 8* 8*   AST 58* 55* 60*       Diagnostic Results:  - Reviewed  "

## 2022-05-06 NOTE — PLAN OF CARE
Patient AAO X 4, VSS. 2L NC. Denies pain, N/V. Constant nagging cough, gave PRN. Visi in place. Patient EGD and colonoscopy postpone til TB ruled out. 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 Gave IV cefepime. Wife at bedside.   Bed locked at lowest setting, yellow socks on, call bell in reach. Remains free from falls.

## 2022-05-06 NOTE — PROGRESS NOTES
[facility] - Transplant Stepdown  Infectious Disease  Progress Note    Patient Name: Edgar Jackson  MRN: [id number]  Admission Date: 4/30/2022  Length of Stay: 6 days  Attending Physician: Samantha Yuen MD  Primary Care Provider: Tyler Castillo MD    Isolation Status: Airborne  Assessment/Plan:      Pneumocystis jiroveci pneumonia  61 y.o. male with a PMH ESRD 2/2 HTN s/p kidney transplant in 1/2004 (in [place]; on Tacrolimus/Sirolimus), c/b LOPEZ cirrhosis, ILD, T2DM (A1C 6.6 in 2/2022), hypothyroidism, BPH, gout was admitted on 4/30/2022 (transferred from [facility]) for abnormal labs and need for transplant liver/kidney evaluation.  Complaints of malaise, chronic cough, diarrhea, weight loss, and found febrile.  Born in [place], lived in [place], [place] before moving to [place] 9/2021.  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  Blasto urine Ag positive also.    Recommendations:  -Due to kidney function, starting Clindamycin and Primaquine with steroid taper starting at prednisone 40mg BID days 1-5, then 40mg daily days 6-10, then 20mg daily days 11-21  -Continue posaconazole  -Airborne precautions until TB rule out, need 3 total AFB Cx  -Can have RT assist by inducing sputum for AFB Cx collection  -[id number] Viracor in process    SBP (spontaneous bacterial peritonitis)  Enterococcus faecalis SBP  -Continue ampicillin  -Repeat paracentesis after TB rule out    Chronic diarrhea  -GI consulted, plan for colonoscopy after TB rule out        Thank you for your consult. I will follow-up with patient. Please contact us if you have any additional questions.    Zheng Chamberlain MD  Infectious Disease  [facility] - Transplant Stepdown    Subjective:     Principal Problem:ILDEFONSO (acute kidney injury)    HPI: Mr Edgar Jackson is a 61 y.o. Polish male with a PMH ESRD 2/2 HTN s/p kidney transplant in 1/2004 (in [place]; on Tacrolimus/Sirolimus), c/b LOPEZ cirrhosis, ILD, T2DM (A1C 6.6 in 2/2022), hypothyroidism, BPH, gout; admitted on  4/30/2022 (transferred from [facility]) for abnormal labs and need for transplant liver/kidney evaluation. Patient is a [employer]  and moved from [place], [place] (lived for 20 years) to [place] since 9/2021. He was hospitalized from 4/7 to 4/21/2022 for worsening Cr. He was received 5 days of ceftriaxone for empiric CAP - had paracentesis without evidence of SBP. He was readmitted at [facility] on 4/27 due to worsening Cr, and was transferred here on 4/30 for transplant specialities evaluation. He reports being lousy for hte past 3 months. He has chronic dry cough, decreased appetite, diarrhea (up to four times a day), 20 lb weight loss for the past 3 months. Patient developed fever to 100.7 on 5/1. Cr 4.5-5 (baseline 1.6). He was started on empiric vancomycin and cefepime. Our team was consulted for antibiotic assistance.     Patient immigrated from [place] since 1975. He previously enjoyed outdoor activities. He currently lives with his wife in [place]. No pets.               Interval History: Still complaining of cough, shortness of breath.    Review of Systems   Constitutional:  Negative for chills and fever.   HENT:  Negative for congestion and sore throat.    Eyes:  Negative for visual disturbance.   Respiratory:  Positive for cough and shortness of breath.    Cardiovascular:  Negative for chest pain and leg swelling.   Gastrointestinal:  Positive for diarrhea. Negative for constipation, nausea and vomiting.   Endocrine: Negative for polydipsia.   Genitourinary:  Negative for dysuria.   Musculoskeletal:  Negative for arthralgias, back pain and myalgias.   Skin:  Negative for rash and wound.   Allergic/Immunologic: Positive for immunocompromised state. Negative for food allergies.   Neurological:  Negative for headaches.   Hematological:  Does not bruise/bleed easily.   Psychiatric/Behavioral:  Negative for confusion.    All other systems reviewed and are negative.  Objective:     Vital Signs  (Most Recent):  Temp: 99.8 °F (37.7 °C) (05/06/22 0810)  Pulse: 72 (05/06/22 0810)  Resp: 20 (05/06/22 0810)  BP: 127/76 (05/06/22 0810)  SpO2: 95 % (05/06/22 0810) Vital Signs (24h Range):  Temp:  [98.5 °F (36.9 °C)-100 °F (37.8 °C)] 99.8 °F (37.7 °C)  Pulse:  [64-72] 72  Resp:  [16-20] 20  SpO2:  [95 %-97 %] 95 %  BP: (120-141)/(67-81) 127/76     Weight: 61 kg (134 lb 7.7 oz)  Body mass index is 20.45 kg/m².    Estimated Creatinine Clearance: 13.3 mL/min (A) (based on SCr of 5.1 mg/dL (H)).    Physical Exam  Vitals and nursing note reviewed.   Constitutional:       General: He is awake. He is not in acute distress.     Appearance: He is well-developed. He is not toxic-appearing or diaphoretic.   HENT:      Head: Normocephalic and atraumatic.      Right Ear: External ear normal.      Left Ear: External ear normal.      Nose:      Comments: On nasal cannula     Mouth/Throat:      Mouth: Mucous membranes are moist.      Pharynx: No oropharyngeal exudate or posterior oropharyngeal erythema.      Comments: -erythematous right lower lip with small amount of dry blood  Eyes:      General: Scleral icterus present.   Cardiovascular:      Rate and Rhythm: Normal rate and regular rhythm.      Pulses: Normal pulses.      Heart sounds: Normal heart sounds. No murmur heard.  Pulmonary:      Effort: No respiratory distress.      Breath sounds: No wheezing.   Abdominal:      General: Bowel sounds are normal. There is no distension.      Palpations: Abdomen is soft.      Tenderness: There is no abdominal tenderness. There is no guarding.   Musculoskeletal:         General: Normal range of motion.      Cervical back: Full passive range of motion without pain and neck supple.      Right lower leg: No edema.      Left lower leg: No edema.   Lymphadenopathy:      Cervical: No cervical adenopathy.   Skin:     General: Skin is warm.      Coloration: Skin is jaundiced.      Findings: No lesion or rash.   Neurological:      Mental Status:  He is alert and oriented to person, place, and time.   Psychiatric:         Attention and Perception: Attention normal.         Mood and Affect: Mood normal.         Behavior: Behavior normal. Behavior is cooperative.         Thought Content: Thought content normal.         Cognition and Memory: Cognition normal.         Judgment: Judgment normal.       Significant Labs: All pertinent labs within the past 24 hours have been reviewed.    Significant Imaging: I have reviewed all pertinent imaging results/findings within the past 24 hours.

## 2022-05-06 NOTE — TREATMENT PLAN
[facility]  Gastroenterology  Treatment Plan    Edgar Jackson is a 61 y.o. male admitted to hospital 4/30/2022 (Hospital Day: 7) due to ILDEFONSO (acute kidney injury).     GI initially consulted on 5/3 for diarrhea.    Interval History  EGD/Colonoscopy postponed due to rule out TB.  AFB smear 2/4 in process.   Pneumocystis jiroveii (+).  7 BM per I/O's yesterday.  Had completed bowel prep.  Receiving Imodium BID PRN    Objective  Temp:  [98.5 °F (36.9 °C)-100 °F (37.8 °C)] 99.8 °F (37.7 °C) (05/06 0810)  Pulse:  [64-72] 72 (05/06 0810)  BP: (120-141)/(67-81) 127/76 (05/06 0810)  Resp:  [16-20] 20 (05/06 0810)  SpO2:  [95 %-97 %] 95 % (05/06 0810)      Laboratory    Recent Labs   Lab 05/04/22  0722 05/05/22  0747 05/06/22  0608   HGB 9.7* 9.6* 8.3*         Assessment and Plan  Chronic diarrhea  Edgar Jackson is a 61 M w/ PMH hypertensive nephropathy s/p renal transplant 2004 on chronic immunosuppression, LOPEZ cirrhosis, DM2, ILD transferred from OSH with ILDEFONSO on CKD, volume overload and ascites.  Now also being evaluated for Liver tx/kidney re-tx.     Had colonoscopy in March that was normal. CT abdomen with no GI concerns. GI consulted for colonoscopy to further evaluate chronic diarrhea in setting of new fever.      Plan:   - Will plan for colonoscopy once TB has been ruled out (AFB smears in process, 2 to be collected)   - continue stool count  - continue Imodium PRN  - Please correct electrolyte abnormalities prior to the procedure  - We will continue to follow.    Thank you for involving us in the care of Edgar Jackson. Please call with any additional questions, concerns or changes in the patient's clinical status.    Theodora Martin, KIZZY  [facility]

## 2022-05-06 NOTE — TREATMENT PLAN
Hepatology Treatment Plan    Edgar Jackson is a 61 y.o. male admitted to hospital 4/30/2022 (Hospital Day: 7) due to ILDEFONSO (acute kidney injury).     Interval History  No acute events overnight.  Continues on antibiotics and antifungals    Objective  Temp:  [98.5 °F (36.9 °C)-100 °F (37.8 °C)] 99.2 °F (37.3 °C) (05/06 1145)  Pulse:  [65-72] 65 (05/06 1145)  BP: (112-141)/(67-79) 112/70 (05/06 1145)  Resp:  [16-20] 20 (05/06 1145)  SpO2:  [95 %-97 %] 97 % (05/06 1145)      Laboratory    Lab Results   Component Value Date    WBC 6.36 05/06/2022    HGB 8.3 (L) 05/06/2022    HCT 24.2 (L) 05/06/2022    MCV 86 05/06/2022    PLT 77 (L) 05/06/2022       Lab Results   Component Value Date     (L) 05/06/2022    K 3.9 05/06/2022    CL 95 05/06/2022    CO2 19 (L) 05/06/2022    BUN 71 (H) 05/06/2022    CREATININE 5.1 (H) 05/06/2022    CALCIUM 7.9 (L) 05/06/2022       Lab Results   Component Value Date    ALBUMIN 2.7 (L) 05/06/2022    ALT 8 (L) 05/06/2022    AST 60 (H) 05/06/2022    ALKPHOS 83 05/06/2022    BILITOT 5.5 (H) 05/06/2022       Lab Results   Component Value Date    INR 1.7 (H) 05/06/2022    INR 1.6 (H) 05/05/2022    INR 1.6 (H) 05/04/2022       MELD-Na score: 35 at 5/6/2022  6:08 AM  MELD score: 32 at 5/6/2022  6:08 AM  Calculated from:  Serum Creatinine: 5.1 mg/dL (Using max of 4 mg/dL) at 5/6/2022  6:08 AM  Serum Sodium: 125 mmol/L at 5/6/2022  6:08 AM  Total Bilirubin: 5.5 mg/dL at 5/6/2022  6:08 AM  INR(ratio): 1.7 at 5/6/2022  6:08 AM  Age: 61 years    Plan  - continue to follow up transplant nephrology rec's    Approved for inpatient liver/kidney transplant evaluation.  Now with multiple infectious sources that will need to be further evaluated and treated.  Currently on posaconazole for blasto Ag/fungitell (awaiting other fungal studies), On vancomycin for enterococcus ascitic fluid culture.       We will hold off on further transplant evaluation at this time until treatment duration for various infections are  defined.    - f/u nephrology recommendations  - f/u pulmonary recc's, bronchoscopy studies  - colonoscopy per GI  - please obtain daily CBC, BMP, LFT, INR  - Plan of care was discussed with primary team    Thank you for involving us in the care of Long Le. Please call with any additional concerns or questions.    Randal Ibrahim MD  Gastroenterology Fellow

## 2022-05-06 NOTE — PT/OT/SLP PROGRESS
Physical Therapy      Patient Name:  Edgar Jackson   MRN:  [id number]    Patient not seen today secondary to  (pt. worked with OT in AM and PT unable to return in PM). Will follow-up over weekend or Monday.    Kevin Chaves, PT  5/6/2022

## 2022-05-06 NOTE — PLAN OF CARE
-Pt free from fall or injury so far this shift. Instructed to call if assistance needed, verbalized understanding.   -BG checked AC/HS. Last , 2 units SSI given.   -Sats mid 90's on 2 L NC. Coughing frequently, PRN cough medicine given.   -Diagnostic paracentesis ordered, now cancelled.   -Denies pain or discomfort.   -Up in the chair for several hours, and worked with therapy today.  -Afebrile. Cefepime DC'd, Unasyn started. Primaquine, clindamycin, and prednisone taper ordered. Remains on Airborne precautions until TB is ruled out, unable to collect sputum, pt unable to produce. Daily labs monitored.

## 2022-05-06 NOTE — PROGRESS NOTES
[facility] - Transplant Stepdown  Kidney Transplant  Progress Note      Reason for Follow-up: Reassessment of Kidney Transplant Referral - 5/2/2022 recipient and management of immunosuppression.          Subjective:   History of Present Illness:  61yoM with PMHx of renal transplant for hypertensive nephropathy (2004) immunosuppressed on tacrolimus/sirolimus,  therapy, LOPEZ cirrhosis, NIDDM, and ILD who was treated at OSH for ILDEFONSO 2/2 decompensated cirrhosis and worsening portal vein hypertension with HRS.   Pt reported that he had been experiencing abdominal distention with associated diarrhea (4-5/day) over several weeks, along with a constant nagging cough.  He has been having weekly paracentesis in the past month.   Despite efforts to manage symptoms, pt in need of  transplant surgery evaluation of the renal/hepatic injury, resulting in tranfer to [facility].    He reports no recent NSAID use.       Hospital Course:  No notes on file    Interval History:  - No acute overnight events  - Urine Output recorded to 450ml     Past Medical, Surgical, Family, and Social History:   Unchanged from H&P.    Scheduled Meds:   allopurinoL  100 mg Oral Daily    ampicillin IVPB  2 g Intravenous Q12H    benzonatate  200 mg Oral TID    furosemide  20 mg Oral Daily    levothyroxine  50 mcg Oral Before breakfast    lipase-protease-amylase 12,000-38,000-60,000 units  2 capsule Oral QID (WM & HS)    metoprolol tartrate  50 mg Oral BID    posaconazole  300 mg Oral Daily    sodium bicarbonate  1,300 mg Oral TID     Continuous Infusions:  PRN Meds:acetaminophen, albuterol-ipratropium, dextromethorphan-guaiFENesin  mg/5 ml, dextrose 10%, fluticasone propionate, glucagon (human recombinant), glucose, glucose, insulin aspart U-100, loperamide, naloxone, sodium chloride 0.9%    Intake/Output - Last 3 Shifts         05/04 0700  05/05 0659 05/05 0700  05/06 0659 05/06 0700  05/07 0659    P.O. 2000  180    I.V. (mL/kg) 100 (1.6)      Other  "0      IV Piggyback 48.4  100    Total Intake(mL/kg) 2148.4 (35.2)  280 (4.6)    Urine (mL/kg/hr) 100 (0.1)  450 (1.1)    Emesis/NG output 0      Other 0      Stool 0      Blood 0      Total Output 100  450    Net +2048.4  -170           Urine Occurrence 5 x 2 x     Stool Occurrence 6 x 1 x     Emesis Occurrence 0 x               Review of Systems   All other systems reviewed and are negative.   Objective:     Vital Signs (Most Recent):  Temp: 99.2 °F (37.3 °C) (05/06/22 1145)  Pulse: 65 (05/06/22 1145)  Resp: 20 (05/06/22 1145)  BP: 112/70 (05/06/22 1145)  SpO2: 97 % (05/06/22 1145) Vital Signs (24h Range):  Temp:  [98.5 °F (36.9 °C)-100 °F (37.8 °C)] 99.2 °F (37.3 °C)  Pulse:  [65-72] 65  Resp:  [16-20] 20  SpO2:  [95 %-97 %] 97 %  BP: (112-141)/(67-79) 112/70     Weight: 61 kg (134 lb 7.7 oz)  Height: 5' 8" (172.7 cm)  Body mass index is 20.45 kg/m².    Physical Exam  Constitutional:       Appearance: He is ill-appearing.   HENT:      Mouth/Throat:      Mouth: Mucous membranes are moist.   Eyes:      Pupils: Pupils are equal, round, and reactive to light.   Cardiovascular:      Rate and Rhythm: Normal rate.   Pulmonary:      Effort: Pulmonary effort is normal.   Abdominal:      General: Bowel sounds are normal.   Musculoskeletal:      Cervical back: Normal range of motion.   Skin:     General: Skin is warm.      Capillary Refill: Capillary refill takes less than 2 seconds.   Neurological:      General: No focal deficit present.      Mental Status: He is alert.       Laboratory:  CBC:   Recent Labs   Lab 05/04/22  0722 05/05/22  0747 05/06/22  0608   WBC 7.10 7.85 6.36   RBC 3.32* 3.30* 2.81*   HGB 9.7* 9.6* 8.3*   HCT 27.6* 28.2* 24.2*   PLT 85* 99* 77*   MCV 83 86 86   MCH 29.2 29.1 29.5   MCHC 35.1 34.0 34.3     CMP:   Recent Labs   Lab 05/04/22  0722 05/05/22  0747 05/06/22  0608   * 180* 213*   CALCIUM 8.1* 8.3* 7.9*   ALBUMIN 2.7* 3.3* 2.7*   PROT 5.4* 5.6* 4.8*   * 128* 125*   K 4.6 4.2 3.9 "   CO2 18* 18* 19*   CL 96 95 95   BUN 67* 67* 71*   CREATININE 5.4* 5.8* 5.1*   ALKPHOS 91 92 83   ALT 8* 8* 8*   AST 58* 55* 60*       Diagnostic Results:  - Reviewed    Assessment/Plan:     * ILDEFONSO (acute kidney injury)  His renal functions were normal beginning of Feb.   After that it appears he had ILDEFONSO - possibly related to pre-renal states  Which led to CKD stage III, which has progressed to stage IV over the course of last few weeks     At this time no indication for hemodialysis.   Has consented for HD in case needed.   Monitor I/Os   US renal transplant unremarkable.  Urine Output on 5/6 noted to be 450ml   Cr: 6.0-->5.7-->5.8-->5.3-->5.4-->5.8-->5.0 (on 5/6)    - No acute indication for HD  - Renally dose all medications to GFR=0  - Avoid Nephrotoxic drugs   - Renal Diet  - Lokelma 10mg PRN if K>5  - Strict I/OS  - Daily RFPs    Pneumocystis jiroveci pneumonia  - PJP positive  - ID following   - If patient needs bactrim for clearance of infection- Nephrology will help with HD needs     Metabolic acidosis  - Continue NaHCO3 1300 TID  Hyponatremia  This is likely in setting of renal failure and liver cirrhosis   Would be hard to normalize  Continue fluid restriction upto 1.2 liter/day    Cirrhosis of liver with ascites  Work up per Hepatology   AFP significantly elevated   Liver biopsy -- Chronic mild-moderately active hepatitis with bridging fibrosis and nodule   formation (Grade 2, Stage 3).      Personal history of immunosupression therapy  On sirolimus and prograf at home   Hold at this time   Currently supratherapeutic   Currently on Posaconazole since 5/5    Transplanted kidney  S/p kidney transplant living donor more then 10 years ago at [place].   Reports no episodes of rejections or complications post surgery   ESRD - related to HTN. Currently has no access for HD       Lilliam Topete MD  Kidney Transplant  [facility] - Transplant Stepdown

## 2022-05-06 NOTE — PT/OT/SLP PROGRESS
"Occupational Therapy   Treatment    Name: Edgar Jackson  MRN: [id number]  Admitting Diagnosis:  ILDEFONSO (acute kidney injury)  1 Day Post-Op     Pre-op Diagnosis: Chronic diarrhea [K52.9]    Procedure(s):  EGD (ESOPHAGOGASTRODUODENOSCOPY)  COLONOSCOPY     Recommendations:     Discharge Recommendations: home health OT  Discharge Equipment Recommendations:  none  Barriers to discharge:  None    Assessment:     Edgar Jackson is a 61 y.o. male with a medical diagnosis of ILDEFONSO (acute kidney injury).  He presents with the following performance deficits affecting function:  weakness, impaired endurance, impaired self care skills, impaired functional mobilty, gait instability, impaired balance, decreased upper extremity function, decreased lower extremity function, impaired cardiopulmonary response to activity.     Pt agreeable to transfer into the chair this date, but very limited by fatigue from coughing all night. Pt requires Min A - CGA for transferring into the chair. Educated on performing BUE therex throughout the day to increase endurance and activity tolerance. Pt would benefit from continued skilled acute OT services in order to maximize independence and safety with ADLs and functional mobility to ensure safe return to PLOF in the least restrictive environment. OT recommending HHOT once pt is medically appropriate for d/c.     Rehab Prognosis:  Good; patient would benefit from acute skilled OT services to address these deficits and reach maximum level of function.       Plan:     Patient to be seen 3 x/week to address the above listed problems via self-care/home management, therapeutic activities, therapeutic exercises  · Plan of Care Expires: 05/31/22  · Plan of Care Reviewed with: patient, spouse    Subjective     "I am just so tired"     Pain/Comfort:  · Pain Rating 1: 0/10    Objective:     Communicated with: RN prior to session.  Patient found HOB elevated with telemetry, oxygen, pulse ox (continuous) upon OT entry to " room.    General Precautions: Standard, fall, airborne   Orthopedic Precautions:N/A   Braces: N/A  Respiratory Status: Nasal cannula, flow 2 L/min     Occupational Performance:     Bed Mobility:    · Patient completed Rolling/Turning to Right with contact guard assistance  · Patient completed Scooting/Bridging with contact guard assistance  · Patient completed Supine to Sit with contact guard assistance     Functional Mobility/Transfers:  · Patient completed Sit <> Stand Transfer with minimum assistance  with  rolling walker   · Patient completed Bed <> Chair Transfer using Step Transfer technique with contact guard assistance with rolling walker  · Functional Mobility: Pt engaging in functional mobility to simulate household/community distances approx 4 ft with CGA and utilizing RW in order to maximize functional activity tolerance and standing balance required for engagement in occupations of choice.  · Attempted to perform static/dynamic standing exercises, but Pt only able to tolerate standing ~15 seconds before needing to sit     Activities of Daily Living:  · Pt not wanting to perform this date     Therapeutic Exercise:    Written exercises on the board to perform and education on how to perform:   o Alternating punches   o Shoulder flexion   o Shoulder abduction     AMPA 6 Click ADL: 19    Treatment & Education:   Therapist provided facilitation and instruction of proper body mechanics and fall prevention strategies during tasks listed above.   Instructed patient to sit in bedside chair daily to increase OOB/activity tolerance.   Instructed patient to use call light to have nursing staff assist with needs/transfers.   Discussed OT POC and answered all questions within OT scope of practice.   Whiteboard updated       Patient left up in chair with all lines intact, call button in reach and wife presentEducation:      GOALS:   Multidisciplinary Problems     Occupational Therapy Goals        Problem:  Occupational Therapy    Goal Priority Disciplines Outcome Interventions   Occupational Therapy Goal     OT, PT/OT Ongoing, Progressing    Description: Goals to be met by: 5/19     Patient will increase functional independence with ADLs by performing:    UE Dressing with Modified Evans.  LE Dressing with Modified Evans.  Grooming while standing with Modified Evans.  Toileting from toilet with Modified Evans for hygiene and clothing management.   Supine to sit with Modified Evans.  Step transfer with Modified Evans using AD as needed  Upper extremity exercise program x15 reps per handout, with independence.                     Time Tracking:     OT Date of Treatment: 05/06/22  OT Start Time: 1020  OT Stop Time: 1033  OT Total Time (min): 13 min    Billable Minutes:Therapeutic Exercise 13    OT/SLOANE: OT          5/6/2022

## 2022-05-06 NOTE — ASSESSMENT & PLAN NOTE
- PJP positive  - ID following   - If patient needs bactrim for clearance of infection- Nephrology will help with HD needs

## 2022-05-06 NOTE — ASSESSMENT & PLAN NOTE
S/p kidney transplant living donor more then 10 years ago at [place].   Reports no episodes of rejections or complications post surgery   ESRD - related to HTN. Currently has no access for HD

## 2022-05-07 NOTE — ASSESSMENT & PLAN NOTE
On sirolimus and prograf at home   Hold at this time   Currently supratherapeutic   Will need to be monitored carfeully- noted to be started on posaconazole on 5/5

## 2022-05-07 NOTE — PROGRESS NOTES
[facility] - Transplant Stepdown  Kidney Transplant  Progress Note      Reason for Follow-up: Reassessment of Kidney Transplant Referral - 5/2/2022 recipient and management of immunosuppression.        Subjective:   History of Present Illness:  61yoM with PMHx of renal transplant for hypertensive nephropathy (2004) immunosuppressed on tacrolimus/sirolimus,  therapy, LOPEZ cirrhosis, NIDDM, and ILD who was treated at OSH for ILDEFONSO 2/2 decompensated cirrhosis and worsening portal vein hypertension with HRS.   Pt reported that he had been experiencing abdominal distention with associated diarrhea (4-5/day) over several weeks, along with a constant nagging cough.  He has been having weekly paracentesis in the past month.   Despite efforts to manage symptoms, pt in need of  transplant surgery evaluation of the renal/hepatic injury, resulting in tranfer to [facility].    He reports no recent NSAID use.       Hospital Course:  No notes on file    Interval History:  - No acute overnight events  - Still requiring 2Liters of O2    Past Medical, Surgical, Family, and Social History:   Unchanged from H&P.    Scheduled Meds:   allopurinoL  100 mg Oral Daily    ampicillin IVPB  2 g Intravenous Q12H    benzonatate  200 mg Oral TID    clindamycin (CLEOCIN) IVPB  900 mg Intravenous Q8H    furosemide  20 mg Oral Daily    levothyroxine  50 mcg Oral Before breakfast    lipase-protease-amylase 12,000-38,000-60,000 units  2 capsule Oral QID (WM & HS)    metoprolol tartrate  50 mg Oral BID    posaconazole  300 mg Oral Daily    predniSONE  40 mg Oral BID    primaquine  52.6 mg Oral Daily    sodium bicarbonate  1,300 mg Oral TID    sodium chloride 7%  4 mL Nebulization Q8H     Continuous Infusions:  PRN Meds:acetaminophen, albuterol-ipratropium, dextromethorphan-guaiFENesin  mg/5 ml, dextrose 10%, fluticasone propionate, glucagon (human recombinant), glucose, glucose, insulin aspart U-100, loperamide, naloxone, sodium chloride  "0.9%    Intake/Output - Last 3 Shifts         05/05 0700  05/06 0659 05/06 0700  05/07 0659 05/07 0700  05/08 0659    P.O.  1800 120    I.V. (mL/kg)       Other       IV Piggyback  100 150    Total Intake(mL/kg)  1900 (31.1) 270 (4.4)    Urine (mL/kg/hr)  1100 (0.8) 200 (0.7)    Emesis/NG output       Other       Stool  0 0    Blood       Total Output  1100 200    Net  +800 +70           Urine Occurrence 2 x  1 x    Stool Occurrence 1 x 1 x 1 x             Review of Systems   Constitutional:  Positive for activity change and appetite change.   HENT: Negative.     Eyes: Negative.    Respiratory:  Positive for shortness of breath.    Cardiovascular: Negative.    Gastrointestinal: Negative.    Endocrine: Negative.    Genitourinary: Negative.    Musculoskeletal: Negative.    Skin: Negative.    Allergic/Immunologic: Negative.    Neurological: Negative.    Hematological: Negative.     Objective:     Vital Signs (Most Recent):  Temp: 97.2 °F (36.2 °C) (05/07/22 1104)  Pulse: (!) 52 (05/07/22 1104)  Resp: 20 (05/07/22 1104)  BP: 92/61 (05/07/22 1104)  SpO2: 96 % (05/07/22 1104)   Vital Signs (24h Range):  Temp:  [97.2 °F (36.2 °C)-99.3 °F (37.4 °C)] 97.2 °F (36.2 °C)  Pulse:  [52-70] 52  Resp:  [12-21] 20  SpO2:  [92 %-98 %] 96 %  BP: ()/(61-84) 92/61     Weight: 61 kg (134 lb 7.7 oz)  Height: 5' 8" (172.7 cm)  Body mass index is 20.45 kg/m².    Physical Exam  HENT:      Head: Normocephalic.      Right Ear: Tympanic membrane normal.      Mouth/Throat:      Mouth: Mucous membranes are moist.   Eyes:      Pupils: Pupils are equal, round, and reactive to light.   Cardiovascular:      Rate and Rhythm: Normal rate.   Pulmonary:      Comments: +decreased breath sounds  +NC- 2 Liters O2   Abdominal:      General: Bowel sounds are normal.   Musculoskeletal:         General: Normal range of motion.      Cervical back: Normal range of motion.   Skin:     General: Skin is warm.   Neurological:      General: No focal deficit " present.      Mental Status: He is alert.       Laboratory:  CBC:   Recent Labs   Lab 05/05/22  0747 05/06/22  0608 05/07/22  0610   WBC 7.85 6.36 2.56*   RBC 3.30* 2.81* 2.65*   HGB 9.6* 8.3* 7.7*   HCT 28.2* 24.2* 22.7*   PLT 99* 77* 65*   MCV 86 86 86   MCH 29.1 29.5 29.1   MCHC 34.0 34.3 33.9     BMP:   Recent Labs   Lab 05/05/22  0747 05/06/22  0608 05/07/22  0610   * 213* 459*   * 125* 124*   K 4.2 3.9 3.7   CL 95 95 93*   CO2 18* 19* 20*   BUN 67* 71* 70*   CREATININE 5.8* 5.1* 5.0*   CALCIUM 8.3* 7.9* 7.5*       Diagnostic Results:  - Reviewwed     Assessment/Plan:     * ILDEFONSO (acute kidney injury)  His renal functions were normal beginning of Feb.   After that it appears he had ILDEFONSO - possibly related to pre-renal states  Which led to CKD stage III, which has progressed to stage IV over the course of last few weeks     Has consented for HD in case needed.   Monitor I/Os   US renal transplant unremarkable.  Urine Output on 5/6 noted to be 450ml   Cr: 6.0-->5.7-->5.8-->5.3-->5.4-->5.8-->5.0 (on 5/7)     - No acute indication for HD  - Renally dose all medications to GFR=0  - Avoid Nephrotoxic drugs   - Renal Diet  - Lokelma 10mg PRN if K>5  - Strict I/OS  - Daily RFPs    Pneumocystis jiroveci pneumonia  - PJP positive  - ID following - Currently on Clindamycin and Primaquine   - If patient needs bactrim for clearance of infection- Nephrology will help with HD needs       Chronic diarrhea  Reports since past 2 months   stool studies in progress   Will undergo C-Scope on 5/4 with GI    Metabolic acidosis  - Continue NaHCO3 1300 TID  - Likely secondary to diarrhea     Hyponatremia  This is likely in setting of renal failure and liver cirrhosis   Would be hard to normalize  Continue fluid restriction upto 1.2 liter/day      Cirrhosis of liver with ascites  Work up per Hepatology   AFP significantly elevated   Liver biopsy -- Chronic mild-moderately active hepatitis with bridging fibrosis and nodule    formation (Grade 2, Stage 3).      Personal history of immunosupression therapy  On sirolimus and prograf at home   Hold at this time   Currently supratherapeutic   Will need to be monitored carfeully- noted to be started on posaconazole on 5/5      Transplanted kidney  S/p kidney transplant living donor more then 10 years ago at [place].   Reports no episodes of rejections or complications post surgery   ESRD - related to HTN. Currently has no access for HD             Lilliam Topete MD  Kidney Transplant  [facility] - Transplant Stepdown

## 2022-05-07 NOTE — PROGRESS NOTES
[facility] - Transplant Stepdown  Infectious Disease  Progress Note    Patient Name: Edgar Jackson  MRN: [id number]  Admission Date: 4/30/2022  Length of Stay: 7 days  Attending Physician: Samantha Yuen MD  Primary Care Provider: Tyler Castillo MD    Isolation Status: Airborne  Assessment/Plan:      Pneumocystis jiroveci pneumonia  61 y.o. male with a PMH ESRD 2/2 HTN s/p kidney transplant in 1/2004 (in [place]; on Tacrolimus/Sirolimus), c/b LOPEZ cirrhosis, ILD, T2DM (A1C 6.6 in 2/2022), hypothyroidism, BPH, gout was admitted on 4/30/2022 (transferred from [facility]) for abnormal labs and need for transplant liver/kidney evaluation.  Complaints of malaise, chronic cough, diarrhea, weight loss, and found febrile.  Born in [place], lived in [place], [place] before moving to [place] 9/2021.  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  Blasto urine Ag positive also.    Recommendations:  -5/6 started Clindamycin and Primaquine with steroid taper starting at prednisone 40mg BID days 1-5, then 40mg daily days 6-10, then 20mg daily days 11-21  -Continue posaconazole  -Airborne precautions until TB rule out, need 3 total AFB Cx  -Can have RT assist by inducing sputum for AFB Cx collection  -[id number] Viracor in process  -GI consulted, planned for colonoscopy after TB rule out    SBP (spontaneous bacterial peritonitis)  Enterococcus faecalis SBP  -Continue ampicillin  -Repeat paracentesis after TB rule out        Thank you for your consult. I will follow-up with patient. Please contact us if you have any additional questions.    Zhegn Chamberlain MD  Infectious Disease  [facility] - Transplant Stepdown    Subjective:     Principal Problem:ILDEFONSO (acute kidney injury)    HPI: Mr Edgar Jackson is a 61 y.o. Sinhala male with a PMH ESRD 2/2 HTN s/p kidney transplant in 1/2004 (in [place]; on Tacrolimus/Sirolimus), c/b LOPEZ cirrhosis, ILD, T2DM (A1C 6.6 in 2/2022), hypothyroidism, BPH, gout; admitted on 4/30/2022 (transferred from [facility]) for abnormal labs and need for transplant liver/kidney evaluation. Patient is a [employer]  and moved from [place], [place] (lived for 20 years) to [place] since 9/2021. He was hospitalized from 4/7 to 4/21/2022 for worsening Cr. He was received 5 days of ceftriaxone for empiric CAP - had paracentesis without evidence of SBP. He was readmitted at [facility] on 4/27 due to worsening Cr, and was transferred here on 4/30 for transplant specialities evaluation. He reports being lousy for hte past 3 months. He has chronic dry cough, decreased appetite, diarrhea (up to four times a day), 20 lb weight loss for the past 3 months. Patient developed fever to 100.7 on 5/1. Cr 4.5-5 (baseline 1.6). He was started on empiric vancomycin and cefepime. Our team was consulted for antibiotic assistance.     Patient immigrated from [place] since 1975. He previously enjoyed outdoor activities. He currently lives with his wife in [place]. No pets.               Interval History: Still coughing, SOB, requiring NC.  Diarrhea with decreased frequency.    Review of Systems   Constitutional:  Negative for chills and fever.   HENT:  Negative for congestion and sore throat.    Eyes:  Negative for visual disturbance.   Respiratory:  Positive for cough and shortness of breath.    Cardiovascular:  Negative for chest pain and leg swelling.   Gastrointestinal:  Positive for diarrhea. Negative for constipation, nausea and vomiting.   Endocrine: Negative for polydipsia.   Genitourinary:  Negative for dysuria.   Musculoskeletal:  Negative for arthralgias, back pain and myalgias.   Skin:  Negative for rash and wound.   Allergic/Immunologic: Positive for immunocompromised state. Negative for food allergies.   Neurological:  Negative for headaches.   Hematological:  Does not bruise/bleed easily.   Psychiatric/Behavioral:  Negative for confusion.    All other systems reviewed and are negative.  Objective:     Vital Signs (Most  Recent):  Temp: 97.6 °F (36.4 °C) (05/07/22 0755)  Pulse: (!) 57 (05/07/22 0755)  Resp: 15 (05/07/22 0755)  BP: 107/71 (05/07/22 0755)  SpO2: 96 % (05/07/22 0910)   Vital Signs (24h Range):  Temp:  [97.6 °F (36.4 °C)-99.3 °F (37.4 °C)] 97.6 °F (36.4 °C)  Pulse:  [57-70] 57  Resp:  [12-28] 15  SpO2:  [92 %-98 %] 96 %  BP: (107-132)/(70-84) 107/71     Weight: 61 kg (134 lb 7.7 oz)  Body mass index is 20.45 kg/m².    Estimated Creatinine Clearance: 13.6 mL/min (A) (based on SCr of 5 mg/dL (H)).    Physical Exam  Vitals and nursing note reviewed.   Constitutional:       General: He is awake. He is not in acute distress.     Appearance: He is well-developed. He is not toxic-appearing or diaphoretic.   HENT:      Head: Normocephalic and atraumatic.      Right Ear: External ear normal.      Left Ear: External ear normal.      Nose:      Comments: On nasal cannula     Mouth/Throat:      Mouth: Mucous membranes are moist.      Pharynx: No oropharyngeal exudate or posterior oropharyngeal erythema.   Eyes:      General: Scleral icterus present.   Cardiovascular:      Rate and Rhythm: Normal rate and regular rhythm.      Pulses: Normal pulses.      Heart sounds: Normal heart sounds. No murmur heard.  Pulmonary:      Effort: No respiratory distress.      Breath sounds: No wheezing.   Abdominal:      General: Bowel sounds are normal. There is no distension.      Palpations: Abdomen is soft.      Tenderness: There is no abdominal tenderness. There is no guarding.   Musculoskeletal:         General: Normal range of motion.      Cervical back: Full passive range of motion without pain and neck supple.      Right lower leg: Positive for edema.      Left lower leg: Positive for edema.   Lymphadenopathy:      Cervical: No cervical adenopathy.   Skin:     General: Skin is warm.      Coloration: Skin is jaundiced.      Findings: No lesion or rash.   Neurological:      Mental Status: He is alert and oriented to person, place, and time.    Psychiatric:         Attention and Perception: Attention normal.         Mood and Affect: Mood normal.         Behavior: Behavior normal. Behavior is cooperative.         Thought Content: Thought content normal.         Cognition and Memory: Cognition normal.         Judgment: Judgment normal.       Significant Labs: All pertinent labs within the past 24 hours have been reviewed.    Significant Imaging: I have reviewed all pertinent imaging results/findings within the past 24 hours.

## 2022-05-07 NOTE — ASSESSMENT & PLAN NOTE
61 y.o. male with a PMH ESRD 2/2 HTN s/p kidney transplant in 1/2004 (in [place]; on Tacrolimus/Sirolimus), c/b LOPEZ cirrhosis, ILD, T2DM (A1C 6.6 in 2/2022), hypothyroidism, BPH, gout was admitted on 4/30/2022 (transferred from [facility]) for abnormal labs and need for transplant liver/kidney evaluation.  Complaints of malaise, chronic cough, diarrhea, weight loss, and found febrile.  Born in [place], lived in [place], [place] before moving to [place] 9/2021.  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  Blasto urine Ag positive also.    Recommendations:  -5/6 started Clindamycin and Primaquine with steroid taper starting at prednisone 40mg BID days 1-5, then 40mg daily days 6-10, then 20mg daily days 11-21  -Continue posaconazole  -Airborne precautions until TB rule out, need 3 total AFB Cx  -Can have RT assist by inducing sputum for AFB Cx collection  -[id number] Viracor in process  -GI consulted, planned for colonoscopy after TB rule out

## 2022-05-07 NOTE — CARE UPDATE
"RAPID RESPONSE NURSE AI ALERT       AI alert received.    Chart Reviewed: 05/07/2022, 2:17 PM    MRN: [id number]  Bed: 54956/42050 A    Dx: ILDEFONSO (acute kidney injury)    Edgar Jackson has a past medical history of BPH (benign prostatic hyperplasia), Diabetes, Gout, Hypertension, Hypothyroidism, Interstitial lung disease, Kidney transplant recipient, and Unspecified cirrhosis of liver.    Last VS: BP 92/61   Pulse (!) 51   Temp 97.2 °F (36.2 °C) (Oral)   Resp (!) 22   Ht 5' 8" (1.727 m)   Wt 61 kg (134 lb 7.7 oz)   SpO2 97%   BMI 20.45 kg/m²     24H Vital Sign Range:  Temp:  [97.2 °F (36.2 °C)-99.3 °F (37.4 °C)]   Pulse:  [51-70]   Resp:  [12-22]   BP: ()/(61-84)   SpO2:  [92 %-98 %]     Level of Consciousness (AVPU): alert    Recent Labs     05/05/22  0747 05/06/22  0608 05/07/22  0610   WBC 7.85 6.36 2.56*   HGB 9.6* 8.3* 7.7*   HCT 28.2* 24.2* 22.7*   PLT 99* 77* 65*       Recent Labs     05/05/22  0747 05/06/22  0608 05/07/22  0610 05/07/22  1209   * 125* 124*  --    K 4.2 3.9 3.7 3.7   CL 95 95 93*  --    CO2 18* 19* 20*  --    CREATININE 5.8* 5.1* 5.0*  --    * 213* 459*  --         No results for input(s): PH, PCO2, PO2, HCO3, POCSATURATED, BE in the last 72 hours.     OXYGEN:  Flow (L/min): 2     O2 Device (Oxygen Therapy): nasal cannula    MEWS score: 2    charge RN, Lizett contacted. Concerned with increased BG levels. Endocrine consulted and Insulin drip started. Pt started on Steroids yesterday.  Instructed to call [phone] for further concerns or assistance.    Maria Dolores Perez RN      "

## 2022-05-07 NOTE — ASSESSMENT & PLAN NOTE
S/p kidney transplant living donor more then 10 years ago at [place].   Reports no episodes of rejections or complications post surgery   ESRD - related to HTN. Currently has no access for HD

## 2022-05-07 NOTE — PROGRESS NOTES
Progress Note  Hospital Medicine      Patient Name: Edgar Jackson  MRN: [id number]  Patient Class: IP- Inpatient  Admission Date: 4/30/2022  Attending Physician:Samantha Yuen MD   Primary Care Provider: Tyler Castillo MD            SUBJECTIVE:     Follow-up For:  ILDEFONSO (acute kidney injury)     Interval history/ROS:     5/7: patient with dry cough, feels weak.       HPI: Mr Jackson is a 61yoM with PMHx of renal transplant for hypertensive nephropathy (2004) immunosuppressed on tacrolimus/sirolimus,  therapy, LOPEZ cirrhosis, NIDDM, and ILD who was treated at OSH for ILDEFONSO 2/2 decompensated cirrhosis and worsening portal vein hypertension with HRS. Pt reported that he had been experiencing abdominal distention with associated diarrhea (4-5/day) over several weeks, along with a constant nagging cough. On presenting to OSH, multiple paracentesis (3) were performed to relieve the ascitic fluid. No evidence of SBP on fluid analysis. Creon was added to diet to aide in food digestion in setting of cirrhosis. Loperamide provided symptomatic treatment of diarrhea. Dextromethorphan-guaifenesin syrup added to reduce cough exacerbations. Despite efforts to manage symptoms, pt in need of  transplant surgery evaluation of the renal/hepatic injury, resulting in tranfer to [facility].     On arrival to [facility] (4/30/2022), pt has experienced increased generalized weakness. Loperamide was continued for diarrhea management. Abdominal distention recurred, despite prior paracentesis. Cough has been fairly constant and predominantly dry. Pt now endorses new left lower quadrant abdominal pain, worsened by cough. KTM and Hepatology consults placed for management of HRS.  OBJECTIVE:     Body mass index is 20.45 kg/m².    Vital Signs Range (Last 24H):  Temp:  [97.2 °F (36.2 °C)-98.2 °F (36.8 °C)]   Pulse:  [51-70]   Resp:  [12-22]   BP: ()/(61-84)   SpO2:  [92 %-97 %]     I & O (Last 24H):    Intake/Output Summary (Last 24 hours) at 5/7/2022 1612  Last data  filed at 5/7/2022 1424  Gross per 24 hour   Intake 1230 ml   Output 850 ml   Net 380 ml        Physical Exam:  Constitutional:       General: He is awake. He is not in acute distress.     Appearance: He is well-developed. He is not toxic-appearing or diaphoretic.   HENT:      Head: Normocephalic and atraumatic.      Right Ear: External ear normal.      Left Ear: External ear normal.      Nose:      Comments: On nasal cannula     Mouth/Throat:      Mouth: Mucous membranes are moist.      Pharynx: No oropharyngeal exudate or posterior oropharyngeal erythema.   Eyes:      General: Scleral icterus present.   Cardiovascular:      Rate and Rhythm: Normal rate and regular rhythm.      Pulses: Normal pulses.      Heart sounds: Normal heart sounds. No murmur heard.  Pulmonary:      Effort: No respiratory distress.      Breath sounds: No wheezing.   Abdominal:      General: Bowel sounds are normal. There is no distension.      Palpations: Abdomen is soft.      Tenderness: There is no abdominal tenderness. There is no guarding.   Musculoskeletal:         General: Normal range of motion.      Cervical back: Full passive range of motion without pain and neck supple.      Right lower leg: Positive for edema.      Left lower leg: Positive for edema.   Lymphadenopathy:      Cervical: No cervical adenopathy.   Skin:     General: Skin is warm.      Coloration: Skin is jaundiced.      Findings: No lesion or rash.   Neurological:      Mental Status: He is alert and oriented to person, place, and time.   Psychiatric:         Attention and Perception: Attention normal.         Mood and Affect: Mood normal.         Behavior: Behavior normal. Behavior is cooperative.         Thought Content: Thought content normal.         Cognition and Memory: Cognition normal.         Judgment: Judgment normal.        Recent Labs   Lab 05/05/22  0747 05/06/22  0608 05/07/22  0610 05/07/22  1209   * 125* 124*  --    K 4.2 3.9 3.7 3.7   CL 95 95 93*   --    CO2 18* 19* 20*  --    BUN 67* 71* 70*  --    CREATININE 5.8* 5.1* 5.0*  --    * 213* 459*  --    CALCIUM 8.3* 7.9* 7.5*  --      Recent Labs   Lab 05/05/22  0747 05/06/22  0608 05/07/22  0610   ALKPHOS 92 83 83   ALT 8* 8* 7*   AST 55* 60* 61*   ALBUMIN 3.3* 2.7* 2.5*   PROT 5.6* 4.8* 4.8*   BILITOT 5.6* 5.5* 6.0*   INR 1.6* 1.7* 2.0*       Recent Labs   Lab 05/01/22  1921 05/02/22  0740 05/04/22  0835 05/05/22  0747 05/06/22  0608 05/07/22  0610   WBC  --    < >  --  7.85 6.36 2.56*   HGB  --    < >  --  9.6* 8.3* 7.7*   HCT  --    < >  --  28.2* 24.2* 22.7*   PLT  --    < >  --  99* 77* 65*   GRAN  --    < >  --  80.4*  6.3 71.7  4.6 81.6*  2.1   SEGS 1  --  79  --   --   --    LYMPH  --    < >  --  7.3*  0.6* 10.4*  0.7* 12.5*  0.3*   LYMPHS 51  --  9  --   --   --    MONO  --    < >  --  9.3  0.7 14.6  0.9 5.1  0.1*    < > = values in this interval not displayed.       Recent Labs   Lab 05/07/22  1103 05/07/22  1108 05/07/22  1218 05/07/22  1323 05/07/22  1422 05/07/22  1523   POCTGLUCOSE 473* 462* 491* 465* 468* 453*       No results for input(s): TROPONINI in the last 168 hours.    Diagnostic Results:  Labs: Reviewed    allopurinoL, 100 mg, Oral, Daily  ampicillin IVPB, 2 g, Intravenous, Q12H  benzonatate, 200 mg, Oral, TID  clindamycin (CLEOCIN) IVPB, 900 mg, Intravenous, Q8H  furosemide, 20 mg, Oral, Daily  levothyroxine, 50 mcg, Oral, Before breakfast  lipase-protease-amylase 12,000-38,000-60,000 units, 2 capsule, Oral, QID (WM & HS)  metoprolol tartrate, 50 mg, Oral, BID  posaconazole, 300 mg, Oral, Daily  predniSONE, 40 mg, Oral, BID  primaquine, 52.6 mg, Oral, Daily  sodium bicarbonate, 1,300 mg, Oral, TID  sodium chloride 7%, 4 mL, Nebulization, Q8H       insulin regular 1 units/mL infusion orderable (DKA) 5.8 Units/hr (05/07/22 1525)     As Needed acetaminophen, albuterol-ipratropium, dextromethorphan-guaiFENesin  mg/5 ml, dextrose 10%, fluticasone propionate, glucagon  (human recombinant), glucose, glucose, loperamide, naloxone, sodium chloride 0.9%    ASSESSMENT/PLAN:     Assessment: Edgar Jackson is a 61 y.o. male here with:     Active Hospital Problems    Diagnosis  POA    *ILDEFONSO (acute kidney injury) [N17.9]  Yes    Pneumocystis jiroveci pneumonia [B59]  Unknown    SBP (spontaneous bacterial peritonitis) [K65.2]  Unknown    Severe malnutrition [E43]  Yes     Severe malnutrition  Nutrition Problem  Severe protein-calorie malnutrition     Related to (etiology):   Chronic illness     Signs and Symptoms (as evidenced by):   Wt loss of 28 lb (17.5%) in 3 months  Energy intake <75% of EEN for >3 months  Moderate muscle mass depletion (temples, clavicles, shoulders, thigh, calf)     Interventions/Recommendations (treatment strategy):  Collaboration with other providers     Nutrition Diagnosis Status:   New      Lung infiltrate on CT [R91.8]  Yes    Sepsis [A41.9]  No    Chronic diarrhea [K52.9]  Yes    Type 2 diabetes mellitus without complication, without long-term current use of insulin [E11.9]  Yes    Chronic cough [R05.3]  Yes    Fever in immunocomprised patient [R50.9]  Yes    Hypothyroidism [E03.9]  Yes     Chronic    Thrombocytopenia [D69.6]  Yes    Hyponatremia [E87.1]  Yes    Metabolic acidosis [E87.2]  Yes    Interstitial lung disease [J84.9]  Yes    Cirrhosis of liver with ascites [K74.60, R18.8]  Yes    Transplanted kidney [Z94.0]  Not Applicable    Gout [M10.9]  Yes    Personal history of immunosupression therapy [Z92.25]  Not Applicable      Resolved Hospital Problems   No resolved problems to display.        Plan:     Pneumocystis jiroveci pneumonia  61 y.o. male with a [facility] ESRD 2/2 HTN s/p kidney transplant in 1/2004 (in [place]; on Tacrolimus/Sirolimus), c/b LOPEZ cirrhosis, ILD, T2DM (A1C 6.6 in 2/2022), hypothyroidism, BPH, gout was admitted on 4/30/2022 (transferred from [facility]) for abnormal labs and need for transplant liver/kidney evaluation.   Complaints of malaise, chronic cough, diarrhea, weight loss, and found febrile.  Born in [place], lived in [place], [place] before moving to [place] 9/2021.  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  Blasto urine Ag positive also.     -5/6 started Clindamycin and Primaquine with steroid taper starting at prednisone 40mg BID days 1-5, then 40mg daily days 6-10, then 20mg daily days 11-21  -Continue posaconazole  -Airborne precautions until TB rule out, need 3 total AFB Cx  -Can have RT assist by inducing sputum for AFB Cx collection  -[id number] Viracor in process  -GI consulted, planned for colonoscopy after TB rule out     SBP (spontaneous bacterial peritonitis)  Enterococcus faecalis SBP  -Continue ampicillin  -Repeat paracentesis after TB rule out    Type 2 diabetes mellitus without complication, without long-term current use of insulin  Pt with Hx of DM2  Currently with BG excursions due to steroid taper placed by ID for PJP     Home regimen: Long acing insulin 30 units and metformin 500 mg PO BID     BG goal 140-180  Currently BG uncontrolled due to pt receiving steroids for PJP treatment     Plan  Recommend intensive insulin drip with POCt q1hr until BG at goal with clear liquid bariatric diet   Once BG stable will consider placing him on SQ insulin (15 units long acting and 7 units before meals with moderate correction and will switch diet to renal and diabetic diet)     Will keep following and will adjust insulin regimen accordingly   Due to kidney function, pt is to discontinue metformin from his home regimen and to discard metformin pills from home.        Hypothyroidism  Pt with Hx of hypothyroidism   Taking LT4 50 mcg PO daily  Continue home regimen     ILDEFONSO (acute kidney injury)  His renal functions were normal beginning of Feb.   After that it appears he had ILDEFONSO - possibly related to pre-renal states  Which led to CKD stage III, which has progressed to stage IV over the course of last few weeks      Has consented for HD in case needed.   Monitor I/Os   US renal transplant unremarkable.  Urine Output on 5/6 noted to be 450ml   Cr: 6.0-->5.7-->5.8-->5.3-->5.4-->5.8-->5.0 (on 5/7)     - No acute indication for HD  - Renally dose all medications to GFR=0  - Avoid Nephrotoxic drugs   - Renal Diet  - Lokelma 10mg PRN if K>5  - Strict I/OS  - Daily RFPs     Pneumocystis jiroveci pneumonia  - PJP positive  - ID following - Currently on Clindamycin and Primaquine   - If patient needs bactrim for clearance of infection- Nephrology will help with HD needs         Chronic diarrhea  Reports since past 2 months   stool studies in progress   Will undergo C-Scope on 5/4 with GI     Metabolic acidosis  - Continue NaHCO3 1300 TID  - Likely secondary to diarrhea      Hyponatremia  This is likely in setting of renal failure and liver cirrhosis   Would be hard to normalize  Continue fluid restriction upto 1.2 liter/day     MELD-Na score: 35 at 5/6/2022  6:08 AM  MELD score: 32 at 5/6/2022  6:08 AM  Calculated from:  Serum Creatinine: 5.1 mg/dL (Using max of 4 mg/dL) at 5/6/2022  6:08 AM  Serum Sodium: 125 mmol/L at 5/6/2022  6:08 AM  Total Bilirubin: 5.5 mg/dL at 5/6/2022  6:08 AM  INR(ratio): 1.7 at 5/6/2022  6:08 AM  Age: 61 years     Plan  - continue to follow up transplant nephrology recc's     Approved for inpatient liver/kidney transplant evaluation.  Now with multiple infectious sources that will need to be further evaluated and treated.  Currently on posaconazole for blasto Ag/fungitell (awaiting other fungal studies), On vancomycin for enterococcus ascitic fluid culture.        We will hold off on further transplant evaluation at this time until treatment duration for various infections are defined.     - f/u nephrology recommendations  - f/u pulmonary recc's, bronchoscopy studies  - colonoscopy per GI  - please obtain daily CBC, BMP, LFT, INR  - Plan of care was discussed with primary team       HIGH RISK  CONDITION(S):  Patient has a condition that poses threat to life and bodily function: Acute Renal Failure          Samantha Yuen MD

## 2022-05-07 NOTE — CONSULTS
[facility] - Transplant Stepdown  Endocrinology  Diabetes Consult Note    Consult Requested by: Samantha Yuen MD   Reason for admit: ILDEFONSO (acute kidney injury)    HISTORY OF PRESENT ILLNESS:  Reason for Consult: Management of T2DM, Hyperglycemia     Surgical Procedure and Date: Pt undergoing evaluation for possible liver/kidney transplant    Diabetes diagnosis year: 2 years ago (2022 per pt)    Home Diabetes Medications:  Metformin 500 mg PO BID, long acting insulin 30 units daily    How often checking glucose at home?  Does not check    BG readings on regimen: Does not check his BG levels   Hypoglycemia on the regimen?  No (reports no symptoms of hypoglycemia, but stated he does not check)  Missed doses on regimen?  No    Diabetes Complications include:     Hyperglycemia    Complicating diabetes co morbidities:   LOPEZ and CKD      HPI:   Patient is a 61 y.o. male with a diagnosis of renal transplant for hypertensive nephropathy (2004) on immunosuppressive therapy, LOPEZ cirrhosis, DM2, gout, BPH and ILD who was treated at [facility] for ILDEFONSO 2/2 decompensated cirrhosis and worsening portal vein hypertension with HRS. He has been having weekly paracentesis in the past month.     Pt reported that he had been experiencing abdominal distention with associated diarrhea (4-5/day) over several weeks, along with a chronic cough, weight loss, and found to be febrile.     [place] [facility] [place]kidney [place]     CT with opacifications, reverse halo sign, found to have PJP.  Blasto urine Ag positive also.    Pt was started on PJP treatment with steroid taper leading to BG excursion. Endocrine consulted for BG management.   -Steroid taper starting 5/6/2022: Prednisone 40mg BID days 1-5, then 40mg daily days 6-10, then 20mg daily days 11-21         Interval HPI:   Overnight events:  Eating:   Clear liquid bariatric diet    Nausea: No  Hypoglycemia and intervention: No  Fever:  No  TPN and/or TF: No  If yes, type of TF/TPN and rate: NA    PMH, PSH, FH, SH updated and reviewed     ROS:    Review of Systems   Constitutional:  Positive for activity change and unexpected weight change.   HENT:  Negative for voice change.    Eyes:  Negative for visual disturbance.   Respiratory:  Negative for shortness of breath.    Cardiovascular:  Negative for chest pain.   Gastrointestinal:  Positive for diarrhea. Negative for abdominal pain, constipation, nausea and vomiting.   Genitourinary:  Negative for urgency.   Musculoskeletal:  Negative for arthralgias.   Neurological:  Negative for headaches.   Psychiatric/Behavioral:  Negative for confusion and sleep disturbance.      Current Medications and/or Treatments Impacting Glycemic Control  Immunotherapy:    Immunosuppressants       None          Steroids:   Hormones (From admission, onward)                Start     Stop Route Frequency Ordered    05/06/22 1730  predniSONE tablet 40 mg         05/11 2059 Oral 2 times daily 05/06/22 1719          Pressors:    Autonomic Drugs (From admission, onward)                None          Hyperglycemia/Diabetes Medications:   Antihyperglycemics (From admission, onward)                Start     Stop Route Frequency Ordered    05/07/22 1300  insulin regular in 0.9 % NaCl 100 unit/100 mL (1 unit/mL) infusion        Question:  Enter initial dose from Infusion Protocol Chart (Units/hr):  Answer:  2    -- IV Continuous 05/07/22 1154             PHYSICAL EXAMINATION:  Vitals:    05/07/22 1104   BP: 92/61   Pulse: (!) 52   Resp: 20   Temp: 97.2 °F (36.2 °C)     Body mass index is 20.45 kg/m².    Physical Exam  Vitals and nursing note reviewed.   Constitutional:       Appearance: He is ill-appearing.   HENT:      Head: Normocephalic and atraumatic.   Eyes:      General: Scleral icterus present.   Neck:      Trachea: No tracheal deviation.      Comments: No thyroid enlargement noted on exam  Cardiovascular:      Rate and  Rhythm: Normal rate.      Heart sounds: Normal heart sounds. No murmur heard.  Pulmonary:      Effort: Pulmonary effort is normal.      Breath sounds: Normal breath sounds.   Abdominal:      Palpations: Abdomen is soft. There is no mass.      Tenderness: There is no abdominal tenderness.      Comments: Insulin injection sites examined on abdomen, clean, no redness, no nodule/masses     Musculoskeletal:         General: Normal range of motion.      Cervical back: Normal range of motion and neck supple. No rigidity.   Lymphadenopathy:      Cervical: No cervical adenopathy.   Skin:     Coloration: Skin is jaundiced.   Neurological:      Mental Status: He is alert and oriented to person, place, and time.   Psychiatric:         Judgment: Judgment normal.         Labs Reviewed and Include   Recent Labs   Lab 05/07/22  0610   *   CALCIUM 7.5*   ALBUMIN 2.5*   PROT 4.8*   *   K 3.7   CO2 20*   CL 93*   BUN 70*   CREATININE 5.0*   ALKPHOS 83   ALT 7*   AST 61*   BILITOT 6.0*     Lab Results   Component Value Date    WBC 2.56 (L) 05/07/2022    HGB 7.7 (L) 05/07/2022    HCT 22.7 (L) 05/07/2022    MCV 86 05/07/2022    PLT 65 (L) 05/07/2022     Recent Labs   Lab 04/30/22  1947   TSH 2.379     Lab Results   Component Value Date    HGBA1C 6.6 (H) 02/09/2022       Nutritional status:   Body mass index is 20.45 kg/m².  Lab Results   Component Value Date    ALBUMIN 2.5 (L) 05/07/2022    ALBUMIN 2.7 (L) 05/06/2022    ALBUMIN 3.3 (L) 05/05/2022     No results found for: PREALBUMIN    Estimated Creatinine Clearance: 13.6 mL/min (A) (based on SCr of 5 mg/dL (H)).    Accu-Checks  Recent Labs     05/05/22  1837 05/05/22  2058 05/06/22  0813 05/06/22  1139 05/06/22  1724 05/06/22  2128 05/07/22  0754 05/07/22  1103 05/07/22  1108 05/07/22  1218   POCTGLUCOSE 182* 208* 226* 267* 246* 328* 478* 473* 462* 491*        ASSESSMENT and PLAN    * ILDEFONSO (acute kidney injury)  Caution with aggressive insulin adjustments to reduce risk of  hypoglycemia  Titrate insulin slowly to avoid hypoglycemia as the risk of hypoglycemia increases with decreased creatinine clearance.        Pneumocystis jiroveci pneumonia  On treatment   Followed by ID  Placed on steroid taper      Type 2 diabetes mellitus without complication, without long-term current use of insulin  Pt with Hx of DM2  Currently with BG excursions due to steroid taper placed by ID for PJP    Home regimen: Long acing insulin 30 units and metformin 500 mg PO BID    BG goal 140-180  Currently BG uncontrolled due to pt receiving steroids for PJP treatment    Plan  Recommend intensive insulin drip with POCt q1hr until BG at goal with clear liquid bariatric diet   Once BG stable will consider placing him on SQ insulin (15 units long acting and 7 units before meals with moderate correction and will switch diet to renal and diabetic diet)    Will keep following and will adjust insulin regimen accordingly   Due to kidney function, pt is to discontinue metformin from his home regimen and to discard metformin pills from home.      Hypothyroidism  Pt with Hx of hypothyroidism   Taking LT4 50 mcg PO daily  Continue home regimen               Art Harrison MD  Endocrinology  [facility] - Transplant Stepdown

## 2022-05-07 NOTE — SUBJECTIVE & OBJECTIVE
Interval History:   On NC  Coughing has decreased  Unable to sleep last night because of frequent blood sugar checks  One BM overnight, loose  No appetite, poor PO intake  Some abdominal pain, but improving.    Review of Systems   Constitutional:  Positive for activity change, appetite change and fatigue. Negative for chills, diaphoresis and fever.   HENT:  Negative for rhinorrhea and sore throat.    Respiratory:  Positive for cough. Negative for shortness of breath.    Cardiovascular:  Negative for chest pain and leg swelling.   Gastrointestinal:  Positive for abdominal pain and diarrhea. Negative for nausea and vomiting.   Genitourinary:  Negative for dysuria and hematuria.   Musculoskeletal:  Negative for arthralgias and myalgias.   Skin:  Negative for rash.   Neurological:  Negative for headaches.   Objective:     Vital Signs (Most Recent):  Temp: 97.2 °F (36.2 °C) (05/07/22 1104)  Pulse: (!) 52 (05/07/22 1104)  Resp: 20 (05/07/22 1104)  BP: 92/61 (05/07/22 1104)  SpO2: 96 % (05/07/22 1104)   Vital Signs (24h Range):  Temp:  [97.2 °F (36.2 °C)-99.3 °F (37.4 °C)] 97.2 °F (36.2 °C)  Pulse:  [52-70] 52  Resp:  [12-21] 20  SpO2:  [92 %-98 %] 96 %  BP: ()/(61-84) 92/61     Weight: 61 kg (134 lb 7.7 oz)  Body mass index is 20.45 kg/m².    Estimated Creatinine Clearance: 13.6 mL/min (A) (based on SCr of 5 mg/dL (H)).    Physical Exam  Constitutional:       General: He is not in acute distress.     Appearance: He is ill-appearing. He is not toxic-appearing or diaphoretic.   HENT:      Head: Normocephalic and atraumatic.      Nose:      Comments: On nasal cannula  Eyes:      General: Scleral icterus present.   Cardiovascular:      Rate and Rhythm: Normal rate and regular rhythm.   Pulmonary:      Effort: Pulmonary effort is normal. No respiratory distress.      Breath sounds: No wheezing, rhonchi or rales.   Abdominal:      General: Abdomen is flat. There is no distension.      Tenderness: There is no abdominal  tenderness. There is no guarding.   Musculoskeletal:      Right lower leg: Edema present.      Left lower leg: Edema present.   Skin:     Coloration: Skin is jaundiced.   Neurological:      Mental Status: He is alert.       Significant Labs: All pertinent labs within the past 24 hours have been reviewed.    Significant Imaging: I have reviewed all pertinent imaging results/findings within the past 24 hours.

## 2022-05-07 NOTE — SUBJECTIVE & OBJECTIVE
Interval HPI:   Overnight events:  Eating:   Clear liquid bariatric diet    Nausea: No  Hypoglycemia and intervention: No  Fever: No  TPN and/or TF: No  If yes, type of TF/TPN and rate: NA    PMH, PSH, FH, SH updated and reviewed     ROS:    Review of Systems   Constitutional:  Positive for activity change and unexpected weight change.   HENT:  Negative for voice change.    Eyes:  Negative for visual disturbance.   Respiratory:  Negative for shortness of breath.    Cardiovascular:  Negative for chest pain.   Gastrointestinal:  Positive for diarrhea. Negative for abdominal pain, constipation, nausea and vomiting.   Genitourinary:  Negative for urgency.   Musculoskeletal:  Negative for arthralgias.   Neurological:  Negative for headaches.   Psychiatric/Behavioral:  Negative for confusion and sleep disturbance.      Current Medications and/or Treatments Impacting Glycemic Control  Immunotherapy:    Immunosuppressants       None          Steroids:   Hormones (From admission, onward)                Start     Stop Route Frequency Ordered    05/06/22 1730  predniSONE tablet 40 mg         05/11 2059 Oral 2 times daily 05/06/22 1719          Pressors:    Autonomic Drugs (From admission, onward)                None          Hyperglycemia/Diabetes Medications:   Antihyperglycemics (From admission, onward)                Start     Stop Route Frequency Ordered    05/07/22 1300  insulin regular in 0.9 % NaCl 100 unit/100 mL (1 unit/mL) infusion        Question:  Enter initial dose from Infusion Protocol Chart (Units/hr):  Answer:  2    -- IV Continuous 05/07/22 1154             PHYSICAL EXAMINATION:  Vitals:    05/07/22 1104   BP: 92/61   Pulse: (!) 52   Resp: 20   Temp: 97.2 °F (36.2 °C)     Body mass index is 20.45 kg/m².    Physical Exam  Vitals and nursing note reviewed.   Constitutional:       Appearance: He is ill-appearing.   HENT:      Head: Normocephalic and atraumatic.   Eyes:      General: Scleral icterus present.    Neck:      Trachea: No tracheal deviation.      Comments: No thyroid enlargement noted on exam  Cardiovascular:      Rate and Rhythm: Normal rate.      Heart sounds: Normal heart sounds. No murmur heard.  Pulmonary:      Effort: Pulmonary effort is normal.      Breath sounds: Normal breath sounds.   Abdominal:      Palpations: Abdomen is soft. There is no mass.      Tenderness: There is no abdominal tenderness.      Comments: Insulin injection sites examined on abdomen, clean, no redness, no nodule/masses     Musculoskeletal:         General: Normal range of motion.      Cervical back: Normal range of motion and neck supple. No rigidity.   Lymphadenopathy:      Cervical: No cervical adenopathy.   Skin:     Coloration: Skin is jaundiced.   Neurological:      Mental Status: He is alert and oriented to person, place, and time.   Psychiatric:         Judgment: Judgment normal.

## 2022-05-07 NOTE — PLAN OF CARE
-Pt free from fall or injury so far this shift. Instructed to call if assistance needed, verbalized understanding.   -Sats mid 90's on 2 L NC. SMALLS noted. Cough slightly improved, tessalon pearls given as scheduled. PRN cough medicine available.   -BG ^ >400. Q 1 hr insulin gtt started and diet changed to sugar free clears. Last , gtt currently at 2.9 units/hr.   -Denies pain or discomfort.   -Ambulated to the bathroom. Encouraged to sit in chair, but wants to rest at this time.   -Afebrile. Clindamycin and Unasyn continued. Airborne precautions maintained. Plan to induce sputum 2 separate times, to rule out TB. 1/2 sputum samples sent, awaiting results. Daily labs monitored.

## 2022-05-07 NOTE — ASSESSMENT & PLAN NOTE
Caution with aggressive insulin adjustments to reduce risk of hypoglycemia  Titrate insulin slowly to avoid hypoglycemia as the risk of hypoglycemia increases with decreased creatinine clearance.

## 2022-05-07 NOTE — SUBJECTIVE & OBJECTIVE
Interval History: Still coughing, SOB, requiring NC.  Diarrhea with decreased frequency.    Review of Systems   Constitutional:  Negative for chills and fever.   HENT:  Negative for congestion and sore throat.    Eyes:  Negative for visual disturbance.   Respiratory:  Positive for cough and shortness of breath.    Cardiovascular:  Negative for chest pain and leg swelling.   Gastrointestinal:  Positive for diarrhea. Negative for constipation, nausea and vomiting.   Endocrine: Negative for polydipsia.   Genitourinary:  Negative for dysuria.   Musculoskeletal:  Negative for arthralgias, back pain and myalgias.   Skin:  Negative for rash and wound.   Allergic/Immunologic: Positive for immunocompromised state. Negative for food allergies.   Neurological:  Negative for headaches.   Hematological:  Does not bruise/bleed easily.   Psychiatric/Behavioral:  Negative for confusion.    All other systems reviewed and are negative.  Objective:     Vital Signs (Most Recent):  Temp: 97.6 °F (36.4 °C) (05/07/22 0755)  Pulse: (!) 57 (05/07/22 0755)  Resp: 15 (05/07/22 0755)  BP: 107/71 (05/07/22 0755)  SpO2: 96 % (05/07/22 0910)   Vital Signs (24h Range):  Temp:  [97.6 °F (36.4 °C)-99.3 °F (37.4 °C)] 97.6 °F (36.4 °C)  Pulse:  [57-70] 57  Resp:  [12-28] 15  SpO2:  [92 %-98 %] 96 %  BP: (107-132)/(70-84) 107/71     Weight: 61 kg (134 lb 7.7 oz)  Body mass index is 20.45 kg/m².    Estimated Creatinine Clearance: 13.6 mL/min (A) (based on SCr of 5 mg/dL (H)).    Physical Exam  Vitals and nursing note reviewed.   Constitutional:       General: He is awake. He is not in acute distress.     Appearance: He is well-developed. He is not toxic-appearing or diaphoretic.   HENT:      Head: Normocephalic and atraumatic.      Right Ear: External ear normal.      Left Ear: External ear normal.      Nose:      Comments: On nasal cannula     Mouth/Throat:      Mouth: Mucous membranes are moist.      Pharynx: No oropharyngeal exudate or posterior  oropharyngeal erythema.   Eyes:      General: Scleral icterus present.   Cardiovascular:      Rate and Rhythm: Normal rate and regular rhythm.      Pulses: Normal pulses.      Heart sounds: Normal heart sounds. No murmur heard.  Pulmonary:      Effort: No respiratory distress.      Breath sounds: No wheezing.   Abdominal:      General: Bowel sounds are normal. There is no distension.      Palpations: Abdomen is soft.      Tenderness: There is no abdominal tenderness. There is no guarding.   Musculoskeletal:         General: Normal range of motion.      Cervical back: Full passive range of motion without pain and neck supple.      Right lower leg: No edema.      Left lower leg: No edema.   Lymphadenopathy:      Cervical: No cervical adenopathy.   Skin:     General: Skin is warm.      Coloration: Skin is jaundiced.      Findings: No lesion or rash.   Neurological:      Mental Status: He is alert and oriented to person, place, and time.   Psychiatric:         Attention and Perception: Attention normal.         Mood and Affect: Mood normal.         Behavior: Behavior normal. Behavior is cooperative.         Thought Content: Thought content normal.         Cognition and Memory: Cognition normal.         Judgment: Judgment normal.       Significant Labs: All pertinent labs within the past 24 hours have been reviewed.    Significant Imaging: I have reviewed all pertinent imaging results/findings within the past 24 hours.

## 2022-05-07 NOTE — PROGRESS NOTES
, 462 on 2 nd finger stick. Informed Dr. Harrison with endocrine , orders to follow, including Q 1 hr insulin gtt and sugar free CL diet. Will continue to monitor.

## 2022-05-07 NOTE — SUBJECTIVE & OBJECTIVE
Subjective:   History of Present Illness:  61yoM with PMHx of renal transplant for hypertensive nephropathy (2004) immunosuppressed on tacrolimus/sirolimus,  therapy, LOPEZ cirrhosis, NIDDM, and ILD who was treated at OSH for ILDEFONSO 2/2 decompensated cirrhosis and worsening portal vein hypertension with HRS.   Pt reported that he had been experiencing abdominal distention with associated diarrhea (4-5/day) over several weeks, along with a constant nagging cough.  He has been having weekly paracentesis in the past month.   Despite efforts to manage symptoms, pt in need of  transplant surgery evaluation of the renal/hepatic injury, [place] [facility] [place]    He reports no recent NSAID use.       Hospital Course:  No notes on file    Interval History:  - No acute overnight events  - Still requiring 2Liters of O2    Past Medical, Surgical, Family, and Social History:   Unchanged from H&P.    Scheduled Meds:   allopurinoL  100 mg Oral Daily    ampicillin IVPB  2 g Intravenous Q12H    benzonatate  200 mg Oral TID    clindamycin (CLEOCIN) IVPB  900 mg Intravenous Q8H    furosemide  20 mg Oral Daily    levothyroxine  50 mcg Oral Before breakfast    lipase-protease-amylase 12,000-38,000-60,000 units  2 capsule Oral QID (WM & HS)    metoprolol tartrate  50 mg Oral BID    posaconazole  300 mg Oral Daily    predniSONE  40 mg Oral BID    primaquine  52.6 mg Oral Daily    sodium bicarbonate  1,300 mg Oral TID    sodium chloride 7%  4 mL Nebulization Q8H     Continuous Infusions:  PRN Meds:acetaminophen, albuterol-ipratropium, dextromethorphan-guaiFENesin  mg/5 ml, dextrose 10%, fluticasone propionate, glucagon (human recombinant), glucose, glucose, insulin aspart U-100, loperamide, naloxone, sodium chloride 0.9%    Intake/Output - Last 3 Shifts         05/05 0700  05/06 0659 05/06 0700  05/07 0659 05/07 0700  05/08 0659    P.O.  1800 120    I.V. (mL/kg)       Other       IV Piggyback  100 150    Total Intake(mL/kg)  1900  "(31.1) 270 (4.4)    Urine (mL/kg/hr)  1100 (0.8) 200 (0.7)    Emesis/NG output       Other       Stool  0 0    Blood       Total Output  1100 200    Net  +800 +70           Urine Occurrence 2 x  1 x    Stool Occurrence 1 x 1 x 1 x             Review of Systems   Constitutional:  Positive for activity change and appetite change.   HENT: Negative.     Eyes: Negative.    Respiratory:  Positive for shortness of breath.    Cardiovascular: Negative.    Gastrointestinal: Negative.    Endocrine: Negative.    Genitourinary: Negative.    Musculoskeletal: Negative.    Skin: Negative.    Allergic/Immunologic: Negative.    Neurological: Negative.    Hematological: Negative.     Objective:     Vital Signs (Most Recent):  Temp: 97.2 °F (36.2 °C) (05/07/22 1104)  Pulse: (!) 52 (05/07/22 1104)  Resp: 20 (05/07/22 1104)  BP: 92/61 (05/07/22 1104)  SpO2: 96 % (05/07/22 1104)   Vital Signs (24h Range):  Temp:  [97.2 °F (36.2 °C)-99.3 °F (37.4 °C)] 97.2 °F (36.2 °C)  Pulse:  [52-70] 52  Resp:  [12-21] 20  SpO2:  [92 %-98 %] 96 %  BP: ()/(61-84) 92/61     Weight: 61 kg (134 lb 7.7 oz)  Height: 5' 8" (172.7 cm)  Body mass index is 20.45 kg/m².    Physical Exam  HENT:      Head: Normocephalic.      Right Ear: Tympanic membrane normal.      Mouth/Throat:      Mouth: Mucous membranes are moist.   Eyes:      Pupils: Pupils are equal, round, and reactive to light.   Cardiovascular:      Rate and Rhythm: Normal rate.   Pulmonary:      Comments: +decreased breath sounds  +NC- 2 Liters O2   Abdominal:      General: Bowel sounds are normal.   Musculoskeletal:         General: Normal range of motion.      Cervical back: Normal range of motion.   Skin:     General: Skin is warm.   Neurological:      General: No focal deficit present.      Mental Status: He is alert.       Laboratory:  CBC:   Recent Labs   Lab 05/05/22  0747 05/06/22  0608 05/07/22  0610   WBC 7.85 6.36 2.56*   RBC 3.30* 2.81* 2.65*   HGB 9.6* 8.3* 7.7*   HCT 28.2* 24.2* 22.7* "   PLT 99* 77* 65*   MCV 86 86 86   MCH 29.1 29.5 29.1   MCHC 34.0 34.3 33.9     BMP:   Recent Labs   Lab 05/05/22  0747 05/06/22  0608 05/07/22  0610   * 213* 459*   * 125* 124*   K 4.2 3.9 3.7   CL 95 95 93*   CO2 18* 19* 20*   BUN 67* 71* 70*   CREATININE 5.8* 5.1* 5.0*   CALCIUM 8.3* 7.9* 7.5*       Diagnostic Results:  - Reviewwed

## 2022-05-07 NOTE — HPI
Reason for Consult: Management of T2DM, Hyperglycemia     Surgical Procedure and Date: Pt undergoing evaluation for possible liver/kidney transplant    Diabetes diagnosis year: 2 years ago (2022 per pt)    Home Diabetes Medications:  Metformin 500 mg PO BID, long acting insulin 30 units daily    How often checking glucose at home?  Does not check    BG readings on regimen: Does not check his BG levels   Hypoglycemia on the regimen?  No (reports no symptoms of hypoglycemia, but stated he does not check)  Missed doses on regimen?  No    Diabetes Complications include:     Hyperglycemia    Complicating diabetes co morbidities:   {OHS IP COMPLICATING DIABETES COMORBIDITIES:[id number]}      HPI:   Patient is a 61 y.o. male with a diagnosis of renal transplant for hypertensive nephropathy (2004) on immunosuppressive therapy, LOPEZ cirrhosis, DM2, gout, BPH and ILD who was treated at [facility] ([facility]) for ILDEFONSO 2/2 decompensated cirrhosis and worsening portal vein hypertension with HRS. He has been having weekly paracentesis in the past month.     Pt reported that he had been experiencing abdominal distention with associated diarrhea (4-5/day) over several weeks, along with a chronic cough, weight loss, and found to be febrile.     [place] [facility] [place]     CT with opacifications, reverse halo sign, found to have PJP.  Blasto urine Ag positive also.    Pt was started on PJP treatment with steroid taper leading to BG excursion. Endocrine consulted for BG management.   -Steroid taper starting 5/6/2022: Prednisone 40mg BID days 1-5, then 40mg daily days 6-10, then 20mg daily days 11-21

## 2022-05-07 NOTE — ASSESSMENT & PLAN NOTE
Pt with Hx of DM2  Currently with BG excursions due to steroid taper placed by ID for PJP    Home regimen: Long acing insulin 30 units and metformin 500 mg PO BID    BG goal 140-180  Currently BG uncontrolled due to pt receiving steroids for PJP treatment    Plan  Recommend intensive insulin drip with POCt q1hr until BG at goal with clear liquid bariatric diet   Once BG stable will consider placing him on SQ insulin (15 units long acting and 7 units before meals with moderate correction and will switch diet to renal and diabetic diet)    Will keep following and will adjust insulin regimen accordingly   Due to kidney function, pt is to discontinue metformin from his home regimen and to discard metformin pills from home.

## 2022-05-07 NOTE — PROGRESS NOTES
. BG >250 for 3 consecutive hours. Insulin gtt rate doubled, to 5.8 units, per algorithm. Rate verified by this RN, and TIFFANY Silva.

## 2022-05-07 NOTE — PLAN OF CARE
-AAO4, VSS/afebrile, on 2L NC   -admitted for ILDEFONSO; kidney/liver w/u  -tessalon vega scheduled and dextromethorphan-guaifenesin PRN for nagging cough  -airborne precautions maintained until TB ruled out; need 2 more sputum samples; pt unable to produce sputum  -CT with opacifications, reverse halo sign > JPJ  -ampicillin q12, clindamycin q8  -labs monitored; H/H 8.3/24.2 down from last, LFTs stable, Cr 5.1 down from last  -wife at bedside, fall precautions maintained, bertram cormier in reach

## 2022-05-08 NOTE — PLAN OF CARE
-Pt free from fall or injury so far this shift. Instructed to call if assistance needed, verbalized understanding.   -HR 50's. Metoprolol held this AM.   -Sats mid 90's on 2 L NC. SLIM noted. Cough slightly improved, tessalon pearls given as scheduled. PRN cough medicine available.   -Insulin gtt DC'd. Levamir and MTI added. BG checked AC/HS/0200. Last . Appetite poor. MTI held for breakfast and lunch.   -Denies pain or discomfort.   -Encouraged to sit in the chair, but refused. Also refused therapy.     -Afebrile. Clindamycin and Unasyn continued. Airborne precautions maintained. Awaiting result from AFB sputum sample. Daily labs monitored.

## 2022-05-08 NOTE — SUBJECTIVE & OBJECTIVE
"Interval HPI:   Overnight events:  BG controlled  Insulin drip stopped overnight     Eating:   Will start diabetic/renal diet     Nausea: No  Hypoglycemia and intervention: No  Fever: No  TPN and/or TF: No    /73   Pulse (!) 53   Temp 97.4 °F (36.3 °C) (Oral)   Resp 20   Ht 5' 8" (1.727 m)   Wt 61 kg (134 lb 7.7 oz)   SpO2 95%   BMI 20.45 kg/m²     Labs Reviewed and Include    Recent Labs   Lab 05/08/22  0611      CALCIUM 7.6*   ALBUMIN 2.5*   PROT 4.8*   *   K 3.7   CO2 20*   CL 98   BUN 73*   CREATININE 4.9*   ALKPHOS 82   ALT 9*   AST 72*   BILITOT 5.3*     Lab Results   Component Value Date    WBC 4.14 05/08/2022    HGB 7.9 (L) 05/08/2022    HCT 22.8 (L) 05/08/2022    MCV 85 05/08/2022    PLT 71 (L) 05/08/2022     No results for input(s): TSH, FREET4 in the last 168 hours.  Lab Results   Component Value Date    HGBA1C 6.6 (H) 02/09/2022       Nutritional status:   Body mass index is 20.45 kg/m².  Lab Results   Component Value Date    ALBUMIN 2.5 (L) 05/08/2022    ALBUMIN 2.5 (L) 05/07/2022    ALBUMIN 2.7 (L) 05/06/2022     No results found for: PREALBUMIN    Estimated Creatinine Clearance: 13.8 mL/min (A) (based on SCr of 4.9 mg/dL (H)).    Accu-Checks  Recent Labs     05/08/22  0029 05/08/22  0130 05/08/22  0231 05/08/22  0252 05/08/22  0328 05/08/22  0429 05/08/22  0450 05/08/22  0519 05/08/22  0630 05/08/22  0729   POCTGLUCOSE 125* 114* 97 95 107 98 95 101 110 120*       Current Medications and/or Treatments Impacting Glycemic Control  Immunotherapy:    Immunosuppressants       None          Steroids:   Hormones (From admission, onward)                Start     Stop Route Frequency Ordered    05/06/22 1730  predniSONE tablet 40 mg         05/11 2059 Oral 2 times daily 05/06/22 1719          Pressors:    Autonomic Drugs (From admission, onward)                None          Hyperglycemia/Diabetes Medications:   Antihyperglycemics (From admission, onward)                Start     Stop " Route Frequency Ordered    05/08/22 0945  insulin aspart U-100 pen 2-4 Units         -- SubQ 3 times daily after meals 05/08/22 0753 05/08/22 0900  insulin detemir U-100 pen 12 Units         -- SubQ Daily 05/08/22 0753    05/08/22 0853  insulin aspart U-100 pen 0-5 Units         -- SubQ Before meals & nightly PRN 05/08/22 0752

## 2022-05-08 NOTE — CARE UPDATE
RAPID RESPONSE NURSE ROUND       Rounding completed with charge RN, Regina. No concerns verbalized at this time. Instructed to call [phone] for further concerns or assistance.

## 2022-05-08 NOTE — PLAN OF CARE
-AAO4, VSS/afebrile, on 3L NC, denies pain  -admitted for ILDEFONSO; kidney/liver w/u  -tessalon vega scheduled and dextromethorphan-guaifenesin PRN for nagging cough  -airborne precautions maintained until TB ruled out; need 1 more sputum sample  -plan to repeat para and do EGD/colonoscopy after TB ruled out  -CT with opacifications, reverse halo sign > JPJ  -ampicillin q12, clindamycin q8  -labs monitored; H/H 7.7/22.7 trending down, LFTs stable, Cr 5.0 down from last, WBC 2.56  -insulin gtt w Q1 hr accuchecks, titrating gtt as ordered; sugar-free clear diet maintained  -voids independently per urinal  -wife at bedside, fall precautions maintained, call bell in reach

## 2022-05-08 NOTE — ASSESSMENT & PLAN NOTE
Pt with Hx of DM2  With BG excursions due to steroid taper placed by ID for PJP    Home regimen: Long acing insulin 30 units and metformin 500 mg PO BID    BG goal 140-180  Currently BG controlled     Plan  Will start levemir 12 units SQ daily  Aspart 2-4 units SQ before meals (Administer 2 units if he eats 50% or less of his meal, administer full dose 4 units if he eats >50%) in addition to low dose correction prior to meal    POCT before meals, bedtime and 2am    Will keep following and will adjust insulin regimen accordingly   Due to kidney function, pt is to discontinue metformin from his home regimen and to discard metformin pills from home.

## 2022-05-08 NOTE — PT/OT/SLP PROGRESS
"Physical Therapy      Patient Name:  Edgar Jackson   MRN:  [id number]    Patient not seen today secondary to  (pt reported "feeling weak" and refused PT treatment on this date. Pt educated on the role of therapy, and benefits of out of bed mobility. Pt encouraged to sit in bedside chair later in the afternoon. Nurse notified.). Will follow-up as scheduled.        "

## 2022-05-08 NOTE — PROGRESS NOTES
[facility] - Transplant Stepdown  Endocrinology  Progress Note    Admit Date: 4/30/2022     Reason for Consult: Management of T2DM, Hyperglycemia     Surgical Procedure and Date: Pt undergoing evaluation for possible liver/kidney transplant    Diabetes diagnosis year: 2 years ago (2022 per pt)    Home Diabetes Medications:  Metformin 500 mg PO BID, long acting insulin 30 units daily    How often checking glucose at home?  Does not check    BG readings on regimen: Does not check his BG levels   Hypoglycemia on the regimen?  No (reports no symptoms of hypoglycemia, but stated he does not check)  Missed doses on regimen?  No    Diabetes Complications include:     Hyperglycemia    Complicating diabetes co morbidities:   CKD      HPI:   Patient is a 61 y.o. male with a diagnosis of renal transplant for hypertensive nephropathy (2004) on immunosuppressive therapy, LOPEZ cirrhosis, DM2, gout, BPH and ILD who was treated at [facility] ([facility]) for ILDEFONSO 2/2 decompensated cirrhosis and worsening portal vein hypertension with HRS. He has been having weekly paracentesis in the past month.     Pt reported that he had been experiencing abdominal distention with associated diarrhea (4-5/day) over several weeks, along with a chronic cough, weight loss, and found to be febrile.     Pt was transferred to [facility] for evaluation of possible liver/kidney transplant     CT with opacifications, reverse halo sign, found to have PJP.  Blasto urine Ag positive also.    Pt was started on PJP treatment with steroid taper leading to BG excursion. Endocrine consulted for BG management.   -Steroid taper starting 5/6/2022: Prednisone 40mg BID days 1-5, then 40mg daily days 6-10, then 20mg daily days 11-21         Interval HPI:   Overnight events:  BG controlled  Insulin drip stopped overnight     Eating:   Will start diabetic/renal diet     Nausea: No  Hypoglycemia and intervention: No  Fever: No  TPN and/or TF: No    /73   Pulse (!) 53   Temp  "97.4 °F (36.3 °C) (Oral)   Resp 20   Ht 5' 8" (1.727 m)   Wt 61 kg (134 lb 7.7 oz)   SpO2 95%   BMI 20.45 kg/m²     Labs Reviewed and Include    Recent Labs   Lab 05/08/22  0611      CALCIUM 7.6*   ALBUMIN 2.5*   PROT 4.8*   *   K 3.7   CO2 20*   CL 98   BUN 73*   CREATININE 4.9*   ALKPHOS 82   ALT 9*   AST 72*   BILITOT 5.3*     Lab Results   Component Value Date    WBC 4.14 05/08/2022    HGB 7.9 (L) 05/08/2022    HCT 22.8 (L) 05/08/2022    MCV 85 05/08/2022    PLT 71 (L) 05/08/2022     No results for input(s): TSH, FREET4 in the last 168 hours.  Lab Results   Component Value Date    HGBA1C 6.6 (H) 02/09/2022       Nutritional status:   Body mass index is 20.45 kg/m².  Lab Results   Component Value Date    ALBUMIN 2.5 (L) 05/08/2022    ALBUMIN 2.5 (L) 05/07/2022    ALBUMIN 2.7 (L) 05/06/2022     No results found for: PREALBUMIN    Estimated Creatinine Clearance: 13.8 mL/min (A) (based on SCr of 4.9 mg/dL (H)).    Accu-Checks  Recent Labs     05/08/22  0029 05/08/22  0130 05/08/22  0231 05/08/22  0252 05/08/22  0328 05/08/22  0429 05/08/22  0450 05/08/22  0519 05/08/22  0630 05/08/22  0729   POCTGLUCOSE 125* 114* 97 95 107 98 95 101 110 120*       Current Medications and/or Treatments Impacting Glycemic Control  Immunotherapy:    Immunosuppressants       None          Steroids:   Hormones (From admission, onward)                Start     Stop Route Frequency Ordered    05/06/22 1730  predniSONE tablet 40 mg         05/11 2059 Oral 2 times daily 05/06/22 1719          Pressors:    Autonomic Drugs (From admission, onward)                None          Hyperglycemia/Diabetes Medications:   Antihyperglycemics (From admission, onward)                Start     Stop Route Frequency Ordered    05/08/22 0945  insulin aspart U-100 pen 2-4 Units         -- SubQ 3 times daily after meals 05/08/22 0753    05/08/22 0900  insulin detemir U-100 pen 12 Units         -- SubQ Daily 05/08/22 0753    05/08/22 0853  " insulin aspart U-100 pen 0-5 Units         -- SubQ Before meals & nightly PRN 05/08/22 0753            ASSESSMENT and PLAN    * ILDEFONSO (acute kidney injury)  Caution with aggressive insulin adjustments to reduce risk of hypoglycemia  Titrate insulin slowly to avoid hypoglycemia as the risk of hypoglycemia increases with decreased creatinine clearance.        Pneumocystis jiroveci pneumonia  On treatment   Followed by ID  Placed on steroid taper      Type 2 diabetes mellitus without complication, without long-term current use of insulin  Pt with Hx of DM2  With BG excursions due to steroid taper placed by ID for PJP    Home regimen: Long acing insulin 30 units and metformin 500 mg PO BID    BG goal 140-180  Currently BG controlled     Plan  Will start levemir 12 units SQ daily  Aspart 2-4 units SQ before meals (Administer 2 units if he eats 50% or less of his meal, administer full dose 4 units if he eats >50%) in addition to low dose correction prior to meal    POCT before meals, bedtime and 2am    Will keep following and will adjust insulin regimen accordingly   Due to kidney function, pt is to discontinue metformin from his home regimen and to discard metformin pills from home.      Hypothyroidism  Pt with Hx of hypothyroidism   Taking LT4 50 mcg PO daily  Continue home regimen           Art Harrison MD  Endocrinology  [facility] - Transplant Stepdown

## 2022-05-08 NOTE — CARE UPDATE
RAPID RESPONSE NURSE ROUND       Rounding completed with charge RN, Lizett. No concerns verbalized at this time. Instructed to call [phone] for further concerns or assistance.

## 2022-05-08 NOTE — NURSING
Dr Pelayo notified of pt BP 80s/50s; current BP now 93/61; this RN held lopressor; no new orders given at this time

## 2022-05-08 NOTE — PROGRESS NOTES
Progress Note  Hospital Medicine      Patient Name: Edgar Jackson  MRN: [id number]  Patient Class: IP- Inpatient  Admission Date: 4/30/2022  Attending Physician:Samantha Yuen MD   Primary Care Provider: Tyler Castillo MD            SUBJECTIVE:     Follow-up For:  ILDEFONSO (acute kidney injury)     Interval history/ROS:   5/8: TB rule out continues    5/7: patient with dry cough, feels weak.       HPI: Mr Jackson is a 61yoM with PMHx of renal transplant for hypertensive nephropathy (2004) immunosuppressed on tacrolimus/sirolimus,  therapy, LOPEZ cirrhosis, NIDDM, and ILD who was treated at OSH for ILDEFONSO 2/2 decompensated cirrhosis and worsening portal vein hypertension with HRS. Pt reported that he had been experiencing abdominal distention with associated diarrhea (4-5/day) over several weeks, along with a constant nagging cough. On presenting to OSH, multiple paracentesis (3) were performed to relieve the ascitic fluid. No evidence of SBP on fluid analysis. Creon was added to diet to aide in food digestion in setting of cirrhosis. Loperamide provided symptomatic treatment of diarrhea. Dextromethorphan-guaifenesin syrup added to reduce cough exacerbations. Despite efforts to manage symptoms, pt in need of  transplant surgery evaluation of the renal/hepatic injury, resulting in tranfer to [facility].     On arrival to [facility] (4/30/2022), pt has experienced increased generalized weakness. Loperamide was continued for diarrhea management. Abdominal distention recurred, despite prior paracentesis. Cough has been fairly constant and predominantly dry. Pt now endorses new left lower quadrant abdominal pain, worsened by cough. KTM and Hepatology consults placed for management of HRS.  OBJECTIVE:     Body mass index is 20.45 kg/m².    Vital Signs Range (Last 24H):  Temp:  [97.3 °F (36.3 °C)-97.6 °F (36.4 °C)]   Pulse:  [52-60]   Resp:  [17-21]   BP: ()/(63-73)   SpO2:  [92 %-98 %]     I & O (Last 24H):    Intake/Output Summary (Last 24 hours) at  5/8/2022 1516  Last data filed at 5/8/2022 1120  Gross per 24 hour   Intake 560 ml   Output 425 ml   Net 135 ml        Physical Exam:  Constitutional:       General: He is awake. He is not in acute distress.     Appearance: He is well-developed. He is not toxic-appearing or diaphoretic.   HENT:      Head: Normocephalic and atraumatic.      Right Ear: External ear normal.      Left Ear: External ear normal.      Nose:      Comments: On nasal cannula     Mouth/Throat:      Mouth: Mucous membranes are moist.      Pharynx: No oropharyngeal exudate or posterior oropharyngeal erythema.   Eyes:      General: Scleral icterus present.   Cardiovascular:      Rate and Rhythm: Normal rate and regular rhythm.      Pulses: Normal pulses.      Heart sounds: Normal heart sounds. No murmur heard.  Pulmonary:      Effort: No respiratory distress.      Breath sounds: No wheezing.   Abdominal:      General: Bowel sounds are normal. There is no distension.      Palpations: Abdomen is soft.      Tenderness: There is no abdominal tenderness. There is no guarding.   Musculoskeletal:         General: Normal range of motion.      Cervical back: Full passive range of motion without pain and neck supple.      Right lower leg: Positive for edema.      Left lower leg: Positive for edema.   Lymphadenopathy:      Cervical: No cervical adenopathy.   Skin:     General: Skin is warm.      Coloration: Skin is jaundiced.      Findings: No lesion or rash.   Neurological:      Mental Status: He is alert and oriented to person, place, and time.   Psychiatric:         Attention and Perception: Attention normal.         Mood and Affect: Mood normal.         Behavior: Behavior normal. Behavior is cooperative.         Thought Content: Thought content normal.         Cognition and Memory: Cognition normal.         Judgment: Judgment normal.        Recent Labs   Lab 05/06/22  0608 05/07/22  0610 05/07/22  1209 05/08/22  0611   * 124*  --  130*   K 3.9 3.7  3.7 3.7   CL 95 93*  --  98   CO2 19* 20*  --  20*   BUN 71* 70*  --  73*   CREATININE 5.1* 5.0*  --  4.9*   * 459*  --  102   CALCIUM 7.9* 7.5*  --  7.6*     Recent Labs   Lab 05/06/22  0608 05/07/22  0610 05/08/22  0611   ALKPHOS 83 83 82   ALT 8* 7* 9*   AST 60* 61* 72*   ALBUMIN 2.7* 2.5* 2.5*   PROT 4.8* 4.8* 4.8*   BILITOT 5.5* 6.0* 5.3*   INR 1.7* 2.0* 2.0*       Recent Labs   Lab 05/01/22  1921 05/02/22  0740 05/04/22  0835 05/05/22  0747 05/06/22  0608 05/07/22  0610 05/08/22  0611   WBC  --    < >  --    < > 6.36 2.56* 4.14   HGB  --    < >  --    < > 8.3* 7.7* 7.9*   HCT  --    < >  --    < > 24.2* 22.7* 22.8*   PLT  --    < >  --    < > 77* 65* 71*   GRAN  --    < >  --    < > 71.7  4.6 81.6*  2.1 81.5*  3.4   SEGS 1  --  79  --   --   --   --    LYMPH  --    < >  --    < > 10.4*  0.7* 12.5*  0.3* 10.1*  0.4*   LYMPHS 51  --  9  --   --   --   --    MONO  --    < >  --    < > 14.6  0.9 5.1  0.1* 7.2  0.3    < > = values in this interval not displayed.       Recent Labs   Lab 05/08/22  0429 05/08/22  0450 05/08/22  0519 05/08/22  0630 05/08/22  0729 05/08/22  0820   POCTGLUCOSE 98 95 101 110 120* 114*       No results for input(s): TROPONINI in the last 168 hours.    Diagnostic Results:  Labs: Reviewed    allopurinoL, 100 mg, Oral, Daily  ampicillin IVPB, 2 g, Intravenous, Q12H  benzonatate, 200 mg, Oral, TID  clindamycin (CLEOCIN) IVPB, 900 mg, Intravenous, Q8H  furosemide, 20 mg, Oral, Daily  insulin aspart U-100, 2-4 Units, Subcutaneous, TID PC  insulin detemir U-100, 12 Units, Subcutaneous, Daily  levothyroxine, 50 mcg, Oral, Before breakfast  lipase-protease-amylase 12,000-38,000-60,000 units, 2 capsule, Oral, QID (WM & HS)  metoprolol tartrate, 50 mg, Oral, BID  posaconazole, 300 mg, Oral, Daily  predniSONE, 40 mg, Oral, BID  primaquine, 52.6 mg, Oral, Daily  sodium bicarbonate, 1,300 mg, Oral, TID        As Needed acetaminophen, albuterol-ipratropium, dextromethorphan-guaiFENesin   mg/5 ml, dextrose 10%, fluticasone propionate, glucagon (human recombinant), glucose, glucose, insulin aspart U-100, loperamide, naloxone, sodium chloride 0.9%    ASSESSMENT/PLAN:     Assessment: Edgar Jackson is a 61 y.o. male here with:     Active Hospital Problems    Diagnosis  POA    *ILDEFONSO (acute kidney injury) [N17.9]  Yes    Pneumocystis jiroveci pneumonia [B59]  Unknown    SBP (spontaneous bacterial peritonitis) [K65.2]  Unknown    Severe malnutrition [E43]  Yes     Severe malnutrition  Nutrition Problem  Severe protein-calorie malnutrition     Related to (etiology):   Chronic illness     Signs and Symptoms (as evidenced by):   Wt loss of 28 lb (17.5%) in 3 months  Energy intake <75% of EEN for >3 months  Moderate muscle mass depletion (temples, clavicles, shoulders, thigh, calf)     Interventions/Recommendations (treatment strategy):  Collaboration with other providers     Nutrition Diagnosis Status:   New      Lung infiltrate on CT [R91.8]  Yes    Sepsis [A41.9]  No    Chronic diarrhea [K52.9]  Yes    Type 2 diabetes mellitus without complication, without long-term current use of insulin [E11.9]  Yes    Chronic cough [R05.3]  Yes    Fever in immunocomprised patient [R50.9]  Yes    Hypothyroidism [E03.9]  Yes     Chronic    Thrombocytopenia [D69.6]  Yes    Hyponatremia [E87.1]  Yes    Metabolic acidosis [E87.2]  Yes    Interstitial lung disease [J84.9]  Yes    Cirrhosis of liver with ascites [K74.60, R18.8]  Yes    Transplanted kidney [Z94.0]  Not Applicable    Gout [M10.9]  Yes    Personal history of immunosupression therapy [Z92.25]  Not Applicable      Resolved Hospital Problems   No resolved problems to display.        Plan:     Pneumocystis jiroveci pneumonia  61 y.o. male with a PMH ESRD 2/2 HTN s/p kidney transplant in 1/2004 (in [place]; on Tacrolimus/Sirolimus), c/b LOPEZ cirrhosis, ILD, T2DM (A1C 6.6 in 2/2022), hypothyroidism, BPH, gout was admitted on 4/30/2022 (transferred from  [facility]) for abnormal labs and need for transplant liver/kidney evaluation.  Complaints of malaise, chronic cough, diarrhea, weight loss, and found febrile.  Born in [place], lived in [place], [place] before moving to [place] 9/2021.  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  Blasto urine Ag positive also.     -5/6 started Clindamycin and Primaquine with steroid taper starting at prednisone 40mg BID days 1-5, then 40mg daily days 6-10, then 20mg daily days 11-21  -Continue posaconazole  -Airborne precautions until TB rule out, need 3 total AFB Cx  -Can have RT assist by inducing sputum for AFB Cx collection  -[id number] Viracor in process  -GI consulted, planned for colonoscopy after TB rule out     SBP (spontaneous bacterial peritonitis)  Enterococcus faecalis SBP  -Continue ampicillin  -Repeat paracentesis after TB rule out    Type 2 diabetes mellitus without complication, without long-term current use of insulin  Pt with Hx of DM2  Currently with BG excursions due to steroid taper placed by ID for PJP     Home regimen: Long acing insulin 30 units and metformin 500 mg PO BID     BG goal 140-180  Currently BG uncontrolled due to pt receiving steroids for PJP treatment     Plan  Recommend intensive insulin drip with POCt q1hr until BG at goal with clear liquid bariatric diet   Once BG stable will consider placing him on SQ insulin (15 units long acting and 7 units before meals with moderate correction and will switch diet to renal and diabetic diet)     Will keep following and will adjust insulin regimen accordingly   Due to kidney function, pt is to discontinue metformin from his home regimen and to discard metformin pills from home.        Hypothyroidism  Pt with Hx of hypothyroidism   Taking LT4 50 mcg PO daily  Continue home regimen     ILDEFONSO (acute kidney injury)  His renal functions were normal beginning of Feb.   After that it appears he had ILDEFONSO - possibly related to pre-renal states  Which  led to CKD stage III, which has progressed to stage IV over the course of last few weeks     Has consented for HD in case needed.   Monitor I/Os   US renal transplant unremarkable.  Urine Output on 5/6 noted to be 450ml   Cr: 6.0-->5.7-->5.8-->5.3-->5.4-->5.8-->5.0 (on 5/7)     - No acute indication for HD  - Renally dose all medications to GFR=0  - Avoid Nephrotoxic drugs   - Renal Diet  - Lokelma 10mg PRN if K>5  - Strict I/OS  - Daily RFPs     Pneumocystis jiroveci pneumonia  - PJP positive  - ID following - Currently on Clindamycin and Primaquine   - If patient needs bactrim for clearance of infection- Nephrology will help with HD needs         Chronic diarrhea  Reports since past 2 months   stool studies in progress   Will undergo C-Scope on 5/4 with GI     Metabolic acidosis  - Continue NaHCO3 1300 TID  - Likely secondary to diarrhea      Hyponatremia  This is likely in setting of renal failure and liver cirrhosis   Would be hard to normalize  Continue fluid restriction upto 1.2 liter/day     MELD-Na score: 35 at 5/6/2022  6:08 AM  MELD score: 32 at 5/6/2022  6:08 AM  Calculated from:  Serum Creatinine: 5.1 mg/dL (Using max of 4 mg/dL) at 5/6/2022  6:08 AM  Serum Sodium: 125 mmol/L at 5/6/2022  6:08 AM  Total Bilirubin: 5.5 mg/dL at 5/6/2022  6:08 AM  INR(ratio): 1.7 at 5/6/2022  6:08 AM  Age: 61 years     Plan  - continue to follow up transplant nephrology recc's     Approved for inpatient liver/kidney transplant evaluation.  Now with multiple infectious sources that will need to be further evaluated and treated.  Currently on posaconazole for blasto Ag/fungitell (awaiting other fungal studies), On vancomycin for enterococcus ascitic fluid culture.        We will hold off on further transplant evaluation at this time until treatment duration for various infections are defined.     - f/u nephrology recommendations  - f/u pulmonary recc's, bronchoscopy studies  - colonoscopy per GI  - please obtain daily CBC,  BMP, LFT, INR  - Plan of care was discussed with primary team       HIGH RISK CONDITION(S):  Patient has a condition that poses threat to life and bodily function: Acute Renal Failure          Samantha Yuen MD

## 2022-05-09 PROBLEM — B65.9 SCHISTOSOMA: Status: ACTIVE | Noted: 2022-01-01

## 2022-05-09 NOTE — PROGRESS NOTES
[facility] - Transplant Stepdown  Endocrinology  Progress Note    Admit Date: 4/30/2022     Reason for Consult: Management of T2DM, Hyperglycemia     Surgical Procedure and Date: Pt undergoing evaluation for possible liver/kidney transplant    Diabetes diagnosis year: 2 years ago (2022 per pt)    Home Diabetes Medications:  Metformin 500 mg PO BID, long acting insulin 30 units daily    How often checking glucose at home?  Does not check    BG readings on regimen: Does not check his BG levels   Hypoglycemia on the regimen?  No (reports no symptoms of hypoglycemia, but stated he does not check)  Missed doses on regimen?  No    Diabetes Complications include:     Hyperglycemia    Complicating diabetes co morbidities:   ESRD      HPI:   Patient is a 61 y.o. male with a diagnosis of renal transplant for hypertensive nephropathy (2004) on immunosuppressive therapy, LOPEZ cirrhosis, DM2, gout, BPH and ILD who was treated at [facility] for ILDEFONSO 2/2 decompensated cirrhosis and worsening portal vein hypertension with HRS. He has been having weekly paracentesis in the past month.     Pt reported that he had been experiencing abdominal distention with associated diarrhea (4-5/day) over several weeks, along with a chronic cough, weight loss, and found to be febrile.     Pt was transferred to [facility] for evaluation of possible liver/kidney transplant     CT with opacifications, reverse halo sign, found to have PJP.  Blasto urine Ag positive also.    Pt was started on PJP treatment with steroid taper leading to BG excursion. Endocrine consulted for BG management.   -Steroid taper starting 5/6/2022: Prednisone 40mg BID days 1-5, then 40mg daily days 6-10, then 20mg daily days 11-21         Interval HPI:   Overnight events:  No acute events reported overnight  Eating:   NPO  Nausea: No  Hypoglycemia and intervention: No  Fever: No  TPN and/or TF: No  If yes, type of TF/TPN and rate: NA    /74   Pulse (!) 53   Temp 96.4  "°F (35.8 °C) (Axillary)   Resp 19   Ht 5' 8" (1.727 m)   Wt 61 kg (134 lb 7.7 oz)   SpO2 95%   BMI 20.45 kg/m²     Labs Reviewed and Include    Recent Labs   Lab 05/09/22  0614   *   CALCIUM 7.8*   ALBUMIN 2.4*   PROT 4.8*   *   K 4.3   CO2 20*   CL 96   BUN 84*   CREATININE 5.0*   ALKPHOS 79   ALT 12   AST 75*   BILITOT 5.5*     Lab Results   Component Value Date    WBC 4.16 05/09/2022    HGB 8.4 (L) 05/09/2022    HCT 23.8 (L) 05/09/2022    MCV 84 05/09/2022    PLT 75 (L) 05/09/2022     No results for input(s): TSH, FREET4 in the last 168 hours.  Lab Results   Component Value Date    HGBA1C 6.6 (H) 02/09/2022       Nutritional status:   Body mass index is 20.45 kg/m².  Lab Results   Component Value Date    ALBUMIN 2.4 (L) 05/09/2022    ALBUMIN 2.5 (L) 05/08/2022    ALBUMIN 2.5 (L) 05/07/2022     No results found for: PREALBUMIN    Estimated Creatinine Clearance: 13.6 mL/min (A) (based on SCr of 5 mg/dL (H)).    Accu-Checks  Recent Labs     05/08/22  0450 05/08/22  0519 05/08/22  0630 05/08/22  0729 05/08/22  0820 05/08/22  1144 05/08/22  1726 05/08/22  2121 05/09/22  0151 05/09/22  0904   POCTGLUCOSE 95 101 110 120* 114* 123* 141* 176* 186* 193*       Current Medications and/or Treatments Impacting Glycemic Control  Immunotherapy:    Immunosuppressants       None          Steroids:   Hormones (From admission, onward)                Start     Stop Route Frequency Ordered    05/06/22 1730  predniSONE tablet 40 mg         05/11 2059 Oral 2 times daily 05/06/22 1719          Pressors:    Autonomic Drugs (From admission, onward)                None          Hyperglycemia/Diabetes Medications:   Antihyperglycemics (From admission, onward)                Start     Stop Route Frequency Ordered    05/08/22 0945  insulin aspart U-100 pen 2-4 Units         -- SubQ 3 times daily after meals 05/08/22 0753    05/08/22 0900  insulin detemir U-100 pen 12 Units         -- SubQ Daily 05/08/22 0753    05/08/22 0853  " insulin aspart U-100 pen 0-5 Units         -- SubQ Before meals & nightly PRN 05/08/22 0753            ASSESSMENT and PLAN    * ILDEFONSO (acute kidney injury)  Caution with aggressive insulin adjustments to reduce risk of hypoglycemia    Type 2 diabetes mellitus without complication, without long-term current use of insulin  Pt with Hx of DM2  With BG excursions due to steroid taper placed by ID for PJP  - 60 kg x 0.3 = 18 TDD x 0.5 = 9 basal / 9 prandial  Home regimen: Long acting insulin 30 units and metformin 500 mg PO BID    BG goal 140-180  Currently BG controlled, expect hyperglycemia as appetite improves while on high-dose steroids    Plan  - Continue levemir 12 units daily  - Continue aspart 2-4 units before meals if appetite improves (Administer 2 units if he eats 50% or less of his meal, administer full dose 4 units if he eats >50%) with low dose correction  - Due to kidney function, pt is to discontinue metformin from his home regimen and to discard metformin pills from home.    Hypothyroidism  Pt with Hx of hypothyroidism   Taking LT4 50 mcg PO daily  - TSH 2.4 on 04/30/2022  - Continue home regimen  - Recommend rechecking TFTs in 4-6 weeks outpatient    Pneumocystis jiroveci pneumonia  On treatment   Followed by ID  Placed on steroid taper      Mike Bernal DO  [facility], [address]th [address]  [address] [place], [place], [address]    Office: [phone]  Fax: [phone]    Disclaimer: This note has been generated using voice-recognition software. There may be typographical errors that have been missed during proof-reading.    The above history labs imaging impression and plan were discussed with attending physician who is in agreement and also took part in this patient's care.  I personally reviewed all of the patients available medications, labs, imaging, vitals, allergies, medical history.

## 2022-05-09 NOTE — ASSESSMENT & PLAN NOTE
Prior to admission, patient with 4 episodes of diarrhea per day for a month. Diarrhea has now slowed down - if resolves, may not need colonoscopy.    -GI consulted, colonoscopy was planned, but held for TB rule out

## 2022-05-09 NOTE — ASSESSMENT & PLAN NOTE
Enterococcus faecalis SBP  -Continue ampicillin  -Repeat paracentesis after TB rule out to ensure resolution of SBP

## 2022-05-09 NOTE — PT/OT/SLP PROGRESS
Physical Therapy Treatment    Patient Name:  Edgar Jackson   MRN:  [id number]    Recommendations:     Discharge Recommendations:  home health PT   Discharge Equipment Recommendations: none   Barriers to discharge: requires increased assistance    Assessment:     Edgar Jackson is a 61 y.o. male admitted with a medical diagnosis of ILDEFONSO (acute kidney injury).  He presents with the following impairments/functional limitations:  weakness, impaired functional mobilty, gait instability . Patient participated well. Ambulates w/ RW and CG/min assistance 38 feet. Transfers w/ CG/min assistance.    Rehab Prognosis: Good; patient would benefit from acute skilled PT services to address these deficits and reach maximum level of function.    Recent Surgery: Procedure(s) (LRB):  EGD (ESOPHAGOGASTRODUODENOSCOPY) (N/A)  COLONOSCOPY (N/A) 4 Days Post-Op    Plan:     During this hospitalization, patient to be seen 3 x/week to address the identified rehab impairments via gait training, therapeutic activities, therapeutic exercises and progress toward the following goals:    · Plan of Care Expires:  06/02/22    Subjective     Chief Complaint: fatigue  Patient/Family Comments/goals: agreeable to session  Pain/Comfort:  · Pain Rating 1: 0/10  · Pain Rating Post-Intervention 1: 0/10      Objective:     Communicated with nurse prior to session.  Patient found HOB elevated with telemetry, oxygen, blood pressure cuff, pulse ox (continuous), SCD upon PT entry to room. Wife present and participates in session.    General Precautions: Standard, fall, airborne   Orthopedic Precautions:N/A   Braces: LSO  Respiratory Status: Nasal cannula, flow 2 L/min     Functional Mobility:  · Bed Mobility:     · Supine to Sit: minimum assistance  · Transfers:     · Sit to Stand:  contact guard assistance and minimum assistance with rolling walker  · Bed to Chair: contact guard assistance and minimum assistance with  rolling walker  using  Step Transfer  · Gait: w/ RW and  CG/min assistance in room 38 feet. (wife follows w/ chair initially). no LOB, occ assistance to manage RW, especially in turns. slow pace, small steps, downward gaze      AM-PAC 6 CLICK MOBILITY  Turning over in bed (including adjusting bedclothes, sheets and blankets)?: 3  Sitting down on and standing up from a chair with arms (e.g., wheelchair, bedside commode, etc.): 3  Moving from lying on back to sitting on the side of the bed?: 3  Moving to and from a bed to a chair (including a wheelchair)?: 3  Need to walk in hospital room?: 3  Climbing 3-5 steps with a railing?: 2  Basic Mobility Total Score: 17       Therapeutic Activities and Exercises:   patient performs 10x GS, QS, AP, LAQ. Performs 3x shoulder squeeze, head rotation, lateral (ear toward shoulder)bends.   Patient educated in PT POC, gait and trf tech, call for assistance    Patient left up in chair with all lines intact, call button in reach, nurse notified and wife present..    GOALS:   Multidisciplinary Problems     Physical Therapy Goals        Problem: Physical Therapy    Goal Priority Disciplines Outcome Goal Variances Interventions   Physical Therapy Goal     PT, PT/OT Ongoing, Progressing     Description: Goals to be met by: 2022     Patient will increase functional independence with mobility by performin. Supine to sit with Modified [place]  2. Sit to stand transfer with Modified [place]  3. Gait  x 200 feet with Modified [place] using Rolling Walker or SPC.   4. Ascend/descend 10 stair with bilateral Handrails Contact Guard Assistance using No Assistive Device.                      Time Tracking:     PT Received On: 22  PT Start Time: 1501     PT Stop Time: 1528  PT Total Time (min): 27 min     Billable Minutes: Gait Training 15 and Therapeutic Exercise 10    Treatment Type: Treatment  PT/PTA: PT     PTA Visit Number: 0     2022

## 2022-05-09 NOTE — NURSING
Pt BG has gone up throughout the day  Difficult to manage insulin admin because Pt is not eating- spoke with endocrine MD  Informed him of 373 BG  Order for 7 units novolog, and recheck in 90 minutes.  Novolog SQ given, 7 units  Wife attempting to feed pt but he took a couple of bites and then didn't want any more.  Pt is drinking Boost.  WCTM

## 2022-05-09 NOTE — ASSESSMENT & PLAN NOTE
61 y.o. male with a PMH ESRD 2/2 HTN s/p kidney transplant in 1/2004 (in [place]; on Tacrolimus/Sirolimus), c/b LOPEZ cirrhosis, ILD, T2DM (A1C 6.6 in 2/2022), hypothyroidism, BPH, gout was admitted on 4/30/2022 (transferred from [facility]) for abnormal labs and need for transplant liver/kidney evaluation.  Complaints of malaise, chronic cough, diarrhea, weight loss, and found febrile.  Born in [place], lived in [place], [place] before moving to [place] 9/2021.  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  Blasto urine Ag positive also.    Recommendations:  -5/6 started Clindamycin and Primaquine with steroid taper starting at prednisone 40mg BID days 1-5, then 40mg daily days 6-10, then 20mg daily days 11-21  -Continue posaconazole  -Airborne precautions until TB rule out, need 3 total AFB Cx  -Can have RT assist by inducing sputum for AFB Cx collection  -[id number] Viracor in process

## 2022-05-09 NOTE — TREATMENT PLAN
[facility]  Gastroenterology  Treatment Plan    Edgar Jackson is a 61 y.o. male admitted to hospital 4/30/2022 (Hospital Day: 10) due to ILDEFONSO (acute kidney injury).     GI initially consulted on 5/3 for diarrhea.    Interval History  TB rule out in progress - 4/4 AFB smear and cultures in progress. Remains on airborne precautions.  ID following. Hepatology paused transplant eval given current concern for infections.  Nursing reports ongoing diarrhea - today, 1 soft BM, 1 liquid stool.   Currently on clindamycin, ampicillin.     Objective  Temp:  [96.4 °F (35.8 °C)] 96.4 °F (35.8 °C) (05/08 1956)  Pulse:  [53-56] 53 (05/09 0900)  BP: (105-114)/(72-77) 114/72 (05/09 0900)  Resp:  [18-22] 18 (05/09 0900)  SpO2:  [93 %-95 %] 94 % (05/09 0900)      Laboratory    Recent Labs   Lab 05/07/22  0610 05/08/22  0611 05/09/22  0614   HGB 7.7* 7.9* 8.4*         Assessment and Plan  Chronic diarrhea  Edgar Jackson is a 61 M w/ PMH hypertensive nephropathy s/p renal transplant 2004 on chronic immunosuppression, LOPEZ cirrhosis, DM2, ILD transferred from OSH with ILDEFONSO on CKD, volume overload and ascites.  Now also being evaluated for Liver tx/kidney re-tx.     Had colonoscopy in March that was normal. CT abdomen with no GI concerns. GI consulted for colonoscopy to further evaluate chronic diarrhea in setting of new fever.     5/9: TB rule out in process, 4/4 AFB smears in progress. Hepatology has paused transplant eval in setting of treatment for ongoing infections.  Per RN, ongoing diarrhea (2 Bms this morning, 1 soft, 1 liquid).     Plan:   - F/u TB rule out results.  If diarrhea is improving and transplant eval on hold, would defer EGD/colonoscopy at this time. Could consider proceeding as OP.  - continue stool count  - continue Imodium PRN, can consider scheduling QID  - Correct electrolyte abnormalities prior to the procedure  - We are signing-off. Please call with any questions.    Thank you for involving us in the care of Edgar  Le. Please call with any additional questions, concerns or changes in the patient's clinical status.    Theodora Martin DNP  [facility]

## 2022-05-09 NOTE — SUBJECTIVE & OBJECTIVE
Interval History: Afebrile.  Wife at bedside believes patient is breathing more comfortably now.  Denies abdominal pain.  Reports soft BM's, not watery diarrhea.    Review of Systems   Constitutional:  Positive for fatigue. Negative for chills, diaphoresis and fever.   HENT:  Negative for rhinorrhea and sore throat.    Respiratory:  Positive for shortness of breath. Negative for cough.    Cardiovascular:  Negative for chest pain and leg swelling.   Gastrointestinal:  Negative for abdominal pain, diarrhea, nausea and vomiting.   Genitourinary:  Negative for dysuria and hematuria.   Musculoskeletal:  Negative for arthralgias and myalgias.   Skin:  Negative for rash.   Allergic/Immunologic: Positive for immunocompromised state.   Neurological:  Negative for headaches.   Objective:     Vital Signs (Most Recent):  Temp: 97.9 °F (36.6 °C) (05/09/22 1608)  Pulse: (!) 53 (05/09/22 0900)  Resp: 18 (05/09/22 0900)  BP: 114/72 (05/09/22 0900)  SpO2: 98 % (05/09/22 1329)   Vital Signs (24h Range):  Temp:  [96.4 °F (35.8 °C)-97.9 °F (36.6 °C)] 97.9 °F (36.6 °C)  Pulse:  [53-56] 53  Resp:  [18-22] 18  SpO2:  [93 %-98 %] 98 %  BP: (105-114)/(72-77) 114/72     Weight: 61 kg (134 lb 7.7 oz)  Body mass index is 20.45 kg/m².    Estimated Creatinine Clearance: 13.6 mL/min (A) (based on SCr of 5 mg/dL (H)).    Physical Exam  Vitals and nursing note reviewed.   Constitutional:       General: He is awake. He is not in acute distress.     Appearance: He is well-developed. He is not toxic-appearing or diaphoretic.   HENT:      Head: Normocephalic and atraumatic.      Right Ear: External ear normal.      Left Ear: External ear normal.      Nose:      Comments: On nasal cannula     Mouth/Throat:      Mouth: Mucous membranes are moist.      Pharynx: No oropharyngeal exudate or posterior oropharyngeal erythema.   Eyes:      General: Scleral icterus present.   Cardiovascular:      Rate and Rhythm: Normal rate and regular rhythm.      Pulses:  Normal pulses.      Heart sounds: Normal heart sounds. No murmur heard.  Pulmonary:      Effort: No respiratory distress.      Breath sounds: No wheezing.   Abdominal:      General: Bowel sounds are normal. There is no distension.      Palpations: Abdomen is soft.      Tenderness: There is no abdominal tenderness. There is no guarding.   Musculoskeletal:         General: Normal range of motion.      Cervical back: Full passive range of motion without pain and neck supple.      Right lower leg: No edema.      Left lower leg: No edema.   Lymphadenopathy:      Cervical: No cervical adenopathy.   Skin:     General: Skin is warm.      Coloration: Skin is jaundiced.      Findings: No lesion or rash.   Neurological:      Mental Status: He is alert and oriented to person, place, and time.   Psychiatric:         Attention and Perception: Attention normal.         Mood and Affect: Mood normal.         Behavior: Behavior normal. Behavior is cooperative.         Thought Content: Thought content normal.         Cognition and Memory: Cognition normal.         Judgment: Judgment normal.       Significant Labs: All pertinent labs within the past 24 hours have been reviewed.    Significant Imaging: I have reviewed all pertinent imaging results/findings within the past 24 hours.

## 2022-05-09 NOTE — ASSESSMENT & PLAN NOTE
Enterococcus faecalis SBP  -Discontinue ampicillin  -Start amoxicillin 500mg BID for SBP prophylaxis  -At this point, likely low utility to repeat paracentesis

## 2022-05-09 NOTE — PROGRESS NOTES
[facility] - Transplant Stepdown  Infectious Disease  Progress Note    Patient Name: Edgar Jackson  MRN: [id number]  Admission Date: 4/30/2022  Length of Stay: 9 days  Attending Physician: Samantha Yuen MD  Primary Care Provider: Tyler Castillo MD    Isolation Status: No active isolations  Assessment/Plan:      Pneumocystis jiroveci pneumonia  61 y.o. male with a PMH ESRD 2/2 HTN s/p kidney transplant in 1/2004 (in [place]; on Tacrolimus/Sirolimus), c/b LOPEZ cirrhosis, ILD, T2DM (A1C 6.6 in 2/2022), hypothyroidism, BPH, gout was admitted on 4/30/2022 (transferred from [facility]) for abnormal labs and need for transplant liver/kidney evaluation.  Complaints of malaise, chronic cough, diarrhea, weight loss, and found febrile.  Born in [place], lived in [place], [place] before moving to [place] 9/2021.  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  Blasto urine Ag positive also.    Recommendations:  -5/6 started Clindamycin and Primaquine with steroid taper starting at prednisone 40mg BID days 1-5, then 40mg daily days 6-10, then 20mg daily days 11-21  -AFB smear x3 negative, discontinue airborne precautions  -Continue posaconazole; [id number] Viracor in process      SBP (spontaneous bacterial peritonitis)  Enterococcus faecalis SBP  -Discontinue ampicillin  -Start amoxicillin 500mg BID for SBP prophylaxis  -At this point, likely low utility to repeat paracentesis    Schistosoma  Equivocal schistosoma serology.  Will treat empirically.    Recommendations:  -Ordered praziquantel 20mg/kg q6h x3 doses    Thank you for your consult. I will follow-up with patient. Please contact us if you have any additional questions.    Zheng Chamberlain MD  Infectious Disease  [facility] - Transplant Stepdown    Subjective:     Principal Problem:ILDEFONSO (acute kidney injury)    HPI: Mr Edgar Jackson is a 61 y.o. Spanish male with a PMH ESRD 2/2 HTN s/p kidney transplant in 1/2004 (in [place]; on Tacrolimus/Sirolimus), c/b LOPEZ cirrhosis, ILD, T2DM  (A1C 6.6 in 2/2022), hypothyroidism, BPH, gout; admitted on 4/30/2022 (transferred from [facility]) for abnormal labs and need for transplant liver/kidney evaluation. Patient is a [employer]  and moved from [place], [place] (lived for 20 years) to [place] since 9/2021. He was hospitalized from 4/7 to 4/21/2022 for worsening Cr. He was received 5 days of ceftriaxone for empiric CAP - had paracentesis without evidence of SBP. He was readmitted at [facility] on 4/27 due to worsening Cr, and was transferred here on 4/30 for transplant specialities evaluation. He reports being lousy for hte past 3 months. He has chronic dry cough, decreased appetite, diarrhea (up to four times a day), 20 lb weight loss for the past 3 months. Patient developed fever to 100.7 on 5/1. Cr 4.5-5 (baseline 1.6). He was started on empiric vancomycin and cefepime. Our team was consulted for antibiotic assistance.     Patient immigrated from [place] since 1975. He previously enjoyed outdoor activities. He currently lives with his wife in [place]. No pets.               Interval History: Afebrile.  Wife at bedside believes patient is breathing more comfortably now.  Denies abdominal pain.  Reports soft BM's, not watery diarrhea.    Review of Systems   Constitutional:  Positive for fatigue. Negative for chills, diaphoresis and fever.   HENT:  Negative for rhinorrhea and sore throat.    Respiratory:  Positive for shortness of breath. Negative for cough.    Cardiovascular:  Negative for chest pain and leg swelling.   Gastrointestinal:  Negative for abdominal pain, diarrhea, nausea and vomiting.   Genitourinary:  Negative for dysuria and hematuria.   Musculoskeletal:  Negative for arthralgias and myalgias.   Skin:  Negative for rash.   Allergic/Immunologic: Positive for immunocompromised state.   Neurological:  Negative for headaches.   Objective:     Vital Signs (Most Recent):  Temp: 97.9 °F (36.6 °C) (05/09/22 1608)  Pulse: (!) 53  (05/09/22 0900)  Resp: 18 (05/09/22 0900)  BP: 114/72 (05/09/22 0900)  SpO2: 98 % (05/09/22 1329)   Vital Signs (24h Range):  Temp:  [96.4 °F (35.8 °C)-97.9 °F (36.6 °C)] 97.9 °F (36.6 °C)  Pulse:  [53-56] 53  Resp:  [18-22] 18  SpO2:  [93 %-98 %] 98 %  BP: (105-114)/(72-77) 114/72     Weight: 61 kg (134 lb 7.7 oz)  Body mass index is 20.45 kg/m².    Estimated Creatinine Clearance: 13.6 mL/min (A) (based on SCr of 5 mg/dL (H)).    Physical Exam  Vitals and nursing note reviewed.   Constitutional:       General: He is awake. He is not in acute distress.     Appearance: He is well-developed. He is not toxic-appearing or diaphoretic.   HENT:      Head: Normocephalic and atraumatic.      Right Ear: External ear normal.      Left Ear: External ear normal.      Nose:      Comments: On nasal cannula     Mouth/Throat:      Mouth: Mucous membranes are moist.      Pharynx: No oropharyngeal exudate or posterior oropharyngeal erythema.   Eyes:      General: Scleral icterus present.   Cardiovascular:      Rate and Rhythm: Normal rate and regular rhythm.      Pulses: Normal pulses.      Heart sounds: Normal heart sounds. No murmur heard.  Pulmonary:      Effort: No respiratory distress.      Breath sounds: No wheezing.   Abdominal:      General: Bowel sounds are normal. There is no distension.      Palpations: Abdomen is soft.      Tenderness: There is no abdominal tenderness. There is no guarding.   Musculoskeletal:         General: Normal range of motion.      Cervical back: Full passive range of motion without pain and neck supple.      Right lower leg: No edema.      Left lower leg: No edema.   Lymphadenopathy:      Cervical: No cervical adenopathy.   Skin:     General: Skin is warm.      Coloration: Skin is jaundiced.      Findings: No lesion or rash.   Neurological:      Mental Status: He is alert and oriented to person, place, and time.   Psychiatric:         Attention and Perception: Attention normal.         Mood and  Affect: Mood normal.         Behavior: Behavior normal. Behavior is cooperative.         Thought Content: Thought content normal.         Cognition and Memory: Cognition normal.         Judgment: Judgment normal.       Significant Labs: All pertinent labs within the past 24 hours have been reviewed.    Significant Imaging: I have reviewed all pertinent imaging results/findings within the past 24 hours.

## 2022-05-09 NOTE — PROGRESS NOTES
Hepatology Treatment Plan    Edgar Jackson is a 61 y.o. male admitted to hospital 4/30/2022 (Hospital Day: 10) due to ILDEFONSO (acute kidney injury).     Interval History  No acute events overnight.  Continues on antibiotics and antifungals.   Deconditioned, working with PT but only able to briefly sit up.    Objective  Temp:  [96.4 °F (35.8 °C)] 96.4 °F (35.8 °C) (05/08 1956)  Pulse:  [53-56] 53 (05/09 0900)  BP: (105-114)/(72-77) 114/72 (05/09 0900)  Resp:  [18-22] 18 (05/09 0900)  SpO2:  [93 %-95 %] 94 % (05/09 0900)    Constitutional:  not in acute distress and well developed  HENT: Head: Normal, normocephalic, atraumatic.  Eyes: conjunctiva clear and sclera icteric  Cardiovascular: regular rate and rhythm  Respiratory: normal chest expansion & respiratory effort   and no accessory muscle use  GI: soft, non-tender, without masses or organomegaly  Musculoskeletal: no muscular tenderness noted  Skin: jaundice present  Neurological: alert  Psychiatric: confused        Laboratory    Lab Results   Component Value Date    WBC 4.16 05/09/2022    HGB 8.4 (L) 05/09/2022    HCT 23.8 (L) 05/09/2022    MCV 84 05/09/2022    PLT 75 (L) 05/09/2022       Lab Results   Component Value Date     (L) 05/09/2022    K 4.3 05/09/2022    CL 96 05/09/2022    CO2 20 (L) 05/09/2022    BUN 84 (H) 05/09/2022    CREATININE 5.0 (H) 05/09/2022    CALCIUM 7.8 (L) 05/09/2022       Lab Results   Component Value Date    ALBUMIN 2.4 (L) 05/09/2022    ALT 12 05/09/2022    AST 75 (H) 05/09/2022    ALKPHOS 79 05/09/2022    BILITOT 5.5 (H) 05/09/2022       Lab Results   Component Value Date    INR 2.0 (H) 05/09/2022    INR 2.0 (H) 05/08/2022    INR 2.0 (H) 05/07/2022       MELD-Na score: 36 at 5/9/2022  6:14 AM  MELD score: 34 at 5/9/2022  6:14 AM  Calculated from:  Serum Creatinine: 5.0 mg/dL (Using max of 4 mg/dL) at 5/9/2022  6:14 AM  Serum Sodium: 129 mmol/L at 5/9/2022  6:14 AM  Total Bilirubin: 5.5 mg/dL at 5/9/2022  6:14 AM  INR(ratio): 2.0 at  5/9/2022  6:14 AM  Age: 61 years     Problem list  1. Decompensated LOPEZ cirrhosis  2. Hx of kidney transplant  3. ILDEFONSO on CKD  4. PJP    Plan  - continue to follow up transplant nephrology recc's    Approved for inpatient liver/kidney transplant evaluation.  Now with multiple infectious sources that will need to be further evaluated and treated.  Currently on posaconazole for blasto Ag/fungitell (awaiting other fungal studies), On vancomycin for enterococcus ascitic fluid culture.       We will hold off on further transplant evaluation at this time until treatment duration for various infections are defined.    - f/u nephrology recommendations  - f/u pulmonary recc's, bronchoscopy studies  - will plan for colonoscopy once TB is ruled out  - please obtain daily CBC, BMP, LFT, INR  - Plan of care was discussed with primary team    Thank you for involving us in the care of Edgar Jackson. Please call with any additional concerns or questions.    Reba Balderas MD  Gastroenterology Fellow

## 2022-05-09 NOTE — ASSESSMENT & PLAN NOTE
His renal functions were normal beginning of Feb.   After that it appears he had ILDEFONSO - possibly related to pre-renal states  Which led to CKD stage III, which has progressed to stage IV over the course of last few weeks     At this time no indication for hemodialysis.   Has consented for HD in case needed.   Monitor I/Os   US renal transplant unremarkable.    Cr function stabilized  - Renally dose all medications to GFR=0  - Avoid Nephrotoxic drugs   - Renal Diet  - Lokelma 10mg PRN if K>5  - Strict I/OS  - Daily RFPs

## 2022-05-09 NOTE — ASSESSMENT & PLAN NOTE
Pt with Hx of hypothyroidism   Taking LT4 50 mcg PO daily  - TSH 2.4 on 04/30/2022  - Continue home regimen  - Recommend rechecking TFTs in 4-6 weeks outpatient

## 2022-05-09 NOTE — PROGRESS NOTES
[facility] - Transplant Stepdown  Kidney Transplant  Progress Note      Reason for Follow-up: Reassessment of Kidney Transplant Referral - 5/2/2022 recipient and management of immunosuppression.          Subjective:   History of Present Illness:  61yoM with PMHx of renal transplant for hypertensive nephropathy (2004) immunosuppressed on tacrolimus/sirolimus,  therapy, LOPEZ cirrhosis, NIDDM, and ILD who was treated at OSH for ILDEFONSO 2/2 decompensated cirrhosis and worsening portal vein hypertension with HRS.   Pt reported that he had been experiencing abdominal distention with associated diarrhea (4-5/day) over several weeks, along with a constant nagging cough.  He has been having weekly paracentesis in the past month.   Despite efforts to manage symptoms, pt in need of  transplant surgery evaluation of the renal/hepatic injury, resulting in tranfer to [facility].    He reports no recent NSAID use.       Hospital Course:  No notes on file    Interval History:  - No acute overnight events  - Still have lose bowel movements  - Still requiring 2Liters of O2    Past Medical, Surgical, Family, and Social History:   Unchanged from H&P.    Scheduled Meds:   allopurinoL  100 mg Oral Daily    ampicillin IVPB  2 g Intravenous Q12H    benzonatate  200 mg Oral TID    clindamycin (CLEOCIN) IVPB  900 mg Intravenous Q8H    furosemide  20 mg Oral Daily    insulin aspart U-100  2-4 Units Subcutaneous TID PC    insulin detemir U-100  12 Units Subcutaneous Daily    levothyroxine  50 mcg Oral Before breakfast    lipase-protease-amylase 12,000-38,000-60,000 units  2 capsule Oral QID (WM & HS)    metoprolol tartrate  12.5 mg Oral BID    posaconazole  300 mg Oral Daily    predniSONE  40 mg Oral BID    primaquine  52.6 mg Oral Daily    sodium bicarbonate  1,300 mg Oral TID     Continuous Infusions:  PRN Meds:acetaminophen, albuterol-ipratropium, dextromethorphan-guaiFENesin  mg/5 ml, dextrose 10%, fluticasone propionate, glucagon  "(human recombinant), glucose, glucose, insulin aspart U-100, loperamide, naloxone, sodium chloride 0.9%    Intake/Output - Last 3 Shifts         05/07 0700 05/08 0659 05/08 0700 05/09 0659 05/09 0700  05/10 0659    P.O. 1440 960     IV Piggyback 200 200     Total Intake(mL/kg) 1640 (26.9) 1160 (19)     Urine (mL/kg/hr) 300 (0.2) 325 (0.2)     Stool 0      Total Output 300 325     Net +1340 +835            Urine Occurrence 1 x      Stool Occurrence 2 x 0 x              Review of Systems   Constitutional:  Positive for activity change and appetite change.   HENT: Negative.     Eyes: Negative.    Respiratory:  Positive for shortness of breath.    Cardiovascular: Negative.    Gastrointestinal: Negative.    Endocrine: Negative.    Genitourinary: Negative.    Musculoskeletal: Negative.    Skin: Negative.    Allergic/Immunologic: Negative.    Neurological: Negative.    Hematological: Negative.     Objective:     Vital Signs (Most Recent):  Temp: 96.4 °F (35.8 °C) (05/08/22 1956)  Pulse: (!) 53 (05/09/22 0900)  Resp: 18 (05/09/22 0900)  BP: 114/72 (05/09/22 0900)  SpO2: (!) 94 % (05/09/22 0900)   Vital Signs (24h Range):  Temp:  [96.4 °F (35.8 °C)] 96.4 °F (35.8 °C)  Pulse:  [53-56] 53  Resp:  [18-22] 18  SpO2:  [93 %-95 %] 94 %  BP: (105-114)/(72-77) 114/72     Weight: 61 kg (134 lb 7.7 oz)  Height: 5' 8" (172.7 cm)  Body mass index is 20.45 kg/m².    Physical Exam  HENT:      Head: Normocephalic.      Right Ear: Tympanic membrane normal.      Mouth/Throat:      Mouth: Mucous membranes are moist.   Eyes:      Pupils: Pupils are equal, round, and reactive to light.   Cardiovascular:      Rate and Rhythm: Normal rate.   Pulmonary:      Comments: +decreased breath sounds  +NC- 2 Liters O2   Abdominal:      General: Bowel sounds are normal.   Musculoskeletal:         General: Normal range of motion.      Cervical back: Normal range of motion.   Skin:     General: Skin is warm.   Neurological:      General: No focal deficit " present.      Mental Status: He is alert.       Laboratory:  CBC:   Recent Labs   Lab 05/07/22  0610 05/08/22  0611 05/09/22  0614   WBC 2.56* 4.14 4.16   RBC 2.65* 2.69* 2.84*   HGB 7.7* 7.9* 8.4*   HCT 22.7* 22.8* 23.8*   PLT 65* 71* 75*   MCV 86 85 84   MCH 29.1 29.4 29.6   MCHC 33.9 34.6 35.3       BMP:   Recent Labs   Lab 05/07/22  0610 05/07/22  1209 05/08/22  0611 05/09/22  0614   *  --  102 179*   *  --  130* 129*   K 3.7 3.7 3.7 4.3   CL 93*  --  98 96   CO2 20*  --  20* 20*   BUN 70*  --  73* 84*   CREATININE 5.0*  --  4.9* 5.0*   CALCIUM 7.5*  --  7.6* 7.8*         Diagnostic Results:  - Reviewwed     Assessment/Plan:     * ILDEFONSO (acute kidney injury)  His renal functions were normal beginning of Feb.   After that it appears he had ILDEFONSO - possibly related to pre-renal states  Which led to CKD stage III, which has progressed to stage IV over the course of last few weeks     At this time no indication for hemodialysis.   Has consented for HD in case needed.   Monitor I/Os   US renal transplant unremarkable.    Cr function stabilized  - Renally dose all medications to GFR=0  - Avoid Nephrotoxic drugs   - Renal Diet  - Lokelma 10mg PRN if K>5  - Strict I/OS  - Daily RFPs       Pneumocystis jiroveci pneumonia  - PJP positive  - ID following   - Currently on Clindamycin and Primaquine       Chronic diarrhea  Reports since past 2 months   GI deferred C-Scope till TB rule-out is complete     Metabolic acidosis  - Continue NaHCO3 1300 TID  - Likely secondary to diarrhea     Hyponatremia  This is likely in setting of renal failure and liver cirrhosis   Would be hard to normalize  Continue fluid restriction upto 1.2 liter/day      Cirrhosis of liver with ascites  Work up per Hepatology   AFP significantly elevated   Liver biopsy -- Chronic mild-moderately active hepatitis with bridging fibrosis and nodule   formation (Grade 2, Stage 3).      Personal history of immunosupression therapy  On sirolimus and  prograf at home   Hold at this time   Currently supratherapeutic   Sstarted on posaconazole on 5/5      Transplanted kidney  S/p kidney transplant living donor more then 10 years ago at [place].   Reports no episodes of rejections or complications post surgery   ESRD - related to HTN. Currently has no access for HD               Lilliam Topete MD  Kidney Transplant  [facility] - Transplant Stepdown

## 2022-05-09 NOTE — SUBJECTIVE & OBJECTIVE
Subjective:   History of Present Illness:  61yoM with PMHx of renal transplant for hypertensive nephropathy (2004) immunosuppressed on tacrolimus/sirolimus,  therapy, LOPEZ cirrhosis, NIDDM, and ILD who was treated at OSH for ILDEFONSO 2/2 decompensated cirrhosis and worsening portal vein hypertension with HRS.   Pt reported that he had been experiencing abdominal distention with associated diarrhea (4-5/day) over several weeks, along with a constant nagging cough.  He has been having weekly paracentesis in the past month.   Despite efforts to manage symptoms, pt in need of  transplant surgery evaluation of the renal/hepatic injury, resulting in tranfer to [facility].    He reports no recent NSAID use.       Hospital Course:  No notes on file    Interval History:  - No acute overnight events  - Still have lose bowel movements  - Still requiring 2Liters of O2    Past Medical, Surgical, Family, and Social History:   Unchanged from H&P.    Scheduled Meds:   allopurinoL  100 mg Oral Daily    ampicillin IVPB  2 g Intravenous Q12H    benzonatate  200 mg Oral TID    clindamycin (CLEOCIN) IVPB  900 mg Intravenous Q8H    furosemide  20 mg Oral Daily    insulin aspart U-100  2-4 Units Subcutaneous TID PC    insulin detemir U-100  12 Units Subcutaneous Daily    levothyroxine  50 mcg Oral Before breakfast    lipase-protease-amylase 12,000-38,000-60,000 units  2 capsule Oral QID (WM & HS)    metoprolol tartrate  12.5 mg Oral BID    posaconazole  300 mg Oral Daily    predniSONE  40 mg Oral BID    primaquine  52.6 mg Oral Daily    sodium bicarbonate  1,300 mg Oral TID     Continuous Infusions:  PRN Meds:acetaminophen, albuterol-ipratropium, dextromethorphan-guaiFENesin  mg/5 ml, dextrose 10%, fluticasone propionate, glucagon (human recombinant), glucose, glucose, insulin aspart U-100, loperamide, naloxone, sodium chloride 0.9%    Intake/Output - Last 3 Shifts         05/07 0700  05/08 0659 05/08 0700  05/09 0659 05/09 0700  05/10 0659  "   P.O. 1440 960     IV Piggyback 200 200     Total Intake(mL/kg) 1640 (26.9) 1160 (19)     Urine (mL/kg/hr) 300 (0.2) 325 (0.2)     Stool 0      Total Output 300 325     Net +1340 +835            Urine Occurrence 1 x      Stool Occurrence 2 x 0 x              Review of Systems   Constitutional:  Positive for activity change and appetite change.   HENT: Negative.     Eyes: Negative.    Respiratory:  Positive for shortness of breath.    Cardiovascular: Negative.    Gastrointestinal: Negative.    Endocrine: Negative.    Genitourinary: Negative.    Musculoskeletal: Negative.    Skin: Negative.    Allergic/Immunologic: Negative.    Neurological: Negative.    Hematological: Negative.     Objective:     Vital Signs (Most Recent):  Temp: 96.4 °F (35.8 °C) (05/08/22 1956)  Pulse: (!) 53 (05/09/22 0900)  Resp: 18 (05/09/22 0900)  BP: 114/72 (05/09/22 0900)  SpO2: (!) 94 % (05/09/22 0900)   Vital Signs (24h Range):  Temp:  [96.4 °F (35.8 °C)] 96.4 °F (35.8 °C)  Pulse:  [53-56] 53  Resp:  [18-22] 18  SpO2:  [93 %-95 %] 94 %  BP: (105-114)/(72-77) 114/72     Weight: 61 kg (134 lb 7.7 oz)  Height: 5' 8" (172.7 cm)  Body mass index is 20.45 kg/m².    Physical Exam  HENT:      Head: Normocephalic.      Right Ear: Tympanic membrane normal.      Mouth/Throat:      Mouth: Mucous membranes are moist.   Eyes:      Pupils: Pupils are equal, round, and reactive to light.   Cardiovascular:      Rate and Rhythm: Normal rate.   Pulmonary:      Comments: +decreased breath sounds  +NC- 2 Liters O2   Abdominal:      General: Bowel sounds are normal.   Musculoskeletal:         General: Normal range of motion.      Cervical back: Normal range of motion.   Skin:     General: Skin is warm.   Neurological:      General: No focal deficit present.      Mental Status: He is alert.       Laboratory:  CBC:   Recent Labs   Lab 05/07/22  0610 05/08/22  0611 05/09/22  0614   WBC 2.56* 4.14 4.16   RBC 2.65* 2.69* 2.84*   HGB 7.7* 7.9* 8.4*   HCT 22.7* 22.8* " 23.8*   PLT 65* 71* 75*   MCV 86 85 84   MCH 29.1 29.4 29.6   MCHC 33.9 34.6 35.3       BMP:   Recent Labs   Lab 05/07/22  0610 05/07/22  1209 05/08/22  0611 05/09/22  0614   *  --  102 179*   *  --  130* 129*   K 3.7 3.7 3.7 4.3   CL 93*  --  98 96   CO2 20*  --  20* 20*   BUN 70*  --  73* 84*   CREATININE 5.0*  --  4.9* 5.0*   CALCIUM 7.5*  --  7.6* 7.8*         Diagnostic Results:  - Reviewwed

## 2022-05-09 NOTE — ASSESSMENT & PLAN NOTE
Equivocal schistosoma serologies.  Will treat empirically.    Recommendations:  -Ordered praziquantel 20mg/kg q6h x3 doses

## 2022-05-09 NOTE — ASSESSMENT & PLAN NOTE
61 y.o. male with a PMH ESRD 2/2 HTN s/p kidney transplant in 1/2004 (in [place]; on Tacrolimus/Sirolimus), c/b LOPEZ cirrhosis, ILD, T2DM (A1C 6.6 in 2/2022), hypothyroidism, BPH, gout was admitted on 4/30/2022 (transferred from [facility]) for abnormal labs and need for transplant liver/kidney evaluation.  Complaints of malaise, chronic cough, diarrhea, weight loss, and found febrile.  Born in [place], lived in [place], [place] before moving to [place] 9/2021.  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  Blasto urine Ag positive also.    Recommendations:  -5/6 started Clindamycin and Primaquine with steroid taper starting at prednisone 40mg BID days 1-5, then 40mg daily days 6-10, then 20mg daily days 11-21  -AFB smear x3 negative, discontinue airborne precautions  -Continue posaconazole; [id number] Viracor in process

## 2022-05-09 NOTE — PROGRESS NOTES
Progress Note  Hospital Medicine      Patient Name: Edgar Jackson  MRN: [id number]  Patient Class: IP- Inpatient  Admission Date: 4/30/2022  Attending Physician:Samantha Yuen MD   Primary Care Provider: Tyler Castillo MD            SUBJECTIVE:     Follow-up For:  ILDEFONSO (acute kidney injury)     Interval history/ROS:   5/9: to begin treatment for schistosomiasis    5/8: TB rule out continues    5/7: patient with dry cough, feels weak.       HPI: Mr Jackson is a 61yoM with PMHx of renal transplant for hypertensive nephropathy (2004) immunosuppressed on tacrolimus/sirolimus,  therapy, LOPEZ cirrhosis, NIDDM, and ILD who was treated at OSH for ILDEFONSO 2/2 decompensated cirrhosis and worsening portal vein hypertension with HRS. Pt reported that he had been experiencing abdominal distention with associated diarrhea (4-5/day) over several weeks, along with a constant nagging cough. On presenting to OSH, multiple paracentesis (3) were performed to relieve the ascitic fluid. No evidence of SBP on fluid analysis. Creon was added to diet to aide in food digestion in setting of cirrhosis. Loperamide provided symptomatic treatment of diarrhea. Dextromethorphan-guaifenesin syrup added to reduce cough exacerbations. Despite efforts to manage symptoms, pt in need of  transplant surgery evaluation of the renal/hepatic injury, resulting in tranfer to [facility].     On arrival to [facility] (4/30/2022), pt has experienced increased generalized weakness. Loperamide was continued for diarrhea management. Abdominal distention recurred, despite prior paracentesis. Cough has been fairly constant and predominantly dry. Pt now endorses new left lower quadrant abdominal pain, worsened by cough. KTM and Hepatology consults placed for management of HRS.  OBJECTIVE:     Body mass index is 20.45 kg/m².    Vital Signs Range (Last 24H):  Temp:  [96.4 °F (35.8 °C)-97.9 °F (36.6 °C)]   Pulse:  [53-56]   Resp:  [18-22]   BP: (105-114)/(72-77)   SpO2:  [93 %-98 %]     I & O (Last  24H):    Intake/Output Summary (Last 24 hours) at 5/9/2022 1732  Last data filed at 5/9/2022 1715  Gross per 24 hour   Intake 770 ml   Output --   Net 770 ml        Physical Exam:  Constitutional:       General: He is awake. He is not in acute distress.     Appearance: He is well-developed. He is not toxic-appearing or diaphoretic.   HENT:      Head: Normocephalic and atraumatic.      Right Ear: External ear normal.      Left Ear: External ear normal.      Nose:      Comments: On nasal cannula     Mouth/Throat:      Mouth: Mucous membranes are moist.      Pharynx: No oropharyngeal exudate or posterior oropharyngeal erythema.   Eyes:      General: Scleral icterus present.   Cardiovascular:      Rate and Rhythm: Normal rate and regular rhythm.      Pulses: Normal pulses.      Heart sounds: Normal heart sounds. No murmur heard.  Pulmonary:      Effort: No respiratory distress.      Breath sounds: No wheezing.   Abdominal:      General: Bowel sounds are normal. There is no distension.      Palpations: Abdomen is soft.      Tenderness: There is no abdominal tenderness. There is no guarding.   Musculoskeletal:         General: Normal range of motion.      Cervical back: Full passive range of motion without pain and neck supple.      Right lower leg: Positive for edema.      Left lower leg: Positive for edema.   Lymphadenopathy:      Cervical: No cervical adenopathy.   Skin:     General: Skin is warm.      Coloration: Skin is jaundiced.      Findings: No lesion or rash.   Neurological:      Mental Status: He is alert and oriented to person, place, and time.   Psychiatric:         Attention and Perception: Attention normal.         Mood and Affect: Mood normal.         Behavior: Behavior normal. Behavior is cooperative.         Thought Content: Thought content normal.         Cognition and Memory: Cognition normal.         Judgment: Judgment normal.        Recent Labs   Lab 05/07/22  0610 05/07/22  1209 05/08/22  0611  05/09/22  0614   *  --  130* 129*   K 3.7 3.7 3.7 4.3   CL 93*  --  98 96   CO2 20*  --  20* 20*   BUN 70*  --  73* 84*   CREATININE 5.0*  --  4.9* 5.0*   *  --  102 179*   CALCIUM 7.5*  --  7.6* 7.8*     Recent Labs   Lab 05/07/22  0610 05/08/22  0611 05/09/22  0614   ALKPHOS 83 82 79   ALT 7* 9* 12   AST 61* 72* 75*   ALBUMIN 2.5* 2.5* 2.4*   PROT 4.8* 4.8* 4.8*   BILITOT 6.0* 5.3* 5.5*   INR 2.0* 2.0* 2.0*       Recent Labs   Lab 05/04/22  0835 05/05/22  0747 05/07/22  0610 05/08/22  0611 05/09/22  0614   WBC  --    < > 2.56* 4.14 4.16   HGB  --    < > 7.7* 7.9* 8.4*   HCT  --    < > 22.7* 22.8* 23.8*   PLT  --    < > 65* 71* 75*   GRAN  --    < > 81.6*  2.1 81.5*  3.4 80.8*  3.4   SEGS 79  --   --   --   --    LYMPH  --    < > 12.5*  0.3* 10.1*  0.4* 10.1*  0.4*   LYMPHS 9  --   --   --   --    MONO  --    < > 5.1  0.1* 7.2  0.3 7.2  0.3    < > = values in this interval not displayed.       Recent Labs   Lab 05/08/22  1726 05/08/22  2121 05/09/22  0151 05/09/22  0904 05/09/22  1208 05/09/22  1711   POCTGLUCOSE 141* 176* 186* 193* 273* 373*       No results for input(s): TROPONINI in the last 168 hours.    Diagnostic Results:  Labs: Reviewed    allopurinoL, 100 mg, Oral, Daily  amoxicillin, 500 mg, Oral, Q12H  benzonatate, 200 mg, Oral, TID  clindamycin (CLEOCIN) IVPB, 900 mg, Intravenous, Q8H  furosemide, 20 mg, Oral, Daily  insulin aspart U-100, 2-4 Units, Subcutaneous, TID PC  insulin aspart U-100, 7 Units, Subcutaneous, Once  insulin detemir U-100, 12 Units, Subcutaneous, Daily  levothyroxine, 50 mcg, Oral, Before breakfast  lipase-protease-amylase 12,000-38,000-60,000 units, 2 capsule, Oral, QID (WM & HS)  loperamide, 2 mg, Oral, TID  metoprolol tartrate, 12.5 mg, Oral, BID  posaconazole, 300 mg, Oral, Daily  [START ON 5/12/2022] predniSONE, 40 mg, Oral, Daily   Followed by  [START ON 5/17/2022] predniSONE, 20 mg, Oral, Daily  predniSONE, 40 mg, Oral, BID  primaquine, 52.6 mg, Oral,  Daily  sodium bicarbonate, 1,300 mg, Oral, TID        As Needed acetaminophen, albuterol-ipratropium, dextromethorphan-guaiFENesin  mg/5 ml, dextrose 10%, fluticasone propionate, glucagon (human recombinant), glucose, glucose, insulin aspart U-100, naloxone, sodium chloride 0.9%    ASSESSMENT/PLAN:     Assessment: Edgar Jackson is a 61 y.o. male here with:     Active Hospital Problems    Diagnosis  POA    *ILDEFONSO (acute kidney injury) [N17.9]  Yes    Pneumocystis jiroveci pneumonia [B59]  Unknown    SBP (spontaneous bacterial peritonitis) [K65.2]  Unknown    Severe malnutrition [E43]  Yes     Severe malnutrition  Nutrition Problem  Severe protein-calorie malnutrition     Related to (etiology):   Chronic illness     Signs and Symptoms (as evidenced by):   Wt loss of 28 lb (17.5%) in 3 months  Energy intake <75% of EEN for >3 months  Moderate muscle mass depletion (temples, clavicles, shoulders, thigh, calf)     Interventions/Recommendations (treatment strategy):  Collaboration with other providers     Nutrition Diagnosis Status:   New      Lung infiltrate on CT [R91.8]  Yes    Sepsis [A41.9]  No    Chronic diarrhea [K52.9]  Yes    Type 2 diabetes mellitus without complication, without long-term current use of insulin [E11.9]  Yes    Chronic cough [R05.3]  Yes    Fever in immunocomprised patient [R50.9]  Yes    Hypothyroidism [E03.9]  Yes     Chronic    Thrombocytopenia [D69.6]  Yes    Hyponatremia [E87.1]  Yes    Metabolic acidosis [E87.2]  Yes    Interstitial lung disease [J84.9]  Yes    Cirrhosis of liver with ascites [K74.60, R18.8]  Yes    Transplanted kidney [Z94.0]  Not Applicable    Gout [M10.9]  Yes    Personal history of immunosupression therapy [Z92.25]  Not Applicable      Resolved Hospital Problems   No resolved problems to display.        Plan:     Pneumocystis jiroveci pneumonia  61 y.o. male with a PMH ESRD 2/2 HTN s/p kidney transplant in 1/2004 (in [place]; on Tacrolimus/Sirolimus), c/b  LOPEZ cirrhosis, ILD, T2DM (A1C 6.6 in 2/2022), hypothyroidism, BPH, gout was admitted on 4/30/2022 (transferred from [facility]) for abnormal labs and need for transplant liver/kidney evaluation.  Complaints of malaise, chronic cough, diarrhea, weight loss, and found febrile.  Born in [place], lived in [place], [place] before moving to [place] 9/2021.  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  Blasto urine Ag positive also.     -5/6 started Clindamycin and Primaquine with steroid taper starting at prednisone 40mg BID days 1-5, then 40mg daily days 6-10, then 20mg daily days 11-21  -Continue posaconazole  -Airborne precautions until TB rule out, need 3 total AFB Cx  -Can have RT assist by inducing sputum for AFB Cx collection  -[id number] Viracor in process  -GI consulted, planned for colonoscopy after TB rule out     SBP (spontaneous bacterial peritonitis)  Enterococcus faecalis SBP  -Continue ampicillin  -Repeat paracentesis after TB rule out    Type 2 diabetes mellitus without complication, without long-term current use of insulin  Pt with Hx of DM2  Currently with BG excursions due to steroid taper placed by ID for PJP     Home regimen: Long acing insulin 30 units and metformin 500 mg PO BID     BG goal 140-180  Currently BG uncontrolled due to pt receiving steroids for PJP treatment     Plan  Recommend intensive insulin drip with POCt q1hr until BG at goal with clear liquid bariatric diet   Once BG stable will consider placing him on SQ insulin (15 units long acting and 7 units before meals with moderate correction and will switch diet to renal and diabetic diet)     Will keep following and will adjust insulin regimen accordingly   Due to kidney function, pt is to discontinue metformin from his home regimen and to discard metformin pills from home.        Hypothyroidism  Pt with Hx of hypothyroidism   Taking LT4 50 mcg PO daily  Continue home regimen     ILDEFONSO (acute kidney injury)  His renal  functions were normal beginning of Feb.   After that it appears he had ILDEFONSO - possibly related to pre-renal states  Which led to CKD stage III, which has progressed to stage IV over the course of last few weeks     Has consented for HD in case needed.   Monitor I/Os   US renal transplant unremarkable.  Urine Output on 5/6 noted to be 450ml   Cr: 6.0-->5.7-->5.8-->5.3-->5.4-->5.8-->5.0 (on 5/7)     - No acute indication for HD  - Renally dose all medications to GFR=0  - Avoid Nephrotoxic drugs   - Renal Diet  - Lokelma 10mg PRN if K>5  - Strict I/OS  - Daily RFPs     Pneumocystis jiroveci pneumonia  - PJP positive  - ID following - Currently on Clindamycin and Primaquine   - If patient needs bactrim for clearance of infection- Nephrology will help with HD needs         Chronic diarrhea  Reports since past 2 months   stool studies in progress   Will undergo C-Scope on 5/4 with GI     Metabolic acidosis  - Continue NaHCO3 1300 TID  - Likely secondary to diarrhea      Hyponatremia  This is likely in setting of renal failure and liver cirrhosis   Would be hard to normalize  Continue fluid restriction upto 1.2 liter/day     MELD-Na score: 35 at 5/6/2022  6:08 AM  MELD score: 32 at 5/6/2022  6:08 AM  Calculated from:  Serum Creatinine: 5.1 mg/dL (Using max of 4 mg/dL) at 5/6/2022  6:08 AM  Serum Sodium: 125 mmol/L at 5/6/2022  6:08 AM  Total Bilirubin: 5.5 mg/dL at 5/6/2022  6:08 AM  INR(ratio): 1.7 at 5/6/2022  6:08 AM  Age: 61 years     Plan  - continue to follow up transplant nephrology recc's     Approved for inpatient liver/kidney transplant evaluation.  Now with multiple infectious sources that will need to be further evaluated and treated.  Currently on posaconazole for blasto Ag/fungitell (awaiting other fungal studies), On vancomycin for enterococcus ascitic fluid culture.        We will hold off on further transplant evaluation at this time until treatment duration for various infections are defined.     - f/u  nephrology recommendations  - f/u pulmonary recc's, bronchoscopy studies  - colonoscopy per GI  - please obtain daily CBC, BMP, LFT, INR  - Plan of care was discussed with primary team       HIGH RISK CONDITION(S):  Patient has a condition that poses threat to life and bodily function: Acute Renal Failure          Samantha Yuen MD

## 2022-05-09 NOTE — ASSESSMENT & PLAN NOTE
Pt with Hx of DM2  With BG excursions due to steroid taper placed by ID for PJP  - 60 kg x 0.3 = 18 TDD x 0.5 = 9 basal / 9 prandial  Home regimen: Long acting insulin 30 units and metformin 500 mg PO BID    BG goal 140-180  Currently BG controlled, expect hyperglycemia as appetite improves while on high-dose steroids    Plan  - Continue levemir 12 units daily  - Continue aspart 2-4 units before meals if appetite improves (Administer 2 units if he eats 50% or less of his meal, administer full dose 4 units if he eats >50%) with low dose correction  - Due to kidney function, pt is to discontinue metformin from his home regimen and to discard metformin pills from home.

## 2022-05-09 NOTE — ASSESSMENT & PLAN NOTE
S/p kidney transplant living donor more then 10 years ago at [facility].   Reports no episodes of rejections or complications post surgery   ESRD - related to HTN. Currently has no access for HD

## 2022-05-09 NOTE — SUBJECTIVE & OBJECTIVE
"Interval HPI:   Overnight events:  No acute events reported overnight  Eating:   NPO  Nausea: No  Hypoglycemia and intervention: No  Fever: No  TPN and/or TF: No  If yes, type of TF/TPN and rate: NA    /74   Pulse (!) 53   Temp 96.4 °F (35.8 °C) (Axillary)   Resp 19   Ht 5' 8" (1.727 m)   Wt 61 kg (134 lb 7.7 oz)   SpO2 95%   BMI 20.45 kg/m²     Labs Reviewed and Include    Recent Labs   Lab 05/09/22  0614   *   CALCIUM 7.8*   ALBUMIN 2.4*   PROT 4.8*   *   K 4.3   CO2 20*   CL 96   BUN 84*   CREATININE 5.0*   ALKPHOS 79   ALT 12   AST 75*   BILITOT 5.5*     Lab Results   Component Value Date    WBC 4.16 05/09/2022    HGB 8.4 (L) 05/09/2022    HCT 23.8 (L) 05/09/2022    MCV 84 05/09/2022    PLT 75 (L) 05/09/2022     No results for input(s): TSH, FREET4 in the last 168 hours.  Lab Results   Component Value Date    HGBA1C 6.6 (H) 02/09/2022       Nutritional status:   Body mass index is 20.45 kg/m².  Lab Results   Component Value Date    ALBUMIN 2.4 (L) 05/09/2022    ALBUMIN 2.5 (L) 05/08/2022    ALBUMIN 2.5 (L) 05/07/2022     No results found for: PREALBUMIN    Estimated Creatinine Clearance: 13.6 mL/min (A) (based on SCr of 5 mg/dL (H)).    Accu-Checks  Recent Labs     05/08/22  0450 05/08/22  0519 05/08/22  0630 05/08/22  0729 05/08/22  0820 05/08/22  1144 05/08/22  1726 05/08/22  2121 05/09/22  0151 05/09/22  0904   POCTGLUCOSE 95 101 110 120* 114* 123* 141* 176* 186* 193*       Current Medications and/or Treatments Impacting Glycemic Control  Immunotherapy:    Immunosuppressants       None          Steroids:   Hormones (From admission, onward)                Start     Stop Route Frequency Ordered    05/06/22 1730  predniSONE tablet 40 mg         05/11 2059 Oral 2 times daily 05/06/22 1719          Pressors:    Autonomic Drugs (From admission, onward)                None          Hyperglycemia/Diabetes Medications:   Antihyperglycemics (From admission, onward)                Start     " Stop Route Frequency Ordered    05/08/22 0945  insulin aspart U-100 pen 2-4 Units         -- SubQ 3 times daily after meals 05/08/22 0753 05/08/22 0900  insulin detemir U-100 pen 12 Units         -- SubQ Daily 05/08/22 0753    05/08/22 0853  insulin aspart U-100 pen 0-5 Units         -- SubQ Before meals & nightly PRN 05/08/22 0758

## 2022-05-09 NOTE — PROGRESS NOTES
[facility] - Transplant Stepdown  Infectious Disease  Progress Note    Patient Name: Edgar Jackson  MRN: [id number]  Admission Date: 4/30/2022  Length of Stay: 8 days  Attending Physician: Samantha Yuen MD  Primary Care Provider: Tyler Castillo MD    Isolation Status: Airborne  Assessment/Plan:      Pneumocystis jiroveci pneumonia  61 y.o. male with a PMH ESRD 2/2 HTN s/p kidney transplant in 1/2004 (in [place]; on Tacrolimus/Sirolimus), c/b LOPEZ cirrhosis, ILD, T2DM (A1C 6.6 in 2/2022), hypothyroidism, BPH, gout was admitted on 4/30/2022 (transferred from [facility]) for abnormal labs and need for transplant liver/kidney evaluation.  Complaints of malaise, chronic cough, diarrhea, weight loss, and found febrile.  Born in [place], lived in [place], [place] before moving to [place] 9/2021.  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  Blasto urine Ag positive also.    Recommendations:  -5/6 started Clindamycin and Primaquine with steroid taper starting at prednisone 40mg BID days 1-5, then 40mg daily days 6-10, then 20mg daily days 11-21  -Continue posaconazole  -Airborne precautions until TB rule out, need 3 total AFB Cx  -Can have RT assist by inducing sputum for AFB Cx collection  -[id number] Viracor in process      SBP (spontaneous bacterial peritonitis)  Enterococcus faecalis SBP  -Continue ampicillin  -Repeat paracentesis after TB rule out to ensure resolution of SBP    Chronic diarrhea  Prior to admission, patient with 4 episodes of diarrhea per day for a month. Diarrhea has now slowed down - if resolves, may not need colonoscopy.    -GI consulted, colonoscopy was planned, but held for TB rule out          Thank you for your consult. I will follow-up with patient. Please contact us if you have any additional questions.    Augusta Neville MD  Infectious Disease  [facility] - Transplant Stepdown    Subjective:     Principal Problem:ILDEFONSO (acute kidney injury)    HPI: Mr Edgar Jackson is a 61 y.o. Polish male with a  PMH ESRD 2/2 HTN s/p kidney transplant in 1/2004 (in [place]; on Tacrolimus/Sirolimus), c/b LOPEZ cirrhosis, ILD, T2DM (A1C 6.6 in 2/2022), hypothyroidism, BPH, gout; admitted on 4/30/2022 (transferred from [facility]) for abnormal labs and need for transplant liver/kidney evaluation. Patient is a [employer]  and moved from [place], [place] (lived for 20 years) to [place] since 9/2021. He was hospitalized from 4/7 to 4/21/2022 for worsening Cr. He was received 5 days of ceftriaxone for empiric CAP - had paracentesis without evidence of SBP. He was readmitted at [facility] on 4/27 due to worsening Cr, and was transferred here on 4/30 for transplant specialities evaluation. He reports being lousy for hte past 3 months. He has chronic dry cough, decreased appetite, diarrhea (up to four times a day), 20 lb weight loss for the past 3 months. Patient developed fever to 100.7 on 5/1. Cr 4.5-5 (baseline 1.6). He was started on empiric vancomycin and cefepime. Our team was consulted for antibiotic assistance.     Patient immigrated from [place] since 1975. He previously enjoyed outdoor activities. He currently lives with his wife in [place]. No pets.               Interval History:   On NC  Coughing has decreased  Unable to sleep last night because of frequent blood sugar checks  One BM overnight, loose  No appetite, poor PO intake  Some abdominal pain, but improving.    Review of Systems   Constitutional:  Positive for activity change, appetite change and fatigue. Negative for chills, diaphoresis and fever.   HENT:  Negative for rhinorrhea and sore throat.    Respiratory:  Positive for cough. Negative for shortness of breath.    Cardiovascular:  Negative for chest pain and leg swelling.   Gastrointestinal:  Positive for abdominal pain and diarrhea. Negative for nausea and vomiting.   Genitourinary:  Negative for dysuria and hematuria.   Musculoskeletal:  Negative for arthralgias and myalgias.   Skin:  Negative  for rash.   Neurological:  Negative for headaches.   Objective:     Vital Signs (Most Recent):  Temp: 97.2 °F (36.2 °C) (05/07/22 1104)  Pulse: (!) 52 (05/07/22 1104)  Resp: 20 (05/07/22 1104)  BP: 92/61 (05/07/22 1104)  SpO2: 96 % (05/07/22 1104)   Vital Signs (24h Range):  Temp:  [97.2 °F (36.2 °C)-99.3 °F (37.4 °C)] 97.2 °F (36.2 °C)  Pulse:  [52-70] 52  Resp:  [12-21] 20  SpO2:  [92 %-98 %] 96 %  BP: ()/(61-84) 92/61     Weight: 61 kg (134 lb 7.7 oz)  Body mass index is 20.45 kg/m².    Estimated Creatinine Clearance: 13.6 mL/min (A) (based on SCr of 5 mg/dL (H)).    Physical Exam  Constitutional:       General: He is not in acute distress.     Appearance: He is ill-appearing. He is not toxic-appearing or diaphoretic.   HENT:      Head: Normocephalic and atraumatic.      Nose:      Comments: On nasal cannula  Eyes:      General: Scleral icterus present.   Cardiovascular:      Rate and Rhythm: Normal rate and regular rhythm.   Pulmonary:      Effort: Pulmonary effort is normal. No respiratory distress.      Breath sounds: No wheezing, rhonchi or rales.   Abdominal:      General: Abdomen is flat. There is no distension.      Tenderness: There is no abdominal tenderness. There is no guarding.   Musculoskeletal:      Right lower leg: Edema present.      Left lower leg: Edema present.   Skin:     Coloration: Skin is jaundiced.   Neurological:      Mental Status: He is alert.       Significant Labs: All pertinent labs within the past 24 hours have been reviewed.    Significant Imaging: I have reviewed all pertinent imaging results/findings within the past 24 hours.

## 2022-05-09 NOTE — PLAN OF CARE
-AAO4, VSS/afebrile, on 2L NC, denies pain  -admitted for ILDEFONSO; kidney/liver w/u  -tessalon vega scheduled and dextromethorphan-guaifenesin available PRN for nagging cough; cough has significantly improved over the weekend per my assessment  -airborne precautions maintained until TB ruled out; pt was able to produce third sputum sample today; specimens pending  -plan to repeat para and do EGD/colonoscopy after TB ruled out  -CT with opacifications, reverse halo sign > JPJ  -ampicillin q12, clindamycin q8  -labs monitored; H/H 7.9/22.8 up from last, LFTs stable, Cr 4.9 down from last, WBC WDL  -insulin gtt D/Edmund; accuchecks ACHS+0200; ss per MAR  -diabetic/renal diet maintained  -voids independently per urinal  -wife at bedside, fall precautions maintained, call bell in reach

## 2022-05-10 PROBLEM — B40.9 BLASTOMYCOSIS: Status: ACTIVE | Noted: 2022-01-01

## 2022-05-10 NOTE — ASSESSMENT & PLAN NOTE
Endocrinology consulted for BG management.   BG goal 140-180    - Levemir Flex Pen 12 units daily  - Novolog (aspart) insulin 3-6 Units SQ TIDWM and prn for BG excursions [facility] SSI (150/25)  - BG checks AC/HS  - Hypoglycemia protocol in place  - If blood glucose greater than 300, please ask patient not to eat food or drink anything other than water until correctional insulin has brought it back below 250    ** Please notify Endocrine for any change and/or advance in diet**  ** Please call Endocrine for any BG related issues **    Discharge Planning:   TBD. Please notify endocrinology prior to discharge.

## 2022-05-10 NOTE — PT/OT/SLP PROGRESS
"Occupational Therapy   Treatment    Name: Edgar Jackson  MRN: [id number]  Admitting Diagnosis:  ILDEFONSO (acute kidney injury)  5 Days Post-Op     Pre-op Diagnosis: Chronic diarrhea [K52.9]    Procedure(s):  EGD (ESOPHAGOGASTRODUODENOSCOPY)  COLONOSCOPY     Recommendations:     Discharge Recommendations: home health OT  Discharge Equipment Recommendations:  none  Barriers to discharge:  None    Assessment:     Edgar Jackson is a 61 y.o. male with a medical diagnosis of ILDEFONSO (acute kidney injury).  He presents with the following performance deficits affecting function:  weakness, impaired endurance, impaired self care skills, impaired functional mobilty, gait instability, impaired balance, decreased upper extremity function, decreased lower extremity function, impaired cardiopulmonary response to activity.     Pt agreeable to therapy, but very limited by poor endurance and weakness. Pt performed BUE therex sitting in the chair with 3 rest breaks taken throughout. Pt then ambulated into the bathroom with CGA- Min A for a BM on the toilet with SBA. Pt would benefit from continued skilled acute OT services in order to maximize independence and safety with ADLs and functional mobility to ensure safe return to PLOF in the least restrictive environment. OT recommending HHOT once pt is medically appropriate for d/c.       Rehab Prognosis:  Good; patient would benefit from acute skilled OT services to address these deficits and reach maximum level of function.       Plan:     Patient to be seen 3 x/week to address the above listed problems via self-care/home management, therapeutic activities, therapeutic exercises  · Plan of Care Expires: 05/31/22  · Plan of Care Reviewed with: patient    Subjective     "I am so tired"     Pain/Comfort:  · Pain Rating 1: 0/10    Objective:     Communicated with: RN prior to session.  Patient found up in chair with oxygen, telemetry, pulse ox (continuous) upon OT entry to room.    General Precautions: Standard, " fall, airborne   Orthopedic Precautions:N/A   Braces: N/A  Respiratory Status: Nasal cannula, flow 2 L/min     Occupational Performance:     Bed Mobility:    · Patient completed Sit to Supine with contact guard assistance     Functional Mobility/Transfers:  · Patient completed Sit <> Stand Transfer with minimum assistance  with  rolling walker   · Patient completed Toilet Transfer Step Transfer technique with moderate assistance with  rolling walker  · Functional Mobility: Pt engaging in functional mobility to simulate household/community distances approx 20 ft with CGA-Min A and utilizing RW in order to maximize functional activity tolerance and standing balance required for engagement in occupations of choice.  · Pt with 2 LOB requiring Min A     Activities of Daily Living:  · Toileting: stand by assistance : For a small BM on the toilet.    · Feeding: Minimum assistance: To take a sip of water from a water bottle. Pt demonstrating decreased  strength, requiring assistance to open the lid.     Therapeutic Exercise:    In sitting, performed the following for 2x10 with rest breaks between each trial   o Alternating punches to hit a target   o Shoulder flexion   o Shoulder abduction     AMPAC 6 Click ADL: 20    Treatment & Education:   Therapist provided facilitation and instruction of proper body mechanics and fall prevention strategies during tasks listed above.   Instructed patient to sit in bedside chair daily to increase OOB/activity tolerance.   Instructed patient to use call light to have nursing staff assist with needs/transfers.   Discussed OT POC and answered all questions within OT scope of practice.   Whiteboard updated       Patient left HOB elevated with all lines intact and call button in reachEducation:      GOALS:   Multidisciplinary Problems     Occupational Therapy Goals        Problem: Occupational Therapy    Goal Priority Disciplines Outcome Interventions   Occupational Therapy Goal      OT, PT/OT Ongoing, Progressing    Description: Goals to be met by: 5/19     Patient will increase functional independence with ADLs by performing:    UE Dressing with Modified Teller.  LE Dressing with Modified Teller.  Grooming while standing with Modified Teller.  Toileting from toilet with Modified Teller for hygiene and clothing management.   Supine to sit with Modified Teller.  Step transfer with Modified Teller using AD as needed  Upper extremity exercise program x15 reps per handout, with independence.                     Time Tracking:     OT Date of Treatment: 05/10/22  OT Start Time: 1316  OT Stop Time: 1345  OT Total Time (min): 29 min    Billable Minutes:Self Care/Home Management 10  Therapeutic Exercise 19    OT/SLOANE: OT          5/10/2022

## 2022-05-10 NOTE — H&P
Inpatient Radiology Pre-procedure Note    History of Present Illness:  Edgar Jackson is a 61 y.o. male with decompensated LOPEZ cirrhosis who was admitted with worsening abdominal pain who presents for diagnostic paracentesis.    Admission H&P reviewed.  Past Medical History:   Diagnosis Date    BPH (benign prostatic hyperplasia)     Diabetes 2020    Gout     Hypertension 2000    Hypothyroidism 04/08/2022    Interstitial lung disease 03/30/2022    Kidney transplant recipient 01/2004    [place] 2* hypertensive nephropathy    Unspecified cirrhosis of liver     ?LOPEZ     Past Surgical History:   Procedure Laterality Date    CATARACT EXTRACTION Right 2017    COLONOSCOPY N/A 3/8/2022    Procedure: COLONOSCOPY;  Surgeon: Mio Simmons III, MD;  Location: [facility];  Service: Endoscopy;  Laterality: N/A;    COLONOSCOPY N/A 5/5/2022    Procedure: COLONOSCOPY;  Surgeon: Chiquita Lewis MD;  Location: [facility] ENDO (2ND FLR);  Service: Endoscopy;  Laterality: N/A;    COLONOSCOPY N/A 5/5/2022    Procedure: COLONOSCOPY;  Surgeon: Chiquita Lewis MD;  Location: [facility] ENDO (2ND FLR);  Service: Endoscopy;  Laterality: N/A;    ENDOSCOPIC ULTRASOUND OF UPPER GASTROINTESTINAL TRACT N/A 3/8/2022    Procedure: ULTRASOUND, UPPER GI TRACT, ENDOSCOPIC;  Surgeon: Mio Simmons III, MD;  Location: [facility];  Service: Endoscopy;  Laterality: N/A;    ESOPHAGOGASTRODUODENOSCOPY N/A 5/5/2022    Procedure: EGD (ESOPHAGOGASTRODUODENOSCOPY);  Surgeon: Chiquita Lewis MD;  Location: [facility] ENDO (2ND FLR);  Service: Endoscopy;  Laterality: N/A;    ESOPHAGOGASTRODUODENOSCOPY N/A 5/5/2022    Procedure: EGD (ESOPHAGOGASTRODUODENOSCOPY);  Surgeon: Chiquita Lewis MD;  Location: [facility] ENDO (2ND FLR);  Service: Endoscopy;  Laterality: N/A;    KIDNEY TRANSPLANT  2004    PORTACATH PLACEMENT  2003    REMOVAL OF VASCULAR ACCESS PORT  2005       Review of Systems:   As documented in primary team H&P    Home Meds:   Prior to Admission  "medications    Medication Sig Start Date End Date Taking? Authorizing Provider   alfuzosin (UROXATRAL) 10 mg Tb24 Take 1 tablet (10 mg total) by mouth after dinner. 12/29/21 5/4/22 Yes SANJUANA Fung   allopurinoL (ZYLOPRIM) 100 MG tablet Take 100 mg by mouth once daily. 10/12/21  Yes Historical Provider   benzonatate (TESSALON) 200 MG capsule Take 1 capsule (200 mg total) by mouth 3 (three) times daily. 4/21/22 5/21/22 Yes Manoj Allen MD   furosemide (LASIX) 20 MG tablet Take 1 tablet (20 mg total) by mouth once daily. 4/21/22 4/21/23 Yes Manoj Allen MD   levothyroxine (SYNTHROID) 50 MCG tablet Take 50 mcg by mouth once daily. 3/28/22  Yes Historical Provider   lipase-protease-amylase 12,000-38,000-60,000 units (CREON) CpDR Take 2 capsules by mouth 4 (four) times daily with meals and nightly. 4/21/22 4/21/23 Yes Manoj Allen MD   metoprolol tartrate (LOPRESSOR) 50 MG tablet Take 50 mg by mouth 2 (two) times daily. 1/3/22  Yes Historical Provider   pravastatin (PRAVACHOL) 40 MG tablet Take 40 mg by mouth every evening. 10/12/21  Yes Historical Provider   sirolimus (RAPAMUNE) 1 MG Tab Take 2 mg by mouth once daily. 10/15/21  Yes Historical Provider   tacrolimus (PROGRAF) 0.5 MG Cap Take 3 capsules (1.5 mg total) by mouth every 12 (twelve) hours. 4/21/22 4/21/23 Yes MD NEELIMA Emery SOLOSTAR U-300 INSULIN 300 unit/mL (1.5 mL) InPn pen Inject 20 Units into the skin nightly. 4/21/22  Yes Manoj Allen MD   VENTOLIN HFA 90 mcg/actuation inhaler INHALE 2 PUFFS INTO THE LUNGS EVERY 6 (SIX) HOURS AS NEEDED FOR WHEEZING (COUGHING). RESCUE  Patient taking differently: Inhale 1 puff into the lungs every 6 (six) hours as needed. 3/7/22  Yes Tyler Castillo MD   BD NOEMI 2ND GEN PEN NEEDLE 32 gauge x 5/32" Ndle USE AS DIRECTED ONCE DAILY WITH BASAGLAR 10/14/21   Historical Provider   fluticasone propionate (FLONASE) 50 mcg/actuation nasal spray 2 sprays (100 mcg total) by Each Nostril " route once daily. 4/22/22   Manoj Allen MD   posaconazole (NOXAFIL) 100 mg TbEC tablet Take 3 tablets (300 mg total) by mouth once daily. 5/10/22   Samantha Yuen MD   primaquine 26.3 mg Tab Take 2 tablets (52.6 mg total) by mouth once daily. 5/10/22   Samantha Yuen MD   sildenafiL (VIAGRA) 100 MG tablet Take 1 tablet (100 mg total) by mouth daily as needed for Erectile Dysfunction. 12/29/21 12/29/22  Chiquita Mcfarlane, FNKEMI   lisinopriL (PRINIVIL,ZESTRIL) 5 MG tablet Take 5 mg by mouth once daily. 10/12/21 4/21/22  Historical Provider   metFORMIN (GLUCOPHAGE) 500 MG tablet Take 500 mg by mouth 2 (two) times daily. 8/20/21 4/21/22  Historical Provider   minoxidiL (LONITEN) 2.5 MG tablet Take 2.5 mg by mouth 2 (two) times daily. 1/7/22 4/21/22  Historical Provider   spironolactone (ALDACTONE) 50 MG tablet  4/1/22 4/7/22  Historical Provider     Scheduled Meds:    allopurinoL  100 mg Oral Daily    amoxicillin  500 mg Oral Q12H    benzonatate  200 mg Oral TID    clindamycin (CLEOCIN) IVPB  900 mg Intravenous Q8H    furosemide  20 mg Oral Daily    insulin aspart U-100  2-4 Units Subcutaneous TID PC    insulin detemir U-100  12 Units Subcutaneous Daily    levothyroxine  50 mcg Oral Before breakfast    lipase-protease-amylase 12,000-38,000-60,000 units  2 capsule Oral QID (WM & HS)    loperamide  2 mg Oral TID    metoprolol tartrate  12.5 mg Oral BID    posaconazole  300 mg Oral Daily    praziquanteL  20 mg/kg Oral Q6H    [START ON 5/12/2022] predniSONE  40 mg Oral Daily    Followed by    [START ON 5/17/2022] predniSONE  20 mg Oral Daily    predniSONE  40 mg Oral BID    primaquine  52.6 mg Oral Daily    sodium bicarbonate  1,300 mg Oral TID     Continuous Infusions:   PRN Meds:sodium chloride 0.9%, acetaminophen, albuterol-ipratropium, dextromethorphan-guaiFENesin  mg/5 ml, dextrose 10%, fluticasone propionate, glucagon (human recombinant), glucose, glucose, insulin aspart U-100,  naloxone, sodium chloride 0.9%  Anticoagulants/Antiplatelets: no anticoagulation    Allergies: Review of patient's allergies indicates:  No Known Allergies  Sedation Hx: have not been any systemic reactions    Labs:  Recent Labs   Lab 05/10/22  0725   INR 1.9*       Recent Labs   Lab 05/10/22  0725   WBC 4.18   HGB 8.1*   HCT 23.0*   MCV 82   PLT 81*      Recent Labs   Lab 05/10/22  0725   *   *   K 3.4*   CL 97   CO2 21*   BUN 94*   CREATININE 5.2*   CALCIUM 7.6*   MG 1.9   ALT 9*   AST 61*   ALBUMIN 2.3*   BILITOT 5.0*         Vitals:  Temp: 97.4 °F (36.3 °C) (05/10/22 1223)  Pulse: (Abnormal) 55 (05/10/22 1452)  Resp: 18 (05/10/22 1452)  BP: 139/80 (05/10/22 1452)  SpO2: 100 % (05/10/22 1452)     Physical Exam:  ASA: III  Mallampati: III    General: no acute distress  Mental Status: alert and oriented to person, place and time  HEENT: normocephalic, atraumatic  Chest: unlabored breathing  Heart: regular heart rate  Abdomen: nondistended  Extremity: moves all extremities    Plan: Patient will undergo US-guided paracentesis.   Sedation Plan: local    Soo Chowdary, PGY-5

## 2022-05-10 NOTE — PROGRESS NOTES
[facility] - Transplant Stepdown  Endocrinology  Progress Note    Admit Date: 4/30/2022     Reason for Consult: Management of T2DM, Hyperglycemia     Surgical Procedure and Date: Pt undergoing evaluation for possible liver/kidney transplant    Diabetes diagnosis year: 2 years ago (2022 per pt)    Home Diabetes Medications:  Metformin 500 mg PO BID, long acting insulin 30 units daily    How often checking glucose at home?  Does not check    BG readings on regimen: Does not check his BG levels   Hypoglycemia on the regimen?  No (reports no symptoms of hypoglycemia, but stated he does not check)  Missed doses on regimen?  No    Diabetes Complications include:     Hyperglycemia    Complicating diabetes co morbidities:   CKD and ESRD      HPI:   Patient is a 61 y.o. male with a diagnosis of renal transplant for hypertensive nephropathy (2004) on immunosuppressive therapy, LOPEZ cirrhosis, DM2, gout, BPH and ILD who was treated at [facility] ([facility]) for ILDEFONSO 2/2 decompensated cirrhosis and worsening portal vein hypertension with HRS. He has been having weekly paracentesis in the past month.     Pt reported that he had been experiencing abdominal distention with associated diarrhea (4-5/day) over several weeks, along with a chronic cough, weight loss, and found to be febrile.     [place] [facility]     CT with opacifications, reverse halo sign, found to have PJP.  Blasto urine Ag positive also.    Pt was started on PJP treatment with steroid taper leading to BG excursion. Endocrine consulted for BG management.   -Steroid taper starting 5/6/2022: Prednisone 40mg BID days 1-5, then 40mg daily days 6-10, then 20mg daily days 11-21         Interval HPI:   Overnight events: No acute events overnight. Patient on the TSU in room 47479/65919 A. Blood glucose improving. BG at and above goal on current insulin regimen (SSI, prandial, and basal insulin ). Steroid use-  "Prednisone 40 mg BID. 5 Days Post-Op  Renal function- Abnormal - Creatinine 5.2   Vasopressors-  None       Diet diabetic [facility];  Calorie; Renal; Isolation Tray - Regular [place]     Eatin%  Nausea: No  Hypoglycemia and intervention: No  Fever: No  TPN and/or TF: No    /80   Pulse (!) 55   Temp (P) 97.9 °F (36.6 °C)   Resp (!) 26   Ht 5' 8" (1.727 m)   Wt 65.2 kg (143 lb 11.8 oz)   SpO2 99%   BMI 21.86 kg/m²     Labs Reviewed and Include    Recent Labs   Lab 05/10/22  0725   *   CALCIUM 7.6*   ALBUMIN 2.3*   PROT 4.9*   *   K 3.4*   CO2 21*   CL 97   BUN 94*   CREATININE 5.2*   ALKPHOS 74   ALT 9*   AST 61*   BILITOT 5.0*     Lab Results   Component Value Date    WBC 4.18 05/10/2022    HGB 8.1 (L) 05/10/2022    HCT 23.0 (L) 05/10/2022    MCV 82 05/10/2022    PLT 81 (L) 05/10/2022     No results for input(s): TSH, FREET4 in the last 168 hours.  Lab Results   Component Value Date    HGBA1C 6.6 (H) 2022       Nutritional status:   Body mass index is 21.86 kg/m².  Lab Results   Component Value Date    ALBUMIN 2.3 (L) 05/10/2022    ALBUMIN 2.4 (L) 2022    ALBUMIN 2.5 (L) 2022     No results found for: PREALBUMIN    Estimated Creatinine Clearance: 13.9 mL/min (A) (based on SCr of 5.2 mg/dL (H)).    Accu-Checks  Recent Labs     22  1208 22  1711 22  1907 22  1908 22  2044 22  2300 05/10/22  0202 05/10/22  0552 05/10/22  0802 05/10/22  1220   POCTGLUCOSE 273* 373* 439* 417* 424* 404* 355* 291* 257* 201*       Current Medications and/or Treatments Impacting Glycemic Control  Immunotherapy:    Immunosuppressants       None          Steroids:   Hormones (From admission, onward)                Start     Stop Route Frequency Ordered    22 0900  predniSONE tablet 20 mg        "Followed by" Linked Group Details    859 Oral Daily 22 17122 0900  predniSONE tablet 40 mg        "Followed by" Linked Group " Details    05/17 0859 Oral Daily 05/09/22 1719    05/06/22 1730  predniSONE tablet 40 mg         05/11 2059 Oral 2 times daily 05/06/22 1719          Pressors:    Autonomic Drugs (From admission, onward)                Start     Stop Route Frequency Ordered    05/09/22 2100  metoprolol tartrate (LOPRESSOR) split tablet 12.5 mg         -- Oral 2 times daily 05/09/22 1003          Hyperglycemia/Diabetes Medications:   Antihyperglycemics (From admission, onward)                Start     Stop Route Frequency Ordered    05/10/22 1915  insulin aspart U-100 pen 3-6 Units         -- SubQ 3 times daily after meals 05/10/22 1506    05/09/22 2204  insulin aspart U-100 pen 1-10 Units         -- SubQ Before meals & nightly PRN 05/09/22 2105    05/08/22 0900  insulin detemir U-100 pen 12 Units         -- SubQ Daily 05/08/22 0753            ASSESSMENT and PLAN    * ILDEFONSO (acute kidney injury)  Caution with aggressive insulin adjustments to reduce risk of hypoglycemia    Type 2 diabetes mellitus without complication, without long-term current use of insulin  Endocrinology consulted for BG management.   BG goal 140-180    - Levemir Flex Pen 12 units daily  - Novolog (aspart) insulin 3-6 Units SQ TIDWM and prn for BG excursions [facility] SSI (150/25)  - BG checks AC/HS  - Hypoglycemia protocol in place  - If blood glucose greater than 300, please ask patient not to eat food or drink anything other than water until correctional insulin has brought it back below 250    ** Please notify Endocrine for any change and/or advance in diet**  ** Please call Endocrine for any BG related issues **    Discharge Planning:   TBD. Please notify endocrinology prior to discharge.      Hypothyroidism  Pt with Hx of hypothyroidism   Taking LT4 50 mcg PO daily  - TSH 2.4 on 04/30/2022  - Continue home regimen  - Recommend rechecking TFTs in 4-6 weeks outpatient         Zheng Parks, DNP, FNP  Endocrinology  [facility] - Transplant Stepdown

## 2022-05-10 NOTE — PROGRESS NOTES
Progress Note  Hospital Medicine      Patient Name: Edgar Jackson  MRN: [id number]  Patient Class: IP- Inpatient  Admission Date: 4/30/2022  Attending Physician:Samantha Yuen MD   Primary Care Provider: Tyler Castillo MD            SUBJECTIVE:     Follow-up For:  ILDEFONSO (acute kidney injury)     Interval history/ROS:   5/10: paracentesis today     5/9: to begin treatment for schistosomiasis    5/8: TB rule out continues    5/7: patient with dry cough, feels weak.       HPI: Mr Jackson is a 61yoM with PMHx of renal transplant for hypertensive nephropathy (2004) immunosuppressed on tacrolimus/sirolimus,  therapy, LOPEZ cirrhosis, NIDDM, and ILD who was treated at OSH for ILDEFONSO 2/2 decompensated cirrhosis and worsening portal vein hypertension with HRS. Pt reported that he had been experiencing abdominal distention with associated diarrhea (4-5/day) over several weeks, along with a constant nagging cough. On presenting to OSH, multiple paracentesis (3) were performed to relieve the ascitic fluid. No evidence of SBP on fluid analysis. Creon was added to diet to aide in food digestion in setting of cirrhosis. Loperamide provided symptomatic treatment of diarrhea. Dextromethorphan-guaifenesin syrup added to reduce cough exacerbations. Despite efforts to manage symptoms, pt in need of  transplant surgery evaluation of the renal/hepatic injury, resulting [place] [facility]     On arrival to [facility] (4/30/2022), pt has experienced increased generalized weakness. Loperamide was continued for diarrhea management. Abdominal distention recurred, despite prior paracentesis. Cough has been fairly constant and predominantly dry. Pt now endorses new left lower quadrant abdominal pain, worsened by cough. KTM and Hepatology consults placed for management of HRS.  OBJECTIVE:     Body mass index is 21.86 kg/m².    Vital Signs Range (Last 24H):  Temp:  [97.4 °F (36.3 °C)-97.9 °F (36.6 °C)]   Pulse:  [55-64]   Resp:  [15-26]   BP: ()/(44-80)   SpO2:   [95 %-100 %]     I & O (Last 24H):    Intake/Output Summary (Last 24 hours) at 5/10/2022 1713  Last data filed at 5/10/2022 1648  Gross per 24 hour   Intake 1390.36 ml   Output 915 ml   Net 475.36 ml        Physical Exam:  Constitutional:       General: He is awake. He is not in acute distress.     Appearance: He is well-developed. He is not toxic-appearing or diaphoretic.   HENT:      Head: Normocephalic and atraumatic.      Right Ear: External ear normal.      Left Ear: External ear normal.      Nose:      Comments: On nasal cannula     Mouth/Throat:      Mouth: Mucous membranes are moist.      Pharynx: No oropharyngeal exudate or posterior oropharyngeal erythema.   Eyes:      General: Scleral icterus present.   Cardiovascular:      Rate and Rhythm: Normal rate and regular rhythm.      Pulses: Normal pulses.      Heart sounds: Normal heart sounds. No murmur heard.  Pulmonary:      Effort: No respiratory distress.      Breath sounds: No wheezing.   Abdominal:      General: Bowel sounds are normal. There is no distension.      Palpations: Abdomen is soft.      Tenderness: There is no abdominal tenderness. There is no guarding.   Musculoskeletal:         General: Normal range of motion.      Cervical back: Full passive range of motion without pain and neck supple.      Right lower leg: Positive for edema.      Left lower leg: Positive for edema.   Lymphadenopathy:      Cervical: No cervical adenopathy.   Skin:     General: Skin is warm.      Coloration: Skin is jaundiced.      Findings: No lesion or rash.   Neurological:      Mental Status: He is alert and oriented to person, place, and time.   Psychiatric:         Attention and Perception: Attention normal.         Mood and Affect: Mood normal.         Behavior: Behavior normal. Behavior is cooperative.         Thought Content: Thought content normal.         Cognition and Memory: Cognition normal.         Judgment: Judgment normal.        Recent Labs   Lab  05/08/22  0611 05/09/22  0614 05/10/22  0725   * 129* 130*   K 3.7 4.3 3.4*   CL 98 96 97   CO2 20* 20* 21*   BUN 73* 84* 94*   CREATININE 4.9* 5.0* 5.2*    179* 251*   CALCIUM 7.6* 7.8* 7.6*   MG  --   --  1.9   PHOS  --   --  6.7*     Recent Labs   Lab 05/08/22  0611 05/09/22  0614 05/10/22  0725   ALKPHOS 82 79 74   ALT 9* 12 9*   AST 72* 75* 61*   ALBUMIN 2.5* 2.4* 2.3*   PROT 4.8* 4.8* 4.9*   BILITOT 5.3* 5.5* 5.0*   INR 2.0* 2.0* 1.9*       Recent Labs   Lab 05/04/22  0835 05/05/22  0747 05/08/22 0611 05/09/22  0614 05/10/22  0725   WBC  --    < > 4.14 4.16 4.18   HGB  --    < > 7.9* 8.4* 8.1*   HCT  --    < > 22.8* 23.8* 23.0*   PLT  --    < > 71* 75* 81*   GRAN  --    < > 81.5*  3.4 80.8*  3.4 79.2*  3.3   SEGS 79  --   --   --   --    LYMPH  --    < > 10.1*  0.4* 10.1*  0.4* 9.6*  0.4*   LYMPHS 9  --   --   --   --    MONO  --    < > 7.2  0.3 7.2  0.3 8.1  0.3    < > = values in this interval not displayed.       Recent Labs   Lab 05/09/22 2044 05/09/22  2300 05/10/22  0202 05/10/22  0552 05/10/22  0802 05/10/22  1220   POCTGLUCOSE 424* 404* 355* 291* 257* 201*       No results for input(s): TROPONINI in the last 168 hours.    Diagnostic Results:  Labs: Reviewed    allopurinoL, 100 mg, Oral, Daily  amoxicillin, 500 mg, Oral, Q12H  benzonatate, 200 mg, Oral, TID  clindamycin (CLEOCIN) IVPB, 900 mg, Intravenous, Q8H  furosemide, 20 mg, Oral, Daily  insulin aspart U-100, 3-6 Units, Subcutaneous, TID PC  insulin detemir U-100, 12 Units, Subcutaneous, Daily  levothyroxine, 50 mcg, Oral, Before breakfast  LIDOcaine HCL 10 mg/ml (1%), , ,   lipase-protease-amylase 12,000-38,000-60,000 units, 2 capsule, Oral, QID (WM & HS)  loperamide, 2 mg, Oral, TID  metoprolol tartrate, 12.5 mg, Oral, BID  posaconazole, 300 mg, Oral, Daily  potassium bicarbonate, 50 mEq, Oral, Once  praziquanteL, 20 mg/kg, Oral, Q6H  [START ON 5/12/2022] predniSONE, 40 mg, Oral, Daily   Followed by  [START ON 5/17/2022]  predniSONE, 20 mg, Oral, Daily  predniSONE, 40 mg, Oral, BID  primaquine, 52.6 mg, Oral, Daily  sodium bicarbonate, 1,300 mg, Oral, TID        As Needed sodium chloride 0.9%, acetaminophen, albuterol-ipratropium, dextromethorphan-guaiFENesin  mg/5 ml, dextrose 10%, fluticasone propionate, glucagon (human recombinant), glucose, glucose, insulin aspart U-100, naloxone, sodium chloride 0.9%    ASSESSMENT/PLAN:     Assessment: Edgar Jackson is a 61 y.o. male here with:     Active Hospital Problems    Diagnosis  POA    *ILDEFONSO (acute kidney injury) [N17.9]  Yes    Schistosoma [B65.9]  Yes    Pneumocystis jiroveci pneumonia [B59]  Unknown    SBP (spontaneous bacterial peritonitis) [K65.2]  Unknown    Severe malnutrition [E43]  Yes     Severe malnutrition  Nutrition Problem  Severe protein-calorie malnutrition     Related to (etiology):   Chronic illness     Signs and Symptoms (as evidenced by):   Wt loss of 28 lb (17.5%) in 3 months  Energy intake <75% of EEN for >3 months  Moderate muscle mass depletion (temples, clavicles, shoulders, thigh, calf)     Interventions/Recommendations (treatment strategy):  Collaboration with other providers     Nutrition Diagnosis Status:   New      Lung infiltrate on CT [R91.8]  Yes    Sepsis [A41.9]  No    Chronic diarrhea [K52.9]  Yes    Type 2 diabetes mellitus without complication, without long-term current use of insulin [E11.9]  Yes    Chronic cough [R05.3]  Yes    Fever in immunocomprised patient [R50.9]  Yes    Hypothyroidism [E03.9]  Yes     Chronic    Thrombocytopenia [D69.6]  Yes    Hyponatremia [E87.1]  Yes    Metabolic acidosis [E87.2]  Yes    Interstitial lung disease [J84.9]  Yes    Cirrhosis of liver with ascites [K74.60, R18.8]  Yes    Transplanted kidney [Z94.0]  Not Applicable    Gout [M10.9]  Yes    Personal history of immunosupression therapy [Z92.25]  Not Applicable      Resolved Hospital Problems   No resolved problems to display.        Plan:      Pneumocystis jiroveci pneumonia  61 y.o. male with a PMH ESRD 2/2 HTN s/p kidney transplant in 1/2004 (in [place]; on Tacrolimus/Sirolimus), c/b LOPEZ cirrhosis, ILD, T2DM (A1C 6.6 in 2/2022), hypothyroidism, BPH, gout was admitted on 4/30/2022 (transferred from [facility]) for abnormal labs and need for transplant liver/kidney evaluation.  Complaints of malaise, chronic cough, diarrhea, weight loss, and found febrile.  Born in [place], lived in [place], [place] before moving to [place] 9/2021.  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  Blasto urine Ag positive also.     -5/6 started Clindamycin and Primaquine with steroid taper starting at prednisone 40mg BID days 1-5, then 40mg daily days 6-10, then 20mg daily days 11-21  -Continue posaconazole  -Airborne precautions until TB rule out, need 3 total AFB Cx  -Can have RT assist by inducing sputum for AFB Cx collection  -[id number] Viracor in process  -GI consulted, planned for colonoscopy after TB rule out     SBP (spontaneous bacterial peritonitis)  Enterococcus faecalis SBP  -Continue ampicillin  -Repeat paracentesis after TB rule out    Type 2 diabetes mellitus without complication, without long-term current use of insulin  Pt with Hx of DM2  Currently with BG excursions due to steroid taper placed by ID for PJP     Home regimen: Long acing insulin 30 units and metformin 500 mg PO BID     BG goal 140-180  Currently BG uncontrolled due to pt receiving steroids for PJP treatment     Plan  Recommend intensive insulin drip with POCt q1hr until BG at goal with clear liquid bariatric diet   Once BG stable will consider placing him on SQ insulin (15 units long acting and 7 units before meals with moderate correction and will switch diet to renal and diabetic diet)     Will keep following and will adjust insulin regimen accordingly   Due to kidney function, pt is to discontinue metformin from his home regimen and to discard metformin pills from  home.        Hypothyroidism  Pt with Hx of hypothyroidism   Taking LT4 50 mcg PO daily  Continue home regimen     ILDEFONSO (acute kidney injury)  His renal functions were normal beginning of Feb.   After that it appears he had ILDEFONSO - possibly related to pre-renal states  Which led to CKD stage III, which has progressed to stage IV over the course of last few weeks     Has consented for HD in case needed.   Monitor I/Os   US renal transplant unremarkable.  Urine Output on 5/6 noted to be 450ml   Cr: 6.0-->5.7-->5.8-->5.3-->5.4-->5.8-->5.0 (on 5/7)     - No acute indication for HD  - Renally dose all medications to GFR=0  - Avoid Nephrotoxic drugs   - Renal Diet  - Lokelma 10mg PRN if K>5  - Strict I/OS  - Daily RFPs     Pneumocystis jiroveci pneumonia  - PJP positive  - ID following - Currently on Clindamycin and Primaquine   - If patient needs bactrim for clearance of infection- Nephrology will help with HD needs         Chronic diarrhea  Reports since past 2 months   stool studies in progress   Will undergo C-Scope on 5/4 with GI     Metabolic acidosis  - Continue NaHCO3 1300 TID  - Likely secondary to diarrhea      Hyponatremia  This is likely in setting of renal failure and liver cirrhosis   Would be hard to normalize  Continue fluid restriction upto 1.2 liter/day     MELD-Na score: 35 at 5/6/2022  6:08 AM  MELD score: 32 at 5/6/2022  6:08 AM  Calculated from:  Serum Creatinine: 5.1 mg/dL (Using max of 4 mg/dL) at 5/6/2022  6:08 AM  Serum Sodium: 125 mmol/L at 5/6/2022  6:08 AM  Total Bilirubin: 5.5 mg/dL at 5/6/2022  6:08 AM  INR(ratio): 1.7 at 5/6/2022  6:08 AM  Age: 61 years     Plan  - continue to follow up transplant nephrology rec's     Approved for inpatient liver/kidney transplant evaluation.  Now with multiple infectious sources that will need to be further evaluated and treated.  Currently on posaconazole for blasto Ag/fungitell (awaiting other fungal studies), On vancomycin for enterococcus ascitic fluid  culture.        We will hold off on further transplant evaluation at this time until treatment duration for various infections are defined.     - f/u nephrology recommendations  - f/u pulmonary recc's, bronchoscopy studies  - colonoscopy per GI  - please obtain daily CBC, BMP, LFT, INR  - Plan of care was discussed with primary team       HIGH RISK CONDITION(S):  Patient has a condition that poses threat to life and bodily function: Acute Renal Failure          Samantha Yuen MD

## 2022-05-10 NOTE — PROGRESS NOTES
[facility] - Transplant Stepdown  Adult Nutrition  Progress Note    SUMMARY       Recommendations    Recommendation/Intervention:   1) Pt to continue DM/renal diet as tolerated PO.    2) Encourage ONS > BID to increase caloric intake.    3) RD to monitor and follow.    Goals:   1) Pt to meet > % EEN/EPN by RD follow-up.  Nutrition Goal Status: goal not met  Communication of RD Recs: reviewed with RN    Assessment and Plan    Severe malnutrition  Nutrition Problem  Severe protein-calorie malnutrition     Related to (etiology):   Chronic illness     Signs and Symptoms (as evidenced by):   Wt loss of 28 lb (17.5%) in 3 months  Energy intake <75% of EEN for >3 months  Moderate muscle mass depletion (temples, clavicles, shoulders, thigh, calf)     Interventions/Recommendations (treatment strategy):  Collaboration with other providers     Nutrition Diagnosis Status:   Continues    Malnutrition Assessment  Malnutrition Type: chronic illness  Energy Intake: severe energy intake  Weight Loss (Malnutrition): greater than 7.5% in 3 months  Energy Intake (Malnutrition): less than 75% for greater than or equal to 3 months  Muscle Mass (Malnutrition): moderate depletion   Orbital Region (Subcutaneous Fat Loss): well nourished  Upper Arm Region (Subcutaneous Fat Loss): well nourished   [place] Region (Muscle Loss): moderate depletion  Clavicle Bone Region (Muscle Loss): moderate depletion  Clavicle and Acromion Bone Region (Muscle Loss): moderate depletion  Dorsal Hand (Muscle Loss): moderate depletion  Anterior Thigh Region (Muscle Loss): mild depletion  Posterior Calf Region (Muscle Loss): mild depletion   Edema (Fluid Accumulation): 1-->trace   Subcutaneous Fat Loss (Final Summary): well nourished  Muscle Loss Evaluation (Final Summary): moderate protein-calorie malnutrition    Severe Weight Loss (Malnutrition): greater than 7.5% in 3 months    Reason for Assessment    Reason For Assessment: RD follow-up  Diagnosis:  (Renal  "disease, liver disease)  Relevant Medical History: ILDEFONSO, T2DM, kidney Tx   Interdisciplinary Rounds: did not attend  General Information Comments: Pt admitted 22 to be evaluated for SLK transplant. Pt on tacrolimus/sirolimus. Evaluation currently on hold pending resolution of enterococcus SBP and pneumocystis pneumonia. Pt appears to be very lethargic; jaundice in eyes. Pt consumed 0-25% breakfast this morning; he finished his fruit and half Boost Breeze Orange. Pt noted he did not consume dinner yesterday. He stated his appetite has been low for the past two months and that it is finally coming back today. Plan for dx paracentesis today. Wt loss 8# between 22 - 2022 maybe due to fluid retention. NFPE completed 22, pt has moderate muscle mass depletion. 1+ generalize edema per chart. Pt met the criteria for severe malnutrition related to chronic illness.   Nutrition Discharge Planning: Pending medical course.    Nutrition Risk Screen    Nutrition Risk Screen: no indicators present    Nutrition/Diet History    Spiritual, Cultural Beliefs, [employer] Practices, Values that Affect Care: no  Factors Affecting Nutritional Intake: decreased appetite, nausea/vomiting    Anthropometrics    Temp: 97.9 °F (36.6 °C)  Height Method: Stated  Height: 5' 8" (172.7 cm)  Height (inches): 68 in  Weight Method: Bed Scale  Weight: 65.2 kg (143 lb 11.8 oz)  Weight (lb): 143.74 lb  Ideal Body Weight (IBW), Male: 154 lb  % Ideal Body Weight, Male (lb): 93.34 %  BMI (Calculated): 21.9  Usual Body Weight (UBW), k kg  % Usual Body Weight: 82.5     Lab/Procedures/Meds    Pertinent Labs Reviewed: reviewed  Pertinent Labs Comments: Na 130, BUN 94, Cr 5.2, eGFR 11.1, Glucose 251, Ca 7.6, Phosphorous 6.7, Protein 4.9, Bilirubin 5.0, AST 61, ALT 9, .7  Pertinent Medications Reviewed: reviewed  Pertinent Medications Comments: Allopurinol, furosemide, levothyroxine, lipase-protease-amylase, loperamide, prednisone, " primaquine    Estimated/Assessed Needs    Weight Used For Calorie Calculations: 65.2 kg (143 lb 11.8 oz)  Energy Calorie Requirements (kcal): 6754-0886 kcal/day  Energy Need Method: Kcal/kg (25-30 kcal/kg)  Protein Requirements: 65-78 g/day (1.0-1.2 g/day)  Weight Used For Protein Calculations: 65.2 kg (143 lb 11.8 oz)  Fluid Requirements (mL): 1 mL/kcal or per MD  Estimated Fluid Requirement Method: RDA Method  RDA Method (mL): 1630  CHO Requirement: 204 g/day    Nutrition Prescription Ordered    Current Diet Order: Diabetic/Renal  Oral Nutrition Supplement: Boost Breeze Orange TID    Evaluation of Received Nutrient/Fluid Intake    I/O: +6.5L since admit  Energy Calories Required: not meeting needs  Protein Required: not meeting needs  Fluid Required: not meeting needs  Comments: LBM 5/10/2022  Tolerance: tolerating  % Intake of Estimated Energy Needs: 0 - 25 %  % Meal Intake: 0 - 25 %    Nutrition Risk    Level of Risk/Frequency of Follow-up:  (2x/weekly)     Monitor and Evaluation    Food and Nutrient Intake: energy intake, food and beverage intake  Food and Nutrient Adminstration: diet order  Knowledge/Beliefs/Attitudes: food and nutrition knowledge/skill, beliefs and attitudes  Physical Activity and Function: nutrition-related ADLs and IADLs  Anthropometric Measurements: weight, weight change, body mass index  Biochemical Data, Medical Tests and Procedures: electrolyte and renal panel, gastrointestinal profile, glucose/endocrine profile, inflammatory profile, lipid profile  Nutrition-Focused Physical Findings: overall appearance, head and eyes, skin     Nutrition Follow-Up    RD Follow-up?: Yes

## 2022-05-10 NOTE — PLAN OF CARE
Patient awake and alert, no distress noted, respirations even and unlabored. Allergies reviewed. Waiting for eval and consent.  Vitals:    05/10/22 1452   BP: 139/80   Pulse: (!) 55   Resp: 18   Temp:

## 2022-05-10 NOTE — SUBJECTIVE & OBJECTIVE
"Interval HPI:   Overnight events: No acute events overnight. Patient on the TSU in room 66980/52586 A. Blood glucose improving. BG at and above goal on current insulin regimen (SSI, prandial, and basal insulin ). Steroid use- Prednisone 40 mg BID. 5 Days Post-Op  Renal function- Abnormal - Creatinine 5.2   Vasopressors-  None       Diet diabetic [facility];  Calorie; Renal; Isolation Tray - Regular [place]     Eatin%  Nausea: No  Hypoglycemia and intervention: No  Fever: No  TPN and/or TF: No    /80   Pulse (!) 55   Temp (P) 97.9 °F (36.6 °C)   Resp (!) 26   Ht 5' 8" (1.727 m)   Wt 65.2 kg (143 lb 11.8 oz)   SpO2 99%   BMI 21.86 kg/m²     Labs Reviewed and Include    Recent Labs   Lab 05/10/22  0725   *   CALCIUM 7.6*   ALBUMIN 2.3*   PROT 4.9*   *   K 3.4*   CO2 21*   CL 97   BUN 94*   CREATININE 5.2*   ALKPHOS 74   ALT 9*   AST 61*   BILITOT 5.0*     Lab Results   Component Value Date    WBC 4.18 05/10/2022    HGB 8.1 (L) 05/10/2022    HCT 23.0 (L) 05/10/2022    MCV 82 05/10/2022    PLT 81 (L) 05/10/2022     No results for input(s): TSH, FREET4 in the last 168 hours.  Lab Results   Component Value Date    HGBA1C 6.6 (H) 2022       Nutritional status:   Body mass index is 21.86 kg/m².  Lab Results   Component Value Date    ALBUMIN 2.3 (L) 05/10/2022    ALBUMIN 2.4 (L) 2022    ALBUMIN 2.5 (L) 2022     No results found for: PREALBUMIN    Estimated Creatinine Clearance: 13.9 mL/min (A) (based on SCr of 5.2 mg/dL (H)).    Accu-Checks  Recent Labs     22  1208 22  1711 22  1907 22  1908 22  2044 22  2300 05/10/22  0202 05/10/22  0552 05/10/22  0802 05/10/22  1220   POCTGLUCOSE 273* 373* 439* 417* 424* 404* 355* 291* 257* 201*       Current Medications and/or Treatments Impacting Glycemic Control  Immunotherapy:    Immunosuppressants       None          Steroids:   Hormones (From admission, onward)                Start     " "Stop Route Frequency Ordered    05/17/22 0900  predniSONE tablet 20 mg        "Followed by" Linked Group Details    05/28 0859 Oral Daily 05/09/22 1719 05/12/22 0900  predniSONE tablet 40 mg        "Followed by" Linked Group Details    05/17 0859 Oral Daily 05/09/22 1719 05/06/22 1730  predniSONE tablet 40 mg         05/11 2059 Oral 2 times daily 05/06/22 1719          Pressors:    Autonomic Drugs (From admission, onward)                Start     Stop Route Frequency Ordered    05/09/22 2100  metoprolol tartrate (LOPRESSOR) split tablet 12.5 mg         -- Oral 2 times daily 05/09/22 1003          Hyperglycemia/Diabetes Medications:   Antihyperglycemics (From admission, onward)                Start     Stop Route Frequency Ordered    05/10/22 1915  insulin aspart U-100 pen 3-6 Units         -- SubQ 3 times daily after meals 05/10/22 1506    05/09/22 2204  insulin aspart U-100 pen 1-10 Units         -- SubQ Before meals & nightly PRN 05/09/22 2105    05/08/22 0900  insulin detemir U-100 pen 12 Units         -- SubQ Daily 05/08/22 0753          "

## 2022-05-10 NOTE — PROGRESS NOTES
Hepatology Treatment Plan    Edgar Jackson is a 61 y.o. male admitted to hospital 4/30/2022 (Hospital Day: 11) due to ILDEFONSO (acute kidney injury).     Interval History  No acute events overnight.  No contraindications for liver transplant from ID standpoint.    Objective  Temp:  [97.5 °F (36.4 °C)-97.9 °F (36.6 °C)] 97.9 °F (36.6 °C) (05/10 0830)  Pulse:  [57-64] 57 (05/10 0915)  BP: ()/(44-79) 97/44 (05/10 0830)  Resp:  [15-22] 18 (05/10 0830)  SpO2:  [95 %-99 %] 95 % (05/10 0915)      Laboratory    Lab Results   Component Value Date    WBC 4.18 05/10/2022    HGB 8.1 (L) 05/10/2022    HCT 23.0 (L) 05/10/2022    MCV 82 05/10/2022    PLT 81 (L) 05/10/2022       Lab Results   Component Value Date     (L) 05/10/2022    K 3.4 (L) 05/10/2022    CL 97 05/10/2022    CO2 21 (L) 05/10/2022    BUN 94 (H) 05/10/2022    CREATININE 5.2 (H) 05/10/2022    CALCIUM 7.6 (L) 05/10/2022       Lab Results   Component Value Date    ALBUMIN 2.3 (L) 05/10/2022    ALT 9 (L) 05/10/2022    AST 61 (H) 05/10/2022    ALKPHOS 74 05/10/2022    BILITOT 5.0 (H) 05/10/2022       Lab Results   Component Value Date    INR 1.9 (H) 05/10/2022    INR 2.0 (H) 05/09/2022    INR 2.0 (H) 05/08/2022       MELD-Na score: 35 at 5/10/2022  7:25 AM  MELD score: 33 at 5/10/2022  7:25 AM  Calculated from:  Serum Creatinine: 5.2 mg/dL (Using max of 4 mg/dL) at 5/10/2022  7:25 AM  Serum Sodium: 130 mmol/L at 5/10/2022  7:25 AM  Total Bilirubin: 5.0 mg/dL at 5/10/2022  7:25 AM  INR(ratio): 1.9 at 5/10/2022  7:25 AM  Age: 61 years     Problem list  1. Decompensated LOPEZ cirrhosis  2. Hx of kidney transplant  3. ILDEFONSO on CKD  4. PJP  5. Schisto Ag +    Plan  - continue to follow up transplant nephrology recc's    Approved for inpatient liver/kidney transplant evaluation.  Now with multiple infectious sources that will need to be treated.  Currently on posaconazole for blasto Ag/fungitell (awaiting other fungal studies), primaquine and clinda for PJP, amoxicillin for  SBP.  In addition, he is also very deconditioned and overall ill.  Will plan to complete transplant evaluation and discussed him at committee meeting tomorrow.      - f/u nephrology recommendations  - cardiac stress test  - no plan for colonoscopy per GI, had a recent screening colonoscopy  - continue Abx per ID  - aggressive PT  - please obtain daily CBC, BMP, LFT, INR  - Plan of care was discussed with primary team    Thank you for involving us in the care of [facility]. Please call with any additional concerns or questions.    Reba Balderas MD  Gastroenterology Fellow

## 2022-05-10 NOTE — PLAN OF CARE
Paracentesis done. Removed 75 ml. Patient awake and alert, no distress noted, respirations even and unlabored. Report called to Rama ALLEN.

## 2022-05-10 NOTE — PLAN OF CARE
[facility] - Transplant Stepdown  Discharge Reassessment    Primary Care Provider: Tyler Castillo MD    Expected Discharge Date: 5/16/2022    Reassessment (most recent)       Discharge Reassessment - 05/10/22 1555          Discharge Reassessment    Assessment Type Discharge Planning Reassessment (P)      Did the patient's condition or plan change since previous assessment? No (P)      Communicated LE with patient/caregiver Date not available/Unable to determine (P)      Discharge Plan A Home Health (P)      Discharge Plan B Home with family (P)      DME Needed Upon Discharge  other (see comments) (P)    TBD    Discharge Barriers Identified None (P)      Why the patient remains in the hospital Requires continued medical care (P)                    Patient will undergo US-guided paracentesis. SW/CM will f/u as needed to assist with dc planning.    HENRRY MichelW   PRN

## 2022-05-10 NOTE — PLAN OF CARE
Patient aao, one person assist with ambulation. Cr 5.2 today, UO measured 150 today per condom cath, and one UM. One loose BM noted today. Dry cough noted, patient needing 2LNC with ambulation, RR elevate.  Patient currently in para, to reevaluate SBP. SB per tele.

## 2022-05-10 NOTE — PLAN OF CARE
ANDRIAEON  VSS   2L NC, sats>92%  PIVX1  IV abx given   ACHS/0200  BG in 400s  Per MD 10 novolog given and BG to be rechecked in 90 miutes- passed on to nightshift nurse  See previous note regarding BG and treatment  Condom catheter in use  Pt up in chair  Safety/fall precautions maintained  WCTM

## 2022-05-10 NOTE — PLAN OF CARE
- Patient is a kidney transplant recipient from 2004, admitted 4/30/22 with ILDEFONSO and to be evaluated for SLK transplant. Transplant evaluation currently on hold pending resolution of Enterococcus SBP and Pneumocystis pneumonia.  - No acute events overnight. Patient is hypoactive and doesn't talk much. Patient ambulated to bathroom this morning with standby assist and had BM.  - Patient denies pain.  - Condom cath in place; concentrated yellow urine output noted.  - BG elevated - patient on PO steroid taper for pulmonary issues. Endocrinology provider ordered extra doses of Novolog and BG rechecks overnight due to multiple BG readings in the 400s. Last BG= 291.  - Patient wearing 2 L nasal cannula. Dyspnea on exertion noted. Mucinex DM cough syrup and Tessalon Perles given for cough.  - Patient was in TB rule-out status from 5/5-5/9; airborne precautions discontinued yesterday. Patient remains in negative pressure room.  - IV Abx: Clindamycin q8h.  - PO Abx: Amoxicillin, posaconazole.  - Patient has wife at bedside.  - SCDs in place.  - Plan for diagnostic paracentesis today.

## 2022-05-10 NOTE — PLAN OF CARE
Problem: Occupational Therapy  Goal: Occupational Therapy Goal  Description: Goals to be met by: 5/19     Patient will increase functional independence with ADLs by performing:    UE Dressing with Modified [place].  LE Dressing with Modified [place].  Grooming while standing with Modified [place].  Toileting from toilet with Modified [place] for hygiene and clothing management.   Supine to sit with Modified [place].  Step transfer with Modified [place] using AD as needed  Upper extremity exercise program x15 reps per handout, with independence.    Outcome: Ongoing, Progressing

## 2022-05-10 NOTE — PROCEDURES
Radiology Post-Procedure Note    Pre Op Diagnosis: Ascites  Post Op Diagnosis: Same    Procedure: Paracentesis    Procedure performed by:Soo Chowdary MD; Srinivasa Galarza MD    Written Informed Consent Obtained: Yes  Specimen Removed: YES approximately 75 cc   Estimated Blood Loss: Minimal    Findings:   Successful paracentesis.     Patient tolerated procedure well.    Soo Chowdary, PGY-5

## 2022-05-10 NOTE — CLINICAL REVIEW
KTM Chart Check      Intake/Output Summary (Last 24 hours) at 5/10/2022 1443  Last data filed at 5/10/2022 0453  Gross per 24 hour   Intake 1390.36 ml   Output 400 ml   Net 990.36 ml       Vitals:    05/10/22 0830 05/10/22 0915 05/10/22 1145 05/10/22 1223   BP: (!) 97/44  (!) 105/48    BP Location:       Patient Position:       Pulse: (!) 59 (!) 57 (!) 55    Resp: 18 18 20    Temp: 97.9 °F (36.6 °C)   97.4 °F (36.3 °C)   TempSrc:    Oral   SpO2: 98% 95% 98%    Weight:       Height:           Recent Labs   Lab 05/08/22  0611 05/09/22  0614 05/10/22  0725   * 129* 130*   K 3.7 4.3 3.4*   CL 98 96 97   CO2 20* 20* 21*   BUN 73* 84* 94*   CREATININE 4.9* 5.0* 5.2*   CALCIUM 7.6* 7.8* 7.6*   PHOS  --   --  6.7*         - Monitoring daily for HD needs. No indication for HD today     No acute issues identified. Please call KTM as needed; Will continue to follow.

## 2022-05-11 NOTE — ASSESSMENT & PLAN NOTE
S/p kidney transplant living donor more then 10 years ago at [facility].   Reports no episodes of rejections or complications post surgery   ESRD - related to HTN.   Currently has no access for HD

## 2022-05-11 NOTE — PT/OT/SLP PROGRESS
Physical Therapy      Patient Name:  Edgar Jackson   MRN:  [id number]    Patient not seen today secondary to Off the floor for procedure/surgery. Patient CHICO on 2 attempts. Will follow-up as scheduled and appropriate.

## 2022-05-11 NOTE — PLAN OF CARE
"- Patient is a kidney transplant recipient from 2004, admitted 4/30/22 with ILDEFONSO and to be evaluated for SLK transplant. Transplant evaluation currently on hold pending resolution of Enterococcus SBP and Pneumocystis pneumonia.  - No acute events overnight. Patient slightly more talkative overnight. Patient ambulated to bathroom prior to bedtime with standby assist and had BM.   - Patient denies pain.  - Condom cath in place; concentrated yellow urine output noted.  - BG monitoring is AC/HS/2A. BG= 200 and 196 overnight.  - Patient wearing 2 L nasal cannula. Dyspnea on exertion noted. Mucinex DM cough syrup and Tessalon Perles given for cough.  - Patient was in TB rule-out status from 5/5-5/9; airborne precautions discontinued 5/9. Patient remains in negative pressure room.  - IV Abx: Clindamycin q8h.  - PO Abx: Amoxicillin, posaconazole.  - Praziquantel ordered x 3 doses for "equivocal schistosoma serologies." Pharmacy informed this RN overnight that the medication was not delivered yesterday by supplier; medication retimed by pharmacy to start this afternoon.  - Patient has wife at bedside.  - SCDs in place.  - Diagnostic paracentesis performed yesterday.  - Patient to go for cardiac stress test today ~1300.  "

## 2022-05-11 NOTE — PT/OT/SLP PROGRESS
Occupational Therapy Treatment    Patient Name:  Edgar Jackson   MRN:  [id number]  Admit Date: 4/30/2022  Admitting Diagnosis:  ILDEFONSO (acute kidney injury)   Length of Stay: 11 days  Recent Surgery: Procedure(s) (LRB):  EGD (ESOPHAGOGASTRODUODENOSCOPY) (N/A)  COLONOSCOPY (N/A) 6 Days Post-Op    Recommendations:     Discharge Recommendations: home with home health  Discharge Equipment Recommendations:  none  Barriers to discharge:  None    Plan:     Patient to be seen 3 x/week to address the above listed problems via self-care/home management, therapeutic activities, therapeutic exercises  · Plan of Care Expires: 05/31/22  · Plan of Care Reviewed with: patient    Assessment:   Edgar Jackson is a 61 y.o. male with a medical diagnosis of ILDEFONSO (acute kidney injury).  He presents with the following performance deficits affecting function: weakness, impaired endurance, impaired self care skills, impaired functional mobilty, gait instability, impaired balance, decreased safety awareness, decreased lower extremity function, impaired coordination, decreased upper extremity function, decreased coordination, impaired cardiopulmonary response to activity.      Pt tolerated session fairly this date and was willing to participate. Demonstrating continued impaired endurance, increased fatigue, decreased activity tolerance, impaired balance, decreased safety awareness and impaired cardiopulmonary response to activity, requiring increased time and assistance to complete functional tasks. Patient completed bed mobility, sit>Stand transfer, and functional mobility of household distance ~20ft with CGA using RW. Patient observed with increased WOB with minimal exertion during functional tasks, requiring rest breaks. Patient completed BUE therex seated UIC x 15 each, extended rest break between exercises due to increased fatigue and WOB. Patient is progressing towards established goals, and continues to benefit from acute skilled OT services to increase  "functional performance and improve quality of life. OT to continue to recommend HHOT at discharge to improve pt functional independence and increase patient safety before returning home.      Rehab Prognosis: Good; patient would benefit from acute skilled OT services to address these deficits and reach maximum level of function.        Subjective   Communicated with: Nurse prior to session.  Patient found HOB elevated with blood pressure cuff, Condom Catheter, peripheral IV, pulse ox (continuous), telemetry (VISI monitor) upon OT entry to room. Pt agreeable to participate at this time.    Patient: " Yes " -patient with limited attempts at conversation during session    Pain/Comfort:  · Pain Rating Post-Intervention 1: 0/10    Objective:   Patient found with: blood pressure cuff, Condom Catheter, peripheral IV, pulse ox (continuous), telemetry (VISI monitor)   General Precautions: Standard, Cardiac fall   Orthopedic Precautions:N/A   Braces: N/A   Oxygen Device: Nasal Cannula 2L  Vitals: /82 (BP Location: Right arm, Patient Position: Lying)   Pulse (S) (!) 51 Comment: SB  Temp 97.6 °F (36.4 °C) (Oral)   Resp 18   Ht 5' 8" (1.727 m)   Wt 63 kg (139 lb)   SpO2 98%   BMI 21.13 kg/m²     Outcome Measures:  [facility] 6 Click ADL: 20    Cognition:   · Alert and Cooperative  · Command following: follows one-step commands  · Communication: clear/fluent    Occupational Performance:  Bed Mobility:    · Patient completed Rolling/Turning to Right with contact guard assistance  · Patient completed Supine to Sit with contact guard assistance on R side of bed; HOB elevated  · Scooting anteriorly to EOB to have both feet planted on floor: contact guard assistance    Functional Mobility/Transfers:   Static Sitting EOB: SBA   Patient completed Sit <> Stand Transfer from EOB with contact guard assistance  with  rolling walker    Static Standing Balance: SBA   Dynamic Standing Balance: SBA   Patient completed functional " mobility of household distance ~20ft with CGA using RW; required increased time due to fatigue and increased WOB.      Activities of Daily Living:  · Grooming: stand by assistance to perform oral care and wash face in stance at sink  · Declined additional ADL needs    AMPAC 6 Click ADL:  AMPAC Total Score: 20    Treatment & Education:  -Pt education on OT role and POC.  -Importance of E/OOB activity with staff assistance, emphasis on daily participation  -BUE therex seated UIC x 15 each: chest press, overhead press, and cross body punch   -required extended rest break bw each exercise due to increased WOB  -Safety during functional transfer and mobility ensured  -Patient provided with education on importance of Bilateral UB/LB integration during functional tasks for improvement in functional performance.   -Education provided/reviewed, questions answered within OT scope of practice.   -Patient demonstrates understanding and learning this date.         Patient left up in chair with all lines intact, call button in reach and  nurse notified    GOALS:   Multidisciplinary Problems     Occupational Therapy Goals        Problem: Occupational Therapy    Goal Priority Disciplines Outcome Interventions   Occupational Therapy Goal     OT, PT/OT Ongoing, Progressing    Description: Goals to be met by: 5/19     Patient will increase functional independence with ADLs by performing:    UE Dressing with Modified [place].  LE Dressing with Modified [place].  Grooming while standing with Modified [place].  Toileting from toilet with Modified [place] for hygiene and clothing management.   Supine to sit with Modified [place].  Step transfer with Modified [place] using AD as needed  Upper extremity exercise program x15 reps per handout, with independence.                     Time Tracking:     OT Date of Treatment: 05/11/22  OT Start Time: 0945  OT Stop Time: 1009  OT Total Time (min): 24 min    Billable  Minutes:Self Care/Home Management 14  Therapeutic Exercise 10      5/11/2022

## 2022-05-11 NOTE — CLINICAL REVIEW
KTM Chart Check      Intake/Output Summary (Last 24 hours) at 5/11/2022 1325  Last data filed at 5/11/2022 0600  Gross per 24 hour   Intake 1365.38 ml   Output 1025 ml   Net 340.38 ml       Vitals:    05/11/22 0800 05/11/22 1057 05/11/22 1115 05/11/22 1129   BP: 128/87 108/68 113/67    BP Location:  Right arm     Patient Position:  Sitting     Pulse: (!) 58 (!) 56 68 100   Resp: 20 18 19 17   Temp: 97.4 °F (36.3 °C) 97.6 °F (36.4 °C)     TempSrc: Oral Oral     SpO2: 95% 99% 98% 98%   Weight:       Height:           Recent Labs   Lab 05/09/22  0614 05/10/22  0725 05/11/22  0628   * 130* 132*   K 4.3 3.4* 3.9   CL 96 97 95   CO2 20* 21* 22*   BUN 84* 94* 95*   CREATININE 5.0* 5.2* 5.1*   CALCIUM 7.8* 7.6* 7.8*   PHOS  --  6.7*  --          - Recommend repeat UA with AM labs  - No acute indication for HD     No acute issues identified. Please call KTM as needed; Will continue to follow.

## 2022-05-11 NOTE — ASSESSMENT & PLAN NOTE
Prior to admission, patient with 4 episodes of diarrhea per day for a month.  Had initially improved, but not recurring.    Recommendations:  -GI pathogens panel  -Reconsult GI for colonoscopy

## 2022-05-11 NOTE — NURSING NOTE
Patient verified by 2 identifiers and allergies reviewed.  IV in place to Lt FA.  DSE explained to patient, consent obtained & testing completed.  Pt tolerated testing well. IV flushed post testing.  Post study discharge instructions reviewed with patient, patient verbalized understanding.  Patient transferred back to room via stretcher accompanied by escort in stable condition.

## 2022-05-11 NOTE — PLAN OF CARE
Chart reviewed.  Transplant deferred.    Pneumocystis jiroveci pneumonia  61 y.o. male with a PMH ESRD 2/2 HTN s/p kidney transplant in 1/2004 (in [place]; on Tacrolimus/Sirolimus), c/b LOPEZ cirrhosis, ILD, T2DM (A1C 6.6 in 2/2022), hypothyroidism, BPH, gout was admitted on 4/30/2022 (transferred from [facility]) for abnormal labs and need for transplant liver/kidney evaluation.  Complaints of malaise, chronic cough, diarrhea, weight loss, and found febrile.  Born in [place], lived in [place], [place] before moving to [place] 9/2021.  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     Recommendations:  -Clindamycin and Primaquine for 21 days  -Steroid taper starting at prednisone 40mg BID days 1-5, then 40mg daily days 6-10, then 20mg daily days 11-21     Blastomycosis  5/2/22 Positive blastomyces urine Ag, below level of quantification.     Recommendations:  -12 months of posaconazole, started 5/4/2022.  -Monthly blastomyces urine and serum Ag.  -Secondary prophylaxis after transplant routinely not done.  However If patient were to be transplanted, needs ID consult at that time for updated recommendations.     Schistosoma  Equivocal schistosoma serologies.  Will treat empirically.     Recommendations:  -Praziquantel 20mg/kg q6h x3 doses     Chronic diarrhea  Prior to admission, patient with 4 episodes of diarrhea per day for a month.  Had initially improved, but not recurring.     Recommendations:  -GI pathogens panel  -Reconsult GI for colonoscopy       Infectious Disease will sign off. Please contact us if you have any additional questions.

## 2022-05-11 NOTE — SUBJECTIVE & OBJECTIVE
Interval History: Coughing, shortness of breath improved.  Still with liquid stools, diarrhea.    Review of Systems   Constitutional:  Positive for fatigue. Negative for chills, diaphoresis and fever.   HENT:  Negative for rhinorrhea and sore throat.    Respiratory:  Positive for shortness of breath. Negative for cough.    Cardiovascular:  Negative for chest pain and leg swelling.   Gastrointestinal:  Negative for abdominal pain, diarrhea, nausea and vomiting.   Genitourinary:  Negative for dysuria and hematuria.   Musculoskeletal:  Negative for arthralgias and myalgias.   Skin:  Negative for rash.   Allergic/Immunologic: Positive for immunocompromised state.   Neurological:  Negative for headaches.   Objective:     Vital Signs (Most Recent):  Temp: 97.9 °F (36.6 °C) (05/10/22 1630)  Pulse: 98 (05/10/22 1645)  Resp: (!) 27 (05/10/22 1645)  BP: (!) 114/52 (05/10/22 1645)  SpO2: 97 % (05/10/22 1645)   Vital Signs (24h Range):  Temp:  [97.4 °F (36.3 °C)-97.9 °F (36.6 °C)] 97.9 °F (36.6 °C)  Pulse:  [55-98] 98  Resp:  [15-27] 27  SpO2:  [95 %-100 %] 97 %  BP: ()/(44-80) 114/52     Weight: 65.2 kg (143 lb 11.8 oz)  Body mass index is 21.86 kg/m².    Estimated Creatinine Clearance: 13.9 mL/min (A) (based on SCr of 5.2 mg/dL (H)).    Physical Exam  Vitals and nursing note reviewed.   Constitutional:       General: He is awake. He is not in acute distress.     Appearance: He is well-developed. He is not toxic-appearing or diaphoretic.   HENT:      Head: Normocephalic and atraumatic.      Right Ear: External ear normal.      Left Ear: External ear normal.      Nose:      Comments: On nasal cannula     Mouth/Throat:      Mouth: Mucous membranes are moist.      Pharynx: No oropharyngeal exudate or posterior oropharyngeal erythema.   Eyes:      General: Scleral icterus present.   Cardiovascular:      Rate and Rhythm: Normal rate and regular rhythm.      Pulses: Normal pulses.      Heart sounds: Normal heart sounds. No  murmur heard.  Pulmonary:      Effort: No respiratory distress.      Breath sounds: No wheezing.   Abdominal:      General: Bowel sounds are normal. There is no distension.      Palpations: Abdomen is soft.      Tenderness: There is no abdominal tenderness. There is no guarding.   Musculoskeletal:         General: Normal range of motion.      Cervical back: Full passive range of motion without pain and neck supple.      Right lower leg: No edema.      Left lower leg: No edema.   Lymphadenopathy:      Cervical: No cervical adenopathy.   Skin:     General: Skin is warm.      Coloration: Skin is jaundiced.      Findings: No lesion or rash.   Neurological:      Mental Status: He is alert and oriented to person, place, and time.   Psychiatric:         Attention and Perception: Attention normal.         Mood and Affect: Mood normal.         Behavior: Behavior normal. Behavior is cooperative.         Thought Content: Thought content normal.         Cognition and Memory: Cognition normal.         Judgment: Judgment normal.       Significant Labs: All pertinent labs within the past 24 hours have been reviewed.    Significant Imaging: I have reviewed all pertinent imaging results/findings within the past 24 hours.

## 2022-05-11 NOTE — TELEPHONE ENCOUNTER
Patient's case was presented to liver transplant committee today.  He was deferred due to needing further evaluation of interstitial lung disease with PFTs and getting Pulmonary prognostic clearance given this diagnosis and current status.     I went to patient's hospital room to let him know of the above, but neither he nor his family members were in the room.  We will try again tomorrow.     Christina Osullivan MD

## 2022-05-11 NOTE — COMMITTEE REVIEW
Edgar Jackson's case presented to selection committee.  Patient has been DEFERRED for liver and kidney transplant at this time. Patient has a history of kidney transplant. He currently has ILDEFONSO on CKD. He also has multiple infectious sources that will need to be treated.  Currently on posaconazole for blasto Ag/fungitell (awaiting other fungal studies), primaquine and clinda for PJP, amoxicillin for SBP.  He is deconditioned, however appears to be improving. Per transplant committee, patient needs pharm stress test, pulmonologist clearance and PFT,  as well as close monitoring by Infectious Disease  I was present at the committee meeting and attest to the decision of the committee.    Chino Baird  05/11/2022

## 2022-05-11 NOTE — NURSING
PHARM.D. PRE-TRANSPLANT NOTE:    This patient's medication therapy was evaluated as part of his pre-transplant evaluation.      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     Current Outpatient Medications   Medication Sig Dispense Refill    posaconazole (NOXAFIL) 100 mg TbEC tablet Take 3 tablets (300 mg total) by mouth once daily. 90 tablet 3    primaquine 26.3 mg Tab Take 2 tablets (52.6 mg total) by mouth once daily. 60 tablet 0     Facility-Administered Medications Ordered in Other Visits   Medication Dose Route Frequency Provider Last Rate Last Admin    0.9%  NaCl infusion   Intravenous PRN Samantha Yuen MD   Stopped at 05/11/22 0548    acetaminophen tablet 650 mg  650 mg Oral Q6H PRN Samantha Yuen MD   650 mg at 05/03/22 0400    albuterol-ipratropium 2.5 mg-0.5 mg/3 mL nebulizer solution 3 mL  3 mL Nebulization Q6H PRN Samantha Yuen MD   3 mL at 05/07/22 2000    allopurinoL tablet 100 mg  100 mg Oral Daily Mansoor Narayanan MD   100 mg at 05/10/22 0841    amoxicillin capsule 500 mg  500 mg  Oral Q12H Samantha Yuen MD   500 mg at 05/10/22 2031    benzonatate capsule 200 mg  200 mg Oral TID Mansoor Narayanan MD   200 mg at 05/10/22 2031    clindamycin in D5W 900 mg/50 mL IVPB 900 mg  900 mg Intravenous Q8H Samantha Yuen MD   Stopped at 05/11/22 0350    dextromethorphan-guaiFENesin  mg/5 ml liquid 5 mL  5 mL Oral Q6H PRN Mansoor Narayanan MD   5 mL at 05/10/22 2032    dextrose 10% bolus 250 mL  25 g Intravenous PRN Mansoor Narayanan MD        fluticasone propionate 50 mcg/actuation nasal spray 100 mcg  2 spray Each Nostril Daily PRN Samantha Yuen MD        furosemide tablet 20 mg  20 mg Oral Daily Mansoor Narayanan MD   20 mg at 05/10/22 0841    glucagon (human recombinant) injection 1 mg  1 mg Intramuscular PRN Mansoor Narayanan MD        glucose chewable tablet 16 g  16 g Oral PRN Mansoor Narayanan MD        glucose chewable tablet 24 g  24 g Oral PRN Mansoor Narayanan MD        insulin aspart U-100 pen 1-10 Units  1-10 Units Subcutaneous QID (AC + HS) PRN Mike Bernal, DO   1 Units at 05/11/22 0202    insulin aspart U-100 pen 3-6 Units  3-6 Units Subcutaneous TID PC Zheng Parks, DNP, FNP   3 Units at 05/10/22 1739    insulin detemir U-100 pen 12 Units  12 Units Subcutaneous Daily Samantha Yuen MD   12 Units at 05/10/22 0840    levothyroxine tablet 50 mcg  50 mcg Oral Before breakfast Samantha Yuen MD   50 mcg at 05/11/22 0549    lipase-protease-amylase 12,000-38,000-60,000 units per capsule 2 capsule  2 capsule Oral QID (WM & HS) Mansoor Narayanan MD   2 capsule at 05/10/22 2031    loperamide capsule 2 mg  2 mg Oral TID Samantha Yuen MD   2 mg at 05/10/22 2031    metoprolol tartrate (LOPRESSOR) split tablet 12.5 mg  12.5 mg Oral BID Samantha Yuen MD   12.5 mg at 05/10/22 2031    naloxone 0.4 mg/mL injection 0.02 mg  0.02 mg Intravenous PRN Mansoor Narayanan MD        posaconazole EC tablet 300 mg  300 mg Oral Daily Samantha Yuen MD   300 mg at 05/10/22 0841    praziquanteL tablet  1,200 mg  20 mg/kg Oral Q6H Samantha Yuen MD        [START ON 5/12/2022] predniSONE tablet 40 mg  40 mg Oral Daily Samantha Yuen MD        Followed by    [START ON 5/17/2022] predniSONE tablet 20 mg  20 mg Oral Daily Samantha Yuen MD        predniSONE tablet 40 mg  40 mg Oral BID Samantha Yuen MD   40 mg at 05/10/22 2031    primaquine tablet 52.6 mg  52.6 mg Oral Daily Samantha Yuen MD   52.6 mg at 05/10/22 0842    sodium bicarbonate tablet 1,300 mg  1,300 mg Oral TID Samantha Yuen MD   1,300 mg at 05/10/22 2032    sodium chloride 0.9% flush 10 mL  10 mL Intravenous Q12H PRN Mansoor Narayanan MD               I am available for consultation and can be contacted, as needed by the other members of the Transplant team.

## 2022-05-11 NOTE — PROGRESS NOTES
Hepatology Treatment Plan    Edgar Jackson is a 61 y.o. male admitted to hospital 4/30/2022 (Hospital Day: 12) due to ILDEFONSO (acute kidney injury).     Interval History  No acute events overnight.  The patient appears significantly better this morning, sitting up in bed independently eating his breakfast, he has been able to sit in the chair for 4-5 hours and walking around with a walker.    Objective  Temp:  [97.4 °F (36.3 °C)-97.9 °F (36.6 °C)] 97.4 °F (36.3 °C) (05/11 0800)  Pulse:  [52-98] 58 (05/11 0800)  BP: (105-139)/(48-87) 128/87 (05/11 0800)  Resp:  [12-27] 20 (05/11 0800)  SpO2:  [95 %-100 %] 95 % (05/11 0800)     Constitutional:  not in acute distress and well developed  HENT: Head: Normal, normocephalic, atraumatic.  Eyes: conjunctiva clear and sclera nonicteric  Cardiovascular: regular rate and rhythm  Respiratory: normal chest expansion & respiratory effort   and no accessory muscle use  GI: soft, non-tender, without masses or organomegaly  Musculoskeletal: no muscular tenderness noted  Skin: normal color  Neurological: alert, oriented x3  Psychiatric: mood and affect are within normal limits, pt is a good historian; no memory problems were noted          Laboratory    Lab Results   Component Value Date    WBC 3.64 (L) 05/11/2022    HGB 8.3 (L) 05/11/2022    HCT 23.5 (L) 05/11/2022    MCV 84 05/11/2022    PLT 67 (L) 05/11/2022       Lab Results   Component Value Date     (L) 05/11/2022    K 3.9 05/11/2022    CL 95 05/11/2022    CO2 22 (L) 05/11/2022    BUN 95 (H) 05/11/2022    CREATININE 5.1 (H) 05/11/2022    CALCIUM 7.8 (L) 05/11/2022       Lab Results   Component Value Date    ALBUMIN 2.5 (L) 05/11/2022    ALT 9 (L) 05/11/2022    AST 63 (H) 05/11/2022    ALKPHOS 74 05/11/2022    BILITOT 5.9 (H) 05/11/2022       Lab Results   Component Value Date    INR 1.9 (H) 05/11/2022    INR 1.9 (H) 05/10/2022    INR 2.0 (H) 05/09/2022       MELD-Na score: 35 at 5/11/2022  6:28 AM  MELD score: 34 at 5/11/2022  6:28  AM  Calculated from:  Serum Creatinine: 5.1 mg/dL (Using max of 4 mg/dL) at 5/11/2022  6:28 AM  Serum Sodium: 132 mmol/L at 5/11/2022  6:28 AM  Total Bilirubin: 5.9 mg/dL at 5/11/2022  6:28 AM  INR(ratio): 1.9 at 5/11/2022  6:28 AM  Age: 61 years     Problem list  1. Decompensated LOPEZ cirrhosis  2. Hx of kidney transplant  3. ILDEFONSO on CKD  4. PJP  5. Schisto Ag +    Plan  - continue to follow up transplant nephrology recc's    Approved for inpatient liver/kidney transplant evaluation.  Now with multiple infectious sources that will need to be treated.  Currently on posaconazole for blasto Ag/fungitell (awaiting other fungal studies), primaquine and clinda for PJP, amoxicillin for SBP.  He is deconditioned, however appears to be improving.  Will plan to discuss the med transplant committee meeting today, awaiting stress test to complete the evaluation.    - f/u nephrology recommendations  - cardiac stress test  - no plan for colonoscopy per GI, had a recent screening colonoscopy  - continue Abx per ID  - aggressive PT  - please obtain daily CBC, BMP, LFT, INR  - Plan of care was discussed with primary team    Thank you for involving us in the care of Edgar Jackson. Please call with any additional concerns or questions.    Reba Balderas MD  Gastroenterology Fellow

## 2022-05-11 NOTE — SUBJECTIVE & OBJECTIVE
"Interval HPI:   Overnight events: No acute events overnight. Patient on the TSU in room 30177/44757 A. Blood glucose improving. BG at and above goal on current insulin regimen (SSI, prandial, and basal insulin ). Steroid use- Prednisone 40 mg BID (Tomorrow will decrease to 40 mg QD). 6 Days Post-Op  Renal function- Abnormal - Creatinine 5.1   Vasopressors-  None       Diet diabetic [facility];  Calorie; Renal; Isolation Tray - Regular [place]     Eatin%  Nausea: No  Hypoglycemia and intervention: No  Fever: No  TPN and/or TF: No    /67   Pulse 100   Temp 97.6 °F (36.4 °C) (Oral)   Resp 17   Ht 5' 8" (1.727 m)   Wt 63.3 kg (139 lb 8.8 oz)   SpO2 98%   BMI 21.22 kg/m²     Labs Reviewed and Include    Recent Labs   Lab 22  0628   *   CALCIUM 7.8*   ALBUMIN 2.5*   PROT 5.0*   *   K 3.9   CO2 22*   CL 95   BUN 95*   CREATININE 5.1*   ALKPHOS 74   ALT 9*   AST 63*   BILITOT 5.9*     Lab Results   Component Value Date    WBC 3.64 (L) 2022    HGB 8.3 (L) 2022    HCT 23.5 (L) 2022    MCV 84 2022    PLT 67 (L) 2022     No results for input(s): TSH, FREET4 in the last 168 hours.  Lab Results   Component Value Date    HGBA1C 6.0 (H) 05/10/2022       Nutritional status:   Body mass index is 21.22 kg/m².  Lab Results   Component Value Date    ALBUMIN 2.5 (L) 2022    ALBUMIN 2.3 (L) 05/10/2022    ALBUMIN 2.4 (L) 2022     No results found for: PREALBUMIN    Estimated Creatinine Clearance: 13.8 mL/min (A) (based on SCr of 5.1 mg/dL (H)).    Accu-Checks  Recent Labs     22  2044 22  2300 05/10/22  0202 05/10/22  0552 05/10/22  0802 05/10/22  1220 05/10/22  1736 05/10/22  2155 22  0200 22  0833   POCTGLUCOSE 424* 404* 355* 291* 257* 201* 195* 200* 196* 209*       Current Medications and/or Treatments Impacting Glycemic Control  Immunotherapy:    Immunosuppressants       None          Steroids:   Hormones (From admission, " "onward)                Start     Stop Route Frequency Ordered    05/17/22 0900  predniSONE tablet 20 mg        "Followed by" Linked Group Details    05/28 0859 Oral Daily 05/09/22 1719    05/12/22 0900  predniSONE tablet 40 mg        "Followed by" Linked Group Details    05/17 0859 Oral Daily 05/09/22 1719          Pressors:    Autonomic Drugs (From admission, onward)                Start     Stop Route Frequency Ordered    05/09/22 2100  metoprolol tartrate (LOPRESSOR) split tablet 12.5 mg         -- Oral 2 times daily 05/09/22 1003          Hyperglycemia/Diabetes Medications:   Antihyperglycemics (From admission, onward)                Start     Stop Route Frequency Ordered    05/12/22 0900  insulin detemir U-100 pen 15 Units         -- SubQ Daily 05/11/22 0901    05/10/22 1915  insulin aspart U-100 pen 3-6 Units         -- SubQ 3 times daily after meals 05/10/22 1506    05/09/22 2204  insulin aspart U-100 pen 1-10 Units         -- SubQ Before meals & nightly PRN 05/09/22 2105          "

## 2022-05-11 NOTE — PLAN OF CARE
Patient aao today, one person assist to the restroom. Patient declined imodium, diarrhea improved.  Patient went for dobutamine stress test today. Afebrile, vitals stable. BG elevated but improving, appetite fair. Cr 5.1 today,  per condom cath.  Family at bedside this am.

## 2022-05-11 NOTE — ASSESSMENT & PLAN NOTE
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    Recommendations:  -12 months of posaconazole, started 5/4/2022.  -Monthly blastomyces urine and serum Ag.  -Secondary prophylaxis after transplant routinely not done.  However If patient were to be transplanted, needs ID consult at that time for updated recommendations.

## 2022-05-11 NOTE — ASSESSMENT & PLAN NOTE
Endocrinology consulted for BG management.   BG goal 140-180    - Levemir Flex Pen 15 units daily (20% increase due to FBG above goal)  - Novolog (aspart) insulin 3-6 Units SQ TIDWM and prn for BG excursions [facility] [place] (150/25)  - BG checks AC/HS  - Hypoglycemia protocol in place  - If blood glucose greater than 300, please ask patient not to eat food or drink anything other than water until correctional insulin has brought it back below 250    ** Please notify Endocrine for any change and/or advance in diet**  ** Please call Endocrine for any BG related issues **    Discharge Planning:   TBD. Please notify endocrinology prior to discharge.

## 2022-05-11 NOTE — PROGRESS NOTES
[facility] - Transplant Stepdown  Endocrinology  Progress Note    Admit Date: 4/30/2022     Reason for Consult: Management of T2DM, Hyperglycemia     Surgical Procedure and Date: Pt undergoing evaluation for possible liver/kidney transplant    Diabetes diagnosis year: 2 years ago (2022 per pt)    Home Diabetes Medications:  Metformin 500 mg PO BID, long acting insulin 30 units daily    How often checking glucose at home?  Does not check    BG readings on regimen: Does not check his BG levels   Hypoglycemia on the regimen?  No (reports no symptoms of hypoglycemia, but stated he does not check)  Missed doses on regimen?  No    Diabetes Complications include:     Hyperglycemia    Complicating diabetes co morbidities:   CKD      HPI:   Patient is a 61 y.o. male with a diagnosis of renal transplant for hypertensive nephropathy (2004) on immunosuppressive therapy, LOPEZ cirrhosis, DM2, gout, BPH and ILD who was treated at [facility] ([facility]) for ILDEFONSO 2/2 decompensated cirrhosis and worsening portal vein hypertension with HRS. He has been having weekly paracentesis in the past month.     Pt reported that he had been experiencing abdominal distention with associated diarrhea (4-5/day) over several weeks, along with a chronic cough, weight loss, and found to be febrile.     Pt was transferred to [facility] for evaluation of possible liver/kidney transplant     CT with opacifications, reverse halo sign, found to have PJP.  Blasto urine Ag positive also.    Pt was started on PJP treatment with steroid taper leading to BG excursion. Endocrine consulted for BG management.   -Steroid taper starting 5/6/2022: Prednisone 40mg BID days 1-5, then 40mg daily days 6-10, then 20mg daily days 11-21         Interval HPI:   Overnight events: No acute events overnight. Patient on the TSU in room 41971/62485 A. Blood glucose improving. BG at and above goal on current insulin regimen (SSI, prandial, and basal insulin ). Steroid use- Prednisone 40 mg  "BID (Tomorrow will decrease to 40 mg QD). 6 Days Post-Op  Renal function- Abnormal - Creatinine 5.1   Vasopressors-  None       Diet diabetic [facility];  Calorie; Renal; Isolation Tray - Regular [place]     Eatin%  Nausea: No  Hypoglycemia and intervention: No  Fever: No  TPN and/or TF: No    /67   Pulse 100   Temp 97.6 °F (36.4 °C) (Oral)   Resp 17   Ht 5' 8" (1.727 m)   Wt 63.3 kg (139 lb 8.8 oz)   SpO2 98%   BMI 21.22 kg/m²     Labs Reviewed and Include    Recent Labs   Lab 22  0628   *   CALCIUM 7.8*   ALBUMIN 2.5*   PROT 5.0*   *   K 3.9   CO2 22*   CL 95   BUN 95*   CREATININE 5.1*   ALKPHOS 74   ALT 9*   AST 63*   BILITOT 5.9*     Lab Results   Component Value Date    WBC 3.64 (L) 2022    HGB 8.3 (L) 2022    HCT 23.5 (L) 2022    MCV 84 2022    PLT 67 (L) 2022     No results for input(s): TSH, FREET4 in the last 168 hours.  Lab Results   Component Value Date    HGBA1C 6.0 (H) 05/10/2022       Nutritional status:   Body mass index is 21.22 kg/m².  Lab Results   Component Value Date    ALBUMIN 2.5 (L) 2022    ALBUMIN 2.3 (L) 05/10/2022    ALBUMIN 2.4 (L) 2022     No results found for: PREALBUMIN    Estimated Creatinine Clearance: 13.8 mL/min (A) (based on SCr of 5.1 mg/dL (H)).    Accu-Checks  Recent Labs     22  2044 22  2300 05/10/22  0202 05/10/22  0552 05/10/22  0802 05/10/22  1220 05/10/22  1736 05/10/22  2155 22  0200 22  0833   POCTGLUCOSE 424* 404* 355* 291* 257* 201* 195* 200* 196* 209*       Current Medications and/or Treatments Impacting Glycemic Control  Immunotherapy:    Immunosuppressants       None          Steroids:   Hormones (From admission, onward)                Start     Stop Route Frequency Ordered    22 0900  predniSONE tablet 20 mg        "Followed by" Linked Group Details    859 Oral Daily 22 1719    22 0900  predniSONE tablet 40 mg        "Followed " "by" Linked Group Details    05/17 0859 Oral Daily 05/09/22 1719          Pressors:    Autonomic Drugs (From admission, onward)                Start     Stop Route Frequency Ordered    05/09/22 2100  metoprolol tartrate (LOPRESSOR) split tablet 12.5 mg         -- Oral 2 times daily 05/09/22 1003          Hyperglycemia/Diabetes Medications:   Antihyperglycemics (From admission, onward)                Start     Stop Route Frequency Ordered    05/12/22 0900  insulin detemir U-100 pen 15 Units         -- SubQ Daily 05/11/22 0901    05/10/22 1915  insulin aspart U-100 pen 3-6 Units         -- SubQ 3 times daily after meals 05/10/22 1506    05/09/22 2204  insulin aspart U-100 pen 1-10 Units         -- SubQ Before meals & nightly PRN 05/09/22 2105            ASSESSMENT and PLAN    * ILDEFONSO (acute kidney injury)  Caution with aggressive insulin adjustments to reduce risk of hypoglycemia    Type 2 diabetes mellitus without complication, without long-term current use of insulin  Endocrinology consulted for BG management.   BG goal 140-180    - Levemir Flex Pen 15 units daily (20% increase due to FBG above goal)  - Novolog (aspart) insulin 3-6 Units SQ TIDWM and prn for BG excursions [facility] SSI (150/25)  - BG checks AC/HS  - Hypoglycemia protocol in place  - If blood glucose greater than 300, please ask patient not to eat food or drink anything other than water until correctional insulin has brought it back below 250    ** Please notify Endocrine for any change and/or advance in diet**  ** Please call Endocrine for any BG related issues **    Discharge Planning:   TBD. Please notify endocrinology prior to discharge.      Hypothyroidism  Pt with Hx of hypothyroidism   Taking LT4 50 mcg PO daily  - TSH 2.4 on 04/30/2022  - Continue home regimen  - Recommend rechecking TFTs in 4-6 weeks outpatient         Zheng Parks, DNP, FNP  Endocrinology  [facility] - Transplant Stepdown  "

## 2022-05-11 NOTE — ASSESSMENT & PLAN NOTE
Equivocal schistosoma serologies.  Will treat empirically.    Recommendations:  -Praziquantel 20mg/kg q6h x3 doses

## 2022-05-11 NOTE — ASSESSMENT & PLAN NOTE
61 y.o. male with a PMH ESRD 2/2 HTN s/p kidney transplant in 1/2004 (in [place]; on Tacrolimus/Sirolimus), c/b LOPEZ cirrhosis, ILD, T2DM (A1C 6.6 in 2/2022), hypothyroidism, BPH, gout was admitted on 4/30/2022 (transferred from [facility]) for abnormal labs and need for transplant liver/kidney evaluation.  Complaints of malaise, chronic cough, diarrhea, weight loss, and found febrile.  Born in [place], lived in [place], [place] before moving to [place] 9/2021.  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    Recommendations:  -Clindamycin and Primaquine for 21 days  -Steroid taper starting at prednisone 40mg BID days 1-5, then 40mg daily days 6-10, then 20mg daily days 11-21

## 2022-05-11 NOTE — PROGRESS NOTES
Progress Note  Hospital Medicine      Patient Name: Edgar Jackson  MRN: [id number]  Patient Class: IP- Inpatient  Admission Date: 4/30/2022  Attending Physician:Samantha Yuen MD   Primary Care Provider: Tyler Castillo MD            SUBJECTIVE:     Follow-up For:  ILDEFONSO (acute kidney injury)     Interval history/ROS:   5/11: deferred for transplant, sitting in chair today. Repeat para shows improvement in peritonitis.     5/10: paracentesis today     5/9: to begin treatment for schistosomiasis    5/8: TB rule out continues    5/7: patient with dry cough, feels weak.       HPI: Mr Jackson is a 61yoM with PMHx of renal transplant for hypertensive nephropathy (2004) immunosuppressed on tacrolimus/sirolimus,  therapy, LOPEZ cirrhosis, NIDDM, and ILD who was treated at OSH for ILDEFONSO 2/2 decompensated cirrhosis and worsening portal vein hypertension with HRS. Pt reported that he had been experiencing abdominal distention with associated diarrhea (4-5/day) over several weeks, along with a constant nagging cough. On presenting to OSH, multiple paracentesis (3) were performed to relieve the ascitic fluid. No evidence of SBP on fluid analysis. Creon was added to diet to aide in food digestion in setting of cirrhosis. Loperamide provided symptomatic treatment of diarrhea. Dextromethorphan-guaifenesin syrup added to reduce cough exacerbations. Despite efforts to manage symptoms, pt in need of  transplant surgery evaluation of the renal/hepatic injury, resulting in tranfer to [facility].     On arrival to [facility] (4/30/2022), pt has experienced increased generalized weakness. Loperamide was continued for diarrhea management. Abdominal distention recurred, despite prior paracentesis. Cough has been fairly constant and predominantly dry. Pt now endorses new left lower quadrant abdominal pain, worsened by cough. KTM and Hepatology consults placed for management of HRS.  OBJECTIVE:     Body mass index is 21.13 kg/m².    Vital Signs Range (Last 24H):  Temp:   [97.3 °F (36.3 °C)-97.9 °F (36.6 °C)]   Pulse:  []   Resp:  [12-27]   BP: (107-136)/(52-87)   SpO2:  [95 %-99 %]     I & O (Last 24H):    Intake/Output Summary (Last 24 hours) at 5/11/2022 1625  Last data filed at 5/11/2022 1400  Gross per 24 hour   Intake 1865.38 ml   Output 1051 ml   Net 814.38 ml        Physical Exam:  Constitutional:       General: He is awake. He is not in acute distress.     Appearance: He is well-developed. He is not toxic-appearing or diaphoretic.   HENT:      Head: Normocephalic and atraumatic.      Right Ear: External ear normal.      Left Ear: External ear normal.      Nose:      Comments: On nasal cannula     Mouth/Throat:      Mouth: Mucous membranes are moist.      Pharynx: No oropharyngeal exudate or posterior oropharyngeal erythema.   Eyes:      General: Scleral icterus present.   Cardiovascular:      Rate and Rhythm: Normal rate and regular rhythm.      Pulses: Normal pulses.      Heart sounds: Normal heart sounds. No murmur heard.  Pulmonary:      Effort: No respiratory distress.      Breath sounds: No wheezing.   Abdominal:      General: Bowel sounds are normal. There is no distension.      Palpations: Abdomen is soft.      Tenderness: There is no abdominal tenderness. There is no guarding.   Musculoskeletal:         General: Normal range of motion.      Cervical back: Full passive range of motion without pain and neck supple.      Right lower leg: Positive for edema.      Left lower leg: Positive for edema.   Lymphadenopathy:      Cervical: No cervical adenopathy.   Skin:     General: Skin is warm.      Coloration: Skin is jaundiced.      Findings: No lesion or rash.   Neurological:      Mental Status: He is alert and oriented to person, place, and time.   Psychiatric:         Attention and Perception: Attention normal.         Mood and Affect: Mood normal.         Behavior: Behavior normal. Behavior is cooperative.         Thought Content: Thought content normal.          Cognition and Memory: Cognition normal.         Judgment: Judgment normal.        Recent Labs   Lab 05/09/22  0614 05/10/22  0725 05/11/22  0628   * 130* 132*   K 4.3 3.4* 3.9   CL 96 97 95   CO2 20* 21* 22*   BUN 84* 94* 95*   CREATININE 5.0* 5.2* 5.1*   * 251* 182*   CALCIUM 7.8* 7.6* 7.8*   MG  --  1.9  --    PHOS  --  6.7*  --      Recent Labs   Lab 05/09/22  0614 05/10/22  0725 05/11/22  0628   ALKPHOS 79 74 74   ALT 12 9* 9*   AST 75* 61* 63*   ALBUMIN 2.4* 2.3* 2.5*   PROT 4.8* 4.9* 5.0*   BILITOT 5.5* 5.0* 5.9*   INR 2.0* 1.9* 1.9*       Recent Labs   Lab 05/09/22  0614 05/10/22  0725 05/10/22  1543 05/11/22  0628   WBC 4.16 4.18  --  3.64*   HGB 8.4* 8.1*  --  8.3*   HCT 23.8* 23.0*  --  23.5*   PLT 75* 81*  --  67*   GRAN 80.8*  3.4 79.2*  3.3  --  74.1*  2.7   SEGS  --   --  3  --    LYMPH 10.1*  0.4* 9.6*  0.4*  --  10.7*  0.4*   LYMPHS  --   --  88  --    MONO 7.2  0.3 8.1  0.3  --  10.2  0.4       Recent Labs   Lab 05/10/22  0802 05/10/22  1220 05/10/22  1736 05/10/22  2155 05/11/22  0200 05/11/22  0833   POCTGLUCOSE 257* 201* 195* 200* 196* 209*       No results for input(s): TROPONINI in the last 168 hours.    Diagnostic Results:  Labs: Reviewed    allopurinoL, 100 mg, Oral, Daily  amoxicillin, 500 mg, Oral, Q12H  benzonatate, 200 mg, Oral, TID  calcitRIOL, 0.25 mcg, Oral, Daily  clindamycin (CLEOCIN) IVPB, 900 mg, Intravenous, Q8H  furosemide, 20 mg, Oral, Daily  insulin aspart U-100, 3-6 Units, Subcutaneous, TID PC  [START ON 5/12/2022] insulin detemir U-100, 15 Units, Subcutaneous, Daily  levothyroxine, 50 mcg, Oral, Before breakfast  lipase-protease-amylase 12,000-38,000-60,000 units, 2 capsule, Oral, QID (WM & HS)  loperamide, 2 mg, Oral, TID  metoprolol tartrate, 12.5 mg, Oral, BID  posaconazole, 300 mg, Oral, Daily  praziquanteL, 20 mg/kg, Oral, Q6H  [START ON 5/12/2022] predniSONE, 40 mg, Oral, Daily   Followed by  [START ON 5/17/2022] predniSONE, 20 mg, Oral,  Daily  primaquine, 52.6 mg, Oral, Daily  sodium bicarbonate, 1,300 mg, Oral, TID        As Needed sodium chloride 0.9%, acetaminophen, albuterol-ipratropium, dextromethorphan-guaiFENesin  mg/5 ml, dextrose 10%, fluticasone propionate, glucagon (human recombinant), glucose, glucose, insulin aspart U-100, naloxone, sodium chloride 0.9%    ASSESSMENT/PLAN:     Assessment: Edgar Jackson is a 61 y.o. male here with:     Active Hospital Problems    Diagnosis  POA    *ILDEFONSO (acute kidney injury) [N17.9]  Yes    Blastomycosis [B40.9]  Yes    Schistosoma [B65.9]  Yes    Pneumocystis jiroveci pneumonia [B59]  Unknown    SBP (spontaneous bacterial peritonitis) [K65.2]  Unknown    Severe malnutrition [E43]  Yes     Severe malnutrition  Nutrition Problem  Severe protein-calorie malnutrition     Related to (etiology):   Chronic illness     Signs and Symptoms (as evidenced by):   Wt loss of 28 lb (17.5%) in 3 months  Energy intake <75% of EEN for >3 months  Moderate muscle mass depletion (temples, clavicles, shoulders, thigh, calf)     Interventions/Recommendations (treatment strategy):  Collaboration with other providers     Nutrition Diagnosis Status:   New      Lung infiltrate on CT [R91.8]  Yes    Sepsis [A41.9]  No    Chronic diarrhea [K52.9]  Yes    Type 2 diabetes mellitus without complication, without long-term current use of insulin [E11.9]  Yes    Chronic cough [R05.3]  Yes    Fever in immunocomprised patient [R50.9]  Yes    Hypothyroidism [E03.9]  Yes     Chronic    Thrombocytopenia [D69.6]  Yes    Hyponatremia [E87.1]  Yes    Metabolic acidosis [E87.2]  Yes    Interstitial lung disease [J84.9]  Yes    Cirrhosis of liver with ascites [K74.60, R18.8]  Yes    Transplanted kidney [Z94.0]  Not Applicable    Gout [M10.9]  Yes    Personal history of immunosupression therapy [Z92.25]  Not Applicable      Resolved Hospital Problems   No resolved problems to display.        Plan:     Pneumocystis jiroveci  pneumonia  61 y.o. male with a PMH ESRD 2/2 HTN s/p kidney transplant in 1/2004 (in [place]; on Tacrolimus/Sirolimus), c/b LOPEZ cirrhosis, ILD, T2DM (A1C 6.6 in 2/2022), hypothyroidism, BPH, gout was admitted on 4/30/2022 (transferred from [facility]) for abnormal labs and need for transplant liver/kidney evaluation.  Complaints of malaise, chronic cough, diarrhea, weight loss, and found febrile.  Born in [place], lived in [place], [place] before moving to [place] 9/2021.  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  Blasto urine Ag positive also.     -5/6 started Clindamycin and Primaquine with steroid taper starting at prednisone 40mg BID days 1-5, then 40mg daily days 6-10, then 20mg daily days 11-21  -Continue posaconazole  -Airborne precautions until TB rule out, need 3 total AFB Cx  -Can have RT assist by inducing sputum for AFB Cx collection  -[id number] Viracor in process  -GI consulted, planned for colonoscopy after TB rule out     SBP (spontaneous bacterial peritonitis)  Enterococcus faecalis SBP  -Continue ampicillin  -Repeat paracentesis after TB rule out    Type 2 diabetes mellitus without complication, without long-term current use of insulin  Pt with Hx of DM2  Currently with BG excursions due to steroid taper placed by ID for PJP     Home regimen: Long acing insulin 30 units and metformin 500 mg PO BID     BG goal 140-180  Currently BG uncontrolled due to pt receiving steroids for PJP treatment     Plan  Recommend intensive insulin drip with POCt q1hr until BG at goal with clear liquid bariatric diet   Once BG stable will consider placing him on SQ insulin (15 units long acting and 7 units before meals with moderate correction and will switch diet to renal and diabetic diet)     Will keep following and will adjust insulin regimen accordingly   Due to kidney function, pt is to discontinue metformin from his home regimen and to discard metformin pills from home.        Hypothyroidism  Pt with  Hx of hypothyroidism   Taking LT4 50 mcg PO daily  Continue home regimen     ILDEFONSO (acute kidney injury)  His renal functions were normal beginning of Feb.   After that it appears he had ILDEFONSO - possibly related to pre-renal states  Which led to CKD stage III, which has progressed to stage IV over the course of last few weeks     Has consented for HD in case needed.   Monitor I/Os   US renal transplant unremarkable.  Urine Output on 5/6 noted to be 450ml   Cr: 6.0-->5.7-->5.8-->5.3-->5.4-->5.8-->5.0 (on 5/7)     - No acute indication for HD  - Renally dose all medications to GFR=0  - Avoid Nephrotoxic drugs   - Renal Diet  - Lokelma 10mg PRN if K>5  - Strict I/OS  - Daily RFPs     Pneumocystis jiroveci pneumonia  - PJP positive  - ID following - Currently on Clindamycin and Primaquine   - If patient needs bactrim for clearance of infection- Nephrology will help with HD needs         Chronic diarrhea  Reports since past 2 months   stool studies in progress   Will undergo C-Scope on 5/4 with GI     Metabolic acidosis  - Continue NaHCO3 1300 TID  - Likely secondary to diarrhea      Hyponatremia  This is likely in setting of renal failure and liver cirrhosis   Would be hard to normalize  Continue fluid restriction upto 1.2 liter/day     MELD-Na score: 35 at 5/6/2022  6:08 AM  MELD score: 32 at 5/6/2022  6:08 AM  Calculated from:  Serum Creatinine: 5.1 mg/dL (Using max of 4 mg/dL) at 5/6/2022  6:08 AM  Serum Sodium: 125 mmol/L at 5/6/2022  6:08 AM  Total Bilirubin: 5.5 mg/dL at 5/6/2022  6:08 AM  INR(ratio): 1.7 at 5/6/2022  6:08 AM  Age: 61 years     Plan  - continue to follow up transplant nephrology rec's     Approved for inpatient liver/kidney transplant evaluation.  Now with multiple infectious sources that will need to be further evaluated and treated.  Currently on posaconazole for blasto Ag/fungitell (awaiting other fungal studies), On vancomycin for enterococcus ascitic fluid culture.        We will hold off on  further transplant evaluation at this time until treatment duration for various infections are defined.     - f/u nephrology recommendations  - f/u pulmonary recc's, bronchoscopy studies  - colonoscopy per GI  - please obtain daily CBC, BMP, LFT, INR  - Plan of care was discussed with primary team       HIGH RISK CONDITION(S):  Patient has a condition that poses threat to life and bodily function: Acute Renal Failure          Samantha Yuen MD

## 2022-05-11 NOTE — PROGRESS NOTES
[facility] - Transplant Stepdown  Infectious Disease  Progress Note    Patient Name: Edgar Jackson  MRN: [id number]  Admission Date: 4/30/2022  Length of Stay: 10 days  Attending Physician: Samantha Yuen MD  Primary Care Provider: Tyler Castillo MD    Isolation Status: No active isolations  Assessment/Plan:      Pneumocystis jiroveci pneumonia  61 y.o. male with a PMH ESRD 2/2 HTN s/p kidney transplant in 1/2004 (in [place]; on Tacrolimus/Sirolimus), c/b LOPEZ cirrhosis, ILD, T2DM (A1C 6.6 in 2/2022), hypothyroidism, BPH, gout was admitted on 4/30/2022 (transferred from [facility]) for abnormal labs and need for transplant liver/kidney evaluation.  Complaints of malaise, chronic cough, diarrhea, weight loss, and found febrile.  Born in [place], lived in [place], [place] before moving to [place] 9/2021.  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    Recommendations:  -Clindamycin and Primaquine for 21 days  -Steroid taper starting at prednisone 40mg BID days 1-5, then 40mg daily days 6-10, then 20mg daily days 11-21    Blastomycosis  5/2/22 Positive blastomyces urine Ag, below level of quantification.    Recommendations:  -12 months of posaconazole, started 5/4/2022.  -Monthly blastomyces urine and serum Ag.  -Secondary prophylaxis after transplant routinely not done.  However If patient were to be transplanted, needs ID consult at that time for updated recommendations.    Schistosoma  Equivocal schistosoma serologies.  Will treat empirically.    Recommendations:  -Praziquantel 20mg/kg q6h x3 doses    Chronic diarrhea  Prior to admission, patient with 4 episodes of diarrhea per day for a month.  Had initially improved, but not recurring.    Recommendations:  -GI pathogens panel  -Reconsult GI for colonoscopy        Thank you for your consult. I will sign off. Please contact us if you have any additional questions.    Zheng Chamberlain MD  Infectious Disease  [facility] - Transplant Stepdown    Subjective:     Principal  Problem:ILDEFONSO (acute kidney injury)    HPI: Mr Edgar Jackson is a 61 y.o. [place] male with a PMH ESRD 2/2 HTN s/p kidney transplant in 1/2004 (in [place]; on Tacrolimus/Sirolimus), c/b LOPEZ cirrhosis, ILD, T2DM (A1C 6.6 in 2/2022), hypothyroidism, BPH, gout; admitted on 4/30/2022 (transferred from [facility]) for abnormal labs and need for transplant liver/kidney evaluation. Patient is a [employer]  and moved from [place], [place] (lived for 20 years) to [place] since 9/2021. He was hospitalized from 4/7 to 4/21/2022 for worsening Cr. He was received 5 days of ceftriaxone for empiric CAP - had paracentesis without evidence of SBP. He was readmitted at [facility] on 4/27 due to worsening Cr, and was transferred here on 4/30 for transplant specialities evaluation. He reports being lousy for hte past 3 months. He has chronic dry cough, decreased appetite, diarrhea (up to four times a day), 20 lb weight loss for the past 3 months. Patient developed fever to 100.7 on 5/1. Cr 4.5-5 (baseline 1.6). He was started on empiric vancomycin and cefepime. Our team was consulted for antibiotic assistance.     Patient immigrated from [facility] since [place]. He previously enjoyed outdoor activities. He currently lives with his wife in [place]. No pets.               Interval History: Coughing, shortness of breath improved.  Still with liquid stools, diarrhea.    Review of Systems   Constitutional:  Positive for fatigue. Negative for chills, diaphoresis and fever.   HENT:  Negative for rhinorrhea and sore throat.    Respiratory:  Positive for shortness of breath. Negative for cough.    Cardiovascular:  Negative for chest pain and leg swelling.   Gastrointestinal:  Negative for abdominal pain, diarrhea, nausea and vomiting.   Genitourinary:  Negative for dysuria and hematuria.   Musculoskeletal:  Negative for arthralgias and myalgias.   Skin:  Negative for rash.   Allergic/Immunologic: Positive for immunocompromised state.    Neurological:  Negative for headaches.   Objective:     Vital Signs (Most Recent):  Temp: 97.9 °F (36.6 °C) (05/10/22 1630)  Pulse: 98 (05/10/22 1645)  Resp: (!) 27 (05/10/22 1645)  BP: (!) 114/52 (05/10/22 1645)  SpO2: 97 % (05/10/22 1645)   Vital Signs (24h Range):  Temp:  [97.4 °F (36.3 °C)-97.9 °F (36.6 °C)] 97.9 °F (36.6 °C)  Pulse:  [55-98] 98  Resp:  [15-27] 27  SpO2:  [95 %-100 %] 97 %  BP: ()/(44-80) 114/52     Weight: 65.2 kg (143 lb 11.8 oz)  Body mass index is 21.86 kg/m².    Estimated Creatinine Clearance: 13.9 mL/min (A) (based on SCr of 5.2 mg/dL (H)).    Physical Exam  Vitals and nursing note reviewed.   Constitutional:       General: He is awake. He is not in acute distress.     Appearance: He is well-developed. He is not toxic-appearing or diaphoretic.   HENT:      Head: Normocephalic and atraumatic.      Right Ear: External ear normal.      Left Ear: External ear normal.      Nose:      Comments: On nasal cannula     Mouth/Throat:      Mouth: Mucous membranes are moist.      Pharynx: No oropharyngeal exudate or posterior oropharyngeal erythema.   Eyes:      General: Scleral icterus present.   Cardiovascular:      Rate and Rhythm: Normal rate and regular rhythm.      Pulses: Normal pulses.      Heart sounds: Normal heart sounds. No murmur heard.  Pulmonary:      Effort: No respiratory distress.      Breath sounds: No wheezing.   Abdominal:      General: Bowel sounds are normal. There is no distension.      Palpations: Abdomen is soft.      Tenderness: There is no abdominal tenderness. There is no guarding.   Musculoskeletal:         General: Normal range of motion.      Cervical back: Full passive range of motion without pain and neck supple.      Right lower leg: No edema.      Left lower leg: No edema.   Lymphadenopathy:      Cervical: No cervical adenopathy.   Skin:     General: Skin is warm.      Coloration: Skin is jaundiced.      Findings: No lesion or rash.   Neurological:       Mental Status: He is alert and oriented to person, place, and time.   Psychiatric:         Attention and Perception: Attention normal.         Mood and Affect: Mood normal.         Behavior: Behavior normal. Behavior is cooperative.         Thought Content: Thought content normal.         Cognition and Memory: Cognition normal.         Judgment: Judgment normal.       Significant Labs: All pertinent labs within the past 24 hours have been reviewed.    Significant Imaging: I have reviewed all pertinent imaging results/findings within the past 24 hours.

## 2022-05-12 NOTE — SUBJECTIVE & OBJECTIVE
Interval History: No acute events overnight however there were reports of patient becoming increasingly confused. This AM he seems easily distracted, but remains Aox3. He has extreme tenderness to palpation around his bladder.     Review of Systems   Constitutional:  Negative for chills and fever.   HENT:  Negative for congestion and sore throat.    Eyes:  Negative for photophobia and visual disturbance.   Respiratory:  Negative for cough and shortness of breath.    Cardiovascular:  Negative for chest pain and palpitations.   Gastrointestinal:  Negative for abdominal pain, blood in stool, constipation, diarrhea, nausea and vomiting.   Endocrine: Negative for cold intolerance and heat intolerance.   Genitourinary:  Negative for dysuria and hematuria.   Musculoskeletal:  Negative for arthralgias and myalgias.   Skin:  Negative for rash and wound.   Allergic/Immunologic: Negative for environmental allergies and food allergies.   Neurological:  Negative for seizures and numbness.   Hematological:  Negative for adenopathy. Does not bruise/bleed easily.   Psychiatric/Behavioral:  Positive for decreased concentration and sleep disturbance. Negative for hallucinations and suicidal ideas.    Objective:     Vital Signs (Most Recent):  Temp: 97.6 °F (36.4 °C) (05/12/22 1121)  Pulse: 68 (05/12/22 0729)  Resp: 19 (05/12/22 0729)  BP: 134/78 (05/12/22 0729)  SpO2: 98 % (05/12/22 0729) Vital Signs (24h Range):  Temp:  [97.3 °F (36.3 °C)-98.5 °F (36.9 °C)] 97.6 °F (36.4 °C)  Pulse:  [59-72] 68  Resp:  [13-22] 19  SpO2:  [85 %-99 %] 98 %  BP: (113-149)/(67-87) 134/78     Weight: 62.4 kg (137 lb 9.1 oz)  Body mass index is 20.92 kg/m².    Intake/Output Summary (Last 24 hours) at 5/12/2022 1408  Last data filed at 5/12/2022 0600  Gross per 24 hour   Intake 1000.9 ml   Output 360 ml   Net 640.9 ml      Physical Exam  Constitutional:       Appearance: He is well-developed.   HENT:      Head: Normocephalic and atraumatic.   Eyes:       General: Scleral icterus present.      Conjunctiva/sclera: Conjunctivae normal.      Pupils: Pupils are equal, round, and reactive to light.   Neck:      Thyroid: No thyromegaly.      Vascular: No JVD.   Cardiovascular:      Rate and Rhythm: Normal rate and regular rhythm.      Heart sounds: Normal heart sounds. No murmur heard.    No friction rub. No gallop.   Pulmonary:      Effort: Pulmonary effort is normal. No respiratory distress.      Breath sounds: Normal breath sounds. No wheezing or rales.   Abdominal:      General: Bowel sounds are normal. There is no distension.      Palpations: Abdomen is soft. There is no mass.      Tenderness: There is abdominal tenderness (suprapubic area). There is no guarding or rebound.      Hernia: No hernia is present.   Musculoskeletal:         General: No deformity. Normal range of motion.      Cervical back: Normal range of motion and neck supple.   Skin:     General: Skin is warm and dry.   Neurological:      Mental Status: He is alert.      Cranial Nerves: No cranial nerve deficit.   Psychiatric:         Attention and Perception: He is inattentive.         Behavior: Behavior normal.       Significant Labs: All pertinent labs within the past 24 hours have been reviewed.  CBC:   Recent Labs   Lab 05/11/22  0628 05/12/22  0639   WBC 3.64* 8.89   HGB 8.3* 8.7*   HCT 23.5* 25.0*   PLT 67* 84*     CMP:   Recent Labs   Lab 05/11/22  0628 05/12/22  0639   * 134*   K 3.9 3.2*   CL 95 94*   CO2 22* 22*   * 129*   BUN 95* 102*   CREATININE 5.1* 5.3*   CALCIUM 7.8* 8.0*   PROT 5.0* 5.6*   ALBUMIN 2.5* 2.7*   BILITOT 5.9* 6.9*   ALKPHOS 74 80   AST 63* 71*   ALT 9* 10   ANIONGAP 15 18*   EGFRNONAA 11.4* 10.8*       Significant Imaging: I have reviewed all pertinent imaging results/findings within the past 24 hours.

## 2022-05-12 NOTE — SUBJECTIVE & OBJECTIVE
Past Medical History:   Diagnosis Date    BPH (benign prostatic hyperplasia)     Diabetes 2020    Gout     Hypertension 2000    Hypothyroidism 04/08/2022    Interstitial lung disease 03/30/2022    Kidney transplant recipient 01/2004    [place] 2* hypertensive nephropathy    Unspecified cirrhosis of liver     ?LOPEZ       Past Surgical History:   Procedure Laterality Date    CATARACT EXTRACTION Right 2017    COLONOSCOPY N/A 3/8/2022    Procedure: COLONOSCOPY;  Surgeon: Mio Simmons III, MD;  Location: [facility] ENDO;  Service: Endoscopy;  Laterality: N/A;    COLONOSCOPY N/A 5/5/2022    Procedure: COLONOSCOPY;  Surgeon: Chiquita Lewis MD;  Location: [facility] ENDO (2ND FLR);  Service: Endoscopy;  Laterality: N/A;    COLONOSCOPY N/A 5/5/2022    Procedure: COLONOSCOPY;  Surgeon: Chiquita Lewis MD;  Location: [facility] ENDO (2ND FLR);  Service: Endoscopy;  Laterality: N/A;    ENDOSCOPIC ULTRASOUND OF UPPER GASTROINTESTINAL TRACT N/A 3/8/2022    Procedure: ULTRASOUND, UPPER GI TRACT, ENDOSCOPIC;  Surgeon: Mio Simmons III, MD;  Location: [facility];  Service: Endoscopy;  Laterality: N/A;    ESOPHAGOGASTRODUODENOSCOPY N/A 5/5/2022    Procedure: EGD (ESOPHAGOGASTRODUODENOSCOPY);  Surgeon: Chiquita Lewis MD;  Location: [facility] (2ND FLR);  Service: Endoscopy;  Laterality: N/A;    ESOPHAGOGASTRODUODENOSCOPY N/A 5/5/2022    Procedure: EGD (ESOPHAGOGASTRODUODENOSCOPY);  Surgeon: Chiquita Lewis MD;  Location: [facility] (2ND FLR);  Service: Endoscopy;  Laterality: N/A;    KIDNEY TRANSPLANT  2004    PORTACATH PLACEMENT  2003    REMOVAL OF VASCULAR ACCESS PORT  2005       Review of patient's allergies indicates:  No Known Allergies    Family History       Problem Relation (Age of Onset)    Heart disease Father    Kidney disease Father          Tobacco Use    Smoking status: Never Smoker    Smokeless tobacco: Never Used   Substance and Sexual Activity    Alcohol use: Not Currently    Drug use: Never    Sexual activity:  Not on file         Review of Systems   Constitutional:  Positive for appetite change, chills, fatigue, fever and unexpected weight change. Negative for activity change.   HENT:  Negative for congestion and sore throat.    Respiratory:  Positive for cough and shortness of breath. Negative for wheezing.    Cardiovascular:  Negative for chest pain and leg swelling.   Gastrointestinal:  Positive for diarrhea. Negative for abdominal pain.   Musculoskeletal:  Negative for back pain.   Skin:  Positive for color change. Negative for rash.   Neurological:  Negative for weakness and light-headedness.   Psychiatric/Behavioral:  Positive for confusion and decreased concentration.    Objective:     Vital Signs (Most Recent):  Temp: 97.6 °F (36.4 °C) (05/12/22 1121)  Pulse: 68 (05/12/22 0729)  Resp: 19 (05/12/22 0729)  BP: 134/78 (05/12/22 0729)  SpO2: 98 % (05/12/22 0729) Vital Signs (24h Range):  Temp:  [97.3 °F (36.3 °C)-98.5 °F (36.9 °C)] 97.6 °F (36.4 °C)  Pulse:  [51-72] 68  Resp:  [13-22] 19  SpO2:  [85 %-99 %] 98 %  BP: (113-149)/(67-87) 134/78     Weight: 62.4 kg (137 lb 9.1 oz)  Body mass index is 20.92 kg/m².      Intake/Output Summary (Last 24 hours) at 5/12/2022 1143  Last data filed at 5/12/2022 0600  Gross per 24 hour   Intake 1500.9 ml   Output 710 ml   Net 790.9 ml       Physical Exam  Constitutional:       General: He is not in acute distress.     Appearance: He is ill-appearing.   HENT:      Head: Normocephalic and atraumatic.      Mouth/Throat:      Mouth: Mucous membranes are moist.   Eyes:      Extraocular Movements: Extraocular movements intact.      Pupils: Pupils are equal, round, and reactive to light.   Cardiovascular:      Rate and Rhythm: Normal rate and regular rhythm.   Pulmonary:      Effort: Pulmonary effort is normal. No respiratory distress.      Comments: Course breath sounds b/l  Abdominal:      Tenderness: There is no abdominal tenderness.   Musculoskeletal:      Right lower leg: No edema.       Left lower leg: No edema.   Lymphadenopathy:      Cervical: No cervical adenopathy.   Skin:     Coloration: Skin is jaundiced.       Vents:       Lines/Drains/Airways       Drain  Duration             Male External Urinary Catheter 05/09/22 0900 Medium 3 days              Peripheral Intravenous Line  Duration                  Peripheral IV - Single Lumen 05/08/22 22 G Left Forearm 4 days                    Significant Labs:    CBC/Anemia Profile:  Recent Labs   Lab 05/11/22  0628 05/12/22  0639   WBC 3.64* 8.89   HGB 8.3* 8.7*   HCT 23.5* 25.0*   PLT 67* 84*   MCV 84 84   RDW 14.7* 15.4*        Chemistries:  Recent Labs   Lab 05/11/22  0628 05/12/22  0639   * 134*   K 3.9 3.2*   CL 95 94*   CO2 22* 22*   BUN 95* 102*   CREATININE 5.1* 5.3*   CALCIUM 7.8* 8.0*   ALBUMIN 2.5* 2.7*   PROT 5.0* 5.6*   BILITOT 5.9* 6.9*   ALKPHOS 74 80   ALT 9* 10   AST 63* 71*   PHOS  --  6.8*       All pertinent labs within the past 24 hours have been reviewed.    Significant Imaging:   I have reviewed all pertinent imaging results/findings within the past 24 hours.

## 2022-05-12 NOTE — ASSESSMENT & PLAN NOTE
MELD-Na score: 35 at 5/12/2022  6:39 AM  MELD score: 34 at 5/12/2022  6:39 AM  Calculated from:  Serum Creatinine: 5.3 mg/dL (Using max of 4 mg/dL) at 5/12/2022  6:39 AM  Serum Sodium: 134 mmol/L at 5/12/2022  6:39 AM  Total Bilirubin: 6.9 mg/dL at 5/12/2022  6:39 AM  INR(ratio): 1.9 at 5/12/2022  6:39 AM  Age: 61 years  Patient recently diagnosed with cirrhosis 2/2 to LOPEZ. Pulmonary hypertension now present with with multiple episodes of paracentesis. Largest abdominal fluid reduction just over 5L. [place] [facility] [place] on culture.   - Hepatology and KTM coordinating for possible transplant.   - Patient denied listing for transplant given concern for ILD, pulm consulted for any further recs and are recommending outpatient PFTs if anything.  - Per transplant anesthesia, they are requesting additional PET stress imaging as he did not achieve maximal HR on his DSE.

## 2022-05-12 NOTE — SIGNIFICANT EVENT
Patient confused, appears encephalopathic  -Will get Ammonia  -Lactulose ordered        Mo Pelayo MD  Internal Medicine Staff

## 2022-05-12 NOTE — CONSULTS
Placed 20 g x 10 cm midline in left brachial vein for 3 weeks of clindamycin using realtime ultrasound guidance.  Lot#[id number].  Max dwell date 6/10/22.  Imagery recorded and saved.

## 2022-05-12 NOTE — PROGRESS NOTES
[facility] - Transplant Stepdown  Kidney Transplant  Progress Note      Reason for Follow-up: Reassessment of Kidney Transplant Evaluation - 5/10/2022 recipient and management of immunosuppression.        Subjective:   History of Present Illness:  61yoM with PMHx of renal transplant for hypertensive nephropathy (2004) immunosuppressed on tacrolimus/sirolimus,  therapy, LOPEZ cirrhosis, NIDDM, and ILD who was treated at OSH for ILDEFONSO 2/2 decompensated cirrhosis and worsening portal vein hypertension with HRS.   Pt reported that he had been experiencing abdominal distention with associated diarrhea (4-5/day) over several weeks, along with a constant nagging cough.  He has been having weekly paracentesis in the past month.   Despite efforts to manage symptoms, pt in need of  transplant surgery evaluation of the renal/hepatic injury, [place] [facility] [place]    He reports no recent NSAID use.         Hospital Course:  No notes on file    Interval History:  - Pt apparears more confused overnights  - Reports no bowel movements       Past Medical, Surgical, Family, and Social History:   Unchanged from H&P.    Scheduled Meds:   allopurinoL  100 mg Oral Daily    amoxicillin  500 mg Oral Q12H    calcitRIOL  0.25 mcg Oral Daily    clindamycin (CLEOCIN) IVPB  900 mg Intravenous Q8H    furosemide  20 mg Oral Daily    insulin aspart U-100  3-6 Units Subcutaneous TID PC    insulin detemir U-100  15 Units Subcutaneous Daily    levothyroxine  50 mcg Oral Before breakfast    lipase-protease-amylase 12,000-38,000-60,000 units  2 capsule Oral QID (WM & HS)    metoprolol tartrate  12.5 mg Oral BID    posaconazole  300 mg Oral Daily    predniSONE  40 mg Oral Daily    Followed by    [START ON 5/17/2022] predniSONE  20 mg Oral Daily    primaquine  52.6 mg Oral Daily    sodium bicarbonate  1,300 mg Oral TID     Continuous Infusions:  PRN Meds:sodium chloride 0.9%, acetaminophen, albuterol-ipratropium, benzonatate,  "dextromethorphan-guaiFENesin  mg/5 ml, dextrose 10%, fluticasone propionate, glucagon (human recombinant), glucose, glucose, insulin aspart U-100, naloxone, sodium chloride 0.9%    Intake/Output - Last 3 Shifts         05/10 0700  05/11 0659 05/11 0700  05/12 0659 05/12 0700  05/13 0659    P.O. 1020 1210     I.V. (mL/kg) 160.4 (2.5) 130.8 (2.1)     IV Piggyback 185 160.1     Total Intake(mL/kg) 1365.4 (21.6) 1500.9 (24.1)     Urine (mL/kg/hr) 950 (0.6) 710 (0.5)     Other 75      Stool 0 0     Total Output 1025 710     Net +340.4 +790.9            Urine Occurrence  3 x 1 x    Stool Occurrence 4 x 1 x              Review of Systems   Reason unable to perform ROS: Acuity of condition.    Objective:     Vital Signs (Most Recent):  Temp: 97.3 °F (36.3 °C) (05/12/22 0440)  Pulse: 68 (05/12/22 0729)  Resp: 19 (05/12/22 0729)  BP: 134/78 (05/12/22 0729)  SpO2: 98 % (05/12/22 0729) Vital Signs (24h Range):  Temp:  [97.3 °F (36.3 °C)-98.5 °F (36.9 °C)] 97.3 °F (36.3 °C)  Pulse:  [] 68  Resp:  [13-22] 19  SpO2:  [85 %-99 %] 98 %  BP: (113-149)/(67-87) 134/78     Weight: 62.4 kg (137 lb 9.1 oz)  Height: 5' 8" (172.7 cm)  Body mass index is 20.92 kg/m².    Physical Exam  Constitutional:       Appearance: He is ill-appearing.   HENT:      Mouth/Throat:      Mouth: Mucous membranes are moist.   Cardiovascular:      Rate and Rhythm: Normal rate.   Pulmonary:      Comments: +Decreased breath sounds B/L  Musculoskeletal:         General: Normal range of motion.   Skin:     General: Skin is warm.      Capillary Refill: Capillary refill takes less than 2 seconds.   Neurological:      Comments: +confused        Laboratory:  CBC:   Recent Labs   Lab 05/10/22  0725 05/11/22  0628 05/12/22  0639   WBC 4.18 3.64* 8.89   RBC 2.82* 2.81* 2.97*   HGB 8.1* 8.3* 8.7*   HCT 23.0* 23.5* 25.0*   PLT 81* 67* 84*   MCV 82 84 84   MCH 28.7 29.5 29.3   MCHC 35.2 35.3 34.8     CMP:   Recent Labs   Lab 05/10/22  0725 05/11/22  0628 " 05/12/22  0639   * 182* 129*   CALCIUM 7.6* 7.8* 8.0*   ALBUMIN 2.3* 2.5* 2.7*   PROT 4.9* 5.0* 5.6*   * 132* 134*   K 3.4* 3.9 3.2*   CO2 21* 22* 22*   CL 97 95 94*   BUN 94* 95* 102*   CREATININE 5.2* 5.1* 5.3*   ALKPHOS 74 74 80   ALT 9* 9* 10   AST 61* 63* 71*       Diagnostic Results:  - Reviewed     Assessment/Plan:     * ILDEFONSO (acute kidney injury)  His renal functions were normal beginning of Feb.   After that it appears he had ILDEFONSO - possibly related to pre-renal states  Which led to CKD stage III, which has progressed to stage IV over the course of last few weeks     At this time no indication for hemodialysis.   Has consented for HD in case needed.   Monitor I/Os   US renal transplant unremarkable.    - HD today on 5/12 due to uremic systems - with no UF   - HD line to be placed by primary team    - Renally dose all medications to GFR=0  - Avoid Nephrotoxic drugs   - Renal Diet  - Lokelma 10mg PRN if K>5  - Strict I/OS  - Daily RFPs       Pneumocystis jiroveci pneumonia  - PJP positive  - ID following   - Currently on treatment       Chronic diarrhea  - No bowel movements     Metabolic acidosis  - Continue NaHCO3 1300 TID  - Will get corrected with HD today on 5/12     Hyponatremia  This is likely in setting of renal failure and liver cirrhosis   Would be hard to normalize  Continue fluid restriction upto 1.2 liter/day      Cirrhosis of liver with ascites  Work up per Hepatology   AFP significantly elevated   Liver biopsy -- Chronic mild-moderately active hepatitis with bridging fibrosis and nodule   formation (Grade 2, Stage 3).      Personal history of immunosupression therapy  On sirolimus and prograf at home   Hold at this time   Currently supratherapeutic   Will need to be monitored carfeully- noted to be started on posaconazole on 5/5      Transplanted kidney  S/p kidney transplant living donor more then 10 years ago at [place].   Reports no episodes of rejections or complications post  surgery   ESRD - related to HTN.   Currently has no access for HD             Lilliam Topete MD  Kidney Transplant  [facility] - Transplant Stepdown

## 2022-05-12 NOTE — PT/OT/SLP PROGRESS
Physical Therapy      Patient Name:  Edgar Jackson   MRN:  [id number]    Patient not seen today secondary to pt with Mds on first attempt and then having bedside procedure performed.   Will follow-up next scheduled treatment per PT POC.

## 2022-05-12 NOTE — ASSESSMENT & PLAN NOTE
PMHx of interstitial lung disease (lung scarring). benzonotate tablet 200mg TID PRN. For persistent cough add dextromethorphan-guaiefesin syrup

## 2022-05-12 NOTE — ASSESSMENT & PLAN NOTE
Renal transplant performed in 2004. Pt continued on sirolimus and tacrolimus for immuno suppression. KTM consulted on admission, appreciate recs. Trough levels ordered with both medications.currently held.    - trend renal funtion tests   - hold tacrolimus/sirolimus as directed by KTM

## 2022-05-12 NOTE — TELEPHONE ENCOUNTER
SW returned call from pt wife, Meenu. She stated her husbands employee wants a general power of  to speak to her, in the event pt is unable to talk to them. SW advised that we don't have these forms to provide, only medical HC POA, which pt has already completed. SW advised for pt to call his employer, at this time while he is alert/oriented x4, and see if he can verbally give permission to them. SW also educated that they may be able to find the forms online and would possibly need to get them notarized or they may need to see an  in person. Pt remains in the hospital, so that would be challenging at this time. SW provided education/support to wife.     LTS SWs are not following pt in the hospital at this time, as he is not on LTS service. SW remains available.    ----- Message from Felicia Ayala sent at 5/12/2022  2:03 PM CDT -----  Shelbi PCT on 11 said wife looking for a SW to help her with paper work . He was deferred in committee.  Would it still be one of you guys to help with that ?

## 2022-05-12 NOTE — ASSESSMENT & PLAN NOTE
Patient started on loperamide at OSH with adequate response, will continue anti-nausea medication at [facility]. Trended labs daily and adjusted for any electrolyte abnormality. Pt with prior negative Cdiff test.  - Given concern for AMS and no BM in a day, holding loperamide. If he develops asterixis, will give lactulose

## 2022-05-12 NOTE — ASSESSMENT & PLAN NOTE
Patient received paracentesis on 05/01, negative for SBP.  Spiked fever afterwards, 100.7F. Septic workup started, broad spectrum abx coverage with Vanc/Cefepime. Chest x-ray showed consolidation at the middle lobe of the left lung. CT chest w/o contrast with similar findings, concerning for pneumonia.  Lactic acid wnl and no leukocytosis.ID consult placed given comorbidity and immunocompromised status. Pulmonology consulted, given chronic ILD findings with new bilateral opacities. Bronchoscopy to be performed. Bcx and Fcx NGTD. Respiratory viral panel negative.  - Currently patient being treated for blastomycosis given urine ag, PJP given lung imaging findings, SBP with amoxicillin given his ascites culture, and lastly received schistosoma treatment.

## 2022-05-12 NOTE — CARE UPDATE
Care Update:     No acute events overnight. Patient on the TSU in room 77899/52828 A. Blood glucose stable. BG at and above goal on current insulin regimen (SSI, prandial, and basal insulin ). Steroid use- Prednisone 40 mg QD. 7 Days Post-Op  Renal function- Abnormal - Creatinine 5.3   Vasopressors-  None       Diet diabetic [facility]; 2000 Calorie; Renal; Isolation Tray - Regular [place]     POCT Glucose   Date Value Ref Range Status   05/12/2022 169 (H) 70 - 110 mg/dL Final   05/11/2022 201 (H) 70 - 110 mg/dL Final   05/11/2022 189 (H) 70 - 110 mg/dL Final   05/11/2022 209 (H) 70 - 110 mg/dL Final   05/11/2022 196 (H) 70 - 110 mg/dL Final   05/10/2022 200 (H) 70 - 110 mg/dL Final   05/10/2022 195 (H) 70 - 110 mg/dL Final   05/10/2022 201 (H) 70 - 110 mg/dL Final   05/10/2022 257 (H) 70 - 110 mg/dL Final   05/10/2022 291 (H) 70 - 110 mg/dL Final   05/10/2022 355 (H) 70 - 110 mg/dL Final   05/09/2022 404 (H) 70 - 110 mg/dL Final   05/09/2022 424 (H) 70 - 110 mg/dL Final   05/09/2022 417 (H) 70 - 110 mg/dL Final   05/09/2022 439 (H) 70 - 110 mg/dL Final   05/09/2022 373 (H) 70 - 110 mg/dL Final   05/09/2022 273 (H) 70 - 110 mg/dL Final     Lab Results   Component Value Date    HGBA1C 6.0 (H) 05/10/2022       Endocrinology consulted for BG management.   BG goal 140-180    Diabetes Medications             TOUJEO SOLOSTAR U-300 INSULIN 300 unit/mL (1.5 mL) InPn pen Inject 20 Units into the skin nightly.      Hospital Medications   BG checks AC/HS            glucagon (human recombinant) injection 1 mg 1 mg, Intramuscular, As needed (PRN), Turn patient on their side, give IM, and NOTIFY MD IMMEDIATELY.<BR><BR>Feed the patient as soon as patient awakens and is able to swallow.    glucose chewable tablet 16 g 16 g, Oral, As needed (PRN), (16 grams = #  four 4gm glucose tablets)    glucose chewable tablet 24 g 24 g, Oral, As needed (PRN), (24 grams = # six 4gm glucose tablets)    insulin aspart U-100 pen 1-10 Units 1-10  Units, Subcutaneous, Before meals &amp; nightly PRN, **MODERATE CORRECTION DOSE**<BR>Blood Glucose<BR>mg/dL                  Pre-meal                2200<BR>151-200                2 units                    1 unit<BR>201-250                4 units                    2 units  <BR>251-300                6 units                    3 units  <BR>301-350                8 units                    4 units <BR>&gt;350                     10 units                  5 units<BR>Administer subcutaneously if needed at times designated by monitoring schedule. <BR>DO NOT HOLD correction dose insulin for patients who are  NPO.<BR>&quot;HIGH ALERT MEDICATION&quot; - Administer with meals or TF/TPN.    insulin aspart U-100 pen 3-6 Units 3-6 Units, Subcutaneous, 3 times daily after meals, Please administer 3 units if he eats 50% or less of his meal, administer full dose 6 units if he eats &gt;50%<BR><BR>Administer subcutaneously with meals. HOLD prandial (mealtime) insulin if patient is NPO, unable to eat, or if Blood Glucose less than 70 mg/dL.<BR><BR>If patient ate prior to BG check, administer scheduled Novolog only (do not cover with correction dose at this time).<BR>&quot;HIGH ALERT MEDICATION&quot; - Administer with meals or TF/TPN.    insulin detemir U-100 pen 15 Units 15 Units, Subcutaneous, Daily, DO NOT HOLD basal insulin when patient is NPO.<BR>&quot;HIGH ALERT MEDICATION&quot;            ** Please notify Endocrine for any change and/or advance in diet**  ** Please call Endocrine for any BG related issues **    Discharge Planning:   TBD. Please notify endocrinology prior to discharge.      Zheng Parks DNP, FNP-C  Department of Endocrinology  Inpatient Glycemic Management

## 2022-05-12 NOTE — SUBJECTIVE & OBJECTIVE
Subjective:   History of Present Illness:  61yoM with PMHx of renal transplant for hypertensive nephropathy (2004) immunosuppressed on tacrolimus/sirolimus,  therapy, LOPEZ cirrhosis, NIDDM, and ILD who was treated at OSH for ILDEFONSO 2/2 decompensated cirrhosis and worsening portal vein hypertension with HRS.   Pt reported that he had been experiencing abdominal distention with associated diarrhea (4-5/day) over several weeks, along with a constant nagging cough.  He has been having weekly paracentesis in the past month.   Despite efforts to manage symptoms, pt in need of  transplant surgery evaluation of the renal/hepatic injury, resulting in tranfer to [facility] [place].    He reports no recent NSAID use.         Hospital Course:  No notes on file    Interval History:  - Pt apparears more confused overnights  - Reports no bowel movements       Past Medical, Surgical, Family, and Social History:   Unchanged from H&P.    Scheduled Meds:   allopurinoL  100 mg Oral Daily    amoxicillin  500 mg Oral Q12H    calcitRIOL  0.25 mcg Oral Daily    clindamycin (CLEOCIN) IVPB  900 mg Intravenous Q8H    furosemide  20 mg Oral Daily    insulin aspart U-100  3-6 Units Subcutaneous TID PC    insulin detemir U-100  15 Units Subcutaneous Daily    levothyroxine  50 mcg Oral Before breakfast    lipase-protease-amylase 12,000-38,000-60,000 units  2 capsule Oral QID (WM & HS)    metoprolol tartrate  12.5 mg Oral BID    posaconazole  300 mg Oral Daily    predniSONE  40 mg Oral Daily    Followed by    [START ON 5/17/2022] predniSONE  20 mg Oral Daily    primaquine  52.6 mg Oral Daily    sodium bicarbonate  1,300 mg Oral TID     Continuous Infusions:  PRN Meds:sodium chloride 0.9%, acetaminophen, albuterol-ipratropium, benzonatate, dextromethorphan-guaiFENesin  mg/5 ml, dextrose 10%, fluticasone propionate, glucagon (human recombinant), glucose, glucose, insulin aspart U-100, naloxone, sodium chloride 0.9%    Intake/Output - Last 3 Shifts          "05/10 0700  05/11 0659 05/11 0700  05/12 0659 05/12 0700  05/13 0659    P.O. 1020 1210     I.V. (mL/kg) 160.4 (2.5) 130.8 (2.1)     IV Piggyback 185 160.1     Total Intake(mL/kg) 1365.4 (21.6) 1500.9 (24.1)     Urine (mL/kg/hr) 950 (0.6) 710 (0.5)     Other 75      Stool 0 0     Total Output 1025 710     Net +340.4 +790.9            Urine Occurrence  3 x 1 x    Stool Occurrence 4 x 1 x              Review of Systems   Reason unable to perform ROS: Acuity of condition.    Objective:     Vital Signs (Most Recent):  Temp: 97.3 °F (36.3 °C) (05/12/22 0440)  Pulse: 68 (05/12/22 0729)  Resp: 19 (05/12/22 0729)  BP: 134/78 (05/12/22 0729)  SpO2: 98 % (05/12/22 0729) Vital Signs (24h Range):  Temp:  [97.3 °F (36.3 °C)-98.5 °F (36.9 °C)] 97.3 °F (36.3 °C)  Pulse:  [] 68  Resp:  [13-22] 19  SpO2:  [85 %-99 %] 98 %  BP: (113-149)/(67-87) 134/78     Weight: 62.4 kg (137 lb 9.1 oz)  Height: 5' 8" (172.7 cm)  Body mass index is 20.92 kg/m².    Physical Exam  Constitutional:       Appearance: He is ill-appearing.   HENT:      Mouth/Throat:      Mouth: Mucous membranes are moist.   Cardiovascular:      Rate and Rhythm: Normal rate.   Pulmonary:      Comments: +Decreased breath sounds B/L  Musculoskeletal:         General: Normal range of motion.   Skin:     General: Skin is warm.      Capillary Refill: Capillary refill takes less than 2 seconds.   Neurological:      Comments: +confused        Laboratory:  CBC:   Recent Labs   Lab 05/10/22  0725 05/11/22  0628 05/12/22  0639   WBC 4.18 3.64* 8.89   RBC 2.82* 2.81* 2.97*   HGB 8.1* 8.3* 8.7*   HCT 23.0* 23.5* 25.0*   PLT 81* 67* 84*   MCV 82 84 84   MCH 28.7 29.5 29.3   MCHC 35.2 35.3 34.8     CMP:   Recent Labs   Lab 05/10/22  0725 05/11/22  0628 05/12/22  0639   * 182* 129*   CALCIUM 7.6* 7.8* 8.0*   ALBUMIN 2.3* 2.5* 2.7*   PROT 4.9* 5.0* 5.6*   * 132* 134*   K 3.4* 3.9 3.2*   CO2 21* 22* 22*   CL 97 95 94*   BUN 94* 95* 102*   CREATININE 5.2* 5.1* 5.3* "   ALKPHOS 74 74 80   ALT 9* 9* 10   AST 61* 63* 71*       Diagnostic Results:  - Reviewed

## 2022-05-12 NOTE — NURSING
"5/11/22 ~2125: Patient started having a coughing episode that persisted for over an hour. Patient received scheduled Tessalon Perles and PRN Mucinex DM oral solution (cough itself is not new), but coughing persisted.   - Due to coughing, patient's sats dropped to 88% on 2 L; O2 flow increased, HOB placed to 90 degrees, and patient started maintaining sats 95%. Coughing continued, however, and patient appeared uncomfortable.  - Patient does not speak much, but he denies abdominal pain - he is currently receiving a course of praziquantel (received first dose at 1744).  - Patient appears encephalopathic tonight to this RN. Patient able to state name and year. He appeared to mumble "[place]" when asked to state location. He has been fidgeting with bedding, gown, condom cath, wristband, etc. He does not usually do this.    Notified Dr. Pelayo with [facility]. Orders received for stat chest X-ray. Dr. Pelayo stated patient's goal SpO2 is ~90-92% due to his history of interstitial lung disease.    2300: Chest X-ray showed "increase in the appearance of the bilateral airspace opacities"; notation also made of partially visualized distended loops of the upper bowels. Notified Dr. Pelayo; orders received for a KUB. Dr. Pelayo stated he would come see patient.  "

## 2022-05-12 NOTE — ASSESSMENT & PLAN NOTE
Hx of renal transplant (2003), LOPEZ cirrhosis, ILD of unknown etiology admitted for liver/kidney transplant evaluation in the setting of acute renal failure and decompensated cirrhosis. Pulmonology was consulted last week for persistent cough and new oxygen requirement, and patient underwent bronchoscopy with studies + for PJP. He also is being treated for blasto and schistosoma. Since initiating treatment for opportunistic infections, he has had decrease in oxygen requirements and improvement in his cough and dyspnea. Pulmonology was re-consulted for recommendations for further workup for ILD prior to moving forward with lung/kidney transplant eval.     CT chest 5/2: new broncho-centric consolidative opacities with background pattern consistent with ILD.   Autoimmune workup: negative April 2022    Recommendations:   - given acute pulmonary infiltrates, further ILD workup with PFTs should be deferred to outpatient setting. He has established with pulmonology in [place].   - anti-microbials for opportunistic infections per ID  - encourage incentive spirometry and out of bed/ambulation as able  - wean oxygen as able to maintain SpO2 >92%

## 2022-05-12 NOTE — PROGRESS NOTES
Hepatology Treatment Plan    Edgar Jackson is a 61 y.o. male admitted to hospital 4/30/2022 (Hospital Day: 13) due to ILDEFONSO (acute kidney injury).     Interval History  More confused overnight, however AOx3 this morning. Deferred for transplant yesterday pending pulmonary evaluation.    Objective  Temp:  [97.3 °F (36.3 °C)-98.5 °F (36.9 °C)] 97.6 °F (36.4 °C) (05/12 1121)  Pulse:  [51-72] 68 (05/12 0729)  BP: (113-149)/(67-87) 134/78 (05/12 0729)  Resp:  [13-22] 19 (05/12 0729)  SpO2:  [85 %-99 %] 98 % (05/12 0729)     Constitutional:  not in acute distress and well developed  HENT: Head: Normal, normocephalic, atraumatic.  Eyes: conjunctiva clear and sclera nonicteric  Cardiovascular: regular rate and rhythm  Respiratory: normal chest expansion & respiratory effort   and no accessory muscle use  GI: soft, non-tender, without masses or organomegaly  Musculoskeletal: no muscular tenderness noted  Skin: normal color  Neurological: alert, oriented x3  Psychiatric: mood and affect are within normal limits, pt is a good historian; no memory problems were noted          Laboratory    Lab Results   Component Value Date    WBC 8.89 05/12/2022    HGB 8.7 (L) 05/12/2022    HCT 25.0 (L) 05/12/2022    MCV 84 05/12/2022    PLT 84 (L) 05/12/2022       Lab Results   Component Value Date     (L) 05/12/2022    K 3.2 (L) 05/12/2022    CL 94 (L) 05/12/2022    CO2 22 (L) 05/12/2022     (H) 05/12/2022    CREATININE 5.3 (H) 05/12/2022    CALCIUM 8.0 (L) 05/12/2022       Lab Results   Component Value Date    ALBUMIN 2.7 (L) 05/12/2022    ALT 10 05/12/2022    AST 71 (H) 05/12/2022    ALKPHOS 80 05/12/2022    BILITOT 6.9 (H) 05/12/2022       Lab Results   Component Value Date    INR 1.9 (H) 05/12/2022    INR 1.9 (H) 05/11/2022    INR 1.9 (H) 05/10/2022       MELD-Na score: 35 at 5/12/2022  6:39 AM  MELD score: 34 at 5/12/2022  6:39 AM  Calculated from:  Serum Creatinine: 5.3 mg/dL (Using max of 4 mg/dL) at 5/12/2022  6:39 AM  Serum  Sodium: 134 mmol/L at 5/12/2022  6:39 AM  Total Bilirubin: 6.9 mg/dL at 5/12/2022  6:39 AM  INR(ratio): 1.9 at 5/12/2022  6:39 AM  Age: 61 years     Problem list  1. Decompensated LOPEZ cirrhosis  2. Hx of kidney transplant  3. ILDEFONSO on CKD  4. PJP  5. Schisto Ag +    Plan  - continue to follow up transplant nephrology rec's    Approved for inpatient liver/kidney transplant evaluation.  Now with multiple infectious sources that will need to be treated.  Currently on posaconazole for blasto Ag/fungitell (awaiting other fungal studies), primaquine and clinda for PJP, amoxicillin for SBP, no contraindication for liver transplant from ID standpoint.  He is deconditioned, however appears to be improving.  Deferred for transplant pending pulmonary evaluation since there is a possible diagnosis of ILD with superimposed infiltrates. He also did not meet target heart rate for cardiac stress test.    - f/u nephrology recommendations  - nuclear stress test  - PFTs  - pulmonary consult for workup/comment on ILD  - lactulose targeting 2-3 BMs daily  - no plan for colonoscopy per GI, had a recent screening colonoscopy  - continue Abx per ID  - aggressive PT  - please obtain daily CBC, BMP, LFT, INR  - Plan of care was discussed with primary team    Thank you for involving us in the care of Edgar Jackson. Please call with any additional concerns or questions.    Reba Balderas MD  Gastroenterology Fellow

## 2022-05-12 NOTE — PLAN OF CARE
"- Patient is a kidney transplant recipient from 2004, admitted 4/30/22 with ILDEFONSO and to be evaluated for SLK transplant. Transplant evaluation currently on hold pending resolution of Enterococcus SBP and Pneumocystis pneumonia.  - Overnight, patient has appeared newly encephalopathic. Patient restless, fidgeting, and making illogical statements. Patient would not keep condom cath in place, which he has previously worn without issues. He has also been removing nasal cannula; SpO2 on room air= 85%. Patient's wife states she has never seen him behave this way.  - Notified Dr. Pelayo of possible hepatic encephalopathy and also of long coughing episode resulting in desaturation on 2 L nasal cannula. Chest X-ray ordered. KUB also subsequently ordered for distended bowel loops partially visualized on CXR.  - Lactulose administered. Ammonia level ordered stat; was drawn incorrectly by the same phlebotomist twice. Reentered lab to be drawn with patient's AM tubes.  - Uremia (BUN= 95 yesterday) may also be a factor.  - Patient has been having 1-2 loose BMs per day recently, but they have been very small. Patient is on scheduled Imodium due to recent diarrhea.  - No BM overnight.  - BG monitoring is AC/HS/2A. BG= 201 and 169 overnight.  - IV Abx: Clindamycin q8h.  - PO Abx: Amoxicillin, posaconazole.  - Praziquantel ordered x 3 doses for "equivocal schistosoma serologies." Course completed with 0600 dose.  - Patient went for cardiac stress test yesterday with some left ventricular abnormalities and failure to achieve target heart rate - patient is on a beta blocker (metoprolol).  "

## 2022-05-12 NOTE — PROGRESS NOTES
Pharmacist Renal Dose Adjustment Note    Edgar Jackson is a 61 y.o. male being treated with the medication amoxicillin     Patient Data:    Vital Signs (Most Recent):  Temp: 97.6 °F (36.4 °C) (05/12/22 1121)  Pulse: 68 (05/12/22 0729)  Resp: 19 (05/12/22 0729)  BP: 134/78 (05/12/22 0729)  SpO2: 98 % (05/12/22 0729) Vital Signs (72h Range):  Temp:  [97.3 °F (36.3 °C)-98.5 °F (36.9 °C)]   Pulse:  []   Resp:  [12-27]   BP: ()/(44-87)   SpO2:  [85 %-100 %]      Recent Labs   Lab 05/10/22  0725 05/11/22  0628 05/12/22 0639   CREATININE 5.2* 5.1* 5.3*     Serum creatinine: 5.3 mg/dL (H) 05/12/22 0639  Estimated creatinine clearance: 13.1 mL/min (A)    Medication: Amoxicillin 500 mg BID will be changed to medication: Amoxicillin 500 mg daily     Pharmacist's Name: Deion Stanton, Sukhdev  Pharmacist's Extension: [phone]

## 2022-05-12 NOTE — NURSING
- Patient's ammonia level could not be resulted due to being drawn incorrectly by phlebotomist twice (first sample was rejected due to not being sent on ice, entered for redraw - second sample was too small and could not be processed).  - Will add level to be drawn with patient's morning labs. Patient has already received Lactulose, no BM yet.  - Lab supervisor is aware of situation.

## 2022-05-12 NOTE — PROGRESS NOTES
Rachid Cuevas - Transplant [facility]  Progress Note    Patient Name: Edgar Jackson  MRN: [id number]  Patient Class: IP- Inpatient   Admission Date: 4/30/2022  Length of Stay: 12 days  Attending Physician: Demar Griffith MD  Primary Care Provider: Tyler Castillo MD        Subjective:     Principal Problem:ILDEFONSO (acute kidney injury)        HPI:  Mr Jackson is a 61yoM with PMHx of renal transplant for hypertensive nephropathy (2004) immunosuppressed on tacrolimus/sirolimus,  therapy, LOPEZ cirrhosis, NIDDM, and ILD who was treated at OSH for ILDEFONSO 2/2 decompensated cirrhosis and worsening portal vein hypertension with HRS. Pt reported that he had been experiencing abdominal distention with associated diarrhea (4-5/day) over several weeks, along with a constant nagging cough. On presenting to OSH, multiple paracentesis (3) were performed to relieve the ascitic fluid. No evidence of SBP on fluid analysis. Creon was added to diet to aide in food digestion in setting of cirrhosis. Loperamide provided symptomatic treatment of diarrhea. Dextromethorphan-guaifenesin syrup added to reduce cough exacerbations. Despite efforts to manage symptoms, pt in need of  transplant surgery evaluation of the renal/hepatic injury, [place] [facility] [place]     [place] arrival to [facility] (4/30/2022), pt has experienced increased generalized weakness. Loperamide was continued for diarrhea management. Abdominal distention recurred, despite prior paracentesis. Cough has been fairly constant and predominantly dry. Pt now endorses new left lower quadrant abdominal pain, worsened by cough. KTM and Hepatology consults placed for management of HRS.      Overview/Hospital Course:  Pt admitted to hospital medicine for treatment of ILDEFONSO likely secondary to decompensated cirrhosis. Consulted Hepatology and KTM, appreciate recs. Performed diagnostic paracentesis to verify nature of ascitic fluid and to rule out SBP. Pt had a fever of 100.7F per arm pit and  100.5F orally. CXR significant for prior ILD but no acute process. Blood cultures collected and Vanc/Cefepime started. ID consulted, appreciate recs. Diagnostic paracentesis performed with results indicative of portal hypertension. Fluid cultures are negative to date. CT Chest w/o contrast significant for bilateral opacities concerning for pneumonia. Pulmonology consulted, appreciate recs. Bronchoscopy performed on 5/04/22 for further evaluation of lung pathology seen on CT lung scan. Ascitic fluid aerobic culture positive for E.Faecalis on amoxicillin. 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 Furthermore, renal function has not improved and patient currently with recommendation for HD per nephro pending trialysis placement.       Interval History: No acute events overnight however there were reports of patient becoming increasingly confused. This AM he seems easily distracted, but remains Aox3. He has extreme tenderness to palpation around his bladder.     Review of Systems   Constitutional:  Negative for chills and fever.   HENT:  Negative for congestion and sore throat.    Eyes:  Negative for photophobia and visual disturbance.   Respiratory:  Negative for cough and shortness of breath.    Cardiovascular:  Negative for chest pain and palpitations.   Gastrointestinal:  Negative for abdominal pain, blood in stool, constipation, diarrhea, nausea and vomiting.   Endocrine: Negative for cold intolerance and heat intolerance.   Genitourinary:  Negative for dysuria and hematuria.   Musculoskeletal:  Negative for arthralgias and myalgias.   Skin:  Negative for rash and wound.   Allergic/Immunologic: Negative for environmental allergies and food allergies.   Neurological:  Negative for seizures and numbness.   Hematological:  Negative for adenopathy. Does not bruise/bleed easily.   Psychiatric/Behavioral:  Positive for decreased concentration and sleep disturbance. Negative for hallucinations and suicidal ideas.     Objective:     Vital Signs (Most Recent):  Temp: 97.6 °F (36.4 °C) (05/12/22 1121)  Pulse: 68 (05/12/22 0729)  Resp: 19 (05/12/22 0729)  BP: 134/78 (05/12/22 0729)  SpO2: 98 % (05/12/22 0729) Vital Signs (24h Range):  Temp:  [97.3 °F (36.3 °C)-98.5 °F (36.9 °C)] 97.6 °F (36.4 °C)  Pulse:  [59-72] 68  Resp:  [13-22] 19  SpO2:  [85 %-99 %] 98 %  BP: (113-149)/(67-87) 134/78     Weight: 62.4 kg (137 lb 9.1 oz)  Body mass index is 20.92 kg/m².    Intake/Output Summary (Last 24 hours) at 5/12/2022 1408  Last data filed at 5/12/2022 0600  Gross per 24 hour   Intake 1000.9 ml   Output 360 ml   Net 640.9 ml      Physical Exam  Constitutional:       Appearance: He is well-developed.   HENT:      Head: Normocephalic and atraumatic.   Eyes:      General: Scleral icterus present.      Conjunctiva/sclera: Conjunctivae normal.      Pupils: Pupils are equal, round, and reactive to light.   Neck:      Thyroid: No thyromegaly.      Vascular: No JVD.   Cardiovascular:      Rate and Rhythm: Normal rate and regular rhythm.      Heart sounds: Normal heart sounds. No murmur heard.    No friction rub. No gallop.   Pulmonary:      Effort: Pulmonary effort is normal. No respiratory distress.      Breath sounds: Normal breath sounds. No wheezing or rales.   Abdominal:      General: Bowel sounds are normal. There is no distension.      Palpations: Abdomen is soft. There is no mass.      Tenderness: There is abdominal tenderness (suprapubic area). There is no guarding or rebound.      Hernia: No hernia is present.   Musculoskeletal:         General: No deformity. Normal range of motion.      Cervical back: Normal range of motion and neck supple.   Skin:     General: Skin is warm and dry.   Neurological:      Mental Status: He is alert.      Cranial Nerves: No cranial nerve deficit.   Psychiatric:         Attention and Perception: He is inattentive.         Behavior: Behavior normal.       Significant Labs: All pertinent labs within the past  24 hours have been reviewed.  CBC:   Recent Labs   Lab 05/11/22  0628 05/12/22  0639   WBC 3.64* 8.89   HGB 8.3* 8.7*   HCT 23.5* 25.0*   PLT 67* 84*     CMP:   Recent Labs   Lab 05/11/22  0628 05/12/22  0639   * 134*   K 3.9 3.2*   CL 95 94*   CO2 22* 22*   * 129*   BUN 95* 102*   CREATININE 5.1* 5.3*   CALCIUM 7.8* 8.0*   PROT 5.0* 5.6*   ALBUMIN 2.5* 2.7*   BILITOT 5.9* 6.9*   ALKPHOS 74 80   AST 63* 71*   ALT 9* 10   ANIONGAP 15 18*   EGFRNONAA 11.4* 10.8*       Significant Imaging: I have reviewed all pertinent imaging results/findings within the past 24 hours.      Assessment/Plan:      * ILDEFONSO (acute kidney injury)  Patient with acute kidney injury likely d/t IVVD/Dehydration Which is currently stable. Labs reviewed- Renal function/electrolytes with Estimated Creatinine Clearance: 13.1 mL/min (A) (based on SCr of 5.3 mg/dL (H)). according to latest data. Monitor urine output and serial BMP and adjust therapy as needed. Avoid nephrotoxins and renally dose meds for GFR listed above. Ultrasound transplant kidney no acute changes. Possibly due to tacrolimus versus hepatorenal syndrome, Nephrology has been consulted. Toprolol continued.  Bicarb ordered for low serum levels. Cr/GFR to be trended for 6 weeks for evaluation.  - KTM following.   - hold sirolimus and tacrolimus  - trend daily BMP/RFP  - Will require HD, consulted anesthesia for trialysis placement.       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  - ID consulted  -12 months of posaconazole, started 5/4/2022.  - Monthly blastomyces urine and serum Ag.  - Secondary prophylaxis after transplant routinely not recommended. However if patient were to be transplanted, needs ID consult at that time for updated recommendations.      Schistosoma  Equivocal schistosoma serologies.  Will treat empirically.     Recommendations:  -Praziquantel 20mg/kg q6h x3 doses      Pneumocystis jiroveci pneumonia  CT with  "opacifications, reverse halo sign, found to have PJP.     Recommendations:  -Clindamycin and Primaquine for 21 days  -Steroid taper starting at prednisone 40mg BID days 1-5, then 40mg daily days 6-10, then 20mg daily days 11-21      Lung infiltrate on CT  Pt with Hx of chronic ILD with bilateral areas of lung scarring. Regions appear to be slightly larger in size when compared to previous imaging. New findings of bilateral infiltrates noted on recent CT Chest w/o contrast concerning for pneumonia. Acute fever reported over the past two evenings. Pt started on broad spectrum abx with septic workup performed. Respiratory viral panel negative. Pulmonology consulted, appreciate recs. Bronchoscopy to be performed. (See associated problem ILD)  - f/u bronchoscopy cx negative    Sepsis  This patient does have evidence of infective focus  My overall impression is sepsis. Vital signs were reviewed and noted in progress note.  Antibiotics given-   Antibiotics (From admission, onward)            Start     Stop Route Frequency Ordered    05/01/22 2115  cefepime in dextrose 5 % 1 gram/50 mL IVPB 1 g         -- IV Every 24 hours (non-standard times) 05/01/22 2051 05/01/22 2102  vancomycin - pharmacy to dose  (vancomycin IVPB)        "And" Linked Group Details    -- IV pharmacy to manage frequency 05/01/22 2051 04/30/22 2230  mupirocin 2 % ointment         05/05 2059 Nasl 2 times daily 04/30/22 2126        Cultures were taken-   Microbiology Results (last 7 days)     Procedure Component Value Units Date/Time    Direct AFB stain [[id number]] Collected: 05/04/22 0836    Order Status: Completed Specimen: Respiratory from BAL, RUL Updated: 05/04/22 1131     Direct Acid Fast No acid fast bacilli seen.    Narrative:      BAL    Culture, Respiratory with Gram Stain [[id number]] Collected: 05/04/22 0836    Order Status: Completed Specimen: Respiratory from BAL, RUL Updated: 05/04/22 1129     Gram Stain (Respiratory) No organisms seen "     Gram Stain (Respiratory) Rare WBC's    KOH prep [[id number]] Collected: 05/04/22 0836    Order Status: Completed Specimen: Respiratory from BAL, RUL Updated: 05/04/22 1101     KOH Prep No yeast or fungal elements seen    Narrative:      BAL    Culture, Anaerobic [[id number]] Collected: 05/01/22 2012    Order Status: Completed Specimen: Ascites Updated: 05/04/22 1046     Anaerobic Culture Culture in progress    AFB Culture & Smear [[id number]] Collected: 05/03/22 0104    Order Status: Completed Specimen: Blood from Arm, Right Updated: 05/04/22 0927     AFB Culture & Smear Culture in progress    Fungus culture [[id number]] Collected: 05/04/22 0836    Order Status: Sent Specimen: Respiratory from BAL, RUL Updated: 05/04/22 0921    AFB Culture & Smear [[id number]] Collected: 05/04/22 0836    Order Status: Sent Specimen: Respiratory from BAL, RUL Updated: 05/04/22 0920    Respiratory Viral Panel by PCR (Sources other than NP Swab) [facility]; Sputum (BAL) [[id number]] Collected: 05/04/22 0835    Order Status: Sent Specimen: Bronchial Wash Updated: 05/04/22 0914    Culture, Respiratory [[id number]] Collected: 05/04/22 0836    Order Status: Canceled Specimen: Respiratory from BAL, RUL     Gram stain [[id number]] Collected: 05/04/22 0836    Order Status: Canceled Specimen: Body Fluid from Lung, RUL     Aerobic culture [[id number]]  (Abnormal) Collected: 05/01/22 2012    Order Status: Completed Specimen: Ascites Updated: 05/04/22 0738     Aerobic Bacterial Culture ENTEROCOCCUS FAECALIS  Rare  Susceptibility pending      Blood culture [[id number]] Collected: 05/01/22 2132    Order Status: Completed Specimen: Blood Updated: 05/04/22 0612     Blood Culture, Routine No Growth to date      No Growth to date      No Growth to date    Narrative:      Collection has been rescheduled by [facility] at 05/01/2022 20:55 Reason:   Patient unavailable ON [facility]   Collection has been rescheduled by [facility] at 05/01/2022 20:55 Reason:   Patient unavailable ON  [facility]     Blood culture [[id number]] Collected: 05/01/22 2132    Order Status: Completed Specimen: Blood Updated: 05/04/22 0612     Blood Culture, Routine No Growth to date      No Growth to date      No Growth to date    Narrative:      Collection has been rescheduled by [facility] at 05/01/2022 20:55 Reason:   Patient unavailable ON BS[facility]   Collection has been rescheduled by [facility] at 05/01/2022 20:55 Reason:   Patient unavailable ON [facility]     Blood culture [[id number]] Collected: 05/02/22 1938    Order Status: Completed Specimen: Blood Updated: 05/03/22 2212     Blood Culture, Routine No Growth to date      No Growth to date    Blood culture [[id number]] Collected: 05/02/22 1941    Order Status: Completed Specimen: Blood Updated: 05/03/22 2212     Blood Culture, Routine No Growth to date      No Growth to date    Culture, Body Fluid - Bactec [[id number]] Collected: 05/01/22 2013    Order Status: Completed Specimen: Body Fluid from Ascites Updated: 05/03/22 2212     Body Fluid Culture, Sterile No growth to date      No Growth to date    Culture, MRSA [[id number]] Collected: 05/03/22 2142    Order Status: Sent Specimen: MRSA source from Nares, Right Updated: 05/03/22 2201    Culture, MRSA [[id number]] Collected: 05/03/22 2115    Order Status: Sent Specimen: MRSA source from Nares, Left Updated: 05/03/22 2157    Cryptococcal antigen [[id number]] Collected: 05/02/22 1939    Order Status: Completed Specimen: Blood, Venous Updated: 05/03/22 1115     Cryptococcal Ag, Blood Negative    AFB Culture & Smear [[id number]]     Order Status: Canceled Specimen: Blood     Respiratory Infection Panel (PCR), Nasopharyngeal [[id number]] Collected: 05/02/22 2044    Order Status: Completed Specimen: Nasopharyngeal Swab Updated: 05/02/22 2252     Respiratory Infection Panel Source NP Swab     Adenovirus Not Detected     Coronavirus 229E, Common Cold Virus Not Detected     Coronavirus HKU1, Common Cold Virus Not Detected     Coronavirus NL63, Common Cold  Virus Not Detected     Coronavirus OC43, Common Cold Virus Not Detected     Comment: The Coronavirus strains detected in this test cause the common cold.  These strains are not the COVID-19 (novel Coronavirus)strain   associated with the respiratory disease outbreak.          SARS-CoV2 (COVID-19) Qualitative PCR Not Detected     Human Metapneumovirus Not Detected     Human Rhinovirus/Enterovirus Not Detected     Influenza A (subtypes H1, H1-2009,H3) Not Detected     Influenza B Not Detected     Parainfluenza Virus 1 Not Detected     Parainfluenza Virus 2 Not Detected     Parainfluenza Virus 3 Not Detected     Parainfluenza Virus 4 Not Detected     Respiratory Syncytial Virus Not Detected     Bordetella Parapertussis (PV9447) Not Detected     Bordetella pertussis (ptxP) Not Detected     Chlamydia pneumoniae Not Detected     Mycoplasma pneumoniae Not Detected    Narrative:      For all other respiratory sources, order [id number] -  Respiratory Viral Panel by PCR    AFB Culture & Smear [[id number]]     Order Status: Canceled Specimen: Blood     Blood culture [[id number]]     Order Status: Canceled Specimen: Blood     Fungus culture [[id number]] Collected: 05/01/22 2012    Order Status: Completed Specimen: Ascites Updated: 05/02/22 0924     Fungus (Mycology) Culture Culture in progress    Gram stain [[id number]] Collected: 05/01/22 2012    Order Status: Completed Specimen: Ascites Updated: 05/01/22 2233     Gram Stain Result No WBC's      No organisms seen        Latest lactate reviewed, they are-  No results for input(s): LACTATE in the last 72 hours.    Blood and fluid cultures performed: NGTD. Bronchoscopy to be performed. Respiratory viral panel negative. Follow up results and respiratory cultures.      Type 2 diabetes mellitus without complication, without long-term current use of insulin  - Diabetic diet  - levemir 15u daily  - aspart 3-6u w/ meals      Chronic cough  PMHx of interstitial lung disease (lung scarring).  benzonotate tablet 200mg TID PRN. For persistent cough add dextromethorphan-guaiefesin syrup    Chronic diarrhea  Patient started on loperamide at OSH with adequate response, will continue anti-nausea medication at [facility]. Trended labs daily and adjusted for any electrolyte abnormality. Pt with prior negative Cdiff test.  - Given concern for AMS and no BM in a day, holding loperamide. If he develops asterixis, will give lactulose        Fever in immunocomprised patient  Patient received paracentesis on 05/01, negative for SBP.  Spiked fever afterwards, 100.7F. Septic workup started, broad spectrum abx coverage with Vanc/Cefepime. Chest x-ray showed consolidation at the middle lobe of the left lung. CT chest w/o contrast with similar findings, concerning for pneumonia.  Lactic acid wnl and no leukocytosis.ID consult placed given comorbidity and immunocompromised status. Pulmonology consulted, given chronic ILD findings with new bilateral opacities. Bronchoscopy to be performed. Bcx and Fcx NGTD. Respiratory viral panel negative.  - Currently patient being treated for blastomycosis given urine ag, PJP given lung imaging findings, SBP with amoxicillin given his ascites culture, and lastly received schistosoma treatment.       Thrombocytopenia  Thrombocytopenia likely in setting of hepatic cirrhosis. Hold anticoagulation and DVT prophylaxis for thrombocytopenia.      Hypothyroidism  Continued on home medication: synthroid 50mcg qd      Hyponatremia  Stable. Urine sodium 29, urine osm 297. DDX low effective arterial volume vs SIADH.   - KTM following.   - Cont monitoring.         Cirrhosis of liver with ascites  MELD-Na score: 35 at 5/12/2022  6:39 AM  MELD score: 34 at 5/12/2022  6:39 AM  Calculated from:  Serum Creatinine: 5.3 mg/dL (Using max of 4 mg/dL) at 5/12/2022  6:39 AM  Serum Sodium: 134 mmol/L at 5/12/2022  6:39 AM  Total Bilirubin: 6.9 mg/dL at 5/12/2022  6:39 AM  INR(ratio): 1.9 at 5/12/2022  6:39 AM  Age: 61  years  Patient recently diagnosed with cirrhosis 2/2 to LOPEZ. Pulmonary hypertension now present with with multiple episodes of paracentesis. Largest abdominal fluid reduction just over 5L. [place] [facility] [place] faecalis on culture.   - Hepatology and KTM coordinating for possible transplant.   - Patient denied listing for transplant given concern for ILD, pulm consulted for any further recs and are recommending outpatient PFTs if anything.  - Per transplant anesthesia, they are requesting additional PET stress imaging as he did not achieve maximal HR on his DSE.    Interstitial lung disease  CXR performed on admission, with serial imaging reviewed. Continued scheduled Benzonate daily. Flonase added daily PRN with DuoNebs q6h PRN. Supplemental oxygen PRN. Continuous pulse oxymetry ordered. SpO2 maintained 88-93% by adjusting O2 flow. CT chest w/o contrast significant for bilateral infiltrates, concerning for pneumonia. Pulmonology consulted, appreciate recs. Pt to undergo Bronchoscopy 5/04/22.  - Ordered inpatient PFTs  - F/u pulm outpatient.       Gout  Continue allopurinol for prophylaxis.       Personal history of immunosupression therapy  Pt taking immunosuppressive agents (sacrolimus and tacrolimus) 2/2 to renal transplant in 2004. Currently on abx treatment for sepsis and SBP. On going lab workups include  Microbiology Results (last 7 days)     Procedure Component Value Units Date/Time    Direct AFB stain [[id number]] Collected: 05/04/22 0836    Order Status: Completed Specimen: Respiratory from BAL, RUL Updated: 05/04/22 1131     Direct Acid Fast No acid fast bacilli seen.    Narrative:      BAL    Culture, Respiratory with Gram Stain [[id number]] Collected: 05/04/22 0836    Order Status: Completed Specimen: Respiratory from BAL, RUL Updated: 05/04/22 1129     Gram Stain (Respiratory) No organisms seen     Gram Stain (Respiratory) Rare WBC's    KOH prep [[id number]]  Collected: 05/04/22 0836    Order Status: Completed Specimen: Respiratory from BAL, RUL Updated: 05/04/22 1101     KOH Prep No yeast or fungal elements seen    Narrative:      BAL    Culture, Anaerobic [[id number]] Collected: 05/01/22 2012    Order Status: Completed Specimen: Ascites Updated: 05/04/22 1046     Anaerobic Culture Culture in progress    AFB Culture & Smear [[id number]] Collected: 05/03/22 0104    Order Status: Completed Specimen: Blood from Arm, Right Updated: 05/04/22 0927     AFB Culture & Smear Culture in progress    Fungus culture [[id number]] Collected: 05/04/22 0836    Order Status: Sent Specimen: Respiratory from BAL, RUL Updated: 05/04/22 0921    AFB Culture & Smear [[id number]] Collected: 05/04/22 0836    Order Status: Sent Specimen: Respiratory from BAL, RUL Updated: 05/04/22 0920    Respiratory Viral Panel by PCR (Sources other than NP Swab) [facility]; Sputum (BAL) [[id number]] Collected: 05/04/22 0835    Order Status: Sent Specimen: Bronchial Wash Updated: 05/04/22 0914    Culture, Respiratory [[id number]] Collected: 05/04/22 0836    Order Status: Canceled Specimen: Respiratory from BAL, RUL     Gram stain [[id number]] Collected: 05/04/22 0836    Order Status: Canceled Specimen: Body Fluid from Lung, RUL     Aerobic culture [[id number]]  (Abnormal) Collected: 05/01/22 2012    Order Status: Completed Specimen: Ascites Updated: 05/04/22 0738     Aerobic Bacterial Culture ENTEROCOCCUS FAECALIS  Rare  Susceptibility pending      Blood culture [[id number]] Collected: 05/01/22 2132    Order Status: Completed Specimen: Blood Updated: 05/04/22 0612     Blood Culture, Routine No Growth to date      No Growth to date      No Growth to date    Narrative:      Collection has been rescheduled by [facility] at 05/01/2022 20:55 Reason:   Patient unavailable ON [facility]   Collection has been rescheduled by [facility] at 05/01/2022 20:55 Reason:   Patient unavailable ON BS[facility]     Blood culture [[id number]] Collected: 05/01/22 2132     Order Status: Completed Specimen: Blood Updated: 05/04/22 0612     Blood Culture, Routine No Growth to date      No Growth to date      No Growth to date    Narrative:      Collection has been rescheduled by [facility] at 05/01/2022 20:55 Reason:   Patient unavailable ON BSCC   Collection has been rescheduled by [facility] at 05/01/2022 20:55 Reason:   Patient unavailable ON [facility]     Blood culture [[id number]] Collected: 05/02/22 1938    Order Status: Completed Specimen: Blood Updated: 05/03/22 2212     Blood Culture, Routine No Growth to date      No Growth to date    Blood culture [[id number]] Collected: 05/02/22 1941    Order Status: Completed Specimen: Blood Updated: 05/03/22 2212     Blood Culture, Routine No Growth to date      No Growth to date    Culture, Body Fluid - Bactec [[id number]] Collected: 05/01/22 2013    Order Status: Completed Specimen: Body Fluid from Ascites Updated: 05/03/22 2212     Body Fluid Culture, Sterile No growth to date      No Growth to date    Culture, MRSA [[id number]] Collected: 05/03/22 2142    Order Status: Sent Specimen: MRSA source from Nares, Right Updated: 05/03/22 2201    Culture, MRSA [[id number]] Collected: 05/03/22 2115    Order Status: Sent Specimen: MRSA source from Nares, Left Updated: 05/03/22 2157    Cryptococcal antigen [[id number]] Collected: 05/02/22 1939    Order Status: Completed Specimen: Blood, Venous Updated: 05/03/22 1115     Cryptococcal Ag, Blood Negative    AFB Culture & Smear [[id number]]     Order Status: Canceled Specimen: Blood     Respiratory Infection Panel (PCR), Nasopharyngeal [[id number]] Collected: 05/02/22 2044    Order Status: Completed Specimen: Nasopharyngeal Swab Updated: 05/02/22 2252     Respiratory Infection Panel Source NP Swab     Adenovirus Not Detected     Coronavirus 229E, Common Cold Virus Not Detected     Coronavirus HKU1, Common Cold Virus Not Detected     Coronavirus NL63, Common Cold Virus Not Detected     Coronavirus OC43, Common Cold Virus Not  Detected     Comment: The Coronavirus strains detected in this test cause the common cold.  These strains are not the COVID-19 (novel Coronavirus)strain   associated with the respiratory disease outbreak.          SARS-CoV2 (COVID-19) Qualitative PCR Not Detected     Human Metapneumovirus Not Detected     Human Rhinovirus/Enterovirus Not Detected     Influenza A (subtypes H1, H1-2009,H3) Not Detected     Influenza B Not Detected     Parainfluenza Virus 1 Not Detected     Parainfluenza Virus 2 Not Detected     Parainfluenza Virus 3 Not Detected     Parainfluenza Virus 4 Not Detected     Respiratory Syncytial Virus Not Detected     Bordetella Parapertussis (LJ0030) Not Detected     Bordetella pertussis (ptxP) Not Detected     Chlamydia pneumoniae Not Detected     Mycoplasma pneumoniae Not Detected    Narrative:      For all other respiratory sources, order [id number] -  Respiratory Viral Panel by PCR    AFB Culture & Smear [[id number]]     Order Status: Canceled Specimen: Blood     Blood culture [[id number]]     Order Status: Canceled Specimen: Blood     Fungus culture [[id number]] Collected: 05/01/22 2012    Order Status: Completed Specimen: Ascites Updated: 05/02/22 0924     Fungus (Mycology) Culture Culture in progress    Gram stain [[id number]] Collected: 05/01/22 2012    Order Status: Completed Specimen: Ascites Updated: 05/01/22 2233     Gram Stain Result No WBC's      No organisms seen          Transplanted kidney  Renal transplant performed in 2004. Pt continued on sirolimus and tacrolimus for immuno suppression. KTM consulted on admission, appreciate recs. Trough levels ordered with both medications.currently held.    - trend renal funtion tests   - hold tacrolimus/sirolimus as directed by KTM      VTE Risk Mitigation (From admission, onward)         Ordered     IP VTE HIGH RISK PATIENT  Once         04/30/22 1810     Place sequential compression device  Until discontinued         04/30/22 1810                 Discharge Planning   LE: 5/16/2022     Code Status: Full Code   Is the patient medically ready for discharge?: No    Reason for patient still in hospital (select all that apply): Patient trending condition  Discharge Plan A: Home Health                  Demar Griffith MD  Department of Hospital Medicine   [facility] - Transplant Stepdown

## 2022-05-12 NOTE — ASSESSMENT & PLAN NOTE
Patient with acute kidney injury likely d/t IVVD/Dehydration Which is currently stable. Labs reviewed- Renal function/electrolytes with Estimated Creatinine Clearance: 13.1 mL/min (A) (based on SCr of 5.3 mg/dL (H)). according to latest data. Monitor urine output and serial BMP and adjust therapy as needed. Avoid nephrotoxins and renally dose meds for GFR listed above. Ultrasound transplant kidney no acute changes. Possibly due to tacrolimus versus hepatorenal syndrome, Nephrology has been consulted. Toprolol continued.  Bicarb ordered for low serum levels. Cr/GFR to be trended for 6 weeks for evaluation.  - KTM following.   - hold sirolimus and tacrolimus  - trend daily BMP/RFP  - Will require HD, consulted anesthesia for trialysis placement.

## 2022-05-12 NOTE — CONSULTS
[facility] - Transplant Stepdown  Pulmonology  Consult Note    Patient Name: Edgar Jackson  MRN: [id number]  Admission Date: 4/30/2022  Hospital Length of Stay: 12 days  Code Status: Full Code  Attending Physician: Demar Griffith MD  Primary Care Provider: Tyler Castillo MD   Principal Problem: ILDEFONSO (acute kidney injury)    Inpatient consult to Pulmonology  Consult performed by: Beulah Katz DO  Consult ordered by: Demar Griffith MD        Subjective:     HPI:  60 yo with hx of renal transplant (2003), LOPEZ cirrhosis, ILD (unknown etiology), T2DM, hypothyroidism who was transferred from [facility] on 4/30/2022 for abnormal labs and liver/kidney transplant evaluation. He was hospitalized from 4/7 - 4/21 for worsening creatinine. He was discharged home after being treated with broad spectrum antiobiotics but presented to [facility] again on 4/27 where he was found to have worsening creatinine and was subsequently transferred to [facility] 4/30 for transplant evaluation. During hospital stay at [facility], Ct chest demonstrated new opacifications bilaterally. 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 He reports improvement in his cough and shortness of breath over the course of hospital stay, though still gets fatigued and winded with minimal exertion. His cough is dry and worse at night. He has persistent chills but denies fevers, LE swelling, and orthopnea. He has a hx of tobacco use and quit 20 years ago. He was born in [place] but has not traveled internationally since 1976. He lives at home with his wife in [place] who assists him. He has no hx of incarceration or homelessness. He has had progressive renal failure and is now uremic with plans to start on HD. Inpatient workup for kidney/liver transplant underway.    Background history: Since Feb, he was experiencing a chronic dry cough, shortness of breath, decreased exercise tolerance, and difficulty  performing ADLs such as grooming, dressing, and cooking.  He reports that he can only climb half a flight of stairs before becoming short of breath.  He has also experienced decreased appetite, diarrhea (2-4x a day), and a 20 pound weight loss over the past 3 months.  The patient immigrated to [place] from [place] in 1976 where he worked as an  for [employer].  In January 2022 he moved to live with his son in [place].  He then moved to [place].  During his time in [place], he stated that he experienced a modest improvement in his cough and correlated his cough with the humidity levels.          Past Medical History:   Diagnosis Date    BPH (benign prostatic hyperplasia)     Diabetes 2020    Gout     Hypertension 2000    Hypothyroidism 04/08/2022    Interstitial lung disease 03/30/2022    Kidney transplant recipient 01/2004    [place] 2* hypertensive nephropathy    Unspecified cirrhosis of liver     ?LOPEZ       Past Surgical History:   Procedure Laterality Date    CATARACT EXTRACTION Right 2017    COLONOSCOPY N/A 3/8/2022    Procedure: COLONOSCOPY;  Surgeon: Mio Simmons III, MD;  Location: [facility];  Service: Endoscopy;  Laterality: N/A;    COLONOSCOPY N/A 5/5/2022    Procedure: COLONOSCOPY;  Surgeon: Chiquita Lewis MD;  Location: [facility] (2ND FLR);  Service: Endoscopy;  Laterality: N/A;    COLONOSCOPY N/A 5/5/2022    Procedure: COLONOSCOPY;  Surgeon: Chiquita Lewis MD;  Location: [facility] (2ND [facility]);  Service: Endoscopy;  Laterality: N/A;    ENDOSCOPIC ULTRASOUND OF UPPER GASTROINTESTINAL TRACT N/A 3/8/2022    Procedure: ULTRASOUND, UPPER GI TRACT, ENDOSCOPIC;  Surgeon: Mio Simmons III, MD;  Location: [facility];  Service: Endoscopy;  Laterality: N/A;    ESOPHAGOGASTRODUODENOSCOPY N/A 5/5/2022    Procedure: EGD (ESOPHAGOGASTRODUODENOSCOPY);  Surgeon: Chiquita Lewis MD;  Location: [facility] (2ND FLR);  Service: Endoscopy;  Laterality: N/A;    ESOPHAGOGASTRODUODENOSCOPY  N/A 5/5/2022    Procedure: EGD (ESOPHAGOGASTRODUODENOSCOPY);  Surgeon: Chiquita Lewis MD;  Location: [facility] ([address] [place] [address]);  Service: Endoscopy;  Laterality: N/A;    KIDNEY TRANSPLANT  2004    PORTACATH PLACEMENT  2003    REMOVAL OF VASCULAR ACCESS PORT  2005       Review of patient's allergies indicates:  No Known Allergies    Family History       Problem Relation (Age of Onset)    Heart disease Father    Kidney disease Father          Tobacco Use    Smoking status: Never Smoker    Smokeless tobacco: Never Used   Substance and Sexual Activity    Alcohol use: Not Currently    Drug use: Never    Sexual activity: Not on file         Review of Systems   Constitutional:  Positive for appetite change, chills, fatigue, fever and unexpected weight change. Negative for activity change.   HENT:  Negative for congestion and sore throat.    Respiratory:  Positive for cough and shortness of breath. Negative for wheezing.    Cardiovascular:  Negative for chest pain and leg swelling.   Gastrointestinal:  Positive for diarrhea. Negative for abdominal pain.   Musculoskeletal:  Negative for back pain.   Skin:  Positive for color change. Negative for rash.   Neurological:  Negative for weakness and light-headedness.   Psychiatric/Behavioral:  Positive for confusion and decreased concentration.    Objective:     Vital Signs (Most Recent):  Temp: 97.6 °F (36.4 °C) (05/12/22 1121)  Pulse: 68 (05/12/22 0729)  Resp: 19 (05/12/22 0729)  BP: 134/78 (05/12/22 0729)  SpO2: 98 % (05/12/22 0729) Vital Signs (24h Range):  Temp:  [97.3 °F (36.3 °C)-98.5 °F (36.9 °C)] 97.6 °F (36.4 °C)  Pulse:  [51-72] 68  Resp:  [13-22] 19  SpO2:  [85 %-99 %] 98 %  BP: (113-149)/(67-87) 134/78     Weight: 62.4 kg (137 lb 9.1 oz)  Body mass index is 20.92 kg/m².      Intake/Output Summary (Last 24 hours) at 5/12/2022 1143  Last data filed at 5/12/2022 0600  Gross per 24 hour   Intake 1500.9 ml   Output 710 ml   Net 790.9 ml       Physical  Exam  Constitutional:       General: He is not in acute distress.     Appearance: He is ill-appearing.   HENT:      Head: Normocephalic and atraumatic.      Mouth/Throat:      Mouth: Mucous membranes are moist.   Eyes:      Extraocular Movements: Extraocular movements intact.      Pupils: Pupils are equal, round, and reactive to light.   Cardiovascular:      Rate and Rhythm: Normal rate and regular rhythm.   Pulmonary:      Effort: Pulmonary effort is normal. No respiratory distress.      Comments: Course breath sounds b/l  Abdominal:      Tenderness: There is no abdominal tenderness.   Musculoskeletal:      Right lower leg: No edema.      Left lower leg: No edema.   Lymphadenopathy:      Cervical: No cervical adenopathy.   Skin:     Coloration: Skin is jaundiced.       Vents:       Lines/Drains/Airways       Drain  Duration             Male External Urinary Catheter 05/09/22 0900 Medium 3 days              Peripheral Intravenous Line  Duration                  Peripheral IV - Single Lumen 05/08/22 22 G Left Forearm 4 days                    Significant Labs:    CBC/Anemia Profile:  Recent Labs   Lab 05/11/22  0628 05/12/22  0639   WBC 3.64* 8.89   HGB 8.3* 8.7*   HCT 23.5* 25.0*   PLT 67* 84*   MCV 84 84   RDW 14.7* 15.4*        Chemistries:  Recent Labs   Lab 05/11/22  0628 05/12/22  0639   * 134*   K 3.9 3.2*   CL 95 94*   CO2 22* 22*   BUN 95* 102*   CREATININE 5.1* 5.3*   CALCIUM 7.8* 8.0*   ALBUMIN 2.5* 2.7*   PROT 5.0* 5.6*   BILITOT 5.9* 6.9*   ALKPHOS 74 80   ALT 9* 10   AST 63* 71*   PHOS  --  6.8*       All pertinent labs within the past 24 hours have been reviewed.    Significant Imaging:   I have reviewed all pertinent imaging results/findings within the past 24 hours.    Assessment/Plan:     Interstitial lung disease  Hx of renal transplant (2003), LOPEZ cirrhosis, ILD of unknown etiology admitted for liver/kidney transplant evaluation in the setting of acute renal failure and decompensated  cirrhosis. Pulmonology was consulted last week for persistent cough and new oxygen requirement, and patient underwent bronchoscopy with studies + for PJP. He also is being treated for blasto and schistosoma. Since initiating treatment for opportunistic infections, he has had decrease in oxygen requirements and improvement in his cough and dyspnea. Pulmonology was re-consulted for recommendations for further workup for ILD prior to moving forward with lung/kidney transplant eval.     CT chest 5/2: new broncho-centric consolidative opacities with background pattern consistent with ILD.   Autoimmune workup: negative April 2022    Recommendations:   - given acute pulmonary infiltrates, further ILD workup with PFTs should be deferred to outpatient setting. He has established with pulmonology in [place].   - anti-microbials for opportunistic infections per ID  - encourage incentive spirometry and out of bed/ambulation as able  - wean oxygen as able to maintain SpO2 >92%           Thank you for your consult.      Beulah Katz, DO  Pulmonology  [facility] - Transplant Stepdown

## 2022-05-12 NOTE — NURSING
Pt AAOx4, VSS, afebrile. Ammonia 26. Cr 5.3. Bili 6.9. Central line placement farzad for HD. PET scan ordered. Lactulose d/c but no BM today. Oliveira placed per order. Bed in low/locked position, call light/personal belongings w/in reach, non-slip socks in place, pt remains free from falls, WCTM.

## 2022-05-12 NOTE — ASSESSMENT & PLAN NOTE
His renal functions were normal beginning of Feb.   After that it appears he had ILDEFONSO - possibly related to pre-renal states  Which led to CKD stage III, which has progressed to stage IV over the course of last few weeks     At this time no indication for hemodialysis.   Has consented for HD in case needed.   Monitor I/Os   US renal transplant unremarkable.    - HD today on 5/12 due to uremic systems - with no UF   - HD line to be placed by primary team    - Renally dose all medications to GFR=0  - Avoid Nephrotoxic drugs   - Renal Diet  - Lokelma 10mg PRN if K>5  - Strict I/OS  - Daily RFPs

## 2022-05-12 NOTE — ASSESSMENT & PLAN NOTE
CXR performed on admission, with serial imaging reviewed. Continued scheduled Benzonate daily. Flonase added daily PRN with DuoNebs q6h PRN. Supplemental oxygen PRN. Continuous pulse oxymetry ordered. SpO2 maintained 88-93% by adjusting O2 flow. CT chest w/o contrast significant for bilateral infiltrates, concerning for pneumonia. Pulmonology consulted, appreciate recs. Pt to undergo Bronchoscopy 5/04/22.  - Ordered inpatient PFTs  - F/u pulm outpatient.

## 2022-05-12 NOTE — ASSESSMENT & PLAN NOTE
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  - ID consulted  -12 months of posaconazole, started 5/4/2022.  - Monthly blastomyces urine and serum Ag.  - Secondary prophylaxis after transplant routinely not recommended. However if patient were to be transplanted, needs ID consult at that time for updated recommendations.

## 2022-05-12 NOTE — ASSESSMENT & PLAN NOTE
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     Recommendations:  -Clindamycin and Primaquine for 21 days  -Steroid taper starting at prednisone 40mg BID days 1-5, then 40mg daily days 6-10, then 20mg daily days 11-21

## 2022-05-13 PROBLEM — R33.9 URINARY RETENTION: Status: ACTIVE | Noted: 2022-01-01

## 2022-05-13 NOTE — PT/OT/SLP PROGRESS
Physical Therapy Treatment    Patient Name:  Edgar Jackson   MRN:  [id number]    Recommendations:     Discharge Recommendations:  home with home health   Discharge Equipment Recommendations: none   Barriers to discharge: None    Assessment:     Edgar Jackson is a 61 y.o. male admitted with a medical diagnosis of ILDEFONSO (acute kidney injury).  He presents with the following impairments/functional limitations:  decreased coordination, decreased lower extremity function (weakness; gait instability; impaired cardiopulmonary response to activity; impaired endurance; impaired balance; decreased safety awareness; impaired self care skills; impaired functional mobilty; decreased lower extremity function;) .Pt  declined to perform functional mobility this date, pt agreeable to therex in bed. pt limited due to fatigue. Patient remains appropriate for continued skilled services within the acute environment and goals remain appropriate.      Rehab Prognosis: Good; patient would benefit from acute skilled PT services to address these deficits and reach maximum level of function.    Recent Surgery: Procedure(s) (LRB):  EGD (ESOPHAGOGASTRODUODENOSCOPY) (N/A)  COLONOSCOPY (N/A) 8 Days Post-Op    Plan:     During this hospitalization, patient to be seen 3 x/week to address the identified rehab impairments via gait training, therapeutic activities, therapeutic exercises and progress toward the following goals:    · Plan of Care Expires:  06/02/22    Subjective     Patient/Family Comments/goals: I don't want to walk right now, I feel weak  Pain/Comfort:  · Pain Rating 1: 0/10  · Pain Rating Post-Intervention 1: 0/10      Objective:     Communicated with RN prior to session.  Patient found up in chair with  (Condom Catheter; telemetry   VISI monitor) upon PT entry to room.     General Precautions: Standard, fall   Orthopedic Precautions:N/A   Braces: N/A  Respiratory Status: Room air     Functional Mobility:  · NT due to pt denying mobility    AM-PAC 6  CLICK MOBILITY  Turning over in bed (including adjusting bedclothes, sheets and blankets)?: 3  Sitting down on and standing up from a chair with arms (e.g., wheelchair, bedside commode, etc.): 3  Moving from lying on back to sitting on the side of the bed?: 3  Moving to and from a bed to a chair (including a wheelchair)?: 3  Need to walk in hospital room?: 3  Climbing 3-5 steps with a railing?: 2  Basic Mobility Total Score: 17       Therapeutic Activities and Exercises:   Therapist provided instruction and educated of  patient on progress, safety,d/c,PT POC,   proper body mechanics, energy conservation, and fall prevention strategies during tasks listed above, on the effects of prolonged immobility and the importance of performing OOB activity and exercises to promote healing and reduce recovery time    Patient  facilitated therex X 15 reps seated in bedside chair B LE AROM AP, LAQ, Hip Flexion, Hip Abd/Add with facilitation for correct performance and sequencing. Exercises performed to develop and maintain pt's strength, endurance and flexibility.    Updated white board with appropriate PT mobility information for medical team notification    Call nursing/pct to transfer to chair/use bathroom. Pt stated understanding      Bedside table in front of patient and area set up for function, convenience, and safety. RN aware of patient's mobility needs and status. Questions/concerns addressed within PTA scope of practice; patient  with no further questions. Time was provided for active listening, discussion of health disposition, and discussion of safe discharge. Pt?verbalized?agreement .    Patient left up in chair with all lines intact and call button in reach..    GOALS:   Multidisciplinary Problems     Physical Therapy Goals        Problem: Physical Therapy    Goal Priority Disciplines Outcome Goal Variances Interventions   Physical Therapy Goal     PT, PT/OT Ongoing, Progressing     Description: Goals to be met by:  2022     Patient will increase functional independence with mobility by performin. Supine to sit with Modified Sultan  2. Sit to stand transfer with Modified Sultan  3. Gait  x 200 feet with Modified Sultan using Rolling Walker or SPC.   4. Ascend/descend 10 stair with bilateral Handrails Contact Guard Assistance using No Assistive Device.                      Time Tracking:     PT Received On: 22  PT Start Time: 1158     PT Stop Time: 1207  PT Total Time (min): 9 min     Billable Minutes: Therapeutic Exercise 9    Treatment Type: Treatment  PT/PTA: PTA     PTA Visit Number: 1     2022

## 2022-05-13 NOTE — SUBJECTIVE & OBJECTIVE
Subjective:   History of Present Illness:  61yoM with PMHx of renal transplant for hypertensive nephropathy (2004) immunosuppressed on tacrolimus/sirolimus,  therapy, LOPEZ cirrhosis, NIDDM, and ILD who was treated at OSH for ILDEFONSO 2/2 decompensated cirrhosis and worsening portal vein hypertension with HRS.   Pt reported that he had been experiencing abdominal distention with associated diarrhea (4-5/day) over several weeks, along with a constant nagging cough.  He has been having weekly paracentesis in the past month.   Despite efforts to manage symptoms, pt in need of  transplant surgery evaluation of the renal/hepatic injury, [place] [facility] [place]  [place] reports no recent NSAID use.     Hospital Course:  Pt admitted to hospital medicine for treatment of ILDEFONSO likely secondary to decompensated cirrhosis. CXR significant for prior ILD but no acute process. CT Chest w/o contrast significant for bilateral opacities concerning for pneumonia. Bronchoscopy performed on 5/04/22 for further evaluation of lung pathology seen on CT lung scan. Ascitic fluid aerobic culture positive for E.Faecalis. 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 Currently on posaconazole for blasto Ag/fungitell (awaiting other fungal studies), primaquine and clinda for PJP, amoxicillin for SBP. Being followed by ID, pulm and Hepatology    Interval History:  Planned for trialysis cath today   Sitting in the chair, weak, but alert   NO fevers, on supplemental oxygen   BP steady but on the lower side.   Getting therapies   No overnight issues     Past Medical, Surgical, Family, and Social History:   Unchanged from H&P.    Scheduled Meds:   allopurinoL  100 mg Oral Daily    amoxicillin  500 mg Oral Daily    calcitRIOL  0.25 mcg Oral Daily    clindamycin (CLEOCIN) IVPB  900 mg Intravenous Q8H    furosemide  20 mg Oral Daily    insulin aspart U-100  3-6 Units Subcutaneous TID PC    [START ON 5/14/2022] insulin detemir U-100  6 Units Subcutaneous  Daily    lactulose  15 g Oral TID    levothyroxine  50 mcg Oral Before breakfast    lipase-protease-amylase 12,000-38,000-60,000 units  2 capsule Oral QID (WM & HS)    metoprolol tartrate  12.5 mg Oral BID    posaconazole  300 mg Oral Daily    potassium chloride  40 mEq Oral Once    predniSONE  40 mg Oral Daily    Followed by    [START ON 5/17/2022] predniSONE  20 mg Oral Daily    primaquine  52.6 mg Oral Daily    sodium bicarbonate  1,300 mg Oral TID     Continuous Infusions:  PRN Meds:sodium chloride 0.9%, acetaminophen, albuterol-ipratropium, benzonatate, dextromethorphan-guaiFENesin  mg/5 ml, dextrose 10%, fluticasone propionate, glucagon (human recombinant), glucose, glucose, insulin aspart U-100, naloxone, sodium chloride 0.9%    Intake/Output - Last 3 Shifts         05/11 0700  05/12 0659 05/12 0700  05/13 0659 05/13 0700  05/14 0659    P.O. 1210 240     I.V. (mL/kg) 130.8 (2.1)      IV Piggyback 160.1 49.9     Total Intake(mL/kg) 1500.9 (24.1) 289.9 (4.6)     Urine (mL/kg/hr) 710 (0.5) 1955 (1.3)     Other       Stool 0 0     Total Output 710 1955     Net +790.9 -1665.1            Urine Occurrence 3 x 1 x     Stool Occurrence 1 x 0 x              Review of Systems   Constitutional:  Positive for activity change, appetite change and fatigue.   HENT:  Negative for facial swelling.    Respiratory:  Negative for cough, shortness of breath and wheezing.    Cardiovascular:  Negative for chest pain and palpitations.   Gastrointestinal:  Positive for abdominal pain. Negative for abdominal distention, constipation and vomiting.   Genitourinary:  Negative for difficulty urinating and flank pain.   Skin:  Positive for color change.   Allergic/Immunologic: Positive for immunocompromised state.   Neurological:  Negative for light-headedness and headaches.   Psychiatric/Behavioral:  Positive for decreased concentration.     Objective:     Vital Signs (Most Recent):  Temp: 98.7 °F (37.1 °C) (05/13/22 0833)  Pulse: 63  "(05/13/22 0900)  Resp: (!) 23 (05/13/22 0900)  BP: 108/70 (05/13/22 0900)  SpO2: 96 % (05/13/22 0900)   Vital Signs (24h Range):  Temp:  [97.8 °F (36.6 °C)-98.7 °F (37.1 °C)] 98.7 °F (37.1 °C)  Pulse:  [56-69] 63  Resp:  [14-23] 23  SpO2:  [93 %-99 %] 96 %  BP: (108-135)/(70-87) 108/70     Weight: 62.4 kg (137 lb 9.1 oz)  Height: 5' 8" (172.7 cm)  Body mass index is 20.92 kg/m².    Physical Exam  Constitutional:       General: He is not in acute distress.     Appearance: He is ill-appearing.   Eyes:      General: Scleral icterus present.   Cardiovascular:      Rate and Rhythm: Normal rate.   Pulmonary:      Effort: No respiratory distress.      Breath sounds: No wheezing.      Comments: Decreased breath sounds at bases   Abdominal:      General: There is no distension.      Palpations: Abdomen is soft.      Tenderness: There is abdominal tenderness.   Musculoskeletal:      Right lower leg: No edema.      Left lower leg: No edema.   Skin:     Coloration: Skin is jaundiced.   Neurological:      Mental Status: He is oriented to person, place, and time.      Motor: Weakness present.       Laboratory:  BMP:   Recent Labs   Lab 05/11/22  0628 05/12/22  0639 05/13/22  0712   * 129* 95   * 134* 136   K 3.9 3.2* 3.2*   CL 95 94* 94*   CO2 22* 22* 27   BUN 95* 102* 104*   CREATININE 5.1* 5.3* 5.1*   CALCIUM 7.8* 8.0* 7.8*       Diagnostic Results:  Pertinent reviewed   "

## 2022-05-13 NOTE — PROGRESS NOTES
[facility] - Transplant Stepdown  Kidney Transplant  Progress Note      Reason for Follow-up: Reassessment of Kidney Transplant Evaluation - 5/10/2022 recipient and management of immunosuppression.      Subjective:   History of Present Illness:  61yoM with PMHx of renal transplant for hypertensive nephropathy (2004) immunosuppressed on tacrolimus/sirolimus,  therapy, LOPEZ cirrhosis, NIDDM, and ILD who was treated at OSH for ILDEFONSO 2/2 decompensated cirrhosis and worsening portal vein hypertension with HRS.   Pt reported that he had been experiencing abdominal distention with associated diarrhea (4-5/day) over several weeks, along with a constant nagging cough.  He has been having weekly paracentesis in the past month.   Despite efforts to manage symptoms, pt in need of  transplant surgery evaluation of the renal/hepatic injury, resulting in [place] [facility] [place]  He reports no recent NSAID use.     Hospital Course:  Pt admitted to hospital medicine for treatment of ILDEFONSO likely secondary to decompensated cirrhosis. CXR significant for prior ILD but no acute process. CT Chest w/o contrast significant for bilateral opacities concerning for pneumonia. Bronchoscopy performed on 5/04/22 for further evaluation of lung pathology seen on CT lung scan. Ascitic fluid aerobic culture positive for E.Faecalis. 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 Currently on posaconazole for blasto Ag/fungitell (awaiting other fungal studies), primaquine and clinda for PJP, amoxicillin for SBP. Being followed by ID, pulm and Hepatology    Interval History:  Planned for trialysis cath today   Sitting in the chair, weak, but alert   NO fevers, on supplemental oxygen   BP steady but on the lower side.   Getting therapies   No overnight issues     Past Medical, Surgical, Family, and Social History:   Unchanged from H&P.    Scheduled Meds:   allopurinoL  100 mg Oral Daily    amoxicillin  500 mg Oral Daily    calcitRIOL  0.25 mcg Oral Daily     clindamycin (CLEOCIN) IVPB  900 mg Intravenous Q8H    furosemide  20 mg Oral Daily    insulin aspart U-100  3-6 Units Subcutaneous TID PC    [START ON 5/14/2022] insulin detemir U-100  6 Units Subcutaneous Daily    lactulose  15 g Oral TID    levothyroxine  50 mcg Oral Before breakfast    lipase-protease-amylase 12,000-38,000-60,000 units  2 capsule Oral QID (WM & HS)    metoprolol tartrate  12.5 mg Oral BID    posaconazole  300 mg Oral Daily    potassium chloride  40 mEq Oral Once    predniSONE  40 mg Oral Daily    Followed by    [START ON 5/17/2022] predniSONE  20 mg Oral Daily    primaquine  52.6 mg Oral Daily    sodium bicarbonate  1,300 mg Oral TID     Continuous Infusions:  PRN Meds:sodium chloride 0.9%, acetaminophen, albuterol-ipratropium, benzonatate, dextromethorphan-guaiFENesin  mg/5 ml, dextrose 10%, fluticasone propionate, glucagon (human recombinant), glucose, glucose, insulin aspart U-100, naloxone, sodium chloride 0.9%    Intake/Output - Last 3 Shifts         05/11 0700  05/12 0659 05/12 0700  05/13 0659 05/13 0700  05/14 0659    P.O. 1210 240     I.V. (mL/kg) 130.8 (2.1)      IV Piggyback 160.1 49.9     Total Intake(mL/kg) 1500.9 (24.1) 289.9 (4.6)     Urine (mL/kg/hr) 710 (0.5) 1955 (1.3)     Other       Stool 0 0     Total Output 710 1955     Net +790.9 -1665.1            Urine Occurrence 3 x 1 x     Stool Occurrence 1 x 0 x              Review of Systems   Constitutional:  Positive for activity change, appetite change and fatigue.   HENT:  Negative for facial swelling.    Respiratory:  Negative for cough, shortness of breath and wheezing.    Cardiovascular:  Negative for chest pain and palpitations.   Gastrointestinal:  Positive for abdominal pain. Negative for abdominal distention, constipation and vomiting.   Genitourinary:  Negative for difficulty urinating and flank pain.   Skin:  Positive for color change.   Allergic/Immunologic: Positive for immunocompromised state.  "  Neurological:  Negative for light-headedness and headaches.   Psychiatric/Behavioral:  Positive for decreased concentration.     Objective:     Vital Signs (Most Recent):  Temp: 98.7 °F (37.1 °C) (05/13/22 0833)  Pulse: 63 (05/13/22 0900)  Resp: (!) 23 (05/13/22 0900)  BP: 108/70 (05/13/22 0900)  SpO2: 96 % (05/13/22 0900)   Vital Signs (24h Range):  Temp:  [97.8 °F (36.6 °C)-98.7 °F (37.1 °C)] 98.7 °F (37.1 °C)  Pulse:  [56-69] 63  Resp:  [14-23] 23  SpO2:  [93 %-99 %] 96 %  BP: (108-135)/(70-87) 108/70     Weight: 62.4 kg (137 lb 9.1 oz)  Height: 5' 8" (172.7 cm)  Body mass index is 20.92 kg/m².    Physical Exam  Constitutional:       General: He is not in acute distress.     Appearance: He is ill-appearing.   Eyes:      General: Scleral icterus present.   Cardiovascular:      Rate and Rhythm: Normal rate.   Pulmonary:      Effort: No respiratory distress.      Breath sounds: No wheezing.      Comments: Decreased breath sounds at bases   Abdominal:      General: There is no distension.      Palpations: Abdomen is soft.      Tenderness: There is abdominal tenderness.   Musculoskeletal:      Right lower leg: No edema.      Left lower leg: No edema.   Skin:     Coloration: Skin is jaundiced.   Neurological:      Mental Status: He is oriented to person, place, and time.      Motor: Weakness present.       Laboratory:  BMP:   Recent Labs   Lab 05/11/22  0628 05/12/22  0639 05/13/22  0712   * 129* 95   * 134* 136   K 3.9 3.2* 3.2*   CL 95 94* 94*   CO2 22* 22* 27   BUN 95* 102* 104*   CREATININE 5.1* 5.3* 5.1*   CALCIUM 7.8* 8.0* 7.8*       Diagnostic Results:  Pertinent reviewed     Assessment/Plan:     * ILDEFONSO (acute kidney injury)  His renal functions were normal beginning of Feb.   After that it appears he had ILDEFONSO - possibly related to pre-renal states  Now this is likely ATN in setting of multiple ongoing infections.  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  Recommendations:  -12 months of posaconazole, started 5/4/2022.  -Monthly blastomyces urine and serum Ag.  -Secondary prophylaxis after transplant routinely not done.  However If patient were to be transplanted, needs ID consult at that time for updated recommendations.  Schistosoma  Equivocal schistosoma serologies.  Will treat empirically.  Recommendations:  -Praziquantel 20mg/kg q6h x3 doses    Chronic cough  CT chest 5/2: new broncho-centric consolidative opacities with background pattern consistent with ILD.   Autoimmune workup: negative April 2022  Per pulm note   - given acute pulmonary infiltrates, further ILD workup with PFTs should be deferred to outpatient setting. He has established with pulmonology in [place].   - encourage incentive spirometry and out of bed/ambulation as able  - wean oxygen as able to maintain SpO2 >92%     Metabolic acidosis  On sodium bicarb for acidosis     Cirrhosis of liver with ascites  Liver biopsy -- Chronic mild-moderately active hepatitis with bridging fibrosis and nodule   formation (Grade 2, Stage 3).  Decompensated LOPEZ   Pending work up for liver /kidney transplant eval    Personal history of immunosupression therapy  On sirolimus and prograf at home   Hold at this time   Will need to be monitored carfeully- noted to be started on posaconazole on 5/5    Transplanted kidney  S/p kidney transplant living donor closer to 20 years in [place].   Reports no episodes of rejections or complications post surgery   ESRD - related to HTN. IgA  newphropathy  Currently has no access for HD         Larry Lopez MD  Kidney Transplant  [facility] - Transplant Stepdown

## 2022-05-13 NOTE — SUBJECTIVE & OBJECTIVE
Interval History: No acute events overnight. Unable to get trialysis placed secondary to logistical issues. Patient more quiet in bed today. Denies any suprapubic pain like yesterday but reports area still sore.     Review of Systems   Constitutional:  Negative for chills and fever.   HENT:  Negative for congestion and sore throat.    Eyes:  Negative for photophobia and visual disturbance.   Respiratory:  Negative for cough and shortness of breath.    Cardiovascular:  Negative for chest pain and palpitations.   Gastrointestinal:  Negative for abdominal pain, blood in stool, constipation, diarrhea, nausea and vomiting.   Endocrine: Negative for cold intolerance and heat intolerance.   Genitourinary:  Negative for dysuria and hematuria.   Musculoskeletal:  Negative for arthralgias and myalgias.   Skin:  Negative for rash and wound.   Allergic/Immunologic: Negative for environmental allergies and food allergies.   Neurological:  Negative for seizures and numbness.   Hematological:  Negative for adenopathy. Does not bruise/bleed easily.   Psychiatric/Behavioral:  Positive for decreased concentration and sleep disturbance. Negative for hallucinations and suicidal ideas.    Objective:     Vital Signs (Most Recent):  Temp: 98.7 °F (37.1 °C) (05/13/22 0833)  Pulse: 63 (05/13/22 0900)  Resp: (!) 23 (05/13/22 0900)  BP: 108/70 (05/13/22 0900)  SpO2: 96 % (05/13/22 0900) Vital Signs (24h Range):  Temp:  [97.8 °F (36.6 °C)-98.7 °F (37.1 °C)] 98.7 °F (37.1 °C)  Pulse:  [56-69] 63  Resp:  [14-23] 23  SpO2:  [93 %-99 %] 96 %  BP: (108-135)/(70-87) 108/70     Weight: 62.4 kg (137 lb 9.1 oz)  Body mass index is 20.92 kg/m².    Intake/Output Summary (Last 24 hours) at 5/13/2022 1159  Last data filed at 5/13/2022 0515  Gross per 24 hour   Intake 289.91 ml   Output 1755 ml   Net -1465.09 ml        Physical Exam  Constitutional:       Appearance: He is well-developed.   HENT:      Head: Normocephalic and atraumatic.   Eyes:       General: Scleral icterus present.      Conjunctiva/sclera: Conjunctivae normal.      Pupils: Pupils are equal, round, and reactive to light.   Neck:      Thyroid: No thyromegaly.      Vascular: No JVD.   Cardiovascular:      Rate and Rhythm: Normal rate and regular rhythm.      Heart sounds: Normal heart sounds. No murmur heard.    No friction rub. No gallop.   Pulmonary:      Effort: Pulmonary effort is normal. No respiratory distress.      Breath sounds: Normal breath sounds. No wheezing or rales.   Abdominal:      General: Bowel sounds are normal. There is no distension.      Palpations: Abdomen is soft. There is no mass.      Tenderness: There is abdominal tenderness (suprapubic area). There is no guarding or rebound.      Hernia: No hernia is present.   Musculoskeletal:         General: No deformity. Normal range of motion.      Cervical back: Normal range of motion and neck supple.   Skin:     General: Skin is warm and dry.   Neurological:      Mental Status: He is alert.      Cranial Nerves: No cranial nerve deficit.   Psychiatric:         Attention and Perception: He is inattentive.         Behavior: Behavior normal.       Significant Labs: All pertinent labs within the past 24 hours have been reviewed.  CBC:   Recent Labs   Lab 05/12/22  0639 05/13/22  0712   WBC 8.89 6.15   HGB 8.7* 7.6*   HCT 25.0* 21.3*   PLT 84* 63*       CMP:   Recent Labs   Lab 05/12/22  0639 05/13/22  0712   * 136   K 3.2* 3.2*   CL 94* 94*   CO2 22* 27   * 95   * 104*   CREATININE 5.3* 5.1*   CALCIUM 8.0* 7.8*   PROT 5.6* 4.9*   ALBUMIN 2.7* 2.5*   BILITOT 6.9* 6.9*   ALKPHOS 80 77   AST 71* 66*   ALT 10 8*   ANIONGAP 18* 15   EGFRNONAA 10.8* 11.4*         Significant Imaging: I have reviewed all pertinent imaging results/findings within the past 24 hours.

## 2022-05-13 NOTE — PT/OT/SLP PROGRESS
"Occupational Therapy   Treatment    Name: Edgar Jackson  MRN: [id number]  Admitting Diagnosis:  ILDEFONSO (acute kidney injury)  8 Days Post-Op    Recommendations:     Discharge Recommendations: home with home health  Discharge Equipment Recommendations:  none  Barriers to discharge:  None    Assessment:     Edgar Jackson is a 61 y.o. male with a medical diagnosis of ILDEFONSO (acute kidney injury). Performance deficits affecting function are weakness, gait instability, impaired cardiopulmonary response to activity, impaired endurance, impaired balance, decreased safety awareness, impaired self care skills, impaired functional mobilty, decreased lower extremity function, decreased upper extremity function, decreased coordination.     Pt found resting in bed and was agreeable to therapy. With increased time and effort, pt engaged in bed mobility to tf supine>sit EOB with verbal/tactile cues for sequencing and body mechanics. Pt reported generalized weakness and dizziness upon sitting upright. BP obtained while sitting EOB reading at 105/66. He reported mild improvement in dizziness after sitting EOB for ~7 minutes. Pt then tf'd bed>bedside chair and denied engaging in functional mobility to perform ADLs in bathroom due to his current state. BP obtained following completing tf reading at 106/70. Once medically stable, OT recommending HH OT  in order to maximize independence with functional activities, reduce caregiver burden, and facilitate safe discharge.    Rehab Prognosis:  Good; patient would benefit from acute skilled OT services to address these deficits and reach maximum level of function.       Plan:     Patient to be seen 3 x/week to address the above listed problems via self-care/home management, therapeutic activities, therapeutic exercises  · Plan of Care Expires: 05/31/22  · Plan of Care Reviewed with: patient    Subjective   "I don't feel comfortable walking right now, I feel dizzy"  Pain/Comfort:  Pain Rating 1: 0/10  Pain " Rating Post-Intervention 1: 0/10    Objective:     Communicated with: RN prior to session.  Patient found supine with blood pressure cuff, Condom Catheter, telemetry (VISI monitor) upon OT entry to room.    General Precautions: Standard, fall   Orthopedic Precautions:N/A   Braces: N/A  Respiratory Status: Nasal cannula, flow 2 L/min     Occupational Performance:     Bed Mobility:    · Patient completed Rolling/Turning to Right with contact guard assistance  · Patient completed Scooting/Bridging with stand by assistance  · Patient completed Supine to Sit with contact guard assistance  · Required cueing for initiation, sequencing, and body mechanics   · HOB elevated    Functional Mobility/Transfers:  · Patient completed Sit <> Stand Transfer with contact guard assistance  with  rolling walker   · Patient completed Bed <> Chair Transfer using Step Transfer technique with contact guard assistance with rolling walker  · Functional Mobility: NT due to pt denying mobility    Activities of Daily Living:  · Lower Body Dressing: dependence to don socks EOB due to pt's inability to perform trunk flexion and utilize figure 4 technique with crossing of LEs      AMPA 6 Click ADL: 18    Treatment & Education:  AROM BUE 1 x 10 reps in 3 planes of motion to improve ROM and functional endurance in order to facilitate engagement in ADLs. Performed seated on bedside chair    -Education on energy conservation and task modification to maximize safety and (I) during ADLs and mobility  -Education on importance of OOB activity to improve overall activity tolerance and promote recovery  -Pt educated to call for assistance and to transfer with hospital staff only  -Provided education regarding role of OT, POC, & discharge recommendations with pt verbalizing understanding.  Pt had no further questions & when asked whether there were any concerns pt reported none.    Patient left up in chair with all lines intact, call button in reach and  chair alarm onEducation:      GOALS:   Multidisciplinary Problems     Occupational Therapy Goals        Problem: Occupational Therapy    Goal Priority Disciplines Outcome Interventions   Occupational Therapy Goal     OT, PT/OT Ongoing, Progressing    Description: Goals to be met by: 5/19     Patient will increase functional independence with ADLs by performing:    UE Dressing with Modified [place].  LE Dressing with Modified [place].  Grooming while standing with Modified [place].  Toileting from toilet with Modified [place] for hygiene and clothing management.   Supine to sit with Modified [place].  Step transfer with Modified [place] using AD as needed  Upper extremity exercise program x15 reps per handout, with independence.                     Time Tracking:     OT Date of Treatment: 05/13/22  OT Start Time: 0842  OT Stop Time: 0905  OT Total Time (min): 23 min    Billable Minutes:Therapeutic Activity 15 Therapeutic Exercise 8    OT/SLOANE: OT          5/13/2022

## 2022-05-13 NOTE — PROGRESS NOTES
Hepatology Treatment Plan    Edgar Jackson is a 61 y.o. male admitted to hospital 4/30/2022 (Hospital Day: 14) due to ILDEFONSO (acute kidney injury).     Interval History  No acute events overnight. The patient reports a sore throat.    Objective  Temp:  [97.8 °F (36.6 °C)-98.7 °F (37.1 °C)] 98.7 °F (37.1 °C) (05/13 0833)  Pulse:  [56-69] 63 (05/13 0900)  BP: (108-135)/(70-87) 108/70 (05/13 0900)  Resp:  [14-23] 23 (05/13 0900)  SpO2:  [93 %-99 %] 96 % (05/13 0900)     Constitutional:  not in acute distress and well developed  HENT: Head: Normal, normocephalic, atraumatic.  Eyes: conjunctiva clear and sclera nonicteric  Cardiovascular: regular rate and rhythm  Respiratory: normal chest expansion & respiratory effort   and no accessory muscle use  GI: soft, non-tender, without masses or organomegaly  Musculoskeletal: no muscular tenderness noted  Skin: normal color  Neurological: alert, oriented x3  Psychiatric: mood and affect are within normal limits, pt is a good historian; no memory problems were noted          Laboratory    Lab Results   Component Value Date    WBC 6.15 05/13/2022    HGB 7.6 (L) 05/13/2022    HCT 21.3 (L) 05/13/2022    MCV 84 05/13/2022    PLT 63 (L) 05/13/2022       Lab Results   Component Value Date     05/13/2022    K 3.2 (L) 05/13/2022    CL 94 (L) 05/13/2022    CO2 27 05/13/2022     (H) 05/13/2022    CREATININE 5.1 (H) 05/13/2022    CALCIUM 7.8 (L) 05/13/2022       Lab Results   Component Value Date    ALBUMIN 2.5 (L) 05/13/2022    ALT 8 (L) 05/13/2022    AST 66 (H) 05/13/2022    ALKPHOS 77 05/13/2022    BILITOT 6.9 (H) 05/13/2022       Lab Results   Component Value Date    INR 1.8 (H) 05/13/2022    INR 1.9 (H) 05/12/2022    INR 1.9 (H) 05/11/2022       MELD-Na score: 34 at 5/13/2022  7:12 AM  MELD score: 34 at 5/13/2022  7:12 AM  Calculated from:  Serum Creatinine: 5.1 mg/dL (Using max of 4 mg/dL) at 5/13/2022  7:12 AM  Serum Sodium: 136 mmol/L at 5/13/2022  7:12 AM  Total Bilirubin: 6.9  mg/dL at 5/13/2022  7:12 AM  INR(ratio): 1.8 at 5/13/2022  7:12 AM  Age: 61 years     Problem list  1. Decompensated LOPEZ cirrhosis  2. Hx of kidney transplant  3. ILDEFONSO on CKD  4. PJP  5. Schisto Ag +      Approved for inpatient liver/kidney transplant evaluation.  Now with multiple infectious sources that will need to be treated.  Currently on posaconazole for blasto Ag/fungitell (awaiting other fungal studies), primaquine and clinda for PJP, amoxicillin for SBP, no contraindication for liver transplant from ID standpoint.  He is deconditioned, however appears to be improving.  Deferred for transplant pending pulmonary evaluation since there is a possible diagnosis of ILD with superimposed infiltrates. He also did not meet target heart rate for cardiac stress test.  Seen by pulmonology, PFTs at this time would not be helpful due to superimposed infection, recommended outpatient follow-up once infection is resolved.      - dialysis per Nephrology  - nuclear stress test  - PFTs as outpatient once infection is resolved  - lactulose targeting 2-3 BMs daily  - no plan for colonoscopy per GI, had a recent screening colonoscopy  - continue Abx per ID  - aggressive PT  - please obtain daily CBC, BMP, LFT, INR  - Plan of care was discussed with primary team    Thank you for involving us in the care of Edgar Jackson. Please call with any additional concerns or questions.    Reba Balderas MD  Gastroenterology Fellow

## 2022-05-13 NOTE — ASSESSMENT & PLAN NOTE
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  Recommendations:  -12 months of posaconazole, started 5/4/2022.  -Monthly blastomyces urine and serum Ag.  -Secondary prophylaxis after transplant routinely not done.  However If patient were to be transplanted, needs ID consult at that time for updated recommendations.  Schistosoma  Equivocal schistosoma serologies.  Will treat empirically.  Recommendations:  -Praziquantel 20mg/kg q6h x3 doses

## 2022-05-13 NOTE — ASSESSMENT & PLAN NOTE
His renal functions were normal beginning of Feb.   After that it appears he had ILDEFONSO - possibly related to pre-renal states  Now this is likely ATN in setting of multiple ongoing infections.  Which led to CKD stage III, which has progressed to stage IV over the course of last few weeks    Monitor I/Os   US renal transplant unremarkable  Plan to initiate hemodialysis once Line established for uremic symptoms   qb 250 ml/min, no UF , 2 hour

## 2022-05-13 NOTE — SUBJECTIVE & OBJECTIVE
"Interval HPI:   Overnight events: No acute events overnight. Patient on the TSU in room 83851/92923 A. Blood glucose improving. BG at, above, and below goal on current insulin regimen (SSI, prandial, and basal insulin ). Steroid use- Prednisone 40 mg QD. 8 Days Post-Op  Renal function- Abnormal - Creatinine 5.1   Vasopressors-  None       Diet diabetic [facility];  Calorie; Renal; Isolation Tray - Regular [place]     Eatin%  Nausea: No  Hypoglycemia and intervention: Yes; BG dropped overnight. Decreased Levemir dosing significantly.   Fever: No  TPN and/or TF: No    /71   Pulse 61   Temp 98.7 °F (37.1 °C) (Oral)   Resp 18   Ht 5' 8" (1.727 m)   Wt 62.4 kg (137 lb 9.1 oz)   SpO2 95%   BMI 20.92 kg/m²     Labs Reviewed and Include    Recent Labs   Lab 22  0712   GLU 95   CALCIUM 7.8*   ALBUMIN 2.5*   PROT 4.9*      K 3.2*   CO2 27   CL 94*   *   CREATININE 5.1*   ALKPHOS 77   ALT 8*   AST 66*   BILITOT 6.9*     Lab Results   Component Value Date    WBC 6.15 2022    HGB 7.6 (L) 2022    HCT 21.3 (L) 2022    MCV 84 2022    PLT 63 (L) 2022     No results for input(s): TSH, FREET4 in the last 168 hours.  Lab Results   Component Value Date    HGBA1C 6.0 (H) 05/10/2022       Nutritional status:   Body mass index is 20.92 kg/m².  Lab Results   Component Value Date    ALBUMIN 2.5 (L) 2022    ALBUMIN 2.7 (L) 2022    ALBUMIN 2.5 (L) 2022     No results found for: PREALBUMIN    Estimated Creatinine Clearance: 13.6 mL/min (A) (based on SCr of 5.1 mg/dL (H)).    Accu-Checks  Recent Labs     22  0200 22  0833 22  1744 22  2124 22  0158 22  1216 22  1319 22  2318 22  0258 22  0423   POCTGLUCOSE 196* 209* 189* 201* 169* 145* 130* 70 69* 78       Current Medications and/or Treatments Impacting Glycemic Control  Immunotherapy:    Immunosuppressants       None          Steroids: " "  Hormones (From admission, onward)                Start     Stop Route Frequency Ordered    05/17/22 0900  predniSONE tablet 20 mg        "Followed by" Linked Group Details    05/28 0859 Oral Daily 05/09/22 1719 05/12/22 0900  predniSONE tablet 40 mg        "Followed by" Linked Group Details    05/17 0859 Oral Daily 05/09/22 1719          Pressors:    Autonomic Drugs (From admission, onward)                Start     Stop Route Frequency Ordered    05/09/22 2100  metoprolol tartrate (LOPRESSOR) split tablet 12.5 mg         -- Oral 2 times daily 05/09/22 1003          Hyperglycemia/Diabetes Medications:   Antihyperglycemics (From admission, onward)                Start     Stop Route Frequency Ordered    05/14/22 0900  insulin detemir U-100 pen 6 Units         -- SubQ Daily 05/13/22 0911    05/10/22 1915  insulin aspart U-100 pen 3-6 Units         -- SubQ 3 times daily after meals 05/10/22 1506    05/09/22 2204  insulin aspart U-100 pen 1-10 Units         -- SubQ Before meals & nightly PRN 05/09/22 2105          "

## 2022-05-13 NOTE — PLAN OF CARE
Plan of care discussed with pt. Pt alert, slept well throughout shift.  VS stable overnight, afebrile. Pt denies any pain, no distress noted.  IV abx given per orders. Monitoring urine output- Oliveira in place-draining yellow urine. Left midline in place- site CDI. Blood glucose <70, PRN glucose tablets given. Pt educated on fall precautions, verbalized understanding. Call light in reach. Pt free of injuries this shift.  All questions addressed. Pt voices no concerns at this time. Safety and comfort measures maintained during hourly rounding.

## 2022-05-13 NOTE — PROGRESS NOTES
[facility] - Trans[facility] Stepdown  Endocrinology  Progress Note    Admit Date: 4/30/2022     Reason for Consult: Management of T2DM, Hyperglycemia     Surgical Procedure and Date: Pt undergoing evaluation for possible liver/kidney transplant    Diabetes diagnosis year: 2 years ago (2022 per pt)    Home Diabetes Medications:  Metformin 500 mg PO BID, long acting insulin 30 units daily    How often checking glucose at home?  Does not check    BG readings on regimen: Does not check his BG levels   Hypoglycemia on the regimen?  No (reports no symptoms of hypoglycemia, but stated he does not check)  Missed doses on regimen?  No    Diabetes Complications include:     Hyperglycemia    Complicating diabetes co morbidities:   Glucocorticoid use       HPI:   Patient is a 61 y.o. male with a diagnosis of renal transplant for hypertensive nephropathy (2004) on immunosuppressive therapy, LOPEZ cirrhosis, DM2, gout, BPH and ILD who was treated at [facility] ([facility]) for ILDEFONSO 2/2 decompensated cirrhosis and worsening portal vein hypertension with HRS. He has been having weekly paracentesis in the past month.     Pt reported that he had been experiencing abdominal distention with associated diarrhea (4-5/day) over several weeks, along with a chronic cough, weight loss, and found to be febrile.     [place] [facility] [place]     CT with opacifications, reverse halo sign, found to have PJP.  Blasto urine Ag positive also.    Pt was started on PJP treatment with steroid taper leading to BG excursion. Endocrine consulted for BG management.   -Steroid taper starting 5/6/2022: Prednisone 40mg BID days 1-5, then 40mg daily days 6-10, then 20mg daily days 11-21         Interval HPI:   Overnight events: No acute events overnight. Patient on the TSU in room 32325/32245 A. Blood glucose improving. BG at, above, and below goal on current insulin regimen (SSI, prandial, and basal insulin ).  "Steroid use- Prednisone 40 mg QD. 8 Days Post-Op  Renal function- Abnormal - Creatinine 5.1   Vasopressors-  None       Diet diabetic [facility];  Calorie; Renal; Isolation Tray - Regular [place]     Eatin%  Nausea: No  Hypoglycemia and intervention: Yes; BG dropped overnight. Decreased Levemir dosing significantly.   Fever: No  TPN and/or TF: No    /71   Pulse 61   Temp 98.7 °F (37.1 °C) (Oral)   Resp 18   Ht 5' 8" (1.727 m)   Wt 62.4 kg (137 lb 9.1 oz)   SpO2 95%   BMI 20.92 kg/m²     Labs Reviewed and Include    Recent Labs   Lab 22  07   GLU 95   CALCIUM 7.8*   ALBUMIN 2.5*   PROT 4.9*      K 3.2*   CO2 27   CL 94*   *   CREATININE 5.1*   ALKPHOS 77   ALT 8*   AST 66*   BILITOT 6.9*     Lab Results   Component Value Date    WBC 6.15 2022    HGB 7.6 (L) 2022    HCT 21.3 (L) 2022    MCV 84 2022    PLT 63 (L) 2022     No results for input(s): TSH, FREET4 in the last 168 hours.  Lab Results   Component Value Date    HGBA1C 6.0 (H) 05/10/2022       Nutritional status:   Body mass index is 20.92 kg/m².  Lab Results   Component Value Date    ALBUMIN 2.5 (L) 2022    ALBUMIN 2.7 (L) 2022    ALBUMIN 2.5 (L) 2022     No results found for: PREALBUMIN    Estimated Creatinine Clearance: 13.6 mL/min (A) (based on SCr of 5.1 mg/dL (H)).    Accu-Checks  Recent Labs     22  0200 22  0833 22  1744 22  2124 22  0158 22  1216 22  1319 22  2318 22  0258 22  0423   POCTGLUCOSE 196* 209* 189* 201* 169* 145* 130* 70 69* 78       Current Medications and/or Treatments Impacting Glycemic Control  Immunotherapy:    Immunosuppressants       None          Steroids:   Hormones (From admission, onward)                Start     Stop Route Frequency Ordered    22 0900  predniSONE tablet 20 mg        "Followed by" Linked Group Details     0859 Oral Daily 22 1717    22 " "0900  predniSONE tablet 40 mg        "Followed by" Linked Group Details    05/17 0859 Oral Daily 05/09/22 1719          Pressors:    Autonomic Drugs (From admission, onward)                Start     Stop Route Frequency Ordered    05/09/22 2100  metoprolol tartrate (LOPRESSOR) split tablet 12.5 mg         -- Oral 2 times daily 05/09/22 1003          Hyperglycemia/Diabetes Medications:   Antihyperglycemics (From admission, onward)                Start     Stop Route Frequency Ordered    05/14/22 0900  insulin detemir U-100 pen 6 Units         -- SubQ Daily 05/13/22 0911    05/10/22 1915  insulin aspart U-100 pen 3-6 Units         -- SubQ 3 times daily after meals 05/10/22 1506    05/09/22 2204  insulin aspart U-100 pen 1-10 Units         -- SubQ Before meals & nightly PRN 05/09/22 2105            ASSESSMENT and PLAN    * ILDEFONSO (acute kidney injury)  Caution with aggressive insulin adjustments to reduce risk of hypoglycemia    Type 2 diabetes mellitus without complication, without long-term current use of insulin  Endocrinology consulted for BG management.   BG goal 140-180    - Levemir Flex Pen 6 units daily (60% reduction due to FBG below goal and hypoglycemia)  - Novolog (aspart) insulin 3-6 Units SQ TIDWM and prn for BG excursions LDC SSI (150/25) (Changed to LDC since of decreased response to steroid reduction).   - BG checks AC/HS  - Hypoglycemia protocol in place  - If blood glucose greater than 300, please ask patient not to eat food or drink anything other than water until correctional insulin has brought it back below 250    ** Please notify Endocrine for any change and/or advance in diet**  ** Please call Endocrine for any BG related issues **    Discharge Planning:   TBD. Please notify endocrinology prior to discharge.      Hypothyroidism  Pt with Hx of hypothyroidism   Taking LT4 50 mcg PO daily  - TSH 2.4 on 04/30/2022  - Continue home regimen  - Recommend rechecking TFTs in 4-6 weeks outpatient       "   Zheng Parks, DNP, FNP  Endocrinology  [facility] - Transplant Stepdown

## 2022-05-13 NOTE — ASSESSMENT & PLAN NOTE
CXR performed on admission, with serial imaging reviewed. Continued scheduled Benzonate daily. Flonase added daily PRN with DuoNebs q6h PRN. Supplemental oxygen PRN. Continuous pulse oxymetry ordered. SpO2 maintained 88-93% by adjusting O2 flow. CT chest w/o contrast significant for bilateral infiltrates, concerning for pneumonia. Pulmonology consulted, appreciate recs. Pt to undergo Bronchoscopy 5/04/22.  - F/u pulm outpatient.

## 2022-05-13 NOTE — ASSESSMENT & PLAN NOTE
S/p kidney transplant living donor closer to 20 years in [place].   Reports no episodes of rejections or complications post surgery   ESRD - related to HTN. IgA newphropathy  Currently has no access for HD

## 2022-05-13 NOTE — ASSESSMENT & PLAN NOTE
Liver biopsy -- Chronic mild-moderately active hepatitis with bridging fibrosis and nodule   formation (Grade 2, Stage 3).  Decompensated LOPEZ   Pending work up for liver /kidney transplant eval

## 2022-05-13 NOTE — ASSESSMENT & PLAN NOTE
On sirolimus and prograf at home   Hold at this time   Will need to be monitored carfeully- noted to be started on posaconazole on 5/5

## 2022-05-13 NOTE — ASSESSMENT & PLAN NOTE
Pt taking immunosuppressive agents (sacrolimus and tacrolimus) 2/2 to renal transplant in 2004.   - Meds currently held  - KTM following

## 2022-05-13 NOTE — ASSESSMENT & PLAN NOTE
MELD-Na score: 34 at 5/13/2022  7:12 AM  MELD score: 34 at 5/13/2022  7:12 AM  Calculated from:  Serum Creatinine: 5.1 mg/dL (Using max of 4 mg/dL) at 5/13/2022  7:12 AM  Serum Sodium: 136 mmol/L at 5/13/2022  7:12 AM  Total Bilirubin: 6.9 mg/dL at 5/13/2022  7:12 AM  INR(ratio): 1.8 at 5/13/2022  7:12 AM  Age: 61 years  Patient recently diagnosed with cirrhosis 2/2 to LOPEZ. Pulmonary hypertension now present with with multiple episodes of paracentesis. Largest abdominal fluid reduction just over 5L. Diagnostic paracentesis performed at [facility] [place] with enterococcus faecalis on culture.   - Hepatology and KTM coordinating for possible transplant.   - Patient denied listing for transplant given concern for ILD, pulm consulted for any further recs and are recommending outpatient PFTs once acute pulmonary insult (PJP PNA) treated.  - Per transplant anesthesia, they are requesting additional PET stress imaging as he did not achieve maximal HR on his DSE.

## 2022-05-13 NOTE — PROGRESS NOTES
Rachid Cuevas - Transplant [facility] Medicine  Progress Note    Patient Name: Edgar Jackson  MRN: [id number]  Patient Class: IP- Inpatient   Admission Date: 4/30/2022  Length of Stay: 13 days  Attending Physician: Demar Griffith MD  Primary Care Provider: Tyler Castillo MD        Subjective:     Principal Problem:ILDEFONSO (acute kidney injury)        HPI:  Mr Jackson is a 61yoM with PMHx of renal transplant for hypertensive nephropathy (2004) immunosuppressed on tacrolimus/sirolimus,  therapy, LOPEZ cirrhosis, NIDDM, and ILD who was treated at OSH for ILDEFONSO 2/2 decompensated cirrhosis and worsening portal vein hypertension with HRS. Pt reported that he had been experiencing abdominal distention with associated diarrhea (4-5/day) over several weeks, along with a constant nagging cough. On presenting to OSH, multiple paracentesis (3) were performed to relieve the ascitic fluid. No evidence of SBP on fluid analysis. Creon was added to diet to aide in food digestion in setting of cirrhosis. Loperamide provided symptomatic treatment of diarrhea. Dextromethorphan-guaifenesin syrup added to reduce cough exacerbations. Despite efforts to manage symptoms, pt in need of  transplant surgery evaluation of the renal/hepatic injury, resulting in tranfer to [facility].     On arrival to [facility] (4/30/2022), pt has experienced increased generalized weakness. Loperamide was continued for diarrhea management. Abdominal distention recurred, despite prior paracentesis. Cough has been fairly constant and predominantly dry. Pt now endorses new left lower quadrant abdominal pain, worsened by cough. KTM and Hepatology consults placed for management of HRS.      Overview/Hospital Course:  Pt admitted to hospital medicine for treatment of ILDEFONSO likely secondary to decompensated cirrhosis. Consulted Hepatology and KTM, appreciate recs. Performed diagnostic paracentesis to verify nature of ascitic fluid and to rule out SBP. Pt had a fever of 100.7F per arm pit and  100.5F orally. CXR significant for prior ILD but no acute process. Blood cultures collected and Vanc/Cefepime started. ID consulted, appreciate recs. Diagnostic paracentesis performed with results indicative of portal hypertension. Fluid cultures are negative to date. CT Chest w/o contrast significant for bilateral opacities concerning for pneumonia. Pulmonology consulted, appreciate recs. Bronchoscopy performed on 5/04/22 for further evaluation of lung pathology seen on CT lung scan. Ascitic fluid aerobic culture positive for E.Faecalis on amoxicillin. 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 Furthermore, renal function has not improved and patient currently with recommendation for HD per nephro pending trialysis placement.       Interval History: No acute events overnight. Unable to get trialysis placed secondary to logistical issues. Patient more quiet in bed today. Denies any suprapubic pain like yesterday but reports area still sore.     Review of Systems   Constitutional:  Negative for chills and fever.   HENT:  Negative for congestion and sore throat.    Eyes:  Negative for photophobia and visual disturbance.   Respiratory:  Negative for cough and shortness of breath.    Cardiovascular:  Negative for chest pain and palpitations.   Gastrointestinal:  Negative for abdominal pain, blood in stool, constipation, diarrhea, nausea and vomiting.   Endocrine: Negative for cold intolerance and heat intolerance.   Genitourinary:  Negative for dysuria and hematuria.   Musculoskeletal:  Negative for arthralgias and myalgias.   Skin:  Negative for rash and wound.   Allergic/Immunologic: Negative for environmental allergies and food allergies.   Neurological:  Negative for seizures and numbness.   Hematological:  Negative for adenopathy. Does not bruise/bleed easily.   Psychiatric/Behavioral:  Positive for decreased concentration and sleep disturbance. Negative for hallucinations and suicidal ideas.    Objective:      Vital Signs (Most Recent):  Temp: 98.7 °F (37.1 °C) (05/13/22 0833)  Pulse: 63 (05/13/22 0900)  Resp: (!) 23 (05/13/22 0900)  BP: 108/70 (05/13/22 0900)  SpO2: 96 % (05/13/22 0900) Vital Signs (24h Range):  Temp:  [97.8 °F (36.6 °C)-98.7 °F (37.1 °C)] 98.7 °F (37.1 °C)  Pulse:  [56-69] 63  Resp:  [14-23] 23  SpO2:  [93 %-99 %] 96 %  BP: (108-135)/(70-87) 108/70     Weight: 62.4 kg (137 lb 9.1 oz)  Body mass index is 20.92 kg/m².    Intake/Output Summary (Last 24 hours) at 5/13/2022 1159  Last data filed at 5/13/2022 0515  Gross per 24 hour   Intake 289.91 ml   Output 1755 ml   Net -1465.09 ml        Physical Exam  Constitutional:       Appearance: He is well-developed.   HENT:      Head: Normocephalic and atraumatic.   Eyes:      General: Scleral icterus present.      Conjunctiva/sclera: Conjunctivae normal.      Pupils: Pupils are equal, round, and reactive to light.   Neck:      Thyroid: No thyromegaly.      Vascular: No JVD.   Cardiovascular:      Rate and Rhythm: Normal rate and regular rhythm.      Heart sounds: Normal heart sounds. No murmur heard.    No friction rub. No gallop.   Pulmonary:      Effort: Pulmonary effort is normal. No respiratory distress.      Breath sounds: Normal breath sounds. No wheezing or rales.   Abdominal:      General: Bowel sounds are normal. There is no distension.      Palpations: Abdomen is soft. There is no mass.      Tenderness: There is abdominal tenderness (suprapubic area). There is no guarding or rebound.      Hernia: No hernia is present.   Musculoskeletal:         General: No deformity. Normal range of motion.      Cervical back: Normal range of motion and neck supple.   Skin:     General: Skin is warm and dry.   Neurological:      Mental Status: He is alert.      Cranial Nerves: No cranial nerve deficit.   Psychiatric:         Attention and Perception: He is inattentive.         Behavior: Behavior normal.       Significant Labs: All pertinent labs within the past 24  hours have been reviewed.  CBC:   Recent Labs   Lab 05/12/22  0639 05/13/22  0712   WBC 8.89 6.15   HGB 8.7* 7.6*   HCT 25.0* 21.3*   PLT 84* 63*       CMP:   Recent Labs   Lab 05/12/22  0639 05/13/22  0712   * 136   K 3.2* 3.2*   CL 94* 94*   CO2 22* 27   * 95   * 104*   CREATININE 5.3* 5.1*   CALCIUM 8.0* 7.8*   PROT 5.6* 4.9*   ALBUMIN 2.7* 2.5*   BILITOT 6.9* 6.9*   ALKPHOS 80 77   AST 71* 66*   ALT 10 8*   ANIONGAP 18* 15   EGFRNONAA 10.8* 11.4*         Significant Imaging: I have reviewed all pertinent imaging results/findings within the past 24 hours.      Assessment/Plan:      * ILDEFONSO (acute kidney injury)  Patient with acute kidney injury likely d/t IVVD/Dehydration Which is currently stable. Labs reviewed- Renal function/electrolytes with Estimated Creatinine Clearance: 13.1 mL/min (A) (based on SCr of 5.3 mg/dL (H)). according to latest data. Monitor urine output and serial BMP and adjust therapy as needed. Avoid nephrotoxins and renally dose meds for GFR listed above. Ultrasound transplant kidney no acute changes. Possibly due to tacrolimus versus hepatorenal syndrome, Nephrology has been consulted. Toprolol continued.  Bicarb ordered for low serum levels. Cr/GFR to be trended for 6 weeks for evaluation.  - KTM following.   - hold sirolimus and tacrolimus  - trend daily BMP/RFP  - Will require HD, consulted anesthesia for trialysis placement.       Urinary retention  Unclear precipitant. Patient complained of urinary urgency and incomplete voiding on 5/12, he was bladder scanned and had jacinto placed with 1L urine return immediately. Patient continues to produce urine  - Once HD established, will remove jacinto and trial tamsulosin to assess TOV      Blastomycosis  5/2/22 Positive blastomyces urine Ag, below level of quantification.  - ID consulted  -12 months of posaconazole, started 5/4/2022.  - Monthly blastomyces urine and serum Ag.  - Secondary prophylaxis after transplant routinely  "not recommended. However if patient were to be transplanted, needs ID consult at that time for updated recommendations.      Schistosoma  Equivocal schistosoma serologies.  Will treat empirically.     Recommendations:  -Praziquantel 20mg/kg q6h x3 doses      Pneumocystis jiroveci pneumonia  CT with opacifications, reverse halo sign, found to have PJP.     Recommendations:  -Clindamycin and Primaquine for 21 days  -Steroid taper starting at prednisone 40mg BID days 1-5, then 40mg daily days 6-10, then 20mg daily days 11-21      Lung infiltrate on CT  Pt with Hx of chronic ILD with bilateral areas of lung scarring. Regions appear to be slightly larger in size when compared to previous imaging. New findings of bilateral infiltrates noted on recent CT Chest w/o contrast concerning for pneumonia. Acute fever reported over the past two evenings. Pt started on broad spectrum abx with septic workup performed. Respiratory viral panel negative. Pulmonology consulted, appreciate recs. Bronchoscopy to be performed. (See associated problem ILD)  - f/u bronchoscopy cx negative    Sepsis  This patient does have evidence of infective focus  My overall impression is sepsis. Vital signs were reviewed and noted in progress note.  Antibiotics given-   Antibiotics (From admission, onward)            Start     Stop Route Frequency Ordered    05/01/22 2115  cefepime in dextrose 5 % 1 gram/50 mL IVPB 1 g         -- IV Every 24 hours (non-standard times) 05/01/22 2051 05/01/22 2102  vancomycin - pharmacy to dose  (vancomycin IVPB)        "And" Linked Group Details    -- IV pharmacy to manage frequency 05/01/22 2051 04/30/22 2230  mupirocin 2 % ointment         05/05 2059 Nasl 2 times daily 04/30/22 2126        Cultures were taken-   Microbiology Results (last 7 days)     Procedure Component Value Units Date/Time    Direct AFB stain [[id number]] Collected: 05/04/22 0836    Order Status: Completed Specimen: Respiratory from BAL, RUL " Updated: 05/04/22 1131     Direct Acid Fast No acid fast bacilli seen.    Narrative:      BAL    Culture, Respiratory with Gram Stain [[id number]] Collected: 05/04/22 0836    Order Status: Completed Specimen: Respiratory from BAL, RUL Updated: 05/04/22 1129     Gram Stain (Respiratory) No organisms seen     Gram Stain (Respiratory) Rare WBC's    KOH prep [[id number]] Collected: 05/04/22 0836    Order Status: Completed Specimen: Respiratory from BAL, RUL Updated: 05/04/22 1101     KOH Prep No yeast or fungal elements seen    Narrative:      BAL    Culture, Anaerobic [[id number]] Collected: 05/01/22 2012    Order Status: Completed Specimen: Ascites Updated: 05/04/22 1046     Anaerobic Culture Culture in progress    AFB Culture & Smear [[id number]] Collected: 05/03/22 0104    Order Status: Completed Specimen: Blood from Arm, Right Updated: 05/04/22 0927     AFB Culture & Smear Culture in progress    Fungus culture [[id number]] Collected: 05/04/22 0836    Order Status: Sent Specimen: Respiratory from BAL, RUL Updated: 05/04/22 0921    AFB Culture & Smear [[id number]] Collected: 05/04/22 0836    Order Status: Sent Specimen: Respiratory from BAL, RUL Updated: 05/04/22 0920    Respiratory Viral Panel by PCR (Sources other than NP Swab) [facility]; Sputum (BAL) [[id number]] Collected: 05/04/22 0835    Order Status: Sent Specimen: Bronchial Wash Updated: 05/04/22 0914    Culture, Respiratory [[id number]] Collected: 05/04/22 0836    Order Status: Canceled Specimen: Respiratory from BAL, RUL     Gram stain [[id number]] Collected: 05/04/22 0836    Order Status: Canceled Specimen: Body Fluid from Lung, RUL     Aerobic culture [[id number]]  (Abnormal) Collected: 05/01/22 2012    Order Status: Completed Specimen: Ascites Updated: 05/04/22 0738     Aerobic Bacterial Culture ENTEROCOCCUS FAECALIS  Rare  Susceptibility pending      Blood culture [[id number]] Collected: 05/01/22 2132    Order Status: Completed Specimen: Blood Updated: 05/04/22 0612      Blood Culture, Routine No Growth to date      No Growth to date      No Growth to date    Narrative:      Collection has been rescheduled by [facility] at 05/01/2022 20:55 Reason:   Patient unavailable ON BSCC   Collection has been rescheduled by [facility] at 05/01/2022 20:55 Reason:   Patient unavailable ON BSCC     Blood culture [[id number]] Collected: 05/01/22 2132    Order Status: Completed Specimen: Blood Updated: 05/04/22 0612     Blood Culture, Routine No Growth to date      No Growth to date      No Growth to date    Narrative:      Collection has been rescheduled by [facility] at 05/01/2022 20:55 Reason:   Patient unavailable ON BSCC   Collection has been rescheduled by [facility] at 05/01/2022 20:55 Reason:   Patient unavailable ON BSCC     Blood culture [[id number]] Collected: 05/02/22 1938    Order Status: Completed Specimen: Blood Updated: 05/03/22 2212     Blood Culture, Routine No Growth to date      No Growth to date    Blood culture [[id number]] Collected: 05/02/22 1941    Order Status: Completed Specimen: Blood Updated: 05/03/22 2212     Blood Culture, Routine No Growth to date      No Growth to date    Culture, Body Fluid - Bactec [[id number]] Collected: 05/01/22 2013    Order Status: Completed Specimen: Body Fluid from Ascites Updated: 05/03/22 2212     Body Fluid Culture, Sterile No growth to date      No Growth to date    Culture, MRSA [[id number]] Collected: 05/03/22 2142    Order Status: Sent Specimen: MRSA source from Nares, Right Updated: 05/03/22 2201    Culture, MRSA [[id number]] Collected: 05/03/22 2115    Order Status: Sent Specimen: MRSA source from Nares, Left Updated: 05/03/22 2157    Cryptococcal antigen [[id number]] Collected: 05/02/22 1939    Order Status: Completed Specimen: Blood, Venous Updated: 05/03/22 1115     Cryptococcal Ag, Blood Negative    AFB Culture & Smear [[id number]]     Order Status: Canceled Specimen: Blood     Respiratory Infection Panel (PCR), Nasopharyngeal [[id number]] Collected: 05/02/22  2044    Order Status: Completed Specimen: Nasopharyngeal Swab Updated: 05/02/22 2252     Respiratory Infection Panel Source NP Swab     Adenovirus Not Detected     Coronavirus 229E, Common Cold Virus Not Detected     Coronavirus HKU1, Common Cold Virus Not Detected     Coronavirus NL63, Common Cold Virus Not Detected     Coronavirus OC43, Common Cold Virus Not Detected     Comment: The Coronavirus strains detected in this test cause the common cold.  These strains are not the COVID-19 (novel Coronavirus)strain   associated with the respiratory disease outbreak.          SARS-CoV2 (COVID-19) Qualitative PCR Not Detected     Human Metapneumovirus Not Detected     Human Rhinovirus/Enterovirus Not Detected     Influenza A (subtypes H1, H1-2009,H3) Not Detected     Influenza B Not Detected     Parainfluenza Virus 1 Not Detected     Parainfluenza Virus 2 Not Detected     Parainfluenza Virus 3 Not Detected     Parainfluenza Virus 4 Not Detected     Respiratory Syncytial Virus Not Detected     Bordetella Parapertussis (II4189) Not Detected     Bordetella pertussis (ptxP) Not Detected     Chlamydia pneumoniae Not Detected     Mycoplasma pneumoniae Not Detected    Narrative:      For all other respiratory sources, order [id number] -  Respiratory Viral Panel by PCR    AFB Culture & Smear [[id number]]     Order Status: Canceled Specimen: Blood     Blood culture [[id number]]     Order Status: Canceled Specimen: Blood     Fungus culture [[id number]] Collected: 05/01/22 2012    Order Status: Completed Specimen: Ascites Updated: 05/02/22 0924     Fungus (Mycology) Culture Culture in progress    Gram stain [[id number]] Collected: 05/01/22 2012    Order Status: Completed Specimen: Ascites Updated: 05/01/22 2233     Gram Stain Result No WBC's      No organisms seen        Latest lactate reviewed, they are-  No results for input(s): LACTATE in the last 72 hours.    Blood and fluid cultures performed: NGTD. Bronchoscopy to be performed.  Respiratory viral panel negative. Follow up results and respiratory cultures.      Type 2 diabetes mellitus without complication, without long-term current use of insulin  - Diabetic diet  - levemir decreased to 6u daily  - aspart 3-6u w/ meals      Chronic cough  PMHx of interstitial lung disease (lung scarring). benzonotate tablet 200mg TID PRN. For persistent cough add dextromethorphan-guaiefesin syrup    Chronic diarrhea  Patient started on loperamide at OSH with adequate response, will continue anti-nausea medication at [facility]. Trended labs daily and adjusted for any electrolyte abnormality. Pt with prior negative Cdiff test.  - Given concern for AMS and no BM in a day, holding loperamide. Continue lactulose        Fever in immunocomprised patient  Patient received paracentesis on 05/01, negative for SBP.  Spiked fever afterwards, 100.7F. Septic workup started, broad spectrum abx coverage with Vanc/Cefepime. Chest x-ray showed consolidation at the middle lobe of the left lung. CT chest w/o contrast with similar findings, concerning for pneumonia.  Lactic acid wnl and no leukocytosis.ID consult placed given comorbidity and immunocompromised status. Pulmonology consulted, given chronic ILD findings with new bilateral opacities. Bronchoscopy to be performed. Bcx and Fcx NGTD. Respiratory viral panel negative.  - Currently patient being treated for blastomycosis given urine ag, PJP given lung imaging findings, SBP with amoxicillin given his ascites culture, and lastly received schistosoma treatment.       Thrombocytopenia  Thrombocytopenia likely in setting of hepatic cirrhosis. Hold anticoagulation and DVT prophylaxis for thrombocytopenia.      Hypothyroidism  Continued on home medication: synthroid 50mcg qd      Hyponatremia  Stable. Urine sodium 29, urine osm 297. DDX low effective arterial volume vs SIADH.   - KTM following.   - Cont monitoring.         Cirrhosis of liver with ascites  MELD-Na score: 34 at  5/13/2022  7:12 AM  MELD score: 34 at 5/13/2022  7:12 AM  Calculated from:  Serum Creatinine: 5.1 mg/dL (Using max of 4 mg/dL) at 5/13/2022  7:12 AM  Serum Sodium: 136 mmol/L at 5/13/2022  7:12 AM  Total Bilirubin: 6.9 mg/dL at 5/13/2022  7:12 AM  INR(ratio): 1.8 at 5/13/2022  7:12 AM  Age: 61 years  Patient recently diagnosed with cirrhosis 2/2 to LOPEZ. Pulmonary hypertension now present with with multiple episodes of paracentesis. Largest abdominal fluid reduction just over 5L. Diagnostic paracentesis performed at [facility] with enterococcus faecalis on culture.   - Hepatology and KTM coordinating for possible transplant.   - Patient denied listing for transplant given concern for ILD, pulm consulted for any further recs and are recommending outpatient PFTs once acute pulmonary insult (PJP PNA) treated.  - Per transplant anesthesia, they are requesting additional PET stress imaging as he did not achieve maximal HR on his DSE.    Interstitial lung disease  CXR performed on admission, with serial imaging reviewed. Continued scheduled Benzonate daily. Flonase added daily PRN with DuoNebs q6h PRN. Supplemental oxygen PRN. Continuous pulse oxymetry ordered. SpO2 maintained 88-93% by adjusting O2 flow. CT chest w/o contrast significant for bilateral infiltrates, concerning for pneumonia. Pulmonology consulted, appreciate recs. Pt to undergo Bronchoscopy 5/04/22.  - F/u pulm outpatient.       Gout  Continue allopurinol for prophylaxis.       Personal history of immunosupression therapy  Pt taking immunosuppressive agents (sacrolimus and tacrolimus) 2/2 to renal transplant in 2004.   - Meds currently held  - KTM following    Transplanted kidney  Renal transplant performed in 2004. Pt continued on sirolimus and tacrolimus for immuno suppression. KTM consulted on admission, appreciate recs. Trough levels ordered with both medications.currently held.    - trend renal funtion tests   - hold tacrolimus/sirolimus as directed by  KTM      VTE Risk Mitigation (From admission, onward)         Ordered     IP VTE HIGH RISK PATIENT  Once         04/30/22 1810     Place sequential compression device  Until discontinued         04/30/22 1810                Discharge Planning   LE: 5/16/2022     Code Status: Full Code   Is the patient medically ready for discharge?: No    Reason for patient still in hospital (select all that apply): Patient trending condition  Discharge Plan A: Home Health                  Demar Griffith MD  Department of Hospital Medicine   [facility] - Transplant Stepdown

## 2022-05-13 NOTE — ANESTHESIA PROCEDURE NOTES
Central Line    Diagnosis: ARF on CKD  Patient location during procedure: floor  Timeout: 5/13/2022 4:15 PM  Procedure end time: 5/13/2022 4:45 PM    Staffing  Authorizing Provider: Susanna Bowen MD  Performing Provider: Kvng Salgado MD    Staffing  Performed: resident/CRNA   Anesthesiologist was present at the time of the procedure.  Preanesthetic Checklist  Completed: patient identified, IV checked, site marked, risks and benefits discussed, surgical consent, monitors and equipment checked and timeout performed  Indication   Indication: hemodialysis     Anesthesia   local infiltration    Central Line   Skin Prep: skin prepped with ChloraPrep, skin prep agent completely dried prior to procedure  Sterile Barriers Followed: Yes    All five maximal barriers used- gloves, gown, cap, mask, and large sterile sheet    hand hygiene performed prior to central venous catheter insertion  Location: right internal jugular.   Catheter type: triple lumen  Catheter Size: 12 Fr (13 F)  Inserted Catheter Length: 13.5 cm  Ultrasound: vascular probe with ultrasound   Vessel Caliber: medium, patent, compressibility normal  Needle advanced into vessel with real time Ultrasound guidance.  Guidewire confirmed in vessel.  sterile gel and probe cover used in ultrasound-guided central venous catheter insertion   Manometry: Venous cannualation confirmed by visual estimation of blood vessel pressure using manometry.  Insertion Attempts: 1   Securement:line sutured, chlorhexidine patch, sterile dressing applied and blood return through all ports    Post-Procedure        Guidewire Guidewire removed intact.

## 2022-05-13 NOTE — ASSESSMENT & PLAN NOTE
Endocrinology consulted for BG management.   BG goal 140-180    - Levemir Flex Pen 6 units daily (60% reduction due to FBG below goal and hypoglycemia)  - Novolog (aspart) insulin 3-6 Units SQ TIDWM and prn for BG excursions LDC SSI (150/25) (Changed to LDC since of decreased response to steroid reduction).   - BG checks AC/HS  - Hypoglycemia protocol in place  - If blood glucose greater than 300, please ask patient not to eat food or drink anything other than water until correctional insulin has brought it back below 250    ** Please notify Endocrine for any change and/or advance in diet**  ** Please call Endocrine for any BG related issues **    Discharge Planning:   TBD. Please notify endocrinology prior to discharge.

## 2022-05-13 NOTE — HOSPITAL COURSE
Pt admitted to hospital medicine for treatment of ILDEFONSO likely secondary to decompensated cirrhosis. CXR significant for prior ILD but no acute process. CT Chest w/o contrast significant for bilateral opacities concerning for pneumonia. Bronchoscopy performed on 5/04/22 for further evaluation of lung pathology seen on CT lung scan. Ascitic fluid aerobic culture positive for E.Faecalis. 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 Currently on posaconazole for blasto Ag/fungitell (awaiting other fungal studies), primaquine and clinda for PJP, amoxicillin for SBP. Being followed by ID, pulm and Hepatology      5/14/22 : Pt had HD X 2 hours for possible uremic encephalopathy on Friday. His BP dropped during HD. Inspite of better BUN and Cr today, he is still slow, in pain. Constant nausea and vomiting. His BP 94/52. I will hold HD today since pt is hypotensive. No sign of volume overload.     5/15/22:  he is almost anuric today. He is still slow but communicate properly . KUB demonstrates partial SBO with stool throughout colon. Has NGT with 600 output. Plan for HD today with no UF for clearance. BP is stable at this point. Can use albumin and midodrine if needed.     5/16/22: last HD (05/15) 0 UF after 2 hours. Continues to be anuric.  Will hold off HD today and plan for tomorrow.  Otherwise, stable.    5/17/22: Anuric.  Appears more somnolent.  Renal function continues to worsen, will plan for HD.

## 2022-05-13 NOTE — PLAN OF CARE
TONYA  VSS  2-3 L NC, sats >92%  SB   Pt oriented to situation, person and place  YAMILETH midline  RIJ, placed at bedside by anesthesia   Verified by XR, nursing communication ok to use  ACHS, WNL  Unable to give insulin as pt is not eating  C/o throat soreness, magic mouthwash ordered  Pt vomited when he attempted to eat, take meds  MD aware  Pt up in chair but asked to be returned to bed, stating that he is really tired  No BM today, pt unable to tolerate full dose of lactulose  HD scheduled to be done at bedside tonight  Wife at bedside  Safety/fall precautions maintained  Bed locked in lowest position  Call light and personal belongings within reach  WCTM

## 2022-05-13 NOTE — ASSESSMENT & PLAN NOTE
Patient started on loperamide at OSH with adequate response, will continue anti-nausea medication at [facility]. Trended labs daily and adjusted for any electrolyte abnormality. Pt with prior negative Cdiff test.  - Given concern for AMS and no BM in a day, holding loperamide. Continue lactulose

## 2022-05-13 NOTE — ASSESSMENT & PLAN NOTE
CT chest 5/2: new broncho-centric consolidative opacities with background pattern consistent with ILD.   Autoimmune workup: negative April 2022  Per pulm note   - given acute pulmonary infiltrates, further ILD workup with PFTs should be deferred to outpatient setting. He has established with pulmonology in [place].   - encourage incentive spirometry and out of bed/ambulation as able  - wean oxygen as able to maintain SpO2 >92%

## 2022-05-13 NOTE — ASSESSMENT & PLAN NOTE
Unclear precipitant. Patient complained of urinary urgency and incomplete voiding on 5/12, he was bladder scanned and had jacinto placed with 1L urine return immediately. Patient continues to produce urine  - Once HD established, will remove jacinto and trial tamsulosin to assess TOV

## 2022-05-14 PROBLEM — K56.7 ILEUS: Status: ACTIVE | Noted: 2022-01-01

## 2022-05-14 PROBLEM — A41.9 SEPSIS: Status: RESOLVED | Noted: 2022-01-01 | Resolved: 2022-01-01

## 2022-05-14 PROBLEM — K52.9 CHRONIC DIARRHEA: Status: RESOLVED | Noted: 2022-01-01 | Resolved: 2022-01-01

## 2022-05-14 NOTE — PLAN OF CARE
· VSS, SpO2 > 92% 3L NC, Tele- SB  · Patient received HD; no fluid taken off  · Magic mouthwash given for sore throat  · Sliding scale not given due to pt poor appetite and glucose outside of sliding scale range  · Pt still complained of nausea, no vomiting  · Pt c/o of lower back pain. One time dose for oxy and Lidocaine patch ordered by Dr. Nichols.   · Bed locked and in lowest position, call light/personal items in reach  · Wife at bedside

## 2022-05-14 NOTE — PROGRESS NOTES
[facility] - [facility] Medicine  Progress Note    Patient Name: Edgar Jackson  MRN: [id number]  Patient Class: IP- Inpatient   Admission Date: 4/30/2022  Length of Stay: 14 days  Attending Physician: Demar Griffith MD  Primary Care Provider: Tyler Castillo MD        Subjective:     Principal Problem:ILDEFONSO (acute kidney injury)        HPI:  Mr Jackson is a 61yoM with PMHx of renal transplant for hypertensive nephropathy (2004) immunosuppressed on tacrolimus/sirolimus,  therapy, LOPEZ cirrhosis, NIDDM, and ILD who was treated at OSH for ILDEFONSO 2/2 decompensated cirrhosis and worsening portal vein hypertension with HRS. Pt reported that he had been experiencing abdominal distention with associated diarrhea (4-5/day) over several weeks, along with a constant nagging cough. On presenting to OSH, multiple paracentesis (3) were performed to relieve the ascitic fluid. No evidence of SBP on fluid analysis. Creon was added to diet to aide in food digestion in setting of cirrhosis. Loperamide provided symptomatic treatment of diarrhea. Dextromethorphan-guaifenesin syrup added to reduce cough exacerbations. Despite efforts to manage symptoms, pt in need of  transplant surgery evaluation of the renal/hepatic injury, resulting in tranfer to [facility].     On arrival to [facility] (4/30/2022), pt has experienced increased generalized weakness. Loperamide was continued for diarrhea management. Abdominal distention recurred, despite prior paracentesis. Cough has been fairly constant and predominantly dry. Pt now endorses new left lower quadrant abdominal pain, worsened by cough. KTM and Hepatology consults placed for management of HRS.      Overview/Hospital Course:  Pt admitted to hospital medicine for treatment of ILDEFONSO likely secondary to decompensated cirrhosis. Consulted Hepatology and KTM, appreciate recs. Performed diagnostic paracentesis to verify nature of ascitic fluid and to rule out SBP. Pt had a fever of 100.7F per arm pit and  100.5F orally. CXR significant for prior ILD but no acute process. Blood cultures collected and Vanc/Cefepime started. ID consulted, appreciate recs. Diagnostic paracentesis performed with results indicative of portal hypertension. Fluid cultures are negative to date. CT Chest w/o contrast significant for bilateral opacities concerning for pneumonia. Pulmonology consulted, appreciate recs. Bronchoscopy performed on 5/04/22 for further evaluation of lung pathology seen on CT lung scan. Ascitic fluid aerobic culture positive for E.Faecalis on amoxicillin. 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 Furthermore, renal function has not improved and patient currently with recommendation for HD per nephro. First session w/ UF happened 5/13. Worsening ileus as of 5/14.      Interval History: No acute events overnight. Received UF last night after trialysis placement, did not tolerate volume removal. Patient had some distress after NGT placement that resolved with withdrawal. KUB demonstrates partial SBO with stool throughout colon likely a result of the imodium he had been receiving when he had diarrhea days prior. Will attempt reinsertion of NGT with enema/bisadodyl suppository later today.     Review of Systems   Constitutional:  Negative for chills and fever.   HENT:  Negative for congestion and sore throat.    Eyes:  Negative for photophobia and visual disturbance.   Respiratory:  Negative for cough and shortness of breath.    Cardiovascular:  Negative for chest pain and palpitations.   Gastrointestinal:  Positive for abdominal pain, nausea and vomiting. Negative for blood in stool, constipation and diarrhea.   Endocrine: Negative for cold intolerance and heat intolerance.   Genitourinary:  Negative for dysuria and hematuria.   Musculoskeletal:  Negative for arthralgias and myalgias.   Skin:  Negative for rash and wound.   Allergic/Immunologic: Negative for environmental allergies and food allergies.   Neurological:   Negative for seizures and numbness.   Hematological:  Negative for adenopathy. Does not bruise/bleed easily.   Psychiatric/Behavioral:  Positive for decreased concentration and sleep disturbance. Negative for hallucinations and suicidal ideas.    Objective:     Vital Signs (Most Recent):  Temp: 97.7 °F (36.5 °C) (05/14/22 0723)  Pulse: 63 (05/14/22 0946)  Resp: (!) 23 (05/14/22 0946)  BP: (!) 104/58 (05/14/22 0946)  SpO2: 96 % (05/14/22 0946) Vital Signs (24h Range):  Temp:  [97.3 °F (36.3 °C)-97.9 °F (36.6 °C)] 97.7 °F (36.5 °C)  Pulse:  [51-63] 63  Resp:  [15-28] 23  SpO2:  [95 %-97 %] 96 %  BP: ()/(50-74) 104/58     Weight: 62.4 kg (137 lb 9.1 oz)  Body mass index is 20.92 kg/m².    Intake/Output Summary (Last 24 hours) at 5/14/2022 1507  Last data filed at 5/14/2022 0000  Gross per 24 hour   Intake 347.93 ml   Output 1170 ml   Net -822.07 ml        Physical Exam  Constitutional:       Appearance: He is well-developed. He is ill-appearing and toxic-appearing.   HENT:      Head: Normocephalic and atraumatic.   Eyes:      General: Scleral icterus present.      Conjunctiva/sclera: Conjunctivae normal.      Pupils: Pupils are equal, round, and reactive to light.   Neck:      Thyroid: No thyromegaly.      Vascular: No JVD.   Cardiovascular:      Rate and Rhythm: Normal rate and regular rhythm.      Heart sounds: Normal heart sounds. No murmur heard.    No friction rub. No gallop.   Pulmonary:      Effort: Pulmonary effort is normal. No respiratory distress.      Breath sounds: Normal breath sounds. No wheezing or rales.   Abdominal:      General: Bowel sounds are normal. There is no distension.      Palpations: Abdomen is soft. There is no mass.      Tenderness: There is abdominal tenderness (diffuse). There is no guarding or rebound.      Hernia: No hernia is present.   Musculoskeletal:         General: No deformity. Normal range of motion.      Cervical back: Normal range of motion and neck supple.   Skin:      General: Skin is warm and dry.   Neurological:      Mental Status: He is alert.      Cranial Nerves: No cranial nerve deficit.   Psychiatric:         Attention and Perception: He is inattentive.         Behavior: Behavior normal.       Significant Labs: All pertinent labs within the past 24 hours have been reviewed.  CBC:   Recent Labs   Lab 05/13/22  0712 05/14/22  0439   WBC 6.15 8.26   HGB 7.6* 8.4*   HCT 21.3* 24.0*   PLT 63* 59*       CMP:   Recent Labs   Lab 05/13/22  0712 05/14/22  0439    137   K 3.2* 3.9   CL 94* 98   CO2 27 24   GLU 95 148*   * 73*   CREATININE 5.1* 3.9*   CALCIUM 7.8* 8.0*   PROT 4.9* 5.3*   ALBUMIN 2.5* 2.5*   BILITOT 6.9* 8.6*   ALKPHOS 77 82   AST 66* 71*   ALT 8* 9*   ANIONGAP 15 15   EGFRNONAA 11.4* 15.7*         Significant Imaging: I have reviewed all pertinent imaging results/findings within the past 24 hours.      Assessment/Plan:      * ILDEFONSO (acute kidney injury)  Patient with acute kidney injury likely d/t IVVD/Dehydration Which is currently stable. Labs reviewed- Renal function/electrolytes with Estimated Creatinine Clearance: 17.8 mL/min (A) (based on SCr of 3.9 mg/dL (H)). according to latest data. Monitor urine output and serial BMP and adjust therapy as needed. Avoid nephrotoxins and renally dose meds for GFR listed above. Ultrasound transplant kidney no acute changes. Possibly due to tacrolimus versus hepatorenal syndrome, Nephrology has been consulted. Toprolol continued.  Bicarb ordered for low serum levels. Cr/GFR to be trended for 6 weeks for evaluation.  - KTM following.   - hold sirolimus and tacrolimus  - trend daily BMP/RFP  - Had UF w/ HD 5/13 without fluid removal. Continues to appear mentally slowed in thought process and uncomfortable.        Ileus  In the setting of having received imodium for diarrhea that occurred earlier in his inpatient stay. Imodium discontinued 5/12, but patient with minimal stools since. KUB demonstrating early/partial  small bowel obstruction vs ileus.   - Will plan for NGT insertion to be set to LIS  - Brown bomb enema ordered  - Bisacodyl suppository ordered prn      Urinary retention  Unclear precipitant. Patient complained of urinary urgency and incomplete voiding on 5/12, he was bladder scanned and had jacinto placed with 1L urine return immediately. Patient continues to produce urine  - Anticipate jacinto removal tomorrow.      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  - ID consulted  -12 months of posaconazole, started 5/4/2022.  - Monthly blastomyces urine and serum Ag.  - Secondary prophylaxis after transplant routinely not recommended. However if patient were to be transplanted, needs ID consult at that time for updated recommendations.      Schistosoma  Equivocal schistosoma serologies.  Will treat empirically.     Recommendations:  -Praziquantel 20mg/kg q6h x3 doses      Pneumocystis jiroveci pneumonia  CT with opacifications, reverse halo sign, found to have PJP.     Recommendations:  -Clindamycin and Primaquine for 21 days  -Steroid taper starting at prednisone 40mg BID days 1-5, then 40mg daily days 6-10, then 20mg daily days 11-21      Lung infiltrate on CT  Pt with Hx of chronic ILD with bilateral areas of lung scarring. Regions appear to be slightly larger in size when compared to previous imaging. New findings of bilateral infiltrates noted on recent CT Chest w/o contrast concerning for pneumonia. Acute fever reported over the past two evenings. Pt started on broad spectrum abx with septic workup performed. Respiratory viral panel negative. Pulmonology consulted, appreciate recs. Bronchoscopy to be performed. (See associated problem ILD)  - f/u bronchoscopy cx negative    Type 2 diabetes mellitus without complication, without long-term current use of insulin  Endocrine following  - Diabetic diet  - scheduled levemir removed  - aspart 3-6u w/ meals      Chronic cough  PMHx  of interstitial lung disease (lung scarring). benzonotate tablet 200mg TID PRN. For persistent cough add dextromethorphan-guaiefesin syrup    Fever in immunocomprised patient  Patient received paracentesis on 05/01, negative for SBP.  Spiked fever afterwards, 100.7F. Septic workup started, broad spectrum abx coverage with Vanc/Cefepime. Chest x-ray showed consolidation at the middle lobe of the left lung. CT chest w/o contrast with similar findings, concerning for pneumonia.  Lactic acid wnl and no leukocytosis.ID consult placed given comorbidity and immunocompromised status. Pulmonology consulted, given chronic ILD findings with new bilateral opacities. Bronchoscopy to be performed. Bcx and Fcx NGTD. Respiratory viral panel negative.  - Currently patient being treated for blastomycosis given urine ag, PJP given lung imaging findings, SBP with amoxicillin given his ascites culture, and lastly received schistosoma treatment.       Thrombocytopenia  Thrombocytopenia likely in setting of hepatic cirrhosis. Hold anticoagulation and DVT prophylaxis for thrombocytopenia.      Hypothyroidism  Continued on home medication: synthroid 50mcg qd      Hyponatremia  Stable. Urine sodium 29, urine osm 297. DDX low effective arterial volume vs SIADH.   - KTM following.   - Cont monitoring.         Cirrhosis of liver with ascites  MELD-Na score: 34 at 5/14/2022  4:39 AM  MELD score: 34 at 5/14/2022  4:39 AM  Calculated from:  Serum Creatinine: 3.9 mg/dL at 5/14/2022  4:39 AM  Serum Sodium: 137 mmol/L at 5/14/2022  4:39 AM  Total Bilirubin: 8.6 mg/dL at 5/14/2022  4:39 AM  INR(ratio): 1.8 at 5/14/2022  4:39 AM  Age: 61 years  Patient recently diagnosed with cirrhosis 2/2 to LOPEZ. Pulmonary hypertension now present with with multiple episodes of paracentesis. Largest abdominal fluid reduction just over 5L. Diagnostic paracentesis performed at [facility] [place] with enterococcus faecalis on culture.   - Hepatology and KTM coordinating for possible  transplant.   - Patient denied listing for transplant given concern for ILD, pulm consulted for any further recs and are recommending outpatient PFTs once acute pulmonary insult (PJP PNA) treated.  - Per transplant anesthesia, they are requesting additional PET stress imaging as he did not achieve maximal HR on his DSE.    Interstitial lung disease  CXR performed on admission, with serial imaging reviewed. Continued scheduled Benzonate daily. Flonase added daily PRN with DuoNebs q6h PRN. Supplemental oxygen PRN. Continuous pulse oxymetry ordered. SpO2 maintained 88-93% by adjusting O2 flow. CT chest w/o contrast significant for bilateral infiltrates, concerning for pneumonia. Pulmonology consulted, appreciate recs. Pt to undergo Bronchoscopy 5/04/22.  - F/u pulm outpatient.       Gout  Continue allopurinol for prophylaxis.       Personal history of immunosupression therapy  Pt taking immunosuppressive agents (sacrolimus and tacrolimus) 2/2 to renal transplant in 2004.   - Meds currently held  - KTM following    Transplanted kidney  Renal transplant performed in 2004. Pt continued on sirolimus and tacrolimus for immuno suppression. KTM consulted on admission, appreciate recs. Trough levels ordered with both medications.currently held.    - trend renal funtion tests   - hold tacrolimus/sirolimus as directed by KTM      VTE Risk Mitigation (From admission, onward)         Ordered     IP VTE HIGH RISK PATIENT  Once         04/30/22 1810     Place sequential compression device  Until discontinued         04/30/22 1810                Discharge Planning   LE: 5/16/2022     Code Status: Full Code   Is the patient medically ready for discharge?: No    Reason for patient still in hospital (select all that apply): Patient trending condition  Discharge Plan A: Home Health                  Demar Griffith MD  Department of Hospital Medicine   [facility] - Transplant Stepdown

## 2022-05-14 NOTE — PROGRESS NOTES
05/14/22 0000   Vital Signs   Temp 97.9 °F (36.6 °C)   Pulse (!) 53   Resp (!) 21   /63   MAP (mmHg) 82   Assessments (Pre/Post)   Blood Liters Processed (BLP) 29.3   Level of Consciousness (AVPU) alert   Dialyzer Clearance clear   Pre-Hemodialysis Assessment   Treatment Status Completed   During Hemodialysis Assessment   UF Removed (mL) 500 mL   Post-Hemodialysis Assessment   Rinseback Volume (mL) 250 mL   Blood Volume Processed (Liters) 29.3 L   Dialyzer Clearance Clear   Duration of Treatment 120 minutes   Total UF (mL) 500 mL   Net Fluid Removal 0   Patient Response to Treatment Tolerated well, low BP's during treatment with no symptoms voiced or noted.   Post-Hemodialysis Comments Treatment complete, blood returned and post catheter care done.   Report given to primary nurse.

## 2022-05-14 NOTE — ASSESSMENT & PLAN NOTE
MELD-Na score: 34 at 5/14/2022  4:39 AM  MELD score: 34 at 5/14/2022  4:39 AM  Calculated from:  Serum Creatinine: 3.9 mg/dL at 5/14/2022  4:39 AM  Serum Sodium: 137 mmol/L at 5/14/2022  4:39 AM  Total Bilirubin: 8.6 mg/dL at 5/14/2022  4:39 AM  INR(ratio): 1.8 at 5/14/2022  4:39 AM  Age: 61 years  Patient recently diagnosed with cirrhosis 2/2 to LOPEZ. Pulmonary hypertension now present with with multiple episodes of paracentesis. Largest abdominal fluid reduction just over 5L. Diagnostic paracentesis performed at [facility] [place] [place] with enterococcus faecalis on culture.   - Hepatology and KTM coordinating for possible transplant.   - Patient denied listing for transplant given concern for ILD, pulm consulted for any further recs and are recommending outpatient PFTs once acute pulmonary insult (PJP PNA) treated.  - Per transplant anesthesia, they are requesting additional PET stress imaging as he did not achieve maximal HR on his DSE.

## 2022-05-14 NOTE — NURSING
NGT left nare, pt tolerated well  0.25 of IV ativan given and that helped Pt  NGT LIWS  Placement verified by XR  MD placed nursing communication order, ok to use  NGT clamped currently to see if pt is able to tolerate meds.  VSS  98% RA  NS/SB on telemetry  WCTM

## 2022-05-14 NOTE — NURSING
Fleet enema and soap suds enema given- pt unable to retain the enemas  Had very small liquid BM on the BSC and then wanted to be returned to bed  NGT placed, clamped  Waiting for XR confirmation placement

## 2022-05-14 NOTE — ASSESSMENT & PLAN NOTE
In the setting of having received imodium for diarrhea that occurred earlier in his inpatient stay. Imodium discontinued 5/12, but patient with minimal stools since. KUB demonstrating early/partial small bowel obstruction vs ileus.   - Will plan for NGT insertion to be set to LIS  - Brown bomb enema ordered  - Bisacodyl suppository ordered prn

## 2022-05-14 NOTE — NURSING
Attempted to place NGT, with the assistance of another nurse  NGT placed easily but Pt immediately began c/o CP and was tachypnic, RR 40s  Pt did not desat but looked to be in distress  o2 sat 97% on 4L NC  Rapid response called and primary MD called   NGT contents appeared to be gastric, dark brown and auscultation of the stomach positive for proper for placement  However Pt became more distressed  NGT pulled and pt immediately appeared more comfortable  EKG, CXR AND ABD XR ordered  Rapid response came to see Pt and are aware of situation  MD to round on Pt  Currently Pt VSS, 97% 4L NC,   EKG NSR  WCTM

## 2022-05-14 NOTE — PROGRESS NOTES
[facility] - Transplant Stepdown  Kidney Transplant  Progress Note      Reason for Follow-up: Reassessment of Kidney Transplant Evaluation - 5/10/2022 recipient and management of immunosuppression.      Subjective:   History of Present Illness:  61yoM with PMHx of renal transplant for hypertensive nephropathy (2004) immunosuppressed on tacrolimus/sirolimus,  therapy, LOPEZ cirrhosis, NIDDM, and ILD who was treated at OSH for ILDEFONSO 2/2 decompensated cirrhosis and worsening portal vein hypertension with HRS.   Pt reported that he had been experiencing abdominal distention with associated diarrhea (4-5/day) over several weeks, along with a constant nagging cough.  He has been having weekly paracentesis in the past month.   Despite efforts to manage symptoms, pt in need of  transplant surgery evaluation of the renal/hepatic injury, resulting in tranfer to [facility] [place].  He reports no recent NSAID use.         Hospital Course:  Pt admitted to hospital medicine for treatment of ILDEFONSO likely secondary to decompensated cirrhosis. CXR significant for prior ILD but no acute process. CT Chest w/o contrast significant for bilateral opacities concerning for pneumonia. Bronchoscopy performed on 5/04/22 for further evaluation of lung pathology seen on CT lung scan. Ascitic fluid aerobic culture positive for E.Faecalis. 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 Currently on posaconazole for blasto Ag/fungitell (awaiting other fungal studies), primaquine and clinda for PJP, amoxicillin for SBP. Being followed by ID, pulm and Hepatology      Pt had HD X 2 hours for possible uremic encephalopathy. His BP dropped during HD. Inspite of better BUN and CR today, he is still slow, in pain. Constant nausea and vomiting. His BP 94/52. I will hold HD today since pt is hypotensive. No sign of volume overload.         Past Medical, Surgical, Family, and Social History:   Unchanged from H&P.    Scheduled Meds:   allopurinoL  100 mg Oral Daily     amoxicillin  500 mg Oral Daily    calcitRIOL  0.25 mcg Oral Daily    clindamycin (CLEOCIN) IVPB  900 mg Intravenous Q8H    furosemide  20 mg Oral Daily    insulin aspart U-100  3-6 Units Subcutaneous TID PC    lactulose  15 g Oral TID    levothyroxine  50 mcg Oral Before breakfast    LIDOcaine  1 patch Transdermal Q24H    LIDOcaine HCl 2%  10 mL Mucous Membrane Q6H    lipase-protease-amylase 12,000-38,000-60,000 units  2 capsule Oral QID (WM & HS)    posaconazole  300 mg Oral Daily    predniSONE  40 mg Oral Daily    Followed by    [START ON 5/17/2022] predniSONE  20 mg Oral Daily    primaquine  52.6 mg Oral Daily    sodium bicarbonate  1,300 mg Oral TID     Continuous Infusions:  PRN Meds:(Magic mouthwash) 1:1:1 diphenhydramine(Benadryl) 12.5mg/5ml liq, aluminum & magnesium hydroxide-simethicone (Maalox), LIDOcaine viscous 2%, sodium chloride 0.9%, acetaminophen, albuterol-ipratropium, benzonatate, bisacodyL, dextromethorphan-guaiFENesin  mg/5 ml, dextrose 10%, fluticasone propionate, glucagon (human recombinant), glucose, glucose, insulin aspart U-100, midodrine, naloxone, sodium chloride 0.9%    Intake/Output - Last 3 Shifts         05/12 0700  05/13 0659 05/13 0700 05/14 0659 05/14 0700  05/15 0659    P.O. 240 150     I.V. (mL/kg)       IV Piggyback 49.9 197.9     Total Intake(mL/kg) 289.9 (4.6) 347.9 (5.6)     Urine (mL/kg/hr) 1955 (1.3) 670 (0.4)     Other  500     Stool 0      Total Output 1955 1170     Net -1665.1 -822.1            Urine Occurrence 1 x      Stool Occurrence 0 x               Objective:     Vital Signs (Most Recent):  Temp: 97.7 °F (36.5 °C) (05/14/22 0723)  Pulse: 63 (05/14/22 0946)  Resp: (!) 23 (05/14/22 0946)  BP: (!) 104/58 (05/14/22 0946)  SpO2: 96 % (05/14/22 0946)   Vital Signs (24h Range):  Temp:  [97.3 °F (36.3 °C)-97.9 °F (36.6 °C)] 97.7 °F (36.5 °C)  Pulse:  [51-63] 63  Resp:  [15-28] 23  SpO2:  [95 %-97 %] 96 %  BP: ()/(50-74) 104/58     Weight: 62.4 kg  "(137 lb 9.1 oz)  Height: 5' 8" (172.7 cm)  Body mass index is 20.92 kg/m².    Physical Exam    Physical Exam  Constitutional:       General: He is not in acute distress.     Appearance: He is ill-appearing.   Eyes:      General: Scleral icterus present.   Cardiovascular:      Rate and Rhythm: Normal rate.   Pulmonary:      Effort: No respiratory distress.      Breath sounds: No wheezing.      Comments: Decreased breath sounds at bases   Abdominal:      General: There is no distension.      Palpations: Abdomen is soft.      Tenderness: There is abdominal tenderness.   Musculoskeletal:      Right lower leg: No edema.      Left lower leg: No edema.   Skin:     Coloration: Skin is jaundiced.   Neurological:      Mental Status: He is oriented to person, place, and time.      Motor: Weakness present.     Assessment/Plan:     * ILDEFONSO (acute kidney injury)  His renal functions were normal beginning of Feb.   After that it appears he had ILDEFONSO . this is likely ATN in setting of multiple ongoing infections.  Which led to CKD stage III, which has progressed to stage IV over the course of last few weeks    Monitor I/Os   US renal transplant unremarkable  Hemodialysis started on Friday for ? uremic symptoms   Will hold off on  HD today since pt is hypotensive. 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  Recommendations:  -12 months of posaconazole, started 5/4/2022.  -Monthly blastomyces urine and serum Ag.  -Secondary prophylaxis after transplant routinely not done. "  However If patient were to be transplanted, needs ID consult at that time for updated recommendations.  Schistosoma  Equivocal schistosoma serologies.  Will treat empirically.  Recommendations:  -Praziquantel 20mg/kg q6h x3 dose        Hyponatremia  This is likely in setting of renal failure and liver cirrhosis   Corrected with HD      Cirrhosis of liver with ascites  Liver biopsy -- Chronic mild-moderately active hepatitis with bridging fibrosis and nodule   formation (Grade 2, Stage 3).  Decompensated LOPEZ   Pending work up for liver /kidney transplant julio Huang MD  Kidney Transplant  [facility] - Transplant Stepdown

## 2022-05-14 NOTE — ASSESSMENT & PLAN NOTE
Endocrinology consulted for BG management.   BG goal 140-180    - D/C Levemir due to FBG at goal and minimal insulin requirements.   - Novolog (aspart) insulin 3-6 Units SQ TIDWM and prn for BG excursions LDC SSI (150/25) (Changed to LDC since of decreased response to steroid reduction).   - BG checks AC/HS  - Hypoglycemia protocol in place  - If blood glucose greater than 300, please ask patient not to eat food or drink anything other than water until correctional insulin has brought it back below 250    ** Please notify Endocrine for any change and/or advance in diet**  ** Please call Endocrine for any BG related issues **    Discharge Planning:   TBD. Please notify endocrinology prior to discharge.

## 2022-05-14 NOTE — PROGRESS NOTES
[facility] - Transplant Stepdown  Endocrinology  Progress Note    Admit Date: 4/30/2022     Reason for Consult: Management of T2DM, Hyperglycemia     Surgical Procedure and Date: Pt undergoing evaluation for possible liver/kidney transplant    Diabetes diagnosis year: 2 years ago (2022 per pt)    Home Diabetes Medications:  Metformin 500 mg PO BID, long acting insulin 30 units daily    How often checking glucose at home?  Does not check    BG readings on regimen: Does not check his BG levels   Hypoglycemia on the regimen?  No (reports no symptoms of hypoglycemia, but stated he does not check)  Missed doses on regimen?  No    Diabetes Complications include:     Hyperglycemia    Complicating diabetes co morbidities:   Glucocorticoid use       HPI:   Patient is a 61 y.o. male with a diagnosis of renal transplant for hypertensive nephropathy (2004) on immunosuppressive therapy, LOPEZ cirrhosis, DM2, gout, BPH and ILD who was treated at [facility] ([facility]) for ILDEFONSO 2/2 decompensated cirrhosis and worsening portal vein hypertension with HRS. He has been having weekly paracentesis in the past month.     Pt reported that he had been experiencing abdominal distention with associated diarrhea (4-5/day) over several weeks, along with a chronic cough, weight loss, and found to be febrile.     Pt was transferred to [facility] for evaluation of possible liver/kidney transplant     CT with opacifications, reverse halo sign, found to have PJP.  Blasto urine Ag positive also.    Pt was started on PJP treatment with steroid taper leading to BG excursion. Endocrine consulted for BG management.   -Steroid taper starting 5/6/2022: Prednisone 40mg BID days 1-5, then 40mg daily days 6-10, then 20mg daily days 11-21         Interval HPI:   Overnight events: No acute events overnight. Patient on the TSU in room 72598/30779 A. Blood glucose improving. BG at, above, and below goal on current insulin regimen (SSI, prandial, and basal insulin ).  "Steroid use- Prednisone 40 mg QD. 9 Days Post-Op  Renal function- Abnormal - Creatinine 3.9   Vasopressors-  None       Diet diabetic [facility];  Calorie; Renal; Isolation Tray - Regular [place]     Eatin%  Nausea: No  Hypoglycemia and intervention: No   Fever: No  TPN and/or TF: No    BP (!) 104/58   Pulse 63   Temp 97.7 °F (36.5 °C)   Resp (!) 23   Ht 5' 8" (1.727 m)   Wt 62.4 kg (137 lb 9.1 oz)   SpO2 96%   BMI 20.92 kg/m²     Labs Reviewed and Include    Recent Labs   Lab 22  0439   *   CALCIUM 8.0*   ALBUMIN 2.5*   PROT 5.3*      K 3.9   CO2 24   CL 98   BUN 73*   CREATININE 3.9*   ALKPHOS 82   ALT 9*   AST 71*   BILITOT 8.6*     Lab Results   Component Value Date    WBC 8.26 2022    HGB 8.4 (L) 2022    HCT 24.0 (L) 2022    MCV 85 2022    PLT 59 (L) 2022     No results for input(s): TSH, FREET4 in the last 168 hours.  Lab Results   Component Value Date    HGBA1C 6.0 (H) 05/10/2022       Nutritional status:   Body mass index is 20.92 kg/m².  Lab Results   Component Value Date    ALBUMIN 2.5 (L) 2022    ALBUMIN 2.5 (L) 2022    ALBUMIN 2.7 (L) 2022     No results found for: PREALBUMIN    Estimated Creatinine Clearance: 17.8 mL/min (A) (based on SCr of 3.9 mg/dL (H)).    Accu-Checks  Recent Labs     22  1216 22  1319 22  2318 22  0258 22  0423 22  0839 22  1244 22  1731 22  2107 22  0205   POCTGLUCOSE 145* 130* 70 69* 78 102 111* 141* 156* 142*       Current Medications and/or Treatments Impacting Glycemic Control  Immunotherapy:    Immunosuppressants       None          Steroids:   Hormones (From admission, onward)                Start     Stop Route Frequency Ordered    22 0900  predniSONE tablet 20 mg        "Followed by" Linked Group Details    859 Oral Daily 22 17122 0900  predniSONE tablet 40 mg        "Followed by" Linked Group " Details    05/17 0859 Oral Daily 05/09/22 1719          Pressors:    Autonomic Drugs (From admission, onward)                Start     Stop Route Frequency Ordered    05/14/22 0818  midodrine tablet 5 mg         -- Oral Daily PRN 05/14/22 0719          Hyperglycemia/Diabetes Medications:   Antihyperglycemics (From admission, onward)                Start     Stop Route Frequency Ordered    05/10/22 1915  insulin aspart U-100 pen 3-6 Units         -- SubQ 3 times daily after meals 05/10/22 1506    05/09/22 2204  insulin aspart U-100 pen 1-10 Units         -- SubQ Before meals & nightly PRN 05/09/22 2105            ASSESSMENT and PLAN    * ILDEFONSO (acute kidney injury)  Caution with aggressive insulin adjustments to reduce risk of hypoglycemia    Type 2 diabetes mellitus without complication, without long-term current use of insulin  Endocrinology consulted for BG management.   BG goal 140-180    - D/C Levemir due to FBG at goal and minimal insulin requirements.   - Novolog (aspart) insulin 3-6 Units SQ TIDWM and prn for BG excursions LDC SSI (150/25) (Changed to LDC since of decreased response to steroid reduction).   - BG checks AC/HS  - Hypoglycemia protocol in place  - If blood glucose greater than 300, please ask patient not to eat food or drink anything other than water until correctional insulin has brought it back below 250    ** Please notify Endocrine for any change and/or advance in diet**  ** Please call Endocrine for any BG related issues **    Discharge Planning:   TBD. Please notify endocrinology prior to discharge.      Hypothyroidism  Pt with Hx of hypothyroidism   Taking LT4 50 mcg PO daily  - TSH 2.4 on 04/30/2022  - Continue home regimen  - Recommend rechecking TFTs in 4-6 weeks outpatient         Zheng Parks, DNP, FNP  Endocrinology  [facility] - Transplant Stepdown

## 2022-05-14 NOTE — SUBJECTIVE & OBJECTIVE
Interval History: No acute events overnight. Received UF last night after trialysis placement, did not tolerate volume removal. Patient had some distress after NGT placement that resolved with withdrawal. KUB demonstrates partial SBO with stool throughout colon likely a result of the imodium he had been receiving when he had diarrhea days prior. Will attempt reinsertion of NGT with enema/bisadodyl suppository later today.     Review of Systems   Constitutional:  Negative for chills and fever.   HENT:  Negative for congestion and sore throat.    Eyes:  Negative for photophobia and visual disturbance.   Respiratory:  Negative for cough and shortness of breath.    Cardiovascular:  Negative for chest pain and palpitations.   Gastrointestinal:  Positive for abdominal pain, nausea and vomiting. Negative for blood in stool, constipation and diarrhea.   Endocrine: Negative for cold intolerance and heat intolerance.   Genitourinary:  Negative for dysuria and hematuria.   Musculoskeletal:  Negative for arthralgias and myalgias.   Skin:  Negative for rash and wound.   Allergic/Immunologic: Negative for environmental allergies and food allergies.   Neurological:  Negative for seizures and numbness.   Hematological:  Negative for adenopathy. Does not bruise/bleed easily.   Psychiatric/Behavioral:  Positive for decreased concentration and sleep disturbance. Negative for hallucinations and suicidal ideas.    Objective:     Vital Signs (Most Recent):  Temp: 97.7 °F (36.5 °C) (05/14/22 0723)  Pulse: 63 (05/14/22 0946)  Resp: (!) 23 (05/14/22 0946)  BP: (!) 104/58 (05/14/22 0946)  SpO2: 96 % (05/14/22 0946) Vital Signs (24h Range):  Temp:  [97.3 °F (36.3 °C)-97.9 °F (36.6 °C)] 97.7 °F (36.5 °C)  Pulse:  [51-63] 63  Resp:  [15-28] 23  SpO2:  [95 %-97 %] 96 %  BP: ()/(50-74) 104/58     Weight: 62.4 kg (137 lb 9.1 oz)  Body mass index is 20.92 kg/m².    Intake/Output Summary (Last 24 hours) at 5/14/2022 6548  Last data filed at  5/14/2022 0000  Gross per 24 hour   Intake 347.93 ml   Output 1170 ml   Net -822.07 ml        Physical Exam  Constitutional:       Appearance: He is well-developed. He is ill-appearing and toxic-appearing.   HENT:      Head: Normocephalic and atraumatic.   Eyes:      General: Scleral icterus present.      Conjunctiva/sclera: Conjunctivae normal.      Pupils: Pupils are equal, round, and reactive to light.   Neck:      Thyroid: No thyromegaly.      Vascular: No JVD.   Cardiovascular:      Rate and Rhythm: Normal rate and regular rhythm.      Heart sounds: Normal heart sounds. No murmur heard.    No friction rub. No gallop.   Pulmonary:      Effort: Pulmonary effort is normal. No respiratory distress.      Breath sounds: Normal breath sounds. No wheezing or rales.   Abdominal:      General: Bowel sounds are normal. There is no distension.      Palpations: Abdomen is soft. There is no mass.      Tenderness: There is abdominal tenderness (diffuse). There is no guarding or rebound.      Hernia: No hernia is present.   Musculoskeletal:         General: No deformity. Normal range of motion.      Cervical back: Normal range of motion and neck supple.   Skin:     General: Skin is warm and dry.   Neurological:      Mental Status: He is alert.      Cranial Nerves: No cranial nerve deficit.   Psychiatric:         Attention and Perception: He is inattentive.         Behavior: Behavior normal.       Significant Labs: All pertinent labs within the past 24 hours have been reviewed.  CBC:   Recent Labs   Lab 05/13/22  0712 05/14/22  0439   WBC 6.15 8.26   HGB 7.6* 8.4*   HCT 21.3* 24.0*   PLT 63* 59*       CMP:   Recent Labs   Lab 05/13/22  0712 05/14/22  0439    137   K 3.2* 3.9   CL 94* 98   CO2 27 24   GLU 95 148*   * 73*   CREATININE 5.1* 3.9*   CALCIUM 7.8* 8.0*   PROT 4.9* 5.3*   ALBUMIN 2.5* 2.5*   BILITOT 6.9* 8.6*   ALKPHOS 77 82   AST 66* 71*   ALT 8* 9*   ANIONGAP 15 15   EGFRNONAA 11.4* 15.7*          Significant Imaging: I have reviewed all pertinent imaging results/findings within the past 24 hours.

## 2022-05-14 NOTE — ASSESSMENT & PLAN NOTE
Patient with acute kidney injury likely d/t IVVD/Dehydration Which is currently stable. Labs reviewed- Renal function/electrolytes with Estimated Creatinine Clearance: 17.8 mL/min (A) (based on SCr of 3.9 mg/dL (H)). according to latest data. Monitor urine output and serial BMP and adjust therapy as needed. Avoid nephrotoxins and renally dose meds for GFR listed above. Ultrasound transplant kidney no acute changes. Possibly due to tacrolimus versus hepatorenal syndrome, Nephrology has been consulted. Toprolol continued.  Bicarb ordered for low serum levels. Cr/GFR to be trended for 6 weeks for evaluation.  - KTM following.   - hold sirolimus and tacrolimus  - trend daily BMP/RFP  - Had UF w/ HD 5/13 without fluid removal. Continues to appear mentally slowed in thought process and uncomfortable.

## 2022-05-14 NOTE — SUBJECTIVE & OBJECTIVE
Subjective:   History of Present Illness:  61yoM with PMHx of renal transplant for hypertensive nephropathy (2004) immunosuppressed on tacrolimus/sirolimus,  therapy, LOPEZ cirrhosis, NIDDM, and ILD who was treated at OSH for ILDEFONSO 2/2 decompensated cirrhosis and worsening portal vein hypertension with HRS.   Pt reported that he had been experiencing abdominal distention with associated diarrhea (4-5/day) over several weeks, along with a constant nagging cough.  He has been having weekly paracentesis in the past month.   Despite efforts to manage symptoms, pt in need of  transplant surgery evaluation of the renal/hepatic injury, resulting in tranfer to [facility] [place].  He reports no recent NSAID use.         Hospital Course:  Pt admitted to hospital medicine for treatment of ILDEFONSO likely secondary to decompensated cirrhosis. CXR significant for prior ILD but no acute process. CT Chest w/o contrast significant for bilateral opacities concerning for pneumonia. Bronchoscopy performed on 5/04/22 for further evaluation of lung pathology seen on CT lung scan. Ascitic fluid aerobic culture positive for E.Faecalis. 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 Currently on posaconazole for blasto Ag/fungitell (awaiting other fungal studies), primaquine and clinda for PJP, amoxicillin for SBP. Being followed by ID, pulm and Hepatology      Pt had HD X 2 hours for possible uremic encephalopathy. His BP dropped during HD. Inspite of better BUN and CR today, he is still slow, in pain. Constant nausea and vomiting. His BP 94/52. I will hold HD today since pt is hypotensive. No sign of volume overload.         Past Medical, Surgical, Family, and Social History:   Unchanged from H&P.    Scheduled Meds:   allopurinoL  100 mg Oral Daily    amoxicillin  500 mg Oral Daily    calcitRIOL  0.25 mcg Oral Daily    clindamycin (CLEOCIN) IVPB  900 mg Intravenous Q8H    furosemide  20 mg Oral Daily    insulin aspart U-100  3-6 Units Subcutaneous TID PC     "lactulose  15 g Oral TID    levothyroxine  50 mcg Oral Before breakfast    LIDOcaine  1 patch Transdermal Q24H    LIDOcaine HCl 2%  10 mL Mucous Membrane Q6H    lipase-protease-amylase 12,000-38,000-60,000 units  2 capsule Oral QID (WM & HS)    posaconazole  300 mg Oral Daily    predniSONE  40 mg Oral Daily    Followed by    [START ON 5/17/2022] predniSONE  20 mg Oral Daily    primaquine  52.6 mg Oral Daily    sodium bicarbonate  1,300 mg Oral TID     Continuous Infusions:  PRN Meds:(Magic mouthwash) 1:1:1 diphenhydramine(Benadryl) 12.5mg/5ml liq, aluminum & magnesium hydroxide-simethicone (Maalox), LIDOcaine viscous 2%, sodium chloride 0.9%, acetaminophen, albuterol-ipratropium, benzonatate, bisacodyL, dextromethorphan-guaiFENesin  mg/5 ml, dextrose 10%, fluticasone propionate, glucagon (human recombinant), glucose, glucose, insulin aspart U-100, midodrine, naloxone, sodium chloride 0.9%    Intake/Output - Last 3 Shifts         05/12 0700  05/13 0659 05/13 0700  05/14 0659 05/14 0700  05/15 0659    P.O. 240 150     I.V. (mL/kg)       IV Piggyback 49.9 197.9     Total Intake(mL/kg) 289.9 (4.6) 347.9 (5.6)     Urine (mL/kg/hr) 1955 (1.3) 670 (0.4)     Other  500     Stool 0      Total Output 1955 1170     Net -1665.1 -822.1            Urine Occurrence 1 x      Stool Occurrence 0 x               Objective:     Vital Signs (Most Recent):  Temp: 97.7 °F (36.5 °C) (05/14/22 0723)  Pulse: 63 (05/14/22 0946)  Resp: (!) 23 (05/14/22 0946)  BP: (!) 104/58 (05/14/22 0946)  SpO2: 96 % (05/14/22 0946)   Vital Signs (24h Range):  Temp:  [97.3 °F (36.3 °C)-97.9 °F (36.6 °C)] 97.7 °F (36.5 °C)  Pulse:  [51-63] 63  Resp:  [15-28] 23  SpO2:  [95 %-97 %] 96 %  BP: ()/(50-74) 104/58     Weight: 62.4 kg (137 lb 9.1 oz)  Height: 5' 8" (172.7 cm)  Body mass index is 20.92 kg/m².    Physical Exam    Physical Exam  Constitutional:       General: He is not in acute distress.     Appearance: He is ill-appearing.   Eyes:      " General: Scleral icterus present.   Cardiovascular:      Rate and Rhythm: Normal rate.   Pulmonary:      Effort: No respiratory distress.      Breath sounds: No wheezing.      Comments: Decreased breath sounds at bases   Abdominal:      General: There is no distension.      Palpations: Abdomen is soft.      Tenderness: There is abdominal tenderness.   Musculoskeletal:      Right lower leg: No edema.      Left lower leg: No edema.   Skin:     Coloration: Skin is jaundiced.   Neurological:      Mental Status: He is oriented to person, place, and time.      Motor: Weakness present.

## 2022-05-14 NOTE — PROGRESS NOTES
05/13/22 2200   Vital Signs   Temp 97.8 °F (36.6 °C)   Pulse (!) 57   Resp 17   /71   MAP (mmHg) 88   Assessments (Pre/Post)   Level of Consciousness (AVPU) alert   Pre-Hemodialysis Assessment   Treatment Status Started   Gross Bleach Negative Yes   Machine Number K18   Alarms Verified Yes   pH 7   Machine Temperature 98.6 °F (37 °C)   Dialyzer F-160   Machine Conductivity 14.4   Meter Conductivity 14.4   Dialysate Na (mEq/L) 140   Dialysate K (mEq/L) 4   Dialysate CA (mEq/L) 2.5   Dialysate HCO3 (mEq/L) 30   Net UF Goal 500   Total UF Goal 500   UF Rate 250   RO # / DI #  39   RO Rejection Within Limits? Yes   During Hemodialysis Assessment   Blood Flow Rate (mL/min) 250 mL/min   Dialysate Flow Rate (mL/min) 500 ml/min   Ultrafiltration Rate (mL/Hr) 250 mL/Hr   Arteriovenous Lines Secure Yes   Arterial Pressure (mmHg) -80 mmHg   Venous Pressure (mmHg) 40   UF Removed (mL) 0 mL   TMP 40   Venous Line in Air Detector Yes   Intra-Hemodialysis Comments Treatment started without difficulty, lines secure and access visible.   Hemodialysis treatment started at bedside, primary nurse notified.

## 2022-05-14 NOTE — SUBJECTIVE & OBJECTIVE
"Interval HPI:   Overnight events: No acute events overnight. Patient on the TSU in room 22861/94119 A. Blood glucose improving. BG at, above, and below goal on current insulin regimen (SSI, prandial, and basal insulin ). Steroid use- Prednisone 40 mg QD. 9 Days Post-Op  Renal function- Abnormal - Creatinine 3.9   Vasopressors-  None       Diet diabetic [facility];  Calorie; Renal; Isolation Tray - Regular [place]     Eatin%  Nausea: No  Hypoglycemia and intervention: No   Fever: No  TPN and/or TF: No    BP (!) 104/58   Pulse 63   Temp 97.7 °F (36.5 °C)   Resp (!) 23   Ht 5' 8" (1.727 m)   Wt 62.4 kg (137 lb 9.1 oz)   SpO2 96%   BMI 20.92 kg/m²     Labs Reviewed and Include    Recent Labs   Lab 22  9549   *   CALCIUM 8.0*   ALBUMIN 2.5*   PROT 5.3*      K 3.9   CO2 24   CL 98   BUN 73*   CREATININE 3.9*   ALKPHOS 82   ALT 9*   AST 71*   BILITOT 8.6*     Lab Results   Component Value Date    WBC 8.26 2022    HGB 8.4 (L) 2022    HCT 24.0 (L) 2022    MCV 85 2022    PLT 59 (L) 2022     No results for input(s): TSH, FREET4 in the last 168 hours.  Lab Results   Component Value Date    HGBA1C 6.0 (H) 05/10/2022       Nutritional status:   Body mass index is 20.92 kg/m².  Lab Results   Component Value Date    ALBUMIN 2.5 (L) 2022    ALBUMIN 2.5 (L) 2022    ALBUMIN 2.7 (L) 2022     No results found for: PREALBUMIN    Estimated Creatinine Clearance: 17.8 mL/min (A) (based on SCr of 3.9 mg/dL (H)).    Accu-Checks  Recent Labs     22  1216 22  1319 22  2318 22  0258 22  0423 22  0839 22  1244 22  1731 22  2107 22  0205   POCTGLUCOSE 145* 130* 70 69* 78 102 111* 141* 156* 142*       Current Medications and/or Treatments Impacting Glycemic Control  Immunotherapy:    Immunosuppressants       None          Steroids:   Hormones (From admission, onward)                Start     Stop " "Route Frequency Ordered    05/17/22 0900  predniSONE tablet 20 mg        "Followed by" Linked Group Details    05/28 0859 Oral Daily 05/09/22 1719 05/12/22 0900  predniSONE tablet 40 mg        "Followed by" Linked Group Details    05/17 0859 Oral Daily 05/09/22 1719          Pressors:    Autonomic Drugs (From admission, onward)                Start     Stop Route Frequency Ordered    05/14/22 0818  midodrine tablet 5 mg         -- Oral Daily PRN 05/14/22 0719          Hyperglycemia/Diabetes Medications:   Antihyperglycemics (From admission, onward)                Start     Stop Route Frequency Ordered    05/10/22 1915  insulin aspart U-100 pen 3-6 Units         -- SubQ 3 times daily after meals 05/10/22 1506    05/09/22 2204  insulin aspart U-100 pen 1-10 Units         -- SubQ Before meals & nightly PRN 05/09/22 2105          "

## 2022-05-14 NOTE — ASSESSMENT & PLAN NOTE
Unclear precipitant. Patient complained of urinary urgency and incomplete voiding on 5/12, he was bladder scanned and had jacinto placed with 1L urine return immediately. Patient continues to produce urine  - Anticipate jacinto removal tomorrow.

## 2022-05-14 NOTE — CARE UPDATE
RAPID RESPONSE NURSE PROACTIVE ROUNDING NOTE       Time of Visit: 1055    Admit Date: 2022  LOS: 14  Code Status: Full Code   Date of Visit: 2022  : 1960  Age: 61 y.o.  Sex: male  Race:   Bed: 08283/06040 A:   MRN: [id number]  Was the patient discharged from an ICU this admission? No   Was the patient discharged from a PACU within last 24 hours? No   Did the patient receive conscious sedation/general anesthesia in last 24 hours? No  Was the patient in the ED within the past 24 hours? No  Was the patient on NIPPV within the past 24 hours? No   Attending Physician: Demar Griffith MD  Primary Service: [facility] MED    Time spent at the bedside: < 15 min    SITUATION    Notified by charge RN via phone call.  Reason for alert: Respiratory distress  Called to evaluate the patient for Respiratory    BACKGROUND     Why is the patient in the hospital?: ILDEFONSO (acute kidney injury)    Patient has a past medical history of BPH (benign prostatic hyperplasia), Diabetes, Gout, Hypertension, Hypothyroidism, Interstitial lung disease, Kidney transplant recipient, and Unspecified cirrhosis of liver.    Last Vitals:  Temp: 97.7 °F (36.5 °C) (723)  Pulse: 63 (946)  Resp: 23 (946)  BP: 104/58 (946)  SpO2: 96 % (946)    24 Hours Vitals Range:  Temp:  [97.3 °F (36.3 °C)-97.9 °F (36.6 °C)]   Pulse:  [51-63]   Resp:  [15-28]   BP: ()/(50-74)   SpO2:  [95 %-97 %]     Labs:  Recent Labs     22  0639 22  0712 22  0439   WBC 8.89 6.15 8.26   HGB 8.7* 7.6* 8.4*   HCT 25.0* 21.3* 24.0*   PLT 84* 63* 59*       Recent Labs     22  0639 22  0712 22  0439   * 136 137   K 3.2* 3.2* 3.9   CL 94* 94* 98   CO2 22* 27 24   CREATININE 5.3* 5.1* 3.9*   * 95 148*   PHOS 6.8*  --   --         No results for input(s): PH, PCO2, PO2, HCO3, POCSATURATED, BE in the last 72 hours.     Called by charge RN for respiratory distress s/p NGT placement.  Patient started complaining of SOB, tachpnea ~40s, and increased WOB. On arrival to bedside NGT already removed, NAD noted, RR ~20, sats >95%.     ASSESSMENT    Physical Exam    INTERVENTIONS    The patient was seen for a Respiratory problem. Staff concerns included new onset of difficulty breathing. The following interventions were performed: supplemental oxygen.    RECOMMENDATIONS    CXR  Monitor respiratory status  Continuous pulse ox    PROVIDER ESCALATION    Yes/No  no    Orders received and case discussed with NA.    Disposition: Remain in room 95902.    FOLLOW-UP    Bedside Margoth ALLEN updated on plan of care. Instructed to call the Rapid Response Nurse, Shelbi Alfred RN at [phone] for additional questions or concerns.

## 2022-05-15 PROBLEM — K56.600 PARTIAL SMALL BOWEL OBSTRUCTION: Status: ACTIVE | Noted: 2022-01-01

## 2022-05-15 NOTE — PROGRESS NOTES
[facility] - Trans[facility] Stepdown  Kidney Transplant  Progress Note      Reason for Follow-up: Reassessment of Kidney Transplant Evaluation - 5/10/2022 recipient and management of immunosuppression.          Subjective:   History of Present Illness:  61yoM with PMHx of renal transplant for hypertensive nephropathy (2004) immunosuppressed on tacrolimus/sirolimus,  therapy, LOPEZ cirrhosis, NIDDM, and ILD who was treated at OSH for ILDEFONSO 2/2 decompensated cirrhosis and worsening portal vein hypertension with HRS.   Pt reported that he had been experiencing abdominal distention with associated diarrhea (4-5/day) over several weeks, along with a constant nagging cough.  He has been having weekly paracentesis in the past month.   Despite efforts to manage symptoms, pt in need of  transplant surgery evaluation of the renal/hepatic injury, resulting in tranfer to [facility].  He reports no recent NSAID use.       Hospital Course:  Pt admitted to hospital medicine for treatment of ILDEFONSO likely secondary to decompensated cirrhosis. CXR significant for prior ILD but no acute process. CT Chest w/o contrast significant for bilateral opacities concerning for pneumonia. Bronchoscopy performed on 5/04/22 for further evaluation of lung pathology seen on CT lung scan. Ascitic fluid aerobic culture positive for E.Faecalis. 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 Currently on posaconazole for blasto Ag/fungitell (awaiting other fungal studies), primaquine and clinda for PJP, amoxicillin for SBP. Being followed by ID, pulm and Hepatology      5/14/22 : Pt had HD X 2 hours for possible uremic encephalopathy on Friday. His BP dropped during HD. Inspite of better BUN and Cr today, he is still slow, in pain. Constant nausea and vomiting. His BP 94/52. I will hold HD today since pt is hypotensive. No sign of volume overload.     5/15/22:  he is almost anuric today. He is still slow but communicate properly . KUB demonstrates partial SBO with stool  throughout colon. Has NGT with 600 output. Plan for HD today with no UF for clearance. BP is stable at this point. Can use albumin and midodrine if needed.     Scheduled Meds:   allopurinoL  100 mg Oral Daily    amoxicillin  500 mg Oral Daily    calcitRIOL  0.25 mcg Oral Daily    clindamycin (CLEOCIN) IVPB  900 mg Intravenous Q8H    insulin aspart U-100  3-6 Units Subcutaneous TID PC    insulin detemir U-100  12 Units Subcutaneous Daily    lactulose  15 g Oral TID    levothyroxine  50 mcg Oral Before breakfast    LIDOcaine  1 patch Transdermal Q24H    LIDOcaine HCl 2%  10 mL Mucous Membrane Q6H    lipase-protease-amylase 12,000-38,000-60,000 units  2 capsule Oral QID (WM & HS)    posaconazole  300 mg Oral Daily    predniSONE  40 mg Oral Daily    Followed by    [START ON 5/17/2022] predniSONE  20 mg Oral Daily    primaquine  52.6 mg Oral Daily     Continuous Infusions:  PRN Meds:(Magic mouthwash) 1:1:1 diphenhydramine(Benadryl) 12.5mg/5ml liq, aluminum & magnesium hydroxide-simethicone (Maalox), LIDOcaine viscous 2%, sodium chloride 0.9%, acetaminophen, albuterol-ipratropium, benzonatate, bisacodyL, dextromethorphan-guaiFENesin  mg/5 ml, dextrose 10%, dextrose 10%, fluticasone propionate, glucagon (human recombinant), glucose, glucose, glucose, insulin aspart U-100, lorazepam, midodrine, naloxone, sodium chloride 0.9%    Intake/Output - Last 3 Shifts         05/13 0700 05/14 0659 05/14 0700  05/15 0659 05/15 0700 05/16 0659    P.O. 150 0     Other   200    IV Piggyback 197.9 198.4     Total Intake(mL/kg) 347.9 (5.6) 198.4 (3.2) 200 (3.2)    Urine (mL/kg/hr) 670 (0.4) 95 (0.1)     Drains  650     Other 500  950    Stool       Total Output 1170 745 950    Net -822.1 -546.6 -750                 Objective:     Vital Signs (Most Recent):  Temp: 98.7 °F (37.1 °C) (05/15/22 0800)  Pulse: 77 (05/15/22 1315)  Resp: 16 (05/15/22 1315)  BP: 110/73 (05/15/22 1345)  SpO2: 99 % (05/15/22 1315) Vital Signs  "(24h Range):  Temp:  [98.5 °F (36.9 °C)-98.7 °F (37.1 °C)] 98.7 °F (37.1 °C)  Pulse:  [71-87] 77  Resp:  [16-25] 16  SpO2:  [96 %-99 %] 99 %  BP: ()/(58-81) 110/73     Weight: 62.4 kg (137 lb 9.1 oz)  Height: 5' 8" (172.7 cm)  Body mass index is 20.92 kg/m².    Physical Exam  Constitutional:       General: He is not in acute distress.     Appearance: He is ill-appearing.   Cardiovascular:      Rate and Rhythm: Normal rate.   Pulmonary:      Effort: No respiratory distress.      Breath sounds: No wheezing.      Comments: Decreased breath sounds at bases   Abdominal:      General: There is no distension.      Palpations: Abdomen is soft.      Tenderness: There is abdominal tenderness.   Musculoskeletal:      Right lower leg: No edema.      Left lower leg: No edema.   Skin:     Coloration: Skin is jaundiced.   Neurological:      Mental Status: He is oriented to person, place, and time.      Motor: Weakness present.     Assessment/Plan:     * ILDEFONSO (acute kidney injury)  His renal functions were normal beginning of Feb.   Now this is likely ATN in setting of multiple ongoing infections.  Which led to CKD stage III, which has progressed to stage IV over the course of last few weeks    Monitor I/Os   US renal transplant unremarkable  Started on  hemodialysis on Friday night.   HD today as BP is more stable.       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  Decompensated LOPEZ         Personal history of immunosupression therapy  On sirolimus and prograf at home   Hold at this time due to high level in the setting of multiple infection  Will need to be monitored carfeully- noted to be started on posaconazole on 5/5      Transplanted kidney  S/p kidney transplant living donor " closer to 20 years in [place].   Reports no episodes of rejections or complications post surgery   ESRD - related to HTN. IgA newphropathy          Fátima Huang MD  Kidney Transplant  [facility] - Transplant Stepdown

## 2022-05-15 NOTE — PROGRESS NOTES
[facility]ach[facility] Cuevas - [facility] Medicine  Progress Note    Patient Name: Edgar Jackson  MRN: [id number]  Patient Class: IP- Inpatient   Admission Date: 4/30/2022  Length of Stay: 15 days  Attending Physician: Demar Griffith MD  Primary Care Provider: Tyler Castillo MD        Subjective:     Principal Problem:ILDEFONSO (acute kidney injury)        HPI:  Mr Jackson is a 61yoM with PMHx of renal transplant for hypertensive nephropathy (2004) immunosuppressed on tacrolimus/sirolimus,  therapy, LOPEZ cirrhosis, NIDDM, and ILD who was treated at OSH for ILDEFONSO 2/2 decompensated cirrhosis and worsening portal vein hypertension with HRS. Pt reported that he had been experiencing abdominal distention with associated diarrhea (4-5/day) over several weeks, along with a constant nagging cough. On presenting to OSH, multiple paracentesis (3) were performed to relieve the ascitic fluid. No evidence of SBP on fluid analysis. Creon was added to diet to aide in food digestion in setting of cirrhosis. Loperamide provided symptomatic treatment of diarrhea. Dextromethorphan-guaifenesin syrup added to reduce cough exacerbations. Despite efforts to manage symptoms, pt in need of  transplant surgery evaluation of the renal/hepatic injury, [place] [facility] [place]     On arrival to [facility] (4/30/2022), pt has experienced increased generalized weakness. Loperamide was continued for diarrhea management. Abdominal distention recurred, despite prior paracentesis. Cough has been fairly constant and predominantly dry. Pt now endorses new left lower quadrant abdominal pain, worsened by cough. KTM and Hepatology consults placed for management of HRS.      Overview/Hospital Course:  Pt admitted to hospital medicine for treatment of ILDEFONSO likely secondary to decompensated cirrhosis. Consulted Hepatology and KTM, appreciate recs. Performed diagnostic paracentesis to verify nature of ascitic fluid and to rule out SBP. Pt had a fever of 100.7F per arm pit and  100.5F orally. CXR significant for prior ILD but no acute process. Blood cultures collected and Vanc/Cefepime started. ID consulted, appreciate recs. Diagnostic paracentesis performed with results indicative of portal hypertension. Fluid cultures are negative to date. CT Chest w/o contrast significant for bilateral opacities concerning for pneumonia. Pulmonology consulted, appreciate recs. Bronchoscopy performed on 5/04/22 for further evaluation of lung pathology seen on CT lung scan. Ascitic fluid aerobic culture positive for E.Faecalis on amoxicillin. 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 Furthermore, renal function has not improved and patient currently with recommendation for HD per nephro. First session w/ UF happened 5/13. Worsening ileus as of 5/14.      Interval History: Patient had some shortness of breath and abdominal pain overnight. He never received the brown bomb enema overnight. WBC elevated this AM, no focal signs of infection and lactate downtrending. Patient this AM more calm and was not short of breath. He tried to have a BM on the bedside commode but without any success.     Review of Systems   Constitutional:  Negative for chills and fever.   HENT:  Negative for congestion and sore throat.    Eyes:  Negative for photophobia and visual disturbance.   Respiratory:  Negative for cough and shortness of breath.    Cardiovascular:  Negative for chest pain and palpitations.   Gastrointestinal:  Positive for abdominal pain, nausea and vomiting. Negative for blood in stool, constipation and diarrhea.   Endocrine: Negative for cold intolerance and heat intolerance.   Genitourinary:  Negative for dysuria and hematuria.   Musculoskeletal:  Negative for arthralgias and myalgias.   Skin:  Negative for rash and wound.   Allergic/Immunologic: Negative for environmental allergies and food allergies.   Neurological:  Negative for seizures and numbness.   Hematological:  Negative for adenopathy. Does not  bruise/bleed easily.   Psychiatric/Behavioral:  Positive for decreased concentration and sleep disturbance. Negative for hallucinations and suicidal ideas.    Objective:     Vital Signs (Most Recent):  Temp: 98.5 °F (36.9 °C) (05/14/22 2000)  Pulse: 82 (05/15/22 1145)  Resp: (!) 21 (05/15/22 0400)  BP: 97/69 (05/15/22 1300)  SpO2: 97 % (05/15/22 0400) Vital Signs (24h Range):  Temp:  [98.5 °F (36.9 °C)] 98.5 °F (36.9 °C)  Pulse:  [71-87] 82  Resp:  [17-25] 21  SpO2:  [96 %-97 %] 97 %  BP: ()/(58-81) 97/69     Weight: 62.4 kg (137 lb 9.1 oz)  Body mass index is 20.92 kg/m².    Intake/Output Summary (Last 24 hours) at 5/15/2022 1307  Last data filed at 5/15/2022 0549  Gross per 24 hour   Intake 198.38 ml   Output 745 ml   Net -546.62 ml        Physical Exam  Constitutional:       Appearance: He is well-developed. He is ill-appearing and toxic-appearing.   HENT:      Head: Normocephalic and atraumatic.   Eyes:      General: Scleral icterus present.      Conjunctiva/sclera: Conjunctivae normal.      Pupils: Pupils are equal, round, and reactive to light.   Neck:      Thyroid: No thyromegaly.      Vascular: No JVD.   Cardiovascular:      Rate and Rhythm: Normal rate and regular rhythm.      Heart sounds: Normal heart sounds. No murmur heard.    No friction rub. No gallop.   Pulmonary:      Effort: Pulmonary effort is normal. No respiratory distress.      Breath sounds: Normal breath sounds. No wheezing or rales.   Abdominal:      General: Abdomen is flat. Bowel sounds are decreased. There is no distension.      Palpations: Abdomen is soft. There is mass (Palpable stool RUQ).      Tenderness: There is abdominal tenderness. There is no guarding or rebound.      Hernia: No hernia is present.   Musculoskeletal:         General: No deformity. Normal range of motion.      Cervical back: Normal range of motion and neck supple.   Skin:     General: Skin is warm and dry.   Neurological:      Mental Status: He is alert.       Cranial Nerves: No cranial nerve deficit.   Psychiatric:         Attention and Perception: He is inattentive.         Behavior: Behavior normal.       Significant Labs: All pertinent labs within the past 24 hours have been reviewed.  CBC:   Recent Labs   Lab 05/14/22  0439 05/14/22  2341 05/15/22  0453   WBC 8.26 16.33* 15.02*   HGB 8.4* 8.0* 7.6*   HCT 24.0* 23.6* 23.1*   PLT 59* 68* 54*       CMP:   Recent Labs   Lab 05/14/22  0439 05/14/22  2341 05/15/22  0453    138 139   K 3.9 4.3 4.1   CL 98 98 98   CO2 24 23 25   * 224* 230*   BUN 73* 88* 95*   CREATININE 3.9* 4.7* 5.6*   CALCIUM 8.0* 7.6* 7.8*   PROT 5.3*  --  4.8*   ALBUMIN 2.5*  --  2.4*   BILITOT 8.6*  --  9.7*   ALKPHOS 82  --  77   AST 71*  --  62*   ALT 9*  --  10   ANIONGAP 15 17* 16   EGFRNONAA 15.7* 12.5* 10.1*         Significant Imaging: I have reviewed all pertinent imaging results/findings within the past 24 hours.      Assessment/Plan:      * ILDEFONSO (acute kidney injury)  Patient with acute kidney injury likely d/t IVVD/Dehydration Which is currently stable. Labs reviewed- Renal function/electrolytes with Estimated Creatinine Clearance: 12.4 mL/min (A) (based on SCr of 5.6 mg/dL (H)). according to latest data. Monitor urine output and serial BMP and adjust therapy as needed. Avoid nephrotoxins and renally dose meds for GFR listed above. Ultrasound transplant kidney no acute changes. Possibly due to tacrolimus versus hepatorenal syndrome, Nephrology has been consulted. Toprolol continued.  Bicarb ordered for low serum levels. Cr/GFR to be trended for 6 weeks for evaluation.  - KTM following.   - hold sirolimus and tacrolimus  - trend daily BMP/RFP  - Had UF w/ HD 5/13 without fluid removal. Continues to appear mentally slowed in thought process and uncomfortable.        Partial small bowel obstruction  In the setting of having received imodium for diarrhea that occurred earlier in his inpatient stay. Imodium discontinued 5/12, but  patient with minimal stools since. KUB demonstrating early/partial small bowel obstruction vs ileus. CT Abd showing transition point in LUQ. No overt distension/abdominal pain at this time.   - NGT inserted, patient on LIWS  - Brown bomb enema unfortunately did not get placed yesterday, will hopefully happen today  - Bisacodyl suppository ordered prn  - Will consult general surgery      Urinary retention  Unclear precipitant. Patient complained of urinary urgency and incomplete voiding on 5/12, he was bladder scanned and had jacinto placed with 1L urine return immediately. Patient continues to produce urine  - Anticipate jacinto removal once partial SBO subsides.      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  - ID consulted  -12 months of posaconazole, started 5/4/2022.  - Monthly blastomyces urine and serum Ag.  - Secondary prophylaxis after transplant routinely not recommended. However if patient were to be transplanted, needs ID consult at that time for updated recommendations.      Schistosoma  Equivocal schistosoma serologies.  Will treat empirically.     Recommendations:  -Praziquantel 20mg/kg q6h x3 doses      Pneumocystis jiroveci pneumonia  CT with opacifications, reverse halo sign, found to have PJP.     Recommendations:  -Clindamycin and Primaquine for 21 days  -Steroid taper starting at prednisone 40mg BID days 1-5, then 40mg daily days 6-10, then 20mg daily days 11-21      Lung infiltrate on CT  Pt with Hx of chronic ILD with bilateral areas of lung scarring. Regions appear to be slightly larger in size when compared to previous imaging. New findings of bilateral infiltrates noted on recent CT Chest w/o contrast concerning for pneumonia. Acute fever reported over the past two evenings. Pt started on broad spectrum abx with septic workup performed. Respiratory viral panel negative. Pulmonology consulted, appreciate recs. Bronchoscopy to be performed. (See associated  problem ILD)  - f/u bronchoscopy cx negative    Type 2 diabetes mellitus without complication, without long-term current use of insulin  Endocrine following  - NPO currently due to partial SBO/nausea  - Levemir 12u daily  - aspart 3-6u w/ meals      Chronic cough  PMHx of interstitial lung disease (lung scarring). benzonotate tablet 200mg TID PRN. For persistent cough add dextromethorphan-guaiefesin syrup    Fever in immunocomprised patient  Patient received paracentesis on 05/01, negative for SBP.  Spiked fever afterwards, 100.7F. Septic workup started, broad spectrum abx coverage with Vanc/Cefepime. Chest x-ray showed consolidation at the middle lobe of the left lung. CT chest w/o contrast with similar findings, concerning for pneumonia.  Lactic acid wnl and no leukocytosis.ID consult placed given comorbidity and immunocompromised status. Pulmonology consulted, given chronic ILD findings with new bilateral opacities. Bronchoscopy to be performed. Bcx and Fcx NGTD. Respiratory viral panel negative.  - Currently patient being treated for blastomycosis given urine ag, PJP given lung imaging findings, SBP with amoxicillin given his ascites culture, and lastly received schistosoma treatment.       Thrombocytopenia  Thrombocytopenia likely in setting of hepatic cirrhosis. Hold anticoagulation and DVT prophylaxis for thrombocytopenia.      Hypothyroidism  Continued on home medication: synthroid 50mcg qd      Hyponatremia  Stable. Urine sodium 29, urine osm 297. DDX low effective arterial volume vs SIADH.   - KTM following.   - Cont monitoring.         Cirrhosis of liver with ascites  MELD-Na score: 35 at 5/15/2022  4:53 AM  MELD score: 35 at 5/15/2022  4:53 AM  Calculated from:  Serum Creatinine: 5.6 mg/dL (Using max of 4 mg/dL) at 5/15/2022  4:53 AM  Serum Sodium: 139 mmol/L (Using max of 137 mmol/L) at 5/15/2022  4:53 AM  Total Bilirubin: 9.7 mg/dL at 5/15/2022  4:53 AM  INR(ratio): 1.8 at 5/15/2022  4:53 AM  Age: 61  years  Patient recently diagnosed with cirrhosis 2/2 to LOPEZ. Pulmonary hypertension now present with with multiple episodes of paracentesis. Largest abdominal fluid reduction just over 5L. Diagnostic paracentesis performed at [facility] [place] with enterococcus faecalis on culture.   - Hepatology and KTM coordinating for possible transplant.   - Patient denied listing for transplant given concern for ILD, pulm consulted for any further recs and are recommending outpatient PFTs once acute pulmonary insult (PJP PNA) treated.  - Per transplant anesthesia, they are requesting additional PET stress imaging as he did not achieve maximal HR on his DSE. PET Stress scheduled for tomorrow, 5/16.     Interstitial lung disease  CXR performed on admission, with serial imaging reviewed. Continued scheduled Benzonate daily. Flonase added daily PRN with DuoNebs q6h PRN. Supplemental oxygen PRN. Continuous pulse oxymetry ordered. SpO2 maintained 88-93% by adjusting O2 flow. CT chest w/o contrast significant for bilateral infiltrates, concerning for pneumonia. Pulmonology consulted, appreciate recs. Pt to undergo Bronchoscopy 5/04/22.  - F/u pulm outpatient.       Gout  Continue allopurinol for prophylaxis.       Personal history of immunosupression therapy  Pt taking immunosuppressive agents (sacrolimus and tacrolimus) 2/2 to renal transplant in 2004.   - Meds currently held  - KTM following    Transplanted kidney  Renal transplant performed in 2004. Pt continued on sirolimus and tacrolimus for immuno suppression. KTM consulted on admission, appreciate recs. Trough levels ordered with both medications.currently held.    - trend renal funtion tests   - hold tacrolimus/sirolimus as directed by KTM      VTE Risk Mitigation (From admission, onward)         Ordered     IP VTE HIGH RISK PATIENT  Once         04/30/22 1810     Place sequential compression device  Until discontinued         04/30/22 1810                Discharge Planning   LE:  5/16/2022     Code Status: Full Code   Is the patient medically ready for discharge?: No    Reason for patient still in hospital (select all that apply): Patient trending condition  Discharge Plan A: Home Health                  Demar Griffith MD  Department of Hospital Medicine   [facility] - Transplant Stepdown

## 2022-05-15 NOTE — CARE UPDATE
"RAPID RESPONSE NURSE AI ALERT       AI alert received.    Chart Reviewed: 05/15/2022, 2:35 AM    MRN: [id number]  Bed: 50648/20296 A    Dx: ILDEFONSO (acute kidney injury)    Edgar Jackson has a past medical history of BPH (benign prostatic hyperplasia), Diabetes, Gout, Hypertension, Hypothyroidism, Interstitial lung disease, Kidney transplant recipient, and Unspecified cirrhosis of liver.    Last VS: /61   Pulse 72   Temp 98.5 °F (36.9 °C) (Oral)   Resp (!) 25   Ht 5' 8" (1.727 m)   Wt 62.4 kg (137 lb 9.1 oz)   SpO2 97%   BMI 20.92 kg/m²     24H Vital Sign Range:  Temp:  [97.7 °F (36.5 °C)-98.5 °F (36.9 °C)]   Pulse:  [60-87]   Resp:  [16-25]   BP: ()/(58-72)   SpO2:  [95 %-97 %]     Level of Consciousness (AVPU): responds to voice    Recent Labs     05/13/22  0712 05/14/22  0439 05/14/22  2341   WBC 6.15 8.26 16.33*   HGB 7.6* 8.4* 8.0*   HCT 21.3* 24.0* 23.6*   PLT 63* 59* 68*       Recent Labs     05/12/22  0639 05/13/22  0712 05/14/22  0439 05/14/22  2341   * 136 137 138   K 3.2* 3.2* 3.9 4.3   CL 94* 94* 98 98   CO2 22* 27 24 23   CREATININE 5.3* 5.1* 3.9* 4.7*   * 95 148* 224*   PHOS 6.8*  --   --   --         Recent Labs     05/14/22  2345   PH 7.535*   PCO2 34.8*   PO2 69*   HCO3 29.4*   POCSATURATED 96   BE 7        OXYGEN:  Flow (L/min): 3     O2 Device (Oxygen Therapy): nasal cannula    MEWS score: 3    Charge RNRegina contacted. No concerns verbalized at this time. Instructed to call [phone] for further concerns or assistance.    Radha Rios RN      "

## 2022-05-15 NOTE — CARE UPDATE
Called by nursing that patient appearing lethargic, but concern that he is more tachypneic particularly with movement.     Review of chart shows - patient has developed Ileus vs Partial SBO - has an NG tube to intermittent low wall suction.     Stat labs including ABG ordered this evening.     RN informed pt was going for CT that was ordered earlier today - non-contrast study,  No oral contrast given.   Radiology report is pending     Bedside exam - patient is awake, alert, appears tired/listless.  Has right sided abdominal tenderness, with more firm abdomen on that side, overall a soft abdomen with TTP in RUQ/RLQ  NO bowel sounds auscultated.     Patient having dark almost black liquid output from NG - however very small amout of gastric output since 1800 shift change.     Pt upped to 4L NC - based on ABG.     Plan  -reordered brown bomb enema   -continue NG to low intermittent suction  -If no BM or symptom improvement in AM - would consult Gen surgery to eval if Rectal tube decompression vs Gastrografin enema might help patient.   -WBC is increased, no fevers and lactic acid stable - given he's being treated for multiple infections, if WBC is rising or fever develops, go back to broad spectrum Abx and involve transplant ID as appropriate.         Lab Results   Component Value Date    WBC 16.33 (H) 05/14/2022    HGB 8.0 (L) 05/14/2022    HCT 23.6 (L) 05/14/2022    MCV 87 05/14/2022    PLT 68 (L) 05/14/2022       CMP  Sodium   Date Value Ref Range Status   05/14/2022 138 136 - 145 mmol/L Final     Potassium   Date Value Ref Range Status   05/14/2022 4.3 3.5 - 5.1 mmol/L Final     Chloride   Date Value Ref Range Status   05/14/2022 98 95 - 110 mmol/L Final     CO2   Date Value Ref Range Status   05/14/2022 23 23 - 29 mmol/L Final     Glucose   Date Value Ref Range Status   05/14/2022 224 (H) 70 - 110 mg/dL Final     BUN   Date Value Ref Range Status   05/14/2022 88 (H) 6 - 20 mg/dL Final     Creatinine   Date Value  Ref Range Status   05/14/2022 4.7 (H) 0.5 - 1.4 mg/dL Final     Calcium   Date Value Ref Range Status   05/14/2022 7.6 (L) 8.7 - 10.5 mg/dL Final     Total Protein   Date Value Ref Range Status   05/14/2022 5.3 (L) 6.0 - 8.4 g/dL Final     Albumin   Date Value Ref Range Status   05/14/2022 2.5 (L) 3.5 - 5.2 g/dL Final     Total Bilirubin   Date Value Ref Range Status   05/14/2022 8.6 (H) 0.1 - 1.0 mg/dL Final     Comment:     For infants and newborns, interpretation of results should be based  on gestational age, weight and in agreement with clinical  observations.    Premature Infant recommended reference ranges:  Up to 24 hours.............<8.0 mg/dL  Up to 48 hours............<12.0 mg/dL  3-5 days..................<15.0 mg/dL  6-29 days.................<15.0 mg/dL       Alkaline Phosphatase   Date Value Ref Range Status   05/14/2022 82 55 - 135 U/L Final     AST   Date Value Ref Range Status   05/14/2022 71 (H) 10 - 40 U/L Final     ALT   Date Value Ref Range Status   05/14/2022 9 (L) 10 - 44 U/L Final     Anion Gap   Date Value Ref Range Status   05/14/2022 17 (H) 8 - 16 mmol/L Final     eGFR if    Date Value Ref Range Status   05/14/2022 14.5 (A) >60 mL/min/1.73 m^2 Final     eGFR if non    Date Value Ref Range Status   05/14/2022 12.5 (A) >60 mL/min/1.73 m^2 Final     Comment:     Calculation used to obtain the estimated glomerular filtration  rate (eGFR) is the CKD-EPI equation.        ABG  Recent Labs   Lab 05/14/22  7575   PH 7.535*   PO2 69*   PCO2 34.8*   HCO3 29.4*   BE 7

## 2022-05-15 NOTE — ASSESSMENT & PLAN NOTE
MELD-Na score: 35 at 5/15/2022  4:53 AM  MELD score: 35 at 5/15/2022  4:53 AM  Calculated from:  Serum Creatinine: 5.6 mg/dL (Using max of 4 mg/dL) at 5/15/2022  4:53 AM  Serum Sodium: 139 mmol/L (Using max of 137 mmol/L) at 5/15/2022  4:53 AM  Total Bilirubin: 9.7 mg/dL at 5/15/2022  4:53 AM  INR(ratio): 1.8 at 5/15/2022  4:53 AM  Age: 61 years  Patient recently diagnosed with cirrhosis 2/2 to LOPEZ. Pulmonary hypertension now present with with multiple episodes of paracentesis. Largest abdominal fluid reduction just over 5L. Diagnostic paracentesis performed at [facility] with enterococcus faecalis on culture.   - Hepatology and KTM coordinating for possible transplant.   - Patient denied listing for transplant given concern for ILD, pulm consulted for any further recs and are recommending outpatient PFTs once acute pulmonary insult (PJP PNA) treated.  - Per transplant anesthesia, they are requesting additional PET stress imaging as he did not achieve maximal HR on his DSE. PET Stress scheduled for tomorrow, 5/16.

## 2022-05-15 NOTE — AI DETERIORATION ALERT
"RAPID RESPONSE NURSE AI ALERT       AI alert received.    Chart Reviewed: 05/15/2022, 1:02 AM    MRN: [id number]  Bed: 45776/82770 A    Dx: ILDEFONSO (acute kidney injury)    Edgar Jackson has a past medical history of BPH (benign prostatic hyperplasia), Diabetes, Gout, Hypertension, Hypothyroidism, Interstitial lung disease, Kidney transplant recipient, and Unspecified cirrhosis of liver.    Last VS: BP (!) 97/58   Pulse 71   Temp 98.5 °F (36.9 °C) (Oral)   Resp 20   Ht 5' 8" (1.727 m)   Wt 62.4 kg (137 lb 9.1 oz)   SpO2 97%   BMI 20.92 kg/m²     24H Vital Sign Range:  Temp:  [97.7 °F (36.5 °C)-98.5 °F (36.9 °C)]   Pulse:  [60-87]   Resp:  [16-23]   BP: ()/(58-72)   SpO2:  [95 %-97 %]     Level of Consciousness (AVPU): responds to voice    Recent Labs     05/13/22  0712 05/14/22  0439 05/14/22  2341   WBC 6.15 8.26 16.33*   HGB 7.6* 8.4* 8.0*   HCT 21.3* 24.0* 23.6*   PLT 63* 59* 68*       Recent Labs     05/12/22  0639 05/13/22  0712 05/14/22  0439 05/14/22  2341   * 136 137 138   K 3.2* 3.2* 3.9 4.3   CL 94* 94* 98 98   CO2 22* 27 24 23   CREATININE 5.3* 5.1* 3.9* 4.7*   * 95 148* 224*   PHOS 6.8*  --   --   --         Recent Labs     05/14/22  2345   PH 7.535*   PCO2 34.8*   PO2 69*   HCO3 29.4*   POCSATURATED 96   BE 7        OXYGEN:  Flow (L/min): 3     O2 Device (Oxygen Therapy): nasal cannula    MEWS score: 3    Charge RNRegina contacted. No concerns verbalized at this time. Instructed to call [phone] for further concerns or assistance.    Radha Rios RN      "

## 2022-05-15 NOTE — ASSESSMENT & PLAN NOTE
Endocrine following  - NPO currently due to partial SBO/nausea  - Levemir 12u daily  - aspart 3-6u w/ meals

## 2022-05-15 NOTE — SUBJECTIVE & OBJECTIVE
Interval History: Patient had some shortness of breath and abdominal pain overnight. He never received the brown bomb enema overnight. WBC elevated this AM, no focal signs of infection and lactate downtrending. Patient this AM more calm and was not short of breath. He tried to have a BM on the bedside commode but without any success.     Review of Systems   Constitutional:  Negative for chills and fever.   HENT:  Negative for congestion and sore throat.    Eyes:  Negative for photophobia and visual disturbance.   Respiratory:  Negative for cough and shortness of breath.    Cardiovascular:  Negative for chest pain and palpitations.   Gastrointestinal:  Positive for abdominal pain, nausea and vomiting. Negative for blood in stool, constipation and diarrhea.   Endocrine: Negative for cold intolerance and heat intolerance.   Genitourinary:  Negative for dysuria and hematuria.   Musculoskeletal:  Negative for arthralgias and myalgias.   Skin:  Negative for rash and wound.   Allergic/Immunologic: Negative for environmental allergies and food allergies.   Neurological:  Negative for seizures and numbness.   Hematological:  Negative for adenopathy. Does not bruise/bleed easily.   Psychiatric/Behavioral:  Positive for decreased concentration and sleep disturbance. Negative for hallucinations and suicidal ideas.    Objective:     Vital Signs (Most Recent):  Temp: 98.5 °F (36.9 °C) (05/14/22 2000)  Pulse: 82 (05/15/22 1145)  Resp: (!) 21 (05/15/22 0400)  BP: 97/69 (05/15/22 1300)  SpO2: 97 % (05/15/22 0400) Vital Signs (24h Range):  Temp:  [98.5 °F (36.9 °C)] 98.5 °F (36.9 °C)  Pulse:  [71-87] 82  Resp:  [17-25] 21  SpO2:  [96 %-97 %] 97 %  BP: ()/(58-81) 97/69     Weight: 62.4 kg (137 lb 9.1 oz)  Body mass index is 20.92 kg/m².    Intake/Output Summary (Last 24 hours) at 5/15/2022 1307  Last data filed at 5/15/2022 0549  Gross per 24 hour   Intake 198.38 ml   Output 745 ml   Net -546.62 ml        Physical  Exam  Constitutional:       Appearance: He is well-developed. He is ill-appearing and toxic-appearing.   HENT:      Head: Normocephalic and atraumatic.   Eyes:      General: Scleral icterus present.      Conjunctiva/sclera: Conjunctivae normal.      Pupils: Pupils are equal, round, and reactive to light.   Neck:      Thyroid: No thyromegaly.      Vascular: No JVD.   Cardiovascular:      Rate and Rhythm: Normal rate and regular rhythm.      Heart sounds: Normal heart sounds. No murmur heard.    No friction rub. No gallop.   Pulmonary:      Effort: Pulmonary effort is normal. No respiratory distress.      Breath sounds: Normal breath sounds. No wheezing or rales.   Abdominal:      General: Abdomen is flat. Bowel sounds are decreased. There is no distension.      Palpations: Abdomen is soft. There is mass (Palpable stool RUQ).      Tenderness: There is abdominal tenderness. There is no guarding or rebound.      Hernia: No hernia is present.   Musculoskeletal:         General: No deformity. Normal range of motion.      Cervical back: Normal range of motion and neck supple.   Skin:     General: Skin is warm and dry.   Neurological:      Mental Status: He is alert.      Cranial Nerves: No cranial nerve deficit.   Psychiatric:         Attention and Perception: He is inattentive.         Behavior: Behavior normal.       Significant Labs: All pertinent labs within the past 24 hours have been reviewed.  CBC:   Recent Labs   Lab 05/14/22  0439 05/14/22  2341 05/15/22  0453   WBC 8.26 16.33* 15.02*   HGB 8.4* 8.0* 7.6*   HCT 24.0* 23.6* 23.1*   PLT 59* 68* 54*       CMP:   Recent Labs   Lab 05/14/22  0439 05/14/22  2341 05/15/22  0453    138 139   K 3.9 4.3 4.1   CL 98 98 98   CO2 24 23 25   * 224* 230*   BUN 73* 88* 95*   CREATININE 3.9* 4.7* 5.6*   CALCIUM 8.0* 7.6* 7.8*   PROT 5.3*  --  4.8*   ALBUMIN 2.5*  --  2.4*   BILITOT 8.6*  --  9.7*   ALKPHOS 82  --  77   AST 71*  --  62*   ALT 9*  --  10   ANIONGAP 15  17* 16   EGFRNONAA 15.7* 12.5* 10.1*         Significant Imaging: I have reviewed all pertinent imaging results/findings within the past 24 hours.

## 2022-05-15 NOTE — PROGRESS NOTES
Dialysis completed. Right IJ trialysis catheter flushed with normal saline, capped and taped. Patient dialyzed for 2 hours. Fluid removal of 500 ml , after Midodrine and Albumin given for blood pressure support. Report given to Angel Mares.

## 2022-05-15 NOTE — NURSING
Brown bomb given this morning, as it wasn't given overnight  Pt did have small liquid BM and sat on BSC as long as he could tolerate  Pt states that he is passing gas, no c/o abdominal pain or discomfort  Abdomen soft, non tender and not distended  Pt more relaxed and calm today  Did have an episode of tachypnea during brown bomb administration- he was redirected and encouraged to slow his breathing and relax  Pt tolerating medications being put through NGT- meds given and NGT clamped for 30 minutes following administration  No N/V or abdominal discomfort while clamped  Pt able to take small sips of water and tolerates a few ice chips  Primary team has consulted General Sx  Brown bomb enema may be repeated   WCTM

## 2022-05-15 NOTE — ASSESSMENT & PLAN NOTE
Unclear precipitant. Patient complained of urinary urgency and incomplete voiding on 5/12, he was bladder scanned and had jacinto placed with 1L urine return immediately. Patient continues to produce urine  - Anticipate jacinto removal once partial SBO subsides.

## 2022-05-15 NOTE — PT/OT/SLP PROGRESS
Physical Therapy      Patient Name:  Edgar Jackson   MRN:  [id number]    Patient not seen today secondary to  (pt on hold per RN due to being very lethargic and needing to get enema.). Will follow-up at a later date.    5/15/2022  .

## 2022-05-15 NOTE — ASSESSMENT & PLAN NOTE
Patient with acute kidney injury likely d/t IVVD/Dehydration Which is currently stable. Labs reviewed- Renal function/electrolytes with Estimated Creatinine Clearance: 12.4 mL/min (A) (based on SCr of 5.6 mg/dL (H)). according to latest data. Monitor urine output and serial BMP and adjust therapy as needed. Avoid nephrotoxins and renally dose meds for GFR listed above. Ultrasound transplant kidney no acute changes. Possibly due to tacrolimus versus hepatorenal syndrome, Nephrology has been consulted. Toprolol continued.  Bicarb ordered for low serum levels. Cr/GFR to be trended for 6 weeks for evaluation.  - KTM following.   - hold sirolimus and tacrolimus  - trend daily BMP/RFP  - Had UF w/ HD 5/13 without fluid removal. Continues to appear mentally slowed in thought process and uncomfortable.

## 2022-05-15 NOTE — SUBJECTIVE & OBJECTIVE
"Interval HPI:   Overnight events: No acute events overnight. Patient on the TSU in room 35594/70426 A. Blood glucose worsening. BG at, above, and below goal on current insulin regimen (SSI, prandial, and basal insulin ). Steroid use- Prednisone 40 mg QD. 10 Days Post-Op  Renal function- Abnormal - Creatinine 5.6   Vasopressors-  None       Diet NPO     Eating:   NPO  Nausea: No  Hypoglycemia and intervention: No   Fever: No  TPN and/or TF: No    /67   Pulse 72   Temp 98.5 °F (36.9 °C) (Oral)   Resp (!) 21   Ht 5' 8" (1.727 m)   Wt 62.4 kg (137 lb 9.1 oz)   SpO2 97%   BMI 20.92 kg/m²     Labs Reviewed and Include    Recent Labs   Lab 05/15/22  0453   *   CALCIUM 7.8*   ALBUMIN 2.4*   PROT 4.8*      K 4.1   CO2 25   CL 98   BUN 95*   CREATININE 5.6*   ALKPHOS 77   ALT 10   AST 62*   BILITOT 9.7*     Lab Results   Component Value Date    WBC 15.02 (H) 05/15/2022    HGB 7.6 (L) 05/15/2022    HCT 23.1 (L) 05/15/2022    MCV 88 05/15/2022    PLT 54 (L) 05/15/2022     No results for input(s): TSH, FREET4 in the last 168 hours.  Lab Results   Component Value Date    HGBA1C 6.0 (H) 05/10/2022       Nutritional status:   Body mass index is 20.92 kg/m².  Lab Results   Component Value Date    ALBUMIN 2.4 (L) 05/15/2022    ALBUMIN 2.5 (L) 05/14/2022    ALBUMIN 2.5 (L) 05/13/2022     No results found for: PREALBUMIN    Estimated Creatinine Clearance: 12.4 mL/min (A) (based on SCr of 5.6 mg/dL (H)).    Accu-Checks  Recent Labs     05/13/22  0839 05/13/22  1244 05/13/22  1731 05/13/22  2107 05/14/22  0205 05/14/22  1148 05/14/22  1811 05/14/22  2145 05/15/22  0205 05/15/22  0914   POCTGLUCOSE 102 111* 141* 156* 142* 202* 217* 261* 234* 233*       Current Medications and/or Treatments Impacting Glycemic Control  Immunotherapy:    Immunosuppressants       None          Steroids:   Hormones (From admission, onward)                Start     Stop Route Frequency Ordered    05/17/22 0900  predniSONE tablet 20 mg  " "      "Followed by" Linked Group Details    05/28 0859 Oral Daily 05/09/22 1719 05/12/22 0900  predniSONE tablet 40 mg        "Followed by" Linked Group Details    05/17 0859 Oral Daily 05/09/22 1719          Pressors:    Autonomic Drugs (From admission, onward)                Start     Stop Route Frequency Ordered    05/14/22 0818  midodrine tablet 5 mg         -- Oral Daily PRN 05/14/22 0719          Hyperglycemia/Diabetes Medications:   Antihyperglycemics (From admission, onward)                Start     Stop Route Frequency Ordered    05/15/22 1115  insulin detemir U-100 pen 12 Units         -- SubQ Daily 05/15/22 1001    05/10/22 1915  insulin aspart U-100 pen 3-6 Units         -- SubQ 3 times daily after meals 05/10/22 1506    05/09/22 2204  insulin aspart U-100 pen 1-10 Units         -- SubQ Before meals & nightly PRN 05/09/22 2105          "

## 2022-05-15 NOTE — ASSESSMENT & PLAN NOTE
Endocrinology consulted for BG management.   BG goal 140-180    - Levemir 12 units daily.   - Novolog (aspart) insulin 3-6 Units SQ TIDWM and prn for BG excursions LDC SSI (150/25)   - BG checks AC/HS  - Hypoglycemia protocol in place  - If blood glucose greater than 300, please ask patient not to eat food or drink anything other than water until correctional insulin has brought it back below 250    ** Please notify Endocrine for any change and/or advance in diet**  ** Please call Endocrine for any BG related issues **    Discharge Planning:   TBD. Please notify endocrinology prior to discharge.

## 2022-05-15 NOTE — AI DETERIORATION ALERT
"RAPID RESPONSE NURSE AI ALERT       AI alert received.    Chart Reviewed: 05/15/2022, 11:49 AM    MRN: [id number]  Bed: 15488/62201 A    Dx: ILDEFONSO (acute kidney injury)    Edgar Jackson has a past medical history of BPH (benign prostatic hyperplasia), Diabetes, Gout, Hypertension, Hypothyroidism, Interstitial lung disease, Kidney transplant recipient, and Unspecified cirrhosis of liver.    Last VS: /76   Pulse 72   Temp 98.5 °F (36.9 °C) (Oral)   Resp (!) 21   Ht 5' 8" (1.727 m)   Wt 62.4 kg (137 lb 9.1 oz)   SpO2 97%   BMI 20.92 kg/m²     24H Vital Sign Range:  Temp:  [98.5 °F (36.9 °C)]   Pulse:  [71-87]   Resp:  [17-25]   BP: ()/(58-81)   SpO2:  [96 %-97 %]     Level of Consciousness (AVPU): responds to voice    Recent Labs     05/14/22  0439 05/14/22  2341 05/15/22  0453   WBC 8.26 16.33* 15.02*   HGB 8.4* 8.0* 7.6*   HCT 24.0* 23.6* 23.1*   PLT 59* 68* 54*       Recent Labs     05/14/22  0439 05/14/22  2341 05/15/22  0453    138 139   K 3.9 4.3 4.1   CL 98 98 98   CO2 24 23 25   CREATININE 3.9* 4.7* 5.6*   * 224* 230*   PHOS  --   --  7.4*        Recent Labs     05/14/22 2345   PH 7.535*   PCO2 34.8*   PO2 69*   HCO3 29.4*   POCSATURATED 96   BE 7        OXYGEN:  Flow (L/min): 3     O2 Device (Oxygen Therapy): nasal cannula    MEWS score: 3    Chart reviewed at this time. No concerns verbalized at this time. Will continue to monitor.     Maria Dolores Perez RN      "

## 2022-05-15 NOTE — SUBJECTIVE & OBJECTIVE
Subjective:   History of Present Illness:  61yoM with PMHx of renal transplant for hypertensive nephropathy (2004) immunosuppressed on tacrolimus/sirolimus,  therapy, LOPEZ cirrhosis, NIDDM, and ILD who was treated at OSH for ILDEFONSO 2/2 decompensated cirrhosis and worsening portal vein hypertension with HRS.   Pt reported that he had been experiencing abdominal distention with associated diarrhea (4-5/day) over several weeks, along with a constant nagging cough.  He has been having weekly paracentesis in the past month.   Despite efforts to manage symptoms, pt in need of  transplant surgery evaluation of the renal/hepatic injury, resulting in tranfer to [facility].  He reports no recent NSAID use.       Hospital Course:  Pt admitted to hospital medicine for treatment of ILDEFONSO likely secondary to decompensated cirrhosis. CXR significant for prior ILD but no acute process. CT Chest w/o contrast significant for bilateral opacities concerning for pneumonia. Bronchoscopy performed on 5/04/22 for further evaluation of lung pathology seen on CT lung scan. Ascitic fluid aerobic culture positive for E.Faecalis. 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 Currently on posaconazole for blasto Ag/fungitell (awaiting other fungal studies), primaquine and clinda for PJP, amoxicillin for SBP. Being followed by ID, pulm and Hepatology      5/14/22 : Pt had HD X 2 hours for possible uremic encephalopathy on Friday. His BP dropped during HD. Inspite of better BUN and Cr today, he is still slow, in pain. Constant nausea and vomiting. His BP 94/52. I will hold HD today since pt is hypotensive. No sign of volume overload.     5/15/22:  he is almost anuric today. He is still slow but communicate properly . KUB demonstrates partial SBO with stool throughout colon. Has NGT with 600 output. Plan for HD today with no UF for clearance. BP is stable at this point. Can use albumin and midodrine if needed.     Scheduled Meds:   allopurinoL  100 mg Oral  "Daily    amoxicillin  500 mg Oral Daily    calcitRIOL  0.25 mcg Oral Daily    clindamycin (CLEOCIN) IVPB  900 mg Intravenous Q8H    insulin aspart U-100  3-6 Units Subcutaneous TID PC    insulin detemir U-100  12 Units Subcutaneous Daily    lactulose  15 g Oral TID    levothyroxine  50 mcg Oral Before breakfast    LIDOcaine  1 patch Transdermal Q24H    LIDOcaine HCl 2%  10 mL Mucous Membrane Q6H    lipase-protease-amylase 12,000-38,000-60,000 units  2 capsule Oral QID (WM & HS)    posaconazole  300 mg Oral Daily    predniSONE  40 mg Oral Daily    Followed by    [START ON 5/17/2022] predniSONE  20 mg Oral Daily    primaquine  52.6 mg Oral Daily     Continuous Infusions:  PRN Meds:(Magic mouthwash) 1:1:1 diphenhydramine(Benadryl) 12.5mg/5ml liq, aluminum & magnesium hydroxide-simethicone (Maalox), LIDOcaine viscous 2%, sodium chloride 0.9%, acetaminophen, albuterol-ipratropium, benzonatate, bisacodyL, dextromethorphan-guaiFENesin  mg/5 ml, dextrose 10%, dextrose 10%, fluticasone propionate, glucagon (human recombinant), glucose, glucose, glucose, insulin aspart U-100, lorazepam, midodrine, naloxone, sodium chloride 0.9%    Intake/Output - Last 3 Shifts         05/13 0700  05/14 0659 05/14 0700  05/15 0659 05/15 0700  05/16 0659    P.O. 150 0     Other   200    IV Piggyback 197.9 198.4     Total Intake(mL/kg) 347.9 (5.6) 198.4 (3.2) 200 (3.2)    Urine (mL/kg/hr) 670 (0.4) 95 (0.1)     Drains  650     Other 500  950    Stool       Total Output 1170 745 950    Net -822.1 -546.6 -750                 Objective:     Vital Signs (Most Recent):  Temp: 98.7 °F (37.1 °C) (05/15/22 0800)  Pulse: 77 (05/15/22 1315)  Resp: 16 (05/15/22 1315)  BP: 110/73 (05/15/22 1345)  SpO2: 99 % (05/15/22 1315) Vital Signs (24h Range):  Temp:  [98.5 °F (36.9 °C)-98.7 °F (37.1 °C)] 98.7 °F (37.1 °C)  Pulse:  [71-87] 77  Resp:  [16-25] 16  SpO2:  [96 %-99 %] 99 %  BP: ()/(58-81) 110/73     Weight: 62.4 kg (137 lb 9.1 oz)  Height: 5' 8" " (172.7 cm)  Body mass index is 20.92 kg/m².    Physical Exam  Constitutional:       General: He is not in acute distress.     Appearance: He is ill-appearing.   Cardiovascular:      Rate and Rhythm: Normal rate.   Pulmonary:      Effort: No respiratory distress.      Breath sounds: No wheezing.      Comments: Decreased breath sounds at bases   Abdominal:      General: There is no distension.      Palpations: Abdomen is soft.      Tenderness: There is abdominal tenderness.   Musculoskeletal:      Right lower leg: No edema.      Left lower leg: No edema.   Skin:     Coloration: Skin is jaundiced.   Neurological:      Mental Status: He is oriented to person, place, and time.      Motor: Weakness present.

## 2022-05-15 NOTE — PROGRESS NOTES
Hepatology Treatment Plan    Edgar Jackson is a 61 y.o. male admitted to hospital 4/30/2022 (Hospital Day: 16) due to ILDEFONSO (acute kidney injury).     Interval History  Patient with abdominal distension, imaging showing concern for ileus/SBO.  Large volume of stool on imaging.    Objective  Temp:  [98.5 °F (36.9 °C)] 98.5 °F (36.9 °C) (05/14 2000)  Pulse:  [71-87] 72 (05/15 0400)  BP: ()/(58-67) 106/67 (05/15 0400)  Resp:  [17-25] 21 (05/15 0400)  SpO2:  [96 %-97 %] 97 % (05/15 0400)         Laboratory    Lab Results   Component Value Date    WBC 15.02 (H) 05/15/2022    HGB 7.6 (L) 05/15/2022    HCT 23.1 (L) 05/15/2022    MCV 88 05/15/2022    PLT 54 (L) 05/15/2022       Lab Results   Component Value Date     05/15/2022    K 4.1 05/15/2022    CL 98 05/15/2022    CO2 25 05/15/2022    BUN 95 (H) 05/15/2022    CREATININE 5.6 (H) 05/15/2022    CALCIUM 7.8 (L) 05/15/2022       Lab Results   Component Value Date    ALBUMIN 2.4 (L) 05/15/2022    ALT 10 05/15/2022    AST 62 (H) 05/15/2022    ALKPHOS 77 05/15/2022    BILITOT 9.7 (H) 05/15/2022       Lab Results   Component Value Date    INR 1.8 (H) 05/15/2022    INR 1.8 (H) 05/14/2022    INR 1.8 (H) 05/13/2022       MELD-Na score: 35 at 5/15/2022  4:53 AM  MELD score: 35 at 5/15/2022  4:53 AM  Calculated from:  Serum Creatinine: 5.6 mg/dL (Using max of 4 mg/dL) at 5/15/2022  4:53 AM  Serum Sodium: 139 mmol/L (Using max of 137 mmol/L) at 5/15/2022  4:53 AM  Total Bilirubin: 9.7 mg/dL at 5/15/2022  4:53 AM  INR(ratio): 1.8 at 5/15/2022  4:53 AM  Age: 61 years     Problem list  1. Decompensated LOPEZ cirrhosis  2. Hx of kidney transplant  3. ILDEFONSO on CKD  4. PJP  5. Schisto Ag +      Approved for inpatient liver/kidney transplant evaluation.  Now with multiple infectious sources that will need to be treated.  Currently on posaconazole for blasto Ag/fungitell (awaiting other fungal studies), primaquine and clinda for PJP, amoxicillin for SBP, no contraindication for liver  transplant from ID standpoint.  He is deconditioned, however appears to be improving.  Deferred for transplant pending pulmonary evaluation since there is a possible diagnosis of ILD with superimposed infiltrates. He also did not meet target heart rate for cardiac stress test.  Seen by pulmonology, PFTs at this time would not be helpful due to superimposed infection, recommended outpatient follow-up once infection is resolved.   He was started on dialysis.  Hospital course complicated by partial obstruction vs ileus.    - surgical consult for bowel obstruction  - dialysis per Nephrology  - nuclear stress test  - PFTs as outpatient once infection is resolved  - lactulose targeting 2-3 BMs daily  - no plan for colonoscopy per GI, had a recent screening colonoscopy  - continue Abx per ID  - aggressive PT  - please obtain daily CBC, BMP, LFT, INR  - Plan of care was discussed with primary team    Thank you for involving us in the care of Edgar Jackson. Please call with any additional concerns or questions.    Reba Balderas MD  Gastroenterology Fellow

## 2022-05-15 NOTE — CARE UPDATE
RAPID RESPONSE NURSE PROACTIVE ROUNDING NOTE       Time of Visit: 2350    Admit Date: 2022  LOS: 15  Code Status: Full Code   Date of Visit: 05/15/2022  : 1960  Age: 61 y.o.  Sex: male  Race:   Bed: 81497/59395 A:   MRN: [id number]  Was the patient discharged from an ICU this admission? No   Was the patient discharged from a PACU within last 24 hours? No   Did the patient receive conscious sedation/general anesthesia in last 24 hours? No  Was the patient in the ED within the past 24 hours? No  Was the patient on NIPPV within the past 24 hours? No   Attending Physician: Demar Griffith MD  Primary Service: [facility] MED    Time spent at the bedside: < 15 min    SITUATION    Notified by charge RN via phone call.  Reason for alert: Inc RR rate, abdominal pain, AI alert  Called to evaluate the patient for Respiratory    BACKGROUND     Why is the patient in the hospital?: ILDEFONSO (acute kidney injury)    Patient has a past medical history of BPH (benign prostatic hyperplasia), Diabetes, Gout, Hypertension, Hypothyroidism, Interstitial lung disease, Kidney transplant recipient, and Unspecified cirrhosis of liver.    Last Vitals:  Temp: 98.5 °F (36.9 °C) (2000)  Pulse: 72 (05/15 0000)  Resp: 25 (05/15 0000)  BP: 100/61 (05/15 0000)  SpO2: 97 % (05/15 0000)    24 Hours Vitals Range:  Temp:  [97.7 °F (36.5 °C)-98.5 °F (36.9 °C)]   Pulse:  [60-87]   Resp:  [16-25]   BP: ()/(58-72)   SpO2:  [95 %-97 %]     Labs:  Recent Labs     22  0712 22  0439 22  2341   WBC 6.15 8.26 16.33*   HGB 7.6* 8.4* 8.0*   HCT 21.3* 24.0* 23.6*   PLT 63* 59* 68*       Recent Labs     22  0639 22  0712 22  0439 22  2341   * 136 137 138   K 3.2* 3.2* 3.9 4.3   CL 94* 94* 98 98   CO2 22* 27 24 23   CREATININE 5.3* 5.1* 3.9* 4.7*   * 95 148* 224*   PHOS 6.8*  --   --   --         Recent Labs     22  2345   PH 7.535*   PCO2 34.8*   PO2 69*   HCO3 29.4*   POCSATURATED  96   BE 7        ASSESSMENT    Physical Exam  Cardiovascular:      Rate and Rhythm: Normal rate and regular rhythm.      Pulses: Normal pulses.      Heart sounds: Normal heart sounds.   Abdominal:      General: Bowel sounds are decreased. There is distension.      Palpations: Abdomen is soft.      Tenderness: There is abdominal tenderness.   Musculoskeletal:      Right lower le+ Edema present.      Left lower le+ Edema present.   Skin:     Coloration: Skin is jaundiced.      Findings: Bruising present.     Called for increasing abdominal pain.     Patient AAOx4 during assessment. Denies HA/CP/SOB; endorses abdominal pain that started previous day (). Abdomen rounded, soft, with mild pain to palpation. HR 70, SpO2 97% RA, RR 28.     INTERVENTIONS    - STAT labs  - Abdominal CT ordered  - Enema ordered but currently on hold    RECOMMENDATIONS    - As stated above    PROVIDER ESCALATION    Yes/No  yes    Orders received and case discussed with Dr. Nichols.    Disposition: Remain in room 55903.    FOLLOW-UP    Bedside Kiesha ALLEN and [facility] CONNIE Tapia updated on plan of care. Instructed to call the Rapid Response Nurse, Radha Rios RN at [phone] for additional questions or concerns.

## 2022-05-15 NOTE — PROGRESS NOTES
[facility] - Transplant Stepdown  Endocrinology  Progress Note    Admit Date: 4/30/2022     Reason for Consult: Management of T2DM, Hyperglycemia     Surgical Procedure and Date: Pt undergoing evaluation for possible liver/kidney transplant    Diabetes diagnosis year: 2 years ago (2022 per pt)    Home Diabetes Medications:  Metformin 500 mg PO BID, long acting insulin 30 units daily    How often checking glucose at home?  Does not check    BG readings on regimen: Does not check his BG levels   Hypoglycemia on the regimen?  No (reports no symptoms of hypoglycemia, but stated he does not check)  Missed doses on regimen?  No    Diabetes Complications include:     Hyperglycemia    Complicating diabetes co morbidities:   Glucocorticoid use       HPI:   Patient is a 61 y.o. male with a diagnosis of renal transplant for hypertensive nephropathy (2004) on immunosuppressive therapy, LOPEZ cirrhosis, DM2, gout, BPH and ILD who was treated at [facility] ([facility]) for ILDEFONSO 2/2 decompensated cirrhosis and worsening portal vein hypertension with HRS. He has been having weekly paracentesis in the past month.     Pt reported that he had been experiencing abdominal distention with associated diarrhea (4-5/day) over several weeks, along with a chronic cough, weight loss, and found to be febrile.     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  Blasto urine Ag positive also.    Pt was started on PJP treatment with steroid taper leading to BG excursion. Endocrine consulted for BG management.   -Steroid taper starting 5/6/2022: Prednisone 40mg BID days 1-5, then 40mg daily days 6-10, then 20mg daily days 11-21         Interval HPI:   Overnight events: No acute events overnight. Patient on the TSU in room 67016/72339 A. Blood glucose worsening. BG at, above, and below goal on current insulin regimen (SSI, prandial, and basal insulin ).  "Steroid use- Prednisone 40 mg QD. 10 Days Post-Op  Renal function- Abnormal - Creatinine 5.6   Vasopressors-  None       Diet NPO     Eating:   NPO  Nausea: No  Hypoglycemia and intervention: No   Fever: No  TPN and/or TF: No    /67   Pulse 72   Temp 98.5 °F (36.9 °C) (Oral)   Resp (!) 21   Ht 5' 8" (1.727 m)   Wt 62.4 kg (137 lb 9.1 oz)   SpO2 97%   BMI 20.92 kg/m²     Labs Reviewed and Include    Recent Labs   Lab 05/15/22  0453   *   CALCIUM 7.8*   ALBUMIN 2.4*   PROT 4.8*      K 4.1   CO2 25   CL 98   BUN 95*   CREATININE 5.6*   ALKPHOS 77   ALT 10   AST 62*   BILITOT 9.7*     Lab Results   Component Value Date    WBC 15.02 (H) 05/15/2022    HGB 7.6 (L) 05/15/2022    HCT 23.1 (L) 05/15/2022    MCV 88 05/15/2022    PLT 54 (L) 05/15/2022     No results for input(s): TSH, FREET4 in the last 168 hours.  Lab Results   Component Value Date    HGBA1C 6.0 (H) 05/10/2022       Nutritional status:   Body mass index is 20.92 kg/m².  Lab Results   Component Value Date    ALBUMIN 2.4 (L) 05/15/2022    ALBUMIN 2.5 (L) 05/14/2022    ALBUMIN 2.5 (L) 05/13/2022     No results found for: PREALBUMIN    Estimated Creatinine Clearance: 12.4 mL/min (A) (based on SCr of 5.6 mg/dL (H)).    Accu-Checks  Recent Labs     05/13/22  0839 05/13/22  1244 05/13/22  1731 05/13/22  2107 05/14/22  0205 05/14/22  1148 05/14/22  1811 05/14/22  2145 05/15/22  0205 05/15/22  0914   POCTGLUCOSE 102 111* 141* 156* 142* 202* 217* 261* 234* 233*       Current Medications and/or Treatments Impacting Glycemic Control  Immunotherapy:    Immunosuppressants       None          Steroids:   Hormones (From admission, onward)                Start     Stop Route Frequency Ordered    05/17/22 0900  predniSONE tablet 20 mg        "Followed by" Linked Group Details    05/28 0859 Oral Daily 05/09/22 1719 05/12/22 0900  predniSONE tablet 40 mg        "Followed by" Linked Group Details    05/17 0859 Oral Daily 05/09/22 1719      "     Pressors:    Autonomic Drugs (From admission, onward)                Start     Stop Route Frequency Ordered    05/14/22 0818  midodrine tablet 5 mg         -- Oral Daily PRN 05/14/22 0719          Hyperglycemia/Diabetes Medications:   Antihyperglycemics (From admission, onward)                Start     Stop Route Frequency Ordered    05/15/22 1115  insulin detemir U-100 pen 12 Units         -- SubQ Daily 05/15/22 1001    05/10/22 1915  insulin aspart U-100 pen 3-6 Units         -- SubQ 3 times daily after meals 05/10/22 1506    05/09/22 2204  insulin aspart U-100 pen 1-10 Units         -- SubQ Before meals & nightly PRN 05/09/22 2105            ASSESSMENT and PLAN    * ILDEFONSO (acute kidney injury)  Caution with aggressive insulin adjustments to reduce risk of hypoglycemia    Type 2 diabetes mellitus without complication, without long-term current use of insulin  Endocrinology consulted for BG management.   BG goal 140-180    - Levemir 12 units daily (Dose resumed due to FBG above goal and excursions noted yesterday).   - Novolog (aspart) insulin 3-6 Units SQ TIDWM and prn for BG excursions LDC SSI (150/25)   - BG checks AC/HS  - Hypoglycemia protocol in place  - If blood glucose greater than 300, please ask patient not to eat food or drink anything other than water until correctional insulin has brought it back below 250    ** Please notify Endocrine for any change and/or advance in diet**  ** Please call Endocrine for any BG related issues **    Discharge Planning:   TBD. Please notify endocrinology prior to discharge.      Hypothyroidism  Pt with Hx of hypothyroidism   Taking LT4 50 mcg PO daily  - TSH 2.4 on 04/30/2022  - Continue home regimen  - Recommend rechecking TFTs in 4-6 weeks outpatient         Zheng Parks, DNP, FNP  Endocrinology  [facility] - Transplant Stepdown

## 2022-05-15 NOTE — CARE UPDATE
RAPID RESPONSE NURSE FOLLOW-UP NOTE       Followed up with patient for proactive rounding.  No acute issues at this time. Reviewed plan of care with Bedside RN, Kiesha.     Please call Rapid Response RN, Radha Rios RN with any questions or concerns at [phone].

## 2022-05-15 NOTE — PLAN OF CARE
VSS, SpO2 > 93% 4L NC  Tele- NSR/SB  Patient has new onset of tachypnea w/ movement. Provider notified; STAT labs and ABG done. Elevated WBC; lactate normal.  Pt went for CT scan of abdomen; results pending.   Mitchell esposito ordered; waiting for CT result since could be obstruction  Pt has NG tube which has put out 100 mL dark green/black liquid this shift   Pt in bed with wheels locked, call light/personal items in reach  Wife at bedside

## 2022-05-16 NOTE — SUBJECTIVE & OBJECTIVE
"Interval HPI:   Overnight events: No acute events overnight. Patient on the TSU in room 69612/16201 A. Blood glucose worsening. BG at, above, and below goal on current insulin regimen (SSI, prandial, and basal insulin ). Steroid use- Prednisone 40 mg QD. 11 Days Post-Op  Renal function- Abnormal - Creatinine 4.2   Vasopressors-  None       Diet NPO     Eating:   NPO  Nausea: No  Hypoglycemia and intervention: No   Fever: No  TPN and/or TF: No    /81   Pulse 64   Temp 98.4 °F (36.9 °C) (Axillary)   Resp 16   Ht 5' 8" (1.727 m)   Wt 62.4 kg (137 lb 9.1 oz)   SpO2 97%   BMI 20.92 kg/m²     Labs Reviewed and Include    Recent Labs   Lab 05/16/22  0414   *   CALCIUM 7.8*   ALBUMIN 2.6*   PROT 4.7*      K 3.8   CO2 27      BUN 75*   CREATININE 4.2*   ALKPHOS 74   ALT 8*   AST 59*   BILITOT 11.3*     Lab Results   Component Value Date    WBC 14.97 (H) 05/16/2022    HGB 6.7 (L) 05/16/2022    HCT 20.1 (L) 05/16/2022    MCV 89 05/16/2022    PLT 44 (L) 05/16/2022     No results for input(s): TSH, FREET4 in the last 168 hours.  Lab Results   Component Value Date    HGBA1C 6.0 (H) 05/10/2022       Nutritional status:   Body mass index is 20.92 kg/m².  Lab Results   Component Value Date    ALBUMIN 2.6 (L) 05/16/2022    ALBUMIN 2.4 (L) 05/15/2022    ALBUMIN 2.5 (L) 05/14/2022     No results found for: PREALBUMIN    Estimated Creatinine Clearance: 16.5 mL/min (A) (based on SCr of 4.2 mg/dL (H)).    Accu-Checks  Recent Labs     05/14/22  0205 05/14/22  1148 05/14/22  1811 05/14/22  2145 05/15/22  0205 05/15/22  0914 05/15/22  1354 05/15/22  1733 05/15/22  2142 05/16/22  0815   POCTGLUCOSE 142* 202* 217* 261* 234* 233* 154* 225* 178* 151*       Current Medications and/or Treatments Impacting Glycemic Control  Immunotherapy:    Immunosuppressants       None          Steroids:   Hormones (From admission, onward)                Start     Stop Route Frequency Ordered    05/17/22 0900  predniSONE tablet 20 " "mg        "Followed by" Linked Group Details    05/28 0859 PER NG TUBE Daily 05/16/22 1039    05/12/22 0900  predniSONE tablet 40 mg        "Followed by" Linked Group Details    05/17 0859 Oral Daily 05/09/22 1719          Pressors:    Autonomic Drugs (From admission, onward)                Start     Stop Route Frequency Ordered    05/14/22 0818  midodrine tablet 5 mg         -- Oral Daily PRN 05/14/22 0719          Hyperglycemia/Diabetes Medications:   Antihyperglycemics (From admission, onward)                Start     Stop Route Frequency Ordered    05/15/22 1115  insulin detemir U-100 pen 12 Units         -- SubQ Daily 05/15/22 1001    05/10/22 1915  insulin aspart U-100 pen 3-6 Units         -- SubQ 3 times daily after meals 05/10/22 1506    05/09/22 2204  insulin aspart U-100 pen 1-10 Units         -- SubQ Before meals & nightly PRN 05/09/22 2105          "

## 2022-05-16 NOTE — ASSESSMENT & PLAN NOTE
Initially stable, but worsened in the setting of ileus/partial SBO. No overt bleeding seen.   - Will get 1u PRBC today and observe for any bleeding.   - Check retic count tomorrow

## 2022-05-16 NOTE — PROGRESS NOTES
Hepatology Treatment Plan    Edgar Jackson is a 61 y.o. male admitted to hospital 4/30/2022 (Hospital Day: 17) due to ILDEFONSO (acute kidney injury).     Interval History  Patient was able to have a bowel movement last night. No abdominal pain today.    Objective  Temp:  [97.8 °F (36.6 °C)-98.4 °F (36.9 °C)] 98.3 °F (36.8 °C) (05/16 1200)  Pulse:  [63-77] 63 (05/16 1200)  BP: ()/(66-81) 113/75 (05/16 1200)  Resp:  [15-21] 17 (05/16 1200)  SpO2:  [96 %-99 %] 98 % (05/16 1200)     Constitutional:  not in acute distress  HENT: NGT in place  Eyes: conjunctiva clear and sclera nonicteric  Cardiovascular: regular rate and rhythm  Respiratory: normal chest expansion & respiratory effort   and no accessory muscle use  GI: soft, non-tender, without masses or organomegaly  Musculoskeletal: no muscular tenderness noted  Skin: normal color  Neurological: alert, oriented x3  Psychiatric: mood and affect are within normal limits, pt is a good historian; no memory problems were noted        Laboratory    Lab Results   Component Value Date    WBC 14.97 (H) 05/16/2022    HGB 6.7 (L) 05/16/2022    HCT 20.1 (L) 05/16/2022    MCV 89 05/16/2022    PLT 44 (L) 05/16/2022       Lab Results   Component Value Date     05/16/2022    K 3.8 05/16/2022     05/16/2022    CO2 27 05/16/2022    BUN 75 (H) 05/16/2022    CREATININE 4.2 (H) 05/16/2022    CALCIUM 7.8 (L) 05/16/2022       Lab Results   Component Value Date    ALBUMIN 2.6 (L) 05/16/2022    ALT 8 (L) 05/16/2022    AST 59 (H) 05/16/2022    ALKPHOS 74 05/16/2022    BILITOT 11.3 (H) 05/16/2022       Lab Results   Component Value Date    INR 2.0 (H) 05/16/2022    INR 1.8 (H) 05/15/2022    INR 1.8 (H) 05/14/2022       MELD-Na score: 37 at 5/16/2022  4:14 AM  MELD score: 37 at 5/16/2022  4:14 AM  Calculated from:  Serum Creatinine: On dialysis. Using 4 mg/dL.  Serum Sodium: 139 mmol/L (Using max of 137 mmol/L) at 5/16/2022  4:14 AM  Total Bilirubin: 11.3 mg/dL at 5/16/2022  4:14  AM  INR(ratio): 2.0 at 5/16/2022  4:14 AM  Age: 61 years     Problem list  1. Decompensated LOPEZ cirrhosis  2. Hx of kidney transplant  3. ILDEFONOS on CKD  4. PJP  5. Schisto Ag +      Approved for inpatient liver/kidney transplant evaluation.  Now with multiple infectious sources for which he is receiving antibiotics.  Currently on posaconazole for blasto Ag/fungitell (awaiting other fungal studies), primaquine and clinda for PJP, amoxicillin for SBP, no contraindication for liver transplant from ID standpoint.  He is deconditioned, however appears to be improving.  Deferred for transplant pending pulmonary evaluation since there is a possible diagnosis of ILD with superimposed infiltrates. He also did not meet target heart rate for cardiac stress test.  Seen by pulmonology, PFTs at this time would not be helpful due to superimposed infection, recommended outpatient follow-up once infection is resolved.   He was started on dialysis.  Hospital course complicated by partial obstruction vs ileus.    - dialysis per Nephrology  - nuclear stress test  - PFTs as outpatient once infection is resolved  - lactulose targeting 2-3 BMs daily  - continue Abx per ID  - aggressive PT  - please obtain daily CBC, BMP, LFT, INR  - Plan of care was discussed with primary team    Thank you for involving us in the care of Edgar Jackson. Please call with any additional concerns or questions.    Reba Balderas MD  Gastroenterology Fellow

## 2022-05-16 NOTE — ASSESSMENT & PLAN NOTE
- previously normal renal function 02/2022 but worsened since  - etiology likely ATN due to multiple etiologies   - renal US unremarkable   - R IJ trialysis cath placed (05/13)  - last HD (05/15) 0 UF     Plan:  - no indication for HD today, will plan for HD tomorrow  - strict I/O  - avoid nephrotoxic agents  - renally dose medications to GFR < 30  - renal diet when appropriate

## 2022-05-16 NOTE — SUBJECTIVE & OBJECTIVE
Subjective:   History of Present Illness:  Edgar Jackson is a 61 y.o. male w/ PMHx of renal transplant for hypertensive nephropathy (2004) immunosuppressed on tacrolimus/sirolimus,  therapy, LOPEZ cirrhosis, NIDDM, and ILD who was treated at OSH for ILDEFONSO 2/2 decompensated cirrhosis and worsening portal vein hypertension with HRS.   Pt reported that he had been experiencing abdominal distention with associated diarrhea (4-5/day) over several weeks, along with a constant nagging cough.  He has been having weekly paracentesis in the past month.   Despite efforts to manage symptoms, pt in need of  transplant surgery evaluation of the renal/hepatic injury, resulting in tranfer to [facility].  He reports no recent NSAID use.     Hospital Course:  Pt admitted to hospital medicine for treatment of ILDEFONSO likely secondary to decompensated cirrhosis. CXR significant for prior ILD but no acute process. CT Chest w/o contrast significant for bilateral opacities concerning for pneumonia. Bronchoscopy performed on 5/04/22 for further evaluation of lung pathology seen on CT lung scan. Ascitic fluid aerobic culture positive for E.Faecalis. 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 Currently on posaconazole for blasto Ag/fungitell (awaiting other fungal studies), primaquine and clinda for PJP, amoxicillin for SBP. Being followed by ID, pulm and Hepatology      5/14/22 : Pt had HD X 2 hours for possible uremic encephalopathy on Friday. His BP dropped during HD. Inspite of better BUN and Cr today, he is still slow, in pain. Constant nausea and vomiting. His BP 94/52. I will hold HD today since pt is hypotensive. No sign of volume overload.     5/15/22:  he is almost anuric today. He is still slow but communicate properly . KUB demonstrates partial SBO with stool throughout colon. Has NGT with 600 output. Plan for HD today with no UF for clearance. BP is stable at this point. Can use albumin and midodrine if needed.     Past Medical, Surgical,  Family, and Social History:   Unchanged from H&P.    Scheduled Meds:   [START ON 5/17/2022] allopurinoL  100 mg Per NG tube Daily    amoxicillin  500 mg Per NG tube Daily    calcitrioL  0.25 mcg Per NG tube Daily    clindamycin (CLEOCIN) IVPB  900 mg Intravenous Q8H    insulin aspart U-100  3-6 Units Subcutaneous TID PC    insulin detemir U-100  12 Units Subcutaneous Daily    lactulose  15 g Per NG tube TID    [START ON 5/17/2022] levothyroxine  50 mcg Per NG tube Before breakfast    LIDOcaine  1 patch Transdermal Q24H    LIDOcaine HCl 2%  10 mL Mucous Membrane Q6H    lipase-protease-amylase 12,000-38,000-60,000 units  2 capsule Oral QID (WM & HS)    posaconazole  300 mg Oral Daily    predniSONE  40 mg Oral Daily    Followed by    [START ON 5/17/2022] predniSONE  20 mg Per NG tube Daily    [START ON 5/17/2022] primaquine  52.6 mg Per NG tube Daily     Continuous Infusions:  PRN Meds:(Magic mouthwash) 1:1:1 diphenhydramine(Benadryl) 12.5mg/5ml liq, aluminum & magnesium hydroxide-simethicone (Maalox), LIDOcaine viscous 2%, sodium chloride, sodium chloride 0.9%, acetaminophen, albuterol-ipratropium, benzonatate, bisacodyL, dextromethorphan-guaiFENesin  mg/5 ml, dextrose 10%, dextrose 10%, fluticasone propionate, glucagon (human recombinant), glucose, glucose, glucose, insulin aspart U-100, lorazepam, midodrine, naloxone, sodium chloride 0.9%    Intake/Output - Last 3 Shifts         05/14 0700  05/15 0659 05/15 0700  05/16 0659 05/16 0700  05/17 0659    P.O. 0 0     I.V. (mL/kg)  0 (0)     Other  200     NG/GT  100 100    IV Piggyback 198.4 129.4     Total Intake(mL/kg) 198.4 (3.2) 429.4 (6.9) 100 (1.6)    Urine (mL/kg/hr) 95 (0.1) 95 (0.1)     Emesis/NG output  0     Drains 650      Other  950     Stool  0     Blood  0     Total Output 745 1045     Net -546.6 -615.6 +100           Urine Occurrence  0 x     Stool Occurrence  0 x     Emesis Occurrence  0 x              Review of Systems   Objective:     Vital Signs  "(Most Recent):  Temp: 98.4 °F (36.9 °C) (05/16/22 1138)  Pulse: 64 (05/16/22 1138)  Resp: 16 (05/16/22 1138)  BP: 123/81 (05/16/22 1138)  SpO2: 97 % (05/16/22 1138) Vital Signs (24h Range):  Temp:  [97.8 °F (36.6 °C)-98.4 °F (36.9 °C)] 98.4 °F (36.9 °C)  Pulse:  [64-77] 64  Resp:  [15-21] 16  SpO2:  [96 %-99 %] 97 %  BP: ()/(63-81) 123/81     Weight: 62.4 kg (137 lb 9.1 oz)  Height: 5' 8" (172.7 cm)  Body mass index is 20.92 kg/m².    Physical Exam  Vitals and nursing note reviewed.   Constitutional:       General: He is not in acute distress.     Appearance: He is well-developed. He is ill-appearing.   HENT:      Head: Normocephalic and atraumatic.      Right Ear: External ear normal.      Left Ear: External ear normal.      Nose: Nose normal.      Mouth/Throat:      Mouth: Mucous membranes are dry.   Eyes:      General:         Right eye: No discharge.         Left eye: No discharge.      Extraocular Movements: Extraocular movements intact.      Conjunctiva/sclera: Conjunctivae normal.      Pupils: Pupils are equal, round, and reactive to light.   Cardiovascular:      Rate and Rhythm: Normal rate and regular rhythm.      Heart sounds: Normal heart sounds. No murmur heard.    No friction rub. No gallop.   Pulmonary:      Effort: Pulmonary effort is normal. No respiratory distress.      Breath sounds: No wheezing or rales.   Abdominal:      General: Bowel sounds are normal. There is no distension.      Palpations: Abdomen is soft.      Tenderness: There is no abdominal tenderness. There is no guarding or rebound.   Musculoskeletal:         General: No tenderness or deformity. Normal range of motion.      Cervical back: Normal range of motion and neck supple.   Skin:     General: Skin is warm and dry.      Capillary Refill: Capillary refill takes less than 2 seconds.      Coloration: Skin is jaundiced. Skin is not pale.      Findings: No erythema or rash.   Neurological:      Mental Status: He is alert and " oriented to person, place, and time.      Cranial Nerves: No cranial nerve deficit.      Sensory: No sensory deficit.      Motor: Weakness present. No abnormal muscle tone.   Psychiatric:         Mood and Affect: Mood normal.         Behavior: Behavior normal.         Thought Content: Thought content normal.         Judgment: Judgment normal.       Laboratory:  CBC:   Recent Labs   Lab 05/15/22  0453 05/16/22  0414 05/16/22  0724   WBC 15.02* 13.93* 14.97*   RBC 2.62* 2.17* 2.23*   HGB 7.6* 6.6* 6.7*   HCT 23.1* 19.9* 20.1*   PLT 54* 43* 44*   MCV 88 92 89   MCH 29.0 30.4 30.0   MCHC 32.9 33.2 33.8     CMP:   Recent Labs   Lab 05/14/22  0439 05/14/22  2341 05/15/22  0453 05/16/22  0414   * 224* 230* 138*   CALCIUM 8.0* 7.6* 7.8* 7.8*   ALBUMIN 2.5*  --  2.4* 2.6*   PROT 5.3*  --  4.8* 4.7*    138 139 139   K 3.9 4.3 4.1 3.8   CO2 24 23 25 27   CL 98 98 98 102   BUN 73* 88* 95* 75*   CREATININE 3.9* 4.7* 5.6* 4.2*   ALKPHOS 82  --  77 74   ALT 9*  --  10 8*   AST 71*  --  62* 59*

## 2022-05-16 NOTE — SUBJECTIVE & OBJECTIVE
Interval History: Patient had one bowel movement overnight, unassisted, moderate volume per wife. Otherwise no acute issues. Patient requesting to eat lately which is new. He denies any nausea. Patient with worsening anemia, consent for blood.     Review of Systems   Constitutional:  Negative for chills and fever.   HENT:  Negative for congestion and sore throat.    Eyes:  Negative for photophobia and visual disturbance.   Respiratory:  Negative for cough and shortness of breath.    Cardiovascular:  Negative for chest pain and palpitations.   Gastrointestinal:  Positive for abdominal pain, nausea and vomiting. Negative for blood in stool, constipation and diarrhea.   Endocrine: Negative for cold intolerance and heat intolerance.   Genitourinary:  Negative for dysuria and hematuria.   Musculoskeletal:  Negative for arthralgias and myalgias.   Skin:  Negative for rash and wound.   Allergic/Immunologic: Negative for environmental allergies and food allergies.   Neurological:  Negative for seizures and numbness.   Hematological:  Negative for adenopathy. Does not bruise/bleed easily.   Psychiatric/Behavioral:  Positive for decreased concentration and sleep disturbance. Negative for hallucinations and suicidal ideas.    Objective:     Vital Signs (Most Recent):  Temp: 98.3 °F (36.8 °C) (05/16/22 1200)  Pulse: 70 (05/16/22 1400)  Resp: 20 (05/16/22 1400)  BP: 118/78 (05/16/22 1400)  SpO2: 97 % (05/16/22 1400) Vital Signs (24h Range):  Temp:  [97.8 °F (36.6 °C)-98.4 °F (36.9 °C)] 98.3 °F (36.8 °C)  Pulse:  [63-70] 70  Resp:  [15-21] 20  SpO2:  [96 %-99 %] 97 %  BP: (101-123)/(66-81) 118/78     Weight: 62.4 kg (137 lb 9.1 oz)  Body mass index is 20.92 kg/m².    Intake/Output Summary (Last 24 hours) at 5/16/2022 1519  Last data filed at 5/16/2022 1400  Gross per 24 hour   Intake 589.41 ml   Output 95 ml   Net 494.41 ml        Physical Exam  Constitutional:       Appearance: He is well-developed. He is ill-appearing and  toxic-appearing.   HENT:      Head: Normocephalic and atraumatic.   Eyes:      General: Scleral icterus present.      Conjunctiva/sclera: Conjunctivae normal.      Pupils: Pupils are equal, round, and reactive to light.   Neck:      Thyroid: No thyromegaly.      Vascular: No JVD.   Cardiovascular:      Rate and Rhythm: Normal rate and regular rhythm.      Heart sounds: Normal heart sounds. No murmur heard.    No friction rub. No gallop.   Pulmonary:      Effort: Pulmonary effort is normal. No respiratory distress.      Breath sounds: Normal breath sounds. No wheezing or rales.   Abdominal:      General: Abdomen is flat. Bowel sounds are decreased. There is no distension.      Palpations: Abdomen is soft. There is mass (Palpable stool RUQ).      Tenderness: There is abdominal tenderness. There is no guarding or rebound.      Hernia: No hernia is present.   Musculoskeletal:         General: No deformity. Normal range of motion.      Cervical back: Normal range of motion and neck supple.   Skin:     General: Skin is warm and dry.   Neurological:      Mental Status: He is alert.      Cranial Nerves: No cranial nerve deficit.   Psychiatric:         Attention and Perception: He is inattentive.         Behavior: Behavior normal.       Significant Labs: All pertinent labs within the past 24 hours have been reviewed.  CBC:   Recent Labs   Lab 05/15/22  0453 05/16/22  0414 05/16/22  0724   WBC 15.02* 13.93* 14.97*   HGB 7.6* 6.6* 6.7*   HCT 23.1* 19.9* 20.1*   PLT 54* 43* 44*       CMP:   Recent Labs   Lab 05/14/22  2341 05/15/22  0453 05/16/22  0414    139 139   K 4.3 4.1 3.8   CL 98 98 102   CO2 23 25 27   * 230* 138*   BUN 88* 95* 75*   CREATININE 4.7* 5.6* 4.2*   CALCIUM 7.6* 7.8* 7.8*   PROT  --  4.8* 4.7*   ALBUMIN  --  2.4* 2.6*   BILITOT  --  9.7* 11.3*   ALKPHOS  --  77 74   AST  --  62* 59*   ALT  --  10 8*   ANIONGAP 17* 16 10   EGFRNONAA 12.5* 10.1* 14.4*         Significant Imaging: I have reviewed  all pertinent imaging results/findings within the past 24 hours.

## 2022-05-16 NOTE — PROGRESS NOTES
[facility] - [facility]  Progress Note    Patient Name: Edgar Jackson  MRN: [id number]  Patient Class: IP- Inpatient   Admission Date: 4/30/2022  Length of Stay: 16 days  Attending Physician: Demar Griffith MD  Primary Care Provider: Tyler Castillo MD        Subjective:     Principal Problem:ILDEFONSO (acute kidney injury)        HPI:  Mr Jackson is a 61yoM with PMHx of renal transplant for hypertensive nephropathy (2004) immunosuppressed on tacrolimus/sirolimus,  therapy, LOPEZ cirrhosis, NIDDM, and ILD who was treated at OSH for ILDEFONSO 2/2 decompensated cirrhosis and worsening portal vein hypertension with HRS. Pt reported that he had been experiencing abdominal distention with associated diarrhea (4-5/day) over several weeks, along with a constant nagging cough. On presenting to OSH, multiple paracentesis (3) were performed to relieve the ascitic fluid. No evidence of SBP on fluid analysis. Creon was added to diet to aide in food digestion in setting of cirrhosis. Loperamide provided symptomatic treatment of diarrhea. Dextromethorphan-guaifenesin syrup added to reduce cough exacerbations. Despite efforts to manage symptoms, pt in need of  transplant surgery evaluation of the renal/hepatic injury, resulting in tranfer to [facility].     On arrival to [facility] (4/30/2022), pt has experienced increased generalized weakness. Loperamide was continued for diarrhea management. Abdominal distention recurred, despite prior paracentesis. Cough has been fairly constant and predominantly dry. Pt now endorses new left lower quadrant abdominal pain, worsened by cough. KTM and Hepatology consults placed for management of HRS.      Overview/Hospital Course:  Pt admitted to hospital medicine for treatment of ILDEFONSO likely secondary to decompensated cirrhosis. Consulted Hepatology and KTM, appreciate recs. Performed diagnostic paracentesis to verify nature of ascitic fluid and to rule out SBP. Pt had a fever of 100.7F per arm pit and  100.5F orally. CXR significant for prior ILD but no acute process. Blood cultures collected and Vanc/Cefepime started. ID consulted, appreciate recs. Diagnostic paracentesis performed with results indicative of portal hypertension. Fluid cultures are negative to date. CT Chest w/o contrast significant for bilateral opacities concerning for pneumonia. Pulmonology consulted, appreciate recs. Bronchoscopy performed on 5/04/22 for further evaluation of lung pathology seen on CT lung scan. Ascitic fluid aerobic culture positive for E.Faecalis on amoxicillin. 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 Furthermore, renal function has not improved and patient currently with recommendation for HD per nephro. First session w/ UF happened 5/13. Worsening ileus/partial SBO as of 5/14. Improved as of 5/16.       Interval History: Patient had one bowel movement overnight, unassisted, moderate volume per wife. Otherwise no acute issues. Patient requesting to eat lately which is new. He denies any nausea. Patient with worsening anemia, consent for blood.     Review of Systems   Constitutional:  Negative for chills and fever.   HENT:  Negative for congestion and sore throat.    Eyes:  Negative for photophobia and visual disturbance.   Respiratory:  Negative for cough and shortness of breath.    Cardiovascular:  Negative for chest pain and palpitations.   Gastrointestinal:  Positive for abdominal pain, nausea and vomiting. Negative for blood in stool, constipation and diarrhea.   Endocrine: Negative for cold intolerance and heat intolerance.   Genitourinary:  Negative for dysuria and hematuria.   Musculoskeletal:  Negative for arthralgias and myalgias.   Skin:  Negative for rash and wound.   Allergic/Immunologic: Negative for environmental allergies and food allergies.   Neurological:  Negative for seizures and numbness.   Hematological:  Negative for adenopathy. Does not bruise/bleed easily.   Psychiatric/Behavioral:  Positive for  decreased concentration and sleep disturbance. Negative for hallucinations and suicidal ideas.    Objective:     Vital Signs (Most Recent):  Temp: 98.3 °F (36.8 °C) (05/16/22 1200)  Pulse: 70 (05/16/22 1400)  Resp: 20 (05/16/22 1400)  BP: 118/78 (05/16/22 1400)  SpO2: 97 % (05/16/22 1400) Vital Signs (24h Range):  Temp:  [97.8 °F (36.6 °C)-98.4 °F (36.9 °C)] 98.3 °F (36.8 °C)  Pulse:  [63-70] 70  Resp:  [15-21] 20  SpO2:  [96 %-99 %] 97 %  BP: (101-123)/(66-81) 118/78     Weight: 62.4 kg (137 lb 9.1 oz)  Body mass index is 20.92 kg/m².    Intake/Output Summary (Last 24 hours) at 5/16/2022 1519  Last data filed at 5/16/2022 1400  Gross per 24 hour   Intake 589.41 ml   Output 95 ml   Net 494.41 ml        Physical Exam  Constitutional:       Appearance: He is well-developed. He is ill-appearing and toxic-appearing.   HENT:      Head: Normocephalic and atraumatic.   Eyes:      General: Scleral icterus present.      Conjunctiva/sclera: Conjunctivae normal.      Pupils: Pupils are equal, round, and reactive to light.   Neck:      Thyroid: No thyromegaly.      Vascular: No JVD.   Cardiovascular:      Rate and Rhythm: Normal rate and regular rhythm.      Heart sounds: Normal heart sounds. No murmur heard.    No friction rub. No gallop.   Pulmonary:      Effort: Pulmonary effort is normal. No respiratory distress.      Breath sounds: Normal breath sounds. No wheezing or rales.   Abdominal:      General: Abdomen is flat. Bowel sounds are decreased. There is no distension.      Palpations: Abdomen is soft. There is mass (Palpable stool RUQ).      Tenderness: There is abdominal tenderness. There is no guarding or rebound.      Hernia: No hernia is present.   Musculoskeletal:         General: No deformity. Normal range of motion.      Cervical back: Normal range of motion and neck supple.   Skin:     General: Skin is warm and dry.   Neurological:      Mental Status: He is alert.      Cranial Nerves: No cranial nerve deficit.    Psychiatric:         Attention and Perception: He is inattentive.         Behavior: Behavior normal.       Significant Labs: All pertinent labs within the past 24 hours have been reviewed.  CBC:   Recent Labs   Lab 05/15/22  0453 05/16/22  0414 05/16/22  0724   WBC 15.02* 13.93* 14.97*   HGB 7.6* 6.6* 6.7*   HCT 23.1* 19.9* 20.1*   PLT 54* 43* 44*       CMP:   Recent Labs   Lab 05/14/22  2341 05/15/22  0453 05/16/22  0414    139 139   K 4.3 4.1 3.8   CL 98 98 102   CO2 23 25 27   * 230* 138*   BUN 88* 95* 75*   CREATININE 4.7* 5.6* 4.2*   CALCIUM 7.6* 7.8* 7.8*   PROT  --  4.8* 4.7*   ALBUMIN  --  2.4* 2.6*   BILITOT  --  9.7* 11.3*   ALKPHOS  --  77 74   AST  --  62* 59*   ALT  --  10 8*   ANIONGAP 17* 16 10   EGFRNONAA 12.5* 10.1* 14.4*         Significant Imaging: I have reviewed all pertinent imaging results/findings within the past 24 hours.      Assessment/Plan:      * ILDEFONSO (acute kidney injury)  Patient with acute kidney injury likely d/t IVVD/Dehydration Which is currently stable. Labs reviewed- Renal function/electrolytes with Estimated Creatinine Clearance: 16.5 mL/min (A) (based on SCr of 4.2 mg/dL (H)). according to latest data. Monitor urine output and serial BMP and adjust therapy as needed. Avoid nephrotoxins and renally dose meds for GFR listed above. Ultrasound transplant kidney no acute changes. Possibly due to tacrolimus versus hepatorenal syndrome, Nephrology has been consulted. Toprolol continued.  Bicarb ordered for low serum levels. Cr/GFR to be trended for 6 weeks for evaluation.  - KTM following.   - hold sirolimus and tacrolimus  - trend daily BMP/RFP  - Had UF w/ HD 5/13 without fluid removal. HD planned for tomorrow    Partial small bowel obstruction  In the setting of having received imodium for diarrhea that occurred earlier in his inpatient stay. Imodium discontinued 5/12, but patient with minimal stools since. KUB demonstrating early/partial small bowel obstruction vs  ileus. CT Abd showing transition point in LUQ. No overt distension/abdominal pain at this time.   - NGT inserted, patient on LIWS initially. Will trial TF today as he had bowel movement on his own 5/15.  - Speech saw patient, cleared him for pleasure feeds/thin liquids. Placed order for CLD so patient can request broth and other thin liquids from cafeteria per wife request  - Observe for additional abdominal distension with TF/CLD  - Bisacodyl suppository ordered prn      Urinary retention  Unclear precipitant. Patient complained of urinary urgency and incomplete voiding on 5/12, he was bladder scanned and had jacinto placed with 1L urine return immediately. Patient continues to produce urine  - Remove jacinto today    Blastomycosis  5/2/22 Positive blastomyces urine Ag, below level of quantification.  - ID consulted  -12 months of posaconazole, started 5/4/2022.  - Monthly blastomyces urine and serum Ag.  - Secondary prophylaxis after transplant routinely not recommended. However if patient were to be transplanted, needs ID consult at that time for updated recommendations.      Schistosoma  Equivocal schistosoma serologies.  Will treat empirically.     Recommendations:  -Praziquantel 20mg/kg q6h x3 doses      Pneumocystis jiroveci pneumonia  CT with opacifications, reverse halo sign, found to have PJP.     Recommendations:  -Clindamycin and Primaquine for 21 days  -Steroid taper starting at prednisone 40mg BID days 1-5, then 40mg daily days 6-10, then 20mg daily days 11-21      Lung infiltrate on CT  Pt with Hx of chronic ILD with bilateral areas of lung scarring. Regions appear to be slightly larger in size when compared to previous imaging. New findings of bilateral infiltrates noted on recent CT Chest w/o contrast concerning for pneumonia. Acute fever reported over the past two evenings. Pt started on broad spectrum abx with septic workup performed. Respiratory viral panel negative. Pulmonology consulted, appreciate  recs. Bronchoscopy to be performed. (See associated problem ILD)  - f/u bronchoscopy cx negative    Type 2 diabetes mellitus without complication, without long-term current use of insulin  Endocrine following  - NPO currently due to partial SBO/nausea  - Levemir 12u daily  - aspart 3-6u w/ meals      Chronic cough  PMHx of interstitial lung disease (lung scarring). benzonotate tablet 200mg TID PRN. For persistent cough add dextromethorphan-guaiefesin syrup    Fever in immunocomprised patient  Patient received paracentesis on 05/01, negative for SBP.  Spiked fever afterwards, 100.7F. Septic workup started, broad spectrum abx coverage with Vanc/Cefepime. Chest x-ray showed consolidation at the middle lobe of the left lung. CT chest w/o contrast with similar findings, concerning for pneumonia.  Lactic acid wnl and no leukocytosis.ID consult placed given comorbidity and immunocompromised status. Pulmonology consulted, given chronic ILD findings with new bilateral opacities. Bronchoscopy to be performed. Bcx and Fcx NGTD. Respiratory viral panel negative.  - Currently patient being treated for blastomycosis given urine ag, PJP given lung imaging findings, SBP with amoxicillin given his ascites culture, and lastly received schistosoma treatment.       Anemia  Initially stable, but worsened in the setting of ileus/partial SBO. No overt bleeding seen.   - Will get 1u PRBC today and observe for any bleeding.   - Check retic count tomorrow      Thrombocytopenia  Thrombocytopenia likely in setting of hepatic cirrhosis. Hold anticoagulation and DVT prophylaxis for thrombocytopenia.      Hypothyroidism  Continued on home medication: synthroid 50mcg qd      Hyponatremia  Stable. Urine sodium 29, urine osm 297. DDX low effective arterial volume vs SIADH.   - KTM following.   - Cont monitoring.         Cirrhosis of liver with ascites  MELD-Na score: 35 at 5/15/2022  4:53 AM  MELD score: 35 at 5/15/2022  4:53 AM  Calculated  from:  Serum Creatinine: 5.6 mg/dL (Using max of 4 mg/dL) at 5/15/2022  4:53 AM  Serum Sodium: 139 mmol/L (Using max of 137 mmol/L) at 5/15/2022  4:53 AM  Total Bilirubin: 9.7 mg/dL at 5/15/2022  4:53 AM  INR(ratio): 1.8 at 5/15/2022  4:53 AM  Age: 61 years  Patient recently diagnosed with cirrhosis 2/2 to LOPEZ. Pulmonary hypertension now present with with multiple episodes of paracentesis. Largest abdominal fluid reduction just over 5L. Diagnostic paracentesis performed at [facility] with enterococcus faecalis on culture.   - Hepatology and KTM coordinating for possible transplant.   - Patient denied listing for transplant given concern for ILD, pulm consulted for any further recs and are recommending outpatient PFTs once acute pulmonary insult (PJP PNA) treated.  - Per transplant anesthesia, they are requesting additional PET stress imaging as he did not achieve maximal HR on his DSE. PET Stress scheduled for tomorrow, 5/16.     Interstitial lung disease  CXR performed on admission, with serial imaging reviewed. Continued scheduled Benzonate daily. Flonase added daily PRN with DuoNebs q6h PRN. Supplemental oxygen PRN. Continuous pulse oxymetry ordered. SpO2 maintained 88-93% by adjusting O2 flow. CT chest w/o contrast significant for bilateral infiltrates, concerning for pneumonia. Pulmonology consulted, appreciate recs. Pt to undergo Bronchoscopy 5/04/22.  - F/u pulm outpatient.       Gout  Continue allopurinol for prophylaxis.       Personal history of immunosupression therapy  Pt taking immunosuppressive agents (sacrolimus and tacrolimus) 2/2 to renal transplant in 2004.   - Meds currently held  - KTM following    Transplanted kidney  Renal transplant performed in 2004. Pt continued on sirolimus and tacrolimus for immuno suppression. KTM consulted on admission, appreciate recs. Trough levels ordered with both medications.currently held.    - trend renal funtion tests   - hold tacrolimus/sirolimus as directed by  KTM    VTE Risk Mitigation (From admission, onward)         Ordered     IP VTE HIGH RISK PATIENT  Once         04/30/22 1810     Place sequential compression device  Until discontinued         04/30/22 1810                Discharge Planning   LE: 5/20/2022     Code Status: Full Code   Is the patient medically ready for discharge?: No    Reason for patient still in hospital (select all that apply): Patient trending condition  Discharge Plan A: Home Health                  Demar Griffith MD  Department of Hospital Medicine   [facility] - Transplant Stepdown

## 2022-05-16 NOTE — ASSESSMENT & PLAN NOTE
- urine blasto Ag (05/02) positive  - initiated on posaconazole (05/04) to be continued for 1 year with monthly urine & serum Ag monitoring

## 2022-05-16 NOTE — ASSESSMENT & PLAN NOTE
- s/p kidney transplant living donor closer to 20 years in [place], [place]  - no prior episodes of rejections or complications post surgery   - ESRD d/t HTN and IgA nephropathy

## 2022-05-16 NOTE — ASSESSMENT & PLAN NOTE
- liver biopsy -- Chronic mild-moderately active hepatitis with bridging fibrosis and nodule   formation (Grade 2, Stage 3).  - decompensated LOPEZ

## 2022-05-16 NOTE — PROGRESS NOTES
[facility] - Transplant Stepdown  Kidney Transplant  Progress Note      Reason for Follow-up: Reassessment of Kidney Transplant Evaluation - 5/10/2022 recipient and management of immunosuppression.      Subjective:   History of Present Illness:  Edgar Jackson is a 61 y.o. male w/ PMHx of renal transplant for hypertensive nephropathy (2004) immunosuppressed on tacrolimus/sirolimus,  therapy, LOPEZ cirrhosis, NIDDM, and ILD who was treated at OSH for ILDEFONSO 2/2 decompensated cirrhosis and worsening portal vein hypertension with HRS.   Pt reported that he had been experiencing abdominal distention with associated diarrhea (4-5/day) over several weeks, along with a constant nagging cough.  He has been having weekly paracentesis in the past month.   Despite efforts to manage symptoms, pt in need of  transplant surgery evaluation of the renal/hepatic injury, [place] [facility] [place]  He reports no recent NSAID use.     Hospital Course:  Pt admitted to hospital medicine for treatment of ILDEFONSO likely secondary to decompensated cirrhosis. CXR significant for prior ILD but no acute process. CT Chest w/o contrast significant for bilateral opacities concerning for pneumonia. Bronchoscopy performed on 5/04/22 for further evaluation of lung pathology seen on CT lung scan. Ascitic fluid aerobic culture positive for E.Faecalis. 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 Currently on posaconazole for blasto Ag/fungitell (awaiting other fungal studies), primaquine and clinda for PJP, amoxicillin for SBP. Being followed by ID, pulm and Hepatology      5/14/22 : Pt had HD X 2 hours for possible uremic encephalopathy on Friday. His BP dropped during HD. Inspite of better BUN and Cr today, he is still slow, in pain. Constant nausea and vomiting. His BP 94/52. I will hold HD today since pt is hypotensive. No sign of volume overload.     5/15/22:  he is almost anuric today. He is still slow but communicate properly . KUB demonstrates partial SBO  with stool throughout colon. Has NGT with 600 output. Plan for HD today with no UF for clearance. BP is stable at this point. Can use albumin and midodrine if needed.     5/16/22: last HD (05/15) 0 UF after 2 hours. Continues to be anuric.  Will hold off HD today and plan for tomorrow.  Otherwise, stable.    Past Medical, Surgical, Family, and Social History:   Unchanged from H&P.    Scheduled Meds:   [START ON 5/17/2022] allopurinoL  100 mg Per NG tube Daily    amoxicillin  500 mg Per NG tube Daily    calcitrioL  0.25 mcg Per NG tube Daily    clindamycin (CLEOCIN) IVPB  900 mg Intravenous Q8H    insulin aspart U-100  3-6 Units Subcutaneous TID PC    insulin detemir U-100  12 Units Subcutaneous Daily    lactulose  15 g Per NG tube TID    [START ON 5/17/2022] levothyroxine  50 mcg Per NG tube Before breakfast    LIDOcaine  1 patch Transdermal Q24H    LIDOcaine HCl 2%  10 mL Mucous Membrane Q6H    lipase-protease-amylase 12,000-38,000-60,000 units  2 capsule Oral QID (WM & HS)    posaconazole  300 mg Oral Daily    predniSONE  40 mg Oral Daily    Followed by    [START ON 5/17/2022] predniSONE  20 mg Per NG tube Daily    [START ON 5/17/2022] primaquine  52.6 mg Per NG tube Daily     Continuous Infusions:  PRN Meds:(Magic mouthwash) 1:1:1 diphenhydramine(Benadryl) 12.5mg/5ml liq, aluminum & magnesium hydroxide-simethicone (Maalox), LIDOcaine viscous 2%, sodium chloride, sodium chloride 0.9%, acetaminophen, albuterol-ipratropium, benzonatate, bisacodyL, dextromethorphan-guaiFENesin  mg/5 ml, dextrose 10%, dextrose 10%, fluticasone propionate, glucagon (human recombinant), glucose, glucose, glucose, insulin aspart U-100, lorazepam, midodrine, naloxone, sodium chloride 0.9%    Intake/Output - Last 3 Shifts         05/14 0700  05/15 0659 05/15 0700  05/16 0659 05/16 0700  05/17 0659    P.O. 0 0     I.V. (mL/kg)  0 (0)     Other  200     NG/GT  100 100    IV Piggyback 198.4 129.4     Total Intake(mL/kg)  "198.4 (3.2) 429.4 (6.9) 100 (1.6)    Urine (mL/kg/hr) 95 (0.1) 95 (0.1)     Emesis/NG output  0     Drains 650      Other  950     Stool  0     Blood  0     Total Output 745 1045     Net -546.6 -615.6 +100           Urine Occurrence  0 x     Stool Occurrence  0 x     Emesis Occurrence  0 x              Review of Systems   Objective:     Vital Signs (Most Recent):  Temp: 98.4 °F (36.9 °C) (05/16/22 1138)  Pulse: 64 (05/16/22 1138)  Resp: 16 (05/16/22 1138)  BP: 123/81 (05/16/22 1138)  SpO2: 97 % (05/16/22 1138) Vital Signs (24h Range):  Temp:  [97.8 °F (36.6 °C)-98.4 °F (36.9 °C)] 98.4 °F (36.9 °C)  Pulse:  [64-77] 64  Resp:  [15-21] 16  SpO2:  [96 %-99 %] 97 %  BP: ()/(63-81) 123/81     Weight: 62.4 kg (137 lb 9.1 oz)  Height: 5' 8" (172.7 cm)  Body mass index is 20.92 kg/m².    Physical Exam  Vitals and nursing note reviewed.   Constitutional:       General: He is not in acute distress.     Appearance: He is well-developed. He is ill-appearing.   HENT:      Head: Normocephalic and atraumatic.      Right Ear: External ear normal.      Left Ear: External ear normal.      Nose: Nose normal.      Mouth/Throat:      Mouth: Mucous membranes are dry.   Eyes:      General:         Right eye: No discharge.         Left eye: No discharge.      Extraocular Movements: Extraocular movements intact.      Conjunctiva/sclera: Conjunctivae normal.      Pupils: Pupils are equal, round, and reactive to light.   Cardiovascular:      Rate and Rhythm: Normal rate and regular rhythm.      Heart sounds: Normal heart sounds. No murmur heard.    No friction rub. No gallop.   Pulmonary:      Effort: Pulmonary effort is normal. No respiratory distress.      Breath sounds: No wheezing or rales.   Abdominal:      General: Bowel sounds are normal. There is no distension.      Palpations: Abdomen is soft.      Tenderness: There is no abdominal tenderness. There is no guarding or rebound.   Musculoskeletal:         General: No tenderness or " deformity. Normal range of motion.      Cervical back: Normal range of motion and neck supple.   Skin:     General: Skin is warm and dry.      Capillary Refill: Capillary refill takes less than 2 seconds.      Coloration: Skin is jaundiced. Skin is not pale.      Findings: No erythema or rash.   Neurological:      Mental Status: He is alert and oriented to person, place, and time.      Cranial Nerves: No cranial nerve deficit.      Sensory: No sensory deficit.      Motor: Weakness present. No abnormal muscle tone.   Psychiatric:         Mood and Affect: Mood normal.         Behavior: Behavior normal.         Thought Content: Thought content normal.         Judgment: Judgment normal.       Laboratory:  CBC:   Recent Labs   Lab 05/15/22  0453 05/16/22  0414 05/16/22  0724   WBC 15.02* 13.93* 14.97*   RBC 2.62* 2.17* 2.23*   HGB 7.6* 6.6* 6.7*   HCT 23.1* 19.9* 20.1*   PLT 54* 43* 44*   MCV 88 92 89   MCH 29.0 30.4 30.0   MCHC 32.9 33.2 33.8     CMP:   Recent Labs   Lab 05/14/22  0439 05/14/22  2341 05/15/22  0453 05/16/22  0414   * 224* 230* 138*   CALCIUM 8.0* 7.6* 7.8* 7.8*   ALBUMIN 2.5*  --  2.4* 2.6*   PROT 5.3*  --  4.8* 4.7*    138 139 139   K 3.9 4.3 4.1 3.8   CO2 24 23 25 27   CL 98 98 98 102   BUN 73* 88* 95* 75*   CREATININE 3.9* 4.7* 5.6* 4.2*   ALKPHOS 82  --  77 74   ALT 9*  --  10 8*   AST 71*  --  62* 59*     Assessment/Plan:     * ILDEFONSO (acute kidney injury)  - previously normal renal function 02/2022 but worsened since  - etiology likely ATN due to multiple etiologies   - renal US unremarkable   - R IJ trialysis cath placed (05/13)  - last HD (05/15) 0 UF     Plan:  - no indication for HD today, will plan for HD tomorrow  - strict I/O  - avoid nephrotoxic agents  - renally dose medications to GFR < 30  - renal diet when appropriate    Partial small bowel obstruction  - NGT in place on LIWS  - primary managing    Blastomycosis  - urine blasto Ag (05/02) positive  - initiated on posaconazole  (05/04) to be continued for 1 year with monthly urine & serum Ag monitoring    Pneumocystis jiroveci pneumonia  - ID initially consulted, has signed off  - on clindamycin + primaquine for 21 days + steroid taper    Cirrhosis of liver with ascites  - liver biopsy -- Chronic mild-moderately active hepatitis with bridging fibrosis and nodule   formation (Grade 2, Stage 3).  - decompensated LOPEZ       Personal history of immunosupression therapy  - on sirolimus and prograf at home   - continue to hold at this time     Transplanted kidney  - s/p kidney transplant living donor closer to 20 years in [place]  - no prior episodes of rejections or complications post surgery   - ESRD d/t HTN and IgA nephropathy          Lidya Lin MD  Kidney Transplant  [facility] - Transplant Stepdown

## 2022-05-16 NOTE — PLAN OF CARE
Problem: SLP  Goal: SLP Goal  Description: Speech Therapy Short Term Goals  Goal expected to be met by 5/30  1. Pt will participate in an ongoing assessment to determine the least restrictive and safest diet with possible updated goals to follow pending results.    Outcome: Ongoing, Progressing  Bedside swallow study completed. Recommend: Thin liquids (okay for ice cream) for pleasure with aspiration precautions in place. ST will continue to follow.

## 2022-05-16 NOTE — AI DETERIORATION ALERT
"RAPID RESPONSE NURSE AI ALERT       AI alert received.    Chart Reviewed: 05/15/2022, 11:36 PM    MRN: [id number]  Bed: 90385/79460 A    Dx: ILDEFONSO (acute kidney injury)    Edgar Jackson has a past medical history of BPH (benign prostatic hyperplasia), Diabetes, Gout, Hypertension, Hypothyroidism, Interstitial lung disease, Kidney transplant recipient, and Unspecified cirrhosis of liver.    Last VS: /66   Pulse 67   Temp 98.1 °F (36.7 °C) (Axillary)   Resp 16   Ht 5' 8" (1.727 m)   Wt 62.4 kg (137 lb 9.1 oz)   SpO2 99%   BMI 20.92 kg/m²     24H Vital Sign Range:  Temp:  [98.1 °F (36.7 °C)-98.7 °F (37.1 °C)]   Pulse:  [67-83]   Resp:  [16-25]   BP: ()/(61-81)   SpO2:  [96 %-99 %]     Level of Consciousness (AVPU): responds to voice    Recent Labs     05/14/22  0439 05/14/22  2341 05/15/22  0453   WBC 8.26 16.33* 15.02*   HGB 8.4* 8.0* 7.6*   HCT 24.0* 23.6* 23.1*   PLT 59* 68* 54*       Recent Labs     05/14/22  0439 05/14/22  2341 05/15/22  0453    138 139   K 3.9 4.3 4.1   CL 98 98 98   CO2 24 23 25   CREATININE 3.9* 4.7* 5.6*   * 224* 230*   PHOS  --   --  7.4*        Recent Labs     05/14/22 2345   PH 7.535*   PCO2 34.8*   PO2 69*   HCO3 29.4*   POCSATURATED 96   BE 7        OXYGEN:  Flow (L/min): 4     O2 Device (Oxygen Therapy): nasal cannula    MEWS score: 2    Charge RN, Regina contacted. No concerns verbalized at this time. Instructed to call [phone] for further concerns or assistance.    Radha Rios RN        "

## 2022-05-16 NOTE — PLAN OF CARE
05/16/22 0916   Post-Acute Status   Post-Acute Authorization Home Health   Home Health Status Referrals Sent     SW faxed referral to [facility] via [facility]. SW will follow up as needed.    9:56 AM  [facility]- Accepted (pending HH order).     Jeannie Chau LMSW  Case Management   [facility]   Ext. [phone]       Fit test mailed 05/25/2017.  Sasha Mcclain, CMA

## 2022-05-16 NOTE — CARE UPDATE
RAPID RESPONSE NURSE ROUND       Rounding completed with charge RN, Lizett. No concerns verbalized at this time. Instructed to call [phone] for further concerns or assistance.

## 2022-05-16 NOTE — PT/OT/SLP EVAL
Speech Language Pathology Evaluation  Bedside Swallow    Patient Name:  Edgar Jackson   MRN:  [id number]   83334/86187 A    Admitting Diagnosis: ILDEFONSO (acute kidney injury)    Recommendations:                 General Recommendations:  ongoing swallow eval  Diet recommendations:  Pleasure Feeding, Thin   Aspiration Precautions:   · 1 small bite/sip at a time,   · Assistance with meals,   · Continue alternate means of nutrition,   · HOB to 90 degrees,   · Monitor for s/s of aspiration and   · Standard aspiration precautions   General Precautions: Standard, fall, aspiration  Communication strategies:  none    History:     Past Medical History:   Diagnosis Date    BPH (benign prostatic hyperplasia)     Diabetes 2020    Gout     Hypertension 2000    Hypothyroidism 04/08/2022    Interstitial lung disease 03/30/2022    Kidney transplant recipient 01/2004    [place] 2* hypertensive nephropathy    Unspecified cirrhosis of liver     ?LOPEZ       Past Surgical History:   Procedure Laterality Date    CATARACT EXTRACTION Right 2017    COLONOSCOPY N/A 3/8/2022    Procedure: COLONOSCOPY;  Surgeon: Mio Simmons III, MD;  Location: [facility];  Service: Endoscopy;  Laterality: N/A;    COLONOSCOPY N/A 5/5/2022    Procedure: COLONOSCOPY;  Surgeon: Chiquita Lewis MD;  Location: [facility] ([address] [place] [address]);  Service: Endoscopy;  Laterality: N/A;    COLONOSCOPY N/A 5/5/2022    Procedure: COLONOSCOPY;  Surgeon: Chiquita Lewis MD;  Location: [facility] ([address] [place] [address]);  Service: Endoscopy;  Laterality: N/A;    ENDOSCOPIC ULTRASOUND OF UPPER GASTROINTESTINAL TRACT N/A 3/8/2022    Procedure: ULTRASOUND, UPPER GI TRACT, ENDOSCOPIC;  Surgeon: Mio Simmons III, MD;  Location: [facility];  Service: Endoscopy;  Laterality: N/A;    ESOPHAGOGASTRODUODENOSCOPY N/A 5/5/2022    Procedure: EGD (ESOPHAGOGASTRODUODENOSCOPY);  Surgeon: Chiquita Lewis MD;  Location: [facility] (2ND FLR);  Service: Endoscopy;  Laterality: N/A;     ESOPHAGOGASTRODUODENOSCOPY N/A 5/5/2022    Procedure: EGD (ESOPHAGOGASTRODUODENOSCOPY);  Surgeon: Chiquita Lewis MD;  Location: [facility] ([address] [place] [address]);  Service: Endoscopy;  Laterality: N/A;    KIDNEY TRANSPLANT  2004    PORTACATH PLACEMENT  2003    REMOVAL OF VASCULAR ACCESS PORT  2005     MD note 5/16: Hospital Course: Pt admitted to hospital medicine for treatment of ILDEFONSO likely secondary to decompensated cirrhosis. CXR significant for prior ILD but no acute process. CT Chest w/o contrast significant for bilateral opacities concerning for pneumonia. Bronchoscopy performed on 5/04/22 for further evaluation of lung pathology seen on CT lung scan. Ascitic fluid aerobic culture positive for E.Faecalis. 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 Currently on posaconazole for blasto Ag/fungitell (awaiting other fungal studies), primaquine and clinda for PJP, amoxicillin for SBP. Being followed by ID, pulm and Hepatology  5/14/22 : Pt had HD X 2 hours for possible uremic encephalopathy on Friday. His BP dropped during HD. Inspite of better BUN and Cr today, he is still slow, in pain. Constant nausea and vomiting. His BP 94/52. I will hold HD today since pt is hypotensive. No sign of volume overload.   5/15/22:  he is almost anuric today. He is still slow but communicate properly . KUB demonstrates partial SBO with stool throughout colon. Has NGT with 600 output. Plan for HD today with no UF for clearance. BP is stable at this point. Can use albumin and midodrine if needed.   5/16/22: last HD (05/15) 0 UF after 2 hours. Continues to be anuric.  Will hold off HD today and plan for tomorrow.  Otherwise, stable.    Chest X-Rays: Stable intrathoracic findings.  Multiple dilated air-filled loops of bowel may be due to ileus or partial/early small bowel obstruction.    Prior diet: unknown; currently with NGT    Subjective     Communicated with RN prior to entry. Per RN, patient/wife asking if he can have ice cream.    Patient awake, however, lethargic.     Pain/Comfort:  · Pain Rating 1: 0/10    Respiratory Status: nasal cannula    Objective:     Oral Musculature Evaluation  · Oral Musculature: general weakness  · Dentition: present and adequate  · Secretion Management: adequate  · Lingual Strength and Mobility: impaired strength  · Volitional Cough: weak  · Volitional Swallow: delayed  · Voice Prior to PO Intake: low volume; weak vocal quality    Bedside Swallow Eval:   Consistencies Assessed:  · Thin liquids ice chips; sips of water via teaspoon, cup, and straw   · Patient declined further consistency trials 2/2 pain with swallowing    Oral Phase:   · Slow oral transit time    Pharyngeal Phase:   · delayed swallow initation   · Facial grimacing with swallow    Compensatory Strategies  · None    Treatment: SLP provided patient education on SLP recommendations, SLP role, s/s and risks of aspiration, safe swallow precautions, and POC.     Assessment:     Edgar Jackson is a 61 y.o. male with an SLP diagnosis of Dysphagia. ST will continue to follow.     Goals:   Multidisciplinary Problems     SLP Goals        Problem: SLP    Goal Priority Disciplines Outcome   SLP Goal     SLP Ongoing, Progressing   Description: Speech Therapy Short Term Goals  Goal expected to be met by 5/30  1. Pt will participate in an ongoing assessment to determine the least restrictive and safest diet with possible updated goals to follow pending results.                     Plan:     · Patient to be seen:  3 x/week   · Plan of Care expires:  06/14/22  · Plan of Care reviewed with:  patient   · SLP Follow-Up:  Yes       Discharge recommendations:  home with home health   Barriers to Discharge:  None    Time Tracking:     SLP Treatment Date:   05/16/22  Speech Start Time:  1309  Speech Stop Time:  1321     Speech Total Time (min):  12 min    Billable Minutes: Eval Swallow and Oral Function 12    05/16/2022

## 2022-05-16 NOTE — ASSESSMENT & PLAN NOTE
In the setting of having received imodium for diarrhea that occurred earlier in his inpatient stay. Imodium discontinued 5/12, but patient with minimal stools since. KUB demonstrating early/partial small bowel obstruction vs ileus. CT Abd showing transition point in LUQ. No overt distension/abdominal pain at this time.   - NGT inserted, patient on LIWS initially. Will trial TF today as he had bowel movement on his own 5/15.  - Speech saw patient, cleared him for pleasure feeds/thin liquids. Placed order for CLD so patient can request broth and other thin liquids from cafeteria per wife request  - Observe for additional abdominal distension with TF/CLD  - Bisacodyl suppository ordered prn

## 2022-05-16 NOTE — ASSESSMENT & PLAN NOTE
Unclear precipitant. Patient complained of urinary urgency and incomplete voiding on 5/12, he was bladder scanned and had jacinto placed with 1L urine return immediately. Patient continues to produce urine  - Remove jacinto today

## 2022-05-16 NOTE — PHYSICIAN QUERY
PT Name: Edgar Jackson  MR #: [id number]    DOCUMENTATION CLARIFICATION     CDS/: Cheryl Dial RN              Contact information: [email or web address]  This form is a permanent document in the medical record.     Query Date: May 16, 2022    By submitting this query, we are merely seeking further clarification of documentation. Please utilize your independent clinical judgment when addressing the question(s) below.    The Medical Record contains the following:   Indicators   Supporting Clinical Findings Location in Medical Record   x AMS, Confusion,  LOC, etc.  Pt confused, appears encephalopathic  Will get Ammonia.  Lactulose ordered    Appears more confused overnights  + confused    Positive for confusion and decreased concentration       he is almost anuric today. He is still slow but communicate properly   : He is oriented to person, place, and time   5/12 Hosp Med MD PN- event note    5/12 Transplant Kidney MD PN    5/12 Pulm MD PN      5/15 Transplant MD PN   x Acute/Chronic Illness 5/14/22 : Pt had HD X 2 hours for possible uremic encephalopathy on Friday.      admitted to hospital medicine for treatment of ILDEFONSO likely secondary to decompensated cirrhosis  ILDEFONSO  Blastomycosis  PJP  Cirrhosis of liver with ascites     Notifed DR. Pelayo of possible hepatic encpehalopathy   5/15 Transplant Kidney MD PN                5/12 Transplant MD PN      Radiology Findings      ElectrcImbalance Ammonia=26    Bun/cr= 95/ 5.1  Bun/cr= 102/ 5.3  Bun/cr= 104/5.1  Bun/cr= 73/ 3.9 5/12-15  Lab     x Medication Vancomycin IVPB 5/3-    Amipcillin IVPB 5/6-5/9   Clindamycin IVPB 5/6- 5/15  Amoxicillin  8/9-12  Cefepime IVPB 5/1- 16  Lactulose 15G PO x 10 doses 5/12-13  Lactulose 2g G x 2 doses 5/12 MAR   x Treatment         HD today on 5/12 due to uremic symptoms, with no UF    Dialysis   BMP, CBC  BLood culture  Urine culture  CXR   5/12 Transplant MD PN     x Other Uremic sympoms    Plan to initiate hemodialysis once  Line established for Uremic symptoms   5/14 Transplant MD PN    5/13 Transplant MD PN         The noted clinical guidelines are only system guidelines and do not replace the providers clinical judgment.    The National [place] of Neurologic Disorders and Stroke (NINDS) of the NIH describes encephalopathy as any diffuse disease of the brain that alters brain function or structure.    Provider- If possible- please further specify type of encephalopathy:    [  x ] Metabolic Encephalopathy -   Due to electrolyte imbalance, metabolic derangements, or infectious processes, includes Septic Encephalopathy, Uremic Encephalopathy     [   ] Acute Hepatic Encephalopathy - Due to liver disease/failure     [   ] Encephalopathy, unspecified        [   ] Other Encephalopathy (please specify): ____________________     [   ] Other  (please specify condition): __________     [   ]  Clinically Undetermined     Present on Admission (POA) Status:    [   ] Yes (Y)   [   ] No (N)   [   ] Clinically Undetermined (W)   [   ] Documentation insufficient to determine if condition is POA (U)     Please document in your progress notes daily for the duration of treatment until resolved, and include in your discharge summary.    References:  EMILEE Spears RN, CCDS. (2018, June 9). Notes from the Instructor: Encephalopathy tips. Retrieved October 22, 2020, from [email or web address]-[email or web address]-tips    ICD-9-CM Coding Clinic First Quarter 2013, Effective with discharges: October 21, 2013 [facility] § Seizure with encephalopathy due to postictal state (2013).    ICD-10-CM/PCS [employer] Integrated Codebook (Version V.20.8.10.0) [Computer software]. (2020). Retrieved October 21, 2020.    National [place] of Neurological Disorders and Stroke. (2019, March 27). Retrieved October 22, 2020, from https://www.ninds.nih.gov/Disorders/All-Disorders/Njppmjcdttfgfe-Soohjpxsfjd-Mcui    Form No. 51168

## 2022-05-16 NOTE — ASSESSMENT & PLAN NOTE
Patient with acute kidney injury likely d/t IVVD/Dehydration Which is currently stable. Labs reviewed- Renal function/electrolytes with Estimated Creatinine Clearance: 16.5 mL/min (A) (based on SCr of 4.2 mg/dL (H)). according to latest data. Monitor urine output and serial BMP and adjust therapy as needed. Avoid nephrotoxins and renally dose meds for GFR listed above. Ultrasound transplant kidney no acute changes. Possibly due to tacrolimus versus hepatorenal syndrome, Nephrology has been consulted. Toprolol continued.  Bicarb ordered for low serum levels. Cr/GFR to be trended for 6 weeks for evaluation.  - KTM following.   - hold sirolimus and tacrolimus  - trend daily BMP/RFP  - Had UF w/ HD 5/13 without fluid removal. HD planned for tomorrow

## 2022-05-16 NOTE — PROGRESS NOTES
[facility] - Transplant Stepdown  Endocrinology  Progress Note    Admit Date: 4/30/2022     Reason for Consult: Management of T2DM, Hyperglycemia     Surgical Procedure and Date: Pt undergoing evaluation for possible liver/kidney transplant    Diabetes diagnosis year: 2 years ago (2022 per pt)    Home Diabetes Medications:  Metformin 500 mg PO BID, long acting insulin 30 units daily    How often checking glucose at home?  Does not check    BG readings on regimen: Does not check his BG levels   Hypoglycemia on the regimen?  No (reports no symptoms of hypoglycemia, but stated he does not check)  Missed doses on regimen?  No    Diabetes Complications include:     Hyperglycemia    Complicating diabetes co morbidities:   Active Cancer      HPI:   Patient is a 61 y.o. male with a diagnosis of renal transplant for hypertensive nephropathy (2004) on immunosuppressive therapy, LOPEZ cirrhosis, DM2, gout, BPH and ILD who was treated at [facility] ([facility]) for ILDEFONSO 2/2 decompensated cirrhosis and worsening portal vein hypertension with HRS. He has been having weekly paracentesis in the past month.     Pt reported that he had been experiencing abdominal distention with associated diarrhea (4-5/day) over several weeks, along with a chronic cough, weight loss, and found to be febrile.     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  Blasto urine Ag positive also.    Pt was started on PJP treatment with steroid taper leading to BG excursion. Endocrine consulted for BG management.   -Steroid taper starting 5/6/2022: Prednisone 40mg BID days 1-5, then 40mg daily days 6-10, then 20mg daily days 11-21         Interval HPI:   Overnight events: No acute events overnight. Patient on the TSU in room 85952/03926 A. Blood glucose worsening. BG at, above, and below goal on current insulin regimen (SSI, prandial, and basal insulin ). Steroid use-  "Prednisone 40 mg QD. 11 Days Post-Op  Renal function- Abnormal - Creatinine 4.2   Vasopressors-  None       Diet NPO     Eating:   NPO  Nausea: No  Hypoglycemia and intervention: No   Fever: No  TPN and/or TF: No    /81   Pulse 64   Temp 98.4 °F (36.9 °C) (Axillary)   Resp 16   Ht 5' 8" (1.727 m)   Wt 62.4 kg (137 lb 9.1 oz)   SpO2 97%   BMI 20.92 kg/m²     Labs Reviewed and Include    Recent Labs   Lab 05/16/22  0414   *   CALCIUM 7.8*   ALBUMIN 2.6*   PROT 4.7*      K 3.8   CO2 27      BUN 75*   CREATININE 4.2*   ALKPHOS 74   ALT 8*   AST 59*   BILITOT 11.3*     Lab Results   Component Value Date    WBC 14.97 (H) 05/16/2022    HGB 6.7 (L) 05/16/2022    HCT 20.1 (L) 05/16/2022    MCV 89 05/16/2022    PLT 44 (L) 05/16/2022     No results for input(s): TSH, FREET4 in the last 168 hours.  Lab Results   Component Value Date    HGBA1C 6.0 (H) 05/10/2022       Nutritional status:   Body mass index is 20.92 kg/m².  Lab Results   Component Value Date    ALBUMIN 2.6 (L) 05/16/2022    ALBUMIN 2.4 (L) 05/15/2022    ALBUMIN 2.5 (L) 05/14/2022     No results found for: PREALBUMIN    Estimated Creatinine Clearance: 16.5 mL/min (A) (based on SCr of 4.2 mg/dL (H)).    Accu-Checks  Recent Labs     05/14/22  0205 05/14/22  1148 05/14/22  1811 05/14/22  2145 05/15/22  0205 05/15/22  0914 05/15/22  1354 05/15/22  1733 05/15/22  2142 05/16/22  0815   POCTGLUCOSE 142* 202* 217* 261* 234* 233* 154* 225* 178* 151*       Current Medications and/or Treatments Impacting Glycemic Control  Immunotherapy:    Immunosuppressants       None          Steroids:   Hormones (From admission, onward)                Start     Stop Route Frequency Ordered    05/17/22 0900  predniSONE tablet 20 mg        "Followed by" Linked Group Details    05/28 0859 PER NG TUBE Daily 05/16/22 1039    05/12/22 0900  predniSONE tablet 40 mg        "Followed by" Linked Group Details    05/17 0859 Oral Daily 05/09/22 1719          Pressors:  "   Autonomic Drugs (From admission, onward)                Start     Stop Route Frequency Ordered    05/14/22 0818  midodrine tablet 5 mg         -- Oral Daily PRN 05/14/22 0719          Hyperglycemia/Diabetes Medications:   Antihyperglycemics (From admission, onward)                Start     Stop Route Frequency Ordered    05/15/22 1115  insulin detemir U-100 pen 12 Units         -- SubQ Daily 05/15/22 1001    05/10/22 1915  insulin aspart U-100 pen 3-6 Units         -- SubQ 3 times daily after meals 05/10/22 1506    05/09/22 2204  insulin aspart U-100 pen 1-10 Units         -- SubQ Before meals & nightly PRN 05/09/22 2105            ASSESSMENT and PLAN    * ILDEFONSO (acute kidney injury)  Caution with aggressive insulin adjustments to reduce risk of hypoglycemia    Type 2 diabetes mellitus without complication, without long-term current use of insulin  Endocrinology consulted for BG management.   BG goal 140-180    - Levemir 12 units daily.   - Novolog (aspart) insulin 3-6 Units SQ TIDWM and prn for BG excursions LDC SSI (150/25)   - BG checks AC/HS  - Hypoglycemia protocol in place  - If blood glucose greater than 300, please ask patient not to eat food or drink anything other than water until correctional insulin has brought it back below 250    ** Please notify Endocrine for any change and/or advance in diet**  ** Please call Endocrine for any BG related issues **    Discharge Planning:   TBD. Please notify endocrinology prior to discharge.      Hypothyroidism  Pt with Hx of hypothyroidism   Taking LT4 50 mcg PO daily  - TSH 2.4 on 04/30/2022  - Continue home regimen  - Recommend rechecking TFTs in 4-6 weeks outpatient         Zheng Parks, DNP, FNP  Endocrinology  [facility] - Transplant Stepdown

## 2022-05-16 NOTE — PLAN OF CARE
Patient is AA, disoriented to time and situation. NGT was to LIWS, patient started on TFs. Patient is receiving 1 unit of PRBCs. Oliveira catheter CDI. Speech therapy consulted. Patient remains NPO but can have thin liquids for pleasure. Monitoring the patient's blood sugars A&HS. Assisting the patient with turning every 2 hours. Waffle mattress in place. Reminded the patient to call for assistance. Call light and personal items are within reach.

## 2022-05-16 NOTE — PLAN OF CARE
AAO PERSON  VVS  ACCU CHECK AC/HS   NPO, NG TUBE TO L NARE TO LIWS  VISI   DECREASED URINE OUT PUT  NO BM   O2 AT 4 L   BED LOW CALL LIGHT WITHIN REACH, VERBAL UNDERSTANDING TO CALL PER WIFE

## 2022-05-17 PROBLEM — E87.1 HYPONATREMIA: Status: RESOLVED | Noted: 2022-01-01 | Resolved: 2022-01-01

## 2022-05-17 NOTE — ASSESSMENT & PLAN NOTE
Endocrinology consulted for BG management.   BG goal 140-180    - Increase Levemir to 14 units daily.   - Novolog (aspart) insulin 3-6 Units SQ TIDWM and prn for BG excursions LDC SSI (150/25)   - BG checks AC/HS  - Hypoglycemia protocol in place  - If blood glucose greater than 300, please ask patient not to eat food or drink anything other than water until correctional insulin has brought it back below 250    ** Please notify Endocrine for any change and/or advance in diet**  ** Please call Endocrine for any BG related issues **    Discharge Planning:   TBD. Please notify endocrinology prior to discharge.

## 2022-05-17 NOTE — PROGRESS NOTES
[facility] - Transplant Stepdown  Endocrinology  Progress Note    Admit Date: 4/30/2022     Reason for Consult: Management of T2DM, Hyperglycemia     Surgical Procedure and Date: Pt undergoing evaluation for possible liver/kidney transplant    Diabetes diagnosis year: 2 years ago (2022 per pt)    Home Diabetes Medications:  Metformin 500 mg PO BID, long acting insulin 30 units daily    How often checking glucose at home?  Does not check    BG readings on regimen: Does not check his BG levels   Hypoglycemia on the regimen?  No (reports no symptoms of hypoglycemia, but stated he does not check)  Missed doses on regimen?  No    Diabetes Complications include:     Hyperglycemia    Complicating diabetes co morbidities:   LOPEZ and CIRRHOSIS, ILDEFONSO/CKD      HPI:   Patient is a 61 y.o. male with a diagnosis of renal transplant for hypertensive nephropathy (2004) on immunosuppressive therapy, LOPEZ cirrhosis, DM2, gout, BPH and ILD who was treated at [facility] ([facility]) for ILDEFONSO 2/2 decompensated cirrhosis and worsening portal vein hypertension with HRS. He has been having weekly paracentesis in the past month.     Pt reported that he had been experiencing abdominal distention with associated diarrhea (4-5/day) over several weeks, along with a chronic cough, weight loss, and found to be febrile.     Pt was transferred to [facility] for evaluation of possible liver/kidney transplant     CT with opacifications, reverse halo sign, found to have PJP.  Blasto urine Ag positive also.    Pt was started on PJP treatment with steroid taper leading to BG excursion. Endocrine consulted for BG management.   -Steroid taper starting 5/6/2022: Prednisone 40mg BID days 1-5, then 40mg daily days 6-10, then 20mg daily days 11-21         Interval HPI:   Overnight events:  BG slightly above goal this AM. BG stable yesterday while on current SQ insulin regimen. TF stopped this morning secondary to high residuals.   Diet clear liquid  12 Days  "Post-Op    Eating:   < 25%  Nausea: No  Hypoglycemia and intervention: No  Fever: No  TPN and/or TF: Yes  If yes, type of TF/TPN and rate: TF held (previous rate of 50 ml/hr).    /89   Pulse 66   Temp 97 °F (36.1 °C) (Axillary)   Resp 17   Ht 5' 8" (1.727 m)   Wt 62.4 kg (137 lb 9.1 oz)   SpO2 98%   BMI 20.92 kg/m²     Labs Reviewed and Include    Recent Labs   Lab 05/17/22  0406   *   CALCIUM 8.1*   ALBUMIN 2.6*   PROT 5.1*      K 4.2   CO2 25      BUN 94*   CREATININE 4.9*   ALKPHOS 92   ALT 9*   AST 64*   BILITOT 14.8*     Lab Results   Component Value Date    WBC 16.11 (H) 05/17/2022    HGB 9.4 (L) 05/17/2022    HCT 27.4 (L) 05/17/2022    MCV 85 05/17/2022    PLT 44 (L) 05/16/2022     No results for input(s): TSH, FREET4 in the last 168 hours.  Lab Results   Component Value Date    HGBA1C 6.0 (H) 05/10/2022       Nutritional status:   Body mass index is 20.92 kg/m².  Lab Results   Component Value Date    ALBUMIN 2.6 (L) 05/17/2022    ALBUMIN 2.6 (L) 05/16/2022    ALBUMIN 2.4 (L) 05/15/2022     No results found for: PREALBUMIN    Estimated Creatinine Clearance: 14.1 mL/min (A) (based on SCr of 4.9 mg/dL (H)).    Accu-Checks  Recent Labs     05/15/22  0914 05/15/22  1354 05/15/22  1733 05/15/22  2142 05/16/22  0815 05/16/22  1149 05/16/22  1639 05/16/22  2036 05/17/22  0828 05/17/22  1227   POCTGLUCOSE 233* 154* 225* 178* 151* 129* 118* 133* 204* 202*       Current Medications and/or Treatments Impacting Glycemic Control  Immunotherapy:    Immunosuppressants       None          Steroids:   Hormones (From admission, onward)                Start     Stop Route Frequency Ordered    05/17/22 0900  predniSONE tablet 20 mg        "Followed by" Linked Group Details    05/28 0859 PER NG TUBE Daily 05/16/22 1039    05/12/22 0900  predniSONE tablet 40 mg        "Followed by" Linked Group Details    05/17 0859 Oral Daily 05/09/22 1719          Pressors:    Autonomic Drugs (From admission, " onward)                Start     Stop Route Frequency Ordered    05/16/22 1353  midodrine tablet 5 mg         -- PER NG TUBE Daily PRN 05/16/22 1354          Hyperglycemia/Diabetes Medications:   Antihyperglycemics (From admission, onward)                Start     Stop Route Frequency Ordered    05/15/22 1115  insulin detemir U-100 pen 12 Units         -- SubQ Daily 05/15/22 1001    05/10/22 1915  insulin aspart U-100 pen 3-6 Units         -- SubQ 3 times daily after meals 05/10/22 1506    05/09/22 2204  insulin aspart U-100 pen 1-10 Units         -- SubQ Before meals & nightly PRN 05/09/22 2105            ASSESSMENT and PLAN    * ILDEFONSO (acute kidney injury)  Caution with aggressive insulin adjustments to reduce risk of hypoglycemia    Type 2 diabetes mellitus without complication, without long-term current use of insulin  Endocrinology consulted for BG management.   BG goal 140-180    - Increase Levemir to 14 units daily.   - Novolog (aspart) insulin 3-6 Units SQ TIDWM and prn for BG excursions LDC SSI (150/25)   - BG checks AC/HS  - Hypoglycemia protocol in place  - If blood glucose greater than 300, please ask patient not to eat food or drink anything other than water until correctional insulin has brought it back below 250    ** Please notify Endocrine for any change and/or advance in diet**  ** Please call Endocrine for any BG related issues **    Discharge Planning:   TBD. Please notify endocrinology prior to discharge.      Anemia  May affect accuracy of HbA1c results.                 Randal Lopez, NP  Endocrinology  [facility] - Transplant Stepdown

## 2022-05-17 NOTE — PROGRESS NOTES
Pt arrived to unit via bed. Pt awake alert, wife at bedside, NG tube intact and connected to LIWS upon arrival to unit. HD started to right IJ. Pt with complaints of abdominal pain and back pain. Assisted pt with repositioning. Pt received tylenol for pain prior to arriving on unit.

## 2022-05-17 NOTE — SUBJECTIVE & OBJECTIVE
Subjective:   History of Present Illness:  Edgar Jackson is a 61 y.o. male w/ PMHx of renal transplant for hypertensive nephropathy (2004) immunosuppressed on tacrolimus/sirolimus,  therapy, LOPEZ cirrhosis, NIDDM, and ILD who was treated at OSH for ILDEFONSO 2/2 decompensated cirrhosis and worsening portal vein hypertension with HRS.   Pt reported that he had been experiencing abdominal distention with associated diarrhea (4-5/day) over several weeks, along with a constant nagging cough.  He has been having weekly paracentesis in the past month.   Despite efforts to manage symptoms, pt in need of  transplant surgery evaluation of the renal/hepatic injury, resulting in tranfer to [facility] [place].  He reports no recent NSAID use.       Hospital Course:  Pt admitted to hospital medicine for treatment of ILDEFONSO likely secondary to decompensated cirrhosis. CXR significant for prior ILD but no acute process. CT Chest w/o contrast significant for bilateral opacities concerning for pneumonia. Bronchoscopy performed on 5/04/22 for further evaluation of lung pathology seen on CT lung scan. Ascitic fluid aerobic culture positive for E.Faecalis. 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 Currently on posaconazole for blasto Ag/fungitell (awaiting other fungal studies), primaquine and clinda for PJP, amoxicillin for SBP. Being followed by ID, pulm and Hepatology      5/14/22 : Pt had HD X 2 hours for possible uremic encephalopathy on Friday. His BP dropped during HD. Inspite of better BUN and Cr today, he is still slow, in pain. Constant nausea and vomiting. His BP 94/52. I will hold HD today since pt is hypotensive. No sign of volume overload.     5/15/22:  he is almost anuric today. He is still slow but communicate properly . KUB demonstrates partial SBO with stool throughout colon. Has NGT with 600 output. Plan for HD today with no UF for clearance. BP is stable at this point. Can use albumin and midodrine if needed.     5/16/22: last HD (05/15) 0  UF after 2 hours. Continues to be anuric.  Will hold off HD today and plan for tomorrow.  Otherwise, stable.    5/17/22: Anuric.  Appears more somnolent.  Renal function continues to worsen, will plan for HD.      Past Medical, Surgical, Family, and Social History:   Unchanged from H&P.    Scheduled Meds:   allopurinoL  100 mg Per NG tube Daily    amoxicillin  500 mg Per NG tube Daily    calcitrioL  0.25 mcg Per NG tube Daily    clindamycin (CLEOCIN) IVPB  900 mg Intravenous Q8H    insulin aspart U-100  3-6 Units Subcutaneous TID PC    insulin detemir U-100  12 Units Subcutaneous Daily    lactulose  20 g Per NG tube TID    levothyroxine  50 mcg Per NG tube Before breakfast    LIDOcaine  1 patch Transdermal Q24H    posaconazole  300 mg Oral Daily    predniSONE  20 mg Per NG tube Daily    [START ON 5/18/2022] primaquine  52.6 mg Per NG tube Daily     Continuous Infusions:  PRN Meds:(Magic mouthwash) 1:1:1 diphenhydramine(Benadryl) 12.5mg/5ml liq, aluminum & magnesium hydroxide-simethicone (Maalox), LIDOcaine viscous 2%, sodium chloride, sodium chloride 0.9%, acetaminophen, albuterol-ipratropium, bisacodyL, dextromethorphan-guaiFENesin  mg/5 ml, dextrose 10%, dextrose 10%, fluticasone propionate, glucagon (human recombinant), glucose, glucose, glucose, insulin aspart U-100, midodrine, naloxone, ondansetron, sodium chloride 0.9%    Intake/Output - Last 3 Shifts         05/15 0700  05/16 0659 05/16 0700 05/17 0659 05/17 0700 05/18 0659    P.O. 0 0 0    I.V. (mL/kg) 0 (0)      Blood  450     Other 200      NG/ 330 300    IV Piggyback 129.4 187.8 50    Total Intake(mL/kg) 429.4 (6.9) 967.8 (15.5) 350 (5.6)    Urine (mL/kg/hr) 95 (0.1) 35 (0)     Emesis/NG output 0 0     Drains   40    Other 950 0     Stool 0 0     Blood 0 0     Total Output 1045 35 40    Net -615.6 +932.8 +310           Urine Occurrence 0 x      Stool Occurrence 0 x 0 x     Emesis Occurrence 0 x               Review of Systems   Objective:  "    Vital Signs (Most Recent):  Temp: 97 °F (36.1 °C) (05/17/22 1159)  Pulse: 66 (05/17/22 1200)  Resp: 17 (05/17/22 1200)  BP: 135/89 (05/17/22 1200)  SpO2: 98 % (05/17/22 1200) Vital Signs (24h Range):  Temp:  [97 °F (36.1 °C)-98.9 °F (37.2 °C)] 97 °F (36.1 °C)  Pulse:  [60-70] 66  Resp:  [14-21] 17  SpO2:  [95 %-98 %] 98 %  BP: (104-146)/(68-95) 135/89     Weight: 62.4 kg (137 lb 9.1 oz)  Height: 5' 8" (172.7 cm)  Body mass index is 20.92 kg/m².    Physical Exam  Vitals and nursing note reviewed.   Constitutional:       General: He is not in acute distress.     Appearance: Normal appearance. He is ill-appearing. He is not toxic-appearing.   HENT:      Head: Normocephalic and atraumatic.      Right Ear: External ear normal.      Left Ear: External ear normal.      Nose: Nose normal.   Eyes:      General: No scleral icterus.        Right eye: No discharge.         Left eye: No discharge.      Extraocular Movements: Extraocular movements intact.      Conjunctiva/sclera: Conjunctivae normal.   Cardiovascular:      Rate and Rhythm: Normal rate and regular rhythm.      Pulses: Normal pulses.      Heart sounds: Normal heart sounds. No murmur heard.    No friction rub. No gallop.   Pulmonary:      Effort: Pulmonary effort is normal. No respiratory distress.      Breath sounds: Normal breath sounds. No wheezing, rhonchi or rales.   Abdominal:      General: Bowel sounds are normal. There is no distension.      Palpations: Abdomen is soft.      Tenderness: There is no abdominal tenderness. There is no rebound.   Musculoskeletal:      Cervical back: Normal range of motion. No rigidity.      Right lower leg: No edema.      Left lower leg: No edema.   Skin:     Capillary Refill: Capillary refill takes less than 2 seconds.   Neurological:      General: No focal deficit present.      Mental Status: He is alert. Mental status is at baseline.   Psychiatric:         Mood and Affect: Mood normal.         Behavior: Behavior normal.   "       Thought Content: Thought content normal.         Judgment: Judgment normal.       Laboratory:  CBC:   Recent Labs   Lab 05/15/22  0453 05/16/22  0414 05/16/22  0724 05/17/22  0406   WBC 15.02* 13.93* 14.97* 16.11*   RBC 2.62* 2.17* 2.23* 3.24*   HGB 7.6* 6.6* 6.7* 9.4*   HCT 23.1* 19.9* 20.1* 27.4*   PLT 54* 43* 44*  --    MCV 88 92 89 85   MCH 29.0 30.4 30.0 29.0   MCHC 32.9 33.2 33.8 34.3     CMP:   Recent Labs   Lab 05/15/22  0453 05/16/22  0414 05/17/22  0406   * 138* 143*   CALCIUM 7.8* 7.8* 8.1*   ALBUMIN 2.4* 2.6* 2.6*   PROT 4.8* 4.7* 5.1*    139 143   K 4.1 3.8 4.2   CO2 25 27 25   CL 98 102 103   BUN 95* 75* 94*   CREATININE 5.6* 4.2* 4.9*   ALKPHOS 77 74 92   ALT 10 8* 9*   AST 62* 59* 64*

## 2022-05-17 NOTE — CARE UPDATE
RAPID RESPONSE NURSE ROUND       Rounding completed with charge RN, Jeffery. No concerns verbalized at this time. Instructed to call [phone] for further concerns or assistance.

## 2022-05-17 NOTE — PLAN OF CARE
AAOX2   VVS  NG TUBE TO RIGHT NARE WITH FEEDING GOING . NO PROBLEMS WITH FEEDS NOTED   WOUND NOTED AND PLACED ON LDA. WOUND CARE CONSULTS PLACED  ACCU CHECKS AC/HS  BED LOW CALL LIGHT WITH IN REACH , BED RAILS X 2 ,

## 2022-05-17 NOTE — SUBJECTIVE & OBJECTIVE
"Interval HPI:   Overnight events:  BG slightly above goal this AM. BG stable yesterday while on current SQ insulin regimen. TF stopped this morning secondary to high residuals.   Diet clear liquid  12 Days Post-Op    Eating:   < 25%  Nausea: No  Hypoglycemia and intervention: No  Fever: No  TPN and/or TF: Yes  If yes, type of TF/TPN and rate: TF held (previous rate of 50 ml/hr).    /89   Pulse 66   Temp 97 °F (36.1 °C) (Axillary)   Resp 17   Ht 5' 8" (1.727 m)   Wt 62.4 kg (137 lb 9.1 oz)   SpO2 98%   BMI 20.92 kg/m²     Labs Reviewed and Include    Recent Labs   Lab 05/17/22  0406   *   CALCIUM 8.1*   ALBUMIN 2.6*   PROT 5.1*      K 4.2   CO2 25      BUN 94*   CREATININE 4.9*   ALKPHOS 92   ALT 9*   AST 64*   BILITOT 14.8*     Lab Results   Component Value Date    WBC 16.11 (H) 05/17/2022    HGB 9.4 (L) 05/17/2022    HCT 27.4 (L) 05/17/2022    MCV 85 05/17/2022    PLT 44 (L) 05/16/2022     No results for input(s): TSH, FREET4 in the last 168 hours.  Lab Results   Component Value Date    HGBA1C 6.0 (H) 05/10/2022       Nutritional status:   Body mass index is 20.92 kg/m².  Lab Results   Component Value Date    ALBUMIN 2.6 (L) 05/17/2022    ALBUMIN 2.6 (L) 05/16/2022    ALBUMIN 2.4 (L) 05/15/2022     No results found for: PREALBUMIN    Estimated Creatinine Clearance: 14.1 mL/min (A) (based on SCr of 4.9 mg/dL (H)).    Accu-Checks  Recent Labs     05/15/22  0914 05/15/22  1354 05/15/22  1733 05/15/22  2142 05/16/22  0815 05/16/22  1149 05/16/22  1639 05/16/22  2036 05/17/22  0828 05/17/22  1227   POCTGLUCOSE 233* 154* 225* 178* 151* 129* 118* 133* 204* 202*       Current Medications and/or Treatments Impacting Glycemic Control  Immunotherapy:    Immunosuppressants       None          Steroids:   Hormones (From admission, onward)                Start     Stop Route Frequency Ordered    05/17/22 0900  predniSONE tablet 20 mg        "Followed by" Linked Group Details    05/28 0859 PER NG " "TUBE Daily 05/16/22 1039    05/12/22 0900  predniSONE tablet 40 mg        "Followed by" Linked Group Details    05/17 0859 Oral Daily 05/09/22 1719          Pressors:    Autonomic Drugs (From admission, onward)                Start     Stop Route Frequency Ordered    05/16/22 1353  midodrine tablet 5 mg         -- PER NG TUBE Daily PRN 05/16/22 1354          Hyperglycemia/Diabetes Medications:   Antihyperglycemics (From admission, onward)                Start     Stop Route Frequency Ordered    05/15/22 1115  insulin detemir U-100 pen 12 Units         -- SubQ Daily 05/15/22 1001    05/10/22 1915  insulin aspart U-100 pen 3-6 Units         -- SubQ 3 times daily after meals 05/10/22 1506    05/09/22 2204  insulin aspart U-100 pen 1-10 Units         -- SubQ Before meals & nightly PRN 05/09/22 2105          "

## 2022-05-17 NOTE — PROGRESS NOTES
Patient c/o back pain. Dr Campos notified. LIWS stop, Tylenol given per orders. Will turn suction back on in 30 minutes. Patient began to vomit shortly after. Suction turned on 250cc in the canister. Dr Campos notified, new orders noted for phenergan. Trans port at the bedside to take the patient to HD. Ok to send the patient to HD at this time, HD RN will give the patient phenergan. Ok to give the patient the brown bomb enema when he returns from HD. Patient and his wife are aware of the plan.

## 2022-05-17 NOTE — PROGRESS NOTES
[facility] - Transplant Stepdown  Adult Nutrition  Progress Note    SUMMARY       Recommendations    1. When medically able, resume TF Novasource Renal @ goal rate of 35ml/hr to provide 1680 kcal, 76g protein and 602ml fluid    2. Continue clear liquid diet for pleasure feed    3. RD to monitor and follow    Goals: Meet % of EEN, EPN  Nutrition Goal Status: progressing towards goal  Communication of RD Recs: reviewed with RN    Assessment and Plan    Severe malnutrition  Nutrition Problem  Severe protein-calorie malnutrition     Related to (etiology):   Chronic illness     Signs and Symptoms (as evidenced by):   Wt loss of 28 lb (17.5%) in 3 months  Energy intake <75% of EEN for >3 months  Moderate muscle mass depletion (temples, clavicles, shoulders, thigh, calf)     Interventions/Recommendations (treatment strategy):  Collaboration with other providers     Nutrition Diagnosis Status:   Continues     Malnutrition Assessment  Malnutrition Type: chronic illness  Energy Intake: severe energy intake      Weight Loss (Malnutrition): greater than 7.5% in 3 months  Energy Intake (Malnutrition): less than 75% for greater than or equal to 3 months  Muscle Mass (Malnutrition): moderate depletion   Orbital Region (Subcutaneous Fat Loss): well nourished  Upper Arm Region (Subcutaneous Fat Loss): well nourished   [place] Region (Muscle Loss): moderate depletion  Clavicle Bone Region (Muscle Loss): moderate depletion  Clavicle and Acromion Bone Region (Muscle Loss): moderate depletion  Dorsal Hand (Muscle Loss): moderate depletion  Anterior Thigh Region (Muscle Loss): mild depletion  Posterior Calf Region (Muscle Loss): mild depletion   Edema (Fluid Accumulation): 1-->trace   Subcutaneous Fat Loss (Final Summary): well nourished  Muscle Loss Evaluation (Final Summary): moderate protein-calorie malnutrition    Severe Weight Loss (Malnutrition): greater than 7.5% in 3 months    Reason for Assessment    Reason For Assessment: RD  "follow-up  Diagnosis:  (Renal disease, liver disease)  Relevant Medical History: ILDEFONSO, T2DM, kidney Tx   Interdisciplinary Rounds: did not attend    General Information Comments:   S/p kidney Tx (). Decreased appetite over the past 3 months . The UBW was 160lb according to family, indicated a significant wt loss of 28 lb (17.5%) in 3 months. NFPE completed 22, pt has moderate muscle mass depletion. 1+ generalize edema per chart. Pt met the criteria for severe malnutrition related to chronic illness.   TF running at 50ml/hr during RD visit. Pt vomited, stated that he vomited 2-3 times a day and feeling bloated, stated that the TF was "feeding too fast". Per SLP, pt allow to have thin liquid for pleasure feeds.      Nutrition Discharge Planning: Pending medical course.    Nutrition Risk Screen    Nutrition Risk Screen: no indicators present    Nutrition/Diet History    Spiritual, Cultural Beliefs, Christian Practices, Values that Affect Care: no  Factors Affecting Nutritional Intake: decreased appetite, nausea/vomiting    Anthropometrics    Temp: 97 °F (36.1 °C)  Height Method: Stated  Height: 5' 8" (172.7 cm)  Height (inches): 68 in  Weight Method: Bed Scale  Weight: 62.4 kg (137 lb 9.1 oz)  Weight (lb): 137.57 lb  Ideal Body Weight (IBW), Male: 154 lb  % Ideal Body Weight, Male (lb): 89.33 %  BMI (Calculated): 20.9  Usual Body Weight (UBW), k kg  % Usual Body Weight: 82.5    Lab/Procedures/Meds    Pertinent Labs Reviewed: reviewed  Pertinent Labs Comments: BUN 94, Cr 4.9, EGFR 13.8, Glucose 143, P 6.6, AST 64. ALT 9    Pertinent Medications Reviewed: reviewed  Pertinent Medications Comments: calcitriol, insulin aspart, insulin determir, lactulose, prednisone    Estimated/Assessed Needs    Weight Used For Calorie Calculations: 62.4 kg (137 lb 9.1 oz)  Energy Calorie Requirements (kcal): 4388-4802 kcal  Energy Need Method: Kcal/kg (25-30 kcal/kg)     Protein Requirements: 64-77 g (1-1.2g/kg)  Weight " Used For Protein Calculations: 64.2 kg (141 lb 8.6 oz)     Fluid Requirements (mL): per MD  Estimated Fluid Requirement Method: RDA Method  RDA Method (mL): 1560     CHO Requirement: 195-234g/day    Nutrition Prescription Ordered    Current Diet Order: CLD  Current Nutrition Support Formula Ordered: Novasource Renal  Current Nutrition Support Rate Ordered: 50 (ml)  Current Nutrition Support Frequency Ordered: ml/hr    Evaluation of Received Nutrient/Fluid Intake    Enteral Calories (kcal): 2400  Enteral Protein (gm): 109  Enteral (Free Water) Fluid (mL): 860  I/O: +5.1 L since 5/3  Energy Calories Required: exceeds needs  Protein Required: exceeds needs  Fluid Required: not meeting needs  Comments: LBM 5/15  Tolerance: not tolerating  % Intake of Estimated Energy Needs: 133%  % Meal Intake: 0%    Nutrition Risk    Level of Risk/Frequency of Follow-up: low     Monitor and Evaluation    Food and Nutrient Intake: enteral nutrition intake, food and beverage intake, energy intake  Food and Nutrient Adminstration: enteral and parenteral nutrition administration, diet order  Knowledge/Beliefs/Attitudes: food and nutrition knowledge/skill, beliefs and attitudes  Physical Activity and Function: nutrition-related ADLs and IADLs  Anthropometric Measurements: height/length, weight, weight change, body mass index  Biochemical Data, Medical Tests and Procedures: electrolyte and renal panel, gastrointestinal profile, glucose/endocrine profile, lipid profile, inflammatory profile  Nutrition-Focused Physical Findings: overall appearance     Nutrition Follow-Up    RD Follow-up?: Yes    Aubree ESCAMILLA-ZAY

## 2022-05-17 NOTE — PT/OT/SLP PROGRESS
Occupational Therapy      Patient Name:  Edgar Jackson   MRN:  [id number]    Patient not seen today secondary to Pt complaining of increased fatigue and nausea this date. Educated on the importance of getting OOB to reduce further weakness. Pt then began actively vomiting while sitting upright in the bed. Assistance given and allowed Pt to perform ADLs bed level to wash face and change into a clean gown. Time spent: 5483-2438 - 1 SC.  Will follow-up on next scheduled treatment date.    5/17/2022

## 2022-05-17 NOTE — PROGRESS NOTES
[facility] - Transplant Stepdown  Kidney Transplant  Progress Note      Reason for Follow-up: Reassessment of Kidney Transplant Evaluation - 5/10/2022 recipient and management of immunosuppression.         Subjective:   History of Present Illness:  Edgar Jackson is a 61 y.o. male w/ PMHx of renal transplant for hypertensive nephropathy (2004) immunosuppressed on tacrolimus/sirolimus,  therapy, LOPZE cirrhosis, NIDDM, and ILD who was treated at OSH for ILDEFONSO 2/2 decompensated cirrhosis and worsening portal vein hypertension with HRS.   Pt reported that he had been experiencing abdominal distention with associated diarrhea (4-5/day) over several weeks, along with a constant nagging cough.  He has been having weekly paracentesis in the past month.   Despite efforts to manage symptoms, pt in need of  transplant surgery evaluation of the renal/hepatic injury, resulting in tranfer to [facility] [place] [facility].  He reports no recent NSAID use.       Hospital Course:  Pt admitted to hospital medicine for treatment of ILDEFONSO likely secondary to decompensated cirrhosis. CXR significant for prior ILD but no acute process. CT Chest w/o contrast significant for bilateral opacities concerning for pneumonia. Bronchoscopy performed on 5/04/22 for further evaluation of lung pathology seen on CT lung scan. Ascitic fluid aerobic culture positive for E.Faecalis. 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 Currently on posaconazole for blasto Ag/fungitell (awaiting other fungal studies), primaquine and clinda for PJP, amoxicillin for SBP. Being followed by ID, pulm and Hepatology      5/14/22 : Pt had HD X 2 hours for possible uremic encephalopathy on Friday. His BP dropped during HD. Inspite of better BUN and Cr today, he is still slow, in pain. Constant nausea and vomiting. His BP 94/52. I will hold HD today since pt is hypotensive. No sign of volume overload.     5/15/22:  he is almost anuric today. He is still slow but communicate properly . KUB demonstrates partial  SBO with stool throughout colon. Has NGT with 600 output. Plan for HD today with no UF for clearance. BP is stable at this point. Can use albumin and midodrine if needed.     5/16/22: last HD (05/15) 0 UF after 2 hours. Continues to be anuric.  Will hold off HD today and plan for tomorrow.  Otherwise, stable.    5/17/22: Anuric.  Appears more somnolent.  Renal function continues to worsen, will plan for HD.      Past Medical, Surgical, Family, and Social History:   Unchanged from H&P.    Scheduled Meds:   allopurinoL  100 mg Per NG tube Daily    amoxicillin  500 mg Per NG tube Daily    calcitrioL  0.25 mcg Per NG tube Daily    clindamycin (CLEOCIN) IVPB  900 mg Intravenous Q8H    insulin aspart U-100  3-6 Units Subcutaneous TID PC    insulin detemir U-100  12 Units Subcutaneous Daily    lactulose  20 g Per NG tube TID    levothyroxine  50 mcg Per NG tube Before breakfast    LIDOcaine  1 patch Transdermal Q24H    posaconazole  300 mg Oral Daily    predniSONE  20 mg Per NG tube Daily    [START ON 5/18/2022] primaquine  52.6 mg Per NG tube Daily     Continuous Infusions:  PRN Meds:(Magic mouthwash) 1:1:1 diphenhydramine(Benadryl) 12.5mg/5ml liq, aluminum & magnesium hydroxide-simethicone (Maalox), LIDOcaine viscous 2%, sodium chloride, sodium chloride 0.9%, acetaminophen, albuterol-ipratropium, bisacodyL, dextromethorphan-guaiFENesin  mg/5 ml, dextrose 10%, dextrose 10%, fluticasone propionate, glucagon (human recombinant), glucose, glucose, glucose, insulin aspart U-100, midodrine, naloxone, ondansetron, sodium chloride 0.9%    Intake/Output - Last 3 Shifts         05/15 0700  05/16 0659 05/16 0700 05/17 0659 05/17 0700 05/18 0659    P.O. 0 0 0    I.V. (mL/kg) 0 (0)      Blood  450     Other 200      NG/ 330 300    IV Piggyback 129.4 187.8 50    Total Intake(mL/kg) 429.4 (6.9) 967.8 (15.5) 350 (5.6)    Urine (mL/kg/hr) 95 (0.1) 35 (0)     Emesis/NG output 0 0     Drains   40    Other 950 0   "   Stool 0 0     Blood 0 0     Total Output 1045 35 40    Net -615.6 +932.8 +310           Urine Occurrence 0 x      Stool Occurrence 0 x 0 x     Emesis Occurrence 0 x               Review of Systems   Objective:     Vital Signs (Most Recent):  Temp: 97 °F (36.1 °C) (05/17/22 1159)  Pulse: 66 (05/17/22 1200)  Resp: 17 (05/17/22 1200)  BP: 135/89 (05/17/22 1200)  SpO2: 98 % (05/17/22 1200) Vital Signs (24h Range):  Temp:  [97 °F (36.1 °C)-98.9 °F (37.2 °C)] 97 °F (36.1 °C)  Pulse:  [60-70] 66  Resp:  [14-21] 17  SpO2:  [95 %-98 %] 98 %  BP: (104-146)/(68-95) 135/89     Weight: 62.4 kg (137 lb 9.1 oz)  Height: 5' 8" (172.7 cm)  Body mass index is 20.92 kg/m².    Physical Exam  Vitals and nursing note reviewed.   Constitutional:       General: He is not in acute distress.     Appearance: Normal appearance. He is ill-appearing. He is not toxic-appearing.   HENT:      Head: Normocephalic and atraumatic.      Right Ear: External ear normal.      Left Ear: External ear normal.      Nose: Nose normal.   Eyes:      General: No scleral icterus.        Right eye: No discharge.         Left eye: No discharge.      Extraocular Movements: Extraocular movements intact.      Conjunctiva/sclera: Conjunctivae normal.   Cardiovascular:      Rate and Rhythm: Normal rate and regular rhythm.      Pulses: Normal pulses.      Heart sounds: Normal heart sounds. No murmur heard.    No friction rub. No gallop.   Pulmonary:      Effort: Pulmonary effort is normal. No respiratory distress.      Breath sounds: Normal breath sounds. No wheezing, rhonchi or rales.   Abdominal:      General: Bowel sounds are normal. There is no distension.      Palpations: Abdomen is soft.      Tenderness: There is no abdominal tenderness. There is no rebound.   Musculoskeletal:      Cervical back: Normal range of motion. No rigidity.      Right lower leg: No edema.      Left lower leg: No edema.   Skin:     Capillary Refill: Capillary refill takes less than 2 " seconds.   Neurological:      General: No focal deficit present.      Mental Status: He is alert. Mental status is at baseline.   Psychiatric:         Mood and Affect: Mood normal.         Behavior: Behavior normal.         Thought Content: Thought content normal.         Judgment: Judgment normal.       Laboratory:  CBC:   Recent Labs   Lab 05/15/22  0453 05/16/22  0414 05/16/22  0724 05/17/22  0406   WBC 15.02* 13.93* 14.97* 16.11*   RBC 2.62* 2.17* 2.23* 3.24*   HGB 7.6* 6.6* 6.7* 9.4*   HCT 23.1* 19.9* 20.1* 27.4*   PLT 54* 43* 44*  --    MCV 88 92 89 85   MCH 29.0 30.4 30.0 29.0   MCHC 32.9 33.2 33.8 34.3     CMP:   Recent Labs   Lab 05/15/22  0453 05/16/22  0414 05/17/22  0406   * 138* 143*   CALCIUM 7.8* 7.8* 8.1*   ALBUMIN 2.4* 2.6* 2.6*   PROT 4.8* 4.7* 5.1*    139 143   K 4.1 3.8 4.2   CO2 25 27 25   CL 98 102 103   BUN 95* 75* 94*   CREATININE 5.6* 4.2* 4.9*   ALKPHOS 77 74 92   ALT 10 8* 9*   AST 62* 59* 64*     Assessment/Plan:     * ILDEFONSO (acute kidney injury)  - previously normal renal function 02/2022 but worsened since  - etiology likely ATN due to multiple etiologies   - renal US unremarkable   - R IJ trialysis cath placed (05/13)  - last HD (05/15) 0 UF   - overall prognosis guarded    Plan:  - HD today  - strict I/O  - avoid nephrotoxic agents  - renally dose medications to GFR < 30  - renal diet when appropriate    Partial small bowel obstruction  - NGT in place on LIWS  - primary managing    Blastomycosis  - 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   Autoimmune workup: negative April 2022  Per pulm note   - given acute pulmonary  infiltrates, further ILD workup with PFTs should be deferred to outpatient setting. He has established with pulmonology in [place].   - encourage incentive spirometry and out of bed/ambulation as able  - wean oxygen as able to maintain SpO2 >92%       Metabolic acidosis  On sodium bicarb for acidosis     Cirrhosis of liver with ascites  - liver biopsy -- Chronic mild-moderately active hepatitis with bridging fibrosis and nodule   formation (Grade 2, Stage 3).  - decompensated LOPEZ       Personal history of immunosupression therapy  - on sirolimus and prograf at home   - continue to hold at this time     Transplanted kidney  - s/p kidney transplant living donor closer to 20 years in [place], [place]  - no prior episodes of rejections or complications post surgery   - ESRD d/t HTN and IgA nephropathy        Lidya Lin MD  Kidney Transplant  [facility] - Transplant Stepdown

## 2022-05-17 NOTE — PROGRESS NOTES
Patient vomited. TF stopped, residuals checked. 40cc noted. Patient does not have any Zofran ordered. Dr Campos notified. Zofran ordered, will hold TF for now.

## 2022-05-17 NOTE — SUBJECTIVE & OBJECTIVE
Interval History: Patient had no bowel movements overnight. He is passing gas without issue. Endorses peeing a little since he had his jacinto removed but no bladder tenderness.     Review of Systems   Constitutional:  Negative for chills and fever.   HENT:  Negative for congestion and sore throat.    Eyes:  Negative for photophobia and visual disturbance.   Respiratory:  Negative for cough and shortness of breath.    Cardiovascular:  Negative for chest pain and palpitations.   Gastrointestinal:  Positive for abdominal pain. Negative for blood in stool, constipation, diarrhea, nausea and vomiting.   Endocrine: Negative for cold intolerance and heat intolerance.   Genitourinary:  Negative for dysuria and hematuria.   Musculoskeletal:  Negative for arthralgias and myalgias.   Skin:  Negative for rash and wound.   Allergic/Immunologic: Negative for environmental allergies and food allergies.   Neurological:  Negative for seizures and numbness.   Hematological:  Negative for adenopathy. Does not bruise/bleed easily.   Psychiatric/Behavioral:  Positive for decreased concentration. Negative for hallucinations and suicidal ideas.    Objective:     Vital Signs (Most Recent):  Temp: 97.1 °F (36.2 °C) (05/17/22 0800)  Pulse: 63 (05/17/22 0800)  Resp: 16 (05/17/22 0800)  BP: 138/80 (05/17/22 0800)  SpO2: 98 % (05/17/22 0800) Vital Signs (24h Range):  Temp:  [97.1 °F (36.2 °C)-98.9 °F (37.2 °C)] 97.1 °F (36.2 °C)  Pulse:  [60-70] 63  Resp:  [14-21] 16  SpO2:  [95 %-98 %] 98 %  BP: (104-146)/(68-94) 138/80     Weight: 62.4 kg (137 lb 9.1 oz)  Body mass index is 20.92 kg/m².    Intake/Output Summary (Last 24 hours) at 5/17/2022 0943  Last data filed at 5/17/2022 0900  Gross per 24 hour   Intake 1117.75 ml   Output 35 ml   Net 1082.75 ml        Physical Exam  Constitutional:       Appearance: He is well-developed. He is ill-appearing and toxic-appearing.   HENT:      Head: Normocephalic and atraumatic.   Eyes:      General: Scleral  icterus present.      Conjunctiva/sclera: Conjunctivae normal.      Pupils: Pupils are equal, round, and reactive to light.   Neck:      Thyroid: No thyromegaly.      Vascular: No JVD.   Cardiovascular:      Rate and Rhythm: Normal rate and regular rhythm.      Heart sounds: Normal heart sounds. No murmur heard.    No friction rub. No gallop.   Pulmonary:      Effort: Pulmonary effort is normal. No respiratory distress.      Breath sounds: Normal breath sounds. No wheezing or rales.   Abdominal:      General: Abdomen is flat. Bowel sounds are decreased. There is no distension.      Palpations: Abdomen is soft.      Tenderness: There is abdominal tenderness (R sided). There is no guarding or rebound.      Hernia: No hernia is present.   Musculoskeletal:         General: No deformity. Normal range of motion.      Cervical back: Normal range of motion and neck supple.   Skin:     General: Skin is warm and dry.   Neurological:      Mental Status: He is alert.      Cranial Nerves: No cranial nerve deficit.   Psychiatric:         Attention and Perception: He is inattentive.         Behavior: Behavior normal.       Significant Labs: All pertinent labs within the past 24 hours have been reviewed.  CBC:   Recent Labs   Lab 05/16/22 0414 05/16/22  0724 05/17/22  0406   WBC 13.93* 14.97* 16.11*   HGB 6.6* 6.7* 9.4*   HCT 19.9* 20.1* 27.4*   PLT 43* 44*  --        CMP:   Recent Labs   Lab 05/16/22 0414 05/17/22  0406    143   K 3.8 4.2    103   CO2 27 25   * 143*   BUN 75* 94*   CREATININE 4.2* 4.9*   CALCIUM 7.8* 8.1*   PROT 4.7* 5.1*   ALBUMIN 2.6* 2.6*   BILITOT 11.3* 14.8*   ALKPHOS 74 92   AST 59* 64*   ALT 8* 9*   ANIONGAP 10 15   EGFRNONAA 14.4* 11.9*         Significant Imaging: I have reviewed all pertinent imaging results/findings within the past 24 hours.

## 2022-05-17 NOTE — ASSESSMENT & PLAN NOTE
Initially stable, but worsened in the setting of ileus/partial SBO. No overt bleeding seen.   - Received 1u PRBC 5/16 with improvement in Hgb  - Reticulocyte count appropriately increased

## 2022-05-17 NOTE — ASSESSMENT & PLAN NOTE
- ID initially consulted, has signed off  - on clindamycin + primaquine for 21 days + steroid taper   Subjective:      Patient ID:  Aakash Figueroa is a 4 y.o. male. HPI Comments: Pt returns for check of ear tubes, there have not been infections since last visit. Tubes were placed 1 week ago May 2022     Patient's medications, allergies, past medical, surgical, social and family histories were reviewed and updated as appropriate. Review of Systems   Constitutional: Negative. Negative for crying and unexpected weight change. HENT: EAR DISCHARGE: No; EAR PAIN: No  Eyes: Negative. Negative for visual disturbance. Respiratory: Negative. Negative for stridor. Cardiovascular: Negative. Negative for chest pain. Gastrointestinal: Negative. Negative for abdominal distention, nausea and vomiting. Skin: Negative. Negative for color change. Neurological: Negative for facial asymmetry. Hematological: Negative. Psychiatric/Behavioral: Negative. Negative for hallucinations. All other systems reviewed and are negative. Objective:   Physical Exam   Constitutional: Patient appears well-developed and well-nourished. HENT:   Head: Normocephalic and atraumatic. There is normal jaw occlusion. Right Ear:   Cerumen Impaction: No  PE tube visualized: Yes   In the TM: Yes   Tube blocked: No   Drainage: No   Infection: No    Left Ear:   Cerumen Impaction: No  PE tube visualized: Yes   In the TM: Yes   Tube blocked: No   Drainage: No   Infection: No      Nose: Nose normal.   Mouth/Throat: Mucous membranes are moist. Dentition is normal. Oropharynx is clear. Eyes: Conjunctivae and EOM are normal. Pupils are equal, round, and reactive to light. Neck: Normal range of motion. Neck supple. Cardiovascular: Regular rhythm,    Pulmonary/Chest: Effort normal and breath sounds normal.   Abdominal: Full and soft. Musculoskeletal: Normal range of motion. Neurological: Alert. Skin: Skin is warm. Assessment:       Diagnosis Orders   1.  Dysfunction of both eustachian tubes     2. Chronic otitis media of both ears                Plan:      Recheck bilateral ear tube. Follow up 4 months. Return to office earlier if there is an unresolved infection unresponsive to drops.

## 2022-05-17 NOTE — ASSESSMENT & PLAN NOTE
- previously normal renal function 02/2022 but worsened since  - etiology likely ATN due to multiple etiologies   - renal US unremarkable   - R IJ trialysis cath placed (05/13)  - last HD (05/15) 0 UF   - overall prognosis guarded    Plan:  - HD today  - strict I/O  - avoid nephrotoxic agents  - renally dose medications to GFR < 30  - renal diet when appropriate

## 2022-05-17 NOTE — ASSESSMENT & PLAN NOTE
In the setting of having received imodium for diarrhea that occurred earlier in his inpatient stay. Imodium discontinued 5/12, but patient with minimal stools since. KUB demonstrating early/partial small bowel obstruction vs ileus. CT Abd showing transition point in LUQ. No overt distension/abdominal pain at this time.   - Repeat KUB today  - NGT inserted, patient on LIWS initially, now he's receiving TF. No distension noted with TFs.  - Speech saw patient, cleared him for pleasure feeds/thin liquids. Placed order for CLD so patient can request broth and other thin liquids from cafeteria per wife request  - Bisacodyl suppository ordered prn

## 2022-05-17 NOTE — PLAN OF CARE
Recommendations    1. When medically able, resume TF Novasource Renal @ goal rate of 35ml/hr to provide 1680 kcal, 76g protein and 602ml fluid    2. Continue clear liquid diet for pleasure feed    3. RD to monitor and follow    Goals: Meet % of EEN, EPN  Nutrition Goal Status: progressing towards goal  Communication of RD Recs: reviewed with TIFFANY ESCAMILLA-ZAY

## 2022-05-17 NOTE — PROGRESS NOTES
Rachid Cuevas - Transplant [facility] Medicine  Progress Note    Patient Name: Edgar Jackson  MRN: [id number]  Patient Class: IP- Inpatient   Admission Date: 4/30/2022  Length of Stay: 17 days  Attending Physician: Demar Griffith MD  Primary Care Provider: Tyler Castillo MD        Subjective:     Principal Problem:ILDEFONSO (acute kidney injury)        HPI:  Mr Jackson is a 61yoM with PMHx of renal transplant for hypertensive nephropathy (2004) immunosuppressed on tacrolimus/sirolimus,  therapy, LOPEZ cirrhosis, NIDDM, and ILD who was treated at OSH for ILDEFONSO 2/2 decompensated cirrhosis and worsening portal vein hypertension with HRS. Pt reported that he had been experiencing abdominal distention with associated diarrhea (4-5/day) over several weeks, along with a constant nagging cough. On presenting to OSH, multiple paracentesis (3) were performed to relieve the ascitic fluid. No evidence of SBP on fluid analysis. Creon was added to diet to aide in food digestion in setting of cirrhosis. Loperamide provided symptomatic treatment of diarrhea. Dextromethorphan-guaifenesin syrup added to reduce cough exacerbations. Despite efforts to manage symptoms, pt in need of  transplant surgery evaluation of the renal/hepatic injury, resulting in tranfer to [facility] [place].     On arrival to [facility] (4/30/2022), pt has experienced increased generalized weakness. Loperamide was continued for diarrhea management. Abdominal distention recurred, despite prior paracentesis. Cough has been fairly constant and predominantly dry. Pt now endorses new left lower quadrant abdominal pain, worsened by cough. KTM and Hepatology consults placed for management of HRS.      Overview/Hospital Course:  Pt admitted to hospital medicine for treatment of ILDEFONSO likely secondary to decompensated cirrhosis. Consulted Hepatology and KTM, appreciate recs. Performed diagnostic paracentesis to verify nature of ascitic fluid and to rule out SBP. Pt had a fever of 100.7F per arm pit and  100.5F orally. CXR significant for prior ILD but no acute process. Blood cultures collected and Vanc/Cefepime started. ID consulted, appreciate recs. Diagnostic paracentesis performed with results indicative of portal hypertension. Fluid cultures are negative to date. CT Chest w/o contrast significant for bilateral opacities concerning for pneumonia. Pulmonology consulted, appreciate recs. Bronchoscopy performed on 5/04/22 for further evaluation of lung pathology seen on CT lung scan. Ascitic fluid aerobic culture positive for E.Faecalis on amoxicillin. 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 Furthermore, renal function has not improved and patient currently with recommendation for HD per nephro. First session w/ UF happened 5/13. Worsening ileus/partial SBO as of 5/14. Improved as of 5/16.       Interval History: Patient had no bowel movements overnight. He is passing gas without issue. Endorses peeing a little since he had his jacinto removed but no bladder tenderness.     Review of Systems   Constitutional:  Negative for chills and fever.   HENT:  Negative for congestion and sore throat.    Eyes:  Negative for photophobia and visual disturbance.   Respiratory:  Negative for cough and shortness of breath.    Cardiovascular:  Negative for chest pain and palpitations.   Gastrointestinal:  Positive for abdominal pain. Negative for blood in stool, constipation, diarrhea, nausea and vomiting.   Endocrine: Negative for cold intolerance and heat intolerance.   Genitourinary:  Negative for dysuria and hematuria.   Musculoskeletal:  Negative for arthralgias and myalgias.   Skin:  Negative for rash and wound.   Allergic/Immunologic: Negative for environmental allergies and food allergies.   Neurological:  Negative for seizures and numbness.   Hematological:  Negative for adenopathy. Does not bruise/bleed easily.   Psychiatric/Behavioral:  Positive for decreased concentration. Negative for hallucinations and suicidal  ideas.    Objective:     Vital Signs (Most Recent):  Temp: 97.1 °F (36.2 °C) (05/17/22 0800)  Pulse: 63 (05/17/22 0800)  Resp: 16 (05/17/22 0800)  BP: 138/80 (05/17/22 0800)  SpO2: 98 % (05/17/22 0800) Vital Signs (24h Range):  Temp:  [97.1 °F (36.2 °C)-98.9 °F (37.2 °C)] 97.1 °F (36.2 °C)  Pulse:  [60-70] 63  Resp:  [14-21] 16  SpO2:  [95 %-98 %] 98 %  BP: (104-146)/(68-94) 138/80     Weight: 62.4 kg (137 lb 9.1 oz)  Body mass index is 20.92 kg/m².    Intake/Output Summary (Last 24 hours) at 5/17/2022 0943  Last data filed at 5/17/2022 0900  Gross per 24 hour   Intake 1117.75 ml   Output 35 ml   Net 1082.75 ml        Physical Exam  Constitutional:       Appearance: He is well-developed. He is ill-appearing and toxic-appearing.   HENT:      Head: Normocephalic and atraumatic.   Eyes:      General: Scleral icterus present.      Conjunctiva/sclera: Conjunctivae normal.      Pupils: Pupils are equal, round, and reactive to light.   Neck:      Thyroid: No thyromegaly.      Vascular: No JVD.   Cardiovascular:      Rate and Rhythm: Normal rate and regular rhythm.      Heart sounds: Normal heart sounds. No murmur heard.    No friction rub. No gallop.   Pulmonary:      Effort: Pulmonary effort is normal. No respiratory distress.      Breath sounds: Normal breath sounds. No wheezing or rales.   Abdominal:      General: Abdomen is flat. Bowel sounds are decreased. There is no distension.      Palpations: Abdomen is soft.      Tenderness: There is abdominal tenderness (R sided). There is no guarding or rebound.      Hernia: No hernia is present.   Musculoskeletal:         General: No deformity. Normal range of motion.      Cervical back: Normal range of motion and neck supple.   Skin:     General: Skin is warm and dry.   Neurological:      Mental Status: He is alert.      Cranial Nerves: No cranial nerve deficit.   Psychiatric:         Attention and Perception: He is inattentive.         Behavior: Behavior normal.        Significant Labs: All pertinent labs within the past 24 hours have been reviewed.  CBC:   Recent Labs   Lab 05/16/22  0414 05/16/22  0724 05/17/22  0406   WBC 13.93* 14.97* 16.11*   HGB 6.6* 6.7* 9.4*   HCT 19.9* 20.1* 27.4*   PLT 43* 44*  --        CMP:   Recent Labs   Lab 05/16/22  0414 05/17/22  0406    143   K 3.8 4.2    103   CO2 27 25   * 143*   BUN 75* 94*   CREATININE 4.2* 4.9*   CALCIUM 7.8* 8.1*   PROT 4.7* 5.1*   ALBUMIN 2.6* 2.6*   BILITOT 11.3* 14.8*   ALKPHOS 74 92   AST 59* 64*   ALT 8* 9*   ANIONGAP 10 15   EGFRNONAA 14.4* 11.9*         Significant Imaging: I have reviewed all pertinent imaging results/findings within the past 24 hours.      Assessment/Plan:      * ILDEFONSO (acute kidney injury)  Patient with acute kidney injury likely d/t IVVD/Dehydration Which is currently stable. Labs reviewed- Renal function/electrolytes with Estimated Creatinine Clearance: 14.1 mL/min (A) (based on SCr of 4.9 mg/dL (H)). according to latest data. Monitor urine output and serial BMP and adjust therapy as needed. Avoid nephrotoxins and renally dose meds for GFR listed above. Ultrasound transplant kidney no acute changes. Possibly due to tacrolimus versus hepatorenal syndrome, Nephrology has been consulted.  Bicarb ordered for low serum levels. Cr/GFR to be trended for 6 weeks for evaluation.  - KTM following.   - hold sirolimus and tacrolimus  - trend daily BMP/RFP  - Had UF w/ HD 5/13 without fluid removal. HD planned for today    Partial small bowel obstruction  In the setting of having received imodium for diarrhea that occurred earlier in his inpatient stay. Imodium discontinued 5/12, but patient with minimal stools since. KUB demonstrating early/partial small bowel obstruction vs ileus. CT Abd showing transition point in LUQ. No overt distension/abdominal pain at this time.   - Repeat KUB today  - NGT inserted, patient on LIWS initially, now he's receiving TF. No distension noted with  TFs.  - Speech saw patient, cleared him for pleasure feeds/thin liquids. Placed order for CLD so patient can request broth and other thin liquids from cafeteria per wife request  - Bisacodyl suppository ordered prn      Urinary retention  Unclear precipitant. Patient complained of urinary urgency and incomplete voiding on 5/12, he was bladder scanned and had jacinto placed with 1L urine return immediately. Patient continues to produce urine  - Remove jacinto 5/17  - Patient voiding on his own    Blastomycosis  5/2/22 Positive blastomyces urine Ag, below level of quantification.  - ID consulted  -12 months of posaconazole, started 5/4/2022.  - Monthly blastomyces urine and serum Ag.  - Secondary prophylaxis after transplant routinely not recommended. However if patient were to be transplanted, needs ID consult at that time for updated recommendations.      Schistosoma  Equivocal schistosoma serologies.  Will treat empirically.     Recommendations:  -Praziquantel 20mg/kg q6h x3 doses      Pneumocystis jiroveci pneumonia  CT with opacifications, reverse halo sign, found to have PJP.     Recommendations:  -Clindamycin and Primaquine for 21 days  -Steroid taper starting at prednisone 40mg BID days 1-5, then 40mg daily days 6-10, then 20mg daily days 11-21      Lung infiltrate on CT  Pt with Hx of chronic ILD with bilateral areas of lung scarring. Regions appear to be slightly larger in size when compared to previous imaging. New findings of bilateral infiltrates noted on recent CT Chest w/o contrast concerning for pneumonia. Acute fever reported over the past two evenings. Pt started on broad spectrum abx with septic workup performed. Respiratory viral panel negative. Pulmonology consulted, appreciate recs. Bronchoscopy to be performed. (See associated problem ILD)  - f/u bronchoscopy cx negative    Type 2 diabetes mellitus without complication, without long-term current use of insulin  Endocrine following  - NPO currently  due to partial SBO/nausea  - Levemir 12u daily  - aspart 3-6u w/ meals      Chronic cough  PMHx of interstitial lung disease (lung scarring). benzonotate tablet 200mg TID PRN. For persistent cough add dextromethorphan-guaiefesin syrup    Fever in immunocomprised patient  Patient received paracentesis on 05/01, negative for SBP.  Spiked fever afterwards, 100.7F. Septic workup started, broad spectrum abx coverage with Vanc/Cefepime. Chest x-ray showed consolidation at the middle lobe of the left lung. CT chest w/o contrast with similar findings, concerning for pneumonia.  Lactic acid wnl and no leukocytosis.ID consult placed given comorbidity and immunocompromised status. Pulmonology consulted, given chronic ILD findings with new bilateral opacities. Bronchoscopy to be performed. Bcx and Fcx NGTD. Respiratory viral panel negative.  - Currently patient being treated for blastomycosis given urine ag, PJP given lung imaging findings, SBP with amoxicillin given his ascites culture, and lastly received schistosoma treatment.       Anemia  Initially stable, but worsened in the setting of ileus/partial SBO. No overt bleeding seen.   - Received 1u PRBC 5/16 with improvement in Hgb  - Reticulocyte count appropriately increased      Thrombocytopenia  Thrombocytopenia likely in setting of hepatic cirrhosis. Hold anticoagulation and DVT prophylaxis for thrombocytopenia.      Hypothyroidism  Continued on home medication: synthroid 50mcg qd      Cirrhosis of liver with ascites  MELD-Na score: 35 at 5/15/2022  4:53 AM  MELD score: 35 at 5/15/2022  4:53 AM  Calculated from:  Serum Creatinine: 5.6 mg/dL (Using max of 4 mg/dL) at 5/15/2022  4:53 AM  Serum Sodium: 139 mmol/L (Using max of 137 mmol/L) at 5/15/2022  4:53 AM  Total Bilirubin: 9.7 mg/dL at 5/15/2022  4:53 AM  INR(ratio): 1.8 at 5/15/2022  4:53 AM  Age: 61 years  Patient recently diagnosed with cirrhosis 2/2 to LOPEZ. Pulmonary hypertension now present with with multiple  episodes of paracentesis. Largest abdominal fluid reduction just over 5L. [place] [facility] [place] faecalis on culture.   - Hepatology and KTM coordinating for possible transplant.   - Patient denied listing for transplant given concern for ILD, pulm consulted for any further recs and are recommending outpatient PFTs once acute pulmonary insult (PJP PNA) treated.  - Per transplant anesthesia, they are requesting additional PET stress imaging as he did not achieve maximal HR on his DSE. PET Stress scheduled for tomorrow, 5/16.     Interstitial lung disease  CXR performed on admission, with serial imaging reviewed. Continued scheduled Benzonate daily. Flonase added daily PRN with DuoNebs q6h PRN. Supplemental oxygen PRN. Continuous pulse oxymetry ordered. SpO2 maintained 88-93% by adjusting O2 flow. CT chest w/o contrast significant for bilateral infiltrates, concerning for pneumonia. Pulmonology consulted, appreciate recs. Pt to undergo Bronchoscopy 5/04/22.  - F/u pulm outpatient.       Gout  Continue allopurinol for prophylaxis.       Personal history of immunosupression therapy  Pt taking immunosuppressive agents (sacrolimus and tacrolimus) 2/2 to renal transplant in 2004.   - Meds currently held  - KTM following    Transplanted kidney  Renal transplant performed in 2004. Pt continued on sirolimus and tacrolimus for immuno suppression. KTM consulted on admission, appreciate recs. Trough levels ordered with both medications.currently held.    - trend renal funtion tests   - hold tacrolimus/sirolimus as directed by KTM    VTE Risk Mitigation (From admission, onward)         Ordered     IP VTE HIGH RISK PATIENT  Once         04/30/22 1810     Place sequential compression device  Until discontinued         04/30/22 1810                Discharge Planning   LE: 5/20/2022     Code Status: Full Code   Is the patient medically ready for discharge?: No    Reason for patient still in  hospital (select all that apply): Patient trending condition  Discharge Plan A: Home Health                  Demar Griffith MD  Department of Hospital Medicine   [facility] - Transplant Stepdown

## 2022-05-17 NOTE — ASSESSMENT & PLAN NOTE
Unclear precipitant. Patient complained of urinary urgency and incomplete voiding on 5/12, he was bladder scanned and had jacinto placed with 1L urine return immediately. Patient continues to produce urine  - Remove jacinto 5/17  - Patient voiding on his own

## 2022-05-17 NOTE — ASSESSMENT & PLAN NOTE
Patient with acute kidney injury likely d/t IVVD/Dehydration Which is currently stable. Labs reviewed- Renal function/electrolytes with Estimated Creatinine Clearance: 14.1 mL/min (A) (based on SCr of 4.9 mg/dL (H)). according to latest data. Monitor urine output and serial BMP and adjust therapy as needed. Avoid nephrotoxins and renally dose meds for GFR listed above. Ultrasound transplant kidney no acute changes. Possibly due to tacrolimus versus hepatorenal syndrome, Nephrology has been consulted.  Bicarb ordered for low serum levels. Cr/GFR to be trended for 6 weeks for evaluation.  - KTM following.   - hold sirolimus and tacrolimus  - trend daily BMP/RFP  - Had UF w/ HD 5/13 without fluid removal. HD planned for today

## 2022-05-17 NOTE — PT/OT/SLP PROGRESS
Physical Therapy      Patient Name:  Edgar Jackson   MRN:  [id number]    Patient not seen today secondary to pt with N/V upon attempt.(RN notified). Pt educated on the effects of prolonged immobility and the importance of performing OOB activity and exercises to promote healing and reduce recovery time.changed gown due to being soiled. Education completed on bed level activity and exercises with verabal understanding. Questions/concerns addressed within PTA scope of practice; patient  with no further questions. Time was provided for active listening, discussion of health disposition, and discussion of safe discharge Pt Will follow-up next scheduled treatment per PT [phone]

## 2022-05-17 NOTE — PROGRESS NOTES
Phone call received from HD RN, patient was not able to tolerate HD and is on his way back to the unit. Patient vomiting. NGT hooked up to LIWS. Phenergan given per orders. Patient c/o back and abd pain. Dr Campos notified that the patient has returned to the unit. Will get nausea/vomiting under control before given brown bomb enema.

## 2022-05-17 NOTE — PROGRESS NOTES
05/17/22 1645   Post-Hemodialysis Assessment   Rinseback Volume (mL) 300 mL   Blood Volume Processed (Liters) 5.3 L   Dialyzer Clearance Moderately streaked   Duration of Treatment 45 minutes   Additional Fluid Intake (mL) 200 mL   Total UF (mL) 209 mL   Net Fluid Removal 0   Patient Response to Treatment pt hypotensive, c/o abdominal pain, back pain   Post-Hemodialysis Comments see note     HD TX end. Pt hypotensive with complaints of increased abdominal pain and back pain. Verbal orders to end TX at this time Dr. Lopez. Net removal 0 ml. NG tube remain intact. Pt wife at bedside. Pt awake, alert, report given to primary nurse.

## 2022-05-18 PROBLEM — Z71.89 ADVANCED CARE PLANNING/COUNSELING DISCUSSION: Status: ACTIVE | Noted: 2022-01-01

## 2022-05-18 PROBLEM — K56.609 SMALL BOWEL OBSTRUCTION: Status: ACTIVE | Noted: 2022-01-01

## 2022-05-18 NOTE — PROGRESS NOTES
"Pt arrived to unit alert and oriented x4.  NRB in place and pt sats 80-92%.  Levo initiated due to hypotension. Pt's plan of care changed to comfort care per patient's request. Pt stated "let me go" and wishes to remain comfortable.  Pts wife at bedside and contacted pt's children.    "

## 2022-05-18 NOTE — SUBJECTIVE & OBJECTIVE
Past Medical History:   Diagnosis Date    BPH (benign prostatic hyperplasia)     Diabetes 2020    Gout     Hypertension 2000    Hypothyroidism 04/08/2022    Interstitial lung disease 03/30/2022    Kidney transplant recipient 01/2004    [place] 2* hypertensive nephropathy    Unspecified cirrhosis of liver     ?LOPEZ       Past Surgical History:   Procedure Laterality Date    CATARACT EXTRACTION Right 2017    COLONOSCOPY N/A 3/8/2022    Procedure: COLONOSCOPY;  Surgeon: Mio Simmons III, MD;  Location: [facility] ENDO;  Service: Endoscopy;  Laterality: N/A;    COLONOSCOPY N/A 5/5/2022    Procedure: COLONOSCOPY;  Surgeon: Chiquita Lewis MD;  Location: [facility] ENDO (2ND FLR);  Service: Endoscopy;  Laterality: N/A;    COLONOSCOPY N/A 5/5/2022    Procedure: COLONOSCOPY;  Surgeon: Chiquita Lewis MD;  Location: [facility] ENDO (2ND FLR);  Service: Endoscopy;  Laterality: N/A;    ENDOSCOPIC ULTRASOUND OF UPPER GASTROINTESTINAL TRACT N/A 3/8/2022    Procedure: ULTRASOUND, UPPER GI TRACT, ENDOSCOPIC;  Surgeon: Mio Simmons III, MD;  Location: [facility];  Service: Endoscopy;  Laterality: N/A;    ESOPHAGOGASTRODUODENOSCOPY N/A 5/5/2022    Procedure: EGD (ESOPHAGOGASTRODUODENOSCOPY);  Surgeon: Chiquita Lewis MD;  Location: [facility] (2ND FLR);  Service: Endoscopy;  Laterality: N/A;    ESOPHAGOGASTRODUODENOSCOPY N/A 5/5/2022    Procedure: EGD (ESOPHAGOGASTRODUODENOSCOPY);  Surgeon: Chiquita Lewis MD;  Location: [facility] (2ND FLR);  Service: Endoscopy;  Laterality: N/A;    KIDNEY TRANSPLANT  2004    PORTACATH PLACEMENT  2003    REMOVAL OF VASCULAR ACCESS PORT  2005       Review of patient's allergies indicates:  No Known Allergies    Family History       Problem Relation (Age of Onset)    Heart disease Father    Kidney disease Father          Tobacco Use    Smoking status: Never Smoker    Smokeless tobacco: Never Used   Substance and Sexual Activity    Alcohol use: Not Currently    Drug use: Never    Sexual activity:  Not on file      Review of Systems   Unable to perform ROS: Severe respiratory distress   Objective:     Vital Signs (Most Recent):  Temp: 97 °F (36.1 °C) (05/17/22 1159)  Pulse: 108 (05/18/22 0133)  Resp: (!) 25 (05/18/22 0245)  BP: 132/85 (05/17/22 1951)  SpO2: (!) 93 % (05/18/22 0133)   Vital Signs (24h Range):  Temp:  [97 °F (36.1 °C)-97.1 °F (36.2 °C)] 97 °F (36.1 °C)  Pulse:  [] 108  Resp:  [15-26] 25  SpO2:  [91 %-98 %] 93 %  BP: ()/(53-95) 132/85   Weight: 62.4 kg (137 lb 9.1 oz)  Body mass index is 20.92 kg/m².      Intake/Output Summary (Last 24 hours) at 5/18/2022 0348  Last data filed at 5/17/2022 1743  Gross per 24 hour   Intake 737.75 ml   Output 284 ml   Net 453.75 ml       Physical Exam  Vitals and nursing note reviewed.   Constitutional:       Appearance: He is ill-appearing and diaphoretic.   HENT:      Mouth/Throat:      Mouth: Mucous membranes are dry.   Eyes:      Pupils: Pupils are equal, round, and reactive to light.   Cardiovascular:      Rate and Rhythm: Regular rhythm. Tachycardia present.      Pulses: Normal pulses.      Heart sounds: Normal heart sounds.   Pulmonary:      Effort: Respiratory distress present.      Breath sounds: Wheezing and rhonchi present.   Abdominal:      General: There is distension.      Tenderness: There is abdominal tenderness.   Musculoskeletal:         General: Normal range of motion.   Skin:     General: Skin is warm.      Coloration: Skin is jaundiced.   Neurological:      Mental Status: He is alert and oriented to person, place, and time.       Vents:  Negative Inspiratory Force (cm H2O): -40 (05/12/22 1636)  Lines/Drains/Airways       Central Venous Catheter Line  Duration                  Hemodialysis Catheter right internal jugular -- days    Percutaneous Central Line Insertion/Assessment - Triple Lumen  05/13/22 1705 right internal jugular 4 days              Drain  Duration                  NG/OG Tube 05/14/22 1545 14 Fr. Left nostril 3 days               Peripheral Intravenous Line  Duration                  Midline Catheter Insertion/Assessment  - Single Lumen 05/12/22 1150 Left brachial vein 20g x 10cm 5 days                  Significant Labs:    CBC/Anemia Profile:  Recent Labs   Lab 05/16/22  0724 05/17/22  0406 05/18/22  0235   WBC 14.97* 16.11* 26.66*   HGB 6.7* 9.4* 11.1*   HCT 20.1* 27.4* 33.8*   PLT 44* 42* 40*   MCV 89 85 89   RDW 19.1* 20.6* 22.1*   RETIC  --  6.2*  --         Chemistries:  Recent Labs   Lab 05/16/22  0414 05/17/22  0406    143   K 3.8 4.2    103   CO2 27 25   BUN 75* 94*   CREATININE 4.2* 4.9*   CALCIUM 7.8* 8.1*   ALBUMIN 2.6* 2.6*   PROT 4.7* 5.1*   BILITOT 11.3* 14.8*   ALKPHOS 74 92   ALT 8* 9*   AST 59* 64*   MG  --  2.8*   PHOS  --  6.6*       All pertinent labs within the past 24 hours have been reviewed.    Significant Imaging: I have reviewed all pertinent imaging results/findings within the past 24 hours.

## 2022-05-18 NOTE — PROGRESS NOTES
Hepatology treatment plan    Unfortunately, the patient became more distended yesterday, followed by vomiting and acute hypoxic respiratory failure.  Transferred to ICU, but the patient's wishes were to be DNR.  Transitioned to comfort care.    We will sign off at this time. Please call with questions.    Reba Balderas MD  GI and Hepatology fellow, PGY-5

## 2022-05-18 NOTE — HPI
The patient is a 62 y/o M with history of renal transplant for hypertensive nephropathy (2004 years in [place]) with progressive CKD, chronically immunosuppressed on tacrolimus/sirolimus, LOPEZ cirrhosis, NIDDM, and ILD (autoimmune workup negative) who is transferred to [facility] [place] [place] for ILDEFONSO and decompensated liver cirrhosis on 4/30 with concerns for possible HRS. He was complaining of malaise, chronic cough, diarrhea, weight loss and febrile on initial presentation. Hepatology and kidney transplant were consulted on admission. Course was complicated by E. Faecalis SBP on para studies from 5/1. Started on amoxicillin. Repeat para with culture clearance on 5/6. There was worsening respiratory status prompting bronchoscopy on 5/4 with pulmonology. 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 Currently also being treated with posaconazole for blasto Ag/fungitell (awaiting other fungal studies) and s/p treatment for schistosomiasis given equivocal testing (s/p praziquantel x3 doses). Unfortunately, the patient become anuric with renal failure requiring HD; last trial 5/17- which he failed. R IJ trialysis cath placed on 05/13. Additionally, he had ileus/partial SBO diagnosed on 5/14, which continues to plague him. NGT was placed for decompression. KUB on 5/17 with diffuse distention of bowel loops and numerous small bowel loops potentially representing pneumatosis. Stat CT A/P without contrast was ordered and is pending. His O2 requirement increased overnight on 5/18 prompting NRB placement. MICU was consulted given clinical deterioration.

## 2022-05-18 NOTE — ASSESSMENT & PLAN NOTE
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     -Clindamycin and Primaquine for 21 days  -Steroid taper starting at prednisone 40mg BID days 1-5, then 40mg daily days 6-10, then 20mg daily days 11-21

## 2022-05-18 NOTE — ASSESSMENT & PLAN NOTE
Patient with acute kidney injury likely d/t IVVD/Dehydration  Ultrasound transplant kidney no acute changes. Possibly due to tacrolimus versus hepatorenal syndrome, Nephrology has been consulted.  Bicarb ordered for low serum levels. Cr/GFR to be trended for 6 weeks for evaluation. First trial of HD with fluid removal 05/17 but was unsuccessful due to hypotension.     - KTM following.  - Nephrology following   - Monitor urine ouput and serial BMP  - Avoid nephrotoxins  - Renally dose medications  - hold sirolimus and tacrolimus  - trend daily BMP/RFP

## 2022-05-18 NOTE — ASSESSMENT & PLAN NOTE
Dr. Owens discussed goals of care with the patient and his wife at bedside. Full ACP note to follow. Pt wishes to be made comfortable. DNR will be placed in chart and patient will be made comfort measures.

## 2022-05-18 NOTE — ASSESSMENT & PLAN NOTE
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    - ID consulted  -12 months of posaconazole, started 5/4/2022.  - Monthly blastomyces urine and serum Ag.  - Secondary prophylaxis after transplant routinely not recommended. However if patient were to be transplanted, needs ID consult at that time for updated recommendations

## 2022-05-18 NOTE — CARE UPDATE
Endocrinology consulted for BG management.   BG goal 140-180  Diet NPO  13 Days Post-Op       - Continue Levemir to 14 units daily.   - Novolog (aspart) insulin 3-6 Units SQ TIDWM and prn for BG excursions LDC SSI (150/25)   - BG checks AC/HS    ** Please notify Endocrine for any change and/or advance in diet**  ** Please call Endocrine for any BG related issues **     Lab Results   Component Value Date    HGBA1C 6.0 (H) 05/10/2022       Discharge Planning:   TBD. Please notify endocrinology prior to discharge.

## 2022-05-18 NOTE — ACP (ADVANCE CARE PLANNING)
"Discussed with the Ms. Jackson and Mr. Jackson current clinical status. Mr. Jackson is in very critical clinical condition, he looks in pain, now in acute hypoxemic respiratory failure, O2 status worsen and become hypotensive, required pressor support. Asked him about his wishes, he responded by NO to intubation and chest compression and to "let me go" twice and he asked to be comfortable. Respecting the patient wishes, Comfort measures started. Wife at bedside tearful, agrees with proceeding with comfort care.     Chris Owens MD  Internal Medicine Department, PGY-III  [facility]  [phone]    "

## 2022-05-18 NOTE — DISCHARGE SUMMARY
[facility] - Cardiac Medical ICU  Critical Care Medicine  Discharge Summary  Expiration Summary    Patient Name: Edgar Jackson  MRN: [id number]  Admission Date: 4/30/2022  Hospital Length of Stay: 18 days  Discharge Date and Time:  05/18/2022 10:45 AM  Attending Physician: Alessio Salazar MD   Discharging Provider: lAessio Salazar MD  Primary Care Provider: Tyler Castillo MD  Reason for Admission: acute renal failure    HPI:   The patient is a 62 y/o M with history of renal transplant for hypertensive nephropathy (2004 years in [place]) with progressive CKD, chronically immunosuppressed on tacrolimus/sirolimus, LOPEZ cirrhosis, NIDDM, and ILD (autoimmune workup negative) who is transferred to [facility] [place] for ILDEFONSO and decompensated liver cirrhosis on 4/30 with concerns for possible HRS. He was complaining of malaise, chronic cough, diarrhea, weight loss and febrile on initial presentation. Hepatology and kidney transplant were consulted on admission. Course was complicated by E. Faecalis SBP on para studies from 5/1. Started on amoxicillin. Repeat para with culture clearance on 5/6. There was worsening respiratory status prompting bronchoscopy on 5/4 with pulmonology. 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 Currently also being treated with posaconazole for blasto Ag/fungitell (awaiting other fungal studies) and s/p treatment for schistosomiasis given equivocal testing (s/p praziquantel x3 doses). Unfortunately, the patient become anuric with renal failure requiring HD; last trial 5/17- which he failed. R IJ trialysis cath placed on 05/13. Additionally, he had ileus/partial SBO diagnosed on 5/14, which continues to plague him. NGT was placed for decompression. KUB on 5/17 with diffuse distention of bowel loops and numerous small bowel loops potentially representing pneumatosis. Stat CT A/P  without contrast was ordered and is pending. His O2 requirement increased overnight on 5/18 prompting NRB placement. MICU was consulted given clinical deterioration.       Procedure(s) (LRB):  EGD (ESOPHAGOGASTRODUODENOSCOPY) (N/A)  COLONOSCOPY (N/A)    Indwelling Lines/Drains at Time of Discharge:   Lines/Drains/Airways     Central Venous Catheter Line  Duration                Hemodialysis Catheter right internal jugular -- days    Percutaneous Central Line Insertion/Assessment - Triple Lumen  05/13/22 1705 right internal jugular 4 days          Drain  Duration                NG/OG Tube 05/14/22 1545 14 Fr. Left nostril 3 days              Hospital Course:       62 y/o M with history of renal transplant for hypertensive nephropathy (2004 years in [place]) with progressive CKD, chronically immunosuppressed on tacrolimus/sirolimus, LOPEZ cirrhosis, NIDDM, and ILD (autoimmune workup negative) [place] [facility] [place] and decompensated liver cirrhosis on 4/30 with concerns for possible HRS. Course was complicated by E. Faecalis SBP on para studies from 5/1. Started on amoxicillin. Repeat para with culture clearance on 5/6. 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 He was also being treated with posaconazole for blasto Ag/fungitell (awaiting other fungal studies) and s/p treatment for schistosomiasis given equivocal testing (s/p praziquantel x3 doses). Unfortunately, the patient become anuric with renal failure requiring HD; last trial 5/17- which he failed. R IJ trialysis cath placed on 05/13. Additionally, he had ileus/partial SBO diagnosed on 5/14, which continues to plague him. NGT was placed for decompression. KUB on 5/17 with diffuse distention of bowel loops and numerous small bowel loops potentially representing pneumatosis. His O2 requirement increased overnight on 5/18 prompting  NRB placement. He was transferred to MICU for acute hypoxemic respiratory failure and septic shock. Overnight criticalcare team discussed goals of care with him and he had responded he did not want aggressive care and wished to be comfortable, with wife at bedside. Comfort care measures were initiated with morphine drip. He  on 22 at 10:45 AM with wife at bedside.      Final Active Diagnoses:    Diagnosis Date Noted POA    PRINCIPAL PROBLEM:  ILDEFONSO (acute kidney injury) [N17.9] 2022 Yes    Advanced care planning/counseling discussion [Z71.89] 2022 Not Applicable    Long-term use of immunosuppressant medication [Z79.899]  Not Applicable    Partial small bowel obstruction [K56.600] 2022 No    Urinary retention [R33.9] 2022 No    Blastomycosis [B40.9] 05/10/2022 Yes    Schistosoma [B65.9] 2022 Yes    Pneumocystis jiroveci pneumonia [B59] 2022 Yes    SBP (spontaneous bacterial peritonitis) [K65.2] 2022 Yes    Severe malnutrition [E43] 2022 Yes    Lung infiltrate on CT [R91.8] 2022 Yes    Type 2 diabetes mellitus without complication, without long-term current use of insulin [E11.9] 2022 Yes    Chronic cough [R05.3] 2022 Yes    Fever in immunocomprised patient [R50.9] 2022 Yes    Hypothyroidism [E03.9] 2022 Yes     Chronic    Thrombocytopenia [D69.6] 2022 Yes    Metabolic acidosis [E87.2] 2022 Yes    Anemia [D64.9] 2022 Yes     Chronic    Interstitial lung disease [J84.9] 2022 Yes    Cirrhosis of liver with ascites [K74.60, R18.8] 2022 Yes    Transplanted kidney [Z94.0] 2022 Not Applicable    Gout [M10.9] 2022 Yes    Personal history of immunosupression therapy [Z92.25] 2022 Not Applicable      Problems Resolved During this Admission:    Diagnosis Date Noted Date Resolved POA    Sepsis [A41.9] 2022 No    Chronic diarrhea [K52.9] 2022  2022 Yes    Hyponatremia [E87.1] 2022 Yes       Discharged Condition:     Disposition:            Alessio Salazar MD  Critical Care Medicine  [facility] - Cardiac Medical ICU

## 2022-05-18 NOTE — SIGNIFICANT EVENT
Death Note     I was called to room by nurse, who had noted asystole     On exam, the patient was unresponsive to verbal and tactile stimuli. There were no radial or carotid pulses. Chest rise and breath and heart sounds were absent. Pupils were fixed and dilated. Pupillary, corneal, gag, and oculocephalic reflexes were absent.       at 10:45 AM.    Cause of death:   Cardiac arrest secondary to septic shock, acute renal failure, and small bowel obstruction.     The wife, Mrs. Leia Jackson was at bedside. Condolences given. The  has been summoned for further support.

## 2022-05-18 NOTE — SIGNIFICANT EVENT
Notified by nurse patient with hypoxia, SOB and abdominal pain after an episode of nausea.  and vomiting of dark brown output. Patient already had NGT in place for partial SBO and connected to suction. He was placed on NRB mask supplemental oxygen for hypoxia. STAT abdominal X-ray showed small bowel pneumatosis. During my bedside exam patient appears uncomfortable due to abdominal pain, he is restless and dyspneic with use of accessory muscles. Abdomen moderately distended with severe diffuse TTP with guarding and rebound tenderness. Started on  cc bolus for borderline SBP in low 90s. STAT CT ordered with concern for SBO vs bowel ischemia. STAT labs including CBC, CMP, lactate, ammonia ordered. Morphine 2 mg ordered for abdominal pain. General surgery consulted and discussed case. Critical care medicine consulted and evaluated patient at bedside with plan to transfer to MICU. Patient verbally expressed that he does not want to be intubated. Updated wife who is present at bedside about patient's change in critical condition.     Critical Care Time: 35 minutes    Critical Care was time spent personally by me on the following activities: evaluating this patient's organ dysfunction, development of treatment plan, discussing treatment plan with patient or surrogate and bedside caregivers, discussions with consultants, evaluation of patient's response to treatment, examination of patient, ordering and performing treatments and interventions, ordering and review of laboratory studies, ordering and review of radiographic studies, re-evaluation of patient's condition. This critical care time did not overlap with that of any other provider or involve time for any separately billed procedures    Diagnosis requiring critical care: suspected SBO vs  Ischemic bowel with sepsis, multiorgan failure.         Donna Gutierrez DO.  Staff Physician, Hospital Medicine.

## 2022-05-18 NOTE — NURSING
0055: Pt noted to be vomiting dark brown liquid . NG tube in place and to LIWS.      0057: Phenergan 12.5 given as orders IVPB.    0100: Pt noted to have acute resp distress. O2 sats noted to be in high 70s. o2 increased to 5l per nc with no improvement     0102: Pt placed on NRB mask at 15L. With Sat improving to high 80s.    0105: Dr Brenda Devine Notified orders received for neb treatment , cxr and abd xray .    0133: RRT at beside for neb treatment. O2 sat changed and still noted to be sating in low 90s which on breathing treatment . When treat done NRB replaced and sats noted .     0140: Xray at bedside      0145: Pt very restless and moving all over bed complaining of increased abd pain . Pt removing NRB and O2 sats dropping to high 70s.     0148 : Pt still having increased Restlessness and increased RR.     0150: Notified Dr. Brenda DEVINE to come to bedside     0205: Pt Brenda at bedside     0207: labs drawn and sent to lab as ordered.    0210: 500ml NS Bolus started     0230: Morphine 2 mg Given as ordered slow IVP  0235: Coram at bedside .    0240: Critical Care team at bedside .To talk to Wife about Code status . Wife stated wanted PT to be coded x 1 but no Breathing Tube.     0255, Pt transported with rapid RN x2 Team and TSU RN x 2 to CT.    0315: Pt Care turned over to Sabrina RN in MICU.

## 2022-05-18 NOTE — PLAN OF CARE
[facility] - Cardiac Medical ICU  Discharge Final Note    Primary Care Provider: Tyler Castillo MD    Expected Discharge Date: 2022     Patient  2022 at 1045 am per MD.    Final Discharge Note (most recent)     Final Note - 22 1253        Final Note    Assessment Type Final Discharge Note     Anticipated Discharge Disposition         Post-Acute Status    Discharge Delays None known at this time                 Important Message from Medicare              Pam Nagel RN     [phone]

## 2022-05-18 NOTE — CODE/ RAPID DOCUMENTATION
RAPID RESPONSE NURSE NOTE        Admit Date: 2022  LOS: 18  Code Status: Full Code   Date of Consult: 2022  : 1960  Age: 61 y.o.  Weight:   Wt Readings from Last 1 Encounters:   22 62.4 kg (137 lb 9.1 oz)     Sex: male  Race:    [facility]:   MRN: [id number]  Time Rapid Response Team page Received: 0234  Time Rapid Response Team at Bedside: 023  Time Rapid Response Team left Bedside: 031  Was the patient discharged from an ICU this admission? No   Was the patient discharged from a PACU within last 24 hours? No   Did the patient receive conscious sedation/general anesthesia in last 24 hours? No  Was the patient in the ED within the past 24 hours? No  Was the patient on NIPPV within the past 24 hours? No   Did this progress into an ARC or CPA: no  Attending Physician: Demar Griffith MD  Primary Service: [facility] MED        SITUATION    Notified by charge RN via phone call.  Reason for alert: hypoxia, hypotension   Called to evaluate the patient for Respiratory    BACKGROUND     Why is the patient in the hospital?: ILDEFONSO (acute kidney injury)    Patient has a past medical history of BPH (benign prostatic hyperplasia), Diabetes, Gout, Hypertension, Hypothyroidism, Interstitial lung disease, Kidney transplant recipient, and Unspecified cirrhosis of liver.    Last Vitals:  Temp: 97 °F (36.1 °C) ( 1159)  Pulse: 108 (133)  Resp: 20 (133)  BP: 132/85 (1951)  SpO2: 93 % (133)    24 Hours Vitals Range:  Temp:  [97 °F (36.1 °C)-97.1 °F (36.2 °C)]   Pulse:  []   Resp:  [15-26]   BP: ()/(53-95)   SpO2:  [91 %-98 %]     Labs:  Recent Labs     22  0414 22  0724 22  0406   WBC 13.93* 14.97* 16.11*   HGB 6.6* 6.7* 9.4*   HCT 19.9* 20.1* 27.4*   PLT 43* 44* 42*       Recent Labs     05/15/22  0453 22  0414 22  0406    139 143   K 4.1 3.8 4.2   CL 98 102 103   CO2 25 27 25   CREATININE 5.6* 4.2* 4.9*   * 138*  143*   PHOS 7.4*  --  6.6*   MG  --   --  2.8*        No results for input(s): PH, PCO2, PO2, HCO3, POCSATURATED, BE in the last 72 hours.     ASSESSMENT    Physical Exam  Constitutional:       General: He is in acute distress.      Appearance: He is ill-appearing and toxic-appearing.   Eyes:      Pupils: Pupils are equal, round, and reactive to light.   Cardiovascular:      Rate and Rhythm: Tachycardia present.   Pulmonary:      Effort: Tachypnea and respiratory distress present.   Abdominal:      General: There is distension.   Skin:     Coloration: Skin is jaundiced and pale.   Neurological:      Mental Status: He is lethargic.      Motor: Weakness present.     Called to assess patient for acute respiratory distress after possible aspiration. Patient requiring nonrebreather for adequate oxygenation. Patient has been nauseated and vomiting all night. NGT has been to LIWS with approximately 1L of brown output for this shift. Patient also acutely hypotensive, 1L fluid bolus infusing.      Rapid Response transferred patient to STAT CT of abdomen/pelvis and then transferred to CMICU bed 6076 for higher level of care.       RECOMMENDATIONS    We recommend: transfer to ICU    PROVIDER ESCALATION    Orders received and case discussed with Dr. Gutierrez and Dr. Jordi Quick .    Disposition: Tx in ICU bed 6076.    FOLLOW UP    Charge RNJeffery and Bedside RN Lane  updated on plan of care. Instructed to call the Rapid Response Nurse, Andrea Metzger RN at [phone] for additional questions or concerns.

## 2022-05-18 NOTE — HOSPITAL COURSE
60 y/o M with history of renal transplant for hypertensive nephropathy (2004 years in [place]) with progressive CKD, chronically immunosuppressed on tacrolimus/sirolimus, LOPEZ cirrhosis, NIDDM, and ILD (autoimmune workup negative) who is transferred to [facility] [place] for ILDEFONSO and decompensated liver cirrhosis on  with concerns for possible HRS. Course was complicated by E. Faecalis SBP on para studies from . Started on amoxicillin. Repeat para with culture clearance on . 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 He was also being treated with posaconazole for blasto Ag/fungitell (awaiting other fungal studies) and s/p treatment for schistosomiasis given equivocal testing (s/p praziquantel x3 doses). Unfortunately, the patient become anuric with renal failure requiring HD; last trial - which he failed. R IJ trialysis cath placed on . Additionally, he had ileus/partial SBO diagnosed on , which continues to plague him. NGT was placed for decompression. KUB on  with diffuse distention of bowel loops and numerous small bowel loops potentially representing pneumatosis. His O2 requirement increased overnight on  prompting NRB placement. He was transferred to MICU for acute hypoxemic respiratory failure and septic shock. Overnight criticalcare team discussed goals of care with him and he had responded he did not want aggressive care and wished to be comfortable, with wife at bedside. Comfort care measures were initiated with morphine drip. He  on 22 at 10:45 AM with wife at bedside.

## 2022-05-18 NOTE — ASSESSMENT & PLAN NOTE
In the setting of having received imodium for diarrhea that occurred earlier in his inpatient stay. Imodium discontinued 5/12, but patient with minimal stools since. KUB 05/14 demonstrating early/partial small bowel obstruction vs ileus. CT Abd 05/15 showing transition point in LUQ.  KUB 05/17 with diffuse distention of bowel loops appears similar to prior and interval development of mottled appearance involving numerous small bowel loops potentially representing pneumatosis. Genreal surgery consulted. CT abdomen/pelvis pending.     - General Surgery consulted,   - CT abdomen, pelvis pending  - Continue NGT to LIWS  - Bisacodyl suppository ordered prn

## 2022-05-18 NOTE — H&P
[facility] - Cardiac Medical ICU  Critical Care Medicine  History & Physical    Patient Name: Edgar Jackson  MRN: [id number]  Admission Date: 4/30/2022  Hospital Length of Stay: 18 days  Code Status: DNR  Attending Physician: Demar Griffith MD   Primary Care Provider: Tyler Castillo MD   Principal Problem: ILDEFONSO (acute kidney injury)    Subjective:     HPI:  The patient is a 62 y/o M with history of renal transplant for hypertensive nephropathy (2004 years in [place]) with progressive CKD, chronically immunosuppressed on tacrolimus/sirolimus, LOPEZ cirrhosis, NIDDM, and ILD (autoimmune workup negative) who is transferred to [facility] [place] [facility] for ILDEFONSO and decompensated liver cirrhosis on 4/30 with concerns for possible HRS. He was complaining of malaise, chronic cough, diarrhea, weight loss and febrile on initial presentation. Hepatology and kidney transplant were consulted on admission. Course was complicated by E. Faecalis SBP on para studies from 5/1. Started on amoxicillin. Repeat para with culture clearance on 5/6. There was worsening respiratory status prompting bronchoscopy on 5/4 with pulmonology. 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 Currently also being treated with posaconazole for blasto Ag/fungitell (awaiting other fungal studies) and s/p treatment for schistosomiasis given equivocal testing (s/p praziquantel x3 doses). Unfortunately, the patient become anuric with renal failure requiring HD; last trial 5/17- which he failed. R IJ trialysis cath placed on 05/13. Additionally, he had ileus/partial SBO diagnosed on 5/14, which continues to plague him. NGT was placed for decompression. KUB on 5/17 with diffuse distention of bowel loops and numerous small bowel loops potentially representing pneumatosis. Stat CT A/P without contrast was ordered and is pending. His O2  requirement increased overnight on 5/18 prompting NRB placement. MICU was consulted given clinical deterioration.       Hospital/ICU Course:  No notes on file     Past Medical History:   Diagnosis Date    BPH (benign prostatic hyperplasia)     Diabetes 2020    Gout     Hypertension 2000    Hypothyroidism 04/08/2022    Interstitial lung disease 03/30/2022    Kidney transplant recipient 01/2004    [place] 2* hypertensive nephropathy    Unspecified cirrhosis of liver     ?LOPEZ       Past Surgical History:   Procedure Laterality Date    CATARACT EXTRACTION Right 2017    COLONOSCOPY N/A 3/8/2022    Procedure: COLONOSCOPY;  Surgeon: Mio Simmons III, MD;  Location: [facility];  Service: Endoscopy;  Laterality: N/A;    COLONOSCOPY N/A 5/5/2022    Procedure: COLONOSCOPY;  Surgeon: Chiquita Lewis MD;  Location: [facility] (2ND FLR);  Service: Endoscopy;  Laterality: N/A;    COLONOSCOPY N/A 5/5/2022    Procedure: COLONOSCOPY;  Surgeon: Chiquita Lewis MD;  Location: [facility] (2ND FLR);  Service: Endoscopy;  Laterality: N/A;    ENDOSCOPIC ULTRASOUND OF UPPER GASTROINTESTINAL TRACT N/A 3/8/2022    Procedure: ULTRASOUND, UPPER GI TRACT, ENDOSCOPIC;  Surgeon: Mio Simmons III, MD;  Location: [facility];  Service: Endoscopy;  Laterality: N/A;    ESOPHAGOGASTRODUODENOSCOPY N/A 5/5/2022    Procedure: EGD (ESOPHAGOGASTRODUODENOSCOPY);  Surgeon: Chiquita Lewis MD;  Location: [facility] (2ND FLR);  Service: Endoscopy;  Laterality: N/A;    ESOPHAGOGASTRODUODENOSCOPY N/A 5/5/2022    Procedure: EGD (ESOPHAGOGASTRODUODENOSCOPY);  Surgeon: Chiquita Lewis MD;  Location: [facility] (2ND FLR);  Service: Endoscopy;  Laterality: N/A;    KIDNEY TRANSPLANT  2004    PORTACATH PLACEMENT  2003    REMOVAL OF VASCULAR ACCESS PORT  2005       Review of patient's allergies indicates:  No Known Allergies    Family History       Problem Relation (Age of Onset)    Heart disease Father    Kidney disease Father           Tobacco Use    Smoking status: Never Smoker    Smokeless tobacco: Never Used   Substance and Sexual Activity    Alcohol use: Not Currently    Drug use: Never    Sexual activity: Not on file      Review of Systems   Unable to perform ROS: Severe respiratory distress   Objective:     Vital Signs (Most Recent):  Temp: 97 °F (36.1 °C) (05/17/22 1159)  Pulse: 108 (05/18/22 0133)  Resp: (!) 25 (05/18/22 0245)  BP: 132/85 (05/17/22 1951)  SpO2: (!) 93 % (05/18/22 0133)   Vital Signs (24h Range):  Temp:  [97 °F (36.1 °C)-97.1 °F (36.2 °C)] 97 °F (36.1 °C)  Pulse:  [] 108  Resp:  [15-26] 25  SpO2:  [91 %-98 %] 93 %  BP: ()/(53-95) 132/85   Weight: 62.4 kg (137 lb 9.1 oz)  Body mass index is 20.92 kg/m².      Intake/Output Summary (Last 24 hours) at 5/18/2022 0348  Last data filed at 5/17/2022 1743  Gross per 24 hour   Intake 737.75 ml   Output 284 ml   Net 453.75 ml       Physical Exam  Vitals and nursing note reviewed.   Constitutional:       Appearance: He is ill-appearing and diaphoretic.   HENT:      Mouth/Throat:      Mouth: Mucous membranes are dry.   Eyes:      Pupils: Pupils are equal, round, and reactive to light.   Cardiovascular:      Rate and Rhythm: Regular rhythm. Tachycardia present.      Pulses: Normal pulses.      Heart sounds: Normal heart sounds.   Pulmonary:      Effort: Respiratory distress present.      Breath sounds: Wheezing and rhonchi present.   Abdominal:      General: There is distension.      Tenderness: There is abdominal tenderness.   Musculoskeletal:         General: Normal range of motion.   Skin:     General: Skin is warm.      Coloration: Skin is jaundiced.   Neurological:      Mental Status: He is alert and oriented to person, place, and time.       Vents:  Negative Inspiratory Force (cm H2O): -40 (05/12/22 1636)  Lines/Drains/Airways       Central Venous Catheter Line  Duration                  Hemodialysis Catheter right internal jugular -- days    Percutaneous  Central Line Insertion/Assessment - Triple Lumen  05/13/22 1705 right internal jugular 4 days              Drain  Duration                  NG/OG Tube 05/14/22 1545 14 Fr. Left nostril 3 days              Peripheral Intravenous Line  Duration                  Midline Catheter Insertion/Assessment  - Single Lumen 05/12/22 1150 Left brachial vein 20g x 10cm 5 days                  Significant Labs:    CBC/Anemia Profile:  Recent Labs   Lab 05/16/22  0724 05/17/22  0406 05/18/22  0235   WBC 14.97* 16.11* 26.66*   HGB 6.7* 9.4* 11.1*   HCT 20.1* 27.4* 33.8*   PLT 44* 42* 40*   MCV 89 85 89   RDW 19.1* 20.6* 22.1*   RETIC  --  6.2*  --         Chemistries:  Recent Labs   Lab 05/16/22  0414 05/17/22  0406    143   K 3.8 4.2    103   CO2 27 25   BUN 75* 94*   CREATININE 4.2* 4.9*   CALCIUM 7.8* 8.1*   ALBUMIN 2.6* 2.6*   PROT 4.7* 5.1*   BILITOT 11.3* 14.8*   ALKPHOS 74 92   ALT 8* 9*   AST 59* 64*   MG  --  2.8*   PHOS  --  6.6*       All pertinent labs within the past 24 hours have been reviewed.    Significant Imaging: I have reviewed all pertinent imaging results/findings within the past 24 hours.    Assessment/Plan:     Pulmonary  Lung infiltrate on CT  Pt with Hx of chronic ILD with bilateral areas of lung scarring. Regions appear to be slightly larger in size when compared to previous imaging. New findings of bilateral infiltrates noted on recent CT Chest w/o contrast concerning for pneumonia. Acute fever reported over the past two evenings. Pt started on broad spectrum abx with septic workup performed. Respiratory viral panel negative. Pulmonology consulted, appreciate recs.     - f/u bronchoscopy cx negative    Renal/  * ILDEFONSO (acute kidney injury)  Patient with acute kidney injury likely d/t IVVD/Dehydration  Ultrasound transplant kidney no acute changes. Possibly due to tacrolimus versus hepatorenal syndrome, Nephrology has been consulted.  Bicarb ordered for low serum levels. Cr/GFR to be trended  for 6 weeks for evaluation. First trial of HD with fluid removal 05/17 but was unsuccessful due to hypotension.     - KTM following.  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    - ID consulted  -12 months of posaconazole, started 5/4/2022.  - Monthly blastomyces urine and serum Ag.  - Secondary prophylaxis after transplant routinely not recommended. However if patient were to be transplanted, needs ID consult at that time for updated recommendations    Schistosoma  Equivocal schistosoma serologies.  Will treat empirically.     -Praziquantel 20mg/kg q6h x3 doses    Pneumocystis jiroveci pneumonia  CT with opacifications, reverse halo sign, found to have PJP.     -Clindamycin and Primaquine for 21 days  -Steroid taper starting at prednisone 40mg BID days 1-5, then 40mg daily days 6-10, then 20mg daily days 11-21    GI  Partial small bowel obstruction  In the setting of having received imodium for diarrhea that occurred earlier in his inpatient stay. Imodium discontinued 5/12, but patient with minimal stools since. KUB 05/14 demonstrating early/partial small bowel obstruction vs ileus. CT Abd 05/15 showing transition point in LUQ.  KUB 05/17 with diffuse distention of bowel loops appears similar to prior and interval development of mottled appearance involving numerous small bowel loops potentially representing pneumatosis. Genreal surgery consulted. CT abdomen/pelvis pending.     - General Surgery consulted,   - CT abdomen, pelvis pending  - Continue NGT to LIWS  - Bisacodyl suppository ordered prn    Cirrhosis of liver with ascites  MELD-Na score: 35 at 5/15/2022  4:53 AM  MELD score: 35 at 5/15/2022  4:53 AM  Calculated from:  Serum Creatinine: 5.6 mg/dL (Using max of 4 mg/dL) at 5/15/2022  4:53  AM  Serum Sodium: 139 mmol/L (Using max of 137 mmol/L) at 5/15/2022  4:53 AM  Total Bilirubin: 9.7 mg/dL at 5/15/2022  4:53 AM  INR(ratio): 1.8 at 5/15/2022  4:53 AM  Age: 61 years  Patient recently diagnosed with cirrhosis 2/2 to LOPEZ. Pulmonary hypertension now present with with multiple episodes of paracentesis. Largest abdominal fluid reduction just over 5L. Diagnostic paracent[place] [facility] with enterococcus faecalis on culture.     - Hepatology and KTM coordinating for possible transplant.   - Patient denied listing for transplant given concern for ILD, pulm consulted for any further recs and are recommending outpatient PFTs once acute pulmonary insult (PJP PNA) treated.  - Per transplant anesthesia, they are requesting additional PET stress imaging as he did not achieve maximal HR on his DSE. PET Stress scheduled for tomorrow, 5/16.     Palliative Care  Advanced care planning/counseling discussion  Dr. Owens discussed goals of care with the patient and his wife at bedside. Full ACP note to follow. Pt wishes to be made comfortable. DNR will be placed in chart and patient will be made comfort measures.         Critical Care Daily Checklist:    A: Awake: RASS Goal/Actual Goal:    Actual:     B: Spontaneous Breathing Trial Performed?     C: SAT & SBT Coordinated?  N/A                      D: Delirium: CAM-ICU     E: Early Mobility Performed? Yes   F: Feeding Goal: Goals: Meet % of EEN, EPN  Status: Nutrition Goal Status: progressing towards goal   Current Diet Order   Procedures    Diet NPO      AS: Analgesia/Sedation None   T: Thromboembolic Prophylaxis None   H: HOB > 300 Yes   U: Stress Ulcer Prophylaxis (if needed) None   G: Glucose Control 140-180   B: Bowel Function Stool Occurrence: 0   I: Indwelling Catheter (Lines & Oliveira) Necessity RIJ Trialysis catheter   D: De-escalation of Antimicrobials/Pharmacotherapies Multiple    Plan for the day/ETD Transition to comfort care    Code  Status:  Family/Goals of Care: DNR  Ongoing     Critical Care Time: 50 minutes  Critical secondary to Patient has a condition that poses threat to life and bodily function: Acute Renal Failure and Small bowel obstruction, acute hypoxic respiratory failure     Critical care was time spent personally by me on the following activities: development of treatment plan with patient or surrogate and bedside caregivers, discussions with consultants, evaluation of patient's response to treatment, examination of patient, ordering and performing treatments and interventions, ordering and review of laboratory studies, ordering and review of radiographic studies, pulse oximetry, re-evaluation of patient's condition. This critical care time did not overlap with that of any other provider or involve time for any procedures.     Juliann Albrecht, KIZZY  Critical Care Medicine  [facility] - Cardiac Medical ICU

## 2022-05-18 NOTE — CARE UPDATE
RAPID RESPONSE NURSE AI ALERT       AI alert received.    Chart Reviewed: 05/17/2022, 11:11 PM    Bedside RNLane contacted. Patient with continued nausea and vomiting. NGT to LIWS. Antiemetic meds given. No additional concerns verbalized at this time. Instructed to call [phone] for further concerns or assistance.     Andrea Metzger RN

## 2022-05-18 NOTE — ASSESSMENT & PLAN NOTE
Pt with Hx of chronic ILD with bilateral areas of lung scarring. Regions appear to be slightly larger in size when compared to previous imaging. New findings of bilateral infiltrates noted on recent CT Chest w/o contrast concerning for pneumonia. Acute fever reported over the past two evenings. Pt started on broad spectrum abx with septic workup performed. Respiratory viral panel negative. Pulmonology consulted, appreciate recs.     - f/u bronchoscopy cx negative

## 2022-05-18 NOTE — PLAN OF CARE
KTM Chart Check      Intake/Output Summary (Last 24 hours) at 5/18/2022 1114  Last data filed at 5/18/2022 1000  Gross per 24 hour   Intake 1084.06 ml   Output 209 ml   Net 875.06 ml       Vitals:    05/18/22 0431 05/18/22 0500 05/18/22 0530 05/18/22 0600   BP:  (!) 57/35 (!) 77/48 (!) 69/41   BP Location:  Right arm Right arm Right arm   Patient Position:  Lying Lying Lying   Pulse:  105 110 107   Resp: (!) 30 (!) 21 (!) 22 19   Temp:       TempSrc:       SpO2:  (!) 84% (!) 90% (!) 88%   Weight:       Height:           Recent Labs   Lab 05/15/22  0453 05/16/22  0414 05/17/22  0406 05/18/22  0235    139 143 142   K 4.1 3.8 4.2 4.6   CL 98 102 103 103   CO2 25 27 25 19*   BUN 95* 75* 94* 92*   CREATININE 5.6* 4.2* 4.9* 5.1*   CALCIUM 7.8* 7.8* 8.1* 8.1*   PHOS 7.4*  --  6.6* 6.6*       Patient decompensated last evening.  Transferred to ICU care and subsequently made comfort care after primary team discussion with his wife.  At this time, will respectively sign off.    Please call KTM as needed.  Sign off.    Lidya Lin MD  Kidney Transplant  [facility] - Transplant Stepdown

## 2022-05-18 NOTE — CONSULTS
Critical care medicine consult received. Patient accepted to our service. Full H and P to follow.     Kori Lucas DO, MS  Internal Medicine PGY-3

## 2022-05-18 NOTE — ASSESSMENT & PLAN NOTE
MELD-Na score: 35 at 5/15/2022  4:53 AM  MELD score: 35 at 5/15/2022  4:53 AM  Calculated from:  Serum Creatinine: 5.6 mg/dL (Using max of 4 mg/dL) at 5/15/2022  4:53 AM  Serum Sodium: 139 mmol/L (Using max of 137 mmol/L) at 5/15/2022  4:53 AM  Total Bilirubin: 9.7 mg/dL at 5/15/2022  4:53 AM  INR(ratio): 1.8 at 5/15/2022  4:53 AM  Age: 61 years  Patient recently diagnosed with cirrhosis 2/2 to LOPEZ. Pulmonary hypertension now present with with multiple episodes of paracentesis. Largest abdominal fluid reduction just over 5L. [place] [facility] [place] fa[place]alis on culture.     - Hepatology and KTM coordinating for possible transplant.   - Patient denied listing for transplant given concern for ILD, pulm consulted for any further recs and are recommending outpatient PFTs once acute pulmonary insult (PJP PNA) treated.  - Per transplant anesthesia, they are requesting additional PET stress imaging as he did not achieve maximal HR on his DSE. PET Stress scheduled for tomorrow, 5/16.

## 2022-05-20 ENCOUNTER — TELEPHONE (OUTPATIENT)
Dept: PULMONOLOGY | Facility: CLINIC | Age: 62
End: 2022-05-20
Payer: COMMERCIAL

## 2022-05-20 NOTE — TELEPHONE ENCOUNTER
Spoke with Ms Porras with [facility], Informed her that I have received her message. Ms Porras states that Dr Salazar is listed as the physician who will sign patient death certificate. I verbalized to Ms Porras that I understand and advised Ms Porras that I will forward her message to Dr Salazar to review/advise. Ms Porras verbalized that she understand.

## 2022-05-20 NOTE — TELEPHONE ENCOUNTER
----- Message from Isaiah Varela sent at 2022 10:17 AM CDT -----  Contact: Chiquita Porras with [facility] home calling in regards to having a death certificate signed. Requesting call back       Chiquita @[phone]

## 2022-05-31 LAB — FUNGUS SPEC CULT: NORMAL

## 2022-06-06 LAB — HPRA INTERPRETATION: NORMAL

## 2022-06-06 PROCEDURE — 86833 HLA CLASS II HIGH DEFIN QUAL: CPT | Performed by: INTERNAL MEDICINE

## 2022-06-06 PROCEDURE — 86832 HLA CLASS I HIGH DEFIN QUAL: CPT | Performed by: INTERNAL MEDICINE

## 2022-06-08 LAB — FUNGUS SPEC CULT: NORMAL

## 2022-06-09 NOTE — ADDENDUM NOTE
Addendum  created 06/09/22 135 by Kvng Salgado MD    Clinical Note Signed, Intraprocedure Blocks edited, SmartForm saved

## 2022-06-21 LAB — MYCOBACTERIUM SPEC QL CULT: NORMAL

## 2022-06-22 LAB
ACID FAST MOD KINY STN SPEC: NORMAL
MYCOBACTERIUM SPEC QL CULT: NORMAL

## 2022-06-22 PROCEDURE — 86832 HLA CLASS I HIGH DEFIN QUAL: CPT | Performed by: INTERNAL MEDICINE

## 2022-06-22 PROCEDURE — 86977 RBC SERUM PRETX INCUBJ/INHIB: CPT | Performed by: INTERNAL MEDICINE

## 2022-06-25 LAB
ACID FAST MOD KINY STN SPEC: NORMAL
MYCOBACTERIUM SPEC QL CULT: NORMAL

## 2022-06-27 LAB
ACID FAST MOD KINY STN SPEC: NORMAL
MYCOBACTERIUM SPEC QL CULT: NORMAL

## 2022-07-05 PROCEDURE — 86832 HLA CLASS I HIGH DEFIN QUAL: CPT | Performed by: INTERNAL MEDICINE

## 2022-07-05 PROCEDURE — 86977 RBC SERUM PRETX INCUBJ/INHIB: CPT | Performed by: INTERNAL MEDICINE

## 2022-07-07 LAB
CLASS I ANTIBODY COMMENTS - LUMINEX: NORMAL
SERUM COLLECTION DT - LUMINEX CLASS I: NORMAL
SPCL1 TESTING DATE: NORMAL

## 2022-07-07 PROCEDURE — 86832 HLA CLASS I HIGH DEFIN QUAL: CPT | Performed by: INTERNAL MEDICINE

## 2022-07-11 LAB
CLASS I ANTIBODY COMMENTS - LUMINEX: NORMAL
SERUM COLLECTION DT - LUMINEX CLASS I: NORMAL
SLAA1 TESTING DATE: NORMAL

## 2022-07-11 PROCEDURE — 86833 HLA CLASS II HIGH DEFIN QUAL: CPT | Performed by: INTERNAL MEDICINE

## 2022-07-12 LAB
CLASS I ANTIBODY COMMENTS - LUMINEX: NORMAL
CLASS II ANTIBODIES - LUMINEX: NORMAL
SERUM COLLECTION DT - LUMINEX CLASS I: NORMAL
SERUM COLLECTION DT - LUMINEX CLASS II: NORMAL
SPCL1 TESTING DATE: NORMAL
SPCL2 TESTING DATE: NORMAL
SPCLU TESTING DATE: NORMAL

## 2022-07-14 LAB
CLASS I ANTIBODIES - FLOW: NEGATIVE
CLASS II ANTIBODIES - FLOW: NEGATIVE
CPRA %: 0
SERUM COLLECTION DT: NORMAL
SERUM COLLECTION DT: NORMAL
SPCF1 TESTING DATE: NORMAL
SPCF2 TESTING DATE: NORMAL
SPCFL TESTING DATE: NORMAL

## 2024-03-26 NOTE — HOSPITAL COURSE
Patient who recently located here from [place], who is on immunosuppressants after renal transplant ,admitted with acute pneumonia  Patient was also found to have signs of end stage liver disease on imaging studies  Pt was treated with iv abx and was later changed to PO abx  Amb Referral to GI MD/PCP made and pt was discharged to home    Pt would like to try Pravachol, he also asked for this medication along with losartan 100 mg be sent to [facility]

## 2024-05-23 NOTE — PROGRESS NOTES
Patient c/o nausea, no relief from the Zofran. Dr Campos notified. NGT back to [facility] and brown bomb enema ordered.    Detail Level: Simple Render Risk Assessment In Note?: yes Additional Notes: Patient consent was obtained to proceed with the visit and recommended plan of care after discussion of all risks and benefits, including the risks of COVID-19 exposure. Additional Notes: Patient received certified letter. Declines treatment. No regrowth seen clinically s/p biopsy. He will notify office if lesions becomes apparent and will reconsider treatment at that time

## 2024-12-26 NOTE — PATIENT INSTRUCTIONS
Will message  Tweit and see if you can get seen sooner for nephrology  May need diuretics to help with fluid building up  IR paracentesis repeat to help with fluid in the abdomen  Liver biopsy upcoming then follow up to decide what is needed next  Fluid on left side of your chest is very small, not large enough to drain at this time  Scarring in the lungs- may be asbestos related, sarcoidosis or other diagnosis. May need biopsy to find out     Home